# Patient Record
Sex: MALE | Race: BLACK OR AFRICAN AMERICAN | NOT HISPANIC OR LATINO | Employment: OTHER | URBAN - METROPOLITAN AREA
[De-identification: names, ages, dates, MRNs, and addresses within clinical notes are randomized per-mention and may not be internally consistent; named-entity substitution may affect disease eponyms.]

---

## 2022-01-21 LAB
APPEARANCE, UA: CLEAR
BACTERIA SPEC CULT: NORMAL
BILIRUB UR QL STRIP: NEGATIVE
COLOR UR: YELLOW
GLUCOSE (UA): NEGATIVE
HGB UR QL STRIP: NEGATIVE
KETONES UR QL STRIP: NEGATIVE
LEUKOCYTE ESTERASE UR QL STRIP: NEGATIVE
NITRITE UR QL STRIP: NEGATIVE
PH UR STRIP: 6.5 [PH] (ref 5–9)
PROT UR QL STRIP: NEGATIVE
RBC #/AREA URNS HPF: NORMAL /[HPF]
SARS-COV-2 AG RESP QL IA.RAPID: NEGATIVE
SP GR UR STRIP: 1.01 (ref 1–1.03)
SQUAMOUS EPITHELIAL, UA: NORMAL
UROBILINOGEN UR STRIP-ACNC: 0.2
WBC #/AREA URNS HPF: NORMAL /[HPF]

## 2022-01-22 LAB
ABS NEUT (OLG): 4.37 X10(3)/MCL (ref 2.1–9.2)
BASOPHILS # BLD AUTO: 0 X10(3)/MCL (ref 0–0.2)
BASOPHILS NFR BLD AUTO: 0 %
BUN SERPL-MCNC: 12 MG/DL (ref 8.4–25.7)
CALCIUM SERPL-MCNC: 8.8 MG/DL (ref 8.7–10.5)
CHLORIDE SERPL-SCNC: 99 MMOL/L (ref 98–107)
CO2 SERPL-SCNC: 29 MMOL/L (ref 23–31)
CREAT SERPL-MCNC: 0.71 MG/DL (ref 0.73–1.18)
CREAT/UREA NIT SERPL: 17
EOSINOPHIL # BLD AUTO: 0.1 X10(3)/MCL (ref 0–0.9)
EOSINOPHIL NFR BLD AUTO: 1 %
ERYTHROCYTE [DISTWIDTH] IN BLOOD BY AUTOMATED COUNT: 15.8 % (ref 11.5–17)
GLUCOSE SERPL-MCNC: 102 MG/DL (ref 82–115)
HCT VFR BLD AUTO: 28.5 % (ref 42–52)
HGB BLD-MCNC: 9.3 GM/DL (ref 14–18)
IMM GRANULOCYTES # BLD AUTO: 0.03 % (ref 0–0.02)
IMM GRANULOCYTES NFR BLD AUTO: 0.4 % (ref 0–0.43)
LYMPHOCYTES # BLD AUTO: 1.9 X10(3)/MCL (ref 0.6–4.6)
LYMPHOCYTES NFR BLD AUTO: 27 %
MCH RBC QN AUTO: 27.1 PG (ref 27–31)
MCHC RBC AUTO-ENTMCNC: 32.6 GM/DL (ref 33–36)
MCV RBC AUTO: 83.1 FL (ref 80–94)
MONOCYTES # BLD AUTO: 0.6 X10(3)/MCL (ref 0.1–1.3)
MONOCYTES NFR BLD AUTO: 9 %
NEUTROPHILS # BLD AUTO: 4.37 X10(3)/MCL (ref 1.4–7.9)
NEUTROPHILS NFR BLD AUTO: 62 %
PLATELET # BLD AUTO: 242 X10(3)/MCL (ref 130–400)
PMV BLD AUTO: 10.5 FL (ref 9.4–12.4)
POTASSIUM SERPL-SCNC: 3.7 MMOL/L (ref 3.5–5.1)
RBC # BLD AUTO: 3.43 X10(6)/MCL (ref 4.7–6.1)
SODIUM SERPL-SCNC: 136 MMOL/L (ref 136–145)
WBC # SPEC AUTO: 7 X10(3)/MCL (ref 4.5–11.5)

## 2022-01-24 ENCOUNTER — HISTORICAL (OUTPATIENT)
Dept: ADMINISTRATIVE | Facility: HOSPITAL | Age: 77
End: 2022-01-24

## 2022-01-24 LAB
ABS NEUT (OLG): 5.86 X10(3)/MCL (ref 2.1–9.2)
ALBUMIN SERPL-MCNC: 3 GM/DL (ref 3.4–4.8)
ALBUMIN/GLOB SERPL: 1 RATIO (ref 1.1–2)
ALP SERPL-CCNC: 62 UNIT/L (ref 40–150)
ALT SERPL-CCNC: 27 UNIT/L (ref 0–55)
AST SERPL-CCNC: 25 UNIT/L (ref 5–34)
BASOPHILS # BLD AUTO: 0 X10(3)/MCL (ref 0–0.2)
BASOPHILS NFR BLD AUTO: 0 %
BILIRUB SERPL-MCNC: 1.6 MG/DL
BILIRUBIN DIRECT+TOT PNL SERPL-MCNC: 0.6 MG/DL (ref 0–0.5)
BILIRUBIN DIRECT+TOT PNL SERPL-MCNC: 1 MG/DL (ref 0–0.8)
BUN SERPL-MCNC: 12.9 MG/DL (ref 8.4–25.7)
CALCIUM SERPL-MCNC: 9 MG/DL (ref 8.7–10.5)
CHLORIDE SERPL-SCNC: 105 MMOL/L (ref 98–107)
CO2 SERPL-SCNC: 22 MMOL/L (ref 23–31)
CREAT SERPL-MCNC: 0.67 MG/DL (ref 0.73–1.18)
EOSINOPHIL # BLD AUTO: 0.1 X10(3)/MCL (ref 0–0.9)
EOSINOPHIL NFR BLD AUTO: 1 %
ERYTHROCYTE [DISTWIDTH] IN BLOOD BY AUTOMATED COUNT: 16.1 % (ref 11.5–17)
GLOBULIN SER-MCNC: 3.1 GM/DL (ref 2.4–3.5)
GLUCOSE SERPL-MCNC: 92 MG/DL (ref 82–115)
HCT VFR BLD AUTO: 29.9 % (ref 42–52)
HGB BLD-MCNC: 9.9 GM/DL (ref 14–18)
IMM GRANULOCYTES # BLD AUTO: 0.06 % (ref 0–0.02)
IMM GRANULOCYTES NFR BLD AUTO: 0.7 % (ref 0–0.43)
LYMPHOCYTES # BLD AUTO: 1.7 X10(3)/MCL (ref 0.6–4.6)
LYMPHOCYTES NFR BLD AUTO: 20 %
MCH RBC QN AUTO: 27.5 PG (ref 27–31)
MCHC RBC AUTO-ENTMCNC: 33.1 GM/DL (ref 33–36)
MCV RBC AUTO: 83.1 FL (ref 80–94)
MONOCYTES # BLD AUTO: 0.8 X10(3)/MCL (ref 0.1–1.3)
MONOCYTES NFR BLD AUTO: 10 %
NEUTROPHILS # BLD AUTO: 5.86 X10(3)/MCL (ref 1.4–7.9)
NEUTROPHILS NFR BLD AUTO: 68 %
PLATELET # BLD AUTO: 342 X10(3)/MCL (ref 130–400)
PMV BLD AUTO: 10.3 FL (ref 9.4–12.4)
POTASSIUM SERPL-SCNC: 4.6 MMOL/L (ref 3.5–5.1)
PROT SERPL-MCNC: 6.1 GM/DL (ref 5.8–7.6)
RBC # BLD AUTO: 3.6 X10(6)/MCL (ref 4.7–6.1)
SODIUM SERPL-SCNC: 139 MMOL/L (ref 136–145)
WBC # SPEC AUTO: 8.6 X10(3)/MCL (ref 4.5–11.5)

## 2022-01-31 LAB
ABS NEUT (OLG): 4.1 (ref 2.1–9.2)
ALBUMIN SERPL-MCNC: 3.2 G/DL (ref 3.4–4.8)
ALBUMIN/GLOB SERPL: 1.1 {RATIO} (ref 1.1–2)
ALP SERPL-CCNC: 99 U/L (ref 40–150)
ALT SERPL-CCNC: 21 U/L (ref 0–55)
AST SERPL-CCNC: 17 U/L (ref 5–34)
BASOPHILS # BLD AUTO: 0 10*3/UL (ref 0–0.2)
BASOPHILS NFR BLD AUTO: 0 %
BILIRUB SERPL-MCNC: 1.2 MG/DL
BILIRUBIN DIRECT+TOT PNL SERPL-MCNC: 0.5 (ref 0–0.5)
BILIRUBIN DIRECT+TOT PNL SERPL-MCNC: 0.7 (ref 0–0.8)
BUN SERPL-MCNC: 13.8 MG/DL (ref 8.4–25.7)
CALCIUM SERPL-MCNC: 8.7 MG/DL (ref 8.7–10.5)
CHLORIDE SERPL-SCNC: 103 MMOL/L (ref 98–107)
CO2 SERPL-SCNC: 22 MMOL/L (ref 23–31)
CREAT SERPL-MCNC: 0.73 MG/DL (ref 0.73–1.18)
EOSINOPHIL # BLD AUTO: 0.1 10*3/UL (ref 0–0.9)
EOSINOPHIL NFR BLD AUTO: 2 %
ERYTHROCYTE [DISTWIDTH] IN BLOOD BY AUTOMATED COUNT: 17.5 % (ref 11.5–17)
GLOBULIN SER-MCNC: 2.9 G/DL (ref 2.4–3.5)
GLUCOSE SERPL-MCNC: 87 MG/DL (ref 82–115)
HCT VFR BLD AUTO: 34.4 % (ref 42–52)
HEMOLYSIS INTERF INDEX SERPL-ACNC: 4
HGB BLD-MCNC: 10.7 G/DL (ref 14–18)
ICTERIC INTERF INDEX SERPL-ACNC: 1
IMM GRANULOCYTES # BLD AUTO: 0.02 10*3/UL (ref 0–0.02)
IMM GRANULOCYTES NFR BLD AUTO: 0.3 % (ref 0–0.43)
LIPEMIC INTERF INDEX SERPL-ACNC: <0
LYMPHOCYTES # BLD AUTO: 1.6 10*3/UL (ref 0.6–4.6)
LYMPHOCYTES NFR BLD AUTO: 24 %
MANUAL DIFF? (OHS): NO
MCH RBC QN AUTO: 27.3 PG (ref 27–31)
MCHC RBC AUTO-ENTMCNC: 31.1 G/DL (ref 33–36)
MCV RBC AUTO: 87.8 FL (ref 80–94)
MONOCYTES # BLD AUTO: 0.6 10*3/UL (ref 0.1–1.3)
MONOCYTES NFR BLD AUTO: 10 %
NEUTROPHILS # BLD AUTO: 4.1 10*3/UL (ref 1.4–7.9)
NEUTROPHILS NFR BLD AUTO: 64 %
PLATELET # BLD AUTO: 463 10*3/UL (ref 130–400)
PMV BLD AUTO: 10.2 FL (ref 9.4–12.4)
POTASSIUM SERPL-SCNC: 4.8 MMOL/L (ref 3.5–5.1)
PROT SERPL-MCNC: 6.1 G/DL (ref 5.8–7.6)
RBC # BLD AUTO: 3.92 10*6/UL (ref 4.7–6.1)
SODIUM SERPL-SCNC: 135 MMOL/L (ref 136–145)
WBC # SPEC AUTO: 6.4 10*3/UL (ref 4.5–11.5)

## 2022-02-01 ENCOUNTER — HISTORICAL (OUTPATIENT)
Dept: ADMINISTRATIVE | Facility: HOSPITAL | Age: 77
End: 2022-02-01

## 2022-02-07 LAB
ABS NEUT (OLG): 2.68 (ref 2.1–9.2)
ALBUMIN SERPL-MCNC: 3.2 G/DL (ref 3.4–4.8)
ALBUMIN/GLOB SERPL: 1.1 {RATIO} (ref 1.1–2)
ALP SERPL-CCNC: 125 U/L (ref 40–150)
ALT SERPL-CCNC: 23 U/L (ref 0–55)
AST SERPL-CCNC: 18 U/L (ref 5–34)
BASOPHILS # BLD AUTO: 0 10*3/UL (ref 0–0.2)
BASOPHILS NFR BLD AUTO: 0 %
BILIRUB SERPL-MCNC: 0.9 MG/DL
BILIRUBIN DIRECT+TOT PNL SERPL-MCNC: 0.4 (ref 0–0.5)
BILIRUBIN DIRECT+TOT PNL SERPL-MCNC: 0.5 (ref 0–0.8)
BUN SERPL-MCNC: 12.9 MG/DL (ref 8.4–25.7)
CALCIUM SERPL-MCNC: 10 MG/DL (ref 8.7–10.5)
CHLORIDE SERPL-SCNC: 103 MMOL/L (ref 98–107)
CO2 SERPL-SCNC: 25 MMOL/L (ref 23–31)
CREAT SERPL-MCNC: 0.72 MG/DL (ref 0.73–1.18)
EOSINOPHIL # BLD AUTO: 0.2 10*3/UL (ref 0–0.9)
EOSINOPHIL NFR BLD AUTO: 3 %
ERYTHROCYTE [DISTWIDTH] IN BLOOD BY AUTOMATED COUNT: 16.8 % (ref 11.5–17)
GLOBULIN SER-MCNC: 3 G/DL (ref 2.4–3.5)
GLUCOSE SERPL-MCNC: 86 MG/DL (ref 82–115)
HCT VFR BLD AUTO: 34.2 % (ref 42–52)
HEMOLYSIS INTERF INDEX SERPL-ACNC: 4
HEMOLYSIS INTERF INDEX SERPL-ACNC: 4
HGB BLD-MCNC: 10.8 G/DL (ref 14–18)
ICTERIC INTERF INDEX SERPL-ACNC: 1
IMM GRANULOCYTES # BLD AUTO: 0.01 10*3/UL (ref 0–0.02)
IMM GRANULOCYTES NFR BLD AUTO: 0.2 % (ref 0–0.43)
LIPEMIC INTERF INDEX SERPL-ACNC: <0
LYMPHOCYTES # BLD AUTO: 1.5 10*3/UL (ref 0.6–4.6)
LYMPHOCYTES NFR BLD AUTO: 30 %
MAGNESIUM SERPL-MCNC: 1.9 MG/DL (ref 1.6–2.6)
MANUAL DIFF? (OHS): NO
MCH RBC QN AUTO: 27.3 PG (ref 27–31)
MCHC RBC AUTO-ENTMCNC: 31.6 G/DL (ref 33–36)
MCV RBC AUTO: 86.6 FL (ref 80–94)
MONOCYTES # BLD AUTO: 0.6 10*3/UL (ref 0.1–1.3)
MONOCYTES NFR BLD AUTO: 12 %
NEUTROPHILS # BLD AUTO: 2.68 10*3/UL (ref 1.4–7.9)
NEUTROPHILS NFR BLD AUTO: 54 %
PLATELET # BLD AUTO: 377 10*3/UL (ref 130–400)
PMV BLD AUTO: 9.5 FL (ref 9.4–12.4)
POTASSIUM SERPL-SCNC: 4.4 MMOL/L (ref 3.5–5.1)
PROT SERPL-MCNC: 6.2 G/DL (ref 5.8–7.6)
RBC # BLD AUTO: 3.95 10*6/UL (ref 4.7–6.1)
SODIUM SERPL-SCNC: 136 MMOL/L (ref 136–145)
WBC # SPEC AUTO: 5 10*3/UL (ref 4.5–11.5)

## 2022-03-16 ENCOUNTER — HISTORICAL (OUTPATIENT)
Dept: RADIOLOGY | Facility: HOSPITAL | Age: 77
End: 2022-03-16

## 2022-03-22 ENCOUNTER — HISTORICAL (OUTPATIENT)
Dept: ADMINISTRATIVE | Facility: HOSPITAL | Age: 77
End: 2022-03-22

## 2022-03-22 ENCOUNTER — HISTORICAL (OUTPATIENT)
Dept: CARDIOLOGY | Facility: HOSPITAL | Age: 77
End: 2022-03-22

## 2022-03-22 LAB
ABS NEUT (OLG): 5.49 (ref 2.1–9.2)
BASOPHILS # BLD AUTO: 0 10*3/UL (ref 0–0.2)
BASOPHILS NFR BLD AUTO: 0 %
EOSINOPHIL # BLD AUTO: 0 10*3/UL (ref 0–0.9)
EOSINOPHIL NFR BLD AUTO: 0 %
ERYTHROCYTE [DISTWIDTH] IN BLOOD BY AUTOMATED COUNT: 14.5 % (ref 11.5–17)
HCT VFR BLD AUTO: 38.5 % (ref 42–52)
HGB BLD-MCNC: 12.9 G/DL (ref 14–18)
INR PPP: 1 (ref 0–1.3)
LYMPHOCYTES # BLD AUTO: 1.8 10*3/UL (ref 0.6–4.6)
LYMPHOCYTES NFR BLD AUTO: 23 %
MANUAL DIFF? (OHS): NO
MCH RBC QN AUTO: 28.2 PG (ref 27–31)
MCHC RBC AUTO-ENTMCNC: 33.5 G/DL (ref 33–36)
MCV RBC AUTO: 84.2 FL (ref 80–94)
MONOCYTES # BLD AUTO: 0.6 10*3/UL (ref 0.1–1.3)
MONOCYTES NFR BLD AUTO: 8 %
NEUTROPHILS # BLD AUTO: 5.49 10*3/UL (ref 2.1–9.2)
NEUTROPHILS NFR BLD AUTO: 68 %
PLATELET # BLD AUTO: 237 10*3/UL (ref 130–400)
PMV BLD AUTO: 10 FL (ref 9.4–12.4)
PROTHROMBIN TIME: 13.2 S (ref 12.5–14.5)
RBC # BLD AUTO: 4.57 10*6/UL (ref 4.7–6.1)
WBC # SPEC AUTO: 8 10*3/UL (ref 4.5–11.5)

## 2022-04-01 ENCOUNTER — HISTORICAL (OUTPATIENT)
Dept: ADMINISTRATIVE | Facility: HOSPITAL | Age: 77
End: 2022-04-01

## 2022-04-19 ENCOUNTER — HISTORICAL (OUTPATIENT)
Dept: ADMINISTRATIVE | Facility: HOSPITAL | Age: 77
End: 2022-04-19
Payer: MEDICARE

## 2022-04-19 LAB
ABS NEUT (OLG): 3.3 (ref 2.1–9.2)
ALBUMIN SERPL-MCNC: 3.3 G/DL (ref 3.4–4.8)
ALBUMIN/GLOB SERPL: 1.1 {RATIO} (ref 1.1–2)
ALP SERPL-CCNC: 89 U/L (ref 40–150)
ALT SERPL-CCNC: 18 U/L (ref 0–55)
AST SERPL-CCNC: 14 U/L (ref 5–34)
BASOPHILS # BLD AUTO: 0 10*3/UL (ref 0–0.2)
BASOPHILS NFR BLD AUTO: 1 %
BILIRUB SERPL-MCNC: 0.4 MG/DL
BILIRUBIN DIRECT+TOT PNL SERPL-MCNC: 0.2 (ref 0–0.5)
BILIRUBIN DIRECT+TOT PNL SERPL-MCNC: 0.2 (ref 0–0.8)
BUN SERPL-MCNC: 9.7 MG/DL (ref 8.4–25.7)
CALCIUM SERPL-MCNC: 9.3 MG/DL (ref 8.7–10.5)
CHLORIDE SERPL-SCNC: 112 MMOL/L (ref 98–107)
CO2 SERPL-SCNC: 25 MMOL/L (ref 23–31)
CREAT SERPL-MCNC: 0.77 MG/DL (ref 0.73–1.18)
EOSINOPHIL # BLD AUTO: 0.1 10*3/UL (ref 0–0.9)
EOSINOPHIL NFR BLD AUTO: 2 %
ERYTHROCYTE [DISTWIDTH] IN BLOOD BY AUTOMATED COUNT: 16.2 % (ref 11.5–17)
GLOBULIN SER-MCNC: 3.1 G/DL (ref 2.4–3.5)
GLUCOSE SERPL-MCNC: 84 MG/DL (ref 82–115)
HCT VFR BLD AUTO: 33.2 % (ref 42–52)
HEMOLYSIS INTERF INDEX SERPL-ACNC: 1
HGB BLD-MCNC: 10.1 G/DL (ref 14–18)
ICTERIC INTERF INDEX SERPL-ACNC: 0
LIPEMIC INTERF INDEX SERPL-ACNC: 5
LYMPHOCYTES # BLD AUTO: 1.2 10*3/UL (ref 0.6–4.6)
LYMPHOCYTES NFR BLD AUTO: 25 %
MANUAL DIFF? (OHS): NO
MCH RBC QN AUTO: 27.4 PG (ref 27–31)
MCHC RBC AUTO-ENTMCNC: 30.4 G/DL (ref 33–36)
MCV RBC AUTO: 90.2 FL (ref 80–94)
MONOCYTES # BLD AUTO: 0.3 10*3/UL (ref 0.1–1.3)
MONOCYTES NFR BLD AUTO: 7 %
NEUTROPHILS # BLD AUTO: 3.3 10*3/UL (ref 2.1–9.2)
NEUTROPHILS NFR BLD AUTO: 66 %
PLATELET # BLD AUTO: 326 10*3/UL (ref 130–400)
PMV BLD AUTO: 10.2 FL (ref 9.4–12.4)
POTASSIUM SERPL-SCNC: 4.1 MMOL/L (ref 3.5–5.1)
PROT SERPL-MCNC: 6.4 G/DL (ref 5.8–7.6)
RBC # BLD AUTO: 3.68 10*6/UL (ref 4.7–6.1)
SODIUM SERPL-SCNC: 143 MMOL/L (ref 136–145)
WBC # SPEC AUTO: 5 10*3/UL (ref 4.5–11.5)

## 2022-04-26 ENCOUNTER — HISTORICAL (OUTPATIENT)
Dept: RESPIRATORY THERAPY | Facility: HOSPITAL | Age: 77
End: 2022-04-26
Payer: MEDICARE

## 2022-04-27 ENCOUNTER — HISTORICAL (OUTPATIENT)
Dept: RADIOLOGY | Facility: HOSPITAL | Age: 77
End: 2022-04-27
Payer: MEDICARE

## 2022-05-10 RX ORDER — SUCRALFATE 1 G/1
1 TABLET ORAL
COMMUNITY
Start: 2022-05-05 | End: 2022-05-10 | Stop reason: SDUPTHER

## 2022-05-10 RX ORDER — SUCRALFATE 1 G/1
1 TABLET ORAL
Qty: 120 TABLET | Refills: 1 | Status: SHIPPED | OUTPATIENT
Start: 2022-05-10 | End: 2022-05-17 | Stop reason: SDUPTHER

## 2022-05-14 NOTE — DISCHARGE SUMMARY
Admit and Discharge Dates  Admit Date: 01/21/2022  Discharge Date: 02/14/2022  Physicians  Attending Physician - Stephanie VILLANUEVA, Sundeep BERNSTEIN  Admitting Physician - Stephanie VILLANUEVA, Sundeep BERNSTEIN  Consulting Physician - Lynn VILLANUEVA, Joseph  Primary Care Physician - Pepito VILLANUEVA, Axel  Discharge Diagnosis  1.?Debility?R53.81  2.?Closed intertrochanteric fracture of left hip?S72.142A  3.?Postoperative anemia due to acute blood loss?D62  4.?Hypertension?I10  5.?Hyperlipidemia?E78.5  6.?GERD (gastroesophageal reflux disease)?K21.9  7.?Tobacco dependence?F17.200  Surgical Procedures  No procedures recorded for this visit.  Immunizations  No immunizations recorded for this visit.  Hospital Course  Mr. Rosales is a 75 y/o AAM with a h/o HTN, hyperlipidemia, and GERD who was transferred from [facility] on 1/21/22 for rehab s/p IM nailing of left intertrochanteric hip fracture. He initially fell onto his left side on 1/16/22 and he presented to [facility] ER for evaluation. X-ray of the left?femur showed a displaced comminuted intertrochanteric fracture of the left femur?and orthopedics was consulted. He underwent open reduction IM nailing of left displaced comminuted intertrochanteric hip fracture on 1/18/22 by Dr. Fortino Palomares. Postoperatively he developed a worsening anemia with a hemoglobin and hematocrit of 7.7 and 23.8 on 1/18/22 and he was transfused with 2 units packed RBCs. He had no other complications throughout his hospital course and he was transferred to [facility] for PT/OT and management of his multiple medical comorbidities.?He completed a 7 day course of keflex on 1/31/22 due to left hip drainage?and his wound culture grew normal skin everette.?He has been participating in therapy with slow progress?due to?TTWB status. He had no other complications throughout his hospital course and he was discharged home in stable condition.  Time Spent on discharge  >30 minutes  Objective  Vitals & Measurements  T:?36.5? ?C  (Oral)? TMIN:?36.5? ?C (Oral)? TMAX:?36.6? ?C (Oral)? HR:?85(Peripheral)? HR:?84(Monitored)? RR:?18? BP:?135/78? SpO2:?98%?  Physical Exam  General: Elderly obese AAM in NAD.  CVS: RRR.  Respiratory: CTA(B).  Abdomen: Soft, NT, ND, BS+.  Extremities: No c/c/e.  Neuro: A+Ox4.  Skin: Left hip wound c/d/i.?  Patient Discharge Condition  Stable  Discharge Disposition  Home  ?  This service was provided via telemedicine.  Type of Software: Audio/Visual.  Originating Site: [facility].  Distant Site: [facility].  His exam was performed with the assistance of Kacie Avilez RN.   Discharge Medication Reconciliation  Continue  acetaminophen-HYDROcodone (acetaminophen-hydrocodone 325 mg-7.5 mg oral tablet)?1 tab(s), Oral, q4hr, PRN pain  amLODIPine (Amlodipine Besylate 5 Mg)?5 mg, Oral, BID  ascorbic acid (ascorbic acid 500 mg oral tablet)?500 mg, Oral, At Bedtime  aspirin (aspirin 81 mg oral Delayed Release (EC) tablet)?81 mg, Oral, BID  bisacodyl (Dulcolax Laxative 10 mg RECTAL suppository)?10 mg, IL (rectal), Daily, PRN constipation  docusate-senna (docusate-senna 50 mg-8.6 mg oral tablet)?2 tab(s), Oral, Once a day (at bedtime)  famotidine (Pepcid 20 mg oral tablet)?20 mg, Oral, Daily  hydroCHLOROthiazide-lisinopril (Lisinopril-Hctz 20/12.5 T)?1 tab(s), Oral, Daily  lovastatin (Lovastatin 40 Mg Tablet)?40 mg, Oral, Daily  methocarbamol (Robaxin 750 mg oral tablet)?750 mg, Oral, q6hr, PRN spasm  nicotine (nicotine 14 mg/24 hr transdermal film, extended release), TD, Daily  pantoprazole (Protonix 40 mg ORAL enteric coated tablet)?40 mg, Oral, Daily  polyethylene glycol 3350 (polyethylene glycol 3350 oral powder for reconstitution)?17 gm, Oral, Daily  temazepam (Restoril 15 mg oral capsule)?15 mg, Oral, At Bedtime, PRN insomnia  Discontinue  enoxaparin?40 mg, Subcutaneous, Daily  Education and Orders Provided  --ALL-Hypertension, Easy-to-Read (MLTAMPORELLO)  Anemia  Hip Fracture Treated With ORIF, Care  After  Smoking Cessation, Tips for Success (Custom)  [facility] - Next Dose Due (JLVICKNAIR)  Discharge - 02/14/22 9:40:00 CST, Home?  Discharge Activity - Other:, TTWB?  Discharge Diet - Cardiac?  Follow up  Nery Pulliam, on 02/24/2022  ????Follow up appointment with Annie Pulliam on Thursday, February 24, 2022 @ 10:30 am. ?Spoke Toshia.  Fortino Palomares, on 02/16/2022  ????Follow up appointment with Dr. Fortino Palomares on Wednesday, February 16, 2022 @ 10:30 am. ?Spoke to Gertrude.  [facility] Phone: [phone]  [facility] Phone: [phone]

## 2022-05-17 ENCOUNTER — TELEPHONE (OUTPATIENT)
Dept: FAMILY MEDICINE | Facility: CLINIC | Age: 77
End: 2022-05-17
Payer: MEDICARE

## 2022-05-17 RX ORDER — AMLODIPINE BESYLATE 5 MG/1
5 TABLET ORAL 2 TIMES DAILY
COMMUNITY
Start: 2022-04-29 | End: 2022-06-08 | Stop reason: SDUPTHER

## 2022-05-17 RX ORDER — LISINOPRIL AND HYDROCHLOROTHIAZIDE 12.5; 2 MG/1; MG/1
1 TABLET ORAL DAILY
Status: ON HOLD | COMMUNITY
Start: 2022-04-13 | End: 2022-05-24 | Stop reason: CLARIF

## 2022-05-17 RX ORDER — SUCRALFATE 1 G/1
1 TABLET ORAL
COMMUNITY
Start: 2022-04-12 | End: 2022-06-08 | Stop reason: ALTCHOICE

## 2022-05-17 RX ORDER — PANTOPRAZOLE SODIUM 40 MG/1
40 TABLET, DELAYED RELEASE ORAL
COMMUNITY
Start: 2022-04-26 | End: 2022-05-17 | Stop reason: SDUPTHER

## 2022-05-17 RX ORDER — LINACLOTIDE 145 UG/1
145 CAPSULE, GELATIN COATED ORAL DAILY PRN
COMMUNITY
Start: 2022-03-28

## 2022-05-17 RX ORDER — LOVASTATIN 40 MG/1
40 TABLET ORAL DAILY
COMMUNITY
Start: 2022-03-18 | End: 2022-05-17 | Stop reason: SDUPTHER

## 2022-05-17 RX ORDER — AMOXICILLIN 250 MG
CAPSULE ORAL
Status: ON HOLD | COMMUNITY
Start: 2022-01-20 | End: 2022-05-24 | Stop reason: CLARIF

## 2022-05-17 RX ORDER — MUPIROCIN CALCIUM 20 MG/G
CREAM TOPICAL
Status: ON HOLD | COMMUNITY
Start: 2022-04-18 | End: 2022-05-28 | Stop reason: HOSPADM

## 2022-05-17 RX ORDER — HYDROCODONE BITARTRATE AND ACETAMINOPHEN 7.5; 325 MG/1; MG/1
1 TABLET ORAL EVERY 4 HOURS PRN
Status: ON HOLD | COMMUNITY
Start: 2022-02-15 | End: 2022-05-28 | Stop reason: SDUPTHER

## 2022-05-17 RX ORDER — ATORVASTATIN CALCIUM 10 MG/1
10 TABLET, FILM COATED ORAL DAILY
COMMUNITY
Start: 2022-05-05 | End: 2022-05-17 | Stop reason: SDUPTHER

## 2022-05-17 RX ORDER — METHOCARBAMOL 750 MG/1
750 TABLET, FILM COATED ORAL
COMMUNITY
Start: 2022-02-15 | End: 2022-06-08

## 2022-05-17 RX ORDER — ASCORBIC ACID 500 MG
500 TABLET ORAL DAILY
Status: ON HOLD | COMMUNITY
Start: 2022-01-21 | End: 2022-05-24 | Stop reason: CLARIF

## 2022-05-17 NOTE — TELEPHONE ENCOUNTER
Patient's Medicare will not pay for wheelchair since he is able to ambulate with a cane and/or walker.

## 2022-05-18 DIAGNOSIS — Z79.1 ENCOUNTER FOR MONITORING CHRONIC NSAID THERAPY: ICD-10-CM

## 2022-05-18 DIAGNOSIS — Z51.81 ENCOUNTER FOR MONITORING CHRONIC NSAID THERAPY: ICD-10-CM

## 2022-05-18 DIAGNOSIS — R91.8 LUNG MASS: Primary | ICD-10-CM

## 2022-05-23 ENCOUNTER — ANESTHESIA EVENT (OUTPATIENT)
Dept: SURGERY | Facility: HOSPITAL | Age: 77
DRG: 165 | End: 2022-05-23
Payer: MEDICARE

## 2022-05-23 ENCOUNTER — HOSPITAL ENCOUNTER (OUTPATIENT)
Dept: RADIOLOGY | Facility: HOSPITAL | Age: 77
Discharge: HOME | DRG: 165 | End: 2022-05-23
Attending: THORACIC SURGERY (CARDIOTHORACIC VASCULAR SURGERY)
Payer: MEDICARE

## 2022-05-23 DIAGNOSIS — R91.8 LUNG MASS: ICD-10-CM

## 2022-05-23 PROCEDURE — 36415 COLL VENOUS BLD VENIPUNCTURE: CPT | Performed by: THORACIC SURGERY (CARDIOTHORACIC VASCULAR SURGERY)

## 2022-05-23 PROCEDURE — 71046 X-RAY EXAM CHEST 2 VIEWS: CPT | Mod: TC

## 2022-05-23 NOTE — H&P
[facility]  History & Physical  Cardiothoracic Surgery    SUBJECTIVE:     Chief Complaint/Reason for Admission: lung cancer    History of Present Illness:  Patient is a 76 y.o. male presents with squamous cell ca      Family History of Heart Disease: no    No current facility-administered medications on file prior to encounter.     No current outpatient medications on file prior to encounter.        Review of patient's allergies indicates:  No Known Allergies     Past Medical History:   Diagnosis Date    Hypertension     Lung mass         Past Surgical History:   Procedure Laterality Date    BRONCHOSCOPY      HIP FRACTURE SURGERY Left            Review of Systems:  Review of Systems   All other systems reviewed and are negative.       OBJECTIVE:        Physical Exam:  Physical Exam  Vitals reviewed.   HENT:      Head: Normocephalic.      Right Ear: Tympanic membrane normal.      Left Ear: Tympanic membrane normal.      Nose: Nose normal.      Mouth/Throat:      Mouth: Mucous membranes are moist.   Eyes:      Pupils: Pupils are equal, round, and reactive to light.   Cardiovascular:      Rate and Rhythm: Normal rate and regular rhythm.      Pulses: Normal pulses.   Pulmonary:      Effort: Pulmonary effort is normal.      Breath sounds: Normal breath sounds.   Abdominal:      Palpations: Abdomen is soft.   Musculoskeletal:         General: Normal range of motion.      Cervical back: Normal range of motion.   Skin:     General: Skin is warm.   Neurological:      General: No focal deficit present.      Mental Status: He is alert.   Psychiatric:         Mood and Affect: Mood normal.          Laboratory:  I have reviewed all pertinent lab results within the past 24 hours.    Diagnostic Results:  CT: Reviewed          ASSESSMENT/PLAN:   A; RUL ca  P: RUL segement vs lobectomy

## 2022-05-24 ENCOUNTER — ANESTHESIA (OUTPATIENT)
Dept: SURGERY | Facility: HOSPITAL | Age: 77
DRG: 165 | End: 2022-05-24
Payer: MEDICARE

## 2022-05-24 ENCOUNTER — HOSPITAL ENCOUNTER (INPATIENT)
Facility: HOSPITAL | Age: 77
LOS: 4 days | Discharge: HOME HEALTH | DRG: 165 | End: 2022-05-28
Attending: THORACIC SURGERY (CARDIOTHORACIC VASCULAR SURGERY) | Admitting: THORACIC SURGERY (CARDIOTHORACIC VASCULAR SURGERY)
Payer: MEDICARE

## 2022-05-24 DIAGNOSIS — R91.8 LUNG MASS: Primary | ICD-10-CM

## 2022-05-24 DIAGNOSIS — Z51.81 ENCOUNTER FOR MONITORING NSAID THERAPY: ICD-10-CM

## 2022-05-24 DIAGNOSIS — Z79.1 ENCOUNTER FOR MONITORING NSAID THERAPY: ICD-10-CM

## 2022-05-24 LAB
ANION GAP SERPL CALC-SCNC: 12 MEQ/L
BASOPHILS # BLD AUTO: 0.03 X10(3)/MCL (ref 0–0.2)
BASOPHILS NFR BLD AUTO: 0.3 %
BUN SERPL-MCNC: 12.9 MG/DL (ref 8.4–25.7)
CALCIUM SERPL-MCNC: 8.9 MG/DL (ref 8.8–10)
CHLORIDE SERPL-SCNC: 108 MMOL/L (ref 98–107)
CO2 SERPL-SCNC: 19 MMOL/L (ref 23–31)
CREAT SERPL-MCNC: 0.73 MG/DL (ref 0.73–1.18)
CREAT/UREA NIT SERPL: 18
EOSINOPHIL # BLD AUTO: 0 X10(3)/MCL (ref 0–0.9)
EOSINOPHIL NFR BLD AUTO: 0 %
ERYTHROCYTE [DISTWIDTH] IN BLOOD BY AUTOMATED COUNT: 16.3 % (ref 11.5–17)
GLUCOSE SERPL-MCNC: 128 MG/DL (ref 82–115)
HCT VFR BLD AUTO: 29.7 % (ref 42–52)
HGB BLD-MCNC: 9.8 GM/DL (ref 14–18)
IMM GRANULOCYTES # BLD AUTO: 0.05 X10(3)/MCL (ref 0–0.02)
IMM GRANULOCYTES NFR BLD AUTO: 0.4 % (ref 0–0.43)
LYMPHOCYTES # BLD AUTO: 0.55 X10(3)/MCL (ref 0.6–4.6)
LYMPHOCYTES NFR BLD AUTO: 4.9 %
MCH RBC QN AUTO: 26.3 PG (ref 27–31)
MCHC RBC AUTO-ENTMCNC: 33 MG/DL (ref 33–36)
MCV RBC AUTO: 79.6 FL (ref 80–94)
MONOCYTES # BLD AUTO: 0.7 X10(3)/MCL (ref 0.1–1.3)
MONOCYTES NFR BLD AUTO: 6.3 %
NEUTROPHILS # BLD AUTO: 9.8 X10(3)/MCL (ref 2.1–9.2)
NEUTROPHILS NFR BLD AUTO: 88.1 %
NRBC BLD AUTO-RTO: 0 %
PLATELET # BLD AUTO: 286 X10(3)/MCL (ref 130–400)
PMV BLD AUTO: 10.2 FL (ref 9.4–12.4)
POCT GLUCOSE: 136 MG/DL (ref 70–110)
POTASSIUM SERPL-SCNC: 4.3 MMOL/L (ref 3.5–5.1)
RBC # BLD AUTO: 3.73 X10(6)/MCL (ref 4.7–6.1)
SODIUM SERPL-SCNC: 139 MMOL/L (ref 136–145)
WBC # SPEC AUTO: 11.1 X10(3)/MCL (ref 4.5–11.5)

## 2022-05-24 PROCEDURE — 80048 BASIC METABOLIC PNL TOTAL CA: CPT | Performed by: THORACIC SURGERY (CARDIOTHORACIC VASCULAR SURGERY)

## 2022-05-24 PROCEDURE — 36415 COLL VENOUS BLD VENIPUNCTURE: CPT | Performed by: THORACIC SURGERY (CARDIOTHORACIC VASCULAR SURGERY)

## 2022-05-24 PROCEDURE — 32480 PARTIAL REMOVAL OF LUNG: CPT | Mod: AS,RT,,

## 2022-05-24 PROCEDURE — 25000003 PHARM REV CODE 250: Performed by: THORACIC SURGERY (CARDIOTHORACIC VASCULAR SURGERY)

## 2022-05-24 PROCEDURE — 32480 PR REMOVAL OF LUNG,LOBECTOMY: ICD-10-PCS | Mod: RT,,, | Performed by: THORACIC SURGERY (CARDIOTHORACIC VASCULAR SURGERY)

## 2022-05-24 PROCEDURE — C1894 INTRO/SHEATH, NON-LASER: HCPCS | Performed by: THORACIC SURGERY (CARDIOTHORACIC VASCULAR SURGERY)

## 2022-05-24 PROCEDURE — A4306 DRUG DELIVERY SYSTEM <=50 ML: HCPCS | Performed by: THORACIC SURGERY (CARDIOTHORACIC VASCULAR SURGERY)

## 2022-05-24 PROCEDURE — 36620 INSERTION CATHETER ARTERY: CPT | Performed by: NURSE ANESTHETIST, CERTIFIED REGISTERED

## 2022-05-24 PROCEDURE — 36000710: Performed by: THORACIC SURGERY (CARDIOTHORACIC VASCULAR SURGERY)

## 2022-05-24 PROCEDURE — 63600175 PHARM REV CODE 636 W HCPCS: Performed by: THORACIC SURGERY (CARDIOTHORACIC VASCULAR SURGERY)

## 2022-05-24 PROCEDURE — 37000009 HC ANESTHESIA EA ADD 15 MINS: Performed by: THORACIC SURGERY (CARDIOTHORACIC VASCULAR SURGERY)

## 2022-05-24 PROCEDURE — 99223 1ST HOSP IP/OBS HIGH 75: CPT | Mod: 57,,, | Performed by: PHYSICIAN ASSISTANT

## 2022-05-24 PROCEDURE — 63600175 PHARM REV CODE 636 W HCPCS: Performed by: ANESTHESIOLOGY

## 2022-05-24 PROCEDURE — 63600175 PHARM REV CODE 636 W HCPCS

## 2022-05-24 PROCEDURE — 32480 PARTIAL REMOVAL OF LUNG: CPT | Mod: RT,,, | Performed by: THORACIC SURGERY (CARDIOTHORACIC VASCULAR SURGERY)

## 2022-05-24 PROCEDURE — 38746 PR REMOVE THOR LYMPH NODES RAD REGNL: ICD-10-PCS | Mod: ,,, | Performed by: THORACIC SURGERY (CARDIOTHORACIC VASCULAR SURGERY)

## 2022-05-24 PROCEDURE — 38746 REMOVE THORACIC LYMPH NODES: CPT | Mod: ,,, | Performed by: THORACIC SURGERY (CARDIOTHORACIC VASCULAR SURGERY)

## 2022-05-24 PROCEDURE — 71000039 HC RECOVERY, EACH ADD'L HOUR: Performed by: THORACIC SURGERY (CARDIOTHORACIC VASCULAR SURGERY)

## 2022-05-24 PROCEDURE — 25000003 PHARM REV CODE 250: Performed by: NURSE ANESTHETIST, CERTIFIED REGISTERED

## 2022-05-24 PROCEDURE — 71000033 HC RECOVERY, INTIAL HOUR: Performed by: THORACIC SURGERY (CARDIOTHORACIC VASCULAR SURGERY)

## 2022-05-24 PROCEDURE — 63600175 PHARM REV CODE 636 W HCPCS: Performed by: NURSE ANESTHETIST, CERTIFIED REGISTERED

## 2022-05-24 PROCEDURE — 94761 N-INVAS EAR/PLS OXIMETRY MLT: CPT

## 2022-05-24 PROCEDURE — 94799 UNLISTED PULMONARY SVC/PX: CPT

## 2022-05-24 PROCEDURE — 32480 PR REMOVAL OF LUNG,LOBECTOMY: ICD-10-PCS | Mod: AS,RT,,

## 2022-05-24 PROCEDURE — 99223 PR INITIAL HOSPITAL CARE,LEVL III: ICD-10-PCS | Mod: 57,,, | Performed by: PHYSICIAN ASSISTANT

## 2022-05-24 PROCEDURE — 38746 REMOVE THORACIC LYMPH NODES: CPT | Mod: AS,,,

## 2022-05-24 PROCEDURE — 85025 COMPLETE CBC W/AUTO DIFF WBC: CPT | Performed by: THORACIC SURGERY (CARDIOTHORACIC VASCULAR SURGERY)

## 2022-05-24 PROCEDURE — 36000711: Performed by: THORACIC SURGERY (CARDIOTHORACIC VASCULAR SURGERY)

## 2022-05-24 PROCEDURE — 38746 PR REMOVE THOR LYMPH NODES RAD REGNL: ICD-10-PCS | Mod: AS,,,

## 2022-05-24 PROCEDURE — 11000001 HC ACUTE MED/SURG PRIVATE ROOM

## 2022-05-24 PROCEDURE — 37000008 HC ANESTHESIA 1ST 15 MINUTES: Performed by: THORACIC SURGERY (CARDIOTHORACIC VASCULAR SURGERY)

## 2022-05-24 PROCEDURE — 27201423 OPTIME MED/SURG SUP & DEVICES STERILE SUPPLY: Performed by: THORACIC SURGERY (CARDIOTHORACIC VASCULAR SURGERY)

## 2022-05-24 PROCEDURE — 27000221 HC OXYGEN, UP TO 24 HOURS

## 2022-05-24 PROCEDURE — C1729 CATH, DRAINAGE: HCPCS | Performed by: THORACIC SURGERY (CARDIOTHORACIC VASCULAR SURGERY)

## 2022-05-24 RX ORDER — CEFAZOLIN SODIUM 2 G/50ML
2 SOLUTION INTRAVENOUS
Status: COMPLETED | OUTPATIENT
Start: 2022-05-24 | End: 2022-05-24

## 2022-05-24 RX ORDER — VANCOMYCIN HCL IN 5 % DEXTROSE 1G/250ML
1000 PLASTIC BAG, INJECTION (ML) INTRAVENOUS
Status: DISCONTINUED | OUTPATIENT
Start: 2022-05-24 | End: 2022-05-24

## 2022-05-24 RX ORDER — DOCUSATE SODIUM 100 MG/1
100 CAPSULE, LIQUID FILLED ORAL 2 TIMES DAILY
Status: DISCONTINUED | OUTPATIENT
Start: 2022-05-24 | End: 2022-05-28 | Stop reason: HOSPADM

## 2022-05-24 RX ORDER — LIDOCAINE HYDROCHLORIDE 10 MG/ML
1 INJECTION, SOLUTION EPIDURAL; INFILTRATION; INTRACAUDAL; PERINEURAL ONCE
Status: CANCELLED | OUTPATIENT
Start: 2022-05-24 | End: 2022-05-24

## 2022-05-24 RX ORDER — FAMOTIDINE 20 MG/1
20 TABLET, FILM COATED ORAL 2 TIMES DAILY
Status: DISCONTINUED | OUTPATIENT
Start: 2022-05-24 | End: 2022-05-28 | Stop reason: HOSPADM

## 2022-05-24 RX ORDER — BUPIVACAINE HYDROCHLORIDE 5 MG/ML
INJECTION, SOLUTION EPIDURAL; INTRACAUDAL
Status: DISCONTINUED | OUTPATIENT
Start: 2022-05-24 | End: 2022-05-24 | Stop reason: HOSPADM

## 2022-05-24 RX ORDER — MIDAZOLAM HYDROCHLORIDE 1 MG/ML
INJECTION INTRAMUSCULAR; INTRAVENOUS
Status: DISCONTINUED | OUTPATIENT
Start: 2022-05-24 | End: 2022-05-24

## 2022-05-24 RX ORDER — OXYCODONE HYDROCHLORIDE 5 MG/1
5 TABLET ORAL EVERY 4 HOURS PRN
Status: DISCONTINUED | OUTPATIENT
Start: 2022-05-24 | End: 2022-05-28 | Stop reason: HOSPADM

## 2022-05-24 RX ORDER — SODIUM CHLORIDE 450 MG/100ML
75 INJECTION, SOLUTION INTRAVENOUS CONTINUOUS
Status: ACTIVE | OUTPATIENT
Start: 2022-05-24 | End: 2022-05-25

## 2022-05-24 RX ORDER — ESMOLOL HYDROCHLORIDE 10 MG/ML
INJECTION INTRAVENOUS
Status: DISCONTINUED | OUTPATIENT
Start: 2022-05-24 | End: 2022-05-24

## 2022-05-24 RX ORDER — ROCURONIUM BROMIDE 10 MG/ML
INJECTION, SOLUTION INTRAVENOUS
Status: DISCONTINUED | OUTPATIENT
Start: 2022-05-24 | End: 2022-05-24

## 2022-05-24 RX ORDER — SODIUM CHLORIDE, SODIUM GLUCONATE, SODIUM ACETATE, POTASSIUM CHLORIDE AND MAGNESIUM CHLORIDE 30; 37; 368; 526; 502 MG/100ML; MG/100ML; MG/100ML; MG/100ML; MG/100ML
1000 INJECTION, SOLUTION INTRAVENOUS CONTINUOUS
Status: DISCONTINUED | OUTPATIENT
Start: 2022-05-24 | End: 2022-05-24

## 2022-05-24 RX ORDER — HALOPERIDOL 5 MG/ML
0.5 INJECTION INTRAMUSCULAR EVERY 10 MIN PRN
Status: DISCONTINUED | OUTPATIENT
Start: 2022-05-24 | End: 2022-05-24

## 2022-05-24 RX ORDER — FENTANYL CITRATE 50 UG/ML
INJECTION, SOLUTION INTRAMUSCULAR; INTRAVENOUS
Status: DISCONTINUED | OUTPATIENT
Start: 2022-05-24 | End: 2022-05-24

## 2022-05-24 RX ORDER — ONDANSETRON 2 MG/ML
4 INJECTION INTRAMUSCULAR; INTRAVENOUS DAILY PRN
Status: DISCONTINUED | OUTPATIENT
Start: 2022-05-24 | End: 2022-05-24

## 2022-05-24 RX ORDER — ACETAMINOPHEN 325 MG/1
650 TABLET ORAL EVERY 4 HOURS PRN
Status: DISCONTINUED | OUTPATIENT
Start: 2022-05-24 | End: 2022-05-28 | Stop reason: HOSPADM

## 2022-05-24 RX ORDER — HYDROMORPHONE HYDROCHLORIDE 2 MG/ML
0.4 INJECTION, SOLUTION INTRAMUSCULAR; INTRAVENOUS; SUBCUTANEOUS EVERY 5 MIN PRN
Status: DISCONTINUED | OUTPATIENT
Start: 2022-05-24 | End: 2022-05-24

## 2022-05-24 RX ORDER — LOPERAMIDE HYDROCHLORIDE 2 MG/1
4 CAPSULE ORAL ONCE
Status: DISCONTINUED | OUTPATIENT
Start: 2022-05-24 | End: 2022-05-28 | Stop reason: HOSPADM

## 2022-05-24 RX ORDER — SODIUM CHLORIDE, SODIUM GLUCONATE, SODIUM ACETATE, POTASSIUM CHLORIDE AND MAGNESIUM CHLORIDE 30; 37; 368; 526; 502 MG/100ML; MG/100ML; MG/100ML; MG/100ML; MG/100ML
1000 INJECTION, SOLUTION INTRAVENOUS CONTINUOUS
Status: CANCELLED | OUTPATIENT
Start: 2022-05-24 | End: 2022-06-23

## 2022-05-24 RX ORDER — HYDROMORPHONE HYDROCHLORIDE 2 MG/ML
INJECTION, SOLUTION INTRAMUSCULAR; INTRAVENOUS; SUBCUTANEOUS
Status: DISCONTINUED | OUTPATIENT
Start: 2022-05-24 | End: 2022-05-24

## 2022-05-24 RX ORDER — ONDANSETRON 2 MG/ML
INJECTION INTRAMUSCULAR; INTRAVENOUS
Status: DISCONTINUED | OUTPATIENT
Start: 2022-05-24 | End: 2022-05-24

## 2022-05-24 RX ORDER — CEFAZOLIN SODIUM/WATER 2 G/20 ML
SYRINGE (ML) INTRAVENOUS
Status: DISCONTINUED | OUTPATIENT
Start: 2022-05-24 | End: 2022-05-24

## 2022-05-24 RX ORDER — ONDANSETRON 4 MG/1
8 TABLET, ORALLY DISINTEGRATING ORAL EVERY 8 HOURS PRN
Status: DISCONTINUED | OUTPATIENT
Start: 2022-05-24 | End: 2022-05-28 | Stop reason: HOSPADM

## 2022-05-24 RX ORDER — TALC
6 POWDER (GRAM) TOPICAL NIGHTLY PRN
Status: DISCONTINUED | OUTPATIENT
Start: 2022-05-24 | End: 2022-05-24

## 2022-05-24 RX ORDER — PROPOFOL 10 MG/ML
VIAL (ML) INTRAVENOUS
Status: DISCONTINUED | OUTPATIENT
Start: 2022-05-24 | End: 2022-05-24

## 2022-05-24 RX ORDER — DEXAMETHASONE SODIUM PHOSPHATE 4 MG/ML
INJECTION, SOLUTION INTRA-ARTICULAR; INTRALESIONAL; INTRAMUSCULAR; INTRAVENOUS; SOFT TISSUE
Status: DISCONTINUED | OUTPATIENT
Start: 2022-05-24 | End: 2022-05-24

## 2022-05-24 RX ORDER — KETOROLAC TROMETHAMINE 30 MG/ML
15 INJECTION, SOLUTION INTRAMUSCULAR; INTRAVENOUS 4 TIMES DAILY
Status: COMPLETED | OUTPATIENT
Start: 2022-05-24 | End: 2022-05-26

## 2022-05-24 RX ORDER — MUPIROCIN 20 MG/G
1 OINTMENT TOPICAL 2 TIMES DAILY
Status: DISPENSED | OUTPATIENT
Start: 2022-05-24 | End: 2022-05-26

## 2022-05-24 RX ORDER — PHENYLEPHRINE HYDROCHLORIDE 10 MG/ML
INJECTION INTRAVENOUS
Status: DISCONTINUED | OUTPATIENT
Start: 2022-05-24 | End: 2022-05-24

## 2022-05-24 RX ORDER — METOCLOPRAMIDE HYDROCHLORIDE 5 MG/ML
5 INJECTION INTRAMUSCULAR; INTRAVENOUS EVERY 6 HOURS PRN
Status: DISCONTINUED | OUTPATIENT
Start: 2022-05-24 | End: 2022-05-28 | Stop reason: HOSPADM

## 2022-05-24 RX ORDER — ATORVASTATIN CALCIUM 10 MG/1
10 TABLET, FILM COATED ORAL DAILY
COMMUNITY
End: 2022-06-08 | Stop reason: SDUPTHER

## 2022-05-24 RX ORDER — CEFAZOLIN SODIUM 2 G/50ML
2 SOLUTION INTRAVENOUS
Status: DISCONTINUED | OUTPATIENT
Start: 2022-05-24 | End: 2022-05-28 | Stop reason: HOSPADM

## 2022-05-24 RX ADMIN — ONDANSETRON 4 MG: 2 INJECTION INTRAMUSCULAR; INTRAVENOUS at 08:05

## 2022-05-24 RX ADMIN — HYDROMORPHONE HYDROCHLORIDE 0.5 MG: 2 INJECTION INTRAMUSCULAR; INTRAVENOUS; SUBCUTANEOUS at 06:05

## 2022-05-24 RX ADMIN — FAMOTIDINE 20 MG: 20 TABLET, FILM COATED ORAL at 08:05

## 2022-05-24 RX ADMIN — DOCUSATE SODIUM 100 MG: 100 CAPSULE, LIQUID FILLED ORAL at 08:05

## 2022-05-24 RX ADMIN — CEFAZOLIN SODIUM 2 G: 2 SOLUTION INTRAVENOUS at 02:05

## 2022-05-24 RX ADMIN — SODIUM CHLORIDE 75 ML/HR: 4.5 INJECTION, SOLUTION INTRAVENOUS at 12:05

## 2022-05-24 RX ADMIN — DEXAMETHASONE SODIUM PHOSPHATE 4 MG: 4 INJECTION, SOLUTION INTRA-ARTICULAR; INTRALESIONAL; INTRAMUSCULAR; INTRAVENOUS; SOFT TISSUE at 07:05

## 2022-05-24 RX ADMIN — MUPIROCIN 1 G: 20 OINTMENT TOPICAL at 08:05

## 2022-05-24 RX ADMIN — ROCURONIUM BROMIDE 50 MG: 10 INJECTION, SOLUTION INTRAVENOUS at 07:05

## 2022-05-24 RX ADMIN — HYDROMORPHONE HYDROCHLORIDE 0.4 MG: 2 INJECTION, SOLUTION INTRAMUSCULAR; INTRAVENOUS; SUBCUTANEOUS at 10:05

## 2022-05-24 RX ADMIN — CEFAZOLIN SODIUM 2 G: 2 SOLUTION INTRAVENOUS at 10:05

## 2022-05-24 RX ADMIN — SODIUM CHLORIDE, SODIUM GLUCONATE, SODIUM ACETATE, POTASSIUM CHLORIDE AND MAGNESIUM CHLORIDE: 526; 502; 368; 37; 30 INJECTION, SOLUTION INTRAVENOUS at 07:05

## 2022-05-24 RX ADMIN — PROPOFOL 130 MG: 10 INJECTION, EMULSION INTRAVENOUS at 07:05

## 2022-05-24 RX ADMIN — FENTANYL CITRATE 50 MCG: 50 INJECTION, SOLUTION INTRAMUSCULAR; INTRAVENOUS at 08:05

## 2022-05-24 RX ADMIN — HYDROMORPHONE HYDROCHLORIDE 0.4 MG: 2 INJECTION, SOLUTION INTRAMUSCULAR; INTRAVENOUS; SUBCUTANEOUS at 11:05

## 2022-05-24 RX ADMIN — FENTANYL CITRATE 100 MCG: 50 INJECTION, SOLUTION INTRAMUSCULAR; INTRAVENOUS at 07:05

## 2022-05-24 RX ADMIN — HYDROMORPHONE HYDROCHLORIDE 0.5 MG: 2 INJECTION INTRAMUSCULAR; INTRAVENOUS; SUBCUTANEOUS at 09:05

## 2022-05-24 RX ADMIN — PHENYLEPHRINE HYDROCHLORIDE 100 MCG: 10 INJECTION INTRAVENOUS at 08:05

## 2022-05-24 RX ADMIN — ROCURONIUM BROMIDE 20 MG: 10 INJECTION, SOLUTION INTRAVENOUS at 08:05

## 2022-05-24 RX ADMIN — KETOROLAC TROMETHAMINE 15 MG: 30 INJECTION, SOLUTION INTRAMUSCULAR; INTRAVENOUS at 06:05

## 2022-05-24 RX ADMIN — OXYCODONE HYDROCHLORIDE 5 MG: 5 TABLET ORAL at 09:05

## 2022-05-24 RX ADMIN — MIDAZOLAM HYDROCHLORIDE 2 MG: 1 INJECTION, SOLUTION INTRAMUSCULAR; INTRAVENOUS at 06:05

## 2022-05-24 RX ADMIN — ESMOLOL HYDROCHLORIDE 20 MG: 10 INJECTION INTRAVENOUS at 07:05

## 2022-05-24 RX ADMIN — Medication 2 G: at 07:05

## 2022-05-24 RX ADMIN — KETOROLAC TROMETHAMINE 15 MG: 30 INJECTION, SOLUTION INTRAMUSCULAR; INTRAVENOUS at 11:05

## 2022-05-24 RX ADMIN — SUGAMMADEX 179 MG: 100 INJECTION, SOLUTION INTRAVENOUS at 09:05

## 2022-05-24 RX ADMIN — BUPIVACAINE HYDROCHLORIDE: 5 INJECTION, SOLUTION PERINEURAL at 10:05

## 2022-05-24 NOTE — ANESTHESIA PROCEDURE NOTES
Peripheral IV Insertion    Diagnosis: I87.9 Disorder of vein, unspecified.    Patient location during procedure: OR  Procedure start time: 5/24/2022 7:34 AM  Procedure end time: 5/24/2022 7:34 AM    Staffing  Authorizing Provider: Codie Grover MD  Performing Provider: Ric Long CRNA    Anesthesiologist was present at the time of the procedure.    Preanesthetic Checklist  Completed: patient identified, IV checked, site marked, risks and benefits discussed, surgical consent, monitors and equipment checked, pre-op evaluation, timeout performed and anesthesia consent givenPeripheral IV Insertion  Skin Prep: alcohol swabs  Local Infiltration: none  Orientation: right  Location: forearm  Catheter Type: peripheral IV (single lumen)  Catheter Size: 18 G  Catheter placement by Anatomical landmarks. Heme positive aspiration all ports. Insertion Attempts: 1  Assessment  Dressing: secured with tape and tegaderm  Patient: Tolerated well  Line flushed easily.

## 2022-05-24 NOTE — ANESTHESIA PREPROCEDURE EVALUATION
"                                                                                                             05/24/2022  Leonila Rosales is a 76 y.o., male with Past Medical History:  Hypertension  Lung mass  And Past Surgical History:  Bronchoscopy  Hip fracture surgery  Shoulder surgery  Here for above  Per pt, the lung mass was found after he had a left hip fracture on imaging prior to surgery in [place] - I have no records in this EMR of this procedure  He is a lifelong heavy smoker  Had CT Guided bx in March of 2022    Date:     05/24/2022  Name:   Leonila Rosales          SEX: male                      AGE: 76 y.o.               Phone:  [phone]                                      YOB: 1945  Procedure(s):  THORACOTOMY   Surgeon(s):  Jose Diaz MD    Vitals:  Vitals:    05/18/22 1214 05/24/22 0512 05/24/22 0533 05/24/22 0604   BP:  (!) 147/74 (!) 147/74 (!) 160/86   Pulse:  90  75   Temp:  36.7 °C (98 °F)     TempSrc:  Oral     SpO2:  99%  100%   Weight: 91.6 kg (202 lb)  89.3 kg (196 lb 13.9 oz)    Height: 5' 4" (1.626 m)          Allergies: Patient has no known allergies.    Home Medications:   Prior to Admission medications    Medication Sig Start Date End Date Taking? Authorizing Provider   amLODIPine (NORVASC) 5 MG tablet Take 5 mg by mouth 2 (two) times daily. 4/29/22  Yes Historical Provider   aspirin 81 mg Cap Take 81 mg by mouth Daily. 3/22/22  Yes Historical Provider   atorvastatin (LIPITOR) 10 MG tablet Take 10 mg by mouth once daily.   Yes Historical Provider   LINZESS 145 mcg Cap capsule Take 145 mcg by mouth once daily. 3/28/22  Yes Historical Provider   sucralfate (CARAFATE) 1 gram tablet Take 1 g by mouth 4 (four) times daily before meals and nightly. 4/12/22  Yes Historical Provider   HYDROcodone-acetaminophen (NORCO) 7.5-325 mg per tablet Take 1 tablet by mouth every 4 (four) hours as needed. 2/15/22   Historical Provider   methocarbamoL (ROBAXIN) 750 MG Tab 750 " mg. 2/15/22   Historical Provider   mupirocin calcium 2% (BACTROBAN) 2 % cream Apply topically. 22   Historical Provider       Documented History:  Past Surgical History:   Procedure Laterality Date    BRONCHOSCOPY      HIP FRACTURE SURGERY Left     SHOULDER SURGERY       Past Medical History:   Diagnosis Date    Hypertension     Lung mass      Social History     Tobacco Use   Smoking Status Former Smoker    Quit date: 2021    Years since quittin.4   Smokeless Tobacco Never Used     Social History     Substance and Sexual Activity   Alcohol Use Not Currently     Social History     Substance and Sexual Activity   Drug Use Never       Anesthesia Pre-Op Evaluation  Anesthesia Evaluation       Anesthesia Plan  Type of Anesthesia, risks & benefits discussed:  Anesthesia Type:  Gen ETT    Patient's Preference:   Plan Factors:          Intra-op Monitoring Plan: Standard ASA Monitors and Art Line  Intra-op Monitoring Plan Comments:   Post Op Pain Control Plan: IV/PO Opioids PRN and multimodal analgesia  Post Op Pain Control Plan Comments:     Induction:   IV  Beta Blocker:         Informed Consent: Informed consent signed with the Patient and all parties understand the risks and agree with anesthesia plan.  All questions answered.    ASA Score: 3     Day of Surgery Review of History & Physical:    H&P Update referred to the surgeon/provider.    Anesthesia Plan Notes: Premedication with Midazolam  Arterial line in holding  Technique: GETA with Double lumen tube   Thoracic epidural for postop pain control if requested by Surgeon - if not, plan for local infiltration/Exparel by surgeon and on-Q pump managed by Surgeon  Pt high risk of reintubation so will plan on extubating in OR and watch carefully in PACU prior to discharge to the floor  PONV Prophylaxis   PACU Postop           Ready For Surgery From Anesthesia Perspective.                   .      Pre-op Assessment          Review of Systems      Physical  Exam  General: Well nourished, Cooperative, Alert and Oriented    Airway:  Mallampati: II   Mouth Opening: Normal  TM Distance: Normal  Tongue: Normal  Neck ROM: Normal ROM    Dental:  Periodontal disease  Poor dentition with many missing broken teeth, none loose per pt  Chest/Lungs:  Clear to auscultation, Normal Respiratory Rate    Heart:  Rate: Normal  Rhythm: Regular Rhythm        Anesthesia Plan  Type of Anesthesia, risks & benefits discussed:    Anesthesia Type: Gen ETT  Intra-op Monitoring Plan: Standard ASA Monitors and Art Line  Post Op Pain Control Plan: IV/PO Opioids PRN and multimodal analgesia  Induction:  IV  Airway Plan: Direct  Informed Consent: Informed consent signed with the Patient and all parties understand the risks and agree with anesthesia plan.  All questions answered. Patient consented to blood products? No  ASA Score: 3  Day of Surgery Review of History & Physical: H&P Update referred to the surgeon/provider.  Anesthesia Plan Notes: Premedication with Midazolam  Arterial line in holding  Technique: GETA with Double lumen tube   Thoracic epidural for postop pain control if requested by Surgeon - if not, plan for local infiltration/Exparel by surgeon and on-Q pump managed by Surgeon  Pt high risk of reintubation so will plan on extubating in OR and watch carefully in PACU prior to discharge to the floor  PONV Prophylaxis   PACU Postop       Ready For Surgery From Anesthesia Perspective.     .

## 2022-05-24 NOTE — ANESTHESIA PROCEDURE NOTES
Intubation    Date/Time: 5/24/2022 7:34 AM  Performed by: Ric Long CRNA  Authorized by: Codie Grover MD     Intubation:     Induction:  Intravenous    Intubated:  Postinduction    Mask Ventilation:  Easy with oral airway    Attempts:  1    Attempted By:  Student    Method of Intubation:  Fiberoptic and direct    Blade:  Jesus 2    Laryngeal View Grade: Grade I - full view of cords      Difficult Airway Encountered?: No      Complications:  None    Airway Device:  Double lumen tube left    Airway Device Size:  37F    Style/Cuff Inflation:  Cuffed (inflated to minimal occlusive pressure)    Inflation Amount (mL):  6    Tube secured:  30    Secured at:  The lips    Placement Verified By:  Fiber optic visualization and Capnometry    Complicating Factors:  None    Findings Post-Intubation:  BS equal bilateral and atraumatic/condition of teeth unchanged

## 2022-05-24 NOTE — ANESTHESIA PROCEDURE NOTES
Arterial    Diagnosis: Lung mass    Patient location during procedure: holding area  Procedure start time: 5/24/2022 6:50 AM  Procedure end time: 5/24/2022 6:55 AM    Staffing  Authorizing Provider: Codie Grover MD  Performing Provider: Ric Long CRNA    Anesthesiologist was present at the time of the procedure.    Preanesthetic Checklist  Completed: patient identified, IV checked, site marked, risks and benefits discussed, surgical consent, monitors and equipment checked, pre-op evaluation, timeout performed and anesthesia consent givenArterial  Skin Prep: chlorhexidine gluconate and isopropyl alcohol  Local Infiltration: lidocaine  Orientation: right  Location: radial    Catheter Size: 20 G  Catheter placement by Anatomical landmarks. Heme positive aspiration all ports. Insertion Attempts: 1  Assessment  Dressing: secured with tape and tegaderm  Patient: Tolerated well

## 2022-05-24 NOTE — ANESTHESIA POSTPROCEDURE EVALUATION
Anesthesia Post Evaluation    Patient: Leonila Rosales    Procedure(s) Performed: Procedure(s) (LRB):  THORACOTOMY (Right)    Final Anesthesia Type: general      Patient location during evaluation: PACU  Patient participation: Yes- Able to Participate  Level of consciousness: awake and alert  Post-procedure vital signs: reviewed and stable  Pain management: adequate    PONV status at discharge: No PONV  Anesthetic complications: no      Cardiovascular status: hemodynamically stable  Respiratory status: unassisted  Hydration status: euvolemic  Follow-up not needed.          Vitals Value Taken Time   /60 05/24/22 1002   Temp 36.4 °C (97.5 °F) 05/24/22 1002   Pulse 81 05/24/22 1027   Resp 10 05/24/22 1027   SpO2 95 % 05/24/22 1027   Vitals shown include unvalidated device data.      No case tracking events are documented in the log.      Pain/Anastacio Score: Pain Rating Prior to Med Admin: 6 (5/24/2022 10:20 AM)  Anastacio Score: 3 (5/24/2022 10:00 AM)

## 2022-05-24 NOTE — TRANSFER OF CARE
"Anesthesia Transfer of Care Note    Patient: Leonila Rosales    Procedure(s) Performed: Procedure(s) (LRB):  THORACOTOMY (Right)    Patient location: PACU    Anesthesia Type: general    Transport from OR: Transported from OR on room air with adequate spontaneous ventilation    Post pain: adequate analgesia    Post assessment: no apparent anesthetic complications    Post vital signs: stable    Level of consciousness: sedated    Nausea/Vomiting: no nausea/vomiting    Complications: none    Transfer of care protocol was followed      Last vitals:   Visit Vitals  /60 (BP Location: Right arm, Patient Position: Lying)   Pulse 75   Temp 36.4 °C (97.5 °F) (Oral)   Resp 19   Ht 5' 4" (1.626 m)   Wt 89.3 kg (196 lb 13.9 oz)   SpO2 100%   BMI 33.79 kg/m²     "

## 2022-05-24 NOTE — OP NOTE
Date: 5/24/2022  Preop diagnosis:  Right upper lobe lung cancer    Surgeon: Khari Diaz MD    Procedure:  Right upper lobectomy right middle lobectomy  Regional lymph node dissection  On Q pump placement    Postop diagnosis:  The same negative margins on frozen section    Anesthesia:  General    Blood loss:  Per Anesthesia    Assistant:  Aníbal Jorgensen      Under informed consent patient was taken to the OR put in supine position general anesthesia was induced and therefore maintained for the remainder of the procedure.  Double lumen endotracheal tube was placed the patient was then turned in lateral decubitus position exposing the right chest.  All the pressure points were padded adequately.  The skin over the right chest was prepped and draped in the usual sterile fashion.  IV antibiotics were administered.    A small posterolateral thoracotomy incision was performed.  Muscle sparing technique was used.  Intercostal space 5. Was penetrated with a small piece of rib 6. Resected.  Retractors were placed.  Thorough examination of the chest revealed no evidence of any pleural implant and no other nodularity or masses in the middle or the lower lobe.  The fissures were completed using endoscopic stapler.  Dissection then was started at the apex and the apical posterior branch of the upper lobe was cut between endoscopic vascular staplers.  The pulmonary vein to the upper lobe and middle lobe was identified and the  the upper lobe vein was cut between endoscopic vascular stapler.  Dissection was then started at the fissure and procedure as sending branch was cut between endoscopic staplers.  The middle lobe artery and vein were seen and I elected to remove the middle lobe as it was a part of the upper lobe with no definite fissures whatsoever.  The middle lobe artery middle lobe vein and middle lobe bronchus was cut between staplers ed.  At this point the lung was only stuck with the bronchus and the stapler was  applied to the right upper lobe bronchus the right lung was allowed to breathe and both lower and middle lobe came up very nicely.  The stapler was fired and the specimen was sent for pathology where frozen section came back negative for bronchial margin.  Regional lymph node dissection was performed with lymph nodes from levels 7, 8R, R, 10R, 11R,12R.  The chest was filled with water and Valsalva maneuver was performed revealing no evidence of bronchial leak.  On Q pump was placed multiple rib spaces above and below the incision.  Chest tube was placed and positioned in the apex.  Pericostal sutures were placed and the wounds were closed in layers absorbable sutures.  The patient tolerated the procedure well and was transferred to the recovery room in a stable condition.

## 2022-05-25 LAB
BASOPHILS # BLD AUTO: 0.02 X10(3)/MCL (ref 0–0.2)
BASOPHILS NFR BLD AUTO: 0.2 %
EOSINOPHIL # BLD AUTO: 0.01 X10(3)/MCL (ref 0–0.9)
EOSINOPHIL NFR BLD AUTO: 0.1 %
ERYTHROCYTE [DISTWIDTH] IN BLOOD BY AUTOMATED COUNT: 16.5 % (ref 11.5–17)
HCT VFR BLD AUTO: 31.5 % (ref 42–52)
HGB BLD-MCNC: 9.6 GM/DL (ref 14–18)
IMM GRANULOCYTES # BLD AUTO: 0.06 X10(3)/MCL (ref 0–0.02)
IMM GRANULOCYTES NFR BLD AUTO: 0.7 % (ref 0–0.43)
LYMPHOCYTES # BLD AUTO: 1.4 X10(3)/MCL (ref 0.6–4.6)
LYMPHOCYTES NFR BLD AUTO: 16.9 %
MCH RBC QN AUTO: 25.5 PG (ref 27–31)
MCHC RBC AUTO-ENTMCNC: 30.5 MG/DL (ref 33–36)
MCV RBC AUTO: 83.6 FL (ref 80–94)
MONOCYTES # BLD AUTO: 0.76 X10(3)/MCL (ref 0.1–1.3)
MONOCYTES NFR BLD AUTO: 9.2 %
NEUTROPHILS # BLD AUTO: 6.1 X10(3)/MCL (ref 2.1–9.2)
NEUTROPHILS NFR BLD AUTO: 72.9 %
NRBC BLD AUTO-RTO: 0 %
PLATELET # BLD AUTO: 250 X10(3)/MCL (ref 130–400)
PMV BLD AUTO: 9.9 FL (ref 9.4–12.4)
RBC # BLD AUTO: 3.77 X10(6)/MCL (ref 4.7–6.1)
WBC # SPEC AUTO: 8.3 X10(3)/MCL (ref 4.5–11.5)

## 2022-05-25 PROCEDURE — 25000003 PHARM REV CODE 250: Performed by: THORACIC SURGERY (CARDIOTHORACIC VASCULAR SURGERY)

## 2022-05-25 PROCEDURE — 63600175 PHARM REV CODE 636 W HCPCS: Performed by: THORACIC SURGERY (CARDIOTHORACIC VASCULAR SURGERY)

## 2022-05-25 PROCEDURE — 36415 COLL VENOUS BLD VENIPUNCTURE: CPT | Performed by: THORACIC SURGERY (CARDIOTHORACIC VASCULAR SURGERY)

## 2022-05-25 PROCEDURE — 99024 POSTOP FOLLOW-UP VISIT: CPT | Mod: ,,,

## 2022-05-25 PROCEDURE — 85025 COMPLETE CBC W/AUTO DIFF WBC: CPT | Performed by: THORACIC SURGERY (CARDIOTHORACIC VASCULAR SURGERY)

## 2022-05-25 PROCEDURE — 99900035 HC TECH TIME PER 15 MIN (STAT)

## 2022-05-25 PROCEDURE — 94799 UNLISTED PULMONARY SVC/PX: CPT

## 2022-05-25 PROCEDURE — 99024 PR POST-OP FOLLOW-UP VISIT: ICD-10-PCS | Mod: ,,,

## 2022-05-25 PROCEDURE — 11000001 HC ACUTE MED/SURG PRIVATE ROOM

## 2022-05-25 RX ADMIN — KETOROLAC TROMETHAMINE 15 MG: 30 INJECTION, SOLUTION INTRAMUSCULAR; INTRAVENOUS at 06:05

## 2022-05-25 RX ADMIN — KETOROLAC TROMETHAMINE 15 MG: 30 INJECTION, SOLUTION INTRAMUSCULAR; INTRAVENOUS at 11:05

## 2022-05-25 RX ADMIN — SODIUM CHLORIDE 75 ML/HR: 4.5 INJECTION, SOLUTION INTRAVENOUS at 03:05

## 2022-05-25 RX ADMIN — KETOROLAC TROMETHAMINE 15 MG: 30 INJECTION, SOLUTION INTRAMUSCULAR; INTRAVENOUS at 12:05

## 2022-05-25 RX ADMIN — FAMOTIDINE 20 MG: 20 TABLET, FILM COATED ORAL at 08:05

## 2022-05-25 RX ADMIN — MUPIROCIN 1 G: 20 OINTMENT TOPICAL at 09:05

## 2022-05-25 RX ADMIN — DOCUSATE SODIUM 100 MG: 100 CAPSULE, LIQUID FILLED ORAL at 09:05

## 2022-05-25 RX ADMIN — DOCUSATE SODIUM 100 MG: 100 CAPSULE, LIQUID FILLED ORAL at 08:05

## 2022-05-25 RX ADMIN — OXYCODONE HYDROCHLORIDE 5 MG: 5 TABLET ORAL at 04:05

## 2022-05-25 RX ADMIN — OXYCODONE HYDROCHLORIDE 5 MG: 5 TABLET ORAL at 03:05

## 2022-05-25 RX ADMIN — OXYCODONE HYDROCHLORIDE 5 MG: 5 TABLET ORAL at 09:05

## 2022-05-25 RX ADMIN — FAMOTIDINE 20 MG: 20 TABLET, FILM COATED ORAL at 09:05

## 2022-05-25 NOTE — PROGRESS NOTES
CT SURGERY PROGRESS NOTE  Leonila Rosales  76 y.o.  1945    Patients Procedure: Procedure(s) (LRB):  THORACOTOMY (Right)    Subjective  Interval History: NAEO, patient doing well. Reports no pain but minor discomfort likely from chest tube. Walking well since surgery with minimal SOB.    Review of Systems   Constitutional: Negative.    Respiratory: Negative for cough, sputum production and shortness of breath.    Cardiovascular: Negative for chest pain, palpitations, claudication and leg swelling.   Gastrointestinal: Negative for abdominal pain, nausea and vomiting.   Genitourinary: Negative.    Skin: Negative.         Incision C/D/I       Medication List  Infusions   ON-Q PAIN PUMP [id number] 4 mL/hr at 05/24/22 1020     Scheduled   docusate sodium  100 mg Oral BID    famotidine  20 mg Oral BID    ketorolac  15 mg Intravenous QID    loperamide  4 mg Oral Once    mupirocin  1 g Nasal BID       Objective:  Recent Vitals:  Temp:  [97.7 °F (36.5 °C)-98.2 °F (36.8 °C)] 98.2 °F (36.8 °C)  Pulse:  [75-97] 82  Resp:  [16-20] 17  SpO2:  [98 %-100 %] 99 %  BP: (130-170)/(73-82) 170/74    Physical Exam  Vitals and nursing note reviewed.   Constitutional:       General: He is awake. He is not in acute distress.     Appearance: Normal appearance. He is not toxic-appearing or diaphoretic.   HENT:      Head: Normocephalic and atraumatic.   Eyes:      Extraocular Movements: Extraocular movements intact.      Pupils: Pupils are equal, round, and reactive to light.   Cardiovascular:      Rate and Rhythm: Normal rate and regular rhythm.      Pulses: Normal pulses.           Radial pulses are 2+ on the right side and 2+ on the left side.        Dorsalis pedis pulses are 2+ on the right side and 2+ on the left side.      Heart sounds: Normal heart sounds.   Pulmonary:      Effort: Pulmonary effort is normal.      Breath sounds: Normal breath sounds.   Musculoskeletal:      Right lower leg: No edema.      Left lower leg: No  edema.   Skin:     General: Skin is warm and dry.      Comments: INCISION C/D/I   Neurological:      General: No focal deficit present.      Mental Status: He is alert and oriented to person, place, and time.   Psychiatric:         Mood and Affect: Mood and affect normal.          I/O last 24 hrs:  Intake/Output - Last 3 Shifts       05/23 0700 05/24 0659 05/24 0700 05/25 0659 05/25 0700 05/26 0659    P.O.  960 480    IV Piggyback  2000     Total Intake(mL/kg)  2960 (33.3) 480 (5.4)    Urine (mL/kg/hr)  5150 (2.4) 300 (0.5)    Stool  0 0    Blood  100     Chest Tube   250    Total Output  5250 550    Net  -2290 -70           Urine Occurrence   1 x    Stool Occurrence  0 x 1 x          Labs  BMP:   Recent Labs   Lab 05/24/22  1407      K 4.3   CO2 19*   BUN 12.9   CREATININE 0.73   CALCIUM 8.9     CBC:   Recent Labs   Lab 05/25/22  0609   WBC 8.3   RBC 3.77*   HGB 9.6*   HCT 31.5*      MCV 83.6   MCH 25.5*   MCHC 30.5*     All pertinent labs from the last 24 hours have been reviewed.      Imaging:   CXR: X-Ray Chest 1 View    Result Date: 5/25/2022  Right chest tube in place without appreciable pneumothorax. Electronically signed by: Caitlin Gross Date:    05/25/2022 Time:    06:03  I have reviewed all pertinent imaging results/findings within the past 24 hours.        ASSESSMENT/PLAN:  RUL malignancy s/p RUL/MUL lobectomy  - ct output 24 hrs 250ml, no airleak. Continue to suction  - IS/OOB/Ambulation  - morning labs and CXR    Case and plan of care discussed with MD Aníbal Jorgensen PA-C

## 2022-05-26 LAB
ANION GAP SERPL CALC-SCNC: 6 MEQ/L
BUN SERPL-MCNC: 9.4 MG/DL (ref 8.4–25.7)
CALCIUM SERPL-MCNC: 9.1 MG/DL (ref 8.8–10)
CHLORIDE SERPL-SCNC: 109 MMOL/L (ref 98–107)
CO2 SERPL-SCNC: 23 MMOL/L (ref 23–31)
CREAT SERPL-MCNC: 0.7 MG/DL (ref 0.73–1.18)
CREAT/UREA NIT SERPL: 13
GLUCOSE SERPL-MCNC: 89 MG/DL (ref 82–115)
POTASSIUM SERPL-SCNC: 4.1 MMOL/L (ref 3.5–5.1)
SODIUM SERPL-SCNC: 138 MMOL/L (ref 136–145)

## 2022-05-26 PROCEDURE — 80048 BASIC METABOLIC PNL TOTAL CA: CPT | Performed by: THORACIC SURGERY (CARDIOTHORACIC VASCULAR SURGERY)

## 2022-05-26 PROCEDURE — 99024 POSTOP FOLLOW-UP VISIT: CPT | Mod: ,,, | Performed by: PHYSICIAN ASSISTANT

## 2022-05-26 PROCEDURE — 99024 PR POST-OP FOLLOW-UP VISIT: ICD-10-PCS | Mod: ,,, | Performed by: PHYSICIAN ASSISTANT

## 2022-05-26 PROCEDURE — 36415 COLL VENOUS BLD VENIPUNCTURE: CPT | Performed by: THORACIC SURGERY (CARDIOTHORACIC VASCULAR SURGERY)

## 2022-05-26 PROCEDURE — 63600175 PHARM REV CODE 636 W HCPCS: Performed by: THORACIC SURGERY (CARDIOTHORACIC VASCULAR SURGERY)

## 2022-05-26 PROCEDURE — 25000003 PHARM REV CODE 250: Performed by: THORACIC SURGERY (CARDIOTHORACIC VASCULAR SURGERY)

## 2022-05-26 PROCEDURE — 11000001 HC ACUTE MED/SURG PRIVATE ROOM

## 2022-05-26 RX ADMIN — OXYCODONE HYDROCHLORIDE 5 MG: 5 TABLET ORAL at 05:05

## 2022-05-26 RX ADMIN — OXYCODONE HYDROCHLORIDE 5 MG: 5 TABLET ORAL at 08:05

## 2022-05-26 RX ADMIN — DOCUSATE SODIUM 100 MG: 100 CAPSULE, LIQUID FILLED ORAL at 08:05

## 2022-05-26 RX ADMIN — FAMOTIDINE 20 MG: 20 TABLET, FILM COATED ORAL at 08:05

## 2022-05-26 RX ADMIN — OXYCODONE HYDROCHLORIDE 5 MG: 5 TABLET ORAL at 03:05

## 2022-05-26 RX ADMIN — KETOROLAC TROMETHAMINE 15 MG: 30 INJECTION, SOLUTION INTRAMUSCULAR; INTRAVENOUS at 05:05

## 2022-05-26 RX ADMIN — OXYCODONE HYDROCHLORIDE 5 MG: 5 TABLET ORAL at 09:05

## 2022-05-26 NOTE — PLAN OF CARE
05/26/22 1413   Discharge Assessment   Assessment Type Discharge Planning Assessment   Source of Information patient   Lives With alone   Do you expect to return to your current living situation? Yes   Walking or Climbing Stairs Difficulty none   Equipment Currently Used at Home bedside commode;walker, rolling;cane, straight   Do you currently have service(s) that help you manage your care at home? Yes   Name and Contact number of agency [facility]   Who is going to help you get home at discharge? Felecia///[phone]   Discharge Plan A Home Health   PCP Crystal Alicia

## 2022-05-27 PROCEDURE — 11000001 HC ACUTE MED/SURG PRIVATE ROOM

## 2022-05-27 PROCEDURE — 99024 PR POST-OP FOLLOW-UP VISIT: ICD-10-PCS | Mod: ,,, | Performed by: PHYSICIAN ASSISTANT

## 2022-05-27 PROCEDURE — 99024 POSTOP FOLLOW-UP VISIT: CPT | Mod: ,,, | Performed by: PHYSICIAN ASSISTANT

## 2022-05-27 PROCEDURE — 25000003 PHARM REV CODE 250: Performed by: THORACIC SURGERY (CARDIOTHORACIC VASCULAR SURGERY)

## 2022-05-27 PROCEDURE — 94761 N-INVAS EAR/PLS OXIMETRY MLT: CPT

## 2022-05-27 PROCEDURE — 99900035 HC TECH TIME PER 15 MIN (STAT)

## 2022-05-27 RX ADMIN — FAMOTIDINE 20 MG: 20 TABLET, FILM COATED ORAL at 08:05

## 2022-05-27 RX ADMIN — DOCUSATE SODIUM 100 MG: 100 CAPSULE, LIQUID FILLED ORAL at 09:05

## 2022-05-27 RX ADMIN — OXYCODONE HYDROCHLORIDE 5 MG: 5 TABLET ORAL at 02:05

## 2022-05-27 RX ADMIN — OXYCODONE HYDROCHLORIDE 5 MG: 5 TABLET ORAL at 06:05

## 2022-05-27 RX ADMIN — OXYCODONE HYDROCHLORIDE 5 MG: 5 TABLET ORAL at 05:05

## 2022-05-27 RX ADMIN — OXYCODONE HYDROCHLORIDE 5 MG: 5 TABLET ORAL at 09:05

## 2022-05-27 RX ADMIN — DOCUSATE SODIUM 100 MG: 100 CAPSULE, LIQUID FILLED ORAL at 08:05

## 2022-05-28 VITALS
DIASTOLIC BLOOD PRESSURE: 74 MMHG | HEART RATE: 91 BPM | HEIGHT: 64 IN | WEIGHT: 192.5 LBS | BODY MASS INDEX: 32.87 KG/M2 | OXYGEN SATURATION: 97 % | RESPIRATION RATE: 18 BRPM | TEMPERATURE: 97 F | SYSTOLIC BLOOD PRESSURE: 151 MMHG

## 2022-05-28 PROBLEM — R91.8 LUNG MASS: Status: ACTIVE | Noted: 2022-05-28

## 2022-05-28 PROCEDURE — 99024 POSTOP FOLLOW-UP VISIT: CPT | Mod: ,,, | Performed by: PHYSICIAN ASSISTANT

## 2022-05-28 PROCEDURE — 99024 PR POST-OP FOLLOW-UP VISIT: ICD-10-PCS | Mod: ,,, | Performed by: PHYSICIAN ASSISTANT

## 2022-05-28 PROCEDURE — 25000003 PHARM REV CODE 250: Performed by: THORACIC SURGERY (CARDIOTHORACIC VASCULAR SURGERY)

## 2022-05-28 RX ORDER — HYDROCODONE BITARTRATE AND ACETAMINOPHEN 7.5; 325 MG/1; MG/1
1 TABLET ORAL EVERY 4 HOURS PRN
Qty: 28 TABLET | Refills: 0 | Status: SHIPPED | OUTPATIENT
Start: 2022-05-28 | End: 2022-06-04

## 2022-05-28 RX ADMIN — OXYCODONE HYDROCHLORIDE 5 MG: 5 TABLET ORAL at 10:05

## 2022-05-28 RX ADMIN — FAMOTIDINE 20 MG: 20 TABLET, FILM COATED ORAL at 08:05

## 2022-05-28 RX ADMIN — OXYCODONE HYDROCHLORIDE 5 MG: 5 TABLET ORAL at 05:05

## 2022-05-28 RX ADMIN — DOCUSATE SODIUM 100 MG: 100 CAPSULE, LIQUID FILLED ORAL at 08:05

## 2022-05-28 NOTE — DISCHARGE SUMMARY
[facility] 6th Floor Medical Telemetry  Cardiothoracic Surgery  Discharge Summary      Patient Name: Leonila Rosales  MRN: [id number]  Admission Date: 5/24/2022  Hospital Length of Stay: 4 days  Discharge Date and Time:  05/28/2022 8:52 AM  Attending Physician: Jose Diaz MD   Discharging Provider: Gregg Knox PA-C  Primary Care Provider: NIDIA Richards    HPI:   No notes on file    Procedure(s) (LRB):  THORACOTOMY (Right)      Indwelling Lines/Drains at time of discharge:   Lines/Drains/Airways     None               Hospital Course:      Date: 5/24/2022  Preop diagnosis:  Right upper lobe lung cancer     Procedure:  Right upper lobectomy right middle lobectomy  Regional lymph node dissection  On Q pump placement     Postop diagnosis:  The same negative margins on frozen section     Anesthesia:  General     Blood loss:  Per Anesthesia     Assistant:  Aníbal Jorgensen             Goals of Care Treatment Preferences:             Significant Diagnostic Studies: Labs: BMP: No results for input(s): GLU, NA, K, CL, CO2, BUN, CREATININE, CALCIUM, MG in the last 48 hours., CMP No results for input(s): NA, K, CL, CO2, GLU, BUN, CREATININE, CALCIUM, PROT, ALBUMIN, BILITOT, ALKPHOS, AST, ALT, ANIONGAP, ESTGFRAFRICA, EGFRNONAA in the last 48 hours. and CBC No results for input(s): WBC, HGB, HCT, PLT in the last 48 hours.    Pending Diagnostic Studies:     Procedure Component Value Units Date/Time    X-Ray Chest 1 View [[id number]]     Order Status: Sent Lab Status: No result     X-Ray Chest AP Single View [[id number]]     Order Status: Sent Lab Status: No result           No new Assessment & Plan notes have been filed under this hospital service since the last note was generated.  Service: Cardiothoracic Surgery    There are no hospital problems to display for this patient.     Discharged Condition: good    Disposition: Home or Self Care    Follow Up:   Follow-up Information     Jose Diaz MD  Follow up in 3 week(s).    Specialty: Cardiothoracic Surgery  Contact information:  [address] [place] [address] Dr  [address]  [facility] [address]  [phone]             NIDIA Richards Follow up in 1 week(s).    Specialty: Nurse Practitioner  Contact information:  [address] [place] [address]  [phone]             NURSING SPECIALTIES Follow up.    Specialties: Home Health Services, Home Therapy Services, Home Living Aide Services  Contact information:  Yasmin Smith [facility] [address] [phone]                     Patient Instructions:      X-Ray Chest PA And Lateral   Standing Status: Future Number of Occurrences: 1 Standing Exp. Date: 05/23/23     Order Specific Question Answer Comments   May the Radiologist modify the order per protocol to meet the clinical needs of the patient? Yes    Release to patient Immediate      CBC auto differential   Standing Status: Future Number of Occurrences: 1 Standing Exp. Date: 07/22/23     Urinalysis   Standing Status: Future Number of Occurrences: 1 Standing Exp. Date: 06/23/22     Comprehensive metabolic panel   Standing Status: Future Number of Occurrences: 1 Standing Exp. Date: 07/22/23     APTT   Standing Status: Future Number of Occurrences: 1 Standing Exp. Date: 07/22/23     EKG 12-lead   Standing Status: Future Number of Occurrences: 1 Standing Exp. Date: 05/23/23     Type & Screen   Standing Status: Future Number of Occurrences: 1 Standing Exp. Date: 05/23/23     Medications:  Reconciled Home Medications:      Medication List      CHANGE how you take these medications    HYDROcodone-acetaminophen 7.5-325 mg per tablet  Commonly known as: NORCO  Take 1 tablet by mouth every 4 (four) hours as needed for Pain.  What changed: reasons to take this        CONTINUE taking these medications    amLODIPine 5 MG tablet  Commonly known as: NORVASC  Take 5 mg by mouth 2 (two) times daily.     aspirin 81 mg Cap  Take 81 mg by mouth Daily.      atorvastatin 10 MG tablet  Commonly known as: LIPITOR  Take 10 mg by mouth once daily.     LINZESS 145 mcg Cap capsule  Generic drug: linaCLOtide  Take 145 mcg by mouth once daily.     methocarbamoL 750 MG Tab  Commonly known as: ROBAXIN  750 mg.     sucralfate 1 gram tablet  Commonly known as: CARAFATE  Take 1 g by mouth 4 (four) times daily before meals and nightly.        STOP taking these medications    mupirocin calcium 2% 2 % cream  Commonly known as: BACTROBAN          Time spent on the discharge of patient: 20 minutes    Gregg Knox PA-C  Cardiothoracic Surgery  [facility] - 6th Floor Medical Telemetry

## 2022-05-28 NOTE — HOSPITAL COURSE
Date: 5/24/2022  Preop diagnosis:  Right upper lobe lung cancer     Procedure:  Right upper lobectomy right middle lobectomy  Regional lymph node dissection  On Q pump placement     Postop diagnosis:  The same negative margins on frozen section     Anesthesia:  General     Blood loss:  Per Anesthesia     Assistant:  Aníbal Jorgensen

## 2022-05-31 LAB
DHEA SERPL-MCNC: NORMAL
ESTROGEN SERPL-MCNC: NORMAL PG/ML
INSULIN SERPL-ACNC: NORMAL U[IU]/ML
LAB AP CLINICAL INFORMATION: NORMAL
LAB AP GROSS DESCRIPTION: NORMAL
LAB AP REPORT FOOTNOTES: NORMAL
T3RU NFR SERPL: NORMAL %

## 2022-06-06 ENCOUNTER — DOCUMENTATION ONLY (OUTPATIENT)
Dept: FAMILY MEDICINE | Facility: CLINIC | Age: 77
End: 2022-06-06
Payer: MEDICARE

## 2022-06-08 ENCOUNTER — OFFICE VISIT (OUTPATIENT)
Dept: FAMILY MEDICINE | Facility: CLINIC | Age: 77
End: 2022-06-08
Payer: MEDICARE

## 2022-06-08 VITALS
RESPIRATION RATE: 16 BRPM | BODY MASS INDEX: 33.43 KG/M2 | WEIGHT: 195.81 LBS | HEART RATE: 67 BPM | SYSTOLIC BLOOD PRESSURE: 126 MMHG | HEIGHT: 64 IN | OXYGEN SATURATION: 98 % | TEMPERATURE: 98 F | DIASTOLIC BLOOD PRESSURE: 70 MMHG

## 2022-06-08 DIAGNOSIS — K92.2 GASTROINTESTINAL HEMORRHAGE, UNSPECIFIED GASTROINTESTINAL HEMORRHAGE TYPE: ICD-10-CM

## 2022-06-08 DIAGNOSIS — C34.90 NON-SMALL CELL LUNG CANCER, UNSPECIFIED LATERALITY: ICD-10-CM

## 2022-06-08 DIAGNOSIS — K27.9 PEPTIC ULCER DISEASE: ICD-10-CM

## 2022-06-08 DIAGNOSIS — K22.10 EROSIVE ESOPHAGITIS: ICD-10-CM

## 2022-06-08 DIAGNOSIS — Z90.2 S/P LOBECTOMY OF LUNG: ICD-10-CM

## 2022-06-08 DIAGNOSIS — R91.8 LUNG MASS: Primary | ICD-10-CM

## 2022-06-08 PROBLEM — E78.5 HYPERLIPIDEMIA: Status: ACTIVE | Noted: 2022-06-08

## 2022-06-08 PROBLEM — S72.143A CLOSED INTERTROCHANTERIC FRACTURE OF FEMUR: Status: ACTIVE | Noted: 2022-06-08

## 2022-06-08 PROBLEM — E66.9 OBESITY: Status: ACTIVE | Noted: 2022-06-08

## 2022-06-08 PROBLEM — K21.9 GASTROESOPHAGEAL REFLUX DISEASE: Status: ACTIVE | Noted: 2022-06-08

## 2022-06-08 PROBLEM — E63.9 DEFICIENCY OF MACRONUTRIENTS: Status: ACTIVE | Noted: 2022-06-08

## 2022-06-08 PROBLEM — I10 HYPERTENSION: Status: ACTIVE | Noted: 2022-06-08

## 2022-06-08 PROBLEM — A04.8 HELICOBACTER PYLORI INFECTION: Status: ACTIVE | Noted: 2022-06-08

## 2022-06-08 PROCEDURE — 1159F PR MEDICATION LIST DOCUMENTED IN MEDICAL RECORD: ICD-10-PCS | Mod: CPTII,,, | Performed by: NURSE PRACTITIONER

## 2022-06-08 PROCEDURE — 1159F MED LIST DOCD IN RCRD: CPT | Mod: CPTII,,, | Performed by: NURSE PRACTITIONER

## 2022-06-08 PROCEDURE — 1126F PR PAIN SEVERITY QUANTIFIED, NO PAIN PRESENT: ICD-10-PCS | Mod: CPTII,,, | Performed by: NURSE PRACTITIONER

## 2022-06-08 PROCEDURE — 3078F DIAST BP <80 MM HG: CPT | Mod: CPTII,,, | Performed by: NURSE PRACTITIONER

## 2022-06-08 PROCEDURE — 1126F AMNT PAIN NOTED NONE PRSNT: CPT | Mod: CPTII,,, | Performed by: NURSE PRACTITIONER

## 2022-06-08 PROCEDURE — 3288F FALL RISK ASSESSMENT DOCD: CPT | Mod: CPTII,,, | Performed by: NURSE PRACTITIONER

## 2022-06-08 PROCEDURE — 1101F PT FALLS ASSESS-DOCD LE1/YR: CPT | Mod: CPTII,,, | Performed by: NURSE PRACTITIONER

## 2022-06-08 PROCEDURE — 99215 OFFICE O/P EST HI 40 MIN: CPT | Mod: ,,, | Performed by: NURSE PRACTITIONER

## 2022-06-08 PROCEDURE — 3078F PR MOST RECENT DIASTOLIC BLOOD PRESSURE < 80 MM HG: ICD-10-PCS | Mod: CPTII,,, | Performed by: NURSE PRACTITIONER

## 2022-06-08 PROCEDURE — 99215 PR OFFICE/OUTPT VISIT, EST, LEVL V, 40-54 MIN: ICD-10-PCS | Mod: ,,, | Performed by: NURSE PRACTITIONER

## 2022-06-08 PROCEDURE — 1160F PR REVIEW ALL MEDS BY PRESCRIBER/CLIN PHARMACIST DOCUMENTED: ICD-10-PCS | Mod: CPTII,,, | Performed by: NURSE PRACTITIONER

## 2022-06-08 PROCEDURE — 3074F SYST BP LT 130 MM HG: CPT | Mod: CPTII,,, | Performed by: NURSE PRACTITIONER

## 2022-06-08 PROCEDURE — 3288F PR FALLS RISK ASSESSMENT DOCUMENTED: ICD-10-PCS | Mod: CPTII,,, | Performed by: NURSE PRACTITIONER

## 2022-06-08 PROCEDURE — 1160F RVW MEDS BY RX/DR IN RCRD: CPT | Mod: CPTII,,, | Performed by: NURSE PRACTITIONER

## 2022-06-08 PROCEDURE — 3074F PR MOST RECENT SYSTOLIC BLOOD PRESSURE < 130 MM HG: ICD-10-PCS | Mod: CPTII,,, | Performed by: NURSE PRACTITIONER

## 2022-06-08 PROCEDURE — 1101F PR PT FALLS ASSESS DOC 0-1 FALLS W/OUT INJ PAST YR: ICD-10-PCS | Mod: CPTII,,, | Performed by: NURSE PRACTITIONER

## 2022-06-08 RX ORDER — ATORVASTATIN CALCIUM 10 MG/1
10 TABLET, FILM COATED ORAL DAILY
Qty: 90 TABLET | Refills: 3 | Status: SHIPPED | OUTPATIENT
Start: 2022-06-08 | End: 2023-07-03

## 2022-06-08 RX ORDER — AMLODIPINE BESYLATE 5 MG/1
5 TABLET ORAL 2 TIMES DAILY
Qty: 90 TABLET | Refills: 3 | Status: SHIPPED | OUTPATIENT
Start: 2022-06-08 | End: 2022-10-31

## 2022-06-08 RX ORDER — PANTOPRAZOLE SODIUM 40 MG/1
40 TABLET, DELAYED RELEASE ORAL DAILY
Qty: 30 TABLET | Refills: 11 | Status: SHIPPED | OUTPATIENT
Start: 2022-06-08 | End: 2023-04-12

## 2022-06-08 RX ORDER — HYDROCODONE BITARTRATE AND ACETAMINOPHEN 7.5; 325 MG/1; MG/1
1 TABLET ORAL EVERY 6 HOURS PRN
COMMUNITY
End: 2022-06-08

## 2022-06-08 NOTE — PATIENT INSTRUCTIONS
Stop the Carafate.     Start pantoprazole 40 mg daily for your ulcer.    You have an appointment with your surgeon Dr Diaz on Wednesday June 15th at 8:00 am for surgery follow up.     You need to schedule a repeat upper GI scope to evaluate your ulcer. Dr Vanessa's office will call you in the next 24-48 hours to get that scheduled. Their number is [phone].    [facility] will be coming back as well.

## 2022-06-08 NOTE — PROGRESS NOTES
Transitional Care Note  Subjective:   Patient ID: Leonila Rosales is a 76 y.o. male.    Chief Complaint: TCM - lung surgery       Date of Hospital Discharge: 5/28/22   Family and/or Caretaker present at visit?  No  Diagnostic tests reviewed/disposition: I have reviewed all completed as well as pending diagnostic tests at the time of discharge.  Disease/illness education:   Non small cell lung CA, peptic ulcer, erosive esophagitis  Home health/community services discussion/referrals: Patient does not have home health established from hospital visit.  They do need home health.  If needed, we will set up home health for the patient.   Establishment or re-establishment of referral orders for community resources:none needed.  Discussion with other health care providers: Spoke with Dr Vanessa's office, they will call and schedule EGD.     HPI: HPI     This is a 76-year-old  male who is seen today for [facility] hospital discharge follow-up.  Patient was admitted for a right middle and lower lobectomy on May 24th.  He stayed in the hospital until the 28th.  States was discharged and has been doing well since getting home.  He is not sure why he no longer has home health but they have not come since prior to his hospitalization.  Patient also recently had hospitalization in April for GI bleed, peptic ulcer, erosive esophagitis.  Has not had any GI follow-up due to needing the lobectomy for the non-small cell lung carcinoma.    Health maintenance reviewed with the patient.  Health maintenance completed:  Health Maintenance Topics with due status: Not Due       Topic Last Completion Date    Influenza Vaccine Not Due      Health maintenance due:  Health Maintenance Due   Topic Date Due    Hepatitis C Screening  Never done    Lipid Panel  Never done    Pneumococcal Vaccines (Age 65+) (1 - PCV) Never done    TETANUS VACCINE  Never done    Shingles Vaccine (1 of 2) Never done    Colorectal Cancer Screening   "Never done    COVID-19 Vaccine (3 - Booster for Moderna series) 2021      Review of Systems  History:     Past Medical History:   Diagnosis Date    Closed intertrochanteric fracture     Deficiency of macronutrients     GERD (gastroesophageal reflux disease)     H. pylori infection     History of tobacco use     Hyperlipidemia     Hypertension     Lung mass     Non-small cell lung cancer       Past Surgical History:   Procedure Laterality Date    BIOPSY OF INTESTINE  2022    BRONCHOSCOPY      ESOPHAGOGASTRODUODENOSCOPY  2022    HIP FRACTURE SURGERY Left 2016    Intramedullary Nail Insertion Hip Left 2022    KIDNEY SURGERY Right 2022    SHOULDER SURGERY       History reviewed. No pertinent family history.   Social History     Tobacco Use    Smoking status: Former Smoker     Quit date: 2021     Years since quittin.5    Smokeless tobacco: Never Used   Substance and Sexual Activity    Alcohol use: Not Currently    Drug use: Never    Sexual activity: Not Currently          Allergies: Review of patient's allergies indicates:  No Known Allergies  Objective:     Vitals:    22 1310   BP: 126/70   Pulse: 67   Resp: 16   Temp: 98.2 °F (36.8 °C)   TempSrc: Oral   SpO2: 98%   Weight: 88.8 kg (195 lb 12.8 oz)   Height: 5' 4" (1.626 m)   PainSc: 0-No pain     Body mass index is 33.61 kg/m².       Physical Examination:   Physical Exam    Diagnostic Tests:  Significant Imaging: I have reviewed and interpreted all pertinent imaging results/findings.  X-Ray Chest 1 View  Narrative: EXAMINATION:  XR CHEST 1 VIEW    CLINICAL HISTORY:  Placement Verification;    TECHNIQUE:  One view    COMPARISON:  May 26, 2022.    FINDINGS:  Cardiopericardial silhouette is within normal limits.  Right chest tube is in similar location.  Lungs hypoventilatory changes without dense focal or segmental consolidation, congestive process, significant pleural effusions or pneumothorax.  Focal " resection of the right posterior rib.  Impression: NO significant interval change.    Electronically signed by: Salvador Brown  Date:    05/27/2022  Time:    07:29      Labs:   Lab Results   Component Value Date    WBC 8.3 05/25/2022    RBC 3.77 (L) 05/25/2022    HGB 9.6 (L) 05/25/2022    HCT 31.5 (L) 05/25/2022    MCV 83.6 05/25/2022    MCH 25.5 (L) 05/25/2022     05/25/2022     05/26/2022    K 4.1 05/26/2022    BUN 9.4 05/26/2022    CREATININE 0.70 (L) 05/26/2022    AST 22 05/23/2022    ALT 25 05/23/2022    BILITOT 0.5 05/23/2022    TSH 0.4695 01/18/2022        Laboratory Data:  All pertinent labs have been reviewed.  I have reviewed the following records today:     Details:   [x] Labs [] Internal  [] External    [] Micro [] Internal  [] External    [] Pathology [] Internal  [] External    [x] Imaging [] Internal  [] External    [x] Cardiology Procedures [] Internal  [] External    [x] Provider Records [] Internal  [] External    [] Other [] Internal  [] External      Medications:     Medication List with Changes/Refills   New Medications    PANTOPRAZOLE (PROTONIX) 40 MG TABLET    Take 1 tablet (40 mg total) by mouth once daily.   Current Medications    ASPIRIN 81 MG CAP    Take 81 mg by mouth Daily.    HYDROCODONE-ACETAMINOPHEN (NORCO) 7.5-325 MG PER TABLET    Take 1 tablet by mouth every 4 (four) hours as needed for Pain.    LINZESS 145 MCG CAP CAPSULE    Take 145 mcg by mouth once daily.   Changed and/or Refilled Medications    Modified Medication Previous Medication    AMLODIPINE (NORVASC) 5 MG TABLET amLODIPine (NORVASC) 5 MG tablet       Take 1 tablet (5 mg total) by mouth 2 (two) times daily.    Take 5 mg by mouth 2 (two) times daily.    ATORVASTATIN (LIPITOR) 10 MG TABLET atorvastatin (LIPITOR) 10 MG tablet       Take 1 tablet (10 mg total) by mouth once daily.    Take 10 mg by mouth once daily.   Discontinued Medications    HYDROCODONE-ACETAMINOPHEN (NORCO) 7.5-325 MG PER TABLET    Take 1  tablet by mouth every 6 (six) hours as needed for Pain (pain).    METHOCARBAMOL (ROBAXIN) 750 MG TAB    750 mg.    SUCRALFATE (CARAFATE) 1 GRAM TABLET    Take 1 g by mouth 4 (four) times daily before meals and nightly.     Assessment:     1. Lung mass    2. Non-small cell lung cancer, unspecified laterality    3. S/P lobectomy of lung    4. Peptic ulcer disease    5. Erosive esophagitis    6. Gastrointestinal hemorrhage, unspecified gastrointestinal hemorrhage type      Plan:   Leonila was seen today for tcm visit.    Diagnoses and all orders for this visit:    Lung mass    Non-small cell lung cancer, unspecified laterality  -     Ambulatory referral/consult to Home Health; Future    S/P lobectomy of lung    Peptic ulcer disease  -     pantoprazole (PROTONIX) 40 MG tablet; Take 1 tablet (40 mg total) by mouth once daily.  -     Ambulatory referral/consult to Home Health; Future    Erosive esophagitis  -     pantoprazole (PROTONIX) 40 MG tablet; Take 1 tablet (40 mg total) by mouth once daily.  -     Ambulatory referral/consult to Home Health; Future    Gastrointestinal hemorrhage, unspecified gastrointestinal hemorrhage type    Other orders  -     amLODIPine (NORVASC) 5 MG tablet; Take 1 tablet (5 mg total) by mouth 2 (two) times daily.  -     atorvastatin (LIPITOR) 10 MG tablet; Take 1 tablet (10 mg total) by mouth once daily.      Will restart home health  [facility].  Spoke to Dr. Vanessa's  office staff and they will call him to get his EGD scheduled.  He was informed to stop the Carafate and restart his Protonix.  He was supposed to have been on the Protonix.  Notified of appointment with Dr. Diaz on Wednesday the 15th of this month at 8:00 a.m..       Follow Up:   Follow up in 16 weeks (on 9/28/2022) for follow up.    I spent greater than 50 minutes today both in chart review and greater than 50% of that time in discussion with the patient regarding health maintenance, diagnoses, diagnostic tests, medications,  treatments, symptom management, expected results and adverse effects. Patient verbalized understanding and all questions were answered.

## 2022-06-15 ENCOUNTER — OFFICE VISIT (OUTPATIENT)
Dept: CARDIAC SURGERY | Facility: CLINIC | Age: 77
End: 2022-06-15
Payer: MEDICARE

## 2022-06-15 VITALS
HEIGHT: 64 IN | HEART RATE: 80 BPM | WEIGHT: 193 LBS | DIASTOLIC BLOOD PRESSURE: 80 MMHG | BODY MASS INDEX: 32.95 KG/M2 | SYSTOLIC BLOOD PRESSURE: 146 MMHG

## 2022-06-15 DIAGNOSIS — R91.8 LUNG MASS: Primary | ICD-10-CM

## 2022-06-15 PROCEDURE — 3288F PR FALLS RISK ASSESSMENT DOCUMENTED: ICD-10-PCS | Mod: CPTII,,, | Performed by: THORACIC SURGERY (CARDIOTHORACIC VASCULAR SURGERY)

## 2022-06-15 PROCEDURE — 1101F PR PT FALLS ASSESS DOC 0-1 FALLS W/OUT INJ PAST YR: ICD-10-PCS | Mod: CPTII,,, | Performed by: THORACIC SURGERY (CARDIOTHORACIC VASCULAR SURGERY)

## 2022-06-15 PROCEDURE — 3079F PR MOST RECENT DIASTOLIC BLOOD PRESSURE 80-89 MM HG: ICD-10-PCS | Mod: CPTII,,, | Performed by: THORACIC SURGERY (CARDIOTHORACIC VASCULAR SURGERY)

## 2022-06-15 PROCEDURE — 1160F PR REVIEW ALL MEDS BY PRESCRIBER/CLIN PHARMACIST DOCUMENTED: ICD-10-PCS | Mod: CPTII,,, | Performed by: THORACIC SURGERY (CARDIOTHORACIC VASCULAR SURGERY)

## 2022-06-15 PROCEDURE — 3288F FALL RISK ASSESSMENT DOCD: CPT | Mod: CPTII,,, | Performed by: THORACIC SURGERY (CARDIOTHORACIC VASCULAR SURGERY)

## 2022-06-15 PROCEDURE — 1160F RVW MEDS BY RX/DR IN RCRD: CPT | Mod: CPTII,,, | Performed by: THORACIC SURGERY (CARDIOTHORACIC VASCULAR SURGERY)

## 2022-06-15 PROCEDURE — 99024 POSTOP FOLLOW-UP VISIT: CPT | Mod: ,,, | Performed by: THORACIC SURGERY (CARDIOTHORACIC VASCULAR SURGERY)

## 2022-06-15 PROCEDURE — 3077F SYST BP >= 140 MM HG: CPT | Mod: CPTII,,, | Performed by: THORACIC SURGERY (CARDIOTHORACIC VASCULAR SURGERY)

## 2022-06-15 PROCEDURE — 3079F DIAST BP 80-89 MM HG: CPT | Mod: CPTII,,, | Performed by: THORACIC SURGERY (CARDIOTHORACIC VASCULAR SURGERY)

## 2022-06-15 PROCEDURE — 1159F PR MEDICATION LIST DOCUMENTED IN MEDICAL RECORD: ICD-10-PCS | Mod: CPTII,,, | Performed by: THORACIC SURGERY (CARDIOTHORACIC VASCULAR SURGERY)

## 2022-06-15 PROCEDURE — 1101F PT FALLS ASSESS-DOCD LE1/YR: CPT | Mod: CPTII,,, | Performed by: THORACIC SURGERY (CARDIOTHORACIC VASCULAR SURGERY)

## 2022-06-15 PROCEDURE — 99024 PR POST-OP FOLLOW-UP VISIT: ICD-10-PCS | Mod: ,,, | Performed by: THORACIC SURGERY (CARDIOTHORACIC VASCULAR SURGERY)

## 2022-06-15 PROCEDURE — 3077F PR MOST RECENT SYSTOLIC BLOOD PRESSURE >= 140 MM HG: ICD-10-PCS | Mod: CPTII,,, | Performed by: THORACIC SURGERY (CARDIOTHORACIC VASCULAR SURGERY)

## 2022-06-15 PROCEDURE — 1159F MED LIST DOCD IN RCRD: CPT | Mod: CPTII,,, | Performed by: THORACIC SURGERY (CARDIOTHORACIC VASCULAR SURGERY)

## 2022-06-15 NOTE — PROGRESS NOTES
"Leonila Rosales is a 76 y.o. male patient.   1. Lung mass      Past Medical History:   Diagnosis Date    Closed intertrochanteric fracture     Deficiency of macronutrients     GERD (gastroesophageal reflux disease)     H. pylori infection     History of tobacco use     Hyperlipidemia     Hypertension     Lung mass     Non-small cell lung cancer      No past surgical history pertinent negatives on file.  Scheduled Meds:  Continuous Infusions:  PRN Meds:    Review of patient's allergies indicates:  No Known Allergies  There are no hospital problems to display for this patient.    Blood pressure (!) 146/80, pulse 80, height 5' 4" (1.626 m), weight 87.5 kg (193 lb).    Subjective:  Patient returns to clinic status post right upper lobectomy.  .  He reports no pain      Objective:  His wounds have healed well.  Final pathology is T1c N0 M0.      Assessment & Plan:  Patient is doing very well status post lobectomy.  Plan to refer to Oncology for follow-up and further care      Jose Diaz MD  6/15/2022    "

## 2022-06-24 ENCOUNTER — DOCUMENTATION ONLY (OUTPATIENT)
Dept: HEMATOLOGY/ONCOLOGY | Facility: CLINIC | Age: 77
End: 2022-06-24
Payer: MEDICARE

## 2022-06-27 ENCOUNTER — OFFICE VISIT (OUTPATIENT)
Dept: HEMATOLOGY/ONCOLOGY | Facility: CLINIC | Age: 77
End: 2022-06-27
Payer: MEDICARE

## 2022-06-27 ENCOUNTER — EXTERNAL HOME HEALTH (OUTPATIENT)
Dept: HOME HEALTH SERVICES | Facility: HOSPITAL | Age: 77
End: 2022-06-27
Payer: MEDICARE

## 2022-06-27 VITALS
HEIGHT: 64 IN | BODY MASS INDEX: 32.78 KG/M2 | HEART RATE: 88 BPM | WEIGHT: 192 LBS | TEMPERATURE: 98 F | DIASTOLIC BLOOD PRESSURE: 78 MMHG | OXYGEN SATURATION: 100 % | SYSTOLIC BLOOD PRESSURE: 148 MMHG

## 2022-06-27 DIAGNOSIS — R91.8 LUNG MASS: ICD-10-CM

## 2022-06-27 DIAGNOSIS — C34.91 NON-SMALL CELL CANCER OF RIGHT LUNG: Primary | ICD-10-CM

## 2022-06-27 PROCEDURE — 3077F SYST BP >= 140 MM HG: CPT | Mod: CPTII,S$GLB,, | Performed by: INTERNAL MEDICINE

## 2022-06-27 PROCEDURE — 99999 PR PBB SHADOW E&M-EST. PATIENT-LVL IV: ICD-10-PCS | Mod: PBBFAC,,, | Performed by: INTERNAL MEDICINE

## 2022-06-27 PROCEDURE — 3288F PR FALLS RISK ASSESSMENT DOCUMENTED: ICD-10-PCS | Mod: CPTII,S$GLB,, | Performed by: INTERNAL MEDICINE

## 2022-06-27 PROCEDURE — 99205 PR OFFICE/OUTPT VISIT, NEW, LEVL V, 60-74 MIN: ICD-10-PCS | Mod: S$GLB,,, | Performed by: INTERNAL MEDICINE

## 2022-06-27 PROCEDURE — 1101F PT FALLS ASSESS-DOCD LE1/YR: CPT | Mod: CPTII,S$GLB,, | Performed by: INTERNAL MEDICINE

## 2022-06-27 PROCEDURE — 1126F AMNT PAIN NOTED NONE PRSNT: CPT | Mod: CPTII,S$GLB,, | Performed by: INTERNAL MEDICINE

## 2022-06-27 PROCEDURE — 1101F PR PT FALLS ASSESS DOC 0-1 FALLS W/OUT INJ PAST YR: ICD-10-PCS | Mod: CPTII,S$GLB,, | Performed by: INTERNAL MEDICINE

## 2022-06-27 PROCEDURE — 1159F PR MEDICATION LIST DOCUMENTED IN MEDICAL RECORD: ICD-10-PCS | Mod: CPTII,S$GLB,, | Performed by: INTERNAL MEDICINE

## 2022-06-27 PROCEDURE — 3078F DIAST BP <80 MM HG: CPT | Mod: CPTII,S$GLB,, | Performed by: INTERNAL MEDICINE

## 2022-06-27 PROCEDURE — 1111F PR DISCHARGE MEDS RECONCILED W/ CURRENT OUTPATIENT MED LIST: ICD-10-PCS | Mod: CPTII,S$GLB,, | Performed by: INTERNAL MEDICINE

## 2022-06-27 PROCEDURE — 1126F PR PAIN SEVERITY QUANTIFIED, NO PAIN PRESENT: ICD-10-PCS | Mod: CPTII,S$GLB,, | Performed by: INTERNAL MEDICINE

## 2022-06-27 PROCEDURE — 1111F DSCHRG MED/CURRENT MED MERGE: CPT | Mod: CPTII,S$GLB,, | Performed by: INTERNAL MEDICINE

## 2022-06-27 PROCEDURE — 99205 OFFICE O/P NEW HI 60 MIN: CPT | Mod: S$GLB,,, | Performed by: INTERNAL MEDICINE

## 2022-06-27 PROCEDURE — 3078F PR MOST RECENT DIASTOLIC BLOOD PRESSURE < 80 MM HG: ICD-10-PCS | Mod: CPTII,S$GLB,, | Performed by: INTERNAL MEDICINE

## 2022-06-27 PROCEDURE — 1160F PR REVIEW ALL MEDS BY PRESCRIBER/CLIN PHARMACIST DOCUMENTED: ICD-10-PCS | Mod: CPTII,S$GLB,, | Performed by: INTERNAL MEDICINE

## 2022-06-27 PROCEDURE — 3288F FALL RISK ASSESSMENT DOCD: CPT | Mod: CPTII,S$GLB,, | Performed by: INTERNAL MEDICINE

## 2022-06-27 PROCEDURE — 99999 PR PBB SHADOW E&M-EST. PATIENT-LVL IV: CPT | Mod: PBBFAC,,, | Performed by: INTERNAL MEDICINE

## 2022-06-27 PROCEDURE — 1160F RVW MEDS BY RX/DR IN RCRD: CPT | Mod: CPTII,S$GLB,, | Performed by: INTERNAL MEDICINE

## 2022-06-27 PROCEDURE — 3077F PR MOST RECENT SYSTOLIC BLOOD PRESSURE >= 140 MM HG: ICD-10-PCS | Mod: CPTII,S$GLB,, | Performed by: INTERNAL MEDICINE

## 2022-06-27 PROCEDURE — 1159F MED LIST DOCD IN RCRD: CPT | Mod: CPTII,S$GLB,, | Performed by: INTERNAL MEDICINE

## 2022-06-27 NOTE — PROGRESS NOTES
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 Right upper lobe 2.5 cm mass.    Pathology:  03/22/2022 CT-guided biopsy of the right lung mass: invasive squamous cell carcinoma, moderately differentiated.  5/24/2022: Right upper lobectomy:  1- LYMPH NODE, LUMBAR RIGHT 10 LYMPHADENECTOMY: NEGATIVE FOR METASTATIC CARCINOMA (0/1).   2- LYMPH NODE, 11R, LYMPHADENECTOMY: NEGATIVE FOR METASTATIC CARCINOMA (0/1).  3- LYMPH NODE, 9R, LYMPHADENECTOMY: NEGATIVE FOR METASTATIC CARCINOMA (0/1).   4- LYMPH NODE, 7R, LYMPHADENECTOMY: NEGATIVE FOR METASTATIC CARCINOMA (0/1).   5- LYMPH NODE, 8R, LYMPHADENECTOMY: ONE LYMPH NODE NEGATIVE FOR METASTATIC CARCINOMA (0/1).    6- LYMPH NODE, 12R, LYMPHADENECTOMY: NEGATIVE FOR METASTATIC CARCINOMA (0/1).  7- LUNG, RIGHT UPPER AND MIDDLE LOBES, PNEUMONECTOMY:  POORLY DIFFERENTIATED, G3, SQUAMOUS CELL CARCINOMA, INVASIVE.    - TUMOR SIZE: 3 CM.    - LYMPHOVASCULAR INVASION IS PRESENT.    - MARGINS NEGATIVE FOR INVASIVE CARCINOMA:    - NEAREST MARGIN, VASCULAR / BRONCHIAL 2.5 CM.    - NO PLEURAL SURFACE INVOLVEMENT     - PROMINENT NECROSIS PRESENT.  Visceral Pleura Invasion: Not identified  Number of Lymph Nodes Examined: Exact number : 6  pT Category: pT1c: pN0: No regional lymph node metastasis    CLINICAL HISTORY:       Patient: Leonila Rosales is a 76 y.o. male kindly referred by  Dr. Diaz for evaluation of lung cancer.    On 01/17/2022 patient presented to the emergency department after a fall.  He reports that he was getting to his truck and sleep and fell on his left side on the truck step rail.  He started having pain in his left hip and knee.  He underwent a  CT angiogram that showed No pulmonary embolus. Right upper lobe 2.5 cm mass.      During that hospitalization he was treated for a close intertrochanteric fracture of the left femur and underwent open reduction intramedullary nailing left comminuted intertrochanteric hip fracture on 01/18/2022 with Dr. Fortino Palomares.  He then spent several weeks on rehab.    On 03/22/2022 he underwent CT-guided biopsy of the right lung mass.  Pathology showed invasive squamous cell carcinoma, moderately differentiated.    He is s/p right upper lobectomy with  on 5/24/2022.  Pathology report as above.  Patient is stage IA3 squamous cell carcinoma of the lung.  He has recovered well and has been doing good.     He lives alone and is able to perform ADL's. He uses a walker to aid in ambulation. He quit smoking January 2022, after smoking for 40 yrs.       Chief Complaint: NP (Referral from Dr. Jose Diaz for Lung Cancer)      Interval History:    Past Medical History:   Diagnosis Date    Closed intertrochanteric fracture     Deficiency of macronutrients     GERD (gastroesophageal reflux disease)     H. pylori infection     History of tobacco use     Hyperlipidemia     Hypertension     Lung mass     Non-small cell lung cancer       Past Surgical History:   Procedure Laterality Date    BIOPSY OF INTESTINE  04/04/2022    BRONCHOSCOPY      ESOPHAGOGASTRODUODENOSCOPY  04/04/2022    HIP FRACTURE SURGERY Left 2016    Intramedullary Nail Insertion Hip Left 01/18/2022    KIDNEY SURGERY Right 05/27/2022    SHOULDER SURGERY       History reviewed. No pertinent family history.  Social Connections: Not on file       Review of patient's allergies indicates:  No Known Allergies   Current Outpatient Medications on File Prior to Visit   Medication Sig Dispense Refill    amLODIPine (NORVASC) 5 MG tablet Take 1 tablet (5 mg total) by mouth 2 (two) times daily. 90 tablet 3    aspirin 81 mg Cap Take 81 mg by mouth Daily.       "atorvastatin (LIPITOR) 10 MG tablet Take 1 tablet (10 mg total) by mouth once daily. 90 tablet 3    LINZESS 145 mcg Cap capsule Take 145 mcg by mouth once daily.      pantoprazole (PROTONIX) 40 MG tablet Take 1 tablet (40 mg total) by mouth once daily. 30 tablet 11     No current facility-administered medications on file prior to visit.      Review of Systems   Constitutional: Negative for activity change, appetite change, chills, diaphoresis, fatigue, fever and unexpected weight change.   HENT: Negative for nasal congestion, mouth sores, nosebleeds, postnasal drip, sinus pressure/congestion, sore throat and trouble swallowing.    Eyes: Negative for visual disturbance.   Cardiovascular: Positive for chest pain. Negative for palpitations.        Around surgical scar   Gastrointestinal: Negative for abdominal distention, abdominal pain, blood in stool, change in bowel habit, constipation, diarrhea, nausea, vomiting and change in bowel habit.   Endocrine: Negative.    Genitourinary: Negative for bladder incontinence, decreased urine volume, difficulty urinating, dysuria, frequency, hematuria, scrotal swelling, testicular pain and urgency.   Musculoskeletal: Negative for arthralgias, back pain, gait problem, joint swelling, leg pain, myalgias and neck pain.   Integumentary:  Negative for rash.   Neurological: Positive for weakness. Negative for dizziness, tremors, seizures, syncope, speech difficulty, light-headedness, numbness, headaches and memory loss.   Hematological: Does not bruise/bleed easily.   Psychiatric/Behavioral: Negative for agitation, confusion, hallucinations, sleep disturbance and suicidal ideas. The patient is not nervous/anxious.               Vitals:    06/27/22 1352   BP: (!) 148/78   BP Location: Right arm   Patient Position: Sitting   Pulse: 88   Temp: 98.1 °F (36.7 °C)   SpO2: 100%   Weight: 87.1 kg (192 lb)   Height: 5' 4" (1.626 m)      Physical Exam  Vitals and nursing note reviewed. "   Constitutional:       General: He is not in acute distress.     Appearance: Normal appearance.   HENT:      Head: Normocephalic and atraumatic.      Mouth/Throat:      Mouth: Mucous membranes are moist.   Eyes:      General: No scleral icterus.     Extraocular Movements: Extraocular movements intact.      Conjunctiva/sclera: Conjunctivae normal.   Neck:      Vascular: No JVD.   Cardiovascular:      Rate and Rhythm: Normal rate and regular rhythm.      Heart sounds: No murmur heard.  Pulmonary:      Effort: Pulmonary effort is normal.      Breath sounds: Decreased breath sounds present. No wheezing or rhonchi.   Chest:      Chest wall: No tenderness.   Breasts:      Right: No axillary adenopathy or supraclavicular adenopathy.      Left: No axillary adenopathy or supraclavicular adenopathy.       Abdominal:      General: Bowel sounds are normal. There is no distension.      Palpations: Abdomen is soft.      Tenderness: There is no abdominal tenderness.   Musculoskeletal:         General: No swelling or deformity.      Cervical back: Neck supple.   Lymphadenopathy:      Cervical: No cervical adenopathy.      Upper Body:      Right upper body: No supraclavicular or axillary adenopathy.      Left upper body: No supraclavicular or axillary adenopathy.      Lower Body: No right inguinal adenopathy. No left inguinal adenopathy.   Skin:     General: Skin is warm.      Coloration: Skin is not jaundiced.      Findings: No rash.   Neurological:      General: No focal deficit present.      Mental Status: He is alert and oriented to person, place, and time.      Motor: Weakness present.      Comments: Mobility assitssed by walker   Psychiatric:         Attention and Perception: Attention normal.         Mood and Affect: Mood and affect normal.         Behavior: Behavior is cooperative.         Cognition and Memory: Cognition normal.         Judgment: Judgment normal.       ECOG SCORE           Laboratory:  CBC with  Differential:  Lab Results   Component Value Date    WBC 8.3 05/25/2022    RBC 3.77 (L) 05/25/2022    HGB 9.6 (L) 05/25/2022    HCT 31.5 (L) 05/25/2022    MCV 83.6 05/25/2022    MCH 25.5 (L) 05/25/2022    MCHC 30.5 (L) 05/25/2022    RDW 16.5 05/25/2022     05/25/2022    MPV 9.9 05/25/2022        CMP:  Sodium Level   Date Value Ref Range Status   05/26/2022 138 136 - 145 mmol/L Final     Potassium Level   Date Value Ref Range Status   05/26/2022 4.1 3.5 - 5.1 mmol/L Final     Carbon Dioxide   Date Value Ref Range Status   05/26/2022 23 23 - 31 mmol/L Final     Blood Urea Nitrogen   Date Value Ref Range Status   05/26/2022 9.4 8.4 - 25.7 mg/dL Final     Creatinine   Date Value Ref Range Status   05/26/2022 0.70 (L) 0.73 - 1.18 mg/dL Final     Calcium Level Total   Date Value Ref Range Status   05/26/2022 9.1 8.8 - 10.0 mg/dL Final     Albumin Level   Date Value Ref Range Status   05/23/2022 3.7 3.4 - 4.8 gm/dL Final     Bilirubin Total   Date Value Ref Range Status   05/23/2022 0.5 <=1.5 mg/dL Final     Alkaline Phosphatase   Date Value Ref Range Status   05/23/2022 88 40 - 150 unit/L Final     Aspartate Aminotransferase   Date Value Ref Range Status   05/23/2022 22 5 - 34 unit/L Final     Alanine Aminotransferase   Date Value Ref Range Status   05/23/2022 25 0 - 55 unit/L Final     Estimated GFR-Non    Date Value Ref Range Status   04/19/2022 >60               Assessment:       1. Non-small cell cancer of right lung    2. 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  Discussed with NCCN guidelines according to his stage I A.  With margin negative are 0 observation is the recommendation.  Surveillance include  chest CT with contrast every 6 months for 2-3 years, then H&P and low-dose noncontrast enhanced chest CT annually.    Return to clinic in 3 months with labs  Labs: CBC,  CMP  Will order CT chest w/ contrast    Encourage to call or message us for any questions or problems  The patient was given ample opportunity to ask questions, and to the best of my abilities, all questions answered to satisfaction; patient demonstrated understanding of what we discussed and agreeable to the plan.     I'd like to thank for referring and allowing me the opportunity to participate in the care of this patient and if any questions, please do not hesitate to call the office at [phone].       CONNER FLORES MD      Professional Services   I, Shirley Pina LPN, acted solely as a scribe for and in the presence of Dr. Conner Flores, who performed these services.

## 2022-07-13 ENCOUNTER — DOCUMENT SCAN (OUTPATIENT)
Dept: HOME HEALTH SERVICES | Facility: HOSPITAL | Age: 77
End: 2022-07-13
Payer: MEDICARE

## 2022-08-08 ENCOUNTER — OFFICE VISIT (OUTPATIENT)
Dept: ORTHOPEDICS | Facility: CLINIC | Age: 77
End: 2022-08-08
Payer: MEDICARE

## 2022-08-08 VITALS — WEIGHT: 192 LBS | HEIGHT: 64 IN | BODY MASS INDEX: 32.78 KG/M2

## 2022-08-08 DIAGNOSIS — M25.552 LEFT HIP PAIN: Primary | ICD-10-CM

## 2022-08-08 DIAGNOSIS — S72.142D CLOSED 2-PART INTERTROCHANTERIC FRACTURE OF LEFT FEMUR WITH ROUTINE HEALING, SUBSEQUENT ENCOUNTER: ICD-10-CM

## 2022-08-08 PROCEDURE — 1159F PR MEDICATION LIST DOCUMENTED IN MEDICAL RECORD: ICD-10-PCS | Mod: CPTII,,, | Performed by: ORTHOPAEDIC SURGERY

## 2022-08-08 PROCEDURE — 3288F FALL RISK ASSESSMENT DOCD: CPT | Mod: CPTII,,, | Performed by: ORTHOPAEDIC SURGERY

## 2022-08-08 PROCEDURE — 99213 PR OFFICE/OUTPT VISIT, EST, LEVL III, 20-29 MIN: ICD-10-PCS | Mod: ,,, | Performed by: ORTHOPAEDIC SURGERY

## 2022-08-08 PROCEDURE — 1101F PR PT FALLS ASSESS DOC 0-1 FALLS W/OUT INJ PAST YR: ICD-10-PCS | Mod: CPTII,,, | Performed by: ORTHOPAEDIC SURGERY

## 2022-08-08 PROCEDURE — 1160F RVW MEDS BY RX/DR IN RCRD: CPT | Mod: CPTII,,, | Performed by: ORTHOPAEDIC SURGERY

## 2022-08-08 PROCEDURE — 3288F PR FALLS RISK ASSESSMENT DOCUMENTED: ICD-10-PCS | Mod: CPTII,,, | Performed by: ORTHOPAEDIC SURGERY

## 2022-08-08 PROCEDURE — 1101F PT FALLS ASSESS-DOCD LE1/YR: CPT | Mod: CPTII,,, | Performed by: ORTHOPAEDIC SURGERY

## 2022-08-08 PROCEDURE — 99213 OFFICE O/P EST LOW 20 MIN: CPT | Mod: ,,, | Performed by: ORTHOPAEDIC SURGERY

## 2022-08-08 PROCEDURE — 1160F PR REVIEW ALL MEDS BY PRESCRIBER/CLIN PHARMACIST DOCUMENTED: ICD-10-PCS | Mod: CPTII,,, | Performed by: ORTHOPAEDIC SURGERY

## 2022-08-08 PROCEDURE — 1159F MED LIST DOCD IN RCRD: CPT | Mod: CPTII,,, | Performed by: ORTHOPAEDIC SURGERY

## 2022-08-08 NOTE — PROGRESS NOTES
Subjective:    CC: Pain of the Left Hip and Follow-up (L hip IM nail intertrochanteric fx 1/18/ 22 - pt is ambulating with a quad cane, but states he may not need it. pt states that his hip is feeling good - td)       HPI:  Patient returns today for repeat exam.  Patient is 7+ months from his left intertroch hip fracture.  He states he is doing very well he is without pain or discomfort, he states he is back to his normal activities.    ROS: Refer to HPI for pertinent ROS. All other 12 point systems negative.    Objective:    Physical Exam:  Left lower extremity compartment soft and warm.  Skin is intact.  There is no signs symptoms of DVT or infection.  He has no pain with log-rolling of the left hip.  He is walking with appropriate gait he is stable to stressing there is no long track signs, neurovascular intact distally.    Images:  X-rays two views left hip demonstrate a healed intertroch hip fracture with hardware in appropriate position. Images Reviewed and discussed with patient.    Assessment:  1. Left hip pain  - X-Ray Hip 2 or 3 views Left (with Pelvis when performed); Future    2. Closed 2-part intertrochanteric fracture of left femur with routine healing, subsequent encounter        Plan:  At this time we discussed his physical exam and x-ray findings.  He has healed nicely.  He will continue weight-bearing as tolerated.  I would like see him back with any problems or difficulties.    Follow UP: No follow-ups on file.

## 2022-09-19 ENCOUNTER — HOSPITAL ENCOUNTER (OUTPATIENT)
Dept: RADIOLOGY | Facility: HOSPITAL | Age: 77
Discharge: HOME | End: 2022-09-19
Attending: INTERNAL MEDICINE
Payer: MEDICARE

## 2022-09-19 DIAGNOSIS — R91.8 LUNG MASS: ICD-10-CM

## 2022-09-19 DIAGNOSIS — C34.91 NON-SMALL CELL CANCER OF RIGHT LUNG: ICD-10-CM

## 2022-09-19 LAB
CREAT SERPL-MCNC: 0.9 MG/DL (ref 0.5–1.4)
SAMPLE: NORMAL

## 2022-09-19 PROCEDURE — 25500020 PHARM REV CODE 255: Performed by: INTERNAL MEDICINE

## 2022-09-19 PROCEDURE — 71260 CT THORAX DX C+: CPT | Mod: TC

## 2022-09-19 RX ADMIN — IOPAMIDOL 100 ML: 755 INJECTION, SOLUTION INTRAVENOUS at 04:09

## 2022-09-21 ENCOUNTER — TELEPHONE (OUTPATIENT)
Dept: FAMILY MEDICINE | Facility: CLINIC | Age: 77
End: 2022-09-21
Payer: MEDICARE

## 2022-09-21 DIAGNOSIS — E63.9 DEFICIENCY OF MACRONUTRIENTS: ICD-10-CM

## 2022-09-21 DIAGNOSIS — Z11.59 NEED FOR HEPATITIS C SCREENING TEST: ICD-10-CM

## 2022-09-21 DIAGNOSIS — E78.5 HYPERLIPIDEMIA, UNSPECIFIED HYPERLIPIDEMIA TYPE: Primary | ICD-10-CM

## 2022-09-21 NOTE — TELEPHONE ENCOUNTER
Are there any outstanding tasks in patient's chart?  labs    2. Do we have outstanding/pending referrals?  n    3. Has the patient been seen in an ER, Urgent Care, or admitted since last visit?  n    4. Has patient seen any other health care providers since last visit?  Dr. Napoles, Dr. Palomares    5.  Has patient had any blood work or x-rays done since last visit?   Completed labs for Dr. Napoles  Will complete labs for visit at [facility]      Please order labs needed for visit

## 2022-09-23 ENCOUNTER — LAB VISIT (OUTPATIENT)
Dept: LAB | Facility: HOSPITAL | Age: 77
End: 2022-09-23
Attending: NURSE PRACTITIONER
Payer: MEDICARE

## 2022-09-23 DIAGNOSIS — E78.5 HYPERLIPIDEMIA, UNSPECIFIED HYPERLIPIDEMIA TYPE: ICD-10-CM

## 2022-09-23 DIAGNOSIS — Z11.59 NEED FOR HEPATITIS C SCREENING TEST: ICD-10-CM

## 2022-09-23 LAB
CHOLEST SERPL-MCNC: 156 MG/DL
CHOLEST/HDLC SERPL: 3 {RATIO} (ref 0–5)
HCV AB SERPL QL IA: NONREACTIVE
HDLC SERPL-MCNC: 55 MG/DL (ref 35–60)
LDLC SERPL CALC-MCNC: 88 MG/DL (ref 50–140)
TRIGL SERPL-MCNC: 66 MG/DL (ref 34–140)
VLDLC SERPL CALC-MCNC: 13 MG/DL

## 2022-09-23 PROCEDURE — 36415 COLL VENOUS BLD VENIPUNCTURE: CPT

## 2022-09-23 PROCEDURE — 80061 LIPID PANEL: CPT

## 2022-09-23 PROCEDURE — 86803 HEPATITIS C AB TEST: CPT

## 2022-09-27 ENCOUNTER — LAB VISIT (OUTPATIENT)
Dept: LAB | Facility: HOSPITAL | Age: 77
End: 2022-09-27
Attending: INTERNAL MEDICINE
Payer: MEDICARE

## 2022-09-27 ENCOUNTER — OFFICE VISIT (OUTPATIENT)
Dept: HEMATOLOGY/ONCOLOGY | Facility: CLINIC | Age: 77
End: 2022-09-27
Payer: MEDICARE

## 2022-09-27 VITALS
OXYGEN SATURATION: 100 % | BODY MASS INDEX: 35.8 KG/M2 | TEMPERATURE: 97 F | DIASTOLIC BLOOD PRESSURE: 73 MMHG | RESPIRATION RATE: 18 BRPM | SYSTOLIC BLOOD PRESSURE: 134 MMHG | HEIGHT: 64 IN | HEART RATE: 79 BPM | WEIGHT: 209.69 LBS

## 2022-09-27 DIAGNOSIS — D50.9 MICROCYTIC ANEMIA: ICD-10-CM

## 2022-09-27 DIAGNOSIS — C34.91 NON-SMALL CELL CANCER OF RIGHT LUNG: ICD-10-CM

## 2022-09-27 DIAGNOSIS — C34.90 MALIGNANT NEOPLASM OF UNSPECIFIED PART OF UNSPECIFIED BRONCHUS OR LUNG: Primary | ICD-10-CM

## 2022-09-27 DIAGNOSIS — R91.8 LUNG MASS: ICD-10-CM

## 2022-09-27 LAB
ALBUMIN SERPL-MCNC: 3.7 GM/DL (ref 3.4–4.8)
ALBUMIN/GLOB SERPL: 1 RATIO (ref 1.1–2)
ALP SERPL-CCNC: 99 UNIT/L (ref 40–150)
ALT SERPL-CCNC: 55 UNIT/L (ref 0–55)
AST SERPL-CCNC: 40 UNIT/L (ref 5–34)
BASOPHILS # BLD AUTO: 0.04 X10(3)/MCL (ref 0–0.2)
BASOPHILS NFR BLD AUTO: 0.6 %
BILIRUBIN DIRECT+TOT PNL SERPL-MCNC: 0.6 MG/DL
BUN SERPL-MCNC: 13.6 MG/DL (ref 8.4–25.7)
CALCIUM SERPL-MCNC: 9.7 MG/DL (ref 8.8–10)
CHLORIDE SERPL-SCNC: 108 MMOL/L (ref 98–107)
CO2 SERPL-SCNC: 26 MMOL/L (ref 23–31)
CREAT SERPL-MCNC: 0.91 MG/DL (ref 0.73–1.18)
EOSINOPHIL # BLD AUTO: 0.16 X10(3)/MCL (ref 0–0.9)
EOSINOPHIL NFR BLD AUTO: 2.5 %
ERYTHROCYTE [DISTWIDTH] IN BLOOD BY AUTOMATED COUNT: 21 % (ref 11.5–17)
GFR SERPLBLD CREATININE-BSD FMLA CKD-EPI: >60 MLS/MIN/1.73/M2
GLOBULIN SER-MCNC: 3.6 GM/DL (ref 2.4–3.5)
GLUCOSE SERPL-MCNC: 101 MG/DL (ref 82–115)
HCT VFR BLD AUTO: 37.7 % (ref 42–52)
HGB BLD-MCNC: 11.4 GM/DL (ref 14–18)
IMM GRANULOCYTES # BLD AUTO: 0.02 X10(3)/MCL (ref 0–0.04)
IMM GRANULOCYTES NFR BLD AUTO: 0.3 %
LYMPHOCYTES # BLD AUTO: 2.01 X10(3)/MCL (ref 0.6–4.6)
LYMPHOCYTES NFR BLD AUTO: 32 %
MCH RBC QN AUTO: 24.1 PG (ref 27–31)
MCHC RBC AUTO-ENTMCNC: 30.2 MG/DL (ref 33–36)
MCV RBC AUTO: 79.7 FL (ref 80–94)
MONOCYTES # BLD AUTO: 0.68 X10(3)/MCL (ref 0.1–1.3)
MONOCYTES NFR BLD AUTO: 10.8 %
NEUTROPHILS # BLD AUTO: 3.4 X10(3)/MCL (ref 2.1–9.2)
NEUTROPHILS NFR BLD AUTO: 53.8 %
PLATELET # BLD AUTO: 260 X10(3)/MCL (ref 130–400)
PMV BLD AUTO: 9.4 FL (ref 7.4–10.4)
POTASSIUM SERPL-SCNC: 4 MMOL/L (ref 3.5–5.1)
PROT SERPL-MCNC: 7.3 GM/DL (ref 5.8–7.6)
RBC # BLD AUTO: 4.73 X10(6)/MCL (ref 4.7–6.1)
SODIUM SERPL-SCNC: 141 MMOL/L (ref 136–145)
WBC # SPEC AUTO: 6.3 X10(3)/MCL (ref 4.5–11.5)

## 2022-09-27 PROCEDURE — 36415 COLL VENOUS BLD VENIPUNCTURE: CPT

## 2022-09-27 PROCEDURE — 1126F PR PAIN SEVERITY QUANTIFIED, NO PAIN PRESENT: ICD-10-PCS | Mod: CPTII,S$GLB,, | Performed by: INTERNAL MEDICINE

## 2022-09-27 PROCEDURE — 80053 COMPREHEN METABOLIC PANEL: CPT

## 2022-09-27 PROCEDURE — 99214 PR OFFICE/OUTPT VISIT, EST, LEVL IV, 30-39 MIN: ICD-10-PCS | Mod: S$GLB,,, | Performed by: INTERNAL MEDICINE

## 2022-09-27 PROCEDURE — 1159F PR MEDICATION LIST DOCUMENTED IN MEDICAL RECORD: ICD-10-PCS | Mod: CPTII,S$GLB,, | Performed by: INTERNAL MEDICINE

## 2022-09-27 PROCEDURE — 99999 PR PBB SHADOW E&M-EST. PATIENT-LVL IV: CPT | Mod: PBBFAC,,, | Performed by: INTERNAL MEDICINE

## 2022-09-27 PROCEDURE — 1160F PR REVIEW ALL MEDS BY PRESCRIBER/CLIN PHARMACIST DOCUMENTED: ICD-10-PCS | Mod: CPTII,S$GLB,, | Performed by: INTERNAL MEDICINE

## 2022-09-27 PROCEDURE — 85025 COMPLETE CBC W/AUTO DIFF WBC: CPT

## 2022-09-27 PROCEDURE — 1126F AMNT PAIN NOTED NONE PRSNT: CPT | Mod: CPTII,S$GLB,, | Performed by: INTERNAL MEDICINE

## 2022-09-27 PROCEDURE — 3078F DIAST BP <80 MM HG: CPT | Mod: CPTII,S$GLB,, | Performed by: INTERNAL MEDICINE

## 2022-09-27 PROCEDURE — 1159F MED LIST DOCD IN RCRD: CPT | Mod: CPTII,S$GLB,, | Performed by: INTERNAL MEDICINE

## 2022-09-27 PROCEDURE — 99999 PR PBB SHADOW E&M-EST. PATIENT-LVL IV: ICD-10-PCS | Mod: PBBFAC,,, | Performed by: INTERNAL MEDICINE

## 2022-09-27 PROCEDURE — 1160F RVW MEDS BY RX/DR IN RCRD: CPT | Mod: CPTII,S$GLB,, | Performed by: INTERNAL MEDICINE

## 2022-09-27 PROCEDURE — 3078F PR MOST RECENT DIASTOLIC BLOOD PRESSURE < 80 MM HG: ICD-10-PCS | Mod: CPTII,S$GLB,, | Performed by: INTERNAL MEDICINE

## 2022-09-27 PROCEDURE — 3075F PR MOST RECENT SYSTOLIC BLOOD PRESS GE 130-139MM HG: ICD-10-PCS | Mod: CPTII,S$GLB,, | Performed by: INTERNAL MEDICINE

## 2022-09-27 PROCEDURE — 3075F SYST BP GE 130 - 139MM HG: CPT | Mod: CPTII,S$GLB,, | Performed by: INTERNAL MEDICINE

## 2022-09-27 PROCEDURE — 99214 OFFICE O/P EST MOD 30 MIN: CPT | Mod: S$GLB,,, | Performed by: INTERNAL MEDICINE

## 2022-09-27 NOTE — PROGRESS NOTES
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 Right upper lobe 2.5 cm mass.  CT C 9/19/2022: Status post right partial pneumonectomy for resection of a right upper lobe lung mass with no residual recurrent mass seen. Small right-sided pleural effusion. Some lymphadenopathy in the right paratracheal region with a maximum short axis dimension of 1.6 cm.  Follow-up is recommended    Pathology:  03/22/2022 CT-guided biopsy of the right lung mass: invasive squamous cell carcinoma, moderately differentiated.  5/24/2022: Right upper lobectomy:  1- LYMPH NODE, LUMBAR RIGHT 10 LYMPHADENECTOMY: NEGATIVE FOR METASTATIC CARCINOMA (0/1).   2- LYMPH NODE, 11R, LYMPHADENECTOMY: NEGATIVE FOR METASTATIC CARCINOMA (0/1).  3- LYMPH NODE, 9R, LYMPHADENECTOMY: NEGATIVE FOR METASTATIC CARCINOMA (0/1).   4- LYMPH NODE, 7R, LYMPHADENECTOMY: NEGATIVE FOR METASTATIC CARCINOMA (0/1).   5- LYMPH NODE, 8R, LYMPHADENECTOMY: ONE LYMPH NODE NEGATIVE FOR METASTATIC CARCINOMA (0/1).    6- LYMPH NODE, 12R, LYMPHADENECTOMY: NEGATIVE FOR METASTATIC CARCINOMA (0/1).  7- LUNG, RIGHT UPPER AND MIDDLE LOBES, PNEUMONECTOMY:  POORLY DIFFERENTIATED, G3, SQUAMOUS CELL CARCINOMA, INVASIVE.    - TUMOR SIZE: 3 CM.    - LYMPHOVASCULAR INVASION IS PRESENT.    - MARGINS NEGATIVE FOR INVASIVE CARCINOMA:    - NEAREST MARGIN, VASCULAR / BRONCHIAL 2.5 CM.    - NO PLEURAL SURFACE INVOLVEMENT     - PROMINENT NECROSIS PRESENT.  Visceral Pleura Invasion: Not identified  Number of Lymph Nodes Examined: Exact number : 6  pT Category: pT1c: pN0: No regional lymph node metastasis    CLINICAL HISTORY:        Patient: Leonila Rosales is a 77 y.o. male kindly referred by  Dr. Diaz for evaluation of lung cancer.    On 01/17/2022 patient presented to the emergency department after a fall.  He reports that he was getting to his truck and sleep and fell on his left side on the truck step rail.  He started having pain in his left hip and knee.  He underwent a CT angiogram that showed No pulmonary embolus. Right upper lobe 2.5 cm mass.      During that hospitalization he was treated for a close intertrochanteric fracture of the left femur and underwent open reduction intramedullary nailing left comminuted intertrochanteric hip fracture on 01/18/2022 with Dr. Fortino Palomares.  He then spent several weeks on rehab.    On 03/22/2022 he underwent CT-guided biopsy of the right lung mass.  Pathology showed invasive squamous cell carcinoma, moderately differentiated.    He is s/p right upper lobectomy with  on 5/24/2022.  Pathology report as above.  Patient is stage IA3 squamous cell carcinoma of the lung.  He has recovered well and has been doing good.     He lives alone and is able to perform ADL's. He uses a walker to aid in ambulation. He quit smoking January 2022, after smoking for 40 yrs.       Chief Complaint: no complaints today      Interval History:    Patient is here in surveillance of his lung cancer. He is doing well and voices no concerns. He has gained about 9 lbs since his last visit. He is very active now and cut the grass. He uses a cane for mobility but voices no weakness. He has an appt with his PCP tomorrow.  Discuss CT scan and with him in detail.  It showed Status post right partial pneumonectomy for resection of a right upper lobe lung mass with no residual recurrent mass seen. Small right-sided pleural effusion. Some lymphadenopathy in the right paratracheal region with a maximum short axis dimension of 1.6 cm.  Follow-up is recommended      Past Medical History:   Diagnosis Date    Closed  intertrochanteric fracture     Deficiency of macronutrients     GERD (gastroesophageal reflux disease)     H. pylori infection     History of tobacco use     Hyperlipidemia     Hypertension     Lung mass     Non-small cell lung cancer       Past Surgical History:   Procedure Laterality Date    BIOPSY OF INTESTINE  04/04/2022    BRONCHOSCOPY      ESOPHAGOGASTRODUODENOSCOPY  04/04/2022    HIP FRACTURE SURGERY Left 2016    Intramedullary Nail Insertion Hip Left 01/18/2022    KIDNEY SURGERY Right 05/27/2022    SHOULDER SURGERY       History reviewed. No pertinent family history.  Social Connections: Not on file       Review of patient's allergies indicates:  No Known Allergies   Current Outpatient Medications on File Prior to Visit   Medication Sig Dispense Refill    amLODIPine (NORVASC) 5 MG tablet Take 1 tablet (5 mg total) by mouth 2 (two) times daily. 90 tablet 3    aspirin 81 mg Cap Take 81 mg by mouth Daily.      atorvastatin (LIPITOR) 10 MG tablet Take 1 tablet (10 mg total) by mouth once daily. 90 tablet 3    pantoprazole (PROTONIX) 40 MG tablet Take 1 tablet (40 mg total) by mouth once daily. 30 tablet 11    LINZESS 145 mcg Cap capsule Take 145 mcg by mouth once daily.       No current facility-administered medications on file prior to visit.      Review of Systems   Constitutional:  Negative for activity change, appetite change, chills, diaphoresis, fatigue, fever and unexpected weight change.   HENT:  Negative for nasal congestion, mouth sores, nosebleeds, postnasal drip, sinus pressure/congestion, sore throat and trouble swallowing.    Eyes:  Negative for visual disturbance.   Cardiovascular:  Negative for chest pain and palpitations.        Around surgical scar   Gastrointestinal:  Negative for abdominal distention, abdominal pain, blood in stool, change in bowel habit, constipation, diarrhea, nausea, vomiting and change in bowel habit.   Endocrine: Negative.    Genitourinary:  Negative for bladder  "incontinence, decreased urine volume, difficulty urinating, dysuria, frequency, hematuria, scrotal swelling, testicular pain and urgency.   Musculoskeletal:  Negative for arthralgias, back pain, gait problem, joint swelling, leg pain, myalgias and neck pain.   Integumentary:  Negative for rash.   Neurological:  Negative for dizziness, tremors, seizures, syncope, speech difficulty, weakness, light-headedness, numbness, headaches and memory loss.   Hematological:  Does not bruise/bleed easily.   Psychiatric/Behavioral:  Negative for agitation, confusion, hallucinations, sleep disturbance and suicidal ideas. The patient is not nervous/anxious.             Vitals:    09/27/22 0935   BP: 134/73   BP Location: Right arm   Patient Position: Sitting   BP Method: Medium (Automatic)   Pulse: 79   Resp: 18   Temp: 97.4 °F (36.3 °C)   TempSrc: Oral   SpO2: 100%   Weight: 95.1 kg (209 lb 11.2 oz)   Height: 5' 4" (1.626 m)      Physical Exam  Vitals and nursing note reviewed.   Constitutional:       General: He is not in acute distress.     Appearance: Normal appearance.   HENT:      Head: Normocephalic and atraumatic.      Mouth/Throat:      Mouth: Mucous membranes are moist.   Eyes:      General: No scleral icterus.     Extraocular Movements: Extraocular movements intact.      Conjunctiva/sclera: Conjunctivae normal.   Neck:      Vascular: No JVD.   Cardiovascular:      Rate and Rhythm: Normal rate and regular rhythm.      Heart sounds: No murmur heard.  Pulmonary:      Effort: Pulmonary effort is normal.      Breath sounds: Decreased breath sounds present. No wheezing or rhonchi.   Chest:      Chest wall: No tenderness.   Abdominal:      General: Bowel sounds are normal. There is no distension.      Palpations: Abdomen is soft.      Tenderness: There is no abdominal tenderness.   Musculoskeletal:         General: No swelling or deformity.      Cervical back: Neck supple.   Lymphadenopathy:      Cervical: No cervical " adenopathy.      Upper Body:      Right upper body: No supraclavicular or axillary adenopathy.      Left upper body: No supraclavicular or axillary adenopathy.      Lower Body: No right inguinal adenopathy. No left inguinal adenopathy.   Skin:     General: Skin is warm.      Coloration: Skin is not jaundiced.      Findings: No rash.   Neurological:      General: No focal deficit present.      Mental Status: He is alert and oriented to person, place, and time.      Motor: Weakness present.      Comments: Mobility assitssed by walker   Psychiatric:         Attention and Perception: Attention normal.         Mood and Affect: Mood and affect normal.         Behavior: Behavior is cooperative.         Cognition and Memory: Cognition normal.         Judgment: Judgment normal.     ECOG SCORE    1 - Restricted in strenuous activity-ambulatory and able to carry out work of a light nature         Laboratory:  CBC with Differential:  Lab Results   Component Value Date    WBC 6.3 09/27/2022    RBC 4.73 09/27/2022    HGB 11.4 (L) 09/27/2022    HCT 37.7 (L) 09/27/2022    MCV 79.7 (L) 09/27/2022    MCH 24.1 (L) 09/27/2022    MCHC 30.2 (L) 09/27/2022    RDW 21.0 (H) 09/27/2022     09/27/2022    MPV 9.4 09/27/2022        CMP:  Sodium Level   Date Value Ref Range Status   05/26/2022 138 136 - 145 mmol/L Final     Potassium Level   Date Value Ref Range Status   05/26/2022 4.1 3.5 - 5.1 mmol/L Final     Carbon Dioxide   Date Value Ref Range Status   05/26/2022 23 23 - 31 mmol/L Final     Blood Urea Nitrogen   Date Value Ref Range Status   05/26/2022 9.4 8.4 - 25.7 mg/dL Final     Creatinine   Date Value Ref Range Status   05/26/2022 0.70 (L) 0.73 - 1.18 mg/dL Final     Calcium Level Total   Date Value Ref Range Status   05/26/2022 9.1 8.8 - 10.0 mg/dL Final     Albumin Level   Date Value Ref Range Status   05/23/2022 3.7 3.4 - 4.8 gm/dL Final     Bilirubin Total   Date Value Ref Range Status   05/23/2022 0.5 <=1.5 mg/dL Final      Alkaline Phosphatase   Date Value Ref Range Status   05/23/2022 88 40 - 150 unit/L Final     Aspartate Aminotransferase   Date Value Ref Range Status   05/23/2022 22 5 - 34 unit/L Final     Alanine Aminotransferase   Date Value Ref Range Status   05/23/2022 25 0 - 55 unit/L Final     Estimated GFR-Non    Date Value Ref Range Status   04/19/2022 >60               Assessment:       1. Malignant neoplasm of unspecified part of unspecified bronchus or lung    2. 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  With margin negative are 0 observation is the recommendation.  NCCN guidelines for surveillance, stage 1-2:  H&P and CT chest every 6 months for 2-3 years, then H&P and low-dose noncontrast enhanced CT annually  Smoking cessation advice, counseling on Firmagon therapy  CT or brain MRI not routinely indicated        Plan:       Patient with 1.6 right paratracheal LN and small Rt pleural effusion. I will do CT scan chest to eval stability of this findings    Return to clinic in 4 months with labs  Labs: CBC, CMP  Will order CT chest w/ contrast    Encourage to call or message us for any questions or problems  The patient was given ample opportunity to ask questions, and to the best of my abilities, all questions answered to satisfaction; patient demonstrated understanding of what we discussed and agreeable to the plan.         CONNER FLORES MD

## 2022-09-28 ENCOUNTER — OFFICE VISIT (OUTPATIENT)
Dept: FAMILY MEDICINE | Facility: CLINIC | Age: 77
End: 2022-09-28
Payer: MEDICARE

## 2022-09-28 VITALS
HEART RATE: 78 BPM | WEIGHT: 209.19 LBS | RESPIRATION RATE: 16 BRPM | HEIGHT: 64 IN | SYSTOLIC BLOOD PRESSURE: 134 MMHG | OXYGEN SATURATION: 100 % | BODY MASS INDEX: 35.71 KG/M2 | DIASTOLIC BLOOD PRESSURE: 80 MMHG | TEMPERATURE: 98 F

## 2022-09-28 DIAGNOSIS — E63.9 DEFICIENCY OF MACRONUTRIENTS: ICD-10-CM

## 2022-09-28 DIAGNOSIS — D50.9 MICROCYTIC ANEMIA: ICD-10-CM

## 2022-09-28 DIAGNOSIS — E66.01 SEVERE OBESITY (BMI 35.0-39.9) WITH COMORBIDITY: ICD-10-CM

## 2022-09-28 DIAGNOSIS — Z11.59 NEED FOR HEPATITIS C SCREENING TEST: ICD-10-CM

## 2022-09-28 DIAGNOSIS — I10 HYPERTENSION, UNSPECIFIED TYPE: ICD-10-CM

## 2022-09-28 DIAGNOSIS — E78.5 HYPERLIPIDEMIA, UNSPECIFIED HYPERLIPIDEMIA TYPE: Primary | ICD-10-CM

## 2022-09-28 DIAGNOSIS — C34.91 NON-SMALL CELL CANCER OF RIGHT LUNG: ICD-10-CM

## 2022-09-28 DIAGNOSIS — K02.9 DENTAL DECAY: ICD-10-CM

## 2022-09-28 DIAGNOSIS — S72.142D CLOSED DISPLACED INTERTROCHANTERIC FRACTURE OF LEFT FEMUR WITH ROUTINE HEALING, SUBSEQUENT ENCOUNTER: ICD-10-CM

## 2022-09-28 DIAGNOSIS — Z23 NEED FOR INFLUENZA VACCINATION: ICD-10-CM

## 2022-09-28 PROCEDURE — 1126F AMNT PAIN NOTED NONE PRSNT: CPT | Mod: CPTII,,, | Performed by: NURSE PRACTITIONER

## 2022-09-28 PROCEDURE — 99214 PR OFFICE/OUTPT VISIT, EST, LEVL IV, 30-39 MIN: ICD-10-PCS | Mod: 25,,, | Performed by: NURSE PRACTITIONER

## 2022-09-28 PROCEDURE — 1126F PR PAIN SEVERITY QUANTIFIED, NO PAIN PRESENT: ICD-10-PCS | Mod: CPTII,,, | Performed by: NURSE PRACTITIONER

## 2022-09-28 PROCEDURE — G0008 FLU VACCINE - QUADRIVALENT - ADJUVANTED: ICD-10-PCS | Mod: ,,, | Performed by: NURSE PRACTITIONER

## 2022-09-28 PROCEDURE — 3075F SYST BP GE 130 - 139MM HG: CPT | Mod: CPTII,,, | Performed by: NURSE PRACTITIONER

## 2022-09-28 PROCEDURE — 1101F PT FALLS ASSESS-DOCD LE1/YR: CPT | Mod: CPTII,,, | Performed by: NURSE PRACTITIONER

## 2022-09-28 PROCEDURE — 3288F FALL RISK ASSESSMENT DOCD: CPT | Mod: CPTII,,, | Performed by: NURSE PRACTITIONER

## 2022-09-28 PROCEDURE — G0008 ADMIN INFLUENZA VIRUS VAC: HCPCS | Mod: ,,, | Performed by: NURSE PRACTITIONER

## 2022-09-28 PROCEDURE — 1160F RVW MEDS BY RX/DR IN RCRD: CPT | Mod: CPTII,,, | Performed by: NURSE PRACTITIONER

## 2022-09-28 PROCEDURE — 1160F PR REVIEW ALL MEDS BY PRESCRIBER/CLIN PHARMACIST DOCUMENTED: ICD-10-PCS | Mod: CPTII,,, | Performed by: NURSE PRACTITIONER

## 2022-09-28 PROCEDURE — 90694 FLU VACCINE - QUADRIVALENT - ADJUVANTED: ICD-10-PCS | Mod: ,,, | Performed by: NURSE PRACTITIONER

## 2022-09-28 PROCEDURE — 3288F PR FALLS RISK ASSESSMENT DOCUMENTED: ICD-10-PCS | Mod: CPTII,,, | Performed by: NURSE PRACTITIONER

## 2022-09-28 PROCEDURE — 99214 OFFICE O/P EST MOD 30 MIN: CPT | Mod: 25,,, | Performed by: NURSE PRACTITIONER

## 2022-09-28 PROCEDURE — 1101F PR PT FALLS ASSESS DOC 0-1 FALLS W/OUT INJ PAST YR: ICD-10-PCS | Mod: CPTII,,, | Performed by: NURSE PRACTITIONER

## 2022-09-28 PROCEDURE — 3079F DIAST BP 80-89 MM HG: CPT | Mod: CPTII,,, | Performed by: NURSE PRACTITIONER

## 2022-09-28 PROCEDURE — 3079F PR MOST RECENT DIASTOLIC BLOOD PRESSURE 80-89 MM HG: ICD-10-PCS | Mod: CPTII,,, | Performed by: NURSE PRACTITIONER

## 2022-09-28 PROCEDURE — 3075F PR MOST RECENT SYSTOLIC BLOOD PRESS GE 130-139MM HG: ICD-10-PCS | Mod: CPTII,,, | Performed by: NURSE PRACTITIONER

## 2022-09-28 PROCEDURE — 90694 VACC AIIV4 NO PRSRV 0.5ML IM: CPT | Mod: ,,, | Performed by: NURSE PRACTITIONER

## 2022-09-28 PROCEDURE — 1159F MED LIST DOCD IN RCRD: CPT | Mod: CPTII,,, | Performed by: NURSE PRACTITIONER

## 2022-09-28 PROCEDURE — 1159F PR MEDICATION LIST DOCUMENTED IN MEDICAL RECORD: ICD-10-PCS | Mod: CPTII,,, | Performed by: NURSE PRACTITIONER

## 2022-09-28 NOTE — PROGRESS NOTES
Subjective:       Patient ID: Leonila Rosales is a 77 y.o. male.    Chief Complaint: Hyperlipidemia (6 month f/u) and Hypertension (6 month f/u)      HPI   This is a 77-year-old  male who presents to clinic today for six-month follow-up.  Patient has a history of closed intertrochanteric fracture of the left hip, deficiency of micronutrients, GERD, H pylori, history of tobacco use, hyperlipidemia, hypertension, non-small cell lung cancer, microcytic anemia, chronic constipation.  Patient states he feels great.  States he saw the oncologist and she told him he only needs surveillance.  He is very happy with this plan. only real concern today is issues with his teeth.  States he has been having a lot of missing teeth and decay in his mouth for years but has never taken care of it.  Denies any dental pain or infection symptoms at this time.    Review of Systems  Comprehensive review of systems negative except as stated in HPI    The patient's Health Maintenance was reviewed and the following appears to be due:   Health Maintenance Due   Topic Date Due    TETANUS VACCINE  Never done    Shingles Vaccine (1 of 2) Never done    COVID-19 Vaccine (4 - Booster for Moderna series) 11/25/2021       Past Medical History:  Past Medical History:   Diagnosis Date    Closed intertrochanteric fracture     Deficiency of macronutrients     GERD (gastroesophageal reflux disease)     H. pylori infection     History of tobacco use     Hyperlipidemia     Hypertension     Lung mass     Non-small cell lung cancer      Past Surgical History:   Procedure Laterality Date    BIOPSY OF INTESTINE  04/04/2022    BRONCHOSCOPY      ESOPHAGOGASTRODUODENOSCOPY  04/04/2022    HIP FRACTURE SURGERY Left 2016    Intramedullary Nail Insertion Hip Left 01/18/2022    KIDNEY SURGERY Right 05/27/2022    SHOULDER SURGERY       Review of patient's allergies indicates:  No Known Allergies  Current Outpatient Medications on File Prior to Visit  "  Medication Sig Dispense Refill    amLODIPine (NORVASC) 5 MG tablet Take 1 tablet (5 mg total) by mouth 2 (two) times daily. 90 tablet 3    aspirin 81 mg Cap Take 81 mg by mouth Daily.      atorvastatin (LIPITOR) 10 MG tablet Take 1 tablet (10 mg total) by mouth once daily. 90 tablet 3    LINZESS 145 mcg Cap capsule Take 145 mcg by mouth once daily.      pantoprazole (PROTONIX) 40 MG tablet Take 1 tablet (40 mg total) by mouth once daily. 30 tablet 11     No current facility-administered medications on file prior to visit.     Social History     Socioeconomic History    Marital status:    Tobacco Use    Smoking status: Former     Types: Cigarettes     Quit date: 2021     Years since quittin.8    Smokeless tobacco: Never   Substance and Sexual Activity    Alcohol use: Not Currently    Drug use: Never    Sexual activity: Not Currently     History reviewed. No pertinent family history.    Objective:       /80 (BP Location: Left arm)   Pulse 78   Temp 98 °F (36.7 °C) (Oral)   Resp 16   Ht 5' 4" (1.626 m)   Wt 94.9 kg (209 lb 3.2 oz)   SpO2 100%   BMI 35.91 kg/m²      Physical Exam  Vitals and nursing note reviewed.   Constitutional:       Appearance: Normal appearance. He is normal weight.   HENT:      Head: Normocephalic and atraumatic.      Right Ear: Tympanic membrane, ear canal and external ear normal.      Left Ear: Tympanic membrane, ear canal and external ear normal.      Nose: Nose normal.      Mouth/Throat:      Mouth: Mucous membranes are moist.      Dentition: Abnormal dentition. Dental caries present.      Pharynx: Oropharynx is clear.   Eyes:      Extraocular Movements: Extraocular movements intact.      Conjunctiva/sclera: Conjunctivae normal.      Pupils: Pupils are equal, round, and reactive to light.   Cardiovascular:      Rate and Rhythm: Normal rate and regular rhythm.      Pulses: Normal pulses.      Heart sounds: Normal heart sounds.   Pulmonary:      Effort: Pulmonary " effort is normal.      Breath sounds: Normal breath sounds.   Musculoskeletal:         General: Normal range of motion.      Cervical back: Normal range of motion and neck supple.      Comments: Steady gait with assistance of cane   Skin:     General: Skin is warm and dry.   Neurological:      General: No focal deficit present.      Mental Status: He is alert and oriented to person, place, and time.   Psychiatric:         Mood and Affect: Mood normal.         Behavior: Behavior normal.         Thought Content: Thought content normal.         Judgment: Judgment normal.       Labs  Lab Visit on 09/27/2022   Component Date Value Ref Range Status    Sodium Level 09/27/2022 141  136 - 145 mmol/L Final    Potassium Level 09/27/2022 4.0  3.5 - 5.1 mmol/L Final    Chloride 09/27/2022 108 (H)  98 - 107 mmol/L Final    Carbon Dioxide 09/27/2022 26  23 - 31 mmol/L Final    Glucose Level 09/27/2022 101  82 - 115 mg/dL Final    Blood Urea Nitrogen 09/27/2022 13.6  8.4 - 25.7 mg/dL Final    Creatinine 09/27/2022 0.91  0.73 - 1.18 mg/dL Final    Calcium Level Total 09/27/2022 9.7  8.8 - 10.0 mg/dL Final    Protein Total 09/27/2022 7.3  5.8 - 7.6 gm/dL Final    Albumin Level 09/27/2022 3.7  3.4 - 4.8 gm/dL Final    Globulin 09/27/2022 3.6 (H)  2.4 - 3.5 gm/dL Final    Albumin/Globulin Ratio 09/27/2022 1.0 (L)  1.1 - 2.0 ratio Final    Bilirubin Total 09/27/2022 0.6  <=1.5 mg/dL Final    Alkaline Phosphatase 09/27/2022 99  40 - 150 unit/L Final    Alanine Aminotransferase 09/27/2022 55  0 - 55 unit/L Final    Aspartate Aminotransferase 09/27/2022 40 (H)  5 - 34 unit/L Final    eGFR 09/27/2022 >60  mls/min/1.73/m2 Final    WBC 09/27/2022 6.3  4.5 - 11.5 x10(3)/mcL Final    RBC 09/27/2022 4.73  4.70 - 6.10 x10(6)/mcL Final    Hgb 09/27/2022 11.4 (L)  14.0 - 18.0 gm/dL Final    Hct 09/27/2022 37.7 (L)  42.0 - 52.0 % Final    MCV 09/27/2022 79.7 (L)  80.0 - 94.0 fL Final    MCH 09/27/2022 24.1 (L)  27.0 - 31.0 pg Final    MCHC  09/27/2022 30.2 (L)  33.0 - 36.0 mg/dL Final    RDW 09/27/2022 21.0 (H)  11.5 - 17.0 % Final    Platelet 09/27/2022 260  130 - 400 x10(3)/mcL Final    MPV 09/27/2022 9.4  7.4 - 10.4 fL Final    Neut % 09/27/2022 53.8  % Final    Lymph % 09/27/2022 32.0  % Final    Mono % 09/27/2022 10.8  % Final    Eos % 09/27/2022 2.5  % Final    Basophil % 09/27/2022 0.6  % Final    Lymph # 09/27/2022 2.01  0.6 - 4.6 x10(3)/mcL Final    Neut # 09/27/2022 3.4  2.1 - 9.2 x10(3)/mcL Final    Mono # 09/27/2022 0.68  0.1 - 1.3 x10(3)/mcL Final    Eos # 09/27/2022 0.16  0 - 0.9 x10(3)/mcL Final    Baso # 09/27/2022 0.04  0 - 0.2 x10(3)/mcL Final    IG# 09/27/2022 0.02  0 - 0.04 x10(3)/mcL Final    IG% 09/27/2022 0.3  % Final   Lab Visit on 09/23/2022   Component Date Value Ref Range Status    Hepatitis C Antibody 09/23/2022 Nonreactive  Nonreactive Final    Cholesterol Total 09/23/2022 156  <=200 mg/dL Final    HDL Cholesterol 09/23/2022 55  35 - 60 mg/dL Final    Triglyceride 09/23/2022 66  34 - 140 mg/dL Final    Cholesterol/HDL Ratio 09/23/2022 3  0 - 5 Final    Very Low Density Lipoprotein 09/23/2022 13   Final    LDL Cholesterol 09/23/2022 88.00  50.00 - 140.00 mg/dL Final   Hospital Outpatient Visit on 09/19/2022   Component Date Value Ref Range Status    POC Creatinine 09/19/2022 0.9  0.5 - 1.4 mg/dL Final    Sample 09/19/2022 unknown   Final       Assessment and Plan       ICD-10-CM ICD-9-CM   1. Hyperlipidemia, unspecified hyperlipidemia type  E78.5 272.4   2. Hypertension, unspecified type  I10 401.9   3. Non-small cell cancer of right lung  C34.91 162.9   4. Need for hepatitis C screening test  Z11.59 V73.89   5. Need for influenza vaccination  Z23 V04.81   6. Severe obesity (BMI 35.0-39.9) with comorbidity  E66.01 278.01   7. Microcytic anemia  D50.9 280.9   8. Deficiency of macronutrients  E63.9 269.9   9. Closed displaced intertrochanteric fracture of left femur with routine healing, subsequent encounter  S72.142D V54.13    10. Dental decay  K02.9 521.00        1. Hyperlipidemia, unspecified hyperlipidemia type  Overview:  Atorvastatin 10 mg daily    Assessment & Plan:  Stable, total cholesterol 156, LDL 88, continue atorvastatin 10 mg daily and will recheck in 1 year      2. Hypertension, unspecified type  Overview:  Amlodipine 5 mg daily    Assessment & Plan:  Stable on amlodipine 5 mg daily, follow-up 6 months Wellness.      3. Non-small cell cancer of right lung  Overview:  01/17/2022 - fall with closed intratrochanteric fracture of the left femur, had CT chest while in emergency department and incidentally noted was right upper lobe 2.5 cm mass.  03/22/2022 CT - guided biopsy right upper lobe mass pathology showing invasive squamous cell carcinoma  05/24/2022 - right upper lobectomy with Dr. Diaz   09/27/2022 - initial visit with Dr. Napoles - recommends H&P and CT chest every 6 months for 2-3 years then H&P and low-dose noncontrast enhanced CT annually    Assessment & Plan:  Will follow-up with Dr. Napoles in 4 months with labs, CT chest.      4. Need for hepatitis C screening test  Comments:  Nonreactive    5. Need for influenza vaccination  -     Influenza (FLUAD) - Quadrivalent (Adjuvanted) *Preferred* (65+) (PF)    6. Severe obesity (BMI 35.0-39.9) with comorbidity  Assessment & Plan:  Discussed decreasing caloric intake and monitor intakes of sugars, carbohydrates, high fat foods.      7. Microcytic anemia  Overview:  Initially noted during hospital stay for left intratrochanteric hip fracture with ORIF. Thought to be related to acute blood loss postoperatively.  04/01/2022 - admit for acute GI bleed, consult per Dr Martins, EGD per Dr Vanessa  + H pylori, erosive gastritis, gastric antral ulcer. Recommended colonoscopy outpatient but this was pushed back due to pending right upper lobectomy.    Assessment & Plan:  Blood count steadily trending up since January of this year, current H&H 11.4 in 37.7. Scheduled for  further workup/follow up labs with Dr Napoles in January.    Still needs colonoscopy, patient wants to wait for now. States just starting to feel well after all the issues over the last year. Will discuss again at wellness in 6 months.       8. Deficiency of macronutrients  Assessment & Plan:  Albumin up to 3.7, will continue to monitor.      9. Closed displaced intertrochanteric fracture of left femur with routine healing, subsequent encounter  Overview:  Fall 01/17/2022  ORIF Dr Palomares 01/18/2022    Assessment & Plan:  Doing well, completely done with therapy.  Walking now only with assistance of cane.      10. Dental decay  Overview:  09/28/2022 - widespread dental decay, missing teeth.  Given number for [facility] with free dental services.           Follow up in 26 weeks (on 3/29/2023) for Annual.     I have personally reviewed NIDIA Lopez history, exam and medical decision making and agree with the assessment and plan as written.

## 2022-09-28 NOTE — ASSESSMENT & PLAN NOTE
Stable, total cholesterol 156, LDL 88, continue atorvastatin 10 mg daily and will recheck in 1 year

## 2022-09-28 NOTE — ASSESSMENT & PLAN NOTE
Blood count steadily trending up since January of this year, current H&H 11.4 in 37.7. Scheduled for further workup/follow up labs with Dr Napoles in January.    Still needs colonoscopy, patient wants to wait for now. States just starting to feel well after all the issues over the last year. Will discuss again at wellness in 6 months.

## 2023-01-25 ENCOUNTER — HOSPITAL ENCOUNTER (OUTPATIENT)
Dept: RADIOLOGY | Facility: HOSPITAL | Age: 78
Discharge: HOME | End: 2023-01-25
Attending: INTERNAL MEDICINE
Payer: MEDICARE

## 2023-01-25 DIAGNOSIS — C34.90 MALIGNANT NEOPLASM OF UNSPECIFIED PART OF UNSPECIFIED BRONCHUS OR LUNG: ICD-10-CM

## 2023-01-25 LAB
CREAT SERPL-MCNC: 0.9 MG/DL (ref 0.5–1.4)
SAMPLE: NORMAL

## 2023-01-25 PROCEDURE — 25500020 PHARM REV CODE 255: Performed by: INTERNAL MEDICINE

## 2023-01-25 PROCEDURE — 71260 CT THORAX DX C+: CPT | Mod: TC

## 2023-01-25 RX ADMIN — IOPAMIDOL 100 ML: 755 INJECTION, SOLUTION INTRAVENOUS at 09:01

## 2023-01-30 ENCOUNTER — OFFICE VISIT (OUTPATIENT)
Dept: HEMATOLOGY/ONCOLOGY | Facility: CLINIC | Age: 78
End: 2023-01-30
Payer: MEDICARE

## 2023-01-30 VITALS
TEMPERATURE: 98 F | BODY MASS INDEX: 34.69 KG/M2 | HEIGHT: 67 IN | WEIGHT: 221 LBS | OXYGEN SATURATION: 98 % | DIASTOLIC BLOOD PRESSURE: 88 MMHG | SYSTOLIC BLOOD PRESSURE: 165 MMHG | HEART RATE: 94 BPM

## 2023-01-30 DIAGNOSIS — C34.91 NON-SMALL CELL CANCER OF RIGHT LUNG: Primary | ICD-10-CM

## 2023-01-30 DIAGNOSIS — Z90.2 S/P LOBECTOMY OF LUNG: ICD-10-CM

## 2023-01-30 DIAGNOSIS — C34.90 MALIGNANT NEOPLASM OF UNSPECIFIED PART OF UNSPECIFIED BRONCHUS OR LUNG: ICD-10-CM

## 2023-01-30 PROCEDURE — 3077F PR MOST RECENT SYSTOLIC BLOOD PRESSURE >= 140 MM HG: ICD-10-PCS | Mod: CPTII,S$GLB,, | Performed by: INTERNAL MEDICINE

## 2023-01-30 PROCEDURE — 99999 PR PBB SHADOW E&M-EST. PATIENT-LVL IV: CPT | Mod: PBBFAC,,, | Performed by: INTERNAL MEDICINE

## 2023-01-30 PROCEDURE — 1159F MED LIST DOCD IN RCRD: CPT | Mod: CPTII,S$GLB,, | Performed by: INTERNAL MEDICINE

## 2023-01-30 PROCEDURE — 3288F PR FALLS RISK ASSESSMENT DOCUMENTED: ICD-10-PCS | Mod: CPTII,S$GLB,, | Performed by: INTERNAL MEDICINE

## 2023-01-30 PROCEDURE — 3079F DIAST BP 80-89 MM HG: CPT | Mod: CPTII,S$GLB,, | Performed by: INTERNAL MEDICINE

## 2023-01-30 PROCEDURE — 99999 PR PBB SHADOW E&M-EST. PATIENT-LVL IV: ICD-10-PCS | Mod: PBBFAC,,, | Performed by: INTERNAL MEDICINE

## 2023-01-30 PROCEDURE — 1159F PR MEDICATION LIST DOCUMENTED IN MEDICAL RECORD: ICD-10-PCS | Mod: CPTII,S$GLB,, | Performed by: INTERNAL MEDICINE

## 2023-01-30 PROCEDURE — 3077F SYST BP >= 140 MM HG: CPT | Mod: CPTII,S$GLB,, | Performed by: INTERNAL MEDICINE

## 2023-01-30 PROCEDURE — 1160F PR REVIEW ALL MEDS BY PRESCRIBER/CLIN PHARMACIST DOCUMENTED: ICD-10-PCS | Mod: CPTII,S$GLB,, | Performed by: INTERNAL MEDICINE

## 2023-01-30 PROCEDURE — 1126F AMNT PAIN NOTED NONE PRSNT: CPT | Mod: CPTII,S$GLB,, | Performed by: INTERNAL MEDICINE

## 2023-01-30 PROCEDURE — 99215 OFFICE O/P EST HI 40 MIN: CPT | Mod: S$GLB,,, | Performed by: INTERNAL MEDICINE

## 2023-01-30 PROCEDURE — 1160F RVW MEDS BY RX/DR IN RCRD: CPT | Mod: CPTII,S$GLB,, | Performed by: INTERNAL MEDICINE

## 2023-01-30 PROCEDURE — 1101F PT FALLS ASSESS-DOCD LE1/YR: CPT | Mod: CPTII,S$GLB,, | Performed by: INTERNAL MEDICINE

## 2023-01-30 PROCEDURE — 1126F PR PAIN SEVERITY QUANTIFIED, NO PAIN PRESENT: ICD-10-PCS | Mod: CPTII,S$GLB,, | Performed by: INTERNAL MEDICINE

## 2023-01-30 PROCEDURE — 1101F PR PT FALLS ASSESS DOC 0-1 FALLS W/OUT INJ PAST YR: ICD-10-PCS | Mod: CPTII,S$GLB,, | Performed by: INTERNAL MEDICINE

## 2023-01-30 PROCEDURE — 3079F PR MOST RECENT DIASTOLIC BLOOD PRESSURE 80-89 MM HG: ICD-10-PCS | Mod: CPTII,S$GLB,, | Performed by: INTERNAL MEDICINE

## 2023-01-30 PROCEDURE — 3288F FALL RISK ASSESSMENT DOCD: CPT | Mod: CPTII,S$GLB,, | Performed by: INTERNAL MEDICINE

## 2023-01-30 PROCEDURE — 99215 PR OFFICE/OUTPT VISIT, EST, LEVL V, 40-54 MIN: ICD-10-PCS | Mod: S$GLB,,, | Performed by: INTERNAL MEDICINE

## 2023-01-30 NOTE — PROGRESS NOTES
HEMATOLOGY/ONCOLOGY OFFICE CLINIC VISIT    Visit Information:    Initial Evaluation: 6/27/2022  Referring Provider: Dr Diaz  Other providers: Dr Palomares  Code status: Not addressed    Diagnosis:  [id number] Stage IA3 Squamous cell carcinoma of lung    Present treatment:  Surveillance    Treatment/Oncology history:  5/24/2022 right upper lobectomy     Plan of care:     Imaging:  CT angiogram 1/17/2022: No pulmonary embolus. Right upper lobe 2.5 cm mass.  CT C 9/19/2022: Status post right partial pneumonectomy for resection of a right upper lobe lung mass with no residual recurrent mass seen. Small right-sided pleural effusion. Some lymphadenopathy in the right paratracheal region with a maximum short axis dimension of 1.6 cm.  Follow-up is recommended  CT C 1/25/2023: There is a paratracheal enlarged lymph node which shows interval mild size increase and now measures 2.2 x 2.0 cm and previously was 1.6 x 1.5 cm.  Aortopulmonary window mildly enlarged lymph node is stable.  Thoracic aorta is without aneurysmal dilatation or dissection.  Impression: 1. Operative changes of right upper lobectomy without bronchial stump soft tissue proliferation    2. No residual tumor load or metastatic nodule. 3. Paratracheal enlarged lymph node with interval size increase.    Pathology:  03/22/2022 CT-guided biopsy of the right lung mass: invasive squamous cell carcinoma, moderately differentiated.  5/24/2022: Right upper lobectomy:  1- LYMPH NODE, LUMBAR RIGHT 10 LYMPHADENECTOMY: NEGATIVE FOR METASTATIC CARCINOMA (0/1).   2- LYMPH NODE, 11R, LYMPHADENECTOMY: NEGATIVE FOR METASTATIC CARCINOMA (0/1).  3- LYMPH NODE, 9R, LYMPHADENECTOMY: NEGATIVE FOR METASTATIC CARCINOMA (0/1).   4- LYMPH NODE, 7R, LYMPHADENECTOMY: NEGATIVE FOR METASTATIC CARCINOMA (0/1).   5- LYMPH NODE, 8R, LYMPHADENECTOMY: ONE LYMPH NODE NEGATIVE FOR METASTATIC CARCINOMA (0/1).    6- LYMPH NODE, 12R, LYMPHADENECTOMY: NEGATIVE FOR METASTATIC CARCINOMA (0/1).  7- LUNG,  RIGHT UPPER AND MIDDLE LOBES, PNEUMONECTOMY:  POORLY DIFFERENTIATED, G3, SQUAMOUS CELL CARCINOMA, INVASIVE.    - TUMOR SIZE: 3 CM.    - LYMPHOVASCULAR INVASION IS PRESENT.    - MARGINS NEGATIVE FOR INVASIVE CARCINOMA:    - NEAREST MARGIN, VASCULAR / BRONCHIAL 2.5 CM.    - NO PLEURAL SURFACE INVOLVEMENT     - PROMINENT NECROSIS PRESENT.  Visceral Pleura Invasion: Not identified  Number of Lymph Nodes Examined: Exact number : 6  pT Category: pT1c: pN0: No regional lymph node metastasis    CLINICAL HISTORY:       Patient: Leonila Rosales is a 77 y.o. male kindly referred by  Dr. Diaz for evaluation of lung cancer.    On 01/17/2022 patient presented to the emergency department after a fall.  He reports that he was getting to his truck and sleep and fell on his left side on the truck step rail.  He started having pain in his left hip and knee.  He underwent a CT angiogram that showed No pulmonary embolus. Right upper lobe 2.5 cm mass.      During that hospitalization he was treated for a close intertrochanteric fracture of the left femur and underwent open reduction intramedullary nailing left comminuted intertrochanteric hip fracture on 01/18/2022 with Dr. Fortino Palomares.  He then spent several weeks on rehab.    On 03/22/2022 he underwent CT-guided biopsy of the right lung mass.  Pathology showed invasive squamous cell carcinoma, moderately differentiated.    He is s/p right upper lobectomy with  on 5/24/2022.  Pathology report as above.  Patient is stage IA3 squamous cell carcinoma of the lung.  He has recovered well and has been doing good.     He lives alone and is able to perform ADL's. He uses a walker to aid in ambulation. He quit smoking January 2022, after smoking for 40 yrs.       Chief Complaint: No Concerns today        Interval History:    Patient is here in surveillance of his lung cancer. He is doing well and voices no concerns. He is very active now and cut the grass. He uses a cane for mobility  but voices no weakness.   Discuss CT scan and with him in detail.  It showed CT C 1/25/2023: There is a paratracheal enlarged lymph node which shows interval mild size increase and now measures 2.2 x 2.0 cm and previously was 1.6 x 1.5 cm.  Aortopulmonary window mildly enlarged lymph node is stable.  Thoracic aorta is without aneurysmal dilatation or dissection.  Impression: 1. Operative changes of right upper lobectomy without bronchial stump soft tissue proliferation  2. No residual tumor load or metastatic nodule. 3. Paratracheal enlarged lymph node with interval size increase.      Past Medical History:   Diagnosis Date    Closed intertrochanteric fracture     Deficiency of macronutrients     GERD (gastroesophageal reflux disease)     H. pylori infection     History of tobacco use     Hyperlipidemia     Hypertension     Lung mass     Non-small cell lung cancer       Past Surgical History:   Procedure Laterality Date    BIOPSY OF INTESTINE  04/04/2022    BRONCHOSCOPY      ESOPHAGOGASTRODUODENOSCOPY  04/04/2022    HIP FRACTURE SURGERY Left 2016    Intramedullary Nail Insertion Hip Left 01/18/2022    KIDNEY SURGERY Right 05/27/2022    SHOULDER SURGERY       History reviewed. No pertinent family history.  Social Connections: Not on file       Review of patient's allergies indicates:  No Known Allergies   Current Outpatient Medications on File Prior to Visit   Medication Sig Dispense Refill    amLODIPine (NORVASC) 5 MG tablet TAKE ONE TABLET BY MOUTH TWICE DAILY 180 tablet 1    aspirin 81 mg Cap Take 81 mg by mouth Daily.      atorvastatin (LIPITOR) 10 MG tablet Take 1 tablet (10 mg total) by mouth once daily. 90 tablet 3    LINZESS 145 mcg Cap capsule Take 145 mcg by mouth once daily.      pantoprazole (PROTONIX) 40 MG tablet Take 1 tablet (40 mg total) by mouth once daily. 30 tablet 11     No current facility-administered medications on file prior to visit.      Review of Systems   Constitutional:  Negative for  "activity change, appetite change, chills, diaphoresis, fatigue, fever and unexpected weight change.   HENT:  Negative for nasal congestion, mouth sores, nosebleeds, postnasal drip, sinus pressure/congestion, sore throat and trouble swallowing.    Eyes:  Negative for visual disturbance.   Cardiovascular:  Negative for chest pain and palpitations.        Around surgical scar   Gastrointestinal:  Negative for abdominal distention, abdominal pain, blood in stool, change in bowel habit, constipation, diarrhea, nausea, vomiting and change in bowel habit.   Endocrine: Negative.    Genitourinary:  Negative for bladder incontinence, decreased urine volume, difficulty urinating, dysuria, frequency, hematuria, scrotal swelling, testicular pain and urgency.   Musculoskeletal:  Negative for arthralgias, back pain, gait problem, joint swelling, leg pain, myalgias and neck pain.   Integumentary:  Negative for rash.   Neurological:  Negative for dizziness, tremors, seizures, syncope, speech difficulty, weakness, light-headedness, numbness, headaches and memory loss.   Hematological:  Does not bruise/bleed easily.   Psychiatric/Behavioral:  Negative for agitation, confusion, hallucinations, sleep disturbance and suicidal ideas. The patient is not nervous/anxious.             Vitals:    01/30/23 0853   BP: (!) 165/88   BP Location: Left arm   Patient Position: Sitting   Pulse: 94   Temp: 98.3 °F (36.8 °C)   TempSrc: Oral   SpO2: 98%   Weight: 100.2 kg (221 lb)   Height: 5' 7" (1.702 m)      Physical Exam  Vitals and nursing note reviewed.   Constitutional:       General: He is not in acute distress.     Appearance: Normal appearance.   HENT:      Head: Normocephalic and atraumatic.      Mouth/Throat:      Mouth: Mucous membranes are moist.   Eyes:      General: No scleral icterus.     Extraocular Movements: Extraocular movements intact.      Conjunctiva/sclera: Conjunctivae normal.   Neck:      Vascular: No JVD.   Cardiovascular:     "  Rate and Rhythm: Normal rate and regular rhythm.      Heart sounds: No murmur heard.  Pulmonary:      Effort: Pulmonary effort is normal.      Breath sounds: Decreased breath sounds present. No wheezing or rhonchi.   Chest:      Chest wall: No tenderness.   Abdominal:      General: Bowel sounds are normal. There is no distension.      Palpations: Abdomen is soft.      Tenderness: There is no abdominal tenderness.   Musculoskeletal:         General: No swelling or deformity.      Cervical back: Neck supple.   Lymphadenopathy:      Cervical: No cervical adenopathy.      Upper Body:      Right upper body: No supraclavicular or axillary adenopathy.      Left upper body: No supraclavicular or axillary adenopathy.      Lower Body: No right inguinal adenopathy. No left inguinal adenopathy.   Skin:     General: Skin is warm.      Coloration: Skin is not jaundiced.      Findings: No rash.   Neurological:      General: No focal deficit present.      Mental Status: He is alert and oriented to person, place, and time.      Motor: Weakness present.      Comments: Mobility assitssed by cane/walker   Psychiatric:         Attention and Perception: Attention normal.         Mood and Affect: Mood and affect normal.         Behavior: Behavior is cooperative.         Cognition and Memory: Cognition normal.         Judgment: Judgment normal.     ECOG SCORE             Laboratory:  CBC with Differential:  Lab Results   Component Value Date    WBC 5.8 01/25/2023    RBC 4.93 01/25/2023    HGB 12.6 (L) 01/25/2023    HCT 40.3 (L) 01/25/2023    MCV 81.7 01/25/2023    MCH 25.6 01/25/2023    MCHC 31.3 (L) 01/25/2023    RDW 16.9 01/25/2023     01/25/2023    MPV 9.6 01/25/2023        CMP:  Sodium Level   Date Value Ref Range Status   01/25/2023 141 136 - 145 mmol/L Final     Potassium Level   Date Value Ref Range Status   01/25/2023 4.3 3.5 - 5.1 mmol/L Final     Carbon Dioxide   Date Value Ref Range Status   01/25/2023 27 23 - 31 mmol/L  Final     Blood Urea Nitrogen   Date Value Ref Range Status   01/25/2023 12.5 8.4 - 25.7 mg/dL Final     Creatinine   Date Value Ref Range Status   01/25/2023 0.98 0.73 - 1.18 mg/dL Final     Calcium Level Total   Date Value Ref Range Status   01/25/2023 10.7 (H) 8.8 - 10.0 mg/dL Final     Albumin Level   Date Value Ref Range Status   01/25/2023 3.8 3.4 - 4.8 g/dL Final     Bilirubin Total   Date Value Ref Range Status   01/25/2023 0.5 <=1.5 mg/dL Final     Alkaline Phosphatase   Date Value Ref Range Status   01/25/2023 120 40 - 150 unit/L Final     Aspartate Aminotransferase   Date Value Ref Range Status   01/25/2023 30 5 - 34 unit/L Final     Alanine Aminotransferase   Date Value Ref Range Status   01/25/2023 35 0 - 55 unit/L Final     Estimated GFR-Non    Date Value Ref Range Status   04/19/2022 >60               Assessment:       No diagnosis found.      [id number] 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  With margin negative are 0 observation is the recommendation.  NCCN guidelines for surveillance, stage 1-2:  H&P and CT chest every 6 months for 2-3 years, then H&P and low-dose noncontrast enhanced CT annually  Smoking cessation advice, counseling on Firmagon therapy  CT or brain MRI not routinely indicated        Plan:       Patient with 1.6 right paratracheal LN and small Rt pleural effusion. Surveillance CT scan chest to eval stability with paratracheal enlarged lymph node which shows interval mild size increase and now measures 2.2 x 2.0 cm and previously was 1.6 x 1.5 cm.  Aortopulmonary window mildly enlarged lymph node is stable.     Return to clinic in 2 weeks to discuss PET  PET/CT to eval progression  Refer back to [facility] for further eval possible tissue diagnosis and recommnedations of enlarging LN  Encourage to call or message us for any questions or problems  The patient was given  ample opportunity to ask questions, and to the best of my abilities, all questions answered to satisfaction; patient demonstrated understanding of what we discussed and agreeable to the plan.         CONNER FLORES MD

## 2023-02-09 ENCOUNTER — HOSPITAL ENCOUNTER (OUTPATIENT)
Dept: RADIOLOGY | Facility: HOSPITAL | Age: 78
Discharge: HOME | End: 2023-02-09
Attending: INTERNAL MEDICINE
Payer: MEDICARE

## 2023-02-09 DIAGNOSIS — C34.91 NON-SMALL CELL CANCER OF RIGHT LUNG: ICD-10-CM

## 2023-02-09 DIAGNOSIS — C34.90 MALIGNANT NEOPLASM OF UNSPECIFIED PART OF UNSPECIFIED BRONCHUS OR LUNG: ICD-10-CM

## 2023-02-09 DIAGNOSIS — Z90.2 S/P LOBECTOMY OF LUNG: ICD-10-CM

## 2023-02-09 PROCEDURE — A9552 F18 FDG: HCPCS

## 2023-02-09 PROCEDURE — 78815 PET IMAGE W/CT SKULL-THIGH: CPT | Mod: TC,PI

## 2023-02-13 ENCOUNTER — OFFICE VISIT (OUTPATIENT)
Dept: HEMATOLOGY/ONCOLOGY | Facility: CLINIC | Age: 78
End: 2023-02-13
Payer: MEDICARE

## 2023-02-13 VITALS
HEIGHT: 67 IN | BODY MASS INDEX: 34.84 KG/M2 | TEMPERATURE: 98 F | HEART RATE: 113 BPM | SYSTOLIC BLOOD PRESSURE: 150 MMHG | OXYGEN SATURATION: 97 % | DIASTOLIC BLOOD PRESSURE: 82 MMHG | WEIGHT: 222 LBS

## 2023-02-13 DIAGNOSIS — E61.1 IRON DEFICIENCY: ICD-10-CM

## 2023-02-13 DIAGNOSIS — C34.91 NON-SMALL CELL CANCER OF RIGHT LUNG: Primary | ICD-10-CM

## 2023-02-13 PROCEDURE — 3077F SYST BP >= 140 MM HG: CPT | Mod: CPTII,S$GLB,, | Performed by: INTERNAL MEDICINE

## 2023-02-13 PROCEDURE — 99215 OFFICE O/P EST HI 40 MIN: CPT | Mod: S$GLB,,, | Performed by: INTERNAL MEDICINE

## 2023-02-13 PROCEDURE — 3077F PR MOST RECENT SYSTOLIC BLOOD PRESSURE >= 140 MM HG: ICD-10-PCS | Mod: CPTII,S$GLB,, | Performed by: INTERNAL MEDICINE

## 2023-02-13 PROCEDURE — 3288F PR FALLS RISK ASSESSMENT DOCUMENTED: ICD-10-PCS | Mod: CPTII,S$GLB,, | Performed by: INTERNAL MEDICINE

## 2023-02-13 PROCEDURE — 99215 PR OFFICE/OUTPT VISIT, EST, LEVL V, 40-54 MIN: ICD-10-PCS | Mod: S$GLB,,, | Performed by: INTERNAL MEDICINE

## 2023-02-13 PROCEDURE — 1126F PR PAIN SEVERITY QUANTIFIED, NO PAIN PRESENT: ICD-10-PCS | Mod: CPTII,S$GLB,, | Performed by: INTERNAL MEDICINE

## 2023-02-13 PROCEDURE — 1160F PR REVIEW ALL MEDS BY PRESCRIBER/CLIN PHARMACIST DOCUMENTED: ICD-10-PCS | Mod: CPTII,S$GLB,, | Performed by: INTERNAL MEDICINE

## 2023-02-13 PROCEDURE — 1160F RVW MEDS BY RX/DR IN RCRD: CPT | Mod: CPTII,S$GLB,, | Performed by: INTERNAL MEDICINE

## 2023-02-13 PROCEDURE — 3079F DIAST BP 80-89 MM HG: CPT | Mod: CPTII,S$GLB,, | Performed by: INTERNAL MEDICINE

## 2023-02-13 PROCEDURE — 3079F PR MOST RECENT DIASTOLIC BLOOD PRESSURE 80-89 MM HG: ICD-10-PCS | Mod: CPTII,S$GLB,, | Performed by: INTERNAL MEDICINE

## 2023-02-13 PROCEDURE — 1101F PT FALLS ASSESS-DOCD LE1/YR: CPT | Mod: CPTII,S$GLB,, | Performed by: INTERNAL MEDICINE

## 2023-02-13 PROCEDURE — 99999 PR PBB SHADOW E&M-EST. PATIENT-LVL III: ICD-10-PCS | Mod: PBBFAC,,, | Performed by: INTERNAL MEDICINE

## 2023-02-13 PROCEDURE — 99999 PR PBB SHADOW E&M-EST. PATIENT-LVL III: CPT | Mod: PBBFAC,,, | Performed by: INTERNAL MEDICINE

## 2023-02-13 PROCEDURE — 1159F MED LIST DOCD IN RCRD: CPT | Mod: CPTII,S$GLB,, | Performed by: INTERNAL MEDICINE

## 2023-02-13 PROCEDURE — 1101F PR PT FALLS ASSESS DOC 0-1 FALLS W/OUT INJ PAST YR: ICD-10-PCS | Mod: CPTII,S$GLB,, | Performed by: INTERNAL MEDICINE

## 2023-02-13 PROCEDURE — 1126F AMNT PAIN NOTED NONE PRSNT: CPT | Mod: CPTII,S$GLB,, | Performed by: INTERNAL MEDICINE

## 2023-02-13 PROCEDURE — 1159F PR MEDICATION LIST DOCUMENTED IN MEDICAL RECORD: ICD-10-PCS | Mod: CPTII,S$GLB,, | Performed by: INTERNAL MEDICINE

## 2023-02-13 PROCEDURE — 3288F FALL RISK ASSESSMENT DOCD: CPT | Mod: CPTII,S$GLB,, | Performed by: INTERNAL MEDICINE

## 2023-02-13 RX ORDER — FERROUS SULFATE 325(65) MG
325 TABLET ORAL DAILY
Qty: 30 TABLET | Refills: 3 | Status: SHIPPED | OUTPATIENT
Start: 2023-02-13 | End: 2023-10-17 | Stop reason: ALTCHOICE

## 2023-02-13 NOTE — PROGRESS NOTES
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 Right upper lobe 2.5 cm mass.  CT C 9/19/2022: Status post right partial pneumonectomy for resection of a right upper lobe lung mass with no residual recurrent mass seen. Small right-sided pleural effusion. Some lymphadenopathy in the right paratracheal region with a maximum short axis dimension of 1.6 cm.  Follow-up is recommended  CT C 1/25/2023: There is a paratracheal enlarged lymph node which shows interval mild size increase and now measures 2.2 x 2.0 cm and previously was 1.6 x 1.5 cm.  Aortopulmonary window mildly enlarged lymph node is stable.  Thoracic aorta is without aneurysmal dilatation or dissection.  Impression: 1. Operative changes of right upper lobectomy without bronchial stump soft tissue proliferation    2. No residual tumor load or metastatic nodule. 3. Paratracheal enlarged lymph node with interval size increase.  PET CT 2/9/2023:  Hypermetabolic right paratracheal lymph node image 81 series 3 measures 25 x 20 mm with max SUV 27.0.  Hypermetabolic anterior mediastinal lymph node anterior to the SVC measures 12 x 9 mm with max SUV 12.0. Impression: 1. Hypermetabolic metastatic mediastinal adenopathy.   2. No PET evidence of metastatic disease outside of the mediastinum.    Pathology:  03/22/2022 CT-guided biopsy of the right lung mass: invasive squamous cell carcinoma, moderately differentiated.  5/24/2022: Right upper lobectomy:  1- LYMPH NODE, LUMBAR RIGHT 10 LYMPHADENECTOMY: NEGATIVE FOR METASTATIC CARCINOMA (0/1).   2- LYMPH NODE, 11R, LYMPHADENECTOMY: NEGATIVE FOR  METASTATIC CARCINOMA (0/1).  3- LYMPH NODE, 9R, LYMPHADENECTOMY: NEGATIVE FOR METASTATIC CARCINOMA (0/1).   4- LYMPH NODE, 7R, LYMPHADENECTOMY: NEGATIVE FOR METASTATIC CARCINOMA (0/1).   5- LYMPH NODE, 8R, LYMPHADENECTOMY: ONE LYMPH NODE NEGATIVE FOR METASTATIC CARCINOMA (0/1).    6- LYMPH NODE, 12R, LYMPHADENECTOMY: NEGATIVE FOR METASTATIC CARCINOMA (0/1).  7- LUNG, RIGHT UPPER AND MIDDLE LOBES, PNEUMONECTOMY:  POORLY DIFFERENTIATED, G3, SQUAMOUS CELL CARCINOMA, INVASIVE.    - TUMOR SIZE: 3 CM.    - LYMPHOVASCULAR INVASION IS PRESENT.    - MARGINS NEGATIVE FOR INVASIVE CARCINOMA:    - NEAREST MARGIN, VASCULAR / BRONCHIAL 2.5 CM.    - NO PLEURAL SURFACE INVOLVEMENT     - PROMINENT NECROSIS PRESENT.  Visceral Pleura Invasion: Not identified  Number of Lymph Nodes Examined: Exact number : 6  pT Category: pT1c: pN0: No regional lymph node metastasis    CLINICAL HISTORY:       Patient: Leonila Rosales is a 77 y.o. male kindly referred by  Dr. Diaz for evaluation of lung cancer.    On 01/17/2022 patient presented to the emergency department after a fall.  He reports that he was getting to his truck and sleep and fell on his left side on the truck step rail.  He started having pain in his left hip and knee.  He underwent a CT angiogram that showed No pulmonary embolus. Right upper lobe 2.5 cm mass.      During that hospitalization he was treated for a close intertrochanteric fracture of the left femur and underwent open reduction intramedullary nailing left comminuted intertrochanteric hip fracture on 01/18/2022 with Dr. Fortino Palomares.  He then spent several weeks on rehab.    On 03/22/2022 he underwent CT-guided biopsy of the right lung mass.  Pathology showed invasive squamous cell carcinoma, moderately differentiated.    He is s/p right upper lobectomy with  on 5/24/2022.  Pathology report as above.  Patient is stage IA3 squamous cell carcinoma of the lung.  He has recovered well and has been doing good.     He  lives alone and is able to perform ADL's. He uses a walker to aid in ambulation. He quit smoking January 2022, after smoking for 40 yrs.       Chief Complaint:  No Concerns today        Interval History:    Patient presents to clinic for 2 weeks follow up to discuss PET CT results. He c/o back pain possibly the way he slept last night. Otherwise, he is doing okay.  He is active and cuts the grass. He uses a cane for mobility but voices no weakness.   He has upcoming appointment with Dr. Diaz this week.   PET CT results discussed in detail with him as above.      Past Medical History:   Diagnosis Date    Closed intertrochanteric fracture     Deficiency of macronutrients     GERD (gastroesophageal reflux disease)     H. pylori infection     History of tobacco use     Hyperlipidemia     Hypertension     Lung mass     Non-small cell lung cancer       Past Surgical History:   Procedure Laterality Date    BIOPSY OF INTESTINE  04/04/2022    BRONCHOSCOPY      ESOPHAGOGASTRODUODENOSCOPY  04/04/2022    HIP FRACTURE SURGERY Left 2016    Intramedullary Nail Insertion Hip Left 01/18/2022    KIDNEY SURGERY Right 05/27/2022    SHOULDER SURGERY       History reviewed. No pertinent family history.  Social Connections: Not on file       Review of patient's allergies indicates:  No Known Allergies   Current Outpatient Medications on File Prior to Visit   Medication Sig Dispense Refill    amLODIPine (NORVASC) 5 MG tablet TAKE ONE TABLET BY MOUTH TWICE DAILY 180 tablet 1    aspirin 81 mg Cap Take 81 mg by mouth Daily.      atorvastatin (LIPITOR) 10 MG tablet Take 1 tablet (10 mg total) by mouth once daily. 90 tablet 3    LINZESS 145 mcg Cap capsule Take 145 mcg by mouth once daily.      pantoprazole (PROTONIX) 40 MG tablet Take 1 tablet (40 mg total) by mouth once daily. 30 tablet 11     No current facility-administered medications on file prior to visit.      Review of Systems   Constitutional:  Negative for activity change, appetite  "change, chills, diaphoresis, fatigue, fever and unexpected weight change.   HENT:  Negative for nasal congestion, mouth sores, nosebleeds, postnasal drip, sinus pressure/congestion, sore throat and trouble swallowing.    Eyes:  Negative for visual disturbance.   Cardiovascular:  Negative for chest pain and palpitations.        Around surgical scar   Gastrointestinal:  Negative for abdominal distention, abdominal pain, blood in stool, change in bowel habit, constipation, diarrhea, nausea, vomiting and change in bowel habit.   Endocrine: Negative.    Genitourinary:  Negative for bladder incontinence, decreased urine volume, difficulty urinating, dysuria, frequency, hematuria, scrotal swelling, testicular pain and urgency.   Musculoskeletal:  Negative for arthralgias, back pain, gait problem, joint swelling, leg pain, myalgias and neck pain.   Integumentary:  Negative for rash.   Neurological:  Negative for dizziness, tremors, seizures, syncope, speech difficulty, weakness, light-headedness, numbness, headaches and memory loss.   Hematological:  Does not bruise/bleed easily.   Psychiatric/Behavioral:  Negative for agitation, confusion, hallucinations, sleep disturbance and suicidal ideas. The patient is not nervous/anxious.             Vitals:    02/13/23 0935   BP: (!) 150/82   BP Location: Left arm   Patient Position: Sitting   Pulse: (!) 113   Temp: 98.2 °F (36.8 °C)   TempSrc: Oral   SpO2: 97%   Weight: 100.7 kg (222 lb)   Height: 5' 7" (1.702 m)        Physical Exam  Vitals and nursing note reviewed.   Constitutional:       General: He is not in acute distress.     Appearance: Normal appearance.   HENT:      Head: Normocephalic and atraumatic.      Mouth/Throat:      Mouth: Mucous membranes are moist.   Eyes:      General: No scleral icterus.     Extraocular Movements: Extraocular movements intact.      Conjunctiva/sclera: Conjunctivae normal.   Neck:      Vascular: No JVD.   Cardiovascular:      Rate and Rhythm: " Normal rate and regular rhythm.      Heart sounds: No murmur heard.  Pulmonary:      Effort: Pulmonary effort is normal.      Breath sounds: Decreased breath sounds present. No wheezing or rhonchi.   Chest:      Chest wall: No tenderness.   Abdominal:      General: Bowel sounds are normal. There is no distension.      Palpations: Abdomen is soft.      Tenderness: There is no abdominal tenderness.   Musculoskeletal:         General: No swelling or deformity.      Cervical back: Neck supple.   Lymphadenopathy:      Cervical: No cervical adenopathy.      Upper Body:      Right upper body: No supraclavicular or axillary adenopathy.      Left upper body: No supraclavicular or axillary adenopathy.      Lower Body: No right inguinal adenopathy. No left inguinal adenopathy.   Skin:     General: Skin is warm.      Coloration: Skin is not jaundiced.      Findings: No rash.   Neurological:      General: No focal deficit present.      Mental Status: He is alert and oriented to person, place, and time.      Motor: Weakness present.      Comments: Mobility assitssed by cane/walker   Psychiatric:         Attention and Perception: Attention normal.         Mood and Affect: Mood and affect normal.         Behavior: Behavior is cooperative.         Cognition and Memory: Cognition normal.         Judgment: Judgment normal.     ECOG SCORE             Laboratory:  CBC with Differential:  Lab Results   Component Value Date    WBC 5.8 01/25/2023    RBC 4.93 01/25/2023    HGB 12.6 (L) 01/25/2023    HCT 40.3 (L) 01/25/2023    MCV 81.7 01/25/2023    MCH 25.6 01/25/2023    MCHC 31.3 (L) 01/25/2023    RDW 16.9 01/25/2023     01/25/2023    MPV 9.6 01/25/2023        CMP:  Sodium Level   Date Value Ref Range Status   01/25/2023 141 136 - 145 mmol/L Final     Potassium Level   Date Value Ref Range Status   01/25/2023 4.3 3.5 - 5.1 mmol/L Final     Carbon Dioxide   Date Value Ref Range Status   01/25/2023 27 23 - 31 mmol/L Final     Blood  Urea Nitrogen   Date Value Ref Range Status   01/25/2023 12.5 8.4 - 25.7 mg/dL Final     Creatinine   Date Value Ref Range Status   01/25/2023 0.98 0.73 - 1.18 mg/dL Final     Calcium Level Total   Date Value Ref Range Status   01/25/2023 10.7 (H) 8.8 - 10.0 mg/dL Final     Albumin Level   Date Value Ref Range Status   01/25/2023 3.8 3.4 - 4.8 g/dL Final     Bilirubin Total   Date Value Ref Range Status   01/25/2023 0.5 <=1.5 mg/dL Final     Alkaline Phosphatase   Date Value Ref Range Status   01/25/2023 120 40 - 150 unit/L Final     Aspartate Aminotransferase   Date Value Ref Range Status   01/25/2023 30 5 - 34 unit/L Final     Alanine Aminotransferase   Date Value Ref Range Status   01/25/2023 35 0 - 55 unit/L Final     Estimated GFR-Non    Date Value Ref Range Status   04/19/2022 >60           Assessment:         [id number] 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  With margin negative are 0 observation is the recommendation.  NCCN guidelines for surveillance, stage 1-2:  H&P and CT chest every 6 months for 2-3 years, then H&P and low-dose noncontrast enhanced CT annually  Smoking cessation advice, counseling on Firmagon therapy  CT or brain MRI not routinely indicated        Plan:       Patient with 1.6 right paratracheal LN and small Rt pleural effusion. Surveillance CT scan chest to eval stability with paratracheal enlarged lymph node which shows interval mild size increase and now measures 2.2 x 2.0 cm and previously was 1.6 x 1.5 cm.  Aortopulmonary window mildly enlarged lymph node is stable. PET CT with Hypermetabolic right paratracheal lymph node image 81 series 3 measures 25 x 20 mm with max SUV 27.0.  Hypermetabolic anterior mediastinal lymph node anterior to the SVC measures 12 x 9 mm with max SUV 12.0.     Appointment with Dr. Jordan Wednesday.  Encouraged to keep it as we may need tissue  diagnosis.  Findings highly suggestive of malignancy.      Return to clinic in 2 weeks to discuss definitive treatment options  Labs: CBC, CMP  Keep scheduled appointment with Dr. Diaz on Wednesday, 2/15/23  Encourage to call or message us for any questions or problems  The patient was given ample opportunity to ask questions, and to the best of my abilities, all questions answered to satisfaction; patient demonstrated understanding of what we discussed and agreeable to the plan.     CONNER FLORES MD      Professional Services   I, Shirley Pina LPN, acted solely as a scribe for and in the presence of Dr. Conner Flores, who performed these services.

## 2023-02-15 ENCOUNTER — OFFICE VISIT (OUTPATIENT)
Dept: CARDIAC SURGERY | Facility: CLINIC | Age: 78
End: 2023-02-15
Payer: MEDICARE

## 2023-02-15 VITALS
DIASTOLIC BLOOD PRESSURE: 84 MMHG | WEIGHT: 218 LBS | BODY MASS INDEX: 35.03 KG/M2 | SYSTOLIC BLOOD PRESSURE: 153 MMHG | HEART RATE: 100 BPM | HEIGHT: 66 IN

## 2023-02-15 DIAGNOSIS — C34.90 MALIGNANT NEOPLASM OF UNSPECIFIED PART OF UNSPECIFIED BRONCHUS OR LUNG: ICD-10-CM

## 2023-02-15 DIAGNOSIS — Z79.1 ENCOUNTER FOR MONITORING NSAID THERAPY: ICD-10-CM

## 2023-02-15 DIAGNOSIS — Z51.81 ENCOUNTER FOR MONITORING NSAID THERAPY: ICD-10-CM

## 2023-02-15 DIAGNOSIS — R91.8 LUNG MASS: Primary | ICD-10-CM

## 2023-02-15 PROCEDURE — 3288F PR FALLS RISK ASSESSMENT DOCUMENTED: ICD-10-PCS | Mod: CPTII,,, | Performed by: THORACIC SURGERY (CARDIOTHORACIC VASCULAR SURGERY)

## 2023-02-15 PROCEDURE — 1159F MED LIST DOCD IN RCRD: CPT | Mod: CPTII,,, | Performed by: THORACIC SURGERY (CARDIOTHORACIC VASCULAR SURGERY)

## 2023-02-15 PROCEDURE — 3077F SYST BP >= 140 MM HG: CPT | Mod: CPTII,,, | Performed by: THORACIC SURGERY (CARDIOTHORACIC VASCULAR SURGERY)

## 2023-02-15 PROCEDURE — 99214 PR OFFICE/OUTPT VISIT, EST, LEVL IV, 30-39 MIN: ICD-10-PCS | Mod: ,,, | Performed by: THORACIC SURGERY (CARDIOTHORACIC VASCULAR SURGERY)

## 2023-02-15 PROCEDURE — 3079F PR MOST RECENT DIASTOLIC BLOOD PRESSURE 80-89 MM HG: ICD-10-PCS | Mod: CPTII,,, | Performed by: THORACIC SURGERY (CARDIOTHORACIC VASCULAR SURGERY)

## 2023-02-15 PROCEDURE — 99214 OFFICE O/P EST MOD 30 MIN: CPT | Mod: ,,, | Performed by: THORACIC SURGERY (CARDIOTHORACIC VASCULAR SURGERY)

## 2023-02-15 PROCEDURE — 1160F RVW MEDS BY RX/DR IN RCRD: CPT | Mod: CPTII,,, | Performed by: THORACIC SURGERY (CARDIOTHORACIC VASCULAR SURGERY)

## 2023-02-15 PROCEDURE — 3288F FALL RISK ASSESSMENT DOCD: CPT | Mod: CPTII,,, | Performed by: THORACIC SURGERY (CARDIOTHORACIC VASCULAR SURGERY)

## 2023-02-15 PROCEDURE — 1160F PR REVIEW ALL MEDS BY PRESCRIBER/CLIN PHARMACIST DOCUMENTED: ICD-10-PCS | Mod: CPTII,,, | Performed by: THORACIC SURGERY (CARDIOTHORACIC VASCULAR SURGERY)

## 2023-02-15 PROCEDURE — 1159F PR MEDICATION LIST DOCUMENTED IN MEDICAL RECORD: ICD-10-PCS | Mod: CPTII,,, | Performed by: THORACIC SURGERY (CARDIOTHORACIC VASCULAR SURGERY)

## 2023-02-15 PROCEDURE — 3077F PR MOST RECENT SYSTOLIC BLOOD PRESSURE >= 140 MM HG: ICD-10-PCS | Mod: CPTII,,, | Performed by: THORACIC SURGERY (CARDIOTHORACIC VASCULAR SURGERY)

## 2023-02-15 PROCEDURE — 1101F PT FALLS ASSESS-DOCD LE1/YR: CPT | Mod: CPTII,,, | Performed by: THORACIC SURGERY (CARDIOTHORACIC VASCULAR SURGERY)

## 2023-02-15 PROCEDURE — 1101F PR PT FALLS ASSESS DOC 0-1 FALLS W/OUT INJ PAST YR: ICD-10-PCS | Mod: CPTII,,, | Performed by: THORACIC SURGERY (CARDIOTHORACIC VASCULAR SURGERY)

## 2023-02-15 PROCEDURE — 3079F DIAST BP 80-89 MM HG: CPT | Mod: CPTII,,, | Performed by: THORACIC SURGERY (CARDIOTHORACIC VASCULAR SURGERY)

## 2023-02-15 NOTE — PROGRESS NOTES
History & Physical    SUBJECTIVE:     History of Present Illness:  The patient is presenting for evaluation of enlarging paratracheal lymph node.  He is status post lobectomy with me last year.  His initial stage was T1c N0 M0 with multiple lymph node biopsies.  His surveillance CT revealed evidence of enlarging right paratracheal lymph node and aortopulmonary lymph node.  These are both PET positive.  He has been doing very well and he is asymptomatic      Chief Complaint   Patient presents with    Pre-op Exam     REF-DR. FLORES-TISSUE DIAGNOSIS AND RECCOMENDATION OF ENLARGING LYMPH NODE - 2.2 X 2.0 CM (PREV1.6 X 1.5 CM)  RT PARATRACHEAL ENLARGED LYMPH NODE, SMALL RT PL EFF, LUNG CA-STAGE IA 3, S/P RT UPPER LOBECTOMY 5/24/22 DR. SCHMIDT, FALL W/ HIP FX.       Review of patient's allergies indicates:  No Known Allergies    Current Outpatient Medications   Medication Sig Dispense Refill    amLODIPine (NORVASC) 5 MG tablet TAKE ONE TABLET BY MOUTH TWICE DAILY 180 tablet 1    aspirin 81 mg Cap Take 81 mg by mouth Daily.      atorvastatin (LIPITOR) 10 MG tablet Take 1 tablet (10 mg total) by mouth once daily. 90 tablet 3    ferrous sulfate (FEOSOL) 325 mg (65 mg iron) Tab tablet Take 1 tablet (325 mg total) by mouth once daily. 30 tablet 3    LINZESS 145 mcg Cap capsule Take 145 mcg by mouth once daily.      pantoprazole (PROTONIX) 40 MG tablet Take 1 tablet (40 mg total) by mouth once daily. (Patient not taking: Reported on 2/15/2023) 30 tablet 11     No current facility-administered medications for this visit.       Past Medical History:   Diagnosis Date    Closed intertrochanteric fracture     Deficiency of macronutrients     GERD (gastroesophageal reflux disease)     H. pylori infection     History of tobacco use     Hyperlipidemia     Hypertension     Lung mass     Non-small cell lung cancer      Past Surgical History:   Procedure Laterality Date    BIOPSY OF INTESTINE  04/04/2022    BRONCHOSCOPY       "ESOPHAGOGASTRODUODENOSCOPY  2022    HIP FRACTURE SURGERY Left 2016    Intramedullary Nail Insertion Hip Left 2022    KIDNEY SURGERY Right 2022    SHOULDER SURGERY       History reviewed. No pertinent family history.  Social History     Tobacco Use    Smoking status: Former     Types: Cigarettes     Quit date: 2021     Years since quittin.2    Smokeless tobacco: Never   Substance Use Topics    Alcohol use: Not Currently    Drug use: Never        Review of Systems:  Review of Systems   Constitutional: Negative.    HENT: Negative.     Eyes: Negative.    Respiratory: Negative.     Cardiovascular: Negative.    Gastrointestinal: Negative.    Endocrine: Negative.    Genitourinary: Negative.    Musculoskeletal: Negative.         Claudications   Skin: Negative.    Allergic/Immunologic: Negative.    Neurological: Negative.    Hematological: Negative.    Psychiatric/Behavioral: Negative.       OBJECTIVE:     Vital Signs (Most Recent)  Pulse: 100 (02/15/23 1315)  BP: (!) 153/84 (02/15/23 1315)  5' 6" (1.676 m)  98.9 kg (218 lb)     Physical Exam:  Physical Exam  Vitals reviewed.   Constitutional:       Appearance: Normal appearance.   HENT:      Head: Normocephalic and atraumatic.      Nose: Nose normal.      Mouth/Throat:      Mouth: Mucous membranes are dry.      Pharynx: Oropharynx is clear.   Eyes:      Extraocular Movements: Extraocular movements intact.      Conjunctiva/sclera: Conjunctivae normal.      Pupils: Pupils are equal, round, and reactive to light.   Cardiovascular:      Rate and Rhythm: Normal rate and regular rhythm.      Pulses: Normal pulses.   Pulmonary:      Effort: Pulmonary effort is normal.      Breath sounds: Normal breath sounds.   Abdominal:      General: Abdomen is flat.      Palpations: Abdomen is soft.   Musculoskeletal:         General: Normal range of motion.      Cervical back: Neck supple.   Skin:     General: Skin is warm and dry.   Neurological:      General: No " focal deficit present.   Psychiatric:         Mood and Affect: Mood normal.       Laboratory:  None      Diagnostic Results:  CT of the chest and PET scan reviewed      ASSESSMENT/PLAN:     Enlarging paratracheal lymph node with history of lung cancer.  I believe mediastinoscopy is in order at this time for biopsies and ruling out recurrence.  He understands the risk of bleeding and infection.  He elected to proceed

## 2023-02-27 ENCOUNTER — OFFICE VISIT (OUTPATIENT)
Dept: HEMATOLOGY/ONCOLOGY | Facility: CLINIC | Age: 78
End: 2023-02-27
Payer: MEDICARE

## 2023-02-27 VITALS
WEIGHT: 214 LBS | DIASTOLIC BLOOD PRESSURE: 80 MMHG | BODY MASS INDEX: 33.59 KG/M2 | SYSTOLIC BLOOD PRESSURE: 161 MMHG | TEMPERATURE: 98 F | HEIGHT: 67 IN | HEART RATE: 86 BPM | OXYGEN SATURATION: 98 %

## 2023-02-27 DIAGNOSIS — C34.91 NON-SMALL CELL CANCER OF RIGHT LUNG: Primary | ICD-10-CM

## 2023-02-27 PROCEDURE — 1159F MED LIST DOCD IN RCRD: CPT | Mod: CPTII,S$GLB,, | Performed by: NURSE PRACTITIONER

## 2023-02-27 PROCEDURE — 99213 OFFICE O/P EST LOW 20 MIN: CPT | Mod: S$GLB,,, | Performed by: NURSE PRACTITIONER

## 2023-02-27 PROCEDURE — 3288F PR FALLS RISK ASSESSMENT DOCUMENTED: ICD-10-PCS | Mod: CPTII,S$GLB,, | Performed by: NURSE PRACTITIONER

## 2023-02-27 PROCEDURE — 99999 PR PBB SHADOW E&M-EST. PATIENT-LVL III: ICD-10-PCS | Mod: PBBFAC,,, | Performed by: INTERNAL MEDICINE

## 2023-02-27 PROCEDURE — 1159F PR MEDICATION LIST DOCUMENTED IN MEDICAL RECORD: ICD-10-PCS | Mod: CPTII,S$GLB,, | Performed by: NURSE PRACTITIONER

## 2023-02-27 PROCEDURE — 3079F PR MOST RECENT DIASTOLIC BLOOD PRESSURE 80-89 MM HG: ICD-10-PCS | Mod: CPTII,S$GLB,, | Performed by: NURSE PRACTITIONER

## 2023-02-27 PROCEDURE — 1126F PR PAIN SEVERITY QUANTIFIED, NO PAIN PRESENT: ICD-10-PCS | Mod: CPTII,S$GLB,, | Performed by: NURSE PRACTITIONER

## 2023-02-27 PROCEDURE — 99999 PR PBB SHADOW E&M-EST. PATIENT-LVL III: CPT | Mod: PBBFAC,,, | Performed by: INTERNAL MEDICINE

## 2023-02-27 PROCEDURE — 3288F FALL RISK ASSESSMENT DOCD: CPT | Mod: CPTII,S$GLB,, | Performed by: NURSE PRACTITIONER

## 2023-02-27 PROCEDURE — 3079F DIAST BP 80-89 MM HG: CPT | Mod: CPTII,S$GLB,, | Performed by: NURSE PRACTITIONER

## 2023-02-27 PROCEDURE — 1101F PR PT FALLS ASSESS DOC 0-1 FALLS W/OUT INJ PAST YR: ICD-10-PCS | Mod: CPTII,S$GLB,, | Performed by: NURSE PRACTITIONER

## 2023-02-27 PROCEDURE — 3077F PR MOST RECENT SYSTOLIC BLOOD PRESSURE >= 140 MM HG: ICD-10-PCS | Mod: CPTII,S$GLB,, | Performed by: NURSE PRACTITIONER

## 2023-02-27 PROCEDURE — 1126F AMNT PAIN NOTED NONE PRSNT: CPT | Mod: CPTII,S$GLB,, | Performed by: NURSE PRACTITIONER

## 2023-02-27 PROCEDURE — 99213 PR OFFICE/OUTPT VISIT, EST, LEVL III, 20-29 MIN: ICD-10-PCS | Mod: S$GLB,,, | Performed by: NURSE PRACTITIONER

## 2023-02-27 PROCEDURE — 1101F PT FALLS ASSESS-DOCD LE1/YR: CPT | Mod: CPTII,S$GLB,, | Performed by: NURSE PRACTITIONER

## 2023-02-27 PROCEDURE — 3077F SYST BP >= 140 MM HG: CPT | Mod: CPTII,S$GLB,, | Performed by: NURSE PRACTITIONER

## 2023-02-27 NOTE — PROGRESS NOTES
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 Right upper lobe 2.5 cm mass.  CT C 9/19/2022: Status post right partial pneumonectomy for resection of a right upper lobe lung mass with no residual recurrent mass seen. Small right-sided pleural effusion. Some lymphadenopathy in the right paratracheal region with a maximum short axis dimension of 1.6 cm.  Follow-up is recommended  CT C 1/25/2023: There is a paratracheal enlarged lymph node which shows interval mild size increase and now measures 2.2 x 2.0 cm and previously was 1.6 x 1.5 cm.  Aortopulmonary window mildly enlarged lymph node is stable.  Thoracic aorta is without aneurysmal dilatation or dissection.  Impression: 1. Operative changes of right upper lobectomy without bronchial stump soft tissue proliferation    2. No residual tumor load or metastatic nodule. 3. Paratracheal enlarged lymph node with interval size increase.  PET CT 2/9/2023:  Hypermetabolic right paratracheal lymph node image 81 series 3 measures 25 x 20 mm with max SUV 27.0.  Hypermetabolic anterior mediastinal lymph node anterior to the SVC measures 12 x 9 mm with max SUV 12.0. Impression: 1. Hypermetabolic metastatic mediastinal adenopathy.   2. No PET evidence of metastatic disease outside of the mediastinum.    Pathology:  03/22/2022 CT-guided biopsy of the right lung mass: invasive squamous cell carcinoma, moderately differentiated.  5/24/2022: Right upper lobectomy:  1- LYMPH NODE, LUMBAR RIGHT 10 LYMPHADENECTOMY: NEGATIVE FOR METASTATIC CARCINOMA (0/1).   2- LYMPH NODE, 11R, LYMPHADENECTOMY: NEGATIVE FOR  METASTATIC CARCINOMA (0/1).  3- LYMPH NODE, 9R, LYMPHADENECTOMY: NEGATIVE FOR METASTATIC CARCINOMA (0/1).   4- LYMPH NODE, 7R, LYMPHADENECTOMY: NEGATIVE FOR METASTATIC CARCINOMA (0/1).   5- LYMPH NODE, 8R, LYMPHADENECTOMY: ONE LYMPH NODE NEGATIVE FOR METASTATIC CARCINOMA (0/1).    6- LYMPH NODE, 12R, LYMPHADENECTOMY: NEGATIVE FOR METASTATIC CARCINOMA (0/1).  7- LUNG, RIGHT UPPER AND MIDDLE LOBES, PNEUMONECTOMY:  POORLY DIFFERENTIATED, G3, SQUAMOUS CELL CARCINOMA, INVASIVE.    - TUMOR SIZE: 3 CM.    - LYMPHOVASCULAR INVASION IS PRESENT.    - MARGINS NEGATIVE FOR INVASIVE CARCINOMA:    - NEAREST MARGIN, VASCULAR / BRONCHIAL 2.5 CM.    - NO PLEURAL SURFACE INVOLVEMENT     - PROMINENT NECROSIS PRESENT.  Visceral Pleura Invasion: Not identified  Number of Lymph Nodes Examined: Exact number : 6  pT Category: pT1c: pN0: No regional lymph node metastasis    CLINICAL HISTORY:       Patient: Leonila Rosales is a 77 y.o. male kindly referred by  Dr. Diaz for evaluation of lung cancer.    On 01/17/2022 patient presented to the emergency department after a fall.  He reports that he was getting to his truck and sleep and fell on his left side on the truck step rail.  He started having pain in his left hip and knee.  He underwent a CT angiogram that showed No pulmonary embolus. Right upper lobe 2.5 cm mass.      During that hospitalization he was treated for a close intertrochanteric fracture of the left femur and underwent open reduction intramedullary nailing left comminuted intertrochanteric hip fracture on 01/18/2022 with Dr. Fortino Palomares.  He then spent several weeks on rehab.    On 03/22/2022 he underwent CT-guided biopsy of the right lung mass.  Pathology showed invasive squamous cell carcinoma, moderately differentiated.    He is s/p right upper lobectomy with  on 5/24/2022.  Pathology report as above.  Patient is stage IA3 squamous cell carcinoma of the lung.  He has recovered well and has been doing good.     He  lives alone and is able to perform ADL's. He uses a walker to aid in ambulation. He quit smoking January 2022, after smoking for 40 yrs.       Chief Complaint: No chief complaint on file.        Interval History:    Patient presents to clinic for 2 weeks follow up. He saw Dr. Diaz in the interim and has been scheduled for mediastinoscopy on 3/6/23.  He has no complaints today. He denies shortness of breath, chest pain. No weight loss. Denies headaches. He uses a cane for mobility.      Past Medical History:   Diagnosis Date    Closed intertrochanteric fracture     Deficiency of macronutrients     GERD (gastroesophageal reflux disease)     H. pylori infection     History of tobacco use     Hyperlipidemia     Hypertension     Lung mass     Non-small cell lung cancer       Past Surgical History:   Procedure Laterality Date    BIOPSY OF INTESTINE  04/04/2022    BRONCHOSCOPY      ESOPHAGOGASTRODUODENOSCOPY  04/04/2022    HIP FRACTURE SURGERY Left 2016    Intramedullary Nail Insertion Hip Left 01/18/2022    KIDNEY SURGERY Right 05/27/2022    SHOULDER SURGERY       History reviewed. No pertinent family history.  Social Connections: Not on file       Review of patient's allergies indicates:  No Known Allergies   Current Outpatient Medications on File Prior to Visit   Medication Sig Dispense Refill    amLODIPine (NORVASC) 5 MG tablet TAKE ONE TABLET BY MOUTH TWICE DAILY 180 tablet 1    aspirin 81 mg Cap Take 81 mg by mouth Daily.      atorvastatin (LIPITOR) 10 MG tablet Take 1 tablet (10 mg total) by mouth once daily. 90 tablet 3    ferrous sulfate (FEOSOL) 325 mg (65 mg iron) Tab tablet Take 1 tablet (325 mg total) by mouth once daily. 30 tablet 3    LINZESS 145 mcg Cap capsule Take 145 mcg by mouth once daily.      pantoprazole (PROTONIX) 40 MG tablet Take 1 tablet (40 mg total) by mouth once daily. 30 tablet 11     No current facility-administered medications on file prior to visit.      Review of Systems  "  Constitutional:  Negative for activity change, appetite change, chills, diaphoresis, fatigue, fever and unexpected weight change.   HENT:  Negative for nasal congestion, mouth sores, nosebleeds, postnasal drip, sinus pressure/congestion, sore throat and trouble swallowing.    Eyes:  Negative for visual disturbance.   Cardiovascular:  Negative for chest pain and palpitations.        Around surgical scar   Gastrointestinal:  Negative for abdominal distention, abdominal pain, blood in stool, change in bowel habit, constipation, diarrhea, nausea, vomiting and change in bowel habit.   Endocrine: Negative.    Genitourinary:  Negative for bladder incontinence, decreased urine volume, difficulty urinating, dysuria, frequency, hematuria, scrotal swelling, testicular pain and urgency.   Musculoskeletal:  Negative for arthralgias, back pain, gait problem, joint swelling, leg pain, myalgias and neck pain.   Integumentary:  Negative for rash.   Neurological:  Negative for dizziness, tremors, seizures, syncope, speech difficulty, weakness, light-headedness, numbness, headaches and memory loss.   Hematological:  Does not bruise/bleed easily.   Psychiatric/Behavioral:  Negative for agitation, confusion, hallucinations, sleep disturbance and suicidal ideas. The patient is not nervous/anxious.             Vitals:    02/27/23 1134   BP: (!) 161/80   BP Location: Right arm   Patient Position: Sitting   Pulse: 86   Temp: 97.9 °F (36.6 °C)   TempSrc: Oral   SpO2: 98%   Weight: 97.1 kg (214 lb)   Height: 5' 7" (1.702 m)          Physical Exam  Vitals and nursing note reviewed.   Constitutional:       General: He is not in acute distress.     Appearance: Normal appearance.   HENT:      Head: Normocephalic and atraumatic.      Mouth/Throat:      Mouth: Mucous membranes are moist.   Eyes:      General: No scleral icterus.     Extraocular Movements: Extraocular movements intact.      Conjunctiva/sclera: Conjunctivae normal.   Neck:      " Vascular: No JVD.   Cardiovascular:      Rate and Rhythm: Normal rate and regular rhythm.      Heart sounds: No murmur heard.  Pulmonary:      Effort: Pulmonary effort is normal.      Breath sounds: Decreased breath sounds present. No wheezing or rhonchi.   Chest:      Chest wall: No tenderness.   Abdominal:      General: Bowel sounds are normal. There is no distension.      Palpations: Abdomen is soft.      Tenderness: There is no abdominal tenderness.   Musculoskeletal:         General: No swelling or deformity.      Cervical back: Neck supple.   Lymphadenopathy:      Cervical: No cervical adenopathy.      Upper Body:      Right upper body: No supraclavicular or axillary adenopathy.      Left upper body: No supraclavicular or axillary adenopathy.      Lower Body: No right inguinal adenopathy. No left inguinal adenopathy.   Skin:     General: Skin is warm.      Coloration: Skin is not jaundiced.      Findings: No rash.   Neurological:      General: No focal deficit present.      Mental Status: He is alert and oriented to person, place, and time.      Motor: Weakness present.      Comments: Mobility assitssed by cane/walker   Psychiatric:         Attention and Perception: Attention normal.         Mood and Affect: Mood and affect normal.         Behavior: Behavior is cooperative.         Cognition and Memory: Cognition normal.         Judgment: Judgment normal.     ECOG SCORE    1 - Restricted in strenuous activity-ambulatory and able to carry out work of a light nature         Laboratory:  CBC with Differential:  Lab Results   Component Value Date    WBC 7.2 02/27/2023    RBC 4.95 02/27/2023    HGB 12.8 (L) 02/27/2023    HCT 41.3 (L) 02/27/2023    MCV 83.4 02/27/2023    MCH 25.9 02/27/2023    MCHC 31.0 (L) 02/27/2023    RDW 17.0 02/27/2023     02/27/2023    MPV 9.8 02/27/2023        CMP:  Sodium Level   Date Value Ref Range Status   02/27/2023 141 136 - 145 mmol/L Final     Potassium Level   Date Value Ref  Range Status   02/27/2023 4.2 3.5 - 5.1 mmol/L Final     Carbon Dioxide   Date Value Ref Range Status   02/27/2023 26 23 - 31 mmol/L Final     Blood Urea Nitrogen   Date Value Ref Range Status   02/27/2023 12.8 8.4 - 25.7 mg/dL Final     Creatinine   Date Value Ref Range Status   02/27/2023 0.98 0.73 - 1.18 mg/dL Final     Calcium Level Total   Date Value Ref Range Status   02/27/2023 10.0 8.8 - 10.0 mg/dL Final     Albumin Level   Date Value Ref Range Status   02/27/2023 3.9 3.4 - 4.8 g/dL Final     Bilirubin Total   Date Value Ref Range Status   02/27/2023 0.7 <=1.5 mg/dL Final     Alkaline Phosphatase   Date Value Ref Range Status   02/27/2023 123 40 - 150 unit/L Final     Aspartate Aminotransferase   Date Value Ref Range Status   02/27/2023 26 5 - 34 unit/L Final     Alanine Aminotransferase   Date Value Ref Range Status   02/27/2023 33 0 - 55 unit/L Final     Estimated GFR-Non    Date Value Ref Range Status   04/19/2022 >60           Assessment:         [id number] 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  With margin negative are 0 observation is the recommendation.  NCCN guidelines for surveillance, stage 1-2:  H&P and CT chest every 6 months for 2-3 years, then H&P and low-dose noncontrast enhanced CT annually  Smoking cessation advice, counseling on Firmagon therapy  CT or brain MRI not routinely indicated        Plan:       Patient with 1.6 right paratracheal LN and small Rt pleural effusion. Surveillance CT scan chest to eval stability with paratracheal enlarged lymph node which shows interval mild size increase and now measures 2.2 x 2.0 cm and previously was 1.6 x 1.5 cm.  Aortopulmonary window mildly enlarged lymph node is stable. PET CT with Hypermetabolic right paratracheal lymph node image 81 series 3 measures 25 x 20 mm with max SUV 27.0.  Hypermetabolic anterior mediastinal lymph node  anterior to the SVC measures 12 x 9 mm with max SUV 12.0.     Appointment with Dr. Jordan on 3/6/23 for mediastinoscopy.   RTC in 2 weeks with MD to discuss definitive treatment options    Encourage to call or message us for any questions or problems  The patient was given ample opportunity to ask questions, and to the best of my abilities, all questions answered to satisfaction; patient demonstrated understanding of what we discussed and agreeable to the plan.     INDIA Arango

## 2023-02-28 ENCOUNTER — TELEPHONE (OUTPATIENT)
Dept: HEMATOLOGY/ONCOLOGY | Facility: CLINIC | Age: 78
End: 2023-02-28
Payer: MEDICARE

## 2023-03-02 ENCOUNTER — HOSPITAL ENCOUNTER (OUTPATIENT)
Dept: RADIOLOGY | Facility: HOSPITAL | Age: 78
Discharge: HOME | End: 2023-03-02
Attending: THORACIC SURGERY (CARDIOTHORACIC VASCULAR SURGERY)
Payer: MEDICARE

## 2023-03-02 ENCOUNTER — ANESTHESIA EVENT (OUTPATIENT)
Dept: SURGERY | Facility: HOSPITAL | Age: 78
End: 2023-03-02
Payer: MEDICARE

## 2023-03-02 DIAGNOSIS — C34.90 MALIGNANT NEOPLASM OF UNSPECIFIED PART OF UNSPECIFIED BRONCHUS OR LUNG: ICD-10-CM

## 2023-03-02 DIAGNOSIS — R91.8 LUNG MASS: ICD-10-CM

## 2023-03-02 PROCEDURE — 71046 X-RAY EXAM CHEST 2 VIEWS: CPT | Mod: TC

## 2023-03-06 ENCOUNTER — ANESTHESIA (OUTPATIENT)
Dept: SURGERY | Facility: HOSPITAL | Age: 78
End: 2023-03-06
Payer: MEDICARE

## 2023-03-06 ENCOUNTER — HOSPITAL ENCOUNTER (OUTPATIENT)
Facility: HOSPITAL | Age: 78
Discharge: HOME | End: 2023-03-06
Attending: THORACIC SURGERY (CARDIOTHORACIC VASCULAR SURGERY) | Admitting: THORACIC SURGERY (CARDIOTHORACIC VASCULAR SURGERY)
Payer: MEDICARE

## 2023-03-06 DIAGNOSIS — R91.8 LUNG MASS: ICD-10-CM

## 2023-03-06 DIAGNOSIS — C34.90 MALIGNANT NEOPLASM OF UNSPECIFIED PART OF UNSPECIFIED BRONCHUS OR LUNG: ICD-10-CM

## 2023-03-06 LAB
GROUP & RH: NORMAL
INDIRECT COOMBS GEL: NORMAL
POCT GLUCOSE: 82 MG/DL (ref 70–110)

## 2023-03-06 PROCEDURE — 37000009 HC ANESTHESIA EA ADD 15 MINS: Performed by: THORACIC SURGERY (CARDIOTHORACIC VASCULAR SURGERY)

## 2023-03-06 PROCEDURE — 88173 CYTOPATH EVAL FNA REPORT: CPT | Performed by: THORACIC SURGERY (CARDIOTHORACIC VASCULAR SURGERY)

## 2023-03-06 PROCEDURE — 36620 INSERTION CATHETER ARTERY: CPT

## 2023-03-06 PROCEDURE — 63600175 PHARM REV CODE 636 W HCPCS

## 2023-03-06 PROCEDURE — 63600175 PHARM REV CODE 636 W HCPCS: Performed by: THORACIC SURGERY (CARDIOTHORACIC VASCULAR SURGERY)

## 2023-03-06 PROCEDURE — 25000003 PHARM REV CODE 250

## 2023-03-06 PROCEDURE — 37000008 HC ANESTHESIA 1ST 15 MINUTES: Performed by: THORACIC SURGERY (CARDIOTHORACIC VASCULAR SURGERY)

## 2023-03-06 PROCEDURE — 88341 IMHCHEM/IMCYTCHM EA ADD ANTB: CPT | Mod: 59

## 2023-03-06 PROCEDURE — 39402 MEDIASTINOSCPY W/LMPH NOD BX: CPT | Mod: ,,, | Performed by: THORACIC SURGERY (CARDIOTHORACIC VASCULAR SURGERY)

## 2023-03-06 PROCEDURE — 88342 IMHCHEM/IMCYTCHM 1ST ANTB: CPT

## 2023-03-06 PROCEDURE — 25000003 PHARM REV CODE 250: Performed by: THORACIC SURGERY (CARDIOTHORACIC VASCULAR SURGERY)

## 2023-03-06 PROCEDURE — 88305 TISSUE EXAM BY PATHOLOGIST: CPT

## 2023-03-06 PROCEDURE — 86900 BLOOD TYPING SEROLOGIC ABO: CPT | Performed by: THORACIC SURGERY (CARDIOTHORACIC VASCULAR SURGERY)

## 2023-03-06 PROCEDURE — 39402 PR MEDIASTINOSCPY W/LMPH NOD BX: ICD-10-PCS | Mod: ,,, | Performed by: THORACIC SURGERY (CARDIOTHORACIC VASCULAR SURGERY)

## 2023-03-06 PROCEDURE — 36000711: Performed by: THORACIC SURGERY (CARDIOTHORACIC VASCULAR SURGERY)

## 2023-03-06 PROCEDURE — 71000033 HC RECOVERY, INTIAL HOUR: Performed by: THORACIC SURGERY (CARDIOTHORACIC VASCULAR SURGERY)

## 2023-03-06 PROCEDURE — 71000015 HC POSTOP RECOV 1ST HR: Performed by: THORACIC SURGERY (CARDIOTHORACIC VASCULAR SURGERY)

## 2023-03-06 PROCEDURE — 36000710: Performed by: THORACIC SURGERY (CARDIOTHORACIC VASCULAR SURGERY)

## 2023-03-06 PROCEDURE — 71000016 HC POSTOP RECOV ADDL HR: Performed by: THORACIC SURGERY (CARDIOTHORACIC VASCULAR SURGERY)

## 2023-03-06 RX ORDER — LIDOCAINE HYDROCHLORIDE 10 MG/ML
INJECTION INFILTRATION; PERINEURAL
Status: DISCONTINUED
Start: 2023-03-06 | End: 2023-03-06 | Stop reason: HOSPADM

## 2023-03-06 RX ORDER — MIDAZOLAM HYDROCHLORIDE 1 MG/ML
INJECTION INTRAMUSCULAR; INTRAVENOUS
Status: COMPLETED
Start: 2023-03-06 | End: 2023-03-06

## 2023-03-06 RX ORDER — MIDAZOLAM HYDROCHLORIDE 1 MG/ML
INJECTION INTRAMUSCULAR; INTRAVENOUS
Status: DISCONTINUED | OUTPATIENT
Start: 2023-03-06 | End: 2023-03-06

## 2023-03-06 RX ORDER — HEPARIN 100 UNIT/ML
SYRINGE INTRAVENOUS
Status: DISCONTINUED
Start: 2023-03-06 | End: 2023-03-06 | Stop reason: HOSPADM

## 2023-03-06 RX ORDER — ONDANSETRON 2 MG/ML
INJECTION INTRAMUSCULAR; INTRAVENOUS
Status: DISCONTINUED | OUTPATIENT
Start: 2023-03-06 | End: 2023-03-06

## 2023-03-06 RX ORDER — ROCURONIUM BROMIDE 10 MG/ML
INJECTION, SOLUTION INTRAVENOUS
Status: DISCONTINUED | OUTPATIENT
Start: 2023-03-06 | End: 2023-03-06

## 2023-03-06 RX ORDER — PHENYLEPHRINE HYDROCHLORIDE 10 MG/ML
INJECTION INTRAVENOUS
Status: DISCONTINUED | OUTPATIENT
Start: 2023-03-06 | End: 2023-03-06

## 2023-03-06 RX ORDER — TRAMADOL HYDROCHLORIDE 50 MG/1
50 TABLET ORAL EVERY 6 HOURS PRN
Qty: 12 TABLET | Refills: 0 | Status: SHIPPED | OUTPATIENT
Start: 2023-03-06

## 2023-03-06 RX ORDER — TRAMADOL HYDROCHLORIDE 50 MG/1
50 TABLET ORAL EVERY 6 HOURS PRN
Status: DISCONTINUED | OUTPATIENT
Start: 2023-03-06 | End: 2023-03-06 | Stop reason: HOSPADM

## 2023-03-06 RX ORDER — LIDOCAINE HYDROCHLORIDE 20 MG/ML
INJECTION, SOLUTION EPIDURAL; INFILTRATION; INTRACAUDAL; PERINEURAL
Status: DISCONTINUED | OUTPATIENT
Start: 2023-03-06 | End: 2023-03-06

## 2023-03-06 RX ORDER — PROPOFOL 10 MG/ML
VIAL (ML) INTRAVENOUS
Status: DISCONTINUED | OUTPATIENT
Start: 2023-03-06 | End: 2023-03-06

## 2023-03-06 RX ORDER — ESMOLOL HYDROCHLORIDE 10 MG/ML
INJECTION INTRAVENOUS
Status: DISCONTINUED | OUTPATIENT
Start: 2023-03-06 | End: 2023-03-06

## 2023-03-06 RX ORDER — FENTANYL CITRATE 50 UG/ML
INJECTION, SOLUTION INTRAMUSCULAR; INTRAVENOUS
Status: DISCONTINUED | OUTPATIENT
Start: 2023-03-06 | End: 2023-03-06

## 2023-03-06 RX ORDER — DEXAMETHASONE SODIUM PHOSPHATE 4 MG/ML
INJECTION, SOLUTION INTRA-ARTICULAR; INTRALESIONAL; INTRAMUSCULAR; INTRAVENOUS; SOFT TISSUE
Status: DISCONTINUED | OUTPATIENT
Start: 2023-03-06 | End: 2023-03-06

## 2023-03-06 RX ADMIN — ESMOLOL HYDROCHLORIDE 10 MG: 100 INJECTION, SOLUTION INTRAVENOUS at 10:03

## 2023-03-06 RX ADMIN — ONDANSETRON 4 MG: 2 INJECTION INTRAMUSCULAR; INTRAVENOUS at 11:03

## 2023-03-06 RX ADMIN — FENTANYL CITRATE 50 MCG: 50 INJECTION, SOLUTION INTRAMUSCULAR; INTRAVENOUS at 10:03

## 2023-03-06 RX ADMIN — DEXTROSE MONOHYDRATE 1.5 G: 5 INJECTION INTRAVENOUS at 10:03

## 2023-03-06 RX ADMIN — SUGAMMADEX 200 MG: 100 INJECTION, SOLUTION INTRAVENOUS at 11:03

## 2023-03-06 RX ADMIN — ESMOLOL HYDROCHLORIDE 10 MG: 100 INJECTION, SOLUTION INTRAVENOUS at 11:03

## 2023-03-06 RX ADMIN — PROPOFOL 150 MG: 10 INJECTION, EMULSION INTRAVENOUS at 10:03

## 2023-03-06 RX ADMIN — FENTANYL CITRATE 50 MCG: 50 INJECTION, SOLUTION INTRAMUSCULAR; INTRAVENOUS at 11:03

## 2023-03-06 RX ADMIN — DEXAMETHASONE SODIUM PHOSPHATE 4 MG: 4 INJECTION, SOLUTION INTRA-ARTICULAR; INTRALESIONAL; INTRAMUSCULAR; INTRAVENOUS; SOFT TISSUE at 10:03

## 2023-03-06 RX ADMIN — ROCURONIUM BROMIDE 50 MG: 10 INJECTION, SOLUTION INTRAVENOUS at 10:03

## 2023-03-06 RX ADMIN — LIDOCAINE HYDROCHLORIDE 80 MG: 20 INJECTION, SOLUTION EPIDURAL; INFILTRATION; INTRACAUDAL; PERINEURAL at 10:03

## 2023-03-06 RX ADMIN — PROPOFOL 100 MG: 10 INJECTION, EMULSION INTRAVENOUS at 11:03

## 2023-03-06 RX ADMIN — SODIUM CHLORIDE, SODIUM GLUCONATE, SODIUM ACETATE, POTASSIUM CHLORIDE AND MAGNESIUM CHLORIDE: 526; 502; 368; 37; 30 INJECTION, SOLUTION INTRAVENOUS at 10:03

## 2023-03-06 RX ADMIN — ROCURONIUM BROMIDE 25 MG: 10 INJECTION, SOLUTION INTRAVENOUS at 11:03

## 2023-03-06 RX ADMIN — MIDAZOLAM HYDROCHLORIDE 1 MG: 1 INJECTION, SOLUTION INTRAMUSCULAR; INTRAVENOUS at 09:03

## 2023-03-06 RX ADMIN — PROPOFOL 50 MG: 10 INJECTION, EMULSION INTRAVENOUS at 10:03

## 2023-03-06 RX ADMIN — PHENYLEPHRINE HYDROCHLORIDE 100 MCG: 10 INJECTION INTRAVENOUS at 10:03

## 2023-03-06 NOTE — TRANSFER OF CARE
"Anesthesia Transfer of Care Note    Patient: Leonila Rosales    Procedure(s) Performed: Procedure(s) (LRB):  MEDIASTINOSCOPY (N/A)    Patient location: PACU    Anesthesia Type: general    Transport from OR: Transported from OR on 2-3 L/min O2 by NC with adequate spontaneous ventilation    Post pain: adequate analgesia    Post assessment: no apparent anesthetic complications    Post vital signs: stable    Level of consciousness: responds to stimulation    Nausea/Vomiting: no nausea/vomiting    Complications: none    Transfer of care protocol was followed      Last vitals:   Visit Vitals  BP (!) 152/93 (BP Location: Right arm, Patient Position: Sitting)   Pulse 74   Temp 36.9 °C (98.5 °F) (Oral)   Resp 18   Ht 5' 7" (1.702 m)   Wt 97.1 kg (214 lb 1.1 oz)   SpO2 99%   BMI 33.53 kg/m²     "

## 2023-03-06 NOTE — ANESTHESIA PREPROCEDURE EVALUATION
"    Note written by JAYLENE Yañez MD and edited by NATHANAEL Rogel MD                                                                                                         03/06/2023  Leonila Rosales is a 77 y.o., male with hx lung CA s/p lobectomy last year.  He now has enlarging paratracheal lymph node.  Mediastinoscopy today.    "History of Present Illness:  The patient is presenting for evaluation of enlarging paratracheal lymph node.  He is status post lobectomy with me last year.  His initial stage was T1c N0 M0 with multiple lymph node biopsies.  His surveillance CT revealed evidence of enlarging right paratracheal lymph node and aortopulmonary lymph node.  These are both PET positive.  He has been doing very well and he is asymptomatic             Chief Complaint   Patient presents with    Pre-op Exam       REF-DR. FLORES-TISSUE DIAGNOSIS AND RECCOMENDATION OF ENLARGING LYMPH NODE - 2.2 X 2.0 CM (PREV1.6 X 1.5 CM)  RT PARATRACHEAL ENLARGED LYMPH NODE, SMALL RT PL EFF, LUNG CA-STAGE IA 3, S/P RT UPPER LOBECTOMY 5/24/22 DR. SCHMIDT, FALL W/ HIP FX.         Review of patient's allergies indicates:  No Known Allergies     Current Medications          Current Outpatient Medications   Medication Sig Dispense Refill    amLODIPine (NORVASC) 5 MG tablet TAKE ONE TABLET BY MOUTH TWICE DAILY 180 tablet 1    aspirin 81 mg Cap Take 81 mg by mouth Daily.        atorvastatin (LIPITOR) 10 MG tablet Take 1 tablet (10 mg total) by mouth once daily. 90 tablet 3    ferrous sulfate (FEOSOL) 325 mg (65 mg iron) Tab tablet Take 1 tablet (325 mg total) by mouth once daily. 30 tablet 3    LINZESS 145 mcg Cap capsule Take 145 mcg by mouth once daily.        pantoprazole (PROTONIX) 40 MG tablet Take 1 tablet (40 mg total) by mouth once daily. (Patient not taking: Reported on 2/15/2023) 30 tablet 11      No current facility-administered medications for this visit.                 Past Medical History:   Diagnosis Date    Closed " "intertrochanteric fracture      Deficiency of macronutrients      GERD (gastroesophageal reflux disease)      H. pylori infection      History of tobacco use      Hyperlipidemia      Hypertension      Lung mass      Non-small cell lung cancer              Past Surgical History:   Procedure Laterality Date    BIOPSY OF INTESTINE   04/04/2022    BRONCHOSCOPY        ESOPHAGOGASTRODUODENOSCOPY   04/04/2022    HIP FRACTURE SURGERY Left 2016    Intramedullary Nail Insertion Hip Left 01/18/2022    KIDNEY SURGERY Right 05/27/2022    SHOULDER SURGERY"          Lab Data:      CROW:      EKG:      Pre-op Assessment    I have reviewed the Patient Summary Reports.     I have reviewed the Nursing Notes. I have reviewed the NPO Status.   I have reviewed the Medications.     Review of Systems  Anesthesia Hx:  No problems with previous Anesthesia  Denies Family Hx of Anesthesia complications.   Denies Personal Hx of Anesthesia complications.   Hematology/Oncology:        Current/Recent Cancer.   Cardiovascular:   Hypertension    Pulmonary:  Pulmonary Normal    Hepatic/GI:   PUD, GERD        Physical Exam  General: Alert, Oriented, Well nourished and Cooperative    Airway:  Mallampati: II   Mouth Opening: Normal  TM Distance: Normal  Tongue: Normal  Neck ROM: Normal ROM    Dental:  Intact    Chest/Lungs:  Clear to auscultation, Normal Respiratory Rate    Heart:  Rate: Normal  Rhythm: Regular Rhythm        Anesthesia Plan  Type of Anesthesia, risks & benefits discussed:    Anesthesia Type: Gen ETT  Intra-op Monitoring Plan: Standard ASA Monitors and Art Line  Post Op Pain Control Plan: multimodal analgesia and IV/PO Opioids PRN  Induction:  IV  Airway Plan: Direct, Post-Induction  Informed Consent: Informed consent signed with the Patient and all parties understand the risks and agree with anesthesia plan.  All questions answered.   ASA Score: 3  Day of Surgery Review of History & Physical: H&P Update referred to the " surgeon/provider.    Ready For Surgery From Anesthesia Perspective.     .

## 2023-03-06 NOTE — ANESTHESIA PROCEDURE NOTES
Arterial    Diagnosis: intraop monitoring    Patient location during procedure: pre-op    Staffing  Authorizing Provider: Thomas Rogel MD  Performing Provider: Gertrudis Slater CRNA    Anesthesiologist was present at the time of the procedure.    Preanesthetic Checklist  Completed: patient identified, IV checked, site marked, risks and benefits discussed, surgical consent, monitors and equipment checked, pre-op evaluation, timeout performed and anesthesia consent givenArterial  Skin Prep: chlorhexidine gluconate  Local Infiltration: lidocaine  Orientation: right  Location: radial    Catheter Size: 20 G  Catheter placement by Anatomical landmarks. Heme positive aspiration all ports. Insertion Attempts: 1  Assessment  Dressing: secured with tape and tegaderm  Patient: Tolerated well

## 2023-03-06 NOTE — OP NOTE
[facility]                       [address] [address]    PATIENT NAME:      ROCK FARMER   YOB: 1945  CSN:               [id number]  MRN:               [id number]  ADMIT DATE:        03/06/2023 06:25:00  PHYSICIAN:         Jose Diaz MD                          OPERATIVE REPORT      DATE OF SURGERY:    03/06/2023 00:00:00    SURGEON:  Jose Diaz MD    PREOPERATIVE DIAGNOSIS:  Mediastinal lymphadenopathy.    PROCEDURE:  Mediastinoscopy.    POSTOPERATIVE DIAGNOSIS:  Mediastinal lymphadenopathy.    BLOOD LOSS:  Minimal.    ANESTHESIA:  General.    TECHNIQUE:  Under full consent, patient was taken to the OR, supine position.    General anesthesia was induced and therefore maintained for remainder of   procedure.  The skin over the neck and chest was prepped and draped in sterile   fashion.  IV antibiotics administered.  Suprasternal notch incision was made,   followed by taking down subcutaneous tissue and platysma.  The median raphe was   penetrated in the midline.  Pretracheal plane was formed.  Mediastinoscope was   introduced, and I was able to biopsy the 4R lymph node, multiple biopsies, a   generous specimen.  This was sent for Pathology.  The wounds were irrigated and   closed in layers of absorbable sutures.  Patient tolerated procedure well.        ______________________________  MD FABI Nazario/AQS  DD:  03/06/2023  Time:  11:38AM  DT:  03/06/2023  Time:  11:59AM  Job #:  [id number]      OPERATIVE REPORT

## 2023-03-06 NOTE — ANESTHESIA PROCEDURE NOTES
Intubation    Date/Time: 3/6/2023 10:27 AM  Performed by: Gertrudis Slater CRNA  Authorized by: Thomas Rogel MD     Intubation:     Induction:  Intravenous    Intubated:  Postinduction    Mask Ventilation:  Easy mask    Attempts:  1    Attempted By:  CRNA    Method of Intubation:  Video laryngoscopy    Blade:  Bass 3    Laryngeal View Grade: Grade IIA - cords partially seen      Difficult Airway Encountered?: No      Complications:  None    Airway Device:  Oral endotracheal tube    Airway Device Size:  7.5    Style/Cuff Inflation:  Cuffed    Tube secured:  22    Secured at:  The lips    Placement Verified By:  Capnometry    Complicating Factors:  None    Findings Post-Intubation:  BS equal bilateral and atraumatic/condition of teeth unchanged

## 2023-03-06 NOTE — BRIEF OP NOTE
[facility]  Cardiothoracic Surgery  Operative Note    SUMMARY     Date of Procedure: 3/6/2023     Procedure: Procedure(s) (LRB):  MEDIASTINOSCOPY (N/A)    Surgeon(s) and Role:     * Jose Diaz MD - Primary     * Gregg Knox PA-C - Assisting    Assistant: Gregg Knox PA-C    Pre-Operative Diagnosis: Malignant neoplasm of unspecified part of unspecified bronchus or lung [C34.90]  Lung mass [R91.8]    Post-Operative Diagnosis: Post-Op Diagnosis Codes:     * Malignant neoplasm of unspecified part of unspecified bronchus or lung [C34.90]     * Lung mass [R91.8]    Anesthesia: General    Operative Findings (including complications, if any):            Specimens:   Specimen (24h ago, onward)      None                    Condition: Good    Disposition: PACU - hemodynamically stable.

## 2023-03-06 NOTE — DISCHARGE SUMMARY
[facility]  Cardiothoracic Surgery  Discharge Summary      Patient Name: Leonila Rosales  MRN: [id number]  Admission Date: 3/6/2023  Hospital Length of Stay: 0 days  Discharge Date and Time:  03/06/2023 11:00 AM  Attending Physician: Jose Diaz MD   Discharging Provider: Gregg Knox PA-C  Primary Care Provider: NIDIA Richards    HPI:   No notes on file    Procedure(s) (LRB):  MEDIASTINOSCOPY (N/A)      Indwelling Lines/Drains at time of discharge:   Lines/Drains/Airways     Airway  Duration                Airway - Non-Surgical 03/06/23 1027 <1 day          Arterial Line  Duration           Arterial Line 03/06/23 1044 Right Radial <1 day              Hospital Course: No notes on file    Goals of Care Treatment Preferences:             Significant Diagnostic Studies:     Pending Diagnostic Studies:     None          No notes have been filed under this hospital service.  Service: Cardiothoracic Surgery    Final Active Diagnoses:    Diagnosis Date Noted POA    PRINCIPAL PROBLEM:  Lung mass [R91.8] 05/28/2022 Yes      Problems Resolved During this Admission:      Discharged Condition: good    Disposition: Home or Self Care    Follow Up:   Follow-up Information     Jose Diaz MD Follow up.    Specialties: Cardiothoracic Surgery, Cardiology  Why: As needed, If symptoms worsen  Contact information:  [address] [place] [address]  [address] [place] [address] [id number] [phone]                       Patient Instructions:   No discharge procedures on file.  Medications:  Reconciled Home Medications:      Medication List      START taking these medications    traMADoL 50 mg tablet  Commonly known as: ULTRAM  Take 1 tablet (50 mg total) by mouth every 6 (six) hours as needed for Pain.        CONTINUE taking these medications    amLODIPine 5 MG tablet  Commonly known as: NORVASC  TAKE ONE TABLET BY MOUTH TWICE DAILY     aspirin 81 mg Cap  Take 81 mg by mouth Daily.     atorvastatin 10  MG tablet  Commonly known as: LIPITOR  Take 1 tablet (10 mg total) by mouth once daily.     ferrous sulfate 325 mg (65 mg iron) Tab tablet  Commonly known as: FEOSOL  Take 1 tablet (325 mg total) by mouth once daily.     LINZESS 145 mcg Cap capsule  Generic drug: linaCLOtide  Take 145 mcg by mouth daily as needed.     pantoprazole 40 MG tablet  Commonly known as: PROTONIX  Take 1 tablet (40 mg total) by mouth once daily.          Time spent on the discharge of patient: 30 minutes    Gregg Knox PA-C  Cardiothoracic Surgery  [facility]

## 2023-03-07 NOTE — ANESTHESIA POSTPROCEDURE EVALUATION
Anesthesia Post Evaluation    Patient: Leonila Rosales    Procedure(s) Performed: Procedure(s) (LRB):  MEDIASTINOSCOPY (N/A)    Final Anesthesia Type: general      Patient location during evaluation: PACU  Patient participation: Yes- Able to Participate  Level of consciousness: awake and alert and oriented  Post-procedure vital signs: reviewed and stable  Pain management: adequate  Airway patency: patent  ANDREA mitigation strategies: Postoperative administration of CPAP, nasopharyngeal airway, or oral appliance in the postanesthesia care unit (PACU), Verification of full reversal of neuromuscular block and Multimodal analgesia  PONV status at discharge: No PONV  Anesthetic complications: no      Cardiovascular status: hemodynamically stable  Respiratory status: unassisted and room air  Hydration status: euvolemic  Follow-up not needed.          Vitals Value Taken Time   /88 03/06/23 1241   Temp 36.8 03/06/23 2124   Pulse 78 03/06/23 1241   Resp 16 03/06/23 1221   SpO2 98 % 03/06/23 1241   Vitals shown include unvalidated device data.      Event Time   Out of Recovery 12:23:00         Pain/Anastacio Score: Anastacio Score: 10 (3/6/2023  1:19 PM)  Modified Anastacio Score: 20 (3/6/2023  1:19 PM)

## 2023-03-08 LAB — PSYCHE PATHOLOGY RESULT: NORMAL

## 2023-03-09 VITALS
SYSTOLIC BLOOD PRESSURE: 172 MMHG | DIASTOLIC BLOOD PRESSURE: 88 MMHG | HEIGHT: 67 IN | OXYGEN SATURATION: 98 % | BODY MASS INDEX: 33.6 KG/M2 | RESPIRATION RATE: 20 BRPM | TEMPERATURE: 99 F | WEIGHT: 214.06 LBS | HEART RATE: 78 BPM

## 2023-03-15 ENCOUNTER — OFFICE VISIT (OUTPATIENT)
Dept: HEMATOLOGY/ONCOLOGY | Facility: CLINIC | Age: 78
End: 2023-03-15
Payer: MEDICARE

## 2023-03-15 VITALS — BODY MASS INDEX: 33.53 KG/M2 | HEIGHT: 67 IN

## 2023-03-15 DIAGNOSIS — Z90.2 S/P LOBECTOMY OF LUNG: ICD-10-CM

## 2023-03-15 DIAGNOSIS — C34.91 NON-SMALL CELL CANCER OF RIGHT LUNG: Primary | ICD-10-CM

## 2023-03-15 PROCEDURE — 1126F PR PAIN SEVERITY QUANTIFIED, NO PAIN PRESENT: ICD-10-PCS | Mod: CPTII,S$GLB,, | Performed by: INTERNAL MEDICINE

## 2023-03-15 PROCEDURE — 1101F PR PT FALLS ASSESS DOC 0-1 FALLS W/OUT INJ PAST YR: ICD-10-PCS | Mod: CPTII,S$GLB,, | Performed by: INTERNAL MEDICINE

## 2023-03-15 PROCEDURE — 1126F AMNT PAIN NOTED NONE PRSNT: CPT | Mod: CPTII,S$GLB,, | Performed by: INTERNAL MEDICINE

## 2023-03-15 PROCEDURE — 1101F PT FALLS ASSESS-DOCD LE1/YR: CPT | Mod: CPTII,S$GLB,, | Performed by: INTERNAL MEDICINE

## 2023-03-15 PROCEDURE — 1159F MED LIST DOCD IN RCRD: CPT | Mod: CPTII,S$GLB,, | Performed by: INTERNAL MEDICINE

## 2023-03-15 PROCEDURE — 99999 PR PBB SHADOW E&M-EST. PATIENT-LVL III: ICD-10-PCS | Mod: PBBFAC,,, | Performed by: INTERNAL MEDICINE

## 2023-03-15 PROCEDURE — 99215 PR OFFICE/OUTPT VISIT, EST, LEVL V, 40-54 MIN: ICD-10-PCS | Mod: S$GLB,,, | Performed by: INTERNAL MEDICINE

## 2023-03-15 PROCEDURE — 3288F FALL RISK ASSESSMENT DOCD: CPT | Mod: CPTII,S$GLB,, | Performed by: INTERNAL MEDICINE

## 2023-03-15 PROCEDURE — 3288F PR FALLS RISK ASSESSMENT DOCUMENTED: ICD-10-PCS | Mod: CPTII,S$GLB,, | Performed by: INTERNAL MEDICINE

## 2023-03-15 PROCEDURE — 1160F PR REVIEW ALL MEDS BY PRESCRIBER/CLIN PHARMACIST DOCUMENTED: ICD-10-PCS | Mod: CPTII,S$GLB,, | Performed by: INTERNAL MEDICINE

## 2023-03-15 PROCEDURE — 99999 PR PBB SHADOW E&M-EST. PATIENT-LVL III: CPT | Mod: PBBFAC,,, | Performed by: INTERNAL MEDICINE

## 2023-03-15 PROCEDURE — 1160F RVW MEDS BY RX/DR IN RCRD: CPT | Mod: CPTII,S$GLB,, | Performed by: INTERNAL MEDICINE

## 2023-03-15 PROCEDURE — 1159F PR MEDICATION LIST DOCUMENTED IN MEDICAL RECORD: ICD-10-PCS | Mod: CPTII,S$GLB,, | Performed by: INTERNAL MEDICINE

## 2023-03-15 PROCEDURE — 99215 OFFICE O/P EST HI 40 MIN: CPT | Mod: S$GLB,,, | Performed by: INTERNAL MEDICINE

## 2023-03-15 NOTE — PROGRESS NOTES
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 Right upper lobe 2.5 cm mass.  CT C 9/19/2022: Status post right partial pneumonectomy for resection of a right upper lobe lung mass with no residual recurrent mass seen. Small right-sided pleural effusion. Some lymphadenopathy in the right paratracheal region with a maximum short axis dimension of 1.6 cm.  Follow-up is recommended  CT C 1/25/2023: There is a paratracheal enlarged lymph node which shows interval mild size increase and now measures 2.2 x 2.0 cm and previously was 1.6 x 1.5 cm.  Aortopulmonary window mildly enlarged lymph node is stable.  Thoracic aorta is without aneurysmal dilatation or dissection.  Impression: 1. Operative changes of right upper lobectomy without bronchialstump soft tissue proliferation    2. No residual tumor load or metastatic nodule. 3. Paratracheal enlarged lymph node with interval size increase.  PET CT 2/9/2023:  Hypermetabolic right paratracheal lymph node image 81 series 3 measures 25 x 20 mm with max SUV 27.0.  Hypermetabolic anterior mediastinal lymph node anterior to the SVC measures 12 x 9 mm with max SUV 12.0. Impression: 1. Hypermetabolic metastatic mediastinal adenopathy.   2. No PET evidence of metastatic disease outside of the mediastinum.    Pathology:  03/22/2022 CT-guided biopsy of the right lung mass: invasive squamous cell carcinoma, moderately differentiated.  5/24/2022: Right upper lobectomy:  1- LYMPH NODE, LUMBAR RIGHT 10 LYMPHADENECTOMY: NEGATIVE FOR METASTATIC CARCINOMA (0/1).   2- LYMPH NODE, 11R,  LYMPHADENECTOMY: NEGATIVE FOR METASTATIC CARCINOMA (0/1).  3- LYMPH NODE, 9R, LYMPHADENECTOMY: NEGATIVE FOR METASTATIC CARCINOMA (0/1).   4- LYMPH NODE, 7R, LYMPHADENECTOMY: NEGATIVE FOR METASTATIC CARCINOMA (0/1).   5- LYMPH NODE, 8R, LYMPHADENECTOMY: ONE LYMPH NODE NEGATIVE FOR METASTATIC CARCINOMA (0/1).    6- LYMPH NODE, 12R, LYMPHADENECTOMY: NEGATIVE FOR METASTATIC CARCINOMA (0/1).  7- LUNG, RIGHT UPPER AND MIDDLE LOBES, PNEUMONECTOMY:  POORLY DIFFERENTIATED, G3, SQUAMOUS CELL CARCINOMA, INVASIVE.    - TUMOR SIZE: 3 CM.    - LYMPHOVASCULAR INVASION IS PRESENT.    - MARGINS NEGATIVE FOR INVASIVE CARCINOMA:    - NEAREST MARGIN, VASCULAR / BRONCHIAL 2.5 CM.    - NO PLEURAL SURFACE INVOLVEMENT     - PROMINENT NECROSIS PRESENT.  Visceral Pleura Invasion: Not identified  Number of Lymph Nodes Examined: Exact number : 6  pT Category: pT1c: pN0: No regional lymph node metastasis    3/8/2023 LYMPH NODE BIOPSY:   LYMPH NODE 4R, LYMPHADENECTOMY:  METASTATIC SQUAMOUS CELL CARCINOMA, NONKERATINIZING, MULTIPLE FRAGMENTS.       IMMUNOHISTOCHEMICAL STAINS:   CK 5/6, P63 AND CK7:  POSITIVE IN MALIGNANT CELLS.   CK20 AND TTF-1:  NEGATIVE IN MALIGNANT CELLS.         CLINICAL HISTORY:       Patient: Leonila Rosales is a 77 y.o. male kindly referred by  Dr. Diaz for evaluation of lung cancer.    On 01/17/2022 patient presented to the emergency department after a fall.  He reports that he was getting to his truck and sleep and fell on his left side on the truck step rail.  He started having pain in his left hip and knee.  He underwent a CT angiogram that showed No pulmonary embolus. Right upper lobe 2.5 cm mass.      During that hospitalization he was treated for a close intertrochanteric fracture of the left femur and underwent open reduction intramedullary nailing left comminuted intertrochanteric hip fracture on 01/18/2022 with Dr. Fortino Palomares.  He then spent several weeks on rehab.    On 03/22/2022 he underwent CT-guided biopsy  of the right lung mass.  Pathology showed invasive squamous cell carcinoma, moderately differentiated.    He is s/p right upper lobectomy with  on 5/24/2022.  Pathology report as above.  Patient is stage IA3 squamous cell carcinoma of the lung.  He has recovered well and has been doing good.     He lives alone and is able to perform ADL's. He uses a walker to aid in ambulation. He quit smoking January 2022, after smoking for 40 yrs.       Chief Complaint: biopsy results    Interval History:    Patient presents to clinic for 2 weeks follow up to discuss biopsy results. He underwent  mediastinoscopy on 3/6/23 with Dr. Diaz and LN biopsy revealed recurrence of METASTATIC SQUAMOUS CELL CARCINOMA of lung. Treatment options and recommendations discussed with him in detail. He has no complaints today. He denies shortness of breath, chest pain. No weight loss. Denies headaches. He uses a cane for mobility.        Past Medical History:   Diagnosis Date    Anemia     Closed intertrochanteric fracture     Deficiency of macronutrients     GERD (gastroesophageal reflux disease)     H. pylori infection     History of tobacco use     Hyperlipidemia     Hypertension     Lung mass     Non-small cell lung cancer       Past Surgical History:   Procedure Laterality Date    BIOPSY OF INTESTINE  04/04/2022    BRONCHOSCOPY      COLONOSCOPY      ESOPHAGOGASTRODUODENOSCOPY  04/04/2022    HIP FRACTURE SURGERY Left 2016    Intramedullary Nail Insertion Hip Left 01/18/2022    KIDNEY SURGERY Right 05/27/2022    MEDIASTINOSCOPY N/A 3/6/2023    Procedure: MEDIASTINOSCOPY;  Surgeon: Jose Diaz MD;  Location: [facility];  Service: Cardiothoracic;  Laterality: N/A;  XX    SHOULDER SURGERY       History reviewed. No pertinent family history.  Social Connections: Not on file       Review of patient's allergies indicates:  No Known Allergies   Current Outpatient Medications on File Prior to Visit   Medication Sig Dispense Refill     amLODIPine (NORVASC) 5 MG tablet TAKE ONE TABLET BY MOUTH TWICE DAILY 180 tablet 1    aspirin 81 mg Cap Take 81 mg by mouth Daily.      atorvastatin (LIPITOR) 10 MG tablet Take 1 tablet (10 mg total) by mouth once daily. 90 tablet 3    ferrous sulfate (FEOSOL) 325 mg (65 mg iron) Tab tablet Take 1 tablet (325 mg total) by mouth once daily. 30 tablet 3    LINZESS 145 mcg Cap capsule Take 145 mcg by mouth daily as needed.      pantoprazole (PROTONIX) 40 MG tablet Take 1 tablet (40 mg total) by mouth once daily. 30 tablet 11    traMADoL (ULTRAM) 50 mg tablet Take 1 tablet (50 mg total) by mouth every 6 (six) hours as needed for Pain. 12 tablet 0     No current facility-administered medications on file prior to visit.      Review of Systems   Constitutional:  Negative for activity change, appetite change, chills, diaphoresis, fatigue, fever and unexpected weight change.   HENT:  Negative for nasal congestion, mouth sores, nosebleeds, postnasal drip, sinus pressure/congestion, sore throat and trouble swallowing.    Eyes:  Negative for visual disturbance.   Cardiovascular:  Negative for chest pain and palpitations.        Around surgical scar   Gastrointestinal:  Negative for abdominal distention, abdominal pain, blood in stool, change in bowel habit, constipation, diarrhea, nausea, vomiting and change in bowel habit.   Endocrine: Negative.    Genitourinary:  Negative for bladder incontinence, decreased urine volume, difficulty urinating, dysuria, frequency, hematuria, scrotal swelling, testicular pain and urgency.   Musculoskeletal:  Negative for arthralgias, back pain, gait problem, joint swelling, leg pain, myalgias and neck pain.   Integumentary:  Negative for rash.   Neurological:  Negative for dizziness, tremors, seizures, syncope, speech difficulty, weakness, light-headedness, numbness, headaches and memory loss.   Hematological:  Does not bruise/bleed easily.   Psychiatric/Behavioral:  Negative for agitation,  "confusion, hallucinations, sleep disturbance and suicidal ideas. The patient is not nervous/anxious.             Vitals:    03/15/23 1103   Height: 5' 7" (1.702 m)     Body mass index is 33.53 kg/m².  Body surface area is 2.14 meters squared.       Physical Exam  Vitals and nursing note reviewed.   Constitutional:       General: He is not in acute distress.     Appearance: Normal appearance.   HENT:      Head: Normocephalic and atraumatic.      Mouth/Throat:      Mouth: Mucous membranes are moist.   Eyes:      General: No scleral icterus.     Extraocular Movements: Extraocular movements intact.      Conjunctiva/sclera: Conjunctivae normal.   Neck:      Vascular: No JVD.   Cardiovascular:      Rate and Rhythm: Normal rate and regular rhythm.      Heart sounds: No murmur heard.  Pulmonary:      Effort: Pulmonary effort is normal.      Breath sounds: Decreased breath sounds present. No wheezing or rhonchi.   Chest:      Chest wall: No tenderness.   Abdominal:      General: Bowel sounds are normal. There is no distension.      Palpations: Abdomen is soft.      Tenderness: There is no abdominal tenderness.   Musculoskeletal:         General: No swelling or deformity.      Cervical back: Neck supple.   Lymphadenopathy:      Cervical: No cervical adenopathy.      Upper Body:      Right upper body: No supraclavicular or axillary adenopathy.      Left upper body: No supraclavicular or axillary adenopathy.      Lower Body: No right inguinal adenopathy. No left inguinal adenopathy.   Skin:     General: Skin is warm.      Coloration: Skin is not jaundiced.      Findings: No rash.   Neurological:      General: No focal deficit present.      Mental Status: He is alert and oriented to person, place, and time.      Motor: Weakness present.      Comments: Mobility assitssed by cane/walker   Psychiatric:         Attention and Perception: Attention normal.         Mood and Affect: Mood and affect normal.         Behavior: Behavior is " cooperative.         Cognition and Memory: Cognition normal.         Judgment: Judgment normal.     ECOG SCORE    1 - Restricted in strenuous activity-ambulatory and able to carry out work of a light nature         Laboratory:  CBC with Differential:  Lab Results   Component Value Date    WBC 5.1 03/02/2023    RBC 4.83 03/02/2023    HGB 12.4 (L) 03/02/2023    HCT 41.3 (L) 03/02/2023    MCV 85.5 03/02/2023    MCH 25.7 03/02/2023    MCHC 30.0 (L) 03/02/2023    RDW 17.7 (H) 03/02/2023     03/02/2023    MPV 10.3 03/02/2023        CMP:  Sodium Level   Date Value Ref Range Status   03/02/2023 140 136 - 145 mmol/L Final     Potassium Level   Date Value Ref Range Status   03/02/2023 4.1 3.5 - 5.1 mmol/L Final     Carbon Dioxide   Date Value Ref Range Status   03/02/2023 23 23 - 31 mmol/L Final     Blood Urea Nitrogen   Date Value Ref Range Status   03/02/2023 17.5 8.4 - 25.7 mg/dL Final     Creatinine   Date Value Ref Range Status   03/02/2023 0.95 0.73 - 1.18 mg/dL Final     Calcium Level Total   Date Value Ref Range Status   03/02/2023 10.0 8.8 - 10.0 mg/dL Final     Albumin Level   Date Value Ref Range Status   03/02/2023 3.8 3.4 - 4.8 g/dL Final     Bilirubin Total   Date Value Ref Range Status   03/02/2023 0.7 <=1.5 mg/dL Final     Alkaline Phosphatase   Date Value Ref Range Status   03/02/2023 114 40 - 150 unit/L Final     Aspartate Aminotransferase   Date Value Ref Range Status   03/02/2023 25 5 - 34 unit/L Final     Alanine Aminotransferase   Date Value Ref Range Status   03/02/2023 28 0 - 55 unit/L Final     Estimated GFR-Non    Date Value Ref Range Status   04/19/2022 >60           Assessment:       1. Non-small cell cancer of right lung    2. 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  Surveillance CT scan chest to eval stability with paratracheal enlarged lymph node which shows interval mild size increase and now measures 2.2 x 2.0 cm and previously was 1.6 x 1.5 cm.  Aortopulmonary window mildly enlarged lymph node is stable. PET CT with Hypermetabolic right paratracheal lymph node image 81 series 3 measures 25 x 20 mm with max SUV 27.0. Hypermetabolic anterior mediastinal lymph node anterior to the SVC measures 12 x 9 mm with max SUV 12.0.   Biopsy of R4 lymph node confirmed the metastatic squamous cell carcinoma    Will refer to Rad/Onc  Will refer back to Dr. Diaz for MP placement   RTC in 2 weeks with MD to discuss definitive treatment options, same day labs: CBC, CMP    Encourage to call or message us for any questions or problems  The patient was given ample opportunity to ask questions, and to the best of my abilities, all questions answered to satisfaction; patient demonstrated understanding of what we discussed and agreeable to the plan.     CONNER FLORES MD      Professional Services   I, Shirley Pina LPN, acted solely as a scribe for and in the presence of Dr. Conner Flores, who performed these services.

## 2023-03-22 ENCOUNTER — OFFICE VISIT (OUTPATIENT)
Dept: CARDIAC SURGERY | Facility: CLINIC | Age: 78
End: 2023-03-22
Payer: MEDICARE

## 2023-03-22 VITALS
HEIGHT: 67 IN | WEIGHT: 202 LBS | HEART RATE: 125 BPM | DIASTOLIC BLOOD PRESSURE: 86 MMHG | SYSTOLIC BLOOD PRESSURE: 152 MMHG | BODY MASS INDEX: 31.71 KG/M2

## 2023-03-22 DIAGNOSIS — C34.90 MALIGNANT NEOPLASM OF UNSPECIFIED PART OF UNSPECIFIED BRONCHUS OR LUNG: Primary | ICD-10-CM

## 2023-03-22 DIAGNOSIS — Z79.1 ENCOUNTER FOR MONITORING NSAID THERAPY: ICD-10-CM

## 2023-03-22 DIAGNOSIS — Z51.81 ENCOUNTER FOR MONITORING NSAID THERAPY: ICD-10-CM

## 2023-03-22 PROCEDURE — 1159F MED LIST DOCD IN RCRD: CPT | Mod: CPTII,,, | Performed by: THORACIC SURGERY (CARDIOTHORACIC VASCULAR SURGERY)

## 2023-03-22 PROCEDURE — 3077F SYST BP >= 140 MM HG: CPT | Mod: CPTII,,, | Performed by: THORACIC SURGERY (CARDIOTHORACIC VASCULAR SURGERY)

## 2023-03-22 PROCEDURE — 1159F PR MEDICATION LIST DOCUMENTED IN MEDICAL RECORD: ICD-10-PCS | Mod: CPTII,,, | Performed by: THORACIC SURGERY (CARDIOTHORACIC VASCULAR SURGERY)

## 2023-03-22 PROCEDURE — 3077F PR MOST RECENT SYSTOLIC BLOOD PRESSURE >= 140 MM HG: ICD-10-PCS | Mod: CPTII,,, | Performed by: THORACIC SURGERY (CARDIOTHORACIC VASCULAR SURGERY)

## 2023-03-22 PROCEDURE — 3079F DIAST BP 80-89 MM HG: CPT | Mod: CPTII,,, | Performed by: THORACIC SURGERY (CARDIOTHORACIC VASCULAR SURGERY)

## 2023-03-22 PROCEDURE — 3079F PR MOST RECENT DIASTOLIC BLOOD PRESSURE 80-89 MM HG: ICD-10-PCS | Mod: CPTII,,, | Performed by: THORACIC SURGERY (CARDIOTHORACIC VASCULAR SURGERY)

## 2023-03-22 PROCEDURE — 1101F PR PT FALLS ASSESS DOC 0-1 FALLS W/OUT INJ PAST YR: ICD-10-PCS | Mod: CPTII,,, | Performed by: THORACIC SURGERY (CARDIOTHORACIC VASCULAR SURGERY)

## 2023-03-22 PROCEDURE — 99024 PR POST-OP FOLLOW-UP VISIT: ICD-10-PCS | Mod: ,,, | Performed by: THORACIC SURGERY (CARDIOTHORACIC VASCULAR SURGERY)

## 2023-03-22 PROCEDURE — 3288F PR FALLS RISK ASSESSMENT DOCUMENTED: ICD-10-PCS | Mod: CPTII,,, | Performed by: THORACIC SURGERY (CARDIOTHORACIC VASCULAR SURGERY)

## 2023-03-22 PROCEDURE — 99024 POSTOP FOLLOW-UP VISIT: CPT | Mod: ,,, | Performed by: THORACIC SURGERY (CARDIOTHORACIC VASCULAR SURGERY)

## 2023-03-22 PROCEDURE — 1160F RVW MEDS BY RX/DR IN RCRD: CPT | Mod: CPTII,,, | Performed by: THORACIC SURGERY (CARDIOTHORACIC VASCULAR SURGERY)

## 2023-03-22 PROCEDURE — 1160F PR REVIEW ALL MEDS BY PRESCRIBER/CLIN PHARMACIST DOCUMENTED: ICD-10-PCS | Mod: CPTII,,, | Performed by: THORACIC SURGERY (CARDIOTHORACIC VASCULAR SURGERY)

## 2023-03-22 PROCEDURE — 3288F FALL RISK ASSESSMENT DOCD: CPT | Mod: CPTII,,, | Performed by: THORACIC SURGERY (CARDIOTHORACIC VASCULAR SURGERY)

## 2023-03-22 PROCEDURE — 1101F PT FALLS ASSESS-DOCD LE1/YR: CPT | Mod: CPTII,,, | Performed by: THORACIC SURGERY (CARDIOTHORACIC VASCULAR SURGERY)

## 2023-03-22 NOTE — H&P (VIEW-ONLY)
"Leonila Rosales is a 77 y.o. male patient.   No diagnosis found.  Past Medical History:   Diagnosis Date    Anemia     Closed intertrochanteric fracture     Deficiency of macronutrients     GERD (gastroesophageal reflux disease)     H. pylori infection     History of tobacco use     Hyperlipidemia     Hypertension     Lung mass     Non-small cell lung cancer      No past surgical history pertinent negatives on file.  Scheduled Meds:  Continuous Infusions:  PRN Meds:    Review of patient's allergies indicates:  No Known Allergies  There are no hospital problems to display for this patient.    Blood pressure (!) 152/86, pulse (!) 125, height 5' 7" (1.702 m), weight 91.6 kg (202 lb).    Subjective:  The patient returns to the clinic status post mediastinoscopy.  Final pathology came back squamous cell carcinoma.  He has been doing well.  He is here for possible placement of MediPort.      Objective:  The wounds have healed well.  He has no complaints.      Assessment & Plan:  I plan for placement of MediPort as soon as possible.  Risks and benefits have been explained to the patient he completely understands and elected to proceed  Jose Diaz MD  3/22/2023    "

## 2023-03-28 ENCOUNTER — TELEPHONE (OUTPATIENT)
Dept: CARDIAC SURGERY | Facility: CLINIC | Age: 78
End: 2023-03-28
Payer: MEDICARE

## 2023-03-28 NOTE — TELEPHONE ENCOUNTER
[facility] pt that 4/7/23 was Good Friday Holiday and no surgeries will be scheduled for this day.  Pt then asked what time his surgery would be on 4/6/23 and [facility] pt we will call the day before to notify of time.  Then pt had questions about chemo and [facility] pt to call and discuss this with Dr. Napoles, that office will be the one who coordinates chemo.  Pt verb. Understanding. L  ----- Message from Raquel Ulrich sent at 3/23/2023 11:22 AM CDT -----  Pt would like to change his sx date from Thurs 4/6/23 to the Fri 4/7/2023 because his daughter is off on the Fri and can take him.    [phone]

## 2023-03-29 ENCOUNTER — OFFICE VISIT (OUTPATIENT)
Dept: RADIATION THERAPY | Facility: HOSPITAL | Age: 78
End: 2023-03-29
Attending: RADIOLOGY
Payer: MEDICARE

## 2023-03-29 DIAGNOSIS — C34.91 NON-SMALL CELL CANCER OF RIGHT LUNG: ICD-10-CM

## 2023-03-29 DIAGNOSIS — Z90.2 S/P LOBECTOMY OF LUNG: ICD-10-CM

## 2023-03-29 PROCEDURE — 77334 RADIATION TREATMENT AID(S): CPT | Performed by: RADIOLOGY

## 2023-04-03 ENCOUNTER — OFFICE VISIT (OUTPATIENT)
Dept: HEMATOLOGY/ONCOLOGY | Facility: CLINIC | Age: 78
End: 2023-04-03
Payer: MEDICARE

## 2023-04-03 ENCOUNTER — TELEPHONE (OUTPATIENT)
Dept: HEMATOLOGY/ONCOLOGY | Facility: CLINIC | Age: 78
End: 2023-04-03

## 2023-04-03 ENCOUNTER — LAB VISIT (OUTPATIENT)
Dept: LAB | Facility: HOSPITAL | Age: 78
End: 2023-04-03
Attending: NURSE PRACTITIONER
Payer: MEDICARE

## 2023-04-03 VITALS
TEMPERATURE: 98 F | DIASTOLIC BLOOD PRESSURE: 73 MMHG | SYSTOLIC BLOOD PRESSURE: 120 MMHG | OXYGEN SATURATION: 97 % | HEART RATE: 83 BPM | HEIGHT: 67 IN | WEIGHT: 212 LBS | BODY MASS INDEX: 33.27 KG/M2

## 2023-04-03 DIAGNOSIS — C34.91 NON-SMALL CELL CANCER OF RIGHT LUNG: Primary | ICD-10-CM

## 2023-04-03 DIAGNOSIS — C34.91 NON-SMALL CELL CANCER OF RIGHT LUNG: ICD-10-CM

## 2023-04-03 DIAGNOSIS — Z90.2 S/P LOBECTOMY OF LUNG: ICD-10-CM

## 2023-04-03 LAB
ALBUMIN SERPL-MCNC: 3.6 G/DL (ref 3.4–4.8)
ALBUMIN/GLOB SERPL: 1 RATIO (ref 1.1–2)
ALP SERPL-CCNC: 101 UNIT/L (ref 40–150)
ALT SERPL-CCNC: 24 UNIT/L (ref 0–55)
AST SERPL-CCNC: 22 UNIT/L (ref 5–34)
BASOPHILS # BLD AUTO: 0.03 X10(3)/MCL (ref 0–0.2)
BASOPHILS NFR BLD AUTO: 0.6 %
BILIRUBIN DIRECT+TOT PNL SERPL-MCNC: 0.9 MG/DL
BUN SERPL-MCNC: 11.7 MG/DL (ref 8.4–25.7)
CALCIUM SERPL-MCNC: 9.8 MG/DL (ref 8.8–10)
CHLORIDE SERPL-SCNC: 106 MMOL/L (ref 98–107)
CO2 SERPL-SCNC: 26 MMOL/L (ref 23–31)
CREAT SERPL-MCNC: 0.96 MG/DL (ref 0.73–1.18)
EOSINOPHIL # BLD AUTO: 0.11 X10(3)/MCL (ref 0–0.9)
EOSINOPHIL NFR BLD AUTO: 2.3 %
ERYTHROCYTE [DISTWIDTH] IN BLOOD BY AUTOMATED COUNT: 17.1 % (ref 11.5–17)
GFR SERPLBLD CREATININE-BSD FMLA CKD-EPI: >60 MLS/MIN/1.73/M2
GLOBULIN SER-MCNC: 3.5 GM/DL (ref 2.4–3.5)
GLUCOSE SERPL-MCNC: 114 MG/DL (ref 82–115)
HCT VFR BLD AUTO: 40.9 % (ref 42–52)
HGB BLD-MCNC: 12.6 G/DL (ref 14–18)
IMM GRANULOCYTES # BLD AUTO: 0.01 X10(3)/MCL (ref 0–0.04)
IMM GRANULOCYTES NFR BLD AUTO: 0.2 %
LYMPHOCYTES # BLD AUTO: 1.57 X10(3)/MCL (ref 0.6–4.6)
LYMPHOCYTES NFR BLD AUTO: 32.2 %
MCH RBC QN AUTO: 26.4 PG (ref 27–31)
MCHC RBC AUTO-ENTMCNC: 30.8 G/DL (ref 33–36)
MCV RBC AUTO: 85.7 FL (ref 80–94)
MONOCYTES # BLD AUTO: 0.43 X10(3)/MCL (ref 0.1–1.3)
MONOCYTES NFR BLD AUTO: 8.8 %
NEUTROPHILS # BLD AUTO: 2.73 X10(3)/MCL (ref 2.1–9.2)
NEUTROPHILS NFR BLD AUTO: 55.9 %
PLATELET # BLD AUTO: 230 X10(3)/MCL (ref 130–400)
PMV BLD AUTO: 9.8 FL (ref 7.4–10.4)
POTASSIUM SERPL-SCNC: 4.1 MMOL/L (ref 3.5–5.1)
PROT SERPL-MCNC: 7.1 GM/DL (ref 5.8–7.6)
RBC # BLD AUTO: 4.77 X10(6)/MCL (ref 4.7–6.1)
SODIUM SERPL-SCNC: 142 MMOL/L (ref 136–145)
WBC # SPEC AUTO: 4.9 X10(3)/MCL (ref 4.5–11.5)

## 2023-04-03 PROCEDURE — 99214 OFFICE O/P EST MOD 30 MIN: CPT | Mod: S$GLB,,, | Performed by: INTERNAL MEDICINE

## 2023-04-03 PROCEDURE — 1159F PR MEDICATION LIST DOCUMENTED IN MEDICAL RECORD: ICD-10-PCS | Mod: CPTII,S$GLB,, | Performed by: INTERNAL MEDICINE

## 2023-04-03 PROCEDURE — 80053 COMPREHEN METABOLIC PANEL: CPT

## 2023-04-03 PROCEDURE — 1101F PR PT FALLS ASSESS DOC 0-1 FALLS W/OUT INJ PAST YR: ICD-10-PCS | Mod: CPTII,S$GLB,, | Performed by: INTERNAL MEDICINE

## 2023-04-03 PROCEDURE — 99214 PR OFFICE/OUTPT VISIT, EST, LEVL IV, 30-39 MIN: ICD-10-PCS | Mod: S$GLB,,, | Performed by: INTERNAL MEDICINE

## 2023-04-03 PROCEDURE — 1126F AMNT PAIN NOTED NONE PRSNT: CPT | Mod: CPTII,S$GLB,, | Performed by: INTERNAL MEDICINE

## 2023-04-03 PROCEDURE — 99999 PR PBB SHADOW E&M-EST. PATIENT-LVL IV: CPT | Mod: PBBFAC,,, | Performed by: INTERNAL MEDICINE

## 2023-04-03 PROCEDURE — 3288F FALL RISK ASSESSMENT DOCD: CPT | Mod: CPTII,S$GLB,, | Performed by: INTERNAL MEDICINE

## 2023-04-03 PROCEDURE — 85025 COMPLETE CBC W/AUTO DIFF WBC: CPT

## 2023-04-03 PROCEDURE — 99999 PR PBB SHADOW E&M-EST. PATIENT-LVL IV: ICD-10-PCS | Mod: PBBFAC,,, | Performed by: INTERNAL MEDICINE

## 2023-04-03 PROCEDURE — 1101F PT FALLS ASSESS-DOCD LE1/YR: CPT | Mod: CPTII,S$GLB,, | Performed by: INTERNAL MEDICINE

## 2023-04-03 PROCEDURE — 1159F MED LIST DOCD IN RCRD: CPT | Mod: CPTII,S$GLB,, | Performed by: INTERNAL MEDICINE

## 2023-04-03 PROCEDURE — 36415 COLL VENOUS BLD VENIPUNCTURE: CPT

## 2023-04-03 PROCEDURE — 3288F PR FALLS RISK ASSESSMENT DOCUMENTED: ICD-10-PCS | Mod: CPTII,S$GLB,, | Performed by: INTERNAL MEDICINE

## 2023-04-03 PROCEDURE — 3078F DIAST BP <80 MM HG: CPT | Mod: CPTII,S$GLB,, | Performed by: INTERNAL MEDICINE

## 2023-04-03 PROCEDURE — 3078F PR MOST RECENT DIASTOLIC BLOOD PRESSURE < 80 MM HG: ICD-10-PCS | Mod: CPTII,S$GLB,, | Performed by: INTERNAL MEDICINE

## 2023-04-03 PROCEDURE — 1126F PR PAIN SEVERITY QUANTIFIED, NO PAIN PRESENT: ICD-10-PCS | Mod: CPTII,S$GLB,, | Performed by: INTERNAL MEDICINE

## 2023-04-03 PROCEDURE — 3074F PR MOST RECENT SYSTOLIC BLOOD PRESSURE < 130 MM HG: ICD-10-PCS | Mod: CPTII,S$GLB,, | Performed by: INTERNAL MEDICINE

## 2023-04-03 PROCEDURE — 3074F SYST BP LT 130 MM HG: CPT | Mod: CPTII,S$GLB,, | Performed by: INTERNAL MEDICINE

## 2023-04-03 NOTE — PROGRESS NOTES
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 Right upper lobe 2.5 cm mass.  CT C 9/19/2022: Status post right partial pneumonectomy for resection of a right upper lobe lung mass with no residual recurrent mass seen. Small right-sided pleural effusion. Some lymphadenopathy in the right paratracheal region with a maximum short axis dimension of 1.6 cm.  Follow-up is recommended  CT C 1/25/2023: There is a paratracheal enlarged lymph node which shows interval mild size increase and now measures 2.2 x 2.0 cm and previously was 1.6 x 1.5 cm.  Aortopulmonary window mildly enlarged lymph node is stable.  Thoracic aorta is without aneurysmal dilatation or dissection.  Impression: 1. Operative changes of right upper lobectomy without bronchialstump soft tissue proliferation    2. No residual tumor load or metastatic nodule. 3. Paratracheal enlarged lymph node with interval size increase.  PET CT 2/9/2023:  Hypermetabolic right paratracheal lymph node image 81 series 3 measures 25 x 20 mm with max SUV 27.0.  Hypermetabolic anterior mediastinal lymph node anterior to the SVC measures 12 x 9 mm with max SUV 12.0. Impression: 1. Hypermetabolic metastatic mediastinal adenopathy.   2. No PET evidence of metastatic disease outside of the mediastinum.    Pathology:  03/22/2022 CT-guided biopsy of the right lung mass: invasive squamous cell carcinoma, moderately differentiated.  5/24/2022: Right upper lobectomy:  1- LYMPH NODE, LUMBAR RIGHT 10 LYMPHADENECTOMY: NEGATIVE FOR METASTATIC CARCINOMA (0/1).    2- LYMPH NODE, 11R, LYMPHADENECTOMY: NEGATIVE FOR METASTATIC CARCINOMA (0/1).  3- LYMPH NODE, 9R, LYMPHADENECTOMY: NEGATIVE FOR METASTATIC CARCINOMA (0/1).   4- LYMPH NODE, 7R, LYMPHADENECTOMY: NEGATIVE FOR METASTATIC CARCINOMA (0/1).   5- LYMPH NODE, 8R, LYMPHADENECTOMY: ONE LYMPH NODE NEGATIVE FOR METASTATIC CARCINOMA (0/1).    6- LYMPH NODE, 12R, LYMPHADENECTOMY: NEGATIVE FOR METASTATIC CARCINOMA (0/1).  7- LUNG, RIGHT UPPER AND MIDDLE LOBES, PNEUMONECTOMY:  POORLY DIFFERENTIATED, G3, SQUAMOUS CELL CARCINOMA, INVASIVE.    - TUMOR SIZE: 3 CM.    - LYMPHOVASCULAR INVASION IS PRESENT.    - MARGINS NEGATIVE FOR INVASIVE CARCINOMA:    - NEAREST MARGIN, VASCULAR / BRONCHIAL 2.5 CM.    - NO PLEURAL SURFACE INVOLVEMENT     - PROMINENT NECROSIS PRESENT.  Visceral Pleura Invasion: Not identified  Number of Lymph Nodes Examined: Exact number : 6  pT Category: pT1c: pN0: No regional lymph node metastasis    3/8/2023 LYMPH NODE BIOPSY:   LYMPH NODE 4R, LYMPHADENECTOMY:  METASTATIC SQUAMOUS CELL CARCINOMA, NONKERATINIZING, MULTIPLE FRAGMENTS.       IMMUNOHISTOCHEMICAL STAINS:   CK 5/6, P63 AND CK7:  POSITIVE IN MALIGNANT CELLS.   CK20 AND TTF-1:  NEGATIVE IN MALIGNANT CELLS.         CLINICAL HISTORY:       Patient: Leonila Rosales is a 77 y.o. male kindly referred by  Dr. Diaz for evaluation of lung cancer.    On 01/17/2022 patient presented to the emergency department after a fall.  He reports that he was getting to his truck and sleep and fell on his left side on the truck step rail.  He started having pain in his left hip and knee.  He underwent a CT angiogram that showed No pulmonary embolus. Right upper lobe 2.5 cm mass.      During that hospitalization he was treated for a close intertrochanteric fracture of the left femur and underwent open reduction intramedullary nailing left comminuted intertrochanteric hip fracture on 01/18/2022 with Dr. Fortino Palomares.  He then spent several weeks on rehab.    On 03/22/2022 he  underwent CT-guided biopsy of the right lung mass.  Pathology showed invasive squamous cell carcinoma, moderately differentiated.    He is s/p right upper lobectomy with  on 5/24/2022.  Pathology report as above.  Patient is stage IA3 squamous cell carcinoma of the lung.  He has recovered well and has been doing good.     He lives alone and is able to perform ADL's. He uses a walker to aid in ambulation. He quit smoking January 2022, after smoking for 40 yrs.       Chief Complaint: No Concerns today        Interval History:    Patient presents to clinic for follow up. He was seen by Dr. Willis on 3/29/23, she recommends XRT + chemotherapy, planning to begin next week. He is scheduled for mediport placement with Dr. Diaz on 4/6/23. He is not too happy about having treatment once a week. He is requesting treatment on Fridays due to transportation. He has no complaints today, states he is feeling fine. He denies shortness of breath, chest pain. No weight loss. Denies headaches. He uses a cane for mobility.        Past Medical History:   Diagnosis Date    Anemia     Closed intertrochanteric fracture     Deficiency of macronutrients     GERD (gastroesophageal reflux disease)     H. pylori infection     History of tobacco use     Hyperlipidemia     Hypertension     Lung mass     Malignant neoplasm of unspecified part of unspecified bronchus or lung     Non-small cell lung cancer       Past Surgical History:   Procedure Laterality Date    BIOPSY OF INTESTINE  04/04/2022    BRONCHOSCOPY      COLONOSCOPY      ESOPHAGOGASTRODUODENOSCOPY  04/04/2022    HIP FRACTURE SURGERY Left 2016    Intramedullary Nail Insertion Hip Left 01/18/2022    KIDNEY SURGERY Right 05/27/2022    MEDIASTINOSCOPY N/A 3/6/2023    Procedure: MEDIASTINOSCOPY;  Surgeon: Jose Diaz MD;  Location: [facility];  Service: Cardiothoracic;  Laterality: N/A;  XX    SHOULDER SURGERY       History reviewed. No pertinent family history.  Social  Connections: Not on file       Review of patient's allergies indicates:  No Known Allergies   Current Outpatient Medications on File Prior to Visit   Medication Sig Dispense Refill    amLODIPine (NORVASC) 5 MG tablet TAKE ONE TABLET BY MOUTH TWICE DAILY 180 tablet 1    aspirin 81 mg Cap Take 81 mg by mouth Daily.      atorvastatin (LIPITOR) 10 MG tablet Take 1 tablet (10 mg total) by mouth once daily. 90 tablet 3    ferrous sulfate (FEOSOL) 325 mg (65 mg iron) Tab tablet Take 1 tablet (325 mg total) by mouth once daily. 30 tablet 3    LINZESS 145 mcg Cap capsule Take 145 mcg by mouth daily as needed.      pantoprazole (PROTONIX) 40 MG tablet Take 1 tablet (40 mg total) by mouth once daily. 30 tablet 11    traMADoL (ULTRAM) 50 mg tablet Take 1 tablet (50 mg total) by mouth every 6 (six) hours as needed for Pain. 12 tablet 0     No current facility-administered medications on file prior to visit.      Review of Systems   Constitutional:  Negative for activity change, appetite change, chills, diaphoresis, fatigue, fever and unexpected weight change.   HENT:  Negative for nasal congestion, mouth sores, nosebleeds, postnasal drip, sinus pressure/congestion, sore throat and trouble swallowing.    Eyes:  Negative for visual disturbance.   Cardiovascular:  Negative for chest pain and palpitations.        Around surgical scar   Gastrointestinal:  Negative for abdominal distention, abdominal pain, blood in stool, change in bowel habit, constipation, diarrhea, nausea, vomiting and change in bowel habit.   Endocrine: Negative.    Genitourinary:  Negative for bladder incontinence, decreased urine volume, difficulty urinating, dysuria, frequency, hematuria, scrotal swelling, testicular pain and urgency.   Musculoskeletal:  Negative for arthralgias, back pain, gait problem, joint swelling, leg pain, myalgias and neck pain.   Integumentary:  Negative for rash.   Neurological:  Negative for dizziness, tremors, seizures, syncope,  "speech difficulty, weakness, light-headedness, numbness, headaches and memory loss.   Hematological:  Does not bruise/bleed easily.   Psychiatric/Behavioral:  Negative for agitation, confusion, hallucinations, sleep disturbance and suicidal ideas. The patient is not nervous/anxious.             Vitals:    04/03/23 0838   BP: 120/73   BP Location: Left arm   Patient Position: Sitting   Pulse: 83   Temp: 98.1 °F (36.7 °C)   TempSrc: Oral   SpO2: 97%   Weight: 96.2 kg (212 lb)   Height: 5' 7" (1.702 m)       Body mass index is 33.2 kg/m².  Body surface area is 2.13 meters squared.       Physical Exam  Vitals and nursing note reviewed.   Constitutional:       General: He is not in acute distress.     Appearance: Normal appearance.   HENT:      Head: Normocephalic and atraumatic.      Mouth/Throat:      Mouth: Mucous membranes are moist.   Eyes:      General: No scleral icterus.     Extraocular Movements: Extraocular movements intact.      Conjunctiva/sclera: Conjunctivae normal.   Neck:      Vascular: No JVD.   Cardiovascular:      Rate and Rhythm: Normal rate and regular rhythm.      Heart sounds: No murmur heard.  Pulmonary:      Effort: Pulmonary effort is normal.      Breath sounds: Decreased breath sounds present. No wheezing or rhonchi.   Chest:      Chest wall: No tenderness.   Abdominal:      General: Bowel sounds are normal. There is no distension.      Palpations: Abdomen is soft.      Tenderness: There is no abdominal tenderness.   Musculoskeletal:         General: No swelling or deformity.      Cervical back: Neck supple.   Lymphadenopathy:      Cervical: No cervical adenopathy.      Upper Body:      Right upper body: No supraclavicular or axillary adenopathy.      Left upper body: No supraclavicular or axillary adenopathy.      Lower Body: No right inguinal adenopathy. No left inguinal adenopathy.   Skin:     General: Skin is warm.      Coloration: Skin is not jaundiced.      Findings: No rash. "   Neurological:      General: No focal deficit present.      Mental Status: He is alert and oriented to person, place, and time.      Motor: Weakness present.      Comments: Mobility assitssed by cane/walker   Psychiatric:         Attention and Perception: Attention normal.         Mood and Affect: Mood and affect normal.         Behavior: Behavior is cooperative.         Cognition and Memory: Cognition normal.         Judgment: Judgment normal.     ECOG SCORE    1 - Restricted in strenuous activity-ambulatory and able to carry out work of a light nature         Laboratory:  CBC with Differential:  Lab Results   Component Value Date    WBC 4.9 04/03/2023    RBC 4.77 04/03/2023    HGB 12.6 (L) 04/03/2023    HCT 40.9 (L) 04/03/2023    MCV 85.7 04/03/2023    MCH 26.4 (L) 04/03/2023    MCHC 30.8 (L) 04/03/2023    RDW 17.1 (H) 04/03/2023     04/03/2023    MPV 9.8 04/03/2023        CMP:  Sodium Level   Date Value Ref Range Status   04/03/2023 142 136 - 145 mmol/L Final     Potassium Level   Date Value Ref Range Status   04/03/2023 4.1 3.5 - 5.1 mmol/L Final     Carbon Dioxide   Date Value Ref Range Status   04/03/2023 26 23 - 31 mmol/L Final     Blood Urea Nitrogen   Date Value Ref Range Status   04/03/2023 11.7 8.4 - 25.7 mg/dL Final     Creatinine   Date Value Ref Range Status   04/03/2023 0.96 0.73 - 1.18 mg/dL Final     Calcium Level Total   Date Value Ref Range Status   04/03/2023 9.8 8.8 - 10.0 mg/dL Final     Albumin Level   Date Value Ref Range Status   04/03/2023 3.6 3.4 - 4.8 g/dL Final     Bilirubin Total   Date Value Ref Range Status   04/03/2023 0.9 <=1.5 mg/dL Final     Alkaline Phosphatase   Date Value Ref Range Status   04/03/2023 101 40 - 150 unit/L Final     Aspartate Aminotransferase   Date Value Ref Range Status   04/03/2023 22 5 - 34 unit/L Final     Alanine Aminotransferase   Date Value Ref Range Status   04/03/2023 24 0 - 55 unit/L Final     Estimated GFR-Non    Date Value Ref  Range Status   04/19/2022 >60           Assessment:       1) [id number] 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 Surveillance CT scan chest to eval stability with paratracheal enlarged lymph node which shows interval mild size increase and now measures 2.2 x 2.0 cm and previously was 1.6 x 1.5 cm.  Aortopulmonary window mildly enlarged lymph node is stable. PET CT with Hypermetabolic right paratracheal lymph node image 81 series 3 measures 25 x 20 mm with max SUV 27.0. Hypermetabolic anterior mediastinal lymph node anterior to the SVC measures 12 x 9 mm with max SUV 12.0.   Biopsy of R4 lymph node confirmed the metastatic squamous cell carcinoma. Discussed case with Dr Willis and he will be ready to start next week.     Will plan to begin chemotherapy +XRT on 4/14/23 - patient prefers Fridays due to transportation  Keep appointment for XRT  Keep scheduled appointment for mediport placement with Dr. Diaz on 4/6/23  Patient education   RTC on 4/14/23 for chemotherapy and labs: CBC, CMP - per MD, no TD visit prior, today (4/3/23) is TD visit  RTC in 2 weeks with NP for TD/labs/chemo - please schedule on Friday per patient request due to transportation    Encourage to call or message us for any questions or problems  The patient was given ample opportunity to ask questions, and to the best of my abilities, all questions answered to satisfaction; patient demonstrated understanding of what we discussed and agreeable to the plan.     CONNER NAPOLES MD      Professional Services   I, Shirley Pina LPN, acted solely as a scribe for and in the presence of Dr. Conner Napoles, who performed these services.

## 2023-04-04 ENCOUNTER — ANESTHESIA EVENT (OUTPATIENT)
Dept: SURGERY | Facility: HOSPITAL | Age: 78
End: 2023-04-04
Payer: MEDICARE

## 2023-04-05 ENCOUNTER — TELEPHONE (OUTPATIENT)
Dept: FAMILY MEDICINE | Facility: CLINIC | Age: 78
End: 2023-04-05
Payer: MEDICARE

## 2023-04-05 NOTE — TELEPHONE ENCOUNTER
No answer/ left message on 4/5/23 at 9:48 reminding patient about upcoming visit. No labs needed prior to appt.

## 2023-04-06 ENCOUNTER — HOSPITAL ENCOUNTER (OUTPATIENT)
Facility: HOSPITAL | Age: 78
Discharge: HOME | End: 2023-04-06
Attending: THORACIC SURGERY (CARDIOTHORACIC VASCULAR SURGERY) | Admitting: THORACIC SURGERY (CARDIOTHORACIC VASCULAR SURGERY)
Payer: MEDICARE

## 2023-04-06 ENCOUNTER — ANESTHESIA (OUTPATIENT)
Dept: SURGERY | Facility: HOSPITAL | Age: 78
End: 2023-04-06
Payer: MEDICARE

## 2023-04-06 DIAGNOSIS — C34.90 MALIGNANT NEOPLASM OF UNSPECIFIED PART OF UNSPECIFIED BRONCHUS OR LUNG: ICD-10-CM

## 2023-04-06 PROCEDURE — 36561 PR INSERT TUNNELED CV CATH WITH PORT: ICD-10-PCS | Mod: LT,,, | Performed by: THORACIC SURGERY (CARDIOTHORACIC VASCULAR SURGERY)

## 2023-04-06 PROCEDURE — 77001 CHG FLUOROGUIDE CNTRL VEN ACCESS,PLACE,REPLACE,REMOVE: ICD-10-PCS | Mod: 26,,, | Performed by: THORACIC SURGERY (CARDIOTHORACIC VASCULAR SURGERY)

## 2023-04-06 PROCEDURE — C1788 PORT, INDWELLING, IMP: HCPCS | Performed by: THORACIC SURGERY (CARDIOTHORACIC VASCULAR SURGERY)

## 2023-04-06 PROCEDURE — 71000016 HC POSTOP RECOV ADDL HR: Performed by: THORACIC SURGERY (CARDIOTHORACIC VASCULAR SURGERY)

## 2023-04-06 PROCEDURE — 36000707: Performed by: THORACIC SURGERY (CARDIOTHORACIC VASCULAR SURGERY)

## 2023-04-06 PROCEDURE — D9220A PRA ANESTHESIA: Mod: CRNA,,, | Performed by: NURSE ANESTHETIST, CERTIFIED REGISTERED

## 2023-04-06 PROCEDURE — 71000015 HC POSTOP RECOV 1ST HR: Performed by: THORACIC SURGERY (CARDIOTHORACIC VASCULAR SURGERY)

## 2023-04-06 PROCEDURE — 25000003 PHARM REV CODE 250: Performed by: NURSE ANESTHETIST, CERTIFIED REGISTERED

## 2023-04-06 PROCEDURE — 36561 INSERT TUNNELED CV CATH: CPT | Mod: LT,,, | Performed by: THORACIC SURGERY (CARDIOTHORACIC VASCULAR SURGERY)

## 2023-04-06 PROCEDURE — 25000003 PHARM REV CODE 250: Performed by: THORACIC SURGERY (CARDIOTHORACIC VASCULAR SURGERY)

## 2023-04-06 PROCEDURE — 77001 FLUOROGUIDE FOR VEIN DEVICE: CPT | Mod: 26,,, | Performed by: THORACIC SURGERY (CARDIOTHORACIC VASCULAR SURGERY)

## 2023-04-06 PROCEDURE — 37000009 HC ANESTHESIA EA ADD 15 MINS: Performed by: THORACIC SURGERY (CARDIOTHORACIC VASCULAR SURGERY)

## 2023-04-06 PROCEDURE — D9220A PRA ANESTHESIA: Mod: ANES,,, | Performed by: ANESTHESIOLOGY

## 2023-04-06 PROCEDURE — 63600175 PHARM REV CODE 636 W HCPCS: Performed by: THORACIC SURGERY (CARDIOTHORACIC VASCULAR SURGERY)

## 2023-04-06 PROCEDURE — D9220A PRA ANESTHESIA: ICD-10-PCS | Mod: CRNA,,, | Performed by: NURSE ANESTHETIST, CERTIFIED REGISTERED

## 2023-04-06 PROCEDURE — 36000706: Performed by: THORACIC SURGERY (CARDIOTHORACIC VASCULAR SURGERY)

## 2023-04-06 PROCEDURE — 63600175 PHARM REV CODE 636 W HCPCS: Performed by: NURSE ANESTHETIST, CERTIFIED REGISTERED

## 2023-04-06 PROCEDURE — D9220A PRA ANESTHESIA: ICD-10-PCS | Mod: ANES,,, | Performed by: ANESTHESIOLOGY

## 2023-04-06 PROCEDURE — 37000008 HC ANESTHESIA 1ST 15 MINUTES: Performed by: THORACIC SURGERY (CARDIOTHORACIC VASCULAR SURGERY)

## 2023-04-06 DEVICE — PORT POWER SLIM: Type: IMPLANTABLE DEVICE | Site: CHEST | Status: FUNCTIONAL

## 2023-04-06 RX ORDER — MIDAZOLAM HYDROCHLORIDE 1 MG/ML
INJECTION INTRAMUSCULAR; INTRAVENOUS
Status: DISCONTINUED | OUTPATIENT
Start: 2023-04-06 | End: 2023-04-06

## 2023-04-06 RX ORDER — PROPOFOL 10 MG/ML
VIAL (ML) INTRAVENOUS CONTINUOUS PRN
Status: DISCONTINUED | OUTPATIENT
Start: 2023-04-06 | End: 2023-04-06

## 2023-04-06 RX ORDER — LIDOCAINE HYDROCHLORIDE 10 MG/ML
INJECTION INFILTRATION; PERINEURAL
Status: DISCONTINUED | OUTPATIENT
Start: 2023-04-06 | End: 2023-04-06 | Stop reason: HOSPADM

## 2023-04-06 RX ORDER — LIDOCAINE HYDROCHLORIDE 20 MG/ML
INJECTION, SOLUTION EPIDURAL; INFILTRATION; INTRACAUDAL; PERINEURAL
Status: DISCONTINUED | OUTPATIENT
Start: 2023-04-06 | End: 2023-04-06

## 2023-04-06 RX ORDER — HEPARIN SODIUM 1000 [USP'U]/ML
INJECTION, SOLUTION INTRAVENOUS; SUBCUTANEOUS
Status: DISCONTINUED | OUTPATIENT
Start: 2023-04-06 | End: 2023-04-06 | Stop reason: HOSPADM

## 2023-04-06 RX ORDER — ONDANSETRON 2 MG/ML
INJECTION INTRAMUSCULAR; INTRAVENOUS
Status: DISCONTINUED | OUTPATIENT
Start: 2023-04-06 | End: 2023-04-06

## 2023-04-06 RX ADMIN — LIDOCAINE HYDROCHLORIDE 4 ML: 20 INJECTION, SOLUTION EPIDURAL; INFILTRATION; INTRACAUDAL; PERINEURAL at 07:04

## 2023-04-06 RX ADMIN — ONDANSETRON 4 MG: 2 INJECTION INTRAMUSCULAR; INTRAVENOUS at 07:04

## 2023-04-06 RX ADMIN — SODIUM CHLORIDE, SODIUM GLUCONATE, SODIUM ACETATE, POTASSIUM CHLORIDE AND MAGNESIUM CHLORIDE: 526; 502; 368; 37; 30 INJECTION, SOLUTION INTRAVENOUS at 07:04

## 2023-04-06 RX ADMIN — PROPOFOL 75 MCG/KG/MIN: 10 INJECTION, EMULSION INTRAVENOUS at 07:04

## 2023-04-06 RX ADMIN — MIDAZOLAM HYDROCHLORIDE 2 MG: 1 INJECTION, SOLUTION INTRAMUSCULAR; INTRAVENOUS at 07:04

## 2023-04-06 RX ADMIN — DEXTROSE MONOHYDRATE 1.5 G: 50 INJECTION, SOLUTION INTRAVENOUS at 07:04

## 2023-04-06 NOTE — ANESTHESIA PREPROCEDURE EVALUATION
04/06/2023  Leonila Rosales is a 77 y.o., male.  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 for Chemo    Pre-op Assessment    I have reviewed the Patient Summary Reports.     I have reviewed the Nursing Notes. I have reviewed the NPO Status.   I have reviewed the Medications.     Review of Systems  Anesthesia Hx:  No problems with previous Anesthesia    Hematology/Oncology:        Current/Recent Cancer.   Cardiovascular:   Hypertension    Hepatic/GI:   PUD, GERD, well controlled        Physical Exam  General: Well nourished, Cooperative, Alert and Oriented    Airway:  Mallampati: II   Mouth Opening: Normal  TM Distance: Normal  Tongue: Normal  Neck ROM: Normal ROM    Dental:  Edentulous        Anesthesia Plan  Type of Anesthesia, risks & benefits discussed:    Anesthesia Type: Gen Natural Airway  Intra-op Monitoring Plan: Standard ASA Monitors  Post Op Pain Control Plan: multimodal analgesia  Induction:  IV  Informed Consent: Informed consent signed with the Patient and all parties understand the risks and agree with anesthesia plan.  All questions answered. Patient consented to blood products? Yes  ASA Score: 3  Day of Surgery Review of History & Physical: H&P Update referred to the surgeon/provider.    Ready For Surgery From Anesthesia Perspective.     .

## 2023-04-06 NOTE — TRANSFER OF CARE
"Anesthesia Transfer of Care Note    Patient: Leonila Rosales    Procedure(s) Performed: Procedure(s) (LRB):  Placement, Mediport (N/A)    Patient location: OPS    Anesthesia Type: MAC    Transport from OR: Transported from OR on room air with adequate spontaneous ventilation    Post pain: adequate analgesia    Post assessment: no apparent anesthetic complications    Post vital signs: stable    Level of consciousness: awake, alert and oriented    Nausea/Vomiting: no nausea/vomiting    Complications: none    Transfer of care protocol was followed      Last vitals:   Visit Vitals  /79   Pulse 88   Temp 37 °C (98.6 °F)   Resp 18   Ht 5' 7" (1.702 m)   Wt 94 kg (207 lb 3.7 oz)   SpO2 100%   BMI 32.46 kg/m²     "

## 2023-04-06 NOTE — OP NOTE
This note has been moved to another encounter. If you have any questions, please contact HIM Chart Correction at [phone].

## 2023-04-06 NOTE — OP NOTE
[facility]                       [address] [id number]    PATIENT NAME:      ROCK FARMER   YOB: 1945  CSN:               [id number]  MRN:               [id number]  ADMIT DATE:  PHYSICIAN:         Jose Diaz MD                          OPERATIVE REPORT      DATE OF SURGERY:    04/06/2023 00:00:00    SURGEON:  Jose Diaz MD    PREOPERATIVE DIAGNOSIS:  Need for MediPort for chemotherapy.    PROCEDURE:  Placement of 6-[place] MediPort through left subclavian approach   under fluoroscopy guidance.    POSTOPERATIVE DIAGNOSIS:  MediPort in good position flushing and aspirating very   well.    ANESTHESIA:  MAC.    TECHNIQUE:  Under informed consent, the patient was taken to the OR in supine   position.  MAC anesthesia was given.  Skin over left chest was prepped and   draped in usual sterile fashion.  Local anesthesia was given in the chest.  I   gained access to the subclavian vein, followed by introduction of a wire under   fluoro guidance all the way into the IVC.  A counterincision was made and a   pocket was made.  The MediPort was sutured in the pocket.  The catheter was   tunneled next to the wire.  The catheter was cut to the appropriate length.    Dilator introducer was advanced over the wire followed by removal of the wire   and the dilator.  The catheter was placed through the introducer and the   introducer was removed.  The tip of the catheter was at the SVC, right atrial   junction.  It flushed and aspirated very well.  The wounds were irrigated and   closed in layers of absorbable sutures.  The patient tolerated the procedure   well.        ______________________________  MD FABI Nazario/SAROJ  DD:  04/06/2023  Time:  09:29AM  DT:  04/06/2023  Time:  10:02AM  Job #:  [id number]      OPERATIVE REPORT

## 2023-04-06 NOTE — PROGRESS NOTES
"Leonila Rosales is a 77 y.o. male patient.   1. Malignant neoplasm of unspecified part of unspecified bronchus or lung      Past Medical History:   Diagnosis Date    Anemia     Closed intertrochanteric fracture     Deficiency of macronutrients     GERD (gastroesophageal reflux disease)     H. pylori infection     History of tobacco use     Hyperlipidemia     Hypertension     Lung mass     Malignant neoplasm of unspecified part of unspecified bronchus or lung     Non-small cell lung cancer      No past surgical history pertinent negatives on file.  Scheduled Meds:  Continuous Infusions:  PRN Meds:    ROS  Gen - aler  CV- wnl  Pulm HPI lung CA  Hem/Onc- HPI  GI GERD, h pylori  Neuro wnl  Skin wnl   HEENT - wnl     Review of patient's allergies indicates:  No Known Allergies  There are no hospital problems to display for this patient.    Blood pressure 135/72, pulse 76, temperature 98.1 °F (36.7 °C), temperature source Oral, resp. rate 18, height 5' 7" (1.702 m), weight 94 kg (207 lb 3.7 oz), SpO2 97 %.    Subjective:  The patient returns to the clinic status post mediastinoscopy.  Final pathology came back squamous cell carcinoma.  He has been doing well.  He is here for possible placement of MediPort.      Objective:  The wounds have healed well.  He has no complaints.      Assessment & Plan:  I plan for placement of MediPort as soon as possible.  Risks and benefits have been explained to the patient he completely understands and elected to proceed  Gregg Knox PA-C  4/6/2023    "

## 2023-04-10 VITALS
OXYGEN SATURATION: 70 % | HEART RATE: 80 BPM | WEIGHT: 207.25 LBS | RESPIRATION RATE: 18 BRPM | BODY MASS INDEX: 32.53 KG/M2 | SYSTOLIC BLOOD PRESSURE: 176 MMHG | TEMPERATURE: 99 F | DIASTOLIC BLOOD PRESSURE: 100 MMHG | HEIGHT: 67 IN

## 2023-04-10 NOTE — ANESTHESIA POSTPROCEDURE EVALUATION
Anesthesia Post Evaluation    Patient: Leonila Rosales    Procedure(s) Performed: Procedure(s) (LRB):  Placement, Mediport (N/A)    Final Anesthesia Type: general      Patient location during evaluation: PACU  Patient participation: Yes- Able to Participate  Level of consciousness: awake and alert  Post-procedure vital signs: reviewed and stable  Pain management: adequate  Airway patency: patent      Anesthetic complications: no      Cardiovascular status: hemodynamically stable  Respiratory status: unassisted  Hydration status: euvolemic  Follow-up not needed.          Vitals Value Taken Time   /100 04/06/23 0900   Temp 36.7 04/10/23 0845   Pulse 80 04/06/23 0904   Resp 16 04/10/23 0845   SpO2 70 % 04/06/23 0904         No case tracking events are documented in the log.      Pain/Anastacio Score: No data recorded

## 2023-04-11 ENCOUNTER — OFFICE VISIT (OUTPATIENT)
Dept: HEMATOLOGY/ONCOLOGY | Facility: CLINIC | Age: 78
End: 2023-04-11
Payer: MEDICARE

## 2023-04-11 ENCOUNTER — CLINICAL SUPPORT (OUTPATIENT)
Dept: HEMATOLOGY/ONCOLOGY | Facility: CLINIC | Age: 78
End: 2023-04-11
Payer: MEDICARE

## 2023-04-11 VITALS
OXYGEN SATURATION: 97 % | BODY MASS INDEX: 33.55 KG/M2 | TEMPERATURE: 98 F | RESPIRATION RATE: 16 BRPM | WEIGHT: 214.19 LBS | SYSTOLIC BLOOD PRESSURE: 167 MMHG | HEART RATE: 109 BPM | DIASTOLIC BLOOD PRESSURE: 95 MMHG

## 2023-04-11 DIAGNOSIS — C34.91 NON-SMALL CELL CANCER OF RIGHT LUNG: ICD-10-CM

## 2023-04-11 PROCEDURE — 1160F RVW MEDS BY RX/DR IN RCRD: CPT | Mod: CPTII,S$GLB,, | Performed by: NURSE PRACTITIONER

## 2023-04-11 PROCEDURE — 1126F PR PAIN SEVERITY QUANTIFIED, NO PAIN PRESENT: ICD-10-PCS | Mod: CPTII,S$GLB,, | Performed by: NURSE PRACTITIONER

## 2023-04-11 PROCEDURE — 3080F PR MOST RECENT DIASTOLIC BLOOD PRESSURE >= 90 MM HG: ICD-10-PCS | Mod: CPTII,S$GLB,, | Performed by: NURSE PRACTITIONER

## 2023-04-11 PROCEDURE — 99215 PR OFFICE/OUTPT VISIT, EST, LEVL V, 40-54 MIN: ICD-10-PCS | Mod: S$GLB,,, | Performed by: NURSE PRACTITIONER

## 2023-04-11 PROCEDURE — 99999 PR PBB SHADOW E&M-EST. PATIENT-LVL IV: ICD-10-PCS | Mod: PBBFAC,,, | Performed by: NURSE PRACTITIONER

## 2023-04-11 PROCEDURE — 99999 PR PBB SHADOW E&M-EST. PATIENT-LVL I: CPT | Mod: PBBFAC,,,

## 2023-04-11 PROCEDURE — 99215 OFFICE O/P EST HI 40 MIN: CPT | Mod: S$GLB,,, | Performed by: NURSE PRACTITIONER

## 2023-04-11 PROCEDURE — 3077F SYST BP >= 140 MM HG: CPT | Mod: CPTII,S$GLB,, | Performed by: NURSE PRACTITIONER

## 2023-04-11 PROCEDURE — 99999 PR PBB SHADOW E&M-EST. PATIENT-LVL IV: CPT | Mod: PBBFAC,,, | Performed by: NURSE PRACTITIONER

## 2023-04-11 PROCEDURE — 99999 PR PBB SHADOW E&M-EST. PATIENT-LVL I: ICD-10-PCS | Mod: PBBFAC,,,

## 2023-04-11 PROCEDURE — 3077F PR MOST RECENT SYSTOLIC BLOOD PRESSURE >= 140 MM HG: ICD-10-PCS | Mod: CPTII,S$GLB,, | Performed by: NURSE PRACTITIONER

## 2023-04-11 PROCEDURE — 1159F PR MEDICATION LIST DOCUMENTED IN MEDICAL RECORD: ICD-10-PCS | Mod: CPTII,S$GLB,, | Performed by: NURSE PRACTITIONER

## 2023-04-11 PROCEDURE — 1126F AMNT PAIN NOTED NONE PRSNT: CPT | Mod: CPTII,S$GLB,, | Performed by: NURSE PRACTITIONER

## 2023-04-11 PROCEDURE — 1160F PR REVIEW ALL MEDS BY PRESCRIBER/CLIN PHARMACIST DOCUMENTED: ICD-10-PCS | Mod: CPTII,S$GLB,, | Performed by: NURSE PRACTITIONER

## 2023-04-11 PROCEDURE — 3080F DIAST BP >= 90 MM HG: CPT | Mod: CPTII,S$GLB,, | Performed by: NURSE PRACTITIONER

## 2023-04-11 PROCEDURE — 1159F MED LIST DOCD IN RCRD: CPT | Mod: CPTII,S$GLB,, | Performed by: NURSE PRACTITIONER

## 2023-04-11 RX ORDER — PROCHLORPERAZINE MALEATE 10 MG
10 TABLET ORAL EVERY 6 HOURS PRN
Qty: 30 TABLET | Refills: 3 | Status: SHIPPED | OUTPATIENT
Start: 2023-04-11 | End: 2024-04-10

## 2023-04-11 RX ORDER — LIDOCAINE AND PRILOCAINE 25; 25 MG/G; MG/G
CREAM TOPICAL
Qty: 30 G | Refills: 3 | Status: SHIPPED | OUTPATIENT
Start: 2023-04-11

## 2023-04-11 RX ORDER — ONDANSETRON HYDROCHLORIDE 8 MG/1
8 TABLET, FILM COATED ORAL EVERY 8 HOURS PRN
Qty: 30 TABLET | Refills: 2 | Status: SHIPPED | OUTPATIENT
Start: 2023-04-11 | End: 2024-04-10

## 2023-04-11 NOTE — PROGRESS NOTES
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 Right upper lobe 2.5 cm mass.  CT C 9/19/2022: Status post right partial pneumonectomy for resection of a right upper lobe lung mass with no residual recurrent mass seen. Small right-sided pleural effusion. Some lymphadenopathy in the right paratracheal region with a maximum short axis dimension of 1.6 cm.  Follow-up is recommended  CT C 1/25/2023: There is a paratracheal enlarged lymph node which shows interval mild size increase and now measures 2.2 x 2.0 cm and previously was 1.6 x 1.5 cm.  Aortopulmonary window mildly enlarged lymph node is stable.  Thoracic aorta is without aneurysmal dilatation or dissection.  Impression: 1. Operative changes of right upper lobectomy without bronchialstump soft tissue proliferation    2. No residual tumor load or metastatic nodule. 3. Paratracheal enlarged lymph node with interval size increase.  PET CT 2/9/2023:  Hypermetabolic right paratracheal lymph node image 81 series 3 measures 25 x 20 mm with max SUV 27.0.  Hypermetabolic anterior mediastinal lymph node anterior to the SVC measures 12 x 9 mm with max SUV 12.0. Impression: 1. Hypermetabolic metastatic mediastinal adenopathy.   2. No PET evidence of metastatic disease outside of the mediastinum.    Pathology:  03/22/2022 CT-guided biopsy of the right lung mass: invasive squamous cell carcinoma, moderately differentiated.  5/24/2022: Right upper lobectomy:  1- LYMPH NODE, LUMBAR RIGHT 10 LYMPHADENECTOMY: NEGATIVE FOR METASTATIC  CARCINOMA (0/1).   2- LYMPH NODE, 11R, LYMPHADENECTOMY: NEGATIVE FOR METASTATIC CARCINOMA (0/1).  3- LYMPH NODE, 9R, LYMPHADENECTOMY: NEGATIVE FOR METASTATIC CARCINOMA (0/1).   4- LYMPH NODE, 7R, LYMPHADENECTOMY: NEGATIVE FOR METASTATIC CARCINOMA (0/1).   5- LYMPH NODE, 8R, LYMPHADENECTOMY: ONE LYMPH NODE NEGATIVE FOR METASTATIC CARCINOMA (0/1).    6- LYMPH NODE, 12R, LYMPHADENECTOMY: NEGATIVE FOR METASTATIC CARCINOMA (0/1).  7- LUNG, RIGHT UPPER AND MIDDLE LOBES, PNEUMONECTOMY:  POORLY DIFFERENTIATED, G3, SQUAMOUS CELL CARCINOMA, INVASIVE.    - TUMOR SIZE: 3 CM.    - LYMPHOVASCULAR INVASION IS PRESENT.    - MARGINS NEGATIVE FOR INVASIVE CARCINOMA:    - NEAREST MARGIN, VASCULAR / BRONCHIAL 2.5 CM.    - NO PLEURAL SURFACE INVOLVEMENT     - PROMINENT NECROSIS PRESENT.  Visceral Pleura Invasion: Not identified  Number of Lymph Nodes Examined: Exact number : [id number]  pT Category: pT1c: pN0: No regional lymph node metastasis    3/8/2023 LYMPH NODE BIOPSY:   LYMPH NODE 4R, LYMPHADENECTOMY:  METASTATIC SQUAMOUS CELL CARCINOMA, NONKERATINIZING, MULTIPLE FRAGMENTS.       IMMUNOHISTOCHEMICAL STAINS:   CK 5/6, P63 AND CK7:  POSITIVE IN MALIGNANT CELLS.   CK20 AND TTF-1:  NEGATIVE IN MALIGNANT CELLS.         CLINICAL HISTORY:       Patient: Leonila Rosales is a 77 y.o. male kindly referred by  Dr. Diaz for evaluation of lung cancer.    On 01/17/2022 patient presented to the emergency department after a fall.  He reports that he was getting to his truck and sleep and fell on his left side on the truck step rail.  He started having pain in his left hip and knee.  He underwent a CT angiogram that showed No pulmonary embolus. Right upper lobe 2.5 cm mass.      During that hospitalization he was treated for a close intertrochanteric fracture of the left femur and underwent open reduction intramedullary nailing left comminuted intertrochanteric hip fracture on 01/18/2022 with Dr. Fortino Palomares.  He then spent several weeks on rehab.    On  03/22/2022 he underwent CT-guided biopsy of the right lung mass.  Pathology showed invasive squamous cell carcinoma, moderately differentiated.    He is s/p right upper lobectomy with  on 5/24/2022.  Pathology report as above.  Patient is stage IA3 squamous cell carcinoma of the lung.  He has recovered well and has been doing good.     He lives alone and is able to perform ADL's. He uses a walker to aid in ambulation. He quit smoking January 2022, after smoking for 40 yrs.       Chief Complaint: Therapy Education         Interval History:    4/11/23: Mr. Rosales presents today for therapy education, his sister joins us by phone. He reports feeling well overall. He has undergone port placement 4/6/23. He is not aware of the date and time he will start radiation and will plan to call their office today to find out. He will be starting chemo on 4/14/23.     4/3/23: Patient presents to clinic for follow up. He was seen by Dr. Willis on 3/29/23, she recommends XRT + chemotherapy, planning to begin next week. He is scheduled for mediport placement with Dr. Diaz on 4/6/23. He is not too happy about having treatment once a week. He is requesting treatment on Fridays due to transportation. He has no complaints today, states he is feeling fine. He denies shortness of breath, chest pain. No weight loss. Denies headaches. He uses a cane for mobility.        Past Medical History:   Diagnosis Date    Anemia     Closed intertrochanteric fracture     Deficiency of macronutrients     GERD (gastroesophageal reflux disease)     H. pylori infection     History of tobacco use     Hyperlipidemia     Hypertension     Lung mass     Malignant neoplasm of unspecified part of unspecified bronchus or lung     Non-small cell lung cancer       Past Surgical History:   Procedure Laterality Date    BIOPSY OF INTESTINE  04/04/2022    BRONCHOSCOPY      COLONOSCOPY      ESOPHAGOGASTRODUODENOSCOPY  04/04/2022    HIP FRACTURE SURGERY Left  2016    Intramedullary Nail Insertion Hip Left 01/18/2022    KIDNEY SURGERY Right 05/27/2022    MEDIASTINOSCOPY N/A 3/6/2023    Procedure: MEDIASTINOSCOPY;  Surgeon: Jose Diaz MD;  Location: [facility] OR;  Service: Cardiothoracic;  Laterality: N/A;  XX    PLACEMENT, MEDIPORT N/A 4/6/2023    Procedure: Placement, Mediport;  Surgeon: Jose Diaz MD;  Location: [facility] OR;  Service: Cardiology;  Laterality: N/A;    SHOULDER SURGERY       History reviewed. No pertinent family history.  Social Connections: Not on file       Review of patient's allergies indicates:  No Known Allergies   Current Outpatient Medications on File Prior to Visit   Medication Sig Dispense Refill    amLODIPine (NORVASC) 5 MG tablet TAKE ONE TABLET BY MOUTH TWICE DAILY 180 tablet 1    aspirin 81 mg Cap Take 81 mg by mouth Daily.      atorvastatin (LIPITOR) 10 MG tablet Take 1 tablet (10 mg total) by mouth once daily. 90 tablet 3    ferrous sulfate (FEOSOL) 325 mg (65 mg iron) Tab tablet Take 1 tablet (325 mg total) by mouth once daily. 30 tablet 3    LINZESS 145 mcg Cap capsule Take 145 mcg by mouth daily as needed. New medication, not started yet      pantoprazole (PROTONIX) 40 MG tablet Take 1 tablet (40 mg total) by mouth once daily. 30 tablet 11    traMADoL (ULTRAM) 50 mg tablet Take 1 tablet (50 mg total) by mouth every 6 (six) hours as needed for Pain. 12 tablet 0     No current facility-administered medications on file prior to visit.      Review of Systems   Constitutional:  Negative for activity change, appetite change, chills, diaphoresis, fatigue, fever and unexpected weight change.   HENT:  Negative for nasal congestion, mouth sores, nosebleeds, postnasal drip, sinus pressure/congestion, sore throat and trouble swallowing.    Eyes:  Negative for visual disturbance.   Cardiovascular:  Negative for chest pain and palpitations.        Around surgical scar   Gastrointestinal:  Negative for abdominal distention, abdominal pain, blood in  stool, change in bowel habit, constipation, diarrhea, nausea, vomiting and change in bowel habit.   Endocrine: Negative.    Genitourinary:  Negative for bladder incontinence, decreased urine volume, difficulty urinating, dysuria, frequency, hematuria, scrotal swelling, testicular pain and urgency.   Musculoskeletal:  Negative for arthralgias, back pain, gait problem, joint swelling, leg pain, myalgias and neck pain.   Integumentary:  Negative for rash.   Neurological:  Negative for dizziness, tremors, seizures, syncope, speech difficulty, weakness, light-headedness, numbness, headaches and memory loss.   Hematological:  Does not bruise/bleed easily.   Psychiatric/Behavioral:  Negative for agitation, confusion, hallucinations, sleep disturbance and suicidal ideas. The patient is not nervous/anxious.             Vitals:    04/11/23 0944   BP: (!) 167/95   Pulse: 109   Resp: 16   Temp: 97.6 °F (36.4 °C)   SpO2: 97%   Weight: 97.2 kg (214 lb 3.2 oz)       Body mass index is 33.55 kg/m².  Body surface area is 2.14 meters squared.       Physical Exam  Vitals and nursing note reviewed.   Constitutional:       General: He is not in acute distress.     Appearance: Normal appearance.   HENT:      Head: Normocephalic and atraumatic.      Mouth/Throat:      Mouth: Mucous membranes are moist.   Eyes:      General: No scleral icterus.     Extraocular Movements: Extraocular movements intact.      Conjunctiva/sclera: Conjunctivae normal.   Neck:      Vascular: No JVD.   Cardiovascular:      Rate and Rhythm: Normal rate and regular rhythm.      Heart sounds: No murmur heard.  Pulmonary:      Effort: Pulmonary effort is normal.      Breath sounds: Decreased breath sounds present. No wheezing or rhonchi.   Chest:      Chest wall: No tenderness.   Abdominal:      General: Bowel sounds are normal. There is no distension.      Palpations: Abdomen is soft.      Tenderness: There is no abdominal tenderness.   Musculoskeletal:          General: No swelling or deformity.      Cervical back: Neck supple.   Lymphadenopathy:      Cervical: No cervical adenopathy.      Upper Body:      Right upper body: No supraclavicular or axillary adenopathy.      Left upper body: No supraclavicular or axillary adenopathy.      Lower Body: No right inguinal adenopathy. No left inguinal adenopathy.   Skin:     General: Skin is warm.      Coloration: Skin is not jaundiced.      Findings: No rash.   Neurological:      General: No focal deficit present.      Mental Status: He is alert and oriented to person, place, and time.      Motor: Weakness present.      Comments: Mobility assitssed by cane/walker   Psychiatric:         Attention and Perception: Attention normal.         Mood and Affect: Mood and affect normal.         Behavior: Behavior is cooperative.         Cognition and Memory: Cognition normal.         Judgment: Judgment normal.         Laboratory:  CBC with Differential:  Lab Results   Component Value Date    WBC 4.9 04/03/2023    RBC 4.77 04/03/2023    HGB 12.6 (L) 04/03/2023    HCT 40.9 (L) 04/03/2023    MCV 85.7 04/03/2023    MCH 26.4 (L) 04/03/2023    MCHC 30.8 (L) 04/03/2023    RDW 17.1 (H) 04/03/2023     04/03/2023    MPV 9.8 04/03/2023        CMP:  Sodium Level   Date Value Ref Range Status   04/03/2023 142 136 - 145 mmol/L Final     Potassium Level   Date Value Ref Range Status   04/03/2023 4.1 3.5 - 5.1 mmol/L Final     Carbon Dioxide   Date Value Ref Range Status   04/03/2023 26 23 - 31 mmol/L Final     Blood Urea Nitrogen   Date Value Ref Range Status   04/03/2023 11.7 8.4 - 25.7 mg/dL Final     Creatinine   Date Value Ref Range Status   04/03/2023 0.96 0.73 - 1.18 mg/dL Final     Calcium Level Total   Date Value Ref Range Status   04/03/2023 9.8 8.8 - 10.0 mg/dL Final     Albumin Level   Date Value Ref Range Status   04/03/2023 3.6 3.4 - 4.8 g/dL Final     Bilirubin Total   Date Value Ref Range Status   04/03/2023 0.9 <=1.5 mg/dL Final      Alkaline Phosphatase   Date Value Ref Range Status   04/03/2023 101 40 - 150 unit/L Final     Aspartate Aminotransferase   Date Value Ref Range Status   04/03/2023 22 5 - 34 unit/L Final     Alanine Aminotransferase   Date Value Ref Range Status   04/03/2023 24 0 - 55 unit/L Final     Estimated GFR-Non    Date Value Ref Range Status   04/19/2022 >60           Assessment:       1) [id number] 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 Surveillance CT scan chest to eval stability with paratracheal enlarged lymph node which shows interval mild size increase and now measures 2.2 x 2.0 cm and previously was 1.6 x 1.5 cm.  Aortopulmonary window mildly enlarged lymph node is stable. PET CT with Hypermetabolic right paratracheal lymph node image 81 series 3 measures 25 x 20 mm with max SUV 27.0. Hypermetabolic anterior mediastinal lymph node anterior to the SVC measures 12 x 9 mm with max SUV 12.0.   Biopsy of R4 lymph node confirmed the metastatic squamous cell carcinoma. Discussed case with Dr Willis.     Will plan to begin chemotherapy +XRT on 4/14/23 - patient prefers Fridays due to transportation  Keep appointment for XRT  S/P mediport placement with Dr. Diaz on 4/6/23  RTC on 4/14/23 for chemotherapy and labs: CBC, CMP - per MD, no TD visit prior  RTC in 2 weeks with NP for TD/labs/chemo - please schedule on Friday per patient request due to transportation    Sent Zofran and Compazine PRN nausea.    Sent Lidocaine to apply to port 30 mins prior to chemo.       DISCUSSION:    1.  A total of 60 minutes were spent in counseling today, in which 100% were face-to-face.  At today's chemotherapy teaching session, we discussed the patient's cancer diagnosis as well as planned therapy regimen, protocol, side effects and toxicities.  A handout of  each therapeutic agent in the regimen was provided and reviewed in detail.    2.  The following side effects were discussed but not limited to:                a.  Discussed the risk of infection while on chemotherapy related to pancytopenia, specifically a decrease in their white blood cell count.  Instructed to contact our office for temperature >100.5 F, chills, sudden onset cough or shortness of breath, symptoms of a urinary tract infection.                b.  Discussed the risk of anemia. Instructed to contact our office for dizziness, heart palpitations, or extreme or sudden changes in weakness.                c.  Discussed the risk of thrombocytopenia, which increases the risk of bruising or bleeding.  Instructed the patient to contact our office for spontaneous signs of bleeding, including nose bleeds, bleeding from the gums or mouth, blood in sputum, urine or stool and unusual or excessive bruising or rash.                d.  Discussed GI side effects including weight changes, changes in appetite, altered sense of taste, stomatitis, nausea, vomiting, diarrhea, constipation, and heartburn.                e.  Discussed  side effects including painful urination, changes in the amount of urination, possible urine color changes.  Discussed fertility issues and to prevent  pregnancy if of child bearing age.                f.  Discussed neurological side effects including the risk of peripheral neuropathy, either temporary or permanent.                g.  Discussed the potential for skin, hair, and nail changes.       3.  Instructed to contact our office for discussion of medication changes, the addition of vitamin and/or herbal supplementation as they may interact with some chemotherapy agents.    4.  Discussed dietary modifications and the need to maintain adequate caloric intake and proper oral hydration.  Recommended 64 ounces of fluid per day.    5.  Discussed anti-emetic protocol and bowel regimen  protocol.    6.  Office contact information given including after hours number.  Discussed there is an oncologist on call 24/7, 365 days including weekends.  Provided primary nurse's information .    7.  In summary, the patient is in agreement with the plan of care.  Questions appeared to be answered to their satisfaction.  Chemotherapy consent was reviewed and signed.  To be copy scanned into the chart.

## 2023-04-11 NOTE — NURSING
I met with Leonila for patient education. He was alone and unsure why he was here. I explained his appointment and what to expect. He understands his diagnosis. I referred [facility] to the [facility], provided him with a gas card and encouraged him to reach out if any issues arise.

## 2023-04-12 ENCOUNTER — OFFICE VISIT (OUTPATIENT)
Dept: FAMILY MEDICINE | Facility: CLINIC | Age: 78
End: 2023-04-12
Payer: MEDICARE

## 2023-04-12 VITALS
SYSTOLIC BLOOD PRESSURE: 132 MMHG | TEMPERATURE: 98 F | HEART RATE: 77 BPM | WEIGHT: 213.63 LBS | DIASTOLIC BLOOD PRESSURE: 76 MMHG | BODY MASS INDEX: 33.53 KG/M2 | HEIGHT: 67 IN | OXYGEN SATURATION: 98 % | RESPIRATION RATE: 16 BRPM

## 2023-04-12 DIAGNOSIS — D50.9 MICROCYTIC ANEMIA: ICD-10-CM

## 2023-04-12 DIAGNOSIS — C34.90 NON-SMALL CELL LUNG CANCER, UNSPECIFIED LATERALITY: ICD-10-CM

## 2023-04-12 DIAGNOSIS — Z71.89 ADVANCED CARE PLANNING/COUNSELING DISCUSSION: ICD-10-CM

## 2023-04-12 DIAGNOSIS — Z00.00 MEDICARE ANNUAL WELLNESS VISIT, SUBSEQUENT: Primary | ICD-10-CM

## 2023-04-12 DIAGNOSIS — E78.5 HYPERLIPIDEMIA, UNSPECIFIED HYPERLIPIDEMIA TYPE: ICD-10-CM

## 2023-04-12 DIAGNOSIS — Z23 NEED FOR PNEUMOCOCCAL VACCINATION: ICD-10-CM

## 2023-04-12 DIAGNOSIS — I10 PRIMARY HYPERTENSION: ICD-10-CM

## 2023-04-12 PROBLEM — Z87.81 HISTORY OF FEMUR FRACTURE: Status: ACTIVE | Noted: 2022-06-08

## 2023-04-12 PROCEDURE — 1157F ADVNC CARE PLAN IN RCRD: CPT | Mod: CPTII,,, | Performed by: NURSE PRACTITIONER

## 2023-04-12 PROCEDURE — 3075F PR MOST RECENT SYSTOLIC BLOOD PRESS GE 130-139MM HG: ICD-10-PCS | Mod: CPTII,,, | Performed by: NURSE PRACTITIONER

## 2023-04-12 PROCEDURE — 3075F SYST BP GE 130 - 139MM HG: CPT | Mod: CPTII,,, | Performed by: NURSE PRACTITIONER

## 2023-04-12 PROCEDURE — 3078F PR MOST RECENT DIASTOLIC BLOOD PRESSURE < 80 MM HG: ICD-10-PCS | Mod: CPTII,,, | Performed by: NURSE PRACTITIONER

## 2023-04-12 PROCEDURE — 1126F AMNT PAIN NOTED NONE PRSNT: CPT | Mod: CPTII,,, | Performed by: NURSE PRACTITIONER

## 2023-04-12 PROCEDURE — 1159F MED LIST DOCD IN RCRD: CPT | Mod: CPTII,,, | Performed by: NURSE PRACTITIONER

## 2023-04-12 PROCEDURE — G0009 ADMIN PNEUMOCOCCAL VACCINE: HCPCS | Mod: ,,, | Performed by: NURSE PRACTITIONER

## 2023-04-12 PROCEDURE — 1157F PR ADVANCE CARE PLAN OR EQUIV PRESENT IN MEDICAL RECORD: ICD-10-PCS | Mod: CPTII,,, | Performed by: NURSE PRACTITIONER

## 2023-04-12 PROCEDURE — 1126F PR PAIN SEVERITY QUANTIFIED, NO PAIN PRESENT: ICD-10-PCS | Mod: CPTII,,, | Performed by: NURSE PRACTITIONER

## 2023-04-12 PROCEDURE — 3288F FALL RISK ASSESSMENT DOCD: CPT | Mod: CPTII,,, | Performed by: NURSE PRACTITIONER

## 2023-04-12 PROCEDURE — 3078F DIAST BP <80 MM HG: CPT | Mod: CPTII,,, | Performed by: NURSE PRACTITIONER

## 2023-04-12 PROCEDURE — 1160F PR REVIEW ALL MEDS BY PRESCRIBER/CLIN PHARMACIST DOCUMENTED: ICD-10-PCS | Mod: CPTII,,, | Performed by: NURSE PRACTITIONER

## 2023-04-12 PROCEDURE — 1159F PR MEDICATION LIST DOCUMENTED IN MEDICAL RECORD: ICD-10-PCS | Mod: CPTII,,, | Performed by: NURSE PRACTITIONER

## 2023-04-12 PROCEDURE — 1101F PR PT FALLS ASSESS DOC 0-1 FALLS W/OUT INJ PAST YR: ICD-10-PCS | Mod: CPTII,,, | Performed by: NURSE PRACTITIONER

## 2023-04-12 PROCEDURE — 1101F PT FALLS ASSESS-DOCD LE1/YR: CPT | Mod: CPTII,,, | Performed by: NURSE PRACTITIONER

## 2023-04-12 PROCEDURE — G0439 PPPS, SUBSEQ VISIT: HCPCS | Mod: ,,, | Performed by: NURSE PRACTITIONER

## 2023-04-12 PROCEDURE — G0439 PR MEDICARE ANNUAL WELLNESS SUBSEQUENT VISIT: ICD-10-PCS | Mod: ,,, | Performed by: NURSE PRACTITIONER

## 2023-04-12 PROCEDURE — G0009 PNEUMOCOCCAL CONJUGATE VACCINE 20-VALENT: ICD-10-PCS | Mod: ,,, | Performed by: NURSE PRACTITIONER

## 2023-04-12 PROCEDURE — 3288F PR FALLS RISK ASSESSMENT DOCUMENTED: ICD-10-PCS | Mod: CPTII,,, | Performed by: NURSE PRACTITIONER

## 2023-04-12 PROCEDURE — 1160F RVW MEDS BY RX/DR IN RCRD: CPT | Mod: CPTII,,, | Performed by: NURSE PRACTITIONER

## 2023-04-12 PROCEDURE — 90677 PNEUMOCOCCAL CONJUGATE VACCINE 20-VALENT: ICD-10-PCS | Mod: ,,, | Performed by: NURSE PRACTITIONER

## 2023-04-12 PROCEDURE — 90677 PCV20 VACCINE IM: CPT | Mod: ,,, | Performed by: NURSE PRACTITIONER

## 2023-04-12 NOTE — PROGRESS NOTES
Patient ID: [id number]     Chief Complaint: Medicare AWV      HPI:     Leonila Rosales is a 77 y.o. male here today for a Medicare Wellness. No other complaints today.       ----------------------------  Anemia  Closed intertrochanteric fracture  Deficiency of macronutrients  GERD (gastroesophageal reflux disease)  H. pylori infection  History of tobacco use  Hyperlipidemia  Hypertension  Lung mass  Malignant neoplasm of unspecified part of unspecified bronchus or lung  Non-small cell lung cancer     Past Surgical History:   Procedure Laterality Date    BIOPSY OF INTESTINE  04/04/2022    BRONCHOSCOPY      COLONOSCOPY      ESOPHAGOGASTRODUODENOSCOPY  04/04/2022    HIP FRACTURE SURGERY Left 2016    Intramedullary Nail Insertion Hip Left 01/18/2022    KIDNEY SURGERY Right 05/27/2022    MEDIASTINOSCOPY N/A 3/6/2023    Procedure: MEDIASTINOSCOPY;  Surgeon: Jose Diaz MD;  Location: [facility];  Service: Cardiothoracic;  Laterality: N/A;  XX    PLACEMENT, MEDIPORT N/A 4/6/2023    Procedure: Placement, Mediport;  Surgeon: Jose Diaz MD;  Location: [facility] OR;  Service: Cardiology;  Laterality: N/A;    SHOULDER SURGERY         Review of patient's allergies indicates:  No Known Allergies    Outpatient Medications Marked as Taking for the 4/12/23 encounter (Office Visit) with NIDIA Zavala   Medication Sig Dispense Refill    amLODIPine (NORVASC) 5 MG tablet TAKE ONE TABLET BY MOUTH TWICE DAILY 180 tablet 1    aspirin 81 mg Cap Take 81 mg by mouth Daily.      atorvastatin (LIPITOR) 10 MG tablet Take 1 tablet (10 mg total) by mouth once daily. 90 tablet 3    ferrous sulfate (FEOSOL) 325 mg (65 mg iron) Tab tablet Take 1 tablet (325 mg total) by mouth once daily. 30 tablet 3    ondansetron (ZOFRAN) 8 MG tablet Take 1 tablet (8 mg total) by mouth every 8 (eight) hours as needed for Nausea. 1st choice for nausea 30 tablet 2       Social History     Socioeconomic History    Marital status:    Tobacco Use     Smoking status: Former     Types: Cigarettes     Quit date: 2021     Years since quittin.3    Smokeless tobacco: Never   Substance and Sexual Activity    Alcohol use: Yes     Comment: rare    Drug use: Never    Sexual activity: Not Currently     Social Determinants of Health     Financial Resource Strain: Medium Risk    Difficulty of Paying Living Expenses: Somewhat hard   Food Insecurity: No Food Insecurity    Worried About Running Out of Food in the Last Year: Never true    Ran Out of Food in the Last Year: Never true   Transportation Needs: No Transportation Needs    Lack of Transportation (Medical): No    Lack of Transportation (Non-Medical): No   Physical Activity: Insufficiently Active    Days of Exercise per Week: 3 days    Minutes of Exercise per Session: 20 min   Stress: Stress Concern Present    Feeling of Stress : To some extent   Social Connections: Socially Integrated    Frequency of Communication with Friends and Family: More than three times a week    Frequency of Social Gatherings with Friends and Family: More than three times a week    Attends Religion Services: More than 4 times per year    Active Member of Clubs or Organizations: Yes    Attends Club or Organization Meetings: More than 4 times per year    Marital Status:    Housing Stability: Low Risk     Unable to Pay for Housing in the Last Year: No    Number of Places Lived in the Last Year: 1    Unstable Housing in the Last Year: No        Family History   Family history unknown: Yes        Patient Care Team:  NIDIA Zavala as PCP - General (Nurse Practitioner)       Subjective:     Review of Systems   Constitutional: Negative.    HENT: Negative.     Eyes: Negative.    Respiratory: Negative.     Cardiovascular: Negative.    Gastrointestinal: Negative.    Genitourinary: Negative.    Musculoskeletal: Negative.    Skin: Negative.    Neurological: Negative.    Endo/Heme/Allergies: Negative.    Psychiatric/Behavioral:  Negative.     All other systems reviewed and are negative.      Patient Reported Health Risk Assessment  What [age] 70-79  Are you male or female?: Male  During the past four weeks, how much have you been bothered by emotional problems such as feeling anxious, depressed, irritable, sad, or downhearted and blue?: Not at all  During the past five weeks, has your physical and/or emotional health limited your social activities with family, friends, neighbors, or groups?: Not at all  During the past four weeks, how much bodily pain have you generally had?: Very mild pain  During the past four weeks, was someone available to help if you needed and wanted help?: Yes, as much as I wanted  During the past four weeks, what was the hardest physical activity you could do for at least two minutes?: Light  Can you get to places out of walking distance without help?  (For example, can you travel alone on buses or taxis, or drive your own car?): Yes  Can you go shopping for groceries or clothes without someone's help?: Yes  Can you prepare your own meals?: Yes  Can you do your own housework without help?: Yes  Because of any health problems, do you need the help of another person with your personal care needs such as eating, bathing, dressing, or getting around the house?: No  Can you handle your own money without help?: Yes  During the past four weeks, how would you rate your health in general?: Very good  How have things been going for you during the past four weeks?: Pretty well  Are you having difficulties driving your car?: No  Do you always fasten your seat belt when you are in a car?: Yes, usually  How often in the past four weeks have you been bothered by falling or dizzy when standing up?: Never  How often in the past four weeks have you been bothered by sexual problems?: Never  How often in the past four weeks have you been bothered by trouble eating well?: Never  How often in the past four weeks have you been  "bothered by teeth or denture problems?: Sometimes  How often in the past four weeks have you been bothered with problems using the telephone?: Never  How often in the past four weeks have you been bothered by tiredness or fatigue?: Never  Have you fallen two or more times in the past year?: No  Are you afraid of falling?: Yes  Are you a smoker?: No  During the past four weeks, how many drinks of wine, beer, or other alcoholic beverages did you have?: One drink or less per week  Do you exercise for about 20 minutes three or more days a week?: Yes, most of the time  Have you been given any information to help you with hazards in your house that might hurt you?: No  Have you been given any information to help you with keeping track of your medications?: No  How often do you have trouble taking medicines the way you've been told to take them?: I always take them as prescribed  How confident are you that you can control and manage most of your health problems?: Very confident  What is your race? (Check all that apply.):     Opioid Screening: Patient medication list reviewed, patient is taking prescription opioids. Patient is not using additional opioids than prescribed. Patient is at low risk of substance abuse based on this opioid use history.      Objective:     /76 (BP Location: Left arm)   Pulse 77   Temp 98.4 °F (36.9 °C) (Oral)   Resp 16   Ht 5' 7" (1.702 m)   Wt 96.9 kg (213 lb 9.6 oz)   SpO2 98%   BMI 33.45 kg/m²     Physical Exam  Vitals and nursing note reviewed.   Constitutional:       Appearance: Normal appearance. He is normal weight.   HENT:      Head: Normocephalic and atraumatic.      Right Ear: Tympanic membrane, ear canal and external ear normal.      Left Ear: Tympanic membrane, ear canal and external ear normal.      Nose: Nose normal.      Mouth/Throat:      Mouth: Mucous membranes are moist.      Pharynx: Oropharynx is clear.   Eyes:      Extraocular Movements: " Extraocular movements intact.      Conjunctiva/sclera: Conjunctivae normal.      Pupils: Pupils are equal, round, and reactive to light.   Cardiovascular:      Rate and Rhythm: Normal rate and regular rhythm.   Pulmonary:      Effort: Pulmonary effort is normal.      Breath sounds: Decreased air movement present.   Abdominal:      General: Abdomen is flat. Bowel sounds are normal.      Palpations: Abdomen is soft.   Musculoskeletal:         General: Normal range of motion.      Cervical back: Normal range of motion and neck supple.      Comments: Ambulates with assistance of cane   Skin:     General: Skin is warm and dry.   Neurological:      General: No focal deficit present.      Mental Status: He is alert and oriented to person, place, and time.   Psychiatric:         Mood and Affect: Mood normal.         Behavior: Behavior normal.         Thought Content: Thought content normal.         Judgment: Judgment normal.         No flowsheet data found.  Fall Risk Assessment - Outpatient 4/12/2023 4/3/2023 3/22/2023 3/15/2023 2/27/2023 2/15/2023 2/13/2023   Mobility Status Ambulatory w/ assistance Ambulatory Ambulatory Ambulatory Ambulatory Ambulatory w/ assistance Ambulatory   Number of falls 0 0 0 0 0 0 0   Identified as fall risk 1 0 0 0 0 1 0           Depression Screening  Over the past two weeks, has the patient felt down, depressed, or hopeless?: No  Over the past two weeks, has the patient felt little interest or pleasure in doing things?: No  Functional Ability/Safety Screening  Was the patient's timed Up & Go test unsteady or longer than 30 seconds?: No  Does the patient need help with phone, transportation, shopping, preparing meals, housework, laundry, meds, or managing money?: No  Does the patient's home have rugs in the hallway, lack grab bars in the bathroom, lack handrails on the stairs or have poor lighting?: No  Have you noticed any hearing difficulties?: No  Cognitive Function (Assessed through direct  observation with due consideration of information obtained by way of patient reports and/or concerns raised by family, friends, caretakers, or others)    Does the patient repeat questions/statements in the same day?: No  Does the patient have trouble remembering the date, year, and time?: No  Does the patient have difficulty managing finances?: No  Assessment and Plan       ICD-10-CM ICD-9-CM   1. Medicare annual wellness visit, subsequent  Z00.00 V70.0   2. Advanced care planning/counseling discussion  Z71.89 V65.49   3. Non-small cell lung cancer, unspecified laterality  C34.90 162.9   4. Need for pneumococcal vaccination  Z23 V03.82   5. Microcytic anemia  D50.9 280.9   6. Hyperlipidemia, unspecified hyperlipidemia type  E78.5 272.4   7. Primary hypertension  I10 401.9     1. Medicare annual wellness visit, subsequent  Overview:  Medicare annual wellness visit yearly in April      2. Advanced care planning/counseling discussion  Assessment & Plan:  Healthcare power of  and Living Will completed in office today and scanned into chart.  Original given to patient.      3. Non-small cell lung cancer, unspecified laterality  Overview:  01/17/2022 - fall with closed intratrochanteric fracture of the left femur, had CT chest while in emergency department and incidentally noted was right upper lobe 2.5 cm mass.    03/22/2022 CT - guided biopsy right upper lobe mass pathology showing invasive squamous cell carcinoma    05/24/2022 - right upper lobectomy with Dr. Diaz     09/27/2022 - initial visit with Dr. Napoles - recommends H&P and CT chest every 6 months for 2-3 years then H&P and low-dose noncontrast enhanced CT annually    CT C 1/25/2023: There is a paratracheal enlarged lymph node which shows interval mild size increase and now measures 2.2 x 2.0 cm and previously was 1.6 x 1.5 cm.  Impression -  Paratracheal enlarged lymph node with interval size increase.    PET CT 2/9/2023:  Hypermetabolic right  paratracheal lymph node image 81 series 3 measures 25 x 20 mm with max SUV 27.0.  Hypermetabolic anterior mediastinal lymph node anterior to the SVC measures 12 x 9 mm with max SUV 12.0. Impression: 1. Hypermetabolic metastatic mediastinal adenopathy.   2. No PET evidence of metastatic disease outside of the mediastinum.    3/8/2023 LYMPH NODE BIOPSY:   LYMPH NODE 4R, LYMPHADENECTOMY:  METASTATIC SQUAMOUS CELL CARCINOMA, NONKERATINIZING, MULTIPLE FRAGMENTS.    04/06/2023 - mediport placement per Dr Diaz    04/11/2023 - oncology visit, plan to start chemotherapy + XRT      Assessment & Plan:  Patient has appointment to start chemotherapy Friday, he will have chemotherapy weekly for 6 weeks.  He is also to be starting radiation therapy.  Does not have an appointment until July, states that Oncology is working on getting that moved up for him.  Recommend continued follow-up with Oncology.      4. Need for pneumococcal vaccination  -     (In Office Administered) Pneumococcal Conjugate Vaccine (20 Valent) (IM)    5. Microcytic anemia  Overview:  Initially noted during hospital stay for left intratrochanteric hip fracture with ORIF. Thought to be related to acute blood loss postoperatively.  04/01/2022 - admit for acute GI bleed, consult per Dr Martins, EGD per Dr Vanessa  + H pylori, erosive gastritis, gastric antral ulcer. Recommended colonoscopy outpatient but this was pushed back due to pending right upper lobectomy.    Assessment & Plan:  Stable, recent H&H 12.6 and 40.9.  Will continue to have regular CBCs with Oncology during chemotherapy treatment.    Orders:  -     CBC Auto Differential; Future; Expected date: 10/12/2023    6. Hyperlipidemia, unspecified hyperlipidemia type  Overview:  Atorvastatin 10 mg daily    Assessment & Plan:  Stable, continue atorvastatin 10 mg daily.  Due for lipid panel in 6 months.    Orders:  -     Comprehensive Metabolic Panel; Future; Expected date: 10/12/2023  -     Lipid  Panel; Future; Expected date: 10/12/2023    7. Primary hypertension  Overview:  Amlodipine 5 mg daily    Assessment & Plan:  Stable, continue amlodipine 5 mg daily, follow-up 6 months.    Orders:  -     Comprehensive Metabolic Panel; Future; Expected date: 10/12/2023              Medicare Annual Wellness and Personalized Prevention Plan:   Fall Risk + Home Safety + Hearing Impairment + Depression Screen + Cognitive Impairment Screen + Health Risk Assessment all reviewed.       Health Maintenance Topics with due status: Not Due       Topic Last Completion Date    Lipid Panel 09/23/2022      The patient's Health Maintenance was reviewed and the following appears to be due at this time:   There are no preventive care reminders to display for this patient.      Advance Care Planning   Advanced Care Planning: I offered to discuss advanced care planning, including how to pick a person who would make decisions for you if you were unable to make them for yourself, called a health care power of , and what kind of decisions you might make such as use of life sustaining treatments such as ventilators and tube feeding when faced with a life limiting illness recorded on a living will that they will need to know. (How you want to be cared for as you near the end of your natural life).  Spent 20 minutes with the patient/family on the importance of Advanced Care Planning.        Opioid Screening: Patient medication list reviewed, patient is taking prescription opioids. Patient is not using additional opioids than prescribed. Patient is at low risk of substance abuse based on this opioid use history.     Medication List with Changes/Refills   Current Medications    AMLODIPINE (NORVASC) 5 MG TABLET    TAKE ONE TABLET BY MOUTH TWICE DAILY       Start Date: 10/31/2022End Date: --    ASPIRIN 81 MG CAP    Take 81 mg by mouth Daily.       Start Date: 3/22/2022 End Date: --    ATORVASTATIN (LIPITOR) 10 MG TABLET    Take 1 tablet  (10 mg total) by mouth once daily.       Start Date: 6/8/2022  End Date: --    FERROUS SULFATE (FEOSOL) 325 MG (65 MG IRON) TAB TABLET    Take 1 tablet (325 mg total) by mouth once daily.       Start Date: 2/13/2023 End Date: 2/13/2024    LIDOCAINE-PRILOCAINE (EMLA) CREAM    Apply topically as needed. Apply to port site 30 mins prior to chemo       Start Date: 4/11/2023 End Date: --    LINZESS 145 MCG CAP CAPSULE    Take 145 mcg by mouth daily as needed. New medication, not started yet       Start Date: 3/28/2022 End Date: --    ONDANSETRON (ZOFRAN) 8 MG TABLET    Take 1 tablet (8 mg total) by mouth every 8 (eight) hours as needed for Nausea. 1st choice for nausea       Start Date: 4/11/2023 End Date: 4/10/2024    PROCHLORPERAZINE (COMPAZINE) 10 MG TABLET    Take 1 tablet (10 mg total) by mouth every 6 (six) hours as needed (for nausea). 2nd choice, can cause drowsiness.       Start Date: 4/11/2023 End Date: 4/10/2024    TRAMADOL (ULTRAM) 50 MG TABLET    Take 1 tablet (50 mg total) by mouth every 6 (six) hours as needed for Pain.       Start Date: 3/6/2023  End Date: --   Discontinued Medications    PANTOPRAZOLE (PROTONIX) 40 MG TABLET    Take 1 tablet (40 mg total) by mouth once daily.       Start Date: 6/8/2022  End Date: 4/12/2023        Follow up in about 6 months (around 10/12/2023) for follow up. In addition to their scheduled follow up, the patient has also been instructed to follow up on as needed basis.

## 2023-04-12 NOTE — ASSESSMENT & PLAN NOTE
Patient has appointment to start chemotherapy Friday, he will have chemotherapy weekly for 6 weeks.  He is also to be starting radiation therapy.  Does not have an appointment until July, states that Oncology is working on getting that moved up for him.  Recommend continued follow-up with Oncology.

## 2023-04-12 NOTE — ASSESSMENT & PLAN NOTE
Stable, recent H&H 12.6 and 40.9.  Will continue to have regular CBCs with Oncology during chemotherapy treatment.

## 2023-04-12 NOTE — ASSESSMENT & PLAN NOTE
Healthcare power of  and Living Will completed in office today and scanned into chart.  Original given to patient.

## 2023-04-13 RX ORDER — SODIUM CHLORIDE 0.9 % (FLUSH) 0.9 %
10 SYRINGE (ML) INJECTION
Status: CANCELLED | OUTPATIENT
Start: 2023-04-14

## 2023-04-13 RX ORDER — HEPARIN 100 UNIT/ML
500 SYRINGE INTRAVENOUS
Status: CANCELLED | OUTPATIENT
Start: 2023-04-14

## 2023-04-13 RX ORDER — DIPHENHYDRAMINE HYDROCHLORIDE 50 MG/ML
50 INJECTION INTRAMUSCULAR; INTRAVENOUS ONCE AS NEEDED
Status: CANCELLED | OUTPATIENT
Start: 2023-04-14

## 2023-04-13 RX ORDER — FAMOTIDINE 10 MG/ML
20 INJECTION INTRAVENOUS
Status: CANCELLED | OUTPATIENT
Start: 2023-04-14

## 2023-04-13 RX ORDER — EPINEPHRINE 0.3 MG/.3ML
0.3 INJECTION SUBCUTANEOUS ONCE AS NEEDED
Status: CANCELLED | OUTPATIENT
Start: 2023-04-14

## 2023-04-14 ENCOUNTER — INFUSION (OUTPATIENT)
Dept: INFUSION THERAPY | Facility: HOSPITAL | Age: 78
End: 2023-04-14
Attending: INTERNAL MEDICINE
Payer: MEDICARE

## 2023-04-14 ENCOUNTER — CLINICAL SUPPORT (OUTPATIENT)
Dept: HEMATOLOGY/ONCOLOGY | Facility: CLINIC | Age: 78
End: 2023-04-14
Payer: MEDICARE

## 2023-04-14 VITALS
TEMPERATURE: 98 F | SYSTOLIC BLOOD PRESSURE: 161 MMHG | DIASTOLIC BLOOD PRESSURE: 75 MMHG | WEIGHT: 213.38 LBS | RESPIRATION RATE: 18 BRPM | BODY MASS INDEX: 33.42 KG/M2 | HEART RATE: 89 BPM | OXYGEN SATURATION: 98 %

## 2023-04-14 DIAGNOSIS — C34.90 NON-SMALL CELL LUNG CANCER, UNSPECIFIED LATERALITY: Primary | ICD-10-CM

## 2023-04-14 PROCEDURE — 96415 CHEMO IV INFUSION ADDL HR: CPT

## 2023-04-14 PROCEDURE — 63600175 PHARM REV CODE 636 W HCPCS: Performed by: INTERNAL MEDICINE

## 2023-04-14 PROCEDURE — 96413 CHEMO IV INFUSION 1 HR: CPT

## 2023-04-14 PROCEDURE — 25000003 PHARM REV CODE 250: Performed by: INTERNAL MEDICINE

## 2023-04-14 PROCEDURE — 96375 TX/PRO/DX INJ NEW DRUG ADDON: CPT

## 2023-04-14 RX ORDER — FAMOTIDINE 10 MG/ML
20 INJECTION INTRAVENOUS
Status: COMPLETED | OUTPATIENT
Start: 2023-04-14 | End: 2023-04-14

## 2023-04-14 RX ORDER — DIPHENHYDRAMINE HYDROCHLORIDE 50 MG/ML
50 INJECTION INTRAMUSCULAR; INTRAVENOUS ONCE AS NEEDED
Status: DISCONTINUED | OUTPATIENT
Start: 2023-04-14 | End: 2023-04-14 | Stop reason: HOSPADM

## 2023-04-14 RX ORDER — EPINEPHRINE 0.3 MG/.3ML
0.3 INJECTION SUBCUTANEOUS ONCE AS NEEDED
Status: DISCONTINUED | OUTPATIENT
Start: 2023-04-14 | End: 2023-04-14 | Stop reason: HOSPADM

## 2023-04-14 RX ORDER — SODIUM CHLORIDE 0.9 % (FLUSH) 0.9 %
10 SYRINGE (ML) INJECTION
Status: DISCONTINUED | OUTPATIENT
Start: 2023-04-14 | End: 2023-04-14 | Stop reason: HOSPADM

## 2023-04-14 RX ORDER — DIPHENHYDRAMINE HYDROCHLORIDE 50 MG/ML
50 INJECTION INTRAMUSCULAR; INTRAVENOUS
Status: COMPLETED | OUTPATIENT
Start: 2023-04-14 | End: 2023-04-14

## 2023-04-14 RX ORDER — HEPARIN 100 UNIT/ML
500 SYRINGE INTRAVENOUS
Status: DISCONTINUED | OUTPATIENT
Start: 2023-04-14 | End: 2023-04-14 | Stop reason: HOSPADM

## 2023-04-14 RX ADMIN — FAMOTIDINE 20 MG: 10 INJECTION, SOLUTION INTRAVENOUS at 09:04

## 2023-04-14 RX ADMIN — PALONOSETRON 0.25 MG: 0.25 INJECTION, SOLUTION INTRAVENOUS at 09:04

## 2023-04-14 RX ADMIN — PACLITAXEL 96 MG: 6 INJECTION, SOLUTION INTRAVENOUS at 10:04

## 2023-04-14 RX ADMIN — DIPHENHYDRAMINE HYDROCHLORIDE 50 MG: 50 INJECTION, SOLUTION INTRAMUSCULAR; INTRAVENOUS at 09:04

## 2023-04-14 RX ADMIN — CARBOPLATIN 230 MG: 10 INJECTION, SOLUTION INTRAVENOUS at 12:04

## 2023-04-14 RX ADMIN — HEPARIN 500 UNITS: 100 SYRINGE at 12:04

## 2023-04-14 NOTE — PROGRESS NOTES
Oncology Nutrition Evaluation      Leonila Rosales   1945    Oncology Provider:   Sanjuana Napoles MD    Reason for Visit:  New Treatment Education    Oncology/Hematology Diagnosis:   1) [id number] Stage IA3 Squamous cell carcinoma of lung s/p 5/24/2022 right upper lobectomy   2) recurrence 3/6/23    Treatment Plan:  Carbo/taxol + XRT    Nutrition Recommendations:  1. Regular diet as tolerated    Nutrition Assessment    4/14/23: This is a 77 y.o.male with a medical diagnosis of recurrent lung CA. He reports good appetite and po intake. No c/o n/v/c/d. Wt has been stable.    Nutrition Factors Affecting Intake  none identified    PMHx: GERD, H Pylori, HLD, HTN    Allergies: Patient has no known allergies.    Current Medications:    Current Outpatient Medications:     amLODIPine (NORVASC) 5 MG tablet, TAKE ONE TABLET BY MOUTH TWICE DAILY, Disp: 180 tablet, Rfl: 1    aspirin 81 mg Cap, Take 81 mg by mouth Daily., Disp: , Rfl:     atorvastatin (LIPITOR) 10 MG tablet, Take 1 tablet (10 mg total) by mouth once daily., Disp: 90 tablet, Rfl: 3    ferrous sulfate (FEOSOL) 325 mg (65 mg iron) Tab tablet, Take 1 tablet (325 mg total) by mouth once daily., Disp: 30 tablet, Rfl: 3    LIDOcaine-prilocaine (EMLA) cream, Apply topically as needed. Apply to port site 30 mins prior to chemo, Disp: 30 g, Rfl: 3    LINZESS 145 mcg Cap capsule, Take 145 mcg by mouth daily as needed. New medication, not started yet, Disp: , Rfl:     ondansetron (ZOFRAN) 8 MG tablet, Take 1 tablet (8 mg total) by mouth every 8 (eight) hours as needed for Nausea. 1st choice for nausea, Disp: 30 tablet, Rfl: 2    prochlorperazine (COMPAZINE) 10 MG tablet, Take 1 tablet (10 mg total) by mouth every 6 (six) hours as needed (for nausea). 2nd choice, can cause drowsiness., Disp: 30 tablet, Rfl: 3    traMADoL (ULTRAM) 50 mg tablet, Take 1 tablet (50 mg total) by mouth every 6 (six) hours as needed for Pain., Disp: 12 tablet, Rfl: 0  No current  "facility-administered medications for this visit.    Facility-Administered Medications Ordered in Other Visits:     alteplase injection 2 mg, 2 mg, Intra-Catheter, PRN, Sanjuana Napoles MD    diphenhydrAMINE injection 50 mg, 50 mg, Intravenous, Once PRN, Sanjuana Napoles MD    EPINEPHrine (EPIPEN) 0.3 mg/0.3 mL pen injection 0.3 mg, 0.3 mg, Intramuscular, Once PRN, Sanjuana Napoles MD    heparin, porcine (PF) 100 unit/mL injection flush 500 Units, 500 Units, Intravenous, PRN, Sanjuana Napoles MD, 500 Units at 04/14/23 1240    hydrocortisone sodium succinate injection 100 mg, 100 mg, Intravenous, Once PRN, Sanjuana Napoles MD    sodium chloride 0.9% 250 mL flush bag, , Intravenous, 1 time in Clinic/HOD, Sanjuana Napoles MD    sodium chloride 0.9% flush 10 mL, 10 mL, Intravenous, PRN, Sanjuana Napoles MD    Labs: 4/14/23 CMP not resulted    Anthropometrics    Height:   Ht Readings from Last 1 Encounters:   04/12/23 5' 7" (1.702 m)      Weight:   Wt Readings from Last 3 Encounters:   04/14/23 96.8 kg (213 lb 6.5 oz)   04/12/23 96.9 kg (213 lb 9.6 oz)   04/11/23 97.2 kg (214 lb 3.2 oz)        Usual Body Weight: 96.8 kg (213 lb)  % Weight Change: 0%    BMI: 33.3 (obese I)    Ideal Weight: 67.2 kg (148 lb)  % Ideal Weight: 144%      Nutrition Diagnosis    PES: Food and nutrition related knowledge deficit related to lung CA as evidenced by lack of prior exposure to onc nutrition. (resolved)    Nutrition Risk  low    Nutrition Intervention    Interventions(treatment strategy):  Education Provided: antimicrobial/neutropenic  Teaching Method: explanation and printed materials  Comprehension: verbalizes understanding  Barriers to Learning: none evident  Expected Compliance: good  Comments: All questions were answered and dietitian's contact information was provided.        Nutrition Monitoring and Evaluation    Ongoing monitoring not warranted at this time. Please consult MARÍA arthur.        Joanne Sepulveda MS, " MARÍA, , JAMARIN

## 2023-04-14 NOTE — PLAN OF CARE
Problem: Adult Inpatient Plan of Care  Goal: Plan of Care Review  Outcome: Met  Flowsheets (Taken 4/14/2023 1240)  Plan of Care Reviewed With: patient  Goal: Absence of Hospital-Acquired Illness or Injury  Outcome: Met  Intervention: Identify and Manage Fall Risk  Flowsheets (Taken 4/14/2023 1240)  Safety Promotion/Fall Prevention:   assistive device/personal item within reach   Fall Risk reviewed with patient/family   in recliner, wheels locked   instructed to call staff for mobility     Pt tolerated C1D1 infusion well. Next appt reviewed; pt denied questions or further needs at the time of discharge.

## 2023-04-17 NOTE — DISCHARGE SUMMARY
[facility] - Periop Services  Cardiothoracic Surgery  Discharge Summary      Patient Name: Leonila Rosales  MRN: [id number]  Admission Date: 4/6/2023  Hospital Length of Stay: 0 days  Discharge Date and Time: 4/6/2023 10:21 AM  Attending Physician: No att. providers found   Discharging Provider: Gregg Knox PA-C  Primary Care Provider: NIDIA Richards    HPI:   No notes on file    Procedure(s) (LRB):  Placement, Mediport (N/A)      Indwelling Lines/Drains at time of discharge:   Lines/Drains/Airways     Central Venous Catheter Line  Duration                PowerPort A Cath Single Lumen Atrial Left -- days              Hospital Course: No notes on file    Goals of Care Treatment Preferences:       Living Will: Yes                  Significant Diagnostic Studies:     Pending Diagnostic Studies:     None          No new Assessment & Plan notes have been filed under this hospital service since the last note was generated.  Service: Cardiothoracic Surgery    There are no hospital problems to display for this patient.     Discharged Condition: fair    Disposition: Home or Self Care    Follow Up:   Follow-up Information     Jose Diaz MD Follow up.    Specialties: Cardiothoracic Surgery, Cardiology  Contact information:  [address] [place] [address] Dr  [address]  [facility] [id number] [phone]                       Patient Instructions:   No discharge procedures on file.  Medications:  Reconciled Home Medications:      Medication List      ASK your doctor about these medications    amLODIPine 5 MG tablet  Commonly known as: NORVASC  TAKE ONE TABLET BY MOUTH TWICE DAILY     aspirin 81 mg Cap  Take 81 mg by mouth Daily.     atorvastatin 10 MG tablet  Commonly known as: LIPITOR  Take 1 tablet (10 mg total) by mouth once daily.     ferrous sulfate 325 mg (65 mg iron) Tab tablet  Commonly known as: FEOSOL  Take 1 tablet (325 mg total) by mouth once daily.     LINZESS 145 mcg Cap capsule  Generic drug:  linaCLOtide  Take 145 mcg by mouth daily as needed. New medication, not started yet     traMADoL 50 mg tablet  Commonly known as: ULTRAM  Take 1 tablet (50 mg total) by mouth every 6 (six) hours as needed for Pain.          Time spent on the discharge of patient: 30 minutes    Gregg Knox PA-C  Cardiothoracic Surgery  [facility] - Periop Services

## 2023-04-21 ENCOUNTER — OFFICE VISIT (OUTPATIENT)
Dept: HEMATOLOGY/ONCOLOGY | Facility: CLINIC | Age: 78
End: 2023-04-21
Payer: MEDICARE

## 2023-04-21 ENCOUNTER — INFUSION (OUTPATIENT)
Dept: INFUSION THERAPY | Facility: HOSPITAL | Age: 78
End: 2023-04-21
Attending: INTERNAL MEDICINE
Payer: MEDICARE

## 2023-04-21 VITALS
OXYGEN SATURATION: 97 % | WEIGHT: 212.88 LBS | HEIGHT: 67 IN | HEART RATE: 88 BPM | RESPIRATION RATE: 18 BRPM | SYSTOLIC BLOOD PRESSURE: 130 MMHG | BODY MASS INDEX: 33.41 KG/M2 | DIASTOLIC BLOOD PRESSURE: 81 MMHG | TEMPERATURE: 98 F

## 2023-04-21 DIAGNOSIS — C34.90 NON-SMALL CELL LUNG CANCER, UNSPECIFIED LATERALITY: Primary | ICD-10-CM

## 2023-04-21 DIAGNOSIS — Z51.11 CHEMOTHERAPY MANAGEMENT, ENCOUNTER FOR: ICD-10-CM

## 2023-04-21 PROCEDURE — 96413 CHEMO IV INFUSION 1 HR: CPT

## 2023-04-21 PROCEDURE — 96375 TX/PRO/DX INJ NEW DRUG ADDON: CPT

## 2023-04-21 PROCEDURE — 25000003 PHARM REV CODE 250: Performed by: NURSE PRACTITIONER

## 2023-04-21 PROCEDURE — 99999 PR PBB SHADOW E&M-EST. PATIENT-LVL III: ICD-10-PCS | Mod: PBBFAC,,, | Performed by: NURSE PRACTITIONER

## 2023-04-21 PROCEDURE — 1160F RVW MEDS BY RX/DR IN RCRD: CPT | Mod: CPTII,S$GLB,, | Performed by: NURSE PRACTITIONER

## 2023-04-21 PROCEDURE — 3075F SYST BP GE 130 - 139MM HG: CPT | Mod: CPTII,S$GLB,, | Performed by: NURSE PRACTITIONER

## 2023-04-21 PROCEDURE — 1101F PT FALLS ASSESS-DOCD LE1/YR: CPT | Mod: CPTII,S$GLB,, | Performed by: NURSE PRACTITIONER

## 2023-04-21 PROCEDURE — 1159F MED LIST DOCD IN RCRD: CPT | Mod: CPTII,S$GLB,, | Performed by: NURSE PRACTITIONER

## 2023-04-21 PROCEDURE — 3288F FALL RISK ASSESSMENT DOCD: CPT | Mod: CPTII,S$GLB,, | Performed by: NURSE PRACTITIONER

## 2023-04-21 PROCEDURE — 1126F AMNT PAIN NOTED NONE PRSNT: CPT | Mod: CPTII,S$GLB,, | Performed by: NURSE PRACTITIONER

## 2023-04-21 PROCEDURE — 1157F PR ADVANCE CARE PLAN OR EQUIV PRESENT IN MEDICAL RECORD: ICD-10-PCS | Mod: CPTII,S$GLB,, | Performed by: NURSE PRACTITIONER

## 2023-04-21 PROCEDURE — 99215 OFFICE O/P EST HI 40 MIN: CPT | Mod: S$GLB,,, | Performed by: NURSE PRACTITIONER

## 2023-04-21 PROCEDURE — 1159F PR MEDICATION LIST DOCUMENTED IN MEDICAL RECORD: ICD-10-PCS | Mod: CPTII,S$GLB,, | Performed by: NURSE PRACTITIONER

## 2023-04-21 PROCEDURE — 3075F PR MOST RECENT SYSTOLIC BLOOD PRESS GE 130-139MM HG: ICD-10-PCS | Mod: CPTII,S$GLB,, | Performed by: NURSE PRACTITIONER

## 2023-04-21 PROCEDURE — 1126F PR PAIN SEVERITY QUANTIFIED, NO PAIN PRESENT: ICD-10-PCS | Mod: CPTII,S$GLB,, | Performed by: NURSE PRACTITIONER

## 2023-04-21 PROCEDURE — 3079F DIAST BP 80-89 MM HG: CPT | Mod: CPTII,S$GLB,, | Performed by: NURSE PRACTITIONER

## 2023-04-21 PROCEDURE — 99215 PR OFFICE/OUTPT VISIT, EST, LEVL V, 40-54 MIN: ICD-10-PCS | Mod: S$GLB,,, | Performed by: NURSE PRACTITIONER

## 2023-04-21 PROCEDURE — 3079F PR MOST RECENT DIASTOLIC BLOOD PRESSURE 80-89 MM HG: ICD-10-PCS | Mod: CPTII,S$GLB,, | Performed by: NURSE PRACTITIONER

## 2023-04-21 PROCEDURE — 96417 CHEMO IV INFUS EACH ADDL SEQ: CPT

## 2023-04-21 PROCEDURE — 99999 PR PBB SHADOW E&M-EST. PATIENT-LVL III: CPT | Mod: PBBFAC,,, | Performed by: NURSE PRACTITIONER

## 2023-04-21 PROCEDURE — 63600175 PHARM REV CODE 636 W HCPCS: Performed by: NURSE PRACTITIONER

## 2023-04-21 PROCEDURE — 96367 TX/PROPH/DG ADDL SEQ IV INF: CPT

## 2023-04-21 PROCEDURE — 1160F PR REVIEW ALL MEDS BY PRESCRIBER/CLIN PHARMACIST DOCUMENTED: ICD-10-PCS | Mod: CPTII,S$GLB,, | Performed by: NURSE PRACTITIONER

## 2023-04-21 PROCEDURE — 3288F PR FALLS RISK ASSESSMENT DOCUMENTED: ICD-10-PCS | Mod: CPTII,S$GLB,, | Performed by: NURSE PRACTITIONER

## 2023-04-21 PROCEDURE — 1101F PR PT FALLS ASSESS DOC 0-1 FALLS W/OUT INJ PAST YR: ICD-10-PCS | Mod: CPTII,S$GLB,, | Performed by: NURSE PRACTITIONER

## 2023-04-21 PROCEDURE — 1157F ADVNC CARE PLAN IN RCRD: CPT | Mod: CPTII,S$GLB,, | Performed by: NURSE PRACTITIONER

## 2023-04-21 RX ORDER — EPINEPHRINE 0.3 MG/.3ML
0.3 INJECTION SUBCUTANEOUS ONCE AS NEEDED
Status: DISCONTINUED | OUTPATIENT
Start: 2023-04-21 | End: 2023-04-21 | Stop reason: HOSPADM

## 2023-04-21 RX ORDER — FAMOTIDINE 10 MG/ML
20 INJECTION INTRAVENOUS
Status: CANCELLED | OUTPATIENT
Start: 2023-04-21

## 2023-04-21 RX ORDER — EPINEPHRINE 0.3 MG/.3ML
0.3 INJECTION SUBCUTANEOUS ONCE AS NEEDED
Status: CANCELLED | OUTPATIENT
Start: 2023-04-21

## 2023-04-21 RX ORDER — SODIUM CHLORIDE 0.9 % (FLUSH) 0.9 %
10 SYRINGE (ML) INJECTION
Status: DISCONTINUED | OUTPATIENT
Start: 2023-04-21 | End: 2023-04-21 | Stop reason: HOSPADM

## 2023-04-21 RX ORDER — DIPHENHYDRAMINE HYDROCHLORIDE 50 MG/ML
50 INJECTION INTRAMUSCULAR; INTRAVENOUS
Status: COMPLETED | OUTPATIENT
Start: 2023-04-21 | End: 2023-04-21

## 2023-04-21 RX ORDER — SODIUM CHLORIDE 0.9 % (FLUSH) 0.9 %
10 SYRINGE (ML) INJECTION
Status: CANCELLED | OUTPATIENT
Start: 2023-04-21

## 2023-04-21 RX ORDER — DIPHENHYDRAMINE HYDROCHLORIDE 50 MG/ML
50 INJECTION INTRAMUSCULAR; INTRAVENOUS ONCE AS NEEDED
Status: CANCELLED | OUTPATIENT
Start: 2023-04-21

## 2023-04-21 RX ORDER — DIPHENHYDRAMINE HYDROCHLORIDE 50 MG/ML
50 INJECTION INTRAMUSCULAR; INTRAVENOUS ONCE AS NEEDED
Status: DISCONTINUED | OUTPATIENT
Start: 2023-04-21 | End: 2023-04-21 | Stop reason: HOSPADM

## 2023-04-21 RX ORDER — FAMOTIDINE 10 MG/ML
20 INJECTION INTRAVENOUS
Status: COMPLETED | OUTPATIENT
Start: 2023-04-21 | End: 2023-04-21

## 2023-04-21 RX ORDER — HEPARIN 100 UNIT/ML
500 SYRINGE INTRAVENOUS
Status: DISCONTINUED | OUTPATIENT
Start: 2023-04-21 | End: 2023-04-21 | Stop reason: HOSPADM

## 2023-04-21 RX ORDER — HEPARIN 100 UNIT/ML
500 SYRINGE INTRAVENOUS
Status: CANCELLED | OUTPATIENT
Start: 2023-04-21

## 2023-04-21 RX ORDER — DIPHENHYDRAMINE HYDROCHLORIDE 50 MG/ML
50 INJECTION INTRAMUSCULAR; INTRAVENOUS
Status: CANCELLED
Start: 2023-04-21

## 2023-04-21 RX ADMIN — FAMOTIDINE 20 MG: 10 INJECTION, SOLUTION INTRAVENOUS at 09:04

## 2023-04-21 RX ADMIN — DIPHENHYDRAMINE HYDROCHLORIDE 50 MG: 50 INJECTION, SOLUTION INTRAMUSCULAR; INTRAVENOUS at 09:04

## 2023-04-21 RX ADMIN — CARBOPLATIN 250 MG: 10 INJECTION, SOLUTION INTRAVENOUS at 11:04

## 2023-04-21 RX ADMIN — PALONOSETRON 0.25 MG: 0.25 INJECTION, SOLUTION INTRAVENOUS at 09:04

## 2023-04-21 RX ADMIN — PACLITAXEL 96 MG: 6 INJECTION, SOLUTION INTRAVENOUS at 10:04

## 2023-04-21 NOTE — PROGRESS NOTES
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 Right upper lobe 2.5 cm mass.  CT C 9/19/2022: Status post right partial pneumonectomy for resection of a right upper lobe lung mass with no residual recurrent mass seen. Small right-sided pleural effusion. Some lymphadenopathy in the right paratracheal region with a maximum short axis dimension of 1.6 cm.  Follow-up is recommended  CT C 1/25/2023: There is a paratracheal enlarged lymph node which shows interval mild size increase and now measures 2.2 x 2.0 cm and previously was 1.6 x 1.5 cm.  Aortopulmonary window mildly enlarged lymph node is stable.  Thoracic aorta is without aneurysmal dilatation or dissection.  Impression: 1. Operative changes of right upper lobectomy without bronchialstump soft tissue proliferation    2. No residual tumor load or metastatic nodule. 3. Paratracheal enlarged lymph node with interval size increase.  PET CT 2/9/2023:  Hypermetabolic right paratracheal lymph node image 81 series 3 measures 25 x 20 mm with max SUV 27.0.  Hypermetabolic anterior mediastinal lymph node anterior to the SVC measures 12 x 9 mm with max SUV 12.0. Impression: 1. Hypermetabolic metastatic mediastinal adenopathy.   2. No PET evidence of metastatic disease outside of the mediastinum.    Pathology:  03/22/2022 CT-guided biopsy of the right lung mass: invasive squamous cell carcinoma, moderately differentiated.  5/24/2022: Right upper lobectomy:  1- LYMPH NODE, LUMBAR RIGHT 10 LYMPHADENECTOMY: NEGATIVE FOR METASTATIC CARCINOMA (0/1).   2- LYMPH  NODE, 11R, LYMPHADENECTOMY: NEGATIVE FOR METASTATIC CARCINOMA (0/1).  3- LYMPH NODE, 9R, LYMPHADENECTOMY: NEGATIVE FOR METASTATIC CARCINOMA (0/1).   4- LYMPH NODE, 7R, LYMPHADENECTOMY: NEGATIVE FOR METASTATIC CARCINOMA (0/1).   5- LYMPH NODE, 8R, LYMPHADENECTOMY: ONE LYMPH NODE NEGATIVE FOR METASTATIC CARCINOMA (0/1).    6- LYMPH NODE, 12R, LYMPHADENECTOMY: NEGATIVE FOR METASTATIC CARCINOMA (0/1).  7- LUNG, RIGHT UPPER AND MIDDLE LOBES, PNEUMONECTOMY:  POORLY DIFFERENTIATED, G3, SQUAMOUS CELL CARCINOMA, INVASIVE.    - TUMOR SIZE: 3 CM.    - LYMPHOVASCULAR INVASION IS PRESENT.    - MARGINS NEGATIVE FOR INVASIVE CARCINOMA:    - NEAREST MARGIN, VASCULAR / BRONCHIAL 2.5 CM.    - NO PLEURAL SURFACE INVOLVEMENT     - PROMINENT NECROSIS PRESENT.  Visceral Pleura Invasion: Not identified  Number of Lymph Nodes Examined: Exact number : 6  pT Category: pT1c: pN0: No regional lymph node metastasis    3/8/2023 LYMPH NODE BIOPSY:   LYMPH NODE 4R, LYMPHADENECTOMY:  METASTATIC SQUAMOUS CELL CARCINOMA, NONKERATINIZING, MULTIPLE FRAGMENTS.       IMMUNOHISTOCHEMICAL STAINS:   CK 5/6, P63 AND CK7:  POSITIVE IN MALIGNANT CELLS.   CK20 AND TTF-1:  NEGATIVE IN MALIGNANT CELLS.         CLINICAL HISTORY:       Patient: Leonila Rosales is a 77 y.o. male kindly referred by  Dr. Diaz for evaluation of lung cancer.    On 01/17/2022 patient presented to the emergency department after a fall.  He reports that he was getting to his truck and sleep and fell on his left side on the truck step rail.  He started having pain in his left hip and knee.  He underwent a CT angiogram that showed No pulmonary embolus. Right upper lobe 2.5 cm mass.      During that hospitalization he was treated for a close intertrochanteric fracture of the left femur and underwent open reduction intramedullary nailing left comminuted intertrochanteric hip fracture on 01/18/2022 with Dr. Fortino Palomares.  He then spent several weeks on rehab.    On 03/22/2022 he underwent  CT-guided biopsy of the right lung mass.  Pathology showed invasive squamous cell carcinoma, moderately differentiated.    He is s/p right upper lobectomy with  on 5/24/2022.  Pathology report as above.  Patient is stage IA3 squamous cell carcinoma of the lung.  He has recovered well and has been doing good.     He lives alone and is able to perform ADL's. He uses a walker to aid in ambulation. He quit smoking January 2022, after smoking for 40 yrs.       Chief Complaint: OTHER (No concerns today.)      Interval History:    Patient presents today for two week f/u. He started weekly carbo/taxol with XRT last week. Chemo was started on 4/14/23. He due for C2 today.  He has no complaints today, states he is feeling fine. Denies any side effects. He denies shortness of breath, chest pain. No weight loss. Denies headaches. He uses a cane for mobility.        Past Medical History:   Diagnosis Date    Anemia     Closed intertrochanteric fracture     Deficiency of macronutrients     GERD (gastroesophageal reflux disease)     H. pylori infection     History of tobacco use     Hyperlipidemia     Hypertension     Lung mass     Malignant neoplasm of unspecified part of unspecified bronchus or lung     Non-small cell lung cancer       Past Surgical History:   Procedure Laterality Date    BIOPSY OF INTESTINE  04/04/2022    BRONCHOSCOPY      COLONOSCOPY      ESOPHAGOGASTRODUODENOSCOPY  04/04/2022    HIP FRACTURE SURGERY Left 2016    Intramedullary Nail Insertion Hip Left 01/18/2022    KIDNEY SURGERY Right 05/27/2022    MEDIASTINOSCOPY N/A 3/6/2023    Procedure: MEDIASTINOSCOPY;  Surgeon: Jose Diaz MD;  Location: [facility] OR;  Service: Cardiothoracic;  Laterality: N/A;  XX    PLACEMENT, MEDIPORT N/A 4/6/2023    Procedure: Placement, Mediport;  Surgeon: Jose Diaz MD;  Location: [facility] OR;  Service: Cardiology;  Laterality: N/A;    SHOULDER SURGERY       Family History   Family history unknown: Yes     Social  Connections: Socially Integrated    Frequency of Communication with Friends and Family: More than three times a week    Frequency of Social Gatherings with Friends and Family: More than three times a week    Attends Zoroastrian Services: More than 4 times per year    Active Member of Clubs or Organizations: Yes    Attends Club or Organization Meetings: More than 4 times per year    Marital Status:        Review of patient's allergies indicates:  No Known Allergies   Current Outpatient Medications on File Prior to Visit   Medication Sig Dispense Refill    amLODIPine (NORVASC) 5 MG tablet TAKE ONE TABLET BY MOUTH TWICE DAILY 180 tablet 1    aspirin 81 mg Cap Take 81 mg by mouth Daily.      atorvastatin (LIPITOR) 10 MG tablet Take 1 tablet (10 mg total) by mouth once daily. 90 tablet 3    ferrous sulfate (FEOSOL) 325 mg (65 mg iron) Tab tablet Take 1 tablet (325 mg total) by mouth once daily. 30 tablet 3    LIDOcaine-prilocaine (EMLA) cream Apply topically as needed. Apply to port site 30 mins prior to chemo 30 g 3    LINZESS 145 mcg Cap capsule Take 145 mcg by mouth daily as needed. New medication, not started yet      ondansetron (ZOFRAN) 8 MG tablet Take 1 tablet (8 mg total) by mouth every 8 (eight) hours as needed for Nausea. 1st choice for nausea 30 tablet 2    prochlorperazine (COMPAZINE) 10 MG tablet Take 1 tablet (10 mg total) by mouth every 6 (six) hours as needed (for nausea). 2nd choice, can cause drowsiness. 30 tablet 3    traMADoL (ULTRAM) 50 mg tablet Take 1 tablet (50 mg total) by mouth every 6 (six) hours as needed for Pain. 12 tablet 0     No current facility-administered medications on file prior to visit.      Review of Systems   Constitutional:  Negative for activity change, appetite change, chills, diaphoresis, fatigue, fever and unexpected weight change.   HENT:  Negative for nasal congestion, mouth sores, nosebleeds, postnasal drip, sinus pressure/congestion, sore throat and trouble  "swallowing.    Eyes:  Negative for visual disturbance.   Cardiovascular:  Negative for chest pain and palpitations.        Around surgical scar   Gastrointestinal:  Negative for abdominal distention, abdominal pain, blood in stool, change in bowel habit, constipation, diarrhea, nausea, vomiting and change in bowel habit.   Endocrine: Negative.    Genitourinary:  Negative for bladder incontinence, decreased urine volume, difficulty urinating, dysuria, frequency, hematuria, scrotal swelling, testicular pain and urgency.   Musculoskeletal:  Negative for arthralgias, back pain, gait problem, joint swelling, leg pain, myalgias and neck pain.   Integumentary:  Negative for rash.   Neurological:  Negative for dizziness, tremors, seizures, syncope, speech difficulty, weakness, light-headedness, numbness, headaches and memory loss.   Hematological:  Does not bruise/bleed easily.   Psychiatric/Behavioral:  Negative for agitation, confusion, hallucinations, sleep disturbance and suicidal ideas. The patient is not nervous/anxious.             Vitals:    04/21/23 0859   BP: 130/81   BP Location: Left arm   Patient Position: Sitting   Pulse: 88   Temp: 98 °F (36.7 °C)   TempSrc: Oral   SpO2: 97%   Weight: 96.6 kg (212 lb 14.4 oz)   Height: 5' 7" (1.702 m)       Body mass index is 33.34 kg/m².  Body surface area is 2.14 meters squared.       Physical Exam  Vitals and nursing note reviewed.   Constitutional:       General: He is not in acute distress.     Appearance: Normal appearance.   HENT:      Head: Normocephalic and atraumatic.      Mouth/Throat:      Mouth: Mucous membranes are moist.   Eyes:      General: No scleral icterus.     Extraocular Movements: Extraocular movements intact.      Conjunctiva/sclera: Conjunctivae normal.   Neck:      Vascular: No JVD.   Cardiovascular:      Rate and Rhythm: Normal rate and regular rhythm.      Heart sounds: No murmur heard.  Pulmonary:      Effort: Pulmonary effort is normal.      " Breath sounds: Decreased breath sounds present. No wheezing or rhonchi.   Chest:      Chest wall: No tenderness.   Abdominal:      General: Bowel sounds are normal. There is no distension.      Palpations: Abdomen is soft.      Tenderness: There is no abdominal tenderness.   Musculoskeletal:         General: No swelling or deformity.      Cervical back: Neck supple.   Lymphadenopathy:      Cervical: No cervical adenopathy.      Upper Body:      Right upper body: No supraclavicular or axillary adenopathy.      Left upper body: No supraclavicular or axillary adenopathy.      Lower Body: No right inguinal adenopathy. No left inguinal adenopathy.   Skin:     General: Skin is warm.      Coloration: Skin is not jaundiced.      Findings: No rash.   Neurological:      General: No focal deficit present.      Mental Status: He is alert and oriented to person, place, and time.      Motor: Weakness present.      Comments: Mobility assitssed by cane/walker   Psychiatric:         Attention and Perception: Attention normal.         Mood and Affect: Mood and affect normal.         Behavior: Behavior is cooperative.         Cognition and Memory: Cognition normal.         Judgment: Judgment normal.     ECOG SCORE    1 - Restricted in strenuous activity-ambulatory and able to carry out work of a light nature         Laboratory:  CBC with Differential:  Lab Results   Component Value Date    WBC 4.3 (L) 04/21/2023    RBC 4.75 04/21/2023    HGB 12.6 (L) 04/21/2023    HCT 41.1 (L) 04/21/2023    MCV 86.5 04/21/2023    MCH 26.5 (L) 04/21/2023    MCHC 30.7 (L) 04/21/2023    RDW 16.3 04/21/2023     04/21/2023    MPV 10.3 04/21/2023        CMP:  Sodium Level   Date Value Ref Range Status   04/21/2023 141 136 - 145 mmol/L Final     Potassium Level   Date Value Ref Range Status   04/21/2023 4.0 3.5 - 5.1 mmol/L Final     Carbon Dioxide   Date Value Ref Range Status   04/21/2023 22 (L) 23 - 31 mmol/L Final     Blood Urea Nitrogen   Date  Value Ref Range Status   04/21/2023 11.5 8.4 - 25.7 mg/dL Final     Creatinine   Date Value Ref Range Status   04/21/2023 0.85 0.73 - 1.18 mg/dL Final     Calcium Level Total   Date Value Ref Range Status   04/21/2023 10.0 8.8 - 10.0 mg/dL Final     Albumin Level   Date Value Ref Range Status   04/21/2023 3.6 3.4 - 4.8 g/dL Final     Bilirubin Total   Date Value Ref Range Status   04/21/2023 0.6 <=1.5 mg/dL Final     Alkaline Phosphatase   Date Value Ref Range Status   04/21/2023 97 40 - 150 unit/L Final     Aspartate Aminotransferase   Date Value Ref Range Status   04/21/2023 18 5 - 34 unit/L Final     Alanine Aminotransferase   Date Value Ref Range Status   04/21/2023 20 0 - 55 unit/L Final     Estimated GFR-Non    Date Value Ref Range Status   04/19/2022 >60           Assessment:       1) [id number] 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 Surveillance CT scan chest to eval stability with paratracheal enlarged lymph node which shows interval mild size increase and now measures 2.2 x 2.0 cm and previously was 1.6 x 1.5 cm.  Aortopulmonary window mildly enlarged lymph node is stable. PET CT with Hypermetabolic right paratracheal lymph node image 81 series 3 measures 25 x 20 mm with max SUV 27.0. Hypermetabolic anterior mediastinal lymph node anterior to the SVC measures 12 x 9 mm with max SUV 12.0.   Biopsy of R4 lymph node confirmed the metastatic squamous cell carcinoma. Discussed case with Dr Willis and he will be ready to start next week.     Started chemotherapy +XRT on 4/14/23 - patient prefers Fridays due to transportation  May proceed with C2 today.  Lab/Infusion only on 4/28/23 for C3  RTC in 2 weeks with for TD/labs/chemo for C4- please schedule on Friday per patient request due to transportation    Encourage to call or message  us for any questions or problems  The patient was given ample opportunity to ask questions, and to the best of my abilities, all questions answered to satisfaction; patient demonstrated understanding of what we discussed and agreeable to the plan.     EVERETT ArangoP

## 2023-04-25 ENCOUNTER — TELEPHONE (OUTPATIENT)
Dept: HEMATOLOGY/ONCOLOGY | Facility: CLINIC | Age: 78
End: 2023-04-25
Payer: MEDICARE

## 2023-04-25 NOTE — TELEPHONE ENCOUNTER
Pt caregiver called requesting oral supplement assistance [facility]. Notes that past couple days pt is not eating as well as last week. Faxed supplement order for Ensure Plus 2 cases  2months.

## 2023-04-27 DIAGNOSIS — C34.90 NON-SMALL CELL LUNG CANCER, UNSPECIFIED LATERALITY: Primary | ICD-10-CM

## 2023-04-27 DIAGNOSIS — Z51.11 CHEMOTHERAPY MANAGEMENT, ENCOUNTER FOR: ICD-10-CM

## 2023-04-28 ENCOUNTER — INFUSION (OUTPATIENT)
Dept: INFUSION THERAPY | Facility: HOSPITAL | Age: 78
End: 2023-04-28
Attending: INTERNAL MEDICINE
Payer: MEDICARE

## 2023-04-28 DIAGNOSIS — C34.90 NON-SMALL CELL LUNG CANCER, UNSPECIFIED LATERALITY: Primary | ICD-10-CM

## 2023-04-28 PROCEDURE — 96413 CHEMO IV INFUSION 1 HR: CPT

## 2023-04-28 PROCEDURE — 96417 CHEMO IV INFUS EACH ADDL SEQ: CPT

## 2023-04-28 PROCEDURE — 96367 TX/PROPH/DG ADDL SEQ IV INF: CPT

## 2023-04-28 PROCEDURE — 25000003 PHARM REV CODE 250: Performed by: NURSE PRACTITIONER

## 2023-04-28 PROCEDURE — 63600175 PHARM REV CODE 636 W HCPCS: Performed by: NURSE PRACTITIONER

## 2023-04-28 PROCEDURE — 96375 TX/PRO/DX INJ NEW DRUG ADDON: CPT

## 2023-04-28 RX ORDER — DIPHENHYDRAMINE HYDROCHLORIDE 50 MG/ML
50 INJECTION INTRAMUSCULAR; INTRAVENOUS
Status: COMPLETED | OUTPATIENT
Start: 2023-04-28 | End: 2023-04-28

## 2023-04-28 RX ORDER — HEPARIN 100 UNIT/ML
500 SYRINGE INTRAVENOUS
Status: DISCONTINUED | OUTPATIENT
Start: 2023-04-28 | End: 2023-04-28 | Stop reason: HOSPADM

## 2023-04-28 RX ORDER — DIPHENHYDRAMINE HYDROCHLORIDE 50 MG/ML
50 INJECTION INTRAMUSCULAR; INTRAVENOUS
Status: CANCELLED
Start: 2023-04-28

## 2023-04-28 RX ORDER — SODIUM CHLORIDE 0.9 % (FLUSH) 0.9 %
10 SYRINGE (ML) INJECTION
Status: CANCELLED | OUTPATIENT
Start: 2023-04-28

## 2023-04-28 RX ORDER — DIPHENHYDRAMINE HYDROCHLORIDE 50 MG/ML
50 INJECTION INTRAMUSCULAR; INTRAVENOUS ONCE AS NEEDED
Status: CANCELLED | OUTPATIENT
Start: 2023-04-28

## 2023-04-28 RX ORDER — SODIUM CHLORIDE 0.9 % (FLUSH) 0.9 %
10 SYRINGE (ML) INJECTION
Status: DISCONTINUED | OUTPATIENT
Start: 2023-04-28 | End: 2023-04-28 | Stop reason: HOSPADM

## 2023-04-28 RX ORDER — EPINEPHRINE 0.3 MG/.3ML
0.3 INJECTION SUBCUTANEOUS ONCE AS NEEDED
Status: CANCELLED | OUTPATIENT
Start: 2023-04-28

## 2023-04-28 RX ORDER — FAMOTIDINE 10 MG/ML
20 INJECTION INTRAVENOUS
Status: COMPLETED | OUTPATIENT
Start: 2023-04-28 | End: 2023-04-28

## 2023-04-28 RX ORDER — DIPHENHYDRAMINE HYDROCHLORIDE 50 MG/ML
50 INJECTION INTRAMUSCULAR; INTRAVENOUS ONCE AS NEEDED
Status: DISCONTINUED | OUTPATIENT
Start: 2023-04-28 | End: 2023-04-28 | Stop reason: HOSPADM

## 2023-04-28 RX ORDER — FAMOTIDINE 10 MG/ML
20 INJECTION INTRAVENOUS
Status: CANCELLED | OUTPATIENT
Start: 2023-04-28

## 2023-04-28 RX ORDER — EPINEPHRINE 0.3 MG/.3ML
0.3 INJECTION SUBCUTANEOUS ONCE AS NEEDED
Status: DISCONTINUED | OUTPATIENT
Start: 2023-04-28 | End: 2023-04-28 | Stop reason: HOSPADM

## 2023-04-28 RX ORDER — HEPARIN 100 UNIT/ML
500 SYRINGE INTRAVENOUS
Status: CANCELLED | OUTPATIENT
Start: 2023-04-28

## 2023-04-28 RX ADMIN — PACLITAXEL 96 MG: 6 INJECTION, SOLUTION INTRAVENOUS at 10:04

## 2023-04-28 RX ADMIN — FAMOTIDINE 20 MG: 10 INJECTION INTRAVENOUS at 10:04

## 2023-04-28 RX ADMIN — Medication 500 UNITS: at 12:04

## 2023-04-28 RX ADMIN — DIPHENHYDRAMINE HYDROCHLORIDE 50 MG: 50 INJECTION, SOLUTION INTRAMUSCULAR; INTRAVENOUS at 10:04

## 2023-04-28 RX ADMIN — PALONOSETRON 0.25 MG: 0.25 INJECTION, SOLUTION INTRAVENOUS at 10:04

## 2023-04-28 RX ADMIN — CARBOPLATIN 200 MG: 10 INJECTION, SOLUTION INTRAVENOUS at 11:04

## 2023-05-05 ENCOUNTER — TELEPHONE (OUTPATIENT)
Dept: HEMATOLOGY/ONCOLOGY | Facility: CLINIC | Age: 78
End: 2023-05-05
Payer: MEDICARE

## 2023-05-05 ENCOUNTER — OFFICE VISIT (OUTPATIENT)
Dept: HEMATOLOGY/ONCOLOGY | Facility: CLINIC | Age: 78
End: 2023-05-05
Payer: MEDICARE

## 2023-05-05 VITALS
TEMPERATURE: 98 F | SYSTOLIC BLOOD PRESSURE: 131 MMHG | WEIGHT: 209 LBS | BODY MASS INDEX: 32.8 KG/M2 | HEIGHT: 67 IN | DIASTOLIC BLOOD PRESSURE: 81 MMHG | HEART RATE: 95 BPM | OXYGEN SATURATION: 99 %

## 2023-05-05 DIAGNOSIS — Z79.899 ON ANTINEOPLASTIC CHEMOTHERAPY: ICD-10-CM

## 2023-05-05 DIAGNOSIS — C34.91 NON-SMALL CELL CANCER OF RIGHT LUNG: Primary | ICD-10-CM

## 2023-05-05 DIAGNOSIS — T45.1X5A LEUKOPENIA DUE TO ANTINEOPLASTIC CHEMOTHERAPY: ICD-10-CM

## 2023-05-05 DIAGNOSIS — D70.1 LEUKOPENIA DUE TO ANTINEOPLASTIC CHEMOTHERAPY: ICD-10-CM

## 2023-05-05 PROCEDURE — 3079F DIAST BP 80-89 MM HG: CPT | Mod: CPTII,S$GLB,, | Performed by: INTERNAL MEDICINE

## 2023-05-05 PROCEDURE — 3075F SYST BP GE 130 - 139MM HG: CPT | Mod: CPTII,S$GLB,, | Performed by: INTERNAL MEDICINE

## 2023-05-05 PROCEDURE — 1157F PR ADVANCE CARE PLAN OR EQUIV PRESENT IN MEDICAL RECORD: ICD-10-PCS | Mod: CPTII,S$GLB,, | Performed by: INTERNAL MEDICINE

## 2023-05-05 PROCEDURE — 99215 PR OFFICE/OUTPT VISIT, EST, LEVL V, 40-54 MIN: ICD-10-PCS | Mod: S$GLB,,, | Performed by: INTERNAL MEDICINE

## 2023-05-05 PROCEDURE — 3079F PR MOST RECENT DIASTOLIC BLOOD PRESSURE 80-89 MM HG: ICD-10-PCS | Mod: CPTII,S$GLB,, | Performed by: INTERNAL MEDICINE

## 2023-05-05 PROCEDURE — 99215 OFFICE O/P EST HI 40 MIN: CPT | Mod: S$GLB,,, | Performed by: INTERNAL MEDICINE

## 2023-05-05 PROCEDURE — 1126F PR PAIN SEVERITY QUANTIFIED, NO PAIN PRESENT: ICD-10-PCS | Mod: CPTII,S$GLB,, | Performed by: INTERNAL MEDICINE

## 2023-05-05 PROCEDURE — 1159F PR MEDICATION LIST DOCUMENTED IN MEDICAL RECORD: ICD-10-PCS | Mod: CPTII,S$GLB,, | Performed by: INTERNAL MEDICINE

## 2023-05-05 PROCEDURE — 1101F PR PT FALLS ASSESS DOC 0-1 FALLS W/OUT INJ PAST YR: ICD-10-PCS | Mod: CPTII,S$GLB,, | Performed by: INTERNAL MEDICINE

## 2023-05-05 PROCEDURE — 99999 PR PBB SHADOW E&M-EST. PATIENT-LVL III: CPT | Mod: PBBFAC,,, | Performed by: INTERNAL MEDICINE

## 2023-05-05 PROCEDURE — 1159F MED LIST DOCD IN RCRD: CPT | Mod: CPTII,S$GLB,, | Performed by: INTERNAL MEDICINE

## 2023-05-05 PROCEDURE — 3075F PR MOST RECENT SYSTOLIC BLOOD PRESS GE 130-139MM HG: ICD-10-PCS | Mod: CPTII,S$GLB,, | Performed by: INTERNAL MEDICINE

## 2023-05-05 PROCEDURE — 3288F FALL RISK ASSESSMENT DOCD: CPT | Mod: CPTII,S$GLB,, | Performed by: INTERNAL MEDICINE

## 2023-05-05 PROCEDURE — 1126F AMNT PAIN NOTED NONE PRSNT: CPT | Mod: CPTII,S$GLB,, | Performed by: INTERNAL MEDICINE

## 2023-05-05 PROCEDURE — 3288F PR FALLS RISK ASSESSMENT DOCUMENTED: ICD-10-PCS | Mod: CPTII,S$GLB,, | Performed by: INTERNAL MEDICINE

## 2023-05-05 PROCEDURE — 1157F ADVNC CARE PLAN IN RCRD: CPT | Mod: CPTII,S$GLB,, | Performed by: INTERNAL MEDICINE

## 2023-05-05 PROCEDURE — 1101F PT FALLS ASSESS-DOCD LE1/YR: CPT | Mod: CPTII,S$GLB,, | Performed by: INTERNAL MEDICINE

## 2023-05-05 PROCEDURE — 99999 PR PBB SHADOW E&M-EST. PATIENT-LVL III: ICD-10-PCS | Mod: PBBFAC,,, | Performed by: INTERNAL MEDICINE

## 2023-05-05 PROCEDURE — 1160F RVW MEDS BY RX/DR IN RCRD: CPT | Mod: CPTII,S$GLB,, | Performed by: INTERNAL MEDICINE

## 2023-05-05 PROCEDURE — 1160F PR REVIEW ALL MEDS BY PRESCRIBER/CLIN PHARMACIST DOCUMENTED: ICD-10-PCS | Mod: CPTII,S$GLB,, | Performed by: INTERNAL MEDICINE

## 2023-05-05 NOTE — PROGRESS NOTES
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 Right upper lobe 2.5 cm mass.  CT C 9/19/2022: Status post right partial pneumonectomy for resection of a right upper lobe lung mass with no residual recurrent mass seen. Small right-sided pleural effusion. Some lymphadenopathy in the right paratracheal region with a maximum short axis dimension of 1.6 cm.  Follow-up is recommended  CT C 1/25/2023: There is a paratracheal enlarged lymph node which shows interval mild size increase and now measures 2.2 x 2.0 cm and previously was 1.6 x 1.5 cm.  Aortopulmonary window mildly enlarged lymph node is stable.  Thoracic aorta is without aneurysmal dilatation or dissection.  Impression: 1. Operative changes of right upper lobectomy without bronchialstump soft tissue proliferation    2. No residual tumor load or metastatic nodule. 3. Paratracheal enlarged lymph node with interval size increase.  PET CT 2/9/2023:  Hypermetabolic right paratracheal lymph node image 81 series 3 measures 25 x 20 mm with max SUV 27.0.  Hypermetabolic anterior mediastinal lymph node anterior to the SVC measures 12 x 9 mm with max SUV 12.0. Impression: 1. Hypermetabolic metastatic mediastinal adenopathy.   2. No PET evidence of metastatic disease outside of the mediastinum.    Pathology:  03/22/2022 CT-guided biopsy of the right lung mass: invasive squamous cell carcinoma, moderately differentiated.  5/24/2022: Right upper lobectomy:  1- LYMPH NODE, LUMBAR RIGHT 10 LYMPHADENECTOMY: NEGATIVE FOR METASTATIC CARCINOMA (0/1).   2- LYMPH  NODE, 11R, LYMPHADENECTOMY: NEGATIVE FOR METASTATIC CARCINOMA (0/1).  3- LYMPH NODE, 9R, LYMPHADENECTOMY: NEGATIVE FOR METASTATIC CARCINOMA (0/1).   4- LYMPH NODE, 7R, LYMPHADENECTOMY: NEGATIVE FOR METASTATIC CARCINOMA (0/1).   5- LYMPH NODE, 8R, LYMPHADENECTOMY: ONE LYMPH NODE NEGATIVE FOR METASTATIC CARCINOMA (0/1).    6- LYMPH NODE, 12R, LYMPHADENECTOMY: NEGATIVE FOR METASTATIC CARCINOMA (0/1).  7- LUNG, RIGHT UPPER AND MIDDLE LOBES, PNEUMONECTOMY:  POORLY DIFFERENTIATED, G3, SQUAMOUS CELL CARCINOMA, INVASIVE.    - TUMOR SIZE: 3 CM.    - LYMPHOVASCULAR INVASION IS PRESENT.    - MARGINS NEGATIVE FOR INVASIVE CARCINOMA:    - NEAREST MARGIN, VASCULAR / BRONCHIAL 2.5 CM.    - NO PLEURAL SURFACE INVOLVEMENT     - PROMINENT NECROSIS PRESENT.  Visceral Pleura Invasion: Not identified  Number of Lymph Nodes Examined: Exact number : [id number]  pT Category: pT1c: pN0: No regional lymph node metastasis    3/8/2023 LYMPH NODE BIOPSY:   LYMPH NODE 4R, LYMPHADENECTOMY:  METASTATIC SQUAMOUS CELL CARCINOMA, NONKERATINIZING, MULTIPLE FRAGMENTS.       IMMUNOHISTOCHEMICAL STAINS:   CK 5/6, P63 AND CK7:  POSITIVE IN MALIGNANT CELLS.   CK20 AND TTF-1:  NEGATIVE IN MALIGNANT CELLS.         CLINICAL HISTORY:       Patient: Leonila Rosales is a 77 y.o. male kindly referred by  Dr. Diaz for evaluation of lung cancer.    On 01/17/2022 patient presented to the emergency department after a fall.  He reports that he was getting to his truck and sleep and fell on his left side on the truck step rail.  He started having pain in his left hip and knee.  He underwent a CT angiogram that showed No pulmonary embolus. Right upper lobe 2.5 cm mass.      During that hospitalization he was treated for a close intertrochanteric fracture of the left femur and underwent open reduction intramedullary nailing left comminuted intertrochanteric hip fracture on 01/18/2022 with Dr. Fortino Palomares.  He then spent several weeks on rehab.    On 03/22/2022 he underwent  CT-guided biopsy of the right lung mass.  Pathology showed invasive squamous cell carcinoma, moderately differentiated.    He is s/p right upper lobectomy with  on 5/24/2022.  Pathology report as above.  Patient is stage IA3 squamous cell carcinoma of the lung.  He has recovered well and has been doing good.     He lives alone and is able to perform ADL's. He uses a walker to aid in ambulation. He quit smoking January 2022, after smoking for 40 yrs.       Chief Complaint: OTHER (No concerns today. )      Interval History:    Patient presents today for two week follow up, he is with his sister.  He started weekly carbo/taxol with XRT last week. Chemo was started on 4/14/23. He due for C4 today.  He has no complaints today, states he is feeling fine. Denies any side effects. He denies shortness of breath, chest pain. No weight loss. Denies headaches. He uses a cane for mobility.  He is requesting DMV form for handicap tag.  ANC today 0.8 - will HOLD chemotherapy.      Past Medical History:   Diagnosis Date    Anemia     Closed intertrochanteric fracture     Deficiency of macronutrients     GERD (gastroesophageal reflux disease)     H. pylori infection     History of tobacco use     Hyperlipidemia     Hypertension     Lung mass     Malignant neoplasm of unspecified part of unspecified bronchus or lung     Non-small cell lung cancer       Past Surgical History:   Procedure Laterality Date    BIOPSY OF INTESTINE  04/04/2022    BRONCHOSCOPY      COLONOSCOPY      ESOPHAGOGASTRODUODENOSCOPY  04/04/2022    HIP FRACTURE SURGERY Left 2016    Intramedullary Nail Insertion Hip Left 01/18/2022    KIDNEY SURGERY Right 05/27/2022    MEDIASTINOSCOPY N/A 3/6/2023    Procedure: MEDIASTINOSCOPY;  Surgeon: Jose Diaz MD;  Location: [facility];  Service: Cardiothoracic;  Laterality: N/A;  XX    PLACEMENT, MEDIPORT N/A 4/6/2023    Procedure: Placement, Mediport;  Surgeon: Jose Diaz MD;  Location: [facility] OR;  Service:  Cardiology;  Laterality: N/A;    SHOULDER SURGERY       Family History   Family history unknown: Yes     Social Connections: Socially Integrated    Frequency of Communication with Friends and Family: More than three times a week    Frequency of Social Gatherings with Friends and Family: More than three times a week    Attends Denominational Services: More than 4 times per year    Active Member of Clubs or Organizations: Yes    Attends Club or Organization Meetings: More than 4 times per year    Marital Status:        Review of patient's allergies indicates:  No Known Allergies   Current Outpatient Medications on File Prior to Visit   Medication Sig Dispense Refill    amLODIPine (NORVASC) 5 MG tablet TAKE ONE TABLET BY MOUTH TWICE DAILY 180 tablet 1    aspirin 81 mg Cap Take 81 mg by mouth Daily.      atorvastatin (LIPITOR) 10 MG tablet Take 1 tablet (10 mg total) by mouth once daily. 90 tablet 3    ferrous sulfate (FEOSOL) 325 mg (65 mg iron) Tab tablet Take 1 tablet (325 mg total) by mouth once daily. 30 tablet 3    LIDOcaine-prilocaine (EMLA) cream Apply topically as needed. Apply to port site 30 mins prior to chemo 30 g 3    LINZESS 145 mcg Cap capsule Take 145 mcg by mouth daily as needed. New medication, not started yet      ondansetron (ZOFRAN) 8 MG tablet Take 1 tablet (8 mg total) by mouth every 8 (eight) hours as needed for Nausea. 1st choice for nausea 30 tablet 2    prochlorperazine (COMPAZINE) 10 MG tablet Take 1 tablet (10 mg total) by mouth every 6 (six) hours as needed (for nausea). 2nd choice, can cause drowsiness. 30 tablet 3    traMADoL (ULTRAM) 50 mg tablet Take 1 tablet (50 mg total) by mouth every 6 (six) hours as needed for Pain. 12 tablet 0     No current facility-administered medications on file prior to visit.      Review of Systems   Constitutional:  Positive for activity change and fatigue. Negative for appetite change, chills, diaphoresis, fever and unexpected weight change.   HENT:   "Negative for nasal congestion, mouth sores, nosebleeds, postnasal drip, sinus pressure/congestion, sore throat and trouble swallowing.    Eyes: Negative.  Negative for visual disturbance.   Respiratory:  Positive for shortness of breath. Negative for cough.    Cardiovascular:  Positive for leg swelling (mild). Negative for chest pain and palpitations.        Around surgical scar   Gastrointestinal:  Positive for abdominal distention and constipation. Negative for abdominal pain, blood in stool, change in bowel habit, diarrhea, nausea, vomiting and change in bowel habit.   Endocrine: Negative.    Genitourinary:  Negative for bladder incontinence, decreased urine volume, difficulty urinating, dysuria, frequency, hematuria, scrotal swelling, testicular pain and urgency.   Musculoskeletal:  Positive for gait problem. Negative for arthralgias, back pain, joint swelling, leg pain, myalgias and neck pain.   Integumentary:  Negative for rash.   Allergic/Immunologic: Negative.    Neurological:  Negative for dizziness, tremors, seizures, syncope, speech difficulty, weakness, light-headedness, numbness, headaches and memory loss.   Hematological:  Negative for adenopathy. Does not bruise/bleed easily.   Psychiatric/Behavioral:  Negative for agitation, confusion, hallucinations, sleep disturbance and suicidal ideas. The patient is not nervous/anxious.             Vitals:    05/05/23 1015   BP: 131/81   BP Location: Left arm   Patient Position: Sitting   Pulse: 95   Temp: 98.3 °F (36.8 °C)   TempSrc: Oral   SpO2: 99%   Weight: 94.8 kg (209 lb)   Height: 5' 7" (1.702 m)     Wt Readings from Last 6 Encounters:   05/05/23 94.8 kg (209 lb)   04/21/23 96.6 kg (212 lb 14.4 oz)   04/14/23 96.8 kg (213 lb 6.5 oz)   04/12/23 96.9 kg (213 lb 9.6 oz)   04/11/23 97.2 kg (214 lb 3.2 oz)   04/06/23 94 kg (207 lb 3.7 oz)           Body mass index is 32.73 kg/m².  Body surface area is 2.12 meters squared.       Physical Exam  Vitals and nursing " note reviewed.   Constitutional:       General: He is not in acute distress.     Appearance: Normal appearance.   HENT:      Head: Normocephalic and atraumatic.      Mouth/Throat:      Mouth: Mucous membranes are moist.   Eyes:      General: No scleral icterus.     Extraocular Movements: Extraocular movements intact.      Conjunctiva/sclera: Conjunctivae normal.   Neck:      Vascular: No JVD.   Cardiovascular:      Rate and Rhythm: Normal rate and regular rhythm.      Heart sounds: No murmur heard.  Pulmonary:      Effort: Pulmonary effort is normal.      Breath sounds: Normal breath sounds.   Chest:      Chest wall: No tenderness.   Abdominal:      General: There is no distension.      Palpations: There is no fluid wave.      Tenderness: There is no abdominal tenderness.   Musculoskeletal:         General: No swelling or deformity.      Cervical back: Neck supple.   Lymphadenopathy:      Cervical: No cervical adenopathy.      Upper Body:      Right upper body: No supraclavicular or axillary adenopathy.      Left upper body: No supraclavicular or axillary adenopathy.      Lower Body: No right inguinal adenopathy. No left inguinal adenopathy.   Skin:     General: Skin is warm.      Coloration: Skin is not jaundiced.      Findings: No rash.   Neurological:      General: No focal deficit present.      Mental Status: He is alert and oriented to person, place, and time.      Motor: Weakness present.      Comments: Mobility assitssed by cane/walker   Psychiatric:         Attention and Perception: Attention normal.         Mood and Affect: Mood and affect normal.         Behavior: Behavior is cooperative.         Cognition and Memory: Cognition normal.         Judgment: Judgment normal.     ECOG SCORE    1 - Restricted in strenuous activity-ambulatory and able to carry out work of a light nature         Laboratory:  CBC with Differential:  Lab Results   Component Value Date    WBC 1.77 (LL) 05/05/2023    RBC 4.51 (L)  05/05/2023    HGB 12.1 (L) 05/05/2023    HCT 38.9 (L) 05/05/2023    MCV 86.3 05/05/2023    MCH 26.8 (L) 05/05/2023    MCHC 31.1 (L) 05/05/2023    RDW 16.1 05/05/2023     05/05/2023    MPV 10.0 05/05/2023        CMP:  Sodium Level   Date Value Ref Range Status   05/05/2023 141 136 - 145 mmol/L Final     Potassium Level   Date Value Ref Range Status   05/05/2023 4.0 3.5 - 5.1 mmol/L Final     Carbon Dioxide   Date Value Ref Range Status   05/05/2023 24 23 - 31 mmol/L Final     Blood Urea Nitrogen   Date Value Ref Range Status   05/05/2023 12.7 8.4 - 25.7 mg/dL Final     Creatinine   Date Value Ref Range Status   05/05/2023 0.93 0.73 - 1.18 mg/dL Final     Calcium Level Total   Date Value Ref Range Status   05/05/2023 9.5 8.8 - 10.0 mg/dL Final     Albumin Level   Date Value Ref Range Status   05/05/2023 3.6 3.4 - 4.8 g/dL Final     Bilirubin Total   Date Value Ref Range Status   05/05/2023 0.5 <=1.5 mg/dL Final     Alkaline Phosphatase   Date Value Ref Range Status   05/05/2023 84 40 - 150 unit/L Final     Aspartate Aminotransferase   Date Value Ref Range Status   05/05/2023 16 5 - 34 unit/L Final     Alanine Aminotransferase   Date Value Ref Range Status   05/05/2023 16 0 - 55 unit/L Final     Estimated GFR-Non    Date Value Ref Range Status   04/19/2022 >60           Assessment:       1) [id number] 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 Surveillance CT scan chest to eval stability with paratracheal enlarged lymph node which shows interval mild size increase and now measures 2.2 x 2.0 cm and previously was 1.6 x 1.5 cm.  Aortopulmonary window mildly enlarged lymph node is stable. PET CT with Hypermetabolic right paratracheal lymph node image 81 series 3 measures 25 x 20 mm with max SUV 27.0. Hypermetabolic anterior mediastinal lymph  node anterior to the SVC measures 12 x 9 mm with max SUV 12.0.   Biopsy of R4 lymph node confirmed the metastatic squamous cell carcinoma. Discussed case with Dr Willis and he will be ready to start next week.     Started chemotherapy +XRT on 4/14/23 - patient prefers Fridays due to transportation  ANC today 0.8 - will HOLD C4 today and re-evaluate in 1 week  Lab/Infusion only on 5/12/23 for C4  RTC in 2 weeks with for TD/labs/chemo for C5- please schedule on Friday per patient request due to transportation  Neutropenic precautions given  DMV form provided     Encourage to call or message us for any questions or problems  The patient was given ample opportunity to ask questions, and to the best of my abilities, all questions answered to satisfaction; patient demonstrated understanding of what we discussed and agreeable to the plan.     CONNER FLORES MD      Professional Services   I, Shirley Pina LPN, acted solely as a scribe for and in the presence of Dr. Conner Flores, who performed these services.

## 2023-05-05 NOTE — TELEPHONE ENCOUNTER
Received message from TASHI Cherry; pt asking for more supplements from [facility]. I called and spoke to  at [facility] and pt is eligible to  2 remaining cases on his order on May 26th. I called and let pt know and he verbalized understanding.

## 2023-05-12 ENCOUNTER — INFUSION (OUTPATIENT)
Dept: INFUSION THERAPY | Facility: HOSPITAL | Age: 78
End: 2023-05-12
Attending: INTERNAL MEDICINE
Payer: MEDICARE

## 2023-05-12 VITALS
TEMPERATURE: 99 F | HEART RATE: 93 BPM | WEIGHT: 212.63 LBS | BODY MASS INDEX: 33.3 KG/M2 | OXYGEN SATURATION: 99 % | RESPIRATION RATE: 16 BRPM | SYSTOLIC BLOOD PRESSURE: 143 MMHG | DIASTOLIC BLOOD PRESSURE: 78 MMHG

## 2023-05-12 DIAGNOSIS — C34.90 NON-SMALL CELL LUNG CANCER, UNSPECIFIED LATERALITY: Primary | ICD-10-CM

## 2023-05-12 PROCEDURE — 63600175 PHARM REV CODE 636 W HCPCS: Performed by: INTERNAL MEDICINE

## 2023-05-12 PROCEDURE — 25000003 PHARM REV CODE 250: Performed by: INTERNAL MEDICINE

## 2023-05-12 PROCEDURE — 96375 TX/PRO/DX INJ NEW DRUG ADDON: CPT

## 2023-05-12 PROCEDURE — 96417 CHEMO IV INFUS EACH ADDL SEQ: CPT

## 2023-05-12 PROCEDURE — 96413 CHEMO IV INFUSION 1 HR: CPT

## 2023-05-12 RX ORDER — FAMOTIDINE 10 MG/ML
20 INJECTION INTRAVENOUS
Status: COMPLETED | OUTPATIENT
Start: 2023-05-12 | End: 2023-05-12

## 2023-05-12 RX ORDER — HEPARIN 100 UNIT/ML
500 SYRINGE INTRAVENOUS
Status: CANCELLED | OUTPATIENT
Start: 2023-05-12

## 2023-05-12 RX ORDER — FAMOTIDINE 10 MG/ML
20 INJECTION INTRAVENOUS
Status: CANCELLED | OUTPATIENT
Start: 2023-05-12

## 2023-05-12 RX ORDER — DIPHENHYDRAMINE HYDROCHLORIDE 50 MG/ML
50 INJECTION INTRAMUSCULAR; INTRAVENOUS
Status: CANCELLED
Start: 2023-05-12

## 2023-05-12 RX ORDER — EPINEPHRINE 0.3 MG/.3ML
0.3 INJECTION SUBCUTANEOUS ONCE AS NEEDED
Status: CANCELLED | OUTPATIENT
Start: 2023-05-12

## 2023-05-12 RX ORDER — SODIUM CHLORIDE 0.9 % (FLUSH) 0.9 %
10 SYRINGE (ML) INJECTION
Status: CANCELLED | OUTPATIENT
Start: 2023-05-12

## 2023-05-12 RX ORDER — HEPARIN 100 UNIT/ML
500 SYRINGE INTRAVENOUS
Status: DISCONTINUED | OUTPATIENT
Start: 2023-05-12 | End: 2023-05-12 | Stop reason: HOSPADM

## 2023-05-12 RX ORDER — EPINEPHRINE 0.3 MG/.3ML
0.3 INJECTION SUBCUTANEOUS ONCE AS NEEDED
Status: DISCONTINUED | OUTPATIENT
Start: 2023-05-12 | End: 2023-05-12 | Stop reason: HOSPADM

## 2023-05-12 RX ORDER — DIPHENHYDRAMINE HYDROCHLORIDE 50 MG/ML
50 INJECTION INTRAMUSCULAR; INTRAVENOUS ONCE AS NEEDED
Status: CANCELLED | OUTPATIENT
Start: 2023-05-12

## 2023-05-12 RX ORDER — SODIUM CHLORIDE 0.9 % (FLUSH) 0.9 %
10 SYRINGE (ML) INJECTION
Status: DISCONTINUED | OUTPATIENT
Start: 2023-05-12 | End: 2023-05-12 | Stop reason: HOSPADM

## 2023-05-12 RX ORDER — DIPHENHYDRAMINE HYDROCHLORIDE 50 MG/ML
50 INJECTION INTRAMUSCULAR; INTRAVENOUS
Status: COMPLETED | OUTPATIENT
Start: 2023-05-12 | End: 2023-05-12

## 2023-05-12 RX ORDER — DIPHENHYDRAMINE HYDROCHLORIDE 50 MG/ML
50 INJECTION INTRAMUSCULAR; INTRAVENOUS ONCE AS NEEDED
Status: DISCONTINUED | OUTPATIENT
Start: 2023-05-12 | End: 2023-05-12 | Stop reason: HOSPADM

## 2023-05-12 RX ADMIN — FAMOTIDINE 20 MG: 10 INJECTION INTRAVENOUS at 09:05

## 2023-05-12 RX ADMIN — SODIUM CHLORIDE: 9 INJECTION, SOLUTION INTRAVENOUS at 09:05

## 2023-05-12 RX ADMIN — CARBOPLATIN 200 MG: 10 INJECTION, SOLUTION INTRAVENOUS at 11:05

## 2023-05-12 RX ADMIN — PALONOSETRON 0.25 MG: 0.25 INJECTION, SOLUTION INTRAVENOUS at 09:05

## 2023-05-12 RX ADMIN — DIPHENHYDRAMINE HYDROCHLORIDE 50 MG: 50 INJECTION INTRAMUSCULAR; INTRAVENOUS at 09:05

## 2023-05-12 RX ADMIN — Medication 500 UNITS: at 11:05

## 2023-05-12 RX ADMIN — PACLITAXEL 96 MG: 6 INJECTION, SOLUTION INTRAVENOUS at 09:05

## 2023-05-12 NOTE — NURSING
Pt tolerated treatment with no complaints. Patton needle removed from mediport. Site without signs and symptoms of complications. Dressing applied.

## 2023-05-19 ENCOUNTER — INFUSION (OUTPATIENT)
Dept: INFUSION THERAPY | Facility: HOSPITAL | Age: 78
End: 2023-05-19
Attending: INTERNAL MEDICINE
Payer: MEDICARE

## 2023-05-19 ENCOUNTER — OFFICE VISIT (OUTPATIENT)
Dept: HEMATOLOGY/ONCOLOGY | Facility: CLINIC | Age: 78
End: 2023-05-19
Payer: MEDICARE

## 2023-05-19 VITALS
WEIGHT: 213.63 LBS | OXYGEN SATURATION: 98 % | HEIGHT: 67 IN | HEART RATE: 101 BPM | SYSTOLIC BLOOD PRESSURE: 131 MMHG | BODY MASS INDEX: 33.53 KG/M2 | TEMPERATURE: 98 F | DIASTOLIC BLOOD PRESSURE: 78 MMHG

## 2023-05-19 DIAGNOSIS — C34.90 NON-SMALL CELL LUNG CANCER, UNSPECIFIED LATERALITY: Primary | ICD-10-CM

## 2023-05-19 DIAGNOSIS — Z79.899 ON ANTINEOPLASTIC CHEMOTHERAPY: ICD-10-CM

## 2023-05-19 PROCEDURE — 99213 OFFICE O/P EST LOW 20 MIN: CPT | Mod: 25 | Performed by: INTERNAL MEDICINE

## 2023-05-19 PROCEDURE — 3075F SYST BP GE 130 - 139MM HG: CPT | Mod: CPTII,,, | Performed by: INTERNAL MEDICINE

## 2023-05-19 PROCEDURE — 99999 PR PBB SHADOW E&M-EST. PATIENT-LVL III: ICD-10-PCS | Mod: PBBFAC,,, | Performed by: INTERNAL MEDICINE

## 2023-05-19 PROCEDURE — 3288F FALL RISK ASSESSMENT DOCD: CPT | Mod: CPTII,,, | Performed by: INTERNAL MEDICINE

## 2023-05-19 PROCEDURE — 3288F PR FALLS RISK ASSESSMENT DOCUMENTED: ICD-10-PCS | Mod: CPTII,,, | Performed by: INTERNAL MEDICINE

## 2023-05-19 PROCEDURE — 25000003 PHARM REV CODE 250: Performed by: INTERNAL MEDICINE

## 2023-05-19 PROCEDURE — 96417 CHEMO IV INFUS EACH ADDL SEQ: CPT

## 2023-05-19 PROCEDURE — 96375 TX/PRO/DX INJ NEW DRUG ADDON: CPT

## 2023-05-19 PROCEDURE — 1101F PT FALLS ASSESS-DOCD LE1/YR: CPT | Mod: CPTII,,, | Performed by: INTERNAL MEDICINE

## 2023-05-19 PROCEDURE — A4216 STERILE WATER/SALINE, 10 ML: HCPCS | Performed by: INTERNAL MEDICINE

## 2023-05-19 PROCEDURE — 1126F PR PAIN SEVERITY QUANTIFIED, NO PAIN PRESENT: ICD-10-PCS | Mod: CPTII,,, | Performed by: INTERNAL MEDICINE

## 2023-05-19 PROCEDURE — 99215 PR OFFICE/OUTPT VISIT, EST, LEVL V, 40-54 MIN: ICD-10-PCS | Mod: ,,, | Performed by: INTERNAL MEDICINE

## 2023-05-19 PROCEDURE — 63600175 PHARM REV CODE 636 W HCPCS: Performed by: INTERNAL MEDICINE

## 2023-05-19 PROCEDURE — 3075F PR MOST RECENT SYSTOLIC BLOOD PRESS GE 130-139MM HG: ICD-10-PCS | Mod: CPTII,,, | Performed by: INTERNAL MEDICINE

## 2023-05-19 PROCEDURE — 1157F ADVNC CARE PLAN IN RCRD: CPT | Mod: CPTII,,, | Performed by: INTERNAL MEDICINE

## 2023-05-19 PROCEDURE — 3078F PR MOST RECENT DIASTOLIC BLOOD PRESSURE < 80 MM HG: ICD-10-PCS | Mod: CPTII,,, | Performed by: INTERNAL MEDICINE

## 2023-05-19 PROCEDURE — 99215 OFFICE O/P EST HI 40 MIN: CPT | Mod: ,,, | Performed by: INTERNAL MEDICINE

## 2023-05-19 PROCEDURE — 3078F DIAST BP <80 MM HG: CPT | Mod: CPTII,,, | Performed by: INTERNAL MEDICINE

## 2023-05-19 PROCEDURE — 96413 CHEMO IV INFUSION 1 HR: CPT

## 2023-05-19 PROCEDURE — 96367 TX/PROPH/DG ADDL SEQ IV INF: CPT

## 2023-05-19 PROCEDURE — 1126F AMNT PAIN NOTED NONE PRSNT: CPT | Mod: CPTII,,, | Performed by: INTERNAL MEDICINE

## 2023-05-19 PROCEDURE — 1101F PR PT FALLS ASSESS DOC 0-1 FALLS W/OUT INJ PAST YR: ICD-10-PCS | Mod: CPTII,,, | Performed by: INTERNAL MEDICINE

## 2023-05-19 PROCEDURE — 1159F PR MEDICATION LIST DOCUMENTED IN MEDICAL RECORD: ICD-10-PCS | Mod: CPTII,,, | Performed by: INTERNAL MEDICINE

## 2023-05-19 PROCEDURE — 1157F PR ADVANCE CARE PLAN OR EQUIV PRESENT IN MEDICAL RECORD: ICD-10-PCS | Mod: CPTII,,, | Performed by: INTERNAL MEDICINE

## 2023-05-19 PROCEDURE — 1159F MED LIST DOCD IN RCRD: CPT | Mod: CPTII,,, | Performed by: INTERNAL MEDICINE

## 2023-05-19 PROCEDURE — 99999 PR PBB SHADOW E&M-EST. PATIENT-LVL III: CPT | Mod: PBBFAC,,, | Performed by: INTERNAL MEDICINE

## 2023-05-19 RX ORDER — SODIUM CHLORIDE 0.9 % (FLUSH) 0.9 %
10 SYRINGE (ML) INJECTION
Status: CANCELLED | OUTPATIENT
Start: 2023-05-19

## 2023-05-19 RX ORDER — FAMOTIDINE 10 MG/ML
20 INJECTION INTRAVENOUS
Status: CANCELLED | OUTPATIENT
Start: 2023-05-19

## 2023-05-19 RX ORDER — DIPHENHYDRAMINE HYDROCHLORIDE 50 MG/ML
50 INJECTION INTRAMUSCULAR; INTRAVENOUS ONCE AS NEEDED
Status: DISCONTINUED | OUTPATIENT
Start: 2023-05-19 | End: 2023-05-19 | Stop reason: HOSPADM

## 2023-05-19 RX ORDER — FAMOTIDINE 10 MG/ML
20 INJECTION INTRAVENOUS
Status: COMPLETED | OUTPATIENT
Start: 2023-05-19 | End: 2023-05-19

## 2023-05-19 RX ORDER — HEPARIN 100 UNIT/ML
500 SYRINGE INTRAVENOUS
Status: CANCELLED | OUTPATIENT
Start: 2023-05-19

## 2023-05-19 RX ORDER — DIPHENHYDRAMINE HYDROCHLORIDE 50 MG/ML
50 INJECTION INTRAMUSCULAR; INTRAVENOUS
Status: COMPLETED | OUTPATIENT
Start: 2023-05-19 | End: 2023-05-19

## 2023-05-19 RX ORDER — DIPHENHYDRAMINE HYDROCHLORIDE 50 MG/ML
50 INJECTION INTRAMUSCULAR; INTRAVENOUS
Status: CANCELLED
Start: 2023-05-19

## 2023-05-19 RX ORDER — EPINEPHRINE 0.3 MG/.3ML
0.3 INJECTION SUBCUTANEOUS ONCE AS NEEDED
Status: DISCONTINUED | OUTPATIENT
Start: 2023-05-19 | End: 2023-05-19 | Stop reason: HOSPADM

## 2023-05-19 RX ORDER — SODIUM CHLORIDE 0.9 % (FLUSH) 0.9 %
10 SYRINGE (ML) INJECTION
Status: DISCONTINUED | OUTPATIENT
Start: 2023-05-19 | End: 2023-05-19 | Stop reason: HOSPADM

## 2023-05-19 RX ORDER — DIPHENHYDRAMINE HYDROCHLORIDE 50 MG/ML
50 INJECTION INTRAMUSCULAR; INTRAVENOUS ONCE AS NEEDED
Status: CANCELLED | OUTPATIENT
Start: 2023-05-19

## 2023-05-19 RX ORDER — HEPARIN 100 UNIT/ML
500 SYRINGE INTRAVENOUS
Status: DISCONTINUED | OUTPATIENT
Start: 2023-05-19 | End: 2023-05-19 | Stop reason: HOSPADM

## 2023-05-19 RX ORDER — EPINEPHRINE 0.3 MG/.3ML
0.3 INJECTION SUBCUTANEOUS ONCE AS NEEDED
Status: CANCELLED | OUTPATIENT
Start: 2023-05-19

## 2023-05-19 RX ADMIN — Medication 10 ML: at 12:05

## 2023-05-19 RX ADMIN — Medication 500 UNITS: at 12:05

## 2023-05-19 RX ADMIN — PACLITAXEL 96 MG: 6 INJECTION, SOLUTION INTRAVENOUS at 11:05

## 2023-05-19 RX ADMIN — PALONOSETRON 0.25 MG: 0.25 INJECTION, SOLUTION INTRAVENOUS at 10:05

## 2023-05-19 RX ADMIN — CARBOPLATIN 200 MG: 10 INJECTION, SOLUTION INTRAVENOUS at 12:05

## 2023-05-19 RX ADMIN — DIPHENHYDRAMINE HYDROCHLORIDE 50 MG: 50 INJECTION, SOLUTION INTRAMUSCULAR; INTRAVENOUS at 10:05

## 2023-05-19 RX ADMIN — FAMOTIDINE 20 MG: 10 INJECTION INTRAVENOUS at 11:05

## 2023-05-19 NOTE — PROGRESS NOTES
HEMATOLOGY/ONCOLOGY OFFICE CLINIC VISIT    Visit Information:    Initial Evaluation: 6/27/2022  Referring Provider: Dr Diaz  Other providers: Dr Palomares  Code status: Not addressed    Diagnosis:  1) [id number] Stage IA3 Squamous cell carcinoma of lung  2) recurrence 3/6/23  3) Thrombocytopenia    Present treatment:  Carbo/taxol + RT    Treatment/Oncogy history:  5/24/2022 right upper lobectomy     Plan of care: chemoradiation--> maintenance therapy with durvalumab      Imaging:  CT angiogram 1/17/2022: No pulmonary embolus. Right upper lobe 2.5 cm mass.  CT C 9/19/2022: Status post right partial pneumonectomy for resection of a right upper lobe lung mass with no residual recurrent mass seen. Small right-sided pleural effusion. Some lymphadenopathy in the right paratracheal region with a maximum short axis dimension of 1.6 cm.  Follow-up is recommended  CT C 1/25/2023: There is a paratracheal enlarged lymph node which shows interval mild size increase and now measures 2.2 x 2.0 cm and previously was 1.6 x 1.5 cm.  Aortopulmonary window mildly enlarged lymph node is stable.  Thoracic aorta is without aneurysmal dilatation or dissection.  Impression: 1. Operative changes of right upper lobectomy without bronchialstump soft tissue proliferation    2. No residual tumor load or metastatic nodule. 3. Paratracheal enlarged lymph node with interval size increase.  PET CT 2/9/2023:  Hypermetabolic right paratracheal lymph node image 81 series 3 measures 25 x 20 mm with max SUV 27.0.  Hypermetabolic anterior mediastinal lymph node anterior to the SVC measures 12 x 9 mm with max SUV 12.0. Impression: 1. Hypermetabolic metastatic mediastinal adenopathy.   2. No PET evidence of metastatic disease outside of the mediastinum.    Pathology:  03/22/2022 CT-guided biopsy of the right lung mass: invasive squamous cell carcinoma, moderately differentiated.  5/24/2022: Right upper lobectomy:  1- LYMPH NODE, LUMBAR RIGHT 10 LYMPHADENECTOMY:  NEGATIVE FOR METASTATIC CARCINOMA (0/1).   2- LYMPH NODE, 11R, LYMPHADENECTOMY: NEGATIVE FOR METASTATIC CARCINOMA (0/1).  3- LYMPH NODE, 9R, LYMPHADENECTOMY: NEGATIVE FOR METASTATIC CARCINOMA (0/1).   4- LYMPH NODE, 7R, LYMPHADENECTOMY: NEGATIVE FOR METASTATIC CARCINOMA (0/1).   5- LYMPH NODE, 8R, LYMPHADENECTOMY: ONE LYMPH NODE NEGATIVE FOR METASTATIC CARCINOMA (0/1).    6- LYMPH NODE, 12R, LYMPHADENECTOMY: NEGATIVE FOR METASTATIC CARCINOMA (0/1).  7- LUNG, RIGHT UPPER AND MIDDLE LOBES, PNEUMONECTOMY:  POORLY DIFFERENTIATED, G3, SQUAMOUS CELL CARCINOMA, INVASIVE.    - TUMOR SIZE: 3 CM.    - LYMPHOVASCULAR INVASION IS PRESENT.    - MARGINS NEGATIVE FOR INVASIVE CARCINOMA:    - NEAREST MARGIN, VASCULAR / BRONCHIAL 2.5 CM.    - NO PLEURAL SURFACE INVOLVEMENT     - PROMINENT NECROSIS PRESENT.  Visceral Pleura Invasion: Not identified  Number of Lymph Nodes Examined: Exact number : 6  pT Category: pT1c: pN0: No regional lymph node metastasis    3/8/2023 LYMPH NODE BIOPSY:   LYMPH NODE 4R, LYMPHADENECTOMY:  METASTATIC SQUAMOUS CELL CARCINOMA, NONKERATINIZING, MULTIPLE FRAGMENTS.       IMMUNOHISTOCHEMICAL STAINS:   CK 5/6, P63 AND CK7:  POSITIVE IN MALIGNANT CELLS.   CK20 AND TTF-1:  NEGATIVE IN MALIGNANT CELLS.         CLINICAL HISTORY:       Patient: Leonila Rosales is a 77 y.o. male kindly referred by  Dr. Diaz for evaluation of lung cancer.    On 01/17/2022 patient presented to the emergency department after a fall.  He reports that he was getting to his truck and sleep and fell on his left side on the truck step rail.  He started having pain in his left hip and knee.  He underwent a CT angiogram that showed No pulmonary embolus. Right upper lobe 2.5 cm mass.      During that hospitalization he was treated for a close intertrochanteric fracture of the left femur and underwent open reduction intramedullary nailing left comminuted intertrochanteric hip fracture on 01/18/2022 with Dr. Fortino Palomares.  He then spent several  weeks on rehab.    On 03/22/2022 he underwent CT-guided biopsy of the right lung mass.  Pathology showed invasive squamous cell carcinoma, moderately differentiated.    He is s/p right upper lobectomy with  on 5/24/2022.  Pathology report as above.  Patient is stage IA3 squamous cell carcinoma of the lung.  He has recovered well and has been doing good.     He lives alone and is able to perform ADL's. He uses a walker to aid in ambulation. He quit smoking January 2022, after smoking for 40 yrs.       Chief Complaint: OTHER (No concerns today.)      Interval History:    Patient presents today for two week follow up, he is with his sister.  He started weekly carbo/taxol with XRT on 4/14/23. He's due for C5 today. C4 was delayed on week due to neutropenia, ANC 0.8,  he received on 5/12/23.  He is due to complete XRT on 5/29/23. He has no complaints today, states he is feeling fine. Denies any side effects. He denies shortness of breath, chest pain. No weight loss. Denies headaches. He uses a cane for mobility.    Explained to them due to national shortage of carboplatin, he will only receive taxol for C6 (final cycle) on 5/26/23.      Past Medical History:   Diagnosis Date    Anemia     Closed intertrochanteric fracture     Deficiency of macronutrients     GERD (gastroesophageal reflux disease)     H. pylori infection     History of tobacco use     Hyperlipidemia     Hypertension     Lung mass     Malignant neoplasm of unspecified part of unspecified bronchus or lung     Non-small cell lung cancer       Past Surgical History:   Procedure Laterality Date    BIOPSY OF INTESTINE  04/04/2022    BRONCHOSCOPY      COLONOSCOPY      ESOPHAGOGASTRODUODENOSCOPY  04/04/2022    HIP FRACTURE SURGERY Left 2016    Intramedullary Nail Insertion Hip Left 01/18/2022    KIDNEY SURGERY Right 05/27/2022    MEDIASTINOSCOPY N/A 3/6/2023    Procedure: MEDIASTINOSCOPY;  Surgeon: Jose Diaz MD;  Location: [facility];  Service:  Cardiothoracic;  Laterality: N/A;  XX    PLACEMENT, MEDIPORT N/A 4/6/2023    Procedure: Placement, Mediport;  Surgeon: Jose Diaz MD;  Location: [facility];  Service: Cardiology;  Laterality: N/A;    SHOULDER SURGERY       Family History   Family history unknown: Yes     Social Connections: Socially Integrated    Frequency of Communication with Friends and Family: More than three times a week    Frequency of Social Gatherings with Friends and Family: More than three times a week    Attends Congregational Services: More than 4 times per year    Active Member of Clubs or Organizations: Yes    Attends Club or Organization Meetings: More than 4 times per year    Marital Status:        Review of patient's allergies indicates:  No Known Allergies   Current Outpatient Medications on File Prior to Visit   Medication Sig Dispense Refill    amLODIPine (NORVASC) 5 MG tablet TAKE ONE TABLET BY MOUTH TWICE DAILY 180 tablet 1    aspirin 81 mg Cap Take 81 mg by mouth Daily.      atorvastatin (LIPITOR) 10 MG tablet Take 1 tablet (10 mg total) by mouth once daily. 90 tablet 3    ferrous sulfate (FEOSOL) 325 mg (65 mg iron) Tab tablet Take 1 tablet (325 mg total) by mouth once daily. 30 tablet 3    LIDOcaine-prilocaine (EMLA) cream Apply topically as needed. Apply to port site 30 mins prior to chemo 30 g 3    LINZESS 145 mcg Cap capsule Take 145 mcg by mouth daily as needed. New medication, not started yet      ondansetron (ZOFRAN) 8 MG tablet Take 1 tablet (8 mg total) by mouth every 8 (eight) hours as needed for Nausea. 1st choice for nausea 30 tablet 2    prochlorperazine (COMPAZINE) 10 MG tablet Take 1 tablet (10 mg total) by mouth every 6 (six) hours as needed (for nausea). 2nd choice, can cause drowsiness. 30 tablet 3    traMADoL (ULTRAM) 50 mg tablet Take 1 tablet (50 mg total) by mouth every 6 (six) hours as needed for Pain. 12 tablet 0     Current Facility-Administered Medications on File Prior to Visit   Medication  Dose Route Frequency Provider Last Rate Last Admin    alteplase injection 2 mg  2 mg Intra-Catheter PRN [facility] MD Dony        CARBOplatin (PARAPLATIN) 200 mg in sodium chloride 0.9% 305 mL chemo infusion  200 mg Intravenous 1 time in [facility] MD Dony        diphenhydrAMINE injection 50 mg  50 mg Intravenous 1 time in [facility] MD Dony        diphenhydrAMINE injection 50 mg  50 mg Intravenous Once PRN [facility] MD Dony        EPINEPHrine (EPIPEN) 0.3 mg/0.3 mL pen injection 0.3 mg  0.3 mg Intramuscular Once PRN [facility] MD Dony        famotidine (PF) injection 20 mg  20 mg Intravenous 1 time in [facility] MD Dony        heparin, porcine (PF) 100 unit/mL injection flush 500 Units  500 Units Intravenous PRN [facility] MD Dony        hydrocortisone sodium succinate injection 100 mg  100 mg Intravenous Once PRN [facility] MD Dony        PACLitaxeL (TAXOL) 45 mg/m2 = 96 mg in sodium chloride 0.9% 301 mL chemo infusion  45 mg/m2 (Treatment Plan Recorded) Intravenous 1 time in [facility] MD Dony        palonosetron (ALOXI) 0.25 mg with Dexamethasone (DECADRON) 12 mg in NS 50 mL IVPB  0.25 mg Intravenous 1 time in [facility] MD Dony        sodium chloride 0.9% 250 mL flush bag   Intravenous 1 time in [facility] MD Dony        sodium chloride 0.9% flush 10 mL  10 mL Intravenous PRN [facility] MD Dony          Review of Systems   Constitutional:  Positive for activity change and fatigue. Negative for appetite change, chills, diaphoresis, fever and unexpected weight change.   HENT:  Negative for nasal congestion, mouth sores, nosebleeds, postnasal drip, sinus pressure/congestion, sore throat and trouble swallowing.    Eyes: Negative.  Negative for visual disturbance.   Respiratory:  Positive for shortness of breath. Negative for cough.    Cardiovascular:  Positive for leg swelling (mild). Negative for  "chest pain and palpitations.        Around surgical scar   Gastrointestinal:  Positive for abdominal distention and constipation. Negative for abdominal pain, blood in stool, change in bowel habit, diarrhea, nausea, vomiting and change in bowel habit.   Endocrine: Negative.    Genitourinary:  Negative for bladder incontinence, decreased urine volume, difficulty urinating, dysuria, frequency, hematuria, scrotal swelling, testicular pain and urgency.   Musculoskeletal:  Positive for gait problem. Negative for arthralgias, back pain, joint swelling, leg pain, myalgias and neck pain.   Integumentary:  Negative for rash.   Allergic/Immunologic: Negative.    Neurological:  Negative for dizziness, tremors, seizures, syncope, speech difficulty, weakness, light-headedness, numbness, headaches and memory loss.   Hematological:  Negative for adenopathy. Does not bruise/bleed easily.   Psychiatric/Behavioral:  Negative for agitation, confusion, hallucinations, sleep disturbance and suicidal ideas. The patient is not nervous/anxious.             Vitals:    05/19/23 0937   BP: 131/78   BP Location: Left arm   Patient Position: Sitting   Pulse: 101   Temp: 98.2 °F (36.8 °C)   TempSrc: Oral   SpO2: 98%   Weight: 96.9 kg (213 lb 9.6 oz)   Height: 5' 7" (1.702 m)       Wt Readings from Last 6 Encounters:   05/19/23 96.9 kg (213 lb 9.6 oz)   05/12/23 96.4 kg (212 lb 9.6 oz)   05/05/23 94.8 kg (209 lb)   04/21/23 96.6 kg (212 lb 14.4 oz)   04/14/23 96.8 kg (213 lb 6.5 oz)   04/12/23 96.9 kg (213 lb 9.6 oz)           Body mass index is 33.45 kg/m².  Body surface area is 2.14 meters squared.       Physical Exam  Vitals and nursing note reviewed.   Constitutional:       General: He is not in acute distress.     Appearance: Normal appearance.   HENT:      Head: Normocephalic and atraumatic.      Mouth/Throat:      Mouth: Mucous membranes are moist.   Eyes:      General: No scleral icterus.     Extraocular Movements: Extraocular movements " intact.      Conjunctiva/sclera: Conjunctivae normal.   Neck:      Vascular: No JVD.   Cardiovascular:      Rate and Rhythm: Normal rate and regular rhythm.      Heart sounds: No murmur heard.  Pulmonary:      Effort: Pulmonary effort is normal.      Breath sounds: Normal breath sounds.   Chest:      Chest wall: No tenderness.   Abdominal:      General: There is no distension.      Palpations: There is no fluid wave.      Tenderness: There is no abdominal tenderness.   Musculoskeletal:         General: No swelling or deformity.      Cervical back: Neck supple.   Lymphadenopathy:      Cervical: No cervical adenopathy.      Upper Body:      Right upper body: No supraclavicular or axillary adenopathy.      Left upper body: No supraclavicular or axillary adenopathy.      Lower Body: No right inguinal adenopathy. No left inguinal adenopathy.   Skin:     General: Skin is warm.      Coloration: Skin is not jaundiced.      Findings: No rash.   Neurological:      General: No focal deficit present.      Mental Status: He is alert and oriented to person, place, and time.      Motor: Weakness present.      Comments: Mobility assitssed by cane/walker   Psychiatric:         Attention and Perception: Attention normal.         Mood and Affect: Mood and affect normal.         Behavior: Behavior is cooperative.         Cognition and Memory: Cognition normal.         Judgment: Judgment normal.     ECOG SCORE    1 - Restricted in strenuous activity-ambulatory and able to carry out work of a light nature         Laboratory:  CBC with Differential:  Lab Results   Component Value Date    WBC 2.87 (L) 05/19/2023    RBC 4.28 (L) 05/19/2023    HGB 11.6 (L) 05/19/2023    HCT 37.0 (L) 05/19/2023    MCV 86.4 05/19/2023    MCH 27.1 05/19/2023    MCHC 31.4 (L) 05/19/2023    RDW 16.8 05/19/2023    PLT 99 (L) 05/19/2023    MPV 10.0 05/19/2023        CMP:  Sodium Level   Date Value Ref Range Status   05/12/2023 144 136 - 145 mmol/L Final      Potassium Level   Date Value Ref Range Status   05/12/2023 4.0 3.5 - 5.1 mmol/L Final     Carbon Dioxide   Date Value Ref Range Status   05/12/2023 25 23 - 31 mmol/L Final     Blood Urea Nitrogen   Date Value Ref Range Status   05/12/2023 10.5 8.4 - 25.7 mg/dL Final     Creatinine   Date Value Ref Range Status   05/12/2023 0.87 0.73 - 1.18 mg/dL Final     Calcium Level Total   Date Value Ref Range Status   05/12/2023 9.6 8.8 - 10.0 mg/dL Final     Albumin Level   Date Value Ref Range Status   05/12/2023 3.5 3.4 - 4.8 g/dL Final     Bilirubin Total   Date Value Ref Range Status   05/12/2023 0.5 <=1.5 mg/dL Final     Alkaline Phosphatase   Date Value Ref Range Status   05/12/2023 85 40 - 150 unit/L Final     Aspartate Aminotransferase   Date Value Ref Range Status   05/12/2023 18 5 - 34 unit/L Final     Alanine Aminotransferase   Date Value Ref Range Status   05/12/2023 18 0 - 55 unit/L Final     Estimated GFR-Non    Date Value Ref Range Status   04/19/2022 >60           Assessment:       1. Non-small cell lung cancer, unspecified laterality    2. On antineoplastic chemotherapy        1) [id number] Stage IA3 Squamous cell carcinoma of lung  --5/24/2022 s/p right upper lobectomy   2) Recurrence-Biopsy proven R4 LN  Educated the patient on the risks versus benefits as well as toxicities associated with treatment.  Verbally consented the patient to the treatment plan and the patient was educated on the planned duration of the treatment and schedule of the treatment administration.  All questions were answered.    Patient understands that because of nationwide carboplatin/cisplatin showed as he will not receive Carboplatinum next week.  Today will be the last dose of Carboplatinum.  He will come next week for Taxol only.    2) Pancytopenia- Treatment related. Will folow for now        Plan:       Patient with 1.6 right paratracheal LN and small Rt pleural effusion. Surveillance CT scan chest to eval  stability with paratracheal enlarged lymph node which shows interval mild size increase and now measures 2.2 x 2.0 cm and previously was 1.6 x 1.5 cm.  Aortopulmonary window mildly enlarged lymph node is stable. PET CT with Hypermetabolic right paratracheal lymph node image 81 series 3 measures 25 x 20 mm with max SUV 27.0. Hypermetabolic anterior mediastinal lymph node anterior to the SVC measures 12 x 9 mm with max SUV 12.0.   Biopsy of R4 lymph node confirmed the metastatic squamous cell carcinoma. Discussed case with Dr Willis and he will be ready to start next week.     Started chemotherapy + XRT on 4/14/23 - patient prefers Fridays due to transportation  Due to complete XRT on 5/29/23  Okay to proceed with C5 today  RTC in 1 week with MD  for TD/labs/chemo for C6 (FINAL CYCLE)- please schedule on Friday per patient request due to transportation  Will discuss maintenance therapy with durvalumab and place the order next visit  Encourage to call or message us for any questions or problems  The patient was given ample opportunity to ask questions, and to the best of my abilities, all questions answered to satisfaction; patient demonstrated understanding of what we discussed and agreeable to the plan.     CONNER FLORES MD      Professional Services   I, Shirley Pina LPN, acted solely as a scribe for and in the presence of Dr. Conner Flores, who performed these services.

## 2023-05-19 NOTE — PLAN OF CARE
Patient received C5D1, tolerated well. No reaction noted. Next appointment given. Discharge in stable condition.

## 2023-05-26 ENCOUNTER — OFFICE VISIT (OUTPATIENT)
Dept: HEMATOLOGY/ONCOLOGY | Facility: CLINIC | Age: 78
End: 2023-05-26
Payer: MEDICARE

## 2023-05-26 ENCOUNTER — TELEPHONE (OUTPATIENT)
Dept: HEMATOLOGY/ONCOLOGY | Facility: CLINIC | Age: 78
End: 2023-05-26
Payer: MEDICARE

## 2023-05-26 VITALS
BODY MASS INDEX: 32.79 KG/M2 | WEIGHT: 208.88 LBS | TEMPERATURE: 98 F | HEIGHT: 67 IN | HEART RATE: 107 BPM | SYSTOLIC BLOOD PRESSURE: 131 MMHG | DIASTOLIC BLOOD PRESSURE: 87 MMHG | OXYGEN SATURATION: 99 %

## 2023-05-26 DIAGNOSIS — T45.1X5A CHEMOTHERAPY-INDUCED NEUTROPENIA: Primary | ICD-10-CM

## 2023-05-26 DIAGNOSIS — Z95.828 PORT-A-CATH IN PLACE: ICD-10-CM

## 2023-05-26 DIAGNOSIS — Z79.899 ON ANTINEOPLASTIC CHEMOTHERAPY: ICD-10-CM

## 2023-05-26 DIAGNOSIS — C34.91 NON-SMALL CELL CANCER OF RIGHT LUNG: ICD-10-CM

## 2023-05-26 DIAGNOSIS — D70.1 CHEMOTHERAPY-INDUCED NEUTROPENIA: Primary | ICD-10-CM

## 2023-05-26 PROBLEM — C34.90 LUNG CANCER: Status: ACTIVE | Noted: 2023-05-26

## 2023-05-26 PROCEDURE — 3075F PR MOST RECENT SYSTOLIC BLOOD PRESS GE 130-139MM HG: ICD-10-PCS | Mod: CPTII,S$GLB,, | Performed by: INTERNAL MEDICINE

## 2023-05-26 PROCEDURE — 99999 PR PBB SHADOW E&M-EST. PATIENT-LVL IV: ICD-10-PCS | Mod: PBBFAC,,, | Performed by: INTERNAL MEDICINE

## 2023-05-26 PROCEDURE — 1159F PR MEDICATION LIST DOCUMENTED IN MEDICAL RECORD: ICD-10-PCS | Mod: CPTII,S$GLB,, | Performed by: INTERNAL MEDICINE

## 2023-05-26 PROCEDURE — 1157F ADVNC CARE PLAN IN RCRD: CPT | Mod: CPTII,S$GLB,, | Performed by: INTERNAL MEDICINE

## 2023-05-26 PROCEDURE — 1101F PT FALLS ASSESS-DOCD LE1/YR: CPT | Mod: CPTII,S$GLB,, | Performed by: INTERNAL MEDICINE

## 2023-05-26 PROCEDURE — 3079F PR MOST RECENT DIASTOLIC BLOOD PRESSURE 80-89 MM HG: ICD-10-PCS | Mod: CPTII,S$GLB,, | Performed by: INTERNAL MEDICINE

## 2023-05-26 PROCEDURE — 1157F PR ADVANCE CARE PLAN OR EQUIV PRESENT IN MEDICAL RECORD: ICD-10-PCS | Mod: CPTII,S$GLB,, | Performed by: INTERNAL MEDICINE

## 2023-05-26 PROCEDURE — 1159F MED LIST DOCD IN RCRD: CPT | Mod: CPTII,S$GLB,, | Performed by: INTERNAL MEDICINE

## 2023-05-26 PROCEDURE — 1160F PR REVIEW ALL MEDS BY PRESCRIBER/CLIN PHARMACIST DOCUMENTED: ICD-10-PCS | Mod: CPTII,S$GLB,, | Performed by: INTERNAL MEDICINE

## 2023-05-26 PROCEDURE — 1160F RVW MEDS BY RX/DR IN RCRD: CPT | Mod: CPTII,S$GLB,, | Performed by: INTERNAL MEDICINE

## 2023-05-26 PROCEDURE — 99215 PR OFFICE/OUTPT VISIT, EST, LEVL V, 40-54 MIN: ICD-10-PCS | Mod: S$GLB,,, | Performed by: INTERNAL MEDICINE

## 2023-05-26 PROCEDURE — 1101F PR PT FALLS ASSESS DOC 0-1 FALLS W/OUT INJ PAST YR: ICD-10-PCS | Mod: CPTII,S$GLB,, | Performed by: INTERNAL MEDICINE

## 2023-05-26 PROCEDURE — 1126F PR PAIN SEVERITY QUANTIFIED, NO PAIN PRESENT: ICD-10-PCS | Mod: CPTII,S$GLB,, | Performed by: INTERNAL MEDICINE

## 2023-05-26 PROCEDURE — 99215 OFFICE O/P EST HI 40 MIN: CPT | Mod: S$GLB,,, | Performed by: INTERNAL MEDICINE

## 2023-05-26 PROCEDURE — 3288F PR FALLS RISK ASSESSMENT DOCUMENTED: ICD-10-PCS | Mod: CPTII,S$GLB,, | Performed by: INTERNAL MEDICINE

## 2023-05-26 PROCEDURE — 3288F FALL RISK ASSESSMENT DOCD: CPT | Mod: CPTII,S$GLB,, | Performed by: INTERNAL MEDICINE

## 2023-05-26 PROCEDURE — 3079F DIAST BP 80-89 MM HG: CPT | Mod: CPTII,S$GLB,, | Performed by: INTERNAL MEDICINE

## 2023-05-26 PROCEDURE — 1126F AMNT PAIN NOTED NONE PRSNT: CPT | Mod: CPTII,S$GLB,, | Performed by: INTERNAL MEDICINE

## 2023-05-26 PROCEDURE — 3075F SYST BP GE 130 - 139MM HG: CPT | Mod: CPTII,S$GLB,, | Performed by: INTERNAL MEDICINE

## 2023-05-26 PROCEDURE — 99999 PR PBB SHADOW E&M-EST. PATIENT-LVL IV: CPT | Mod: PBBFAC,,, | Performed by: INTERNAL MEDICINE

## 2023-05-26 RX ORDER — SODIUM CHLORIDE 0.9 % (FLUSH) 0.9 %
10 SYRINGE (ML) INJECTION
Status: CANCELLED | OUTPATIENT
Start: 2023-06-23

## 2023-05-26 RX ORDER — EPINEPHRINE 0.3 MG/.3ML
0.3 INJECTION SUBCUTANEOUS ONCE AS NEEDED
Status: CANCELLED | OUTPATIENT
Start: 2023-06-23

## 2023-05-26 RX ORDER — DIPHENHYDRAMINE HYDROCHLORIDE 50 MG/ML
50 INJECTION INTRAMUSCULAR; INTRAVENOUS ONCE AS NEEDED
Status: CANCELLED | OUTPATIENT
Start: 2023-06-23

## 2023-05-26 RX ORDER — HEPARIN 100 UNIT/ML
500 SYRINGE INTRAVENOUS
Status: CANCELLED | OUTPATIENT
Start: 2023-06-23

## 2023-05-26 RX ORDER — LEVOFLOXACIN 500 MG/1
500 TABLET, FILM COATED ORAL DAILY
Qty: 5 TABLET | Refills: 0 | Status: SHIPPED | OUTPATIENT
Start: 2023-05-26 | End: 2023-10-17 | Stop reason: ALTCHOICE

## 2023-05-26 NOTE — PROGRESS NOTES
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 Right upper lobe 2.5 cm mass.  CT C 9/19/2022: Status post right partial pneumonectomy for resection of a right upper lobe lung mass with no residual recurrent mass seen. Small right-sided pleural effusion. Some lymphadenopathy in the right paratracheal region with a maximum short axis dimension of 1.6 cm.  Follow-up is recommended  CT C 1/25/2023: There is a paratracheal enlarged lymph node which shows interval mild size increase and now measures 2.2 x 2.0 cm and previously was 1.6 x 1.5 cm.  Aortopulmonary window mildly enlarged lymph node is stable.  Thoracic aorta is without aneurysmal dilatation or dissection.  Impression: 1. Operative changes of right upper lobectomy without bronchialstump soft tissue proliferation    2. No residual tumor load or metastatic nodule. 3. Paratracheal enlarged lymph node with interval size increase.  PET CT 2/9/2023:  Hypermetabolic right paratracheal lymph node image 81 series 3 measures 25 x 20 mm with max SUV 27.0.  Hypermetabolic anterior mediastinal lymph node anterior to the SVC measures 12 x 9 mm with max SUV 12.0. Impression: 1. Hypermetabolic metastatic mediastinal adenopathy.   2. No PET evidence of metastatic disease outside of the mediastinum.    Pathology:  03/22/2022 CT-guided biopsy of the right lung mass: invasive squamous cell carcinoma, moderately differentiated.  5/24/2022: Right upper lobectomy:  1- LYMPH NODE, LUMBAR RIGHT 10 LYMPHADENECTOMY:  NEGATIVE FOR METASTATIC CARCINOMA (0/1).   2- LYMPH NODE, 11R, LYMPHADENECTOMY: NEGATIVE FOR METASTATIC CARCINOMA (0/1).  3- LYMPH NODE, 9R, LYMPHADENECTOMY: NEGATIVE FOR METASTATIC CARCINOMA (0/1).   4- LYMPH NODE, 7R, LYMPHADENECTOMY: NEGATIVE FOR METASTATIC CARCINOMA (0/1).   5- LYMPH NODE, 8R, LYMPHADENECTOMY: ONE LYMPH NODE NEGATIVE FOR METASTATIC CARCINOMA (0/1).    6- LYMPH NODE, 12R, LYMPHADENECTOMY: NEGATIVE FOR METASTATIC CARCINOMA (0/1).  7- LUNG, RIGHT UPPER AND MIDDLE LOBES, PNEUMONECTOMY:  POORLY DIFFERENTIATED, G3, SQUAMOUS CELL CARCINOMA, INVASIVE.    - TUMOR SIZE: 3 CM.    - LYMPHOVASCULAR INVASION IS PRESENT.    - MARGINS NEGATIVE FOR INVASIVE CARCINOMA:    - NEAREST MARGIN, VASCULAR / BRONCHIAL 2.5 CM.    - NO PLEURAL SURFACE INVOLVEMENT     - PROMINENT NECROSIS PRESENT.  Visceral Pleura Invasion: Not identified  Number of Lymph Nodes Examined: Exact number : 6  pT Category: pT1c: pN0: No regional lymph node metastasis    3/8/2023 LYMPH NODE BIOPSY:   LYMPH NODE 4R, LYMPHADENECTOMY:  METASTATIC SQUAMOUS CELL CARCINOMA, NONKERATINIZING, MULTIPLE FRAGMENTS.       IMMUNOHISTOCHEMICAL STAINS:   CK 5/6, P63 AND CK7:  POSITIVE IN MALIGNANT CELLS.   CK20 AND TTF-1:  NEGATIVE IN MALIGNANT CELLS.         CLINICAL HISTORY:       Patient: Leonila Rosales is a 77 y.o. male kindly referred by  Dr. Diaz for evaluation of lung cancer.    On 01/17/2022 patient presented to the emergency department after a fall.  He reports that he was getting to his truck and sleep and fell on his left side on the truck step rail.  He started having pain in his left hip and knee.  He underwent a CT angiogram that showed No pulmonary embolus. Right upper lobe 2.5 cm mass.      During that hospitalization he was treated for a close intertrochanteric fracture of the left femur and underwent open reduction intramedullary nailing left comminuted intertrochanteric hip fracture on 01/18/2022 with Dr. Fortino Palomares.  He then spent several  weeks on rehab.    On 03/22/2022 he underwent CT-guided biopsy of the right lung mass.  Pathology showed invasive squamous cell carcinoma, moderately differentiated.    He is s/p right upper lobectomy with  on 5/24/2022.  Pathology report as above.  Patient is stage IA3 squamous cell carcinoma of the lung.  He has recovered well and has been doing good.     He lives alone and is able to perform ADL's. He uses a walker to aid in ambulation. He quit smoking January 2022, after smoking for 40 yrs.       Chief Complaint: OTHER (No concerns today.)      Interval History:    Patient presents today for follow up and continuation of weekly carbo/taxol. He started weekly with XRT on 4/14/23. He's due for C6 today. C4 was delayed one week due to neutropenia, ANC 0.8,  he received on 5/12/23.  He is due to complete XRT on 5/30/23, will have 2 more after today. He has no complaints today, states he is feeling fine. Denies any side effects. He denies shortness of breath, chest pain. No weight loss. Denies headaches. He uses a cane for mobility.    ANC today 0.7, C6 will be held today. He received 5 total cycles.       Past Medical History:   Diagnosis Date    Anemia     Closed intertrochanteric fracture     Deficiency of macronutrients     GERD (gastroesophageal reflux disease)     H. pylori infection     History of tobacco use     Hyperlipidemia     Hypertension     Lung mass     Malignant neoplasm of unspecified part of unspecified bronchus or lung     Non-small cell lung cancer       Past Surgical History:   Procedure Laterality Date    BIOPSY OF INTESTINE  04/04/2022    BRONCHOSCOPY      COLONOSCOPY      ESOPHAGOGASTRODUODENOSCOPY  04/04/2022    HIP FRACTURE SURGERY Left 2016    Intramedullary Nail Insertion Hip Left 01/18/2022    KIDNEY SURGERY Right 05/27/2022    MEDIASTINOSCOPY N/A 3/6/2023    Procedure: MEDIASTINOSCOPY;  Surgeon: Jose Diaz MD;  Location: [facility];  Service: Cardiothoracic;  Laterality: N/A;   XX    PLACEMENT, MEDIPORT N/A 4/6/2023    Procedure: Placement, Mediport;  Surgeon: Jose Diaz MD;  Location: [facility] OR;  Service: Cardiology;  Laterality: N/A;    SHOULDER SURGERY       Family History   Family history unknown: Yes     Social Connections: Socially Integrated    Frequency of Communication with Friends and Family: More than three times a week    Frequency of Social Gatherings with Friends and Family: More than three times a week    Attends [employer] Services: More than 4 times per year    Active Member of Clubs or Organizations: Yes    Attends Club or Organization Meetings: More than 4 times per year    Marital Status:        Review of patient's allergies indicates:  No Known Allergies   Current Outpatient Medications on File Prior to Visit   Medication Sig Dispense Refill    amLODIPine (NORVASC) 5 MG tablet TAKE ONE TABLET BY MOUTH TWICE DAILY 180 tablet 1    aspirin 81 mg Cap Take 81 mg by mouth Daily.      atorvastatin (LIPITOR) 10 MG tablet Take 1 tablet (10 mg total) by mouth once daily. 90 tablet 3    ferrous sulfate (FEOSOL) 325 mg (65 mg iron) Tab tablet Take 1 tablet (325 mg total) by mouth once daily. 30 tablet 3    LIDOcaine-prilocaine (EMLA) cream Apply topically as needed. Apply to port site 30 mins prior to chemo 30 g 3    LINZESS 145 mcg Cap capsule Take 145 mcg by mouth daily as needed. New medication, not started yet      ondansetron (ZOFRAN) 8 MG tablet Take 1 tablet (8 mg total) by mouth every 8 (eight) hours as needed for Nausea. 1st choice for nausea 30 tablet 2    prochlorperazine (COMPAZINE) 10 MG tablet Take 1 tablet (10 mg total) by mouth every 6 (six) hours as needed (for nausea). 2nd choice, can cause drowsiness. 30 tablet 3    traMADoL (ULTRAM) 50 mg tablet Take 1 tablet (50 mg total) by mouth every 6 (six) hours as needed for Pain. 12 tablet 0     No current facility-administered medications on file prior to visit.      Review of Systems   Constitutional:   "Positive for activity change and fatigue. Negative for appetite change, chills, diaphoresis, fever and unexpected weight change.   HENT:  Negative for nasal congestion, mouth sores, nosebleeds, postnasal drip, sinus pressure/congestion, sore throat and trouble swallowing.    Eyes: Negative.  Negative for visual disturbance.   Respiratory:  Positive for shortness of breath. Negative for cough.    Cardiovascular:  Positive for leg swelling (mild). Negative for chest pain and palpitations.        Around surgical scar   Gastrointestinal:  Positive for abdominal distention and constipation. Negative for abdominal pain, blood in stool, change in bowel habit, diarrhea, nausea, vomiting and change in bowel habit.   Endocrine: Negative.    Genitourinary:  Negative for bladder incontinence, decreased urine volume, difficulty urinating, dysuria, frequency, hematuria, scrotal swelling, testicular pain and urgency.   Musculoskeletal:  Positive for gait problem. Negative for arthralgias, back pain, joint swelling, leg pain, myalgias and neck pain.   Integumentary:  Negative for rash.   Allergic/Immunologic: Negative.    Neurological:  Negative for dizziness, tremors, seizures, syncope, speech difficulty, weakness, light-headedness, numbness, headaches and memory loss.   Hematological:  Negative for adenopathy. Does not bruise/bleed easily.   Psychiatric/Behavioral:  Negative for agitation, confusion, hallucinations, sleep disturbance and suicidal ideas. The patient is not nervous/anxious.             Vitals:    05/26/23 1005   BP: 131/87   BP Location: Left arm   Patient Position: Sitting   Pulse: 107   Temp: 98.2 °F (36.8 °C)   TempSrc: Oral   SpO2: 99%   Weight: 94.8 kg (208 lb 14.4 oz)   Height: 5' 7" (1.702 m)         Wt Readings from Last 6 Encounters:   05/26/23 94.8 kg (208 lb 14.4 oz)   05/19/23 96.9 kg (213 lb 9.6 oz)   05/12/23 96.4 kg (212 lb 9.6 oz)   05/05/23 94.8 kg (209 lb)   04/21/23 96.6 kg (212 lb 14.4 oz) "   04/14/23 96.8 kg (213 lb 6.5 oz)           Body mass index is 32.72 kg/m².  Body surface area is 2.12 meters squared.       Physical Exam  Vitals and nursing note reviewed.   Constitutional:       General: He is not in acute distress.     Appearance: Normal appearance.   HENT:      Head: Normocephalic and atraumatic.      Mouth/Throat:      Mouth: Mucous membranes are moist.   Eyes:      General: No scleral icterus.     Extraocular Movements: Extraocular movements intact.      Conjunctiva/sclera: Conjunctivae normal.   Neck:      Vascular: No JVD.   Cardiovascular:      Rate and Rhythm: Normal rate and regular rhythm.      Heart sounds: No murmur heard.  Pulmonary:      Effort: Pulmonary effort is normal.      Breath sounds: Normal breath sounds.   Chest:      Chest wall: No tenderness.   Abdominal:      General: There is no distension.      Palpations: There is no fluid wave.      Tenderness: There is no abdominal tenderness.   Musculoskeletal:         General: No swelling or deformity.      Cervical back: Neck supple.   Lymphadenopathy:      Cervical: No cervical adenopathy.      Upper Body:      Right upper body: No supraclavicular or axillary adenopathy.      Left upper body: No supraclavicular or axillary adenopathy.      Lower Body: No right inguinal adenopathy. No left inguinal adenopathy.   Skin:     General: Skin is warm.      Coloration: Skin is not jaundiced.      Findings: No rash.   Neurological:      General: No focal deficit present.      Mental Status: He is alert and oriented to person, place, and time.      Motor: Weakness present.      Comments: Mobility assitssed by cane/walker   Psychiatric:         Attention and Perception: Attention normal.         Mood and Affect: Mood and affect normal.         Behavior: Behavior is cooperative.         Cognition and Memory: Cognition normal.         Judgment: Judgment normal.     ECOG SCORE    1 - Restricted in strenuous activity-ambulatory and able to  carry out work of a light nature         Laboratory:  CBC with Differential:  Lab Results   Component Value Date    WBC 1.35 (LL) 05/26/2023    RBC 4.53 (L) 05/26/2023    HGB 12.2 (L) 05/26/2023    HCT 38.7 (L) 05/26/2023    MCV 85.4 05/26/2023    MCH 26.9 (L) 05/26/2023    MCHC 31.5 (L) 05/26/2023    RDW 16.2 05/26/2023     (L) 05/26/2023    MPV 9.9 05/26/2023        CMP:  Sodium Level   Date Value Ref Range Status   05/26/2023 138 136 - 145 mmol/L Final     Potassium Level   Date Value Ref Range Status   05/26/2023 4.2 3.5 - 5.1 mmol/L Final     Carbon Dioxide   Date Value Ref Range Status   05/26/2023 22 (L) 23 - 31 mmol/L Final     Blood Urea Nitrogen   Date Value Ref Range Status   05/26/2023 10.8 8.4 - 25.7 mg/dL Final     Creatinine   Date Value Ref Range Status   05/26/2023 1.01 0.73 - 1.18 mg/dL Final     Calcium Level Total   Date Value Ref Range Status   05/26/2023 9.6 8.8 - 10.0 mg/dL Final     Albumin Level   Date Value Ref Range Status   05/26/2023 3.7 3.4 - 4.8 g/dL Final     Bilirubin Total   Date Value Ref Range Status   05/26/2023 0.7 <=1.5 mg/dL Final     Alkaline Phosphatase   Date Value Ref Range Status   05/26/2023 87 40 - 150 unit/L Final     Aspartate Aminotransferase   Date Value Ref Range Status   05/26/2023 14 5 - 34 unit/L Final     Alanine Aminotransferase   Date Value Ref Range Status   05/26/2023 16 0 - 55 unit/L Final     Estimated GFR-Non    Date Value Ref Range Status   04/19/2022 >60           Assessment:         1) [id number] Stage IA3 Squamous cell carcinoma of lung  --5/24/2022 s/p right upper lobectomy   2) Recurrence-Biopsy proven R4 LN  Educated the patient on the risks versus benefits as well as toxicities associated with treatment.  Verbally consented the patient to the treatment plan and the patient was educated on the planned duration of the treatment and schedule of the treatment administration.  All questions were answered.    Patient understands  that because of nationwide carboplatin/cisplatin showed as he will not receive Carboplatinum next week.  Today will be the last dose of Carboplatinum.  He will come next week for Taxol only.    2) Pancytopenia- Treatment related. Will folow for now  -Patient pancytopenia treatment related.  Today he is neutropenic therefore will going to hold chemotherapy.  Mild thrombocytopenia but improved compared to last week.  Anemia also improved.      Plan:       Patient with 1.6 right paratracheal LN and small Rt pleural effusion. Surveillance CT scan chest to eval stability with paratracheal enlarged lymph node which shows interval mild size increase and now measures 2.2 x 2.0 cm and previously was 1.6 x 1.5 cm.  Aortopulmonary window mildly enlarged lymph node is stable. PET CT with Hypermetabolic right paratracheal lymph node image 81 series 3 measures 25 x 20 mm with max SUV 27.0. Hypermetabolic anterior mediastinal lymph node anterior to the SVC measures 12 x 9 mm with max SUV 12.0.   Biopsy of R4 lymph node confirmed the metastatic squamous cell carcinoma. Discussed case with Dr Willis and he will be ready to start next week.     Started chemotherapy + XRT on 4/14/23 - patient prefers Fridays due to transportation  Due to complete XRT on 5/30/23  ANC today is 0.7, final cycle will be held today  Neutropenic precautions given, patient instructed to call office with fever of 100.4 or greater  Will send prescription for Levaquin 500 mg daily x 5 days to pharmacy  Weekly labs X 3 @ [facility], last will be here for appt on 6/16/23  RTC in 3 weeks with MD to discuss immunotherapy with durvalumab, same day labs: CBC, CMP - please schedule on Friday per patient request due to transportation  CT C/A/P 6 weeks after XRT - due 7/2023 - will order next visit  Patient Education  Will plan to begin immunotherapy on 6/23/23    Encourage to call or message us for any questions or problems  The patient was given ample opportunity to ask  questions, and to the best of my abilities, all questions answered to satisfaction; patient demonstrated understanding of what we discussed and agreeable to the plan.     CONNER FLORES MD      Professional Services   I, Shirley Pina LPN, acted solely as a scribe for and in the presence of Dr. Conner Flores, who performed these services.

## 2023-06-01 NOTE — ADDENDUM NOTE
Addendum  created 06/01/23 1412 by Rosalva Bassett MD    Attestation recorded in Intraprocedure, Intraprocedure Attestations filed

## 2023-06-02 ENCOUNTER — LAB VISIT (OUTPATIENT)
Dept: LAB | Facility: HOSPITAL | Age: 78
End: 2023-06-02
Attending: INTERNAL MEDICINE
Payer: MEDICARE

## 2023-06-02 DIAGNOSIS — Z79.899 ON ANTINEOPLASTIC CHEMOTHERAPY: ICD-10-CM

## 2023-06-02 DIAGNOSIS — C34.91 NON-SMALL CELL CANCER OF RIGHT LUNG: ICD-10-CM

## 2023-06-02 DIAGNOSIS — Z95.828 PORT-A-CATH IN PLACE: ICD-10-CM

## 2023-06-02 LAB
ABS NEUT CALC (OHS): 1.05 X10(3)/MCL (ref 2.1–9.2)
ALBUMIN SERPL-MCNC: 3.6 G/DL (ref 3.4–4.8)
ALBUMIN/GLOB SERPL: 1.2 RATIO (ref 1.1–2)
ALP SERPL-CCNC: 84 UNIT/L (ref 40–150)
ALT SERPL-CCNC: 14 UNIT/L (ref 0–55)
AST SERPL-CCNC: 16 UNIT/L (ref 5–34)
BILIRUBIN DIRECT+TOT PNL SERPL-MCNC: 0.5 MG/DL
BUN SERPL-MCNC: 12.2 MG/DL (ref 8.4–25.7)
CALCIUM SERPL-MCNC: 9.3 MG/DL (ref 8.8–10)
CHLORIDE SERPL-SCNC: 107 MMOL/L (ref 98–107)
CO2 SERPL-SCNC: 25 MMOL/L (ref 23–31)
CREAT SERPL-MCNC: 1.01 MG/DL (ref 0.73–1.18)
EOSINOPHIL NFR BLD MANUAL: 0.03 X10(3)/MCL (ref 0–0.9)
EOSINOPHIL NFR BLD MANUAL: 1 % (ref 0–8)
ERYTHROCYTE [DISTWIDTH] IN BLOOD BY AUTOMATED COUNT: 17.2 % (ref 11.5–17)
GFR SERPLBLD CREATININE-BSD FMLA CKD-EPI: >60 MLS/MIN/1.73/M2
GLOBULIN SER-MCNC: 3.1 GM/DL (ref 2.4–3.5)
GLUCOSE SERPL-MCNC: 110 MG/DL (ref 82–115)
HCT VFR BLD AUTO: 37.6 % (ref 42–52)
HGB BLD-MCNC: 11.7 G/DL (ref 14–18)
LYMPHOCYTES NFR BLD MANUAL: 0.54 X10(3)/MCL
LYMPHOCYTES NFR BLD MANUAL: 20 % (ref 13–40)
MCH RBC QN AUTO: 26.7 PG (ref 27–31)
MCHC RBC AUTO-ENTMCNC: 31.1 G/DL (ref 33–36)
MCV RBC AUTO: 85.8 FL (ref 80–94)
MONOCYTES NFR BLD MANUAL: 1.08 X10(3)/MCL (ref 0.1–1.3)
MONOCYTES NFR BLD MANUAL: 40 % (ref 2–11)
NEUTROPHILS NFR BLD MANUAL: 37 % (ref 47–80)
NEUTS BAND NFR BLD MANUAL: 2 % (ref 0–11)
PLATELET # BLD AUTO: 205 X10(3)/MCL (ref 130–400)
PLATELET # BLD EST: NORMAL 10*3/UL
PMV BLD AUTO: 9.1 FL (ref 7.4–10.4)
POTASSIUM SERPL-SCNC: 3.9 MMOL/L (ref 3.5–5.1)
PROT SERPL-MCNC: 6.7 GM/DL (ref 5.8–7.6)
RBC # BLD AUTO: 4.38 X10(6)/MCL (ref 4.7–6.1)
RBC MORPH BLD: NORMAL
SODIUM SERPL-SCNC: 143 MMOL/L (ref 136–145)
WBC # SPEC AUTO: 2.69 X10(3)/MCL (ref 4.5–11.5)

## 2023-06-02 PROCEDURE — 80053 COMPREHEN METABOLIC PANEL: CPT

## 2023-06-02 PROCEDURE — 85027 COMPLETE CBC AUTOMATED: CPT

## 2023-06-02 PROCEDURE — 36415 COLL VENOUS BLD VENIPUNCTURE: CPT

## 2023-06-07 ENCOUNTER — LAB VISIT (OUTPATIENT)
Dept: LAB | Facility: HOSPITAL | Age: 78
End: 2023-06-07
Attending: INTERNAL MEDICINE
Payer: MEDICARE

## 2023-06-07 DIAGNOSIS — Z79.899 ON ANTINEOPLASTIC CHEMOTHERAPY: ICD-10-CM

## 2023-06-07 DIAGNOSIS — T45.1X5A LEUKOPENIA DUE TO ANTINEOPLASTIC CHEMOTHERAPY: ICD-10-CM

## 2023-06-07 DIAGNOSIS — C34.91 NON-SMALL CELL CANCER OF RIGHT LUNG: ICD-10-CM

## 2023-06-07 DIAGNOSIS — D70.1 LEUKOPENIA DUE TO ANTINEOPLASTIC CHEMOTHERAPY: ICD-10-CM

## 2023-06-07 DIAGNOSIS — Z95.828 PORT-A-CATH IN PLACE: ICD-10-CM

## 2023-06-07 LAB
ALBUMIN SERPL-MCNC: 3.6 G/DL (ref 3.4–4.8)
ALBUMIN/GLOB SERPL: 1.2 RATIO (ref 1.1–2)
ALP SERPL-CCNC: 75 UNIT/L (ref 40–150)
ALT SERPL-CCNC: 21 UNIT/L (ref 0–55)
AST SERPL-CCNC: 20 UNIT/L (ref 5–34)
BASOPHILS # BLD AUTO: 0.02 X10(3)/MCL
BASOPHILS NFR BLD AUTO: 0.6 %
BILIRUBIN DIRECT+TOT PNL SERPL-MCNC: 0.6 MG/DL
BUN SERPL-MCNC: 10 MG/DL (ref 8.4–25.7)
CALCIUM SERPL-MCNC: 9.6 MG/DL (ref 8.8–10)
CHLORIDE SERPL-SCNC: 107 MMOL/L (ref 98–107)
CO2 SERPL-SCNC: 25 MMOL/L (ref 23–31)
CREAT SERPL-MCNC: 0.83 MG/DL (ref 0.73–1.18)
EOSINOPHIL # BLD AUTO: 0.01 X10(3)/MCL (ref 0–0.9)
EOSINOPHIL NFR BLD AUTO: 0.3 %
ERYTHROCYTE [DISTWIDTH] IN BLOOD BY AUTOMATED COUNT: 18.1 % (ref 11.5–17)
GFR SERPLBLD CREATININE-BSD FMLA CKD-EPI: >60 MLS/MIN/1.73/M2
GLOBULIN SER-MCNC: 3 GM/DL (ref 2.4–3.5)
GLUCOSE SERPL-MCNC: 93 MG/DL (ref 82–115)
HCT VFR BLD AUTO: 37 % (ref 42–52)
HGB BLD-MCNC: 11.8 G/DL (ref 14–18)
IMM GRANULOCYTES # BLD AUTO: 0.01 X10(3)/MCL (ref 0–0.04)
IMM GRANULOCYTES NFR BLD AUTO: 0.3 %
LYMPHOCYTES # BLD AUTO: 0.73 X10(3)/MCL (ref 0.6–4.6)
LYMPHOCYTES NFR BLD AUTO: 20.4 %
MCH RBC QN AUTO: 27.8 PG (ref 27–31)
MCHC RBC AUTO-ENTMCNC: 31.9 G/DL (ref 33–36)
MCV RBC AUTO: 87.1 FL (ref 80–94)
MONOCYTES # BLD AUTO: 0.62 X10(3)/MCL (ref 0.1–1.3)
MONOCYTES NFR BLD AUTO: 17.3 %
NEUTROPHILS # BLD AUTO: 2.19 X10(3)/MCL (ref 2.1–9.2)
NEUTROPHILS NFR BLD AUTO: 61.1 %
PLATELET # BLD AUTO: 195 X10(3)/MCL (ref 130–400)
PMV BLD AUTO: 9.1 FL (ref 7.4–10.4)
POTASSIUM SERPL-SCNC: 4.1 MMOL/L (ref 3.5–5.1)
PROT SERPL-MCNC: 6.6 GM/DL (ref 5.8–7.6)
RBC # BLD AUTO: 4.25 X10(6)/MCL (ref 4.7–6.1)
SODIUM SERPL-SCNC: 142 MMOL/L (ref 136–145)
WBC # SPEC AUTO: 3.58 X10(3)/MCL (ref 4.5–11.5)

## 2023-06-07 PROCEDURE — 80053 COMPREHEN METABOLIC PANEL: CPT

## 2023-06-07 PROCEDURE — 85025 COMPLETE CBC W/AUTO DIFF WBC: CPT

## 2023-06-07 PROCEDURE — 36415 COLL VENOUS BLD VENIPUNCTURE: CPT

## 2023-06-08 DIAGNOSIS — I10 HYPERTENSION, UNSPECIFIED TYPE: ICD-10-CM

## 2023-06-08 RX ORDER — AMLODIPINE BESYLATE 5 MG/1
TABLET ORAL
Qty: 180 TABLET | Refills: 1 | Status: SHIPPED | OUTPATIENT
Start: 2023-06-08 | End: 2023-12-22

## 2023-06-16 ENCOUNTER — CLINICAL SUPPORT (OUTPATIENT)
Dept: HEMATOLOGY/ONCOLOGY | Facility: CLINIC | Age: 78
End: 2023-06-16
Payer: MEDICARE

## 2023-06-16 ENCOUNTER — OFFICE VISIT (OUTPATIENT)
Dept: HEMATOLOGY/ONCOLOGY | Facility: CLINIC | Age: 78
End: 2023-06-16
Payer: MEDICARE

## 2023-06-16 VITALS
TEMPERATURE: 98 F | OXYGEN SATURATION: 99 % | BODY MASS INDEX: 32.83 KG/M2 | WEIGHT: 209.19 LBS | HEART RATE: 110 BPM | HEIGHT: 67 IN | DIASTOLIC BLOOD PRESSURE: 74 MMHG | SYSTOLIC BLOOD PRESSURE: 132 MMHG

## 2023-06-16 DIAGNOSIS — R53.83 FATIGUE, UNSPECIFIED TYPE: ICD-10-CM

## 2023-06-16 DIAGNOSIS — C34.91 NON-SMALL CELL CANCER OF RIGHT LUNG: Primary | ICD-10-CM

## 2023-06-16 PROCEDURE — 1157F PR ADVANCE CARE PLAN OR EQUIV PRESENT IN MEDICAL RECORD: ICD-10-PCS | Mod: CPTII,S$GLB,, | Performed by: INTERNAL MEDICINE

## 2023-06-16 PROCEDURE — 99999 PR PBB SHADOW E&M-EST. PATIENT-LVL IV: CPT | Mod: PBBFAC,,, | Performed by: INTERNAL MEDICINE

## 2023-06-16 PROCEDURE — 3075F SYST BP GE 130 - 139MM HG: CPT | Mod: CPTII,S$GLB,, | Performed by: INTERNAL MEDICINE

## 2023-06-16 PROCEDURE — 3288F FALL RISK ASSESSMENT DOCD: CPT | Mod: CPTII,S$GLB,, | Performed by: INTERNAL MEDICINE

## 2023-06-16 PROCEDURE — 99999 PR PBB SHADOW E&M-EST. PATIENT-LVL IV: ICD-10-PCS | Mod: PBBFAC,,, | Performed by: INTERNAL MEDICINE

## 2023-06-16 PROCEDURE — 3288F PR FALLS RISK ASSESSMENT DOCUMENTED: ICD-10-PCS | Mod: CPTII,S$GLB,, | Performed by: INTERNAL MEDICINE

## 2023-06-16 PROCEDURE — 1101F PR PT FALLS ASSESS DOC 0-1 FALLS W/OUT INJ PAST YR: ICD-10-PCS | Mod: CPTII,S$GLB,, | Performed by: INTERNAL MEDICINE

## 2023-06-16 PROCEDURE — 99215 PR OFFICE/OUTPT VISIT, EST, LEVL V, 40-54 MIN: ICD-10-PCS | Mod: S$GLB,,, | Performed by: INTERNAL MEDICINE

## 2023-06-16 PROCEDURE — 3078F PR MOST RECENT DIASTOLIC BLOOD PRESSURE < 80 MM HG: ICD-10-PCS | Mod: CPTII,S$GLB,, | Performed by: INTERNAL MEDICINE

## 2023-06-16 PROCEDURE — 1159F MED LIST DOCD IN RCRD: CPT | Mod: CPTII,S$GLB,, | Performed by: INTERNAL MEDICINE

## 2023-06-16 PROCEDURE — 1159F PR MEDICATION LIST DOCUMENTED IN MEDICAL RECORD: ICD-10-PCS | Mod: CPTII,S$GLB,, | Performed by: INTERNAL MEDICINE

## 2023-06-16 PROCEDURE — 1160F RVW MEDS BY RX/DR IN RCRD: CPT | Mod: CPTII,S$GLB,, | Performed by: INTERNAL MEDICINE

## 2023-06-16 PROCEDURE — 3078F DIAST BP <80 MM HG: CPT | Mod: CPTII,S$GLB,, | Performed by: INTERNAL MEDICINE

## 2023-06-16 PROCEDURE — 99215 OFFICE O/P EST HI 40 MIN: CPT | Mod: S$GLB,,, | Performed by: INTERNAL MEDICINE

## 2023-06-16 PROCEDURE — 1126F PR PAIN SEVERITY QUANTIFIED, NO PAIN PRESENT: ICD-10-PCS | Mod: CPTII,S$GLB,, | Performed by: INTERNAL MEDICINE

## 2023-06-16 PROCEDURE — 1126F AMNT PAIN NOTED NONE PRSNT: CPT | Mod: CPTII,S$GLB,, | Performed by: INTERNAL MEDICINE

## 2023-06-16 PROCEDURE — 3075F PR MOST RECENT SYSTOLIC BLOOD PRESS GE 130-139MM HG: ICD-10-PCS | Mod: CPTII,S$GLB,, | Performed by: INTERNAL MEDICINE

## 2023-06-16 PROCEDURE — 1101F PT FALLS ASSESS-DOCD LE1/YR: CPT | Mod: CPTII,S$GLB,, | Performed by: INTERNAL MEDICINE

## 2023-06-16 PROCEDURE — 1157F ADVNC CARE PLAN IN RCRD: CPT | Mod: CPTII,S$GLB,, | Performed by: INTERNAL MEDICINE

## 2023-06-16 PROCEDURE — 1160F PR REVIEW ALL MEDS BY PRESCRIBER/CLIN PHARMACIST DOCUMENTED: ICD-10-PCS | Mod: CPTII,S$GLB,, | Performed by: INTERNAL MEDICINE

## 2023-06-16 NOTE — PROGRESS NOTES
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 Right upper lobe 2.5 cm mass.  CT C 9/19/2022: Status post right partial pneumonectomy for resection of a right upper lobe lung mass with no residual recurrent mass seen. Small right-sided pleural effusion. Some lymphadenopathy in the right paratracheal region with a maximum short axis dimension of 1.6 cm.  Follow-up is recommended  CT C 1/25/2023: There is a paratracheal enlarged lymph node which shows interval mild size increase and now measures 2.2 x 2.0 cm and previously was 1.6 x 1.5 cm.  Aortopulmonary window mildly enlarged lymph node is stable.  Thoracic aorta is without aneurysmal dilatation or dissection.  Impression: 1. Operative changes of right upper lobectomy without bronchialstump soft tissue proliferation    2. No residual tumor load or metastatic nodule. 3. Paratracheal enlarged lymph node with interval size increase.  PET CT 2/9/2023:  Hypermetabolic right paratracheal lymph node image 81 series 3 measures 25 x 20 mm with max SUV 27.0.  Hypermetabolic anterior mediastinal lymph node anterior to the SVC measures 12 x 9 mm with max SUV 12.0. Impression: 1. Hypermetabolic metastatic mediastinal adenopathy.   2. No PET evidence of metastatic disease outside of the mediastinum.    Pathology:  03/22/2022 CT-guided biopsy of the right lung mass: invasive squamous cell carcinoma, moderately differentiated.  5/24/2022: Right upper lobectomy:  1- LYMPH NODE, LUMBAR RIGHT 10 LYMPHADENECTOMY: NEGATIVE FOR  METASTATIC CARCINOMA (0/1).   2- LYMPH NODE, 11R, LYMPHADENECTOMY: NEGATIVE FOR METASTATIC CARCINOMA (0/1).  3- LYMPH NODE, 9R, LYMPHADENECTOMY: NEGATIVE FOR METASTATIC CARCINOMA (0/1).   4- LYMPH NODE, 7R, LYMPHADENECTOMY: NEGATIVE FOR METASTATIC CARCINOMA (0/1).   5- LYMPH NODE, 8R, LYMPHADENECTOMY: ONE LYMPH NODE NEGATIVE FOR METASTATIC CARCINOMA (0/1).    6- LYMPH NODE, 12R, LYMPHADENECTOMY: NEGATIVE FOR METASTATIC CARCINOMA (0/1).  7- LUNG, RIGHT UPPER AND MIDDLE LOBES, PNEUMONECTOMY:  POORLY DIFFERENTIATED, G3, SQUAMOUS CELL CARCINOMA, INVASIVE.    - TUMOR SIZE: 3 CM.    - LYMPHOVASCULAR INVASION IS PRESENT.    - MARGINS NEGATIVE FOR INVASIVE CARCINOMA:    - NEAREST MARGIN, VASCULAR / BRONCHIAL 2.5 CM.    - NO PLEURAL SURFACE INVOLVEMENT     - PROMINENT NECROSIS PRESENT.  Visceral Pleura Invasion: Not identified  Number of Lymph Nodes Examined: Exact number : 6  pT Category: pT1c: pN0: No regional lymph node metastasis    3/8/2023 LYMPH NODE BIOPSY:   LYMPH NODE 4R, LYMPHADENECTOMY:  METASTATIC SQUAMOUS CELL CARCINOMA, NONKERATINIZING, MULTIPLE FRAGMENTS.       IMMUNOHISTOCHEMICAL STAINS:   CK 5/6, P63 AND CK7:  POSITIVE IN MALIGNANT CELLS.   CK20 AND TTF-1:  NEGATIVE IN MALIGNANT CELLS.         CLINICAL HISTORY:       Patient: Leonila Rosales is a 77 y.o. male kindly referred by  Dr. Diaz for evaluation of lung cancer.    On 01/17/2022 patient presented to the emergency department after a fall.  He reports that he was getting to his truck and sleep and fell on his left side on the truck step rail.  He started having pain in his left hip and knee.  He underwent a CT angiogram that showed No pulmonary embolus. Right upper lobe 2.5 cm mass.      During that hospitalization he was treated for a close intertrochanteric fracture of the left femur and underwent open reduction intramedullary nailing left comminuted intertrochanteric hip fracture on 01/18/2022 with Dr. Fortino Palomares.  He then spent several weeks on  rehab.    On 03/22/2022 he underwent CT-guided biopsy of the right lung mass.  Pathology showed invasive squamous cell carcinoma, moderately differentiated.    He is s/p right upper lobectomy with  on 5/24/2022.  Pathology report as above.  Patient is stage IA3 squamous cell carcinoma of the lung.  He has recovered well and has been doing good.     He lives alone and is able to perform ADL's. He uses a walker to aid in ambulation. He quit smoking January 2022, after smoking for 40 yrs.       Chief Complaint: OTHER (No concerns today.)      Interval History:    Patient presents today for follow up to discuss immunotherapy.  He completed XRT on 5/30/23 and 5 cycles of weekly carbo/taxol on 5/19/23. His final cycle was held due to neutropenia, ANC was 0.7.  He has no complaints today, states he is feeling fine. Denies any side effects. He denies shortness of breath, chest pain. No weight loss. Denies headaches. He uses a cane for mobility.  He drove himself to appointment today.        Past Medical History:   Diagnosis Date    Anemia     Closed intertrochanteric fracture     Deficiency of macronutrients     GERD (gastroesophageal reflux disease)     H. pylori infection     History of tobacco use     Hyperlipidemia     Hypertension     Lung mass     Malignant neoplasm of unspecified part of unspecified bronchus or lung     Non-small cell lung cancer       Past Surgical History:   Procedure Laterality Date    BIOPSY OF INTESTINE  04/04/2022    BRONCHOSCOPY      COLONOSCOPY      ESOPHAGOGASTRODUODENOSCOPY  04/04/2022    HIP FRACTURE SURGERY Left 2016    Intramedullary Nail Insertion Hip Left 01/18/2022    KIDNEY SURGERY Right 05/27/2022    MEDIASTINOSCOPY N/A 3/6/2023    Procedure: MEDIASTINOSCOPY;  Surgeon: Jose Diaz MD;  Location: [facility] OR;  Service: Cardiothoracic;  Laterality: N/A;  XX    PLACEMENT, MEDIPORT N/A 4/6/2023    Procedure: Placement, Mediport;  Surgeon: Jose Diaz MD;  Location: [facility]  OR;  Service: Cardiology;  Laterality: N/A;    SHOULDER SURGERY       Family History   Family history unknown: Yes     Social Connections: Socially Integrated    Frequency of Communication with Friends and Family: More than three times a week    Frequency of Social Gatherings with Friends and Family: More than three times a week    Attends [employer] Services: More than 4 times per year    Active Member of Clubs or Organizations: Yes    Attends Club or Organization Meetings: More than 4 times per year    Marital Status:        Review of patient's allergies indicates:  No Known Allergies   Current Outpatient Medications on File Prior to Visit   Medication Sig Dispense Refill    amLODIPine (NORVASC) 5 MG tablet TAKE ONE TABLET BY MOUTH TWICE DAILY 180 tablet 1    aspirin 81 mg Cap Take 81 mg by mouth Daily.      atorvastatin (LIPITOR) 10 MG tablet Take 1 tablet (10 mg total) by mouth once daily. 90 tablet 3    ferrous sulfate (FEOSOL) 325 mg (65 mg iron) Tab tablet Take 1 tablet (325 mg total) by mouth once daily. 30 tablet 3    levoFLOXacin (LEVAQUIN) 500 MG tablet Take 1 tablet (500 mg total) by mouth once daily. 5 tablet 0    LIDOcaine-prilocaine (EMLA) cream Apply topically as needed. Apply to port site 30 mins prior to chemo 30 g 3    LINZESS 145 mcg Cap capsule Take 145 mcg by mouth daily as needed. New medication, not started yet      ondansetron (ZOFRAN) 8 MG tablet Take 1 tablet (8 mg total) by mouth every 8 (eight) hours as needed for Nausea. 1st choice for nausea 30 tablet 2    prochlorperazine (COMPAZINE) 10 MG tablet Take 1 tablet (10 mg total) by mouth every 6 (six) hours as needed (for nausea). 2nd choice, can cause drowsiness. 30 tablet 3    traMADoL (ULTRAM) 50 mg tablet Take 1 tablet (50 mg total) by mouth every 6 (six) hours as needed for Pain. 12 tablet 0     No current facility-administered medications on file prior to visit.      Review of Systems   Constitutional:  Positive for fatigue  "(mild). Negative for activity change, appetite change, chills, diaphoresis, fever and unexpected weight change.   HENT:  Negative for nasal congestion, mouth sores, nosebleeds, postnasal drip, sinus pressure/congestion, sore throat and trouble swallowing.    Eyes: Negative.  Negative for visual disturbance.   Respiratory:  Positive for shortness of breath (baseline). Negative for cough.    Cardiovascular:  Positive for leg swelling (mild). Negative for chest pain and palpitations.        Around surgical scar   Gastrointestinal:  Positive for constipation. Negative for abdominal distention, abdominal pain, blood in stool, change in bowel habit, diarrhea, nausea, vomiting and change in bowel habit.   Endocrine: Negative.    Genitourinary:  Negative for bladder incontinence, decreased urine volume, difficulty urinating, dysuria, frequency, hematuria, scrotal swelling, testicular pain and urgency.   Musculoskeletal:  Positive for gait problem. Negative for arthralgias, back pain, joint swelling, leg pain, myalgias and neck pain.   Integumentary:  Negative for rash.   Allergic/Immunologic: Negative.    Neurological:  Negative for dizziness, tremors, seizures, syncope, speech difficulty, weakness, light-headedness, numbness, headaches and memory loss.   Hematological:  Negative for adenopathy. Does not bruise/bleed easily.   Psychiatric/Behavioral:  Negative for agitation, confusion, hallucinations, sleep disturbance and suicidal ideas. The patient is not nervous/anxious.             Vitals:    06/16/23 0938   BP: 132/74   BP Location: Right arm   Patient Position: Sitting   Pulse: 110   Temp: 98.2 °F (36.8 °C)   TempSrc: Oral   SpO2: 99%   Weight: 94.9 kg (209 lb 3.2 oz)   Height: 5' 7" (1.702 m)           Wt Readings from Last 6 Encounters:   06/16/23 94.9 kg (209 lb 3.2 oz)   05/26/23 94.8 kg (208 lb 14.4 oz)   05/19/23 96.9 kg (213 lb 9.6 oz)   05/12/23 96.4 kg (212 lb 9.6 oz)   05/05/23 94.8 kg (209 lb)   04/21/23 " 96.6 kg (212 lb 14.4 oz)     Body mass index is 32.77 kg/m².  Body surface area is 2.12 meters squared.       Physical Exam  Vitals and nursing note reviewed.   Constitutional:       General: He is not in acute distress.     Appearance: Normal appearance. He is obese.   HENT:      Head: Normocephalic and atraumatic.      Mouth/Throat:      Mouth: Mucous membranes are moist.   Eyes:      General: No scleral icterus.     Extraocular Movements: Extraocular movements intact.      Conjunctiva/sclera: Conjunctivae normal.   Neck:      Vascular: No JVD.   Cardiovascular:      Rate and Rhythm: Normal rate and regular rhythm.      Heart sounds: No murmur heard.  Pulmonary:      Effort: Pulmonary effort is normal.      Breath sounds: Decreased breath sounds present. No wheezing.   Chest:      Chest wall: No tenderness.   Abdominal:      General: There is no distension.      Palpations: There is no fluid wave.      Tenderness: There is no abdominal tenderness.   Musculoskeletal:         General: No swelling or deformity.      Cervical back: Neck supple.   Lymphadenopathy:      Cervical: No cervical adenopathy.      Upper Body:      Right upper body: No supraclavicular or axillary adenopathy.      Left upper body: No supraclavicular or axillary adenopathy.      Lower Body: No right inguinal adenopathy. No left inguinal adenopathy.   Skin:     General: Skin is warm.      Coloration: Skin is not jaundiced.      Findings: No rash.   Neurological:      General: No focal deficit present.      Mental Status: He is alert and oriented to person, place, and time.      Comments: Mobility assitssed by cane/walker   Psychiatric:         Attention and Perception: Attention normal.         Mood and Affect: Mood and affect normal.         Behavior: Behavior is cooperative.         Cognition and Memory: Cognition normal.         Judgment: Judgment normal.     ECOG SCORE             Laboratory:  CBC with Differential:  Lab Results   Component  Value Date    WBC 4.41 (L) 06/16/2023    RBC 4.25 (L) 06/16/2023    HGB 11.9 (L) 06/16/2023    HCT 38.2 (L) 06/16/2023    MCV 89.9 06/16/2023    MCH 28.0 06/16/2023    MCHC 31.2 (L) 06/16/2023    RDW 18.6 (H) 06/16/2023     06/16/2023    MPV 9.0 06/16/2023        CMP:  Sodium Level   Date Value Ref Range Status   06/16/2023 145 136 - 145 mmol/L Final     Potassium Level   Date Value Ref Range Status   06/16/2023 4.0 3.5 - 5.1 mmol/L Final     Carbon Dioxide   Date Value Ref Range Status   06/16/2023 24 23 - 31 mmol/L Final     Blood Urea Nitrogen   Date Value Ref Range Status   06/16/2023 10.7 8.4 - 25.7 mg/dL Final     Creatinine   Date Value Ref Range Status   06/16/2023 0.93 0.73 - 1.18 mg/dL Final     Calcium Level Total   Date Value Ref Range Status   06/16/2023 9.5 8.8 - 10.0 mg/dL Final     Albumin Level   Date Value Ref Range Status   06/16/2023 3.6 3.4 - 4.8 g/dL Final     Bilirubin Total   Date Value Ref Range Status   06/16/2023 0.7 <=1.5 mg/dL Final     Alkaline Phosphatase   Date Value Ref Range Status   06/16/2023 84 40 - 150 unit/L Final     Aspartate Aminotransferase   Date Value Ref Range Status   06/16/2023 18 5 - 34 unit/L Final     Alanine Aminotransferase   Date Value Ref Range Status   06/16/2023 18 0 - 55 unit/L Final     Estimated GFR-Non    Date Value Ref Range Status   04/19/2022 >60           Assessment:         1) [id number] 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  Verbally consented the patient to the treatment plan and the patient was educated on the planned duration of the treatment and schedule of the treatment administration.  All questions were answered.    3) Pancytopenia- Treatment related. Counts improving.        Plan:       Patient with 1.6 right paratracheal LN and small Rt pleural  effusion. Surveillance CT scan chest to eval stability with paratracheal enlarged lymph node which shows interval mild size increase and now measures 2.2 x 2.0 cm and previously was 1.6 x 1.5 cm.  Aortopulmonary window mildly enlarged lymph node is stable. PET CT with Hypermetabolic right paratracheal lymph node image 81 series 3 measures 25 x 20 mm with max SUV 27.0. Hypermetabolic anterior mediastinal lymph node anterior to the SVC measures 12 x 9 mm with max SUV 12.0.   Biopsy of R4 lymph node confirmed the metastatic squamous cell carcinoma. Discussed case with Dr Willis and he will be ready to start next week. Completed  XRT on 5/30/23  and 5 cycles of Carbo/taxol on 5/19/23. C6 was held on 5/26/23 due to neutropenia, ANC was 0.7.      Plan to begin immunotherapy with Imfinzi every 4 weeks - will schedule next week  RTC in 5 weeks for TD/lab/infusion same day - he lives in [place]  Labs: CBC, CMP, TSH   CT C/A/P 6 weeks after XRT - due 7/2023 - ordered  Will check TSH today  RTC next week for education and first cycle of Imfinzi and labs    Encourage to call or message us for any questions or problems  The patient was given ample opportunity to ask questions, and to the best of my abilities, all questions answered to satisfaction; patient demonstrated understanding of what we discussed and agreeable to the plan.     CONNER FLORES MD      Professional Services   I, Shirley Pina LPN, acted solely as a scribe for and in the presence of Dr. Conner Flores, who performed these services.

## 2023-06-16 NOTE — PROGRESS NOTES
Oncology Nutrition Evaluation      Leonila Rosales   1945    Oncology Provider:   Sanjuana Napoles MD    Reason for Visit:  Change in Treatment Education    Oncology/Hematology Diagnosis:   [id number] Stage IA3 Squamous cell carcinoma of lung  recurrence 3/6/23  s/p 5/24/2022 right upper lobectomy     Treatment Plan:  durvalumab    Nutrition Recommendations:  1. Regular diet as tolerated    Nutrition Assessment    6/16/23: This is a 77 y.o.male with a medical diagnosis of lung CA. He is switching to maintenance immunotherapy. Reports good appetite and po intake. No c/o n/v/c/d. Wt has been stable.    Nutrition Factors Affecting Intake  none identified    PMHx: GERD, H.pylori, HLD, HTN    Allergies: Patient has no known allergies.    Current Medications:    Current Outpatient Medications:     amLODIPine (NORVASC) 5 MG tablet, TAKE ONE TABLET BY MOUTH TWICE DAILY, Disp: 180 tablet, Rfl: 1    aspirin 81 mg Cap, Take 81 mg by mouth Daily., Disp: , Rfl:     atorvastatin (LIPITOR) 10 MG tablet, Take 1 tablet (10 mg total) by mouth once daily., Disp: 90 tablet, Rfl: 3    ferrous sulfate (FEOSOL) 325 mg (65 mg iron) Tab tablet, Take 1 tablet (325 mg total) by mouth once daily., Disp: 30 tablet, Rfl: 3    levoFLOXacin (LEVAQUIN) 500 MG tablet, Take 1 tablet (500 mg total) by mouth once daily., Disp: 5 tablet, Rfl: 0    LIDOcaine-prilocaine (EMLA) cream, Apply topically as needed. Apply to port site 30 mins prior to chemo, Disp: 30 g, Rfl: 3    LINZESS 145 mcg Cap capsule, Take 145 mcg by mouth daily as needed. New medication, not started yet, Disp: , Rfl:     ondansetron (ZOFRAN) 8 MG tablet, Take 1 tablet (8 mg total) by mouth every 8 (eight) hours as needed for Nausea. 1st choice for nausea, Disp: 30 tablet, Rfl: 2    prochlorperazine (COMPAZINE) 10 MG tablet, Take 1 tablet (10 mg total) by mouth every 6 (six) hours as needed (for nausea). 2nd choice, can cause drowsiness., Disp: 30 tablet, Rfl: 3    traMADoL (ULTRAM) 50  "mg tablet, Take 1 tablet (50 mg total) by mouth every 6 (six) hours as needed for Pain., Disp: 12 tablet, Rfl: 0    Labs: 6/16/23 chloride 110 (H)    Anthropometrics    Height:   Ht Readings from Last 1 Encounters:   06/16/23 5' 7" (1.702 m)      Weight:   Wt Readings from Last 3 Encounters:   06/16/23 94.9 kg (209 lb 3.2 oz)   05/26/23 94.8 kg (208 lb 14.4 oz)   05/19/23 96.9 kg (213 lb 9.6 oz)        Usual Body Weight: 94.9 kg (209 lb)  % Weight Change: 0    BMI: 32.7 (obese I)    Ideal Weight: 67.2 kg (148 lb)  % Ideal Weight: 141%      Nutrition Diagnosis    No nutrition diagnosis at this time.    Nutrition Risk  low    Nutrition Intervention    Interventions(treatment strategy):  None at this time.      Nutrition Monitoring and Evaluation    Ongoing monitoring not warranted at this time. Please consult RD prn.        Joanne Sepulveda, MS, RD, , LDN                                                                                                                                                                                                                                                                                 "

## 2023-06-16 NOTE — NURSING
I met with Leonila following his appointment with Dr. Napoles. He didn't meet with the patient educator today. He will be scheduled another day. He reported no feelings of depression or anxiety. He reported he has a good support system.

## 2023-06-21 ENCOUNTER — TELEPHONE (OUTPATIENT)
Dept: HEMATOLOGY/ONCOLOGY | Facility: CLINIC | Age: 78
End: 2023-06-21
Payer: MEDICARE

## 2023-06-22 ENCOUNTER — OFFICE VISIT (OUTPATIENT)
Dept: HEMATOLOGY/ONCOLOGY | Facility: CLINIC | Age: 78
End: 2023-06-22
Payer: MEDICARE

## 2023-06-22 ENCOUNTER — INFUSION (OUTPATIENT)
Dept: INFUSION THERAPY | Facility: HOSPITAL | Age: 78
End: 2023-06-22
Attending: INTERNAL MEDICINE
Payer: MEDICARE

## 2023-06-22 VITALS
RESPIRATION RATE: 16 BRPM | WEIGHT: 210.81 LBS | SYSTOLIC BLOOD PRESSURE: 131 MMHG | BODY MASS INDEX: 33.09 KG/M2 | HEART RATE: 130 BPM | OXYGEN SATURATION: 99 % | HEIGHT: 67 IN | DIASTOLIC BLOOD PRESSURE: 83 MMHG | TEMPERATURE: 98 F

## 2023-06-22 DIAGNOSIS — C34.00 MALIGNANT NEOPLASM OF HILUS OF LUNG, UNSPECIFIED LATERALITY: Primary | ICD-10-CM

## 2023-06-22 DIAGNOSIS — C34.90 NON-SMALL CELL LUNG CANCER, UNSPECIFIED LATERALITY: ICD-10-CM

## 2023-06-22 DIAGNOSIS — C34.91 NON-SMALL CELL CANCER OF RIGHT LUNG: Primary | ICD-10-CM

## 2023-06-22 PROCEDURE — 3079F DIAST BP 80-89 MM HG: CPT | Mod: CPTII,S$GLB,,

## 2023-06-22 PROCEDURE — 1159F MED LIST DOCD IN RCRD: CPT | Mod: CPTII,S$GLB,,

## 2023-06-22 PROCEDURE — A4216 STERILE WATER/SALINE, 10 ML: HCPCS | Performed by: INTERNAL MEDICINE

## 2023-06-22 PROCEDURE — 1101F PR PT FALLS ASSESS DOC 0-1 FALLS W/OUT INJ PAST YR: ICD-10-PCS | Mod: CPTII,S$GLB,,

## 2023-06-22 PROCEDURE — 1157F PR ADVANCE CARE PLAN OR EQUIV PRESENT IN MEDICAL RECORD: ICD-10-PCS | Mod: CPTII,S$GLB,,

## 2023-06-22 PROCEDURE — 1157F ADVNC CARE PLAN IN RCRD: CPT | Mod: CPTII,S$GLB,,

## 2023-06-22 PROCEDURE — 99999 PR PBB SHADOW E&M-EST. PATIENT-LVL III: ICD-10-PCS | Mod: PBBFAC,,,

## 2023-06-22 PROCEDURE — 1101F PT FALLS ASSESS-DOCD LE1/YR: CPT | Mod: CPTII,S$GLB,,

## 2023-06-22 PROCEDURE — 1159F PR MEDICATION LIST DOCUMENTED IN MEDICAL RECORD: ICD-10-PCS | Mod: CPTII,S$GLB,,

## 2023-06-22 PROCEDURE — 1126F PR PAIN SEVERITY QUANTIFIED, NO PAIN PRESENT: ICD-10-PCS | Mod: CPTII,S$GLB,,

## 2023-06-22 PROCEDURE — 99999 PR PBB SHADOW E&M-EST. PATIENT-LVL III: CPT | Mod: PBBFAC,,,

## 2023-06-22 PROCEDURE — 3075F SYST BP GE 130 - 139MM HG: CPT | Mod: CPTII,S$GLB,,

## 2023-06-22 PROCEDURE — 3288F FALL RISK ASSESSMENT DOCD: CPT | Mod: CPTII,S$GLB,,

## 2023-06-22 PROCEDURE — 99215 PR OFFICE/OUTPT VISIT, EST, LEVL V, 40-54 MIN: ICD-10-PCS | Mod: S$GLB,,,

## 2023-06-22 PROCEDURE — 99215 OFFICE O/P EST HI 40 MIN: CPT | Mod: S$GLB,,,

## 2023-06-22 PROCEDURE — 3075F PR MOST RECENT SYSTOLIC BLOOD PRESS GE 130-139MM HG: ICD-10-PCS | Mod: CPTII,S$GLB,,

## 2023-06-22 PROCEDURE — 3288F PR FALLS RISK ASSESSMENT DOCUMENTED: ICD-10-PCS | Mod: CPTII,S$GLB,,

## 2023-06-22 PROCEDURE — 1126F AMNT PAIN NOTED NONE PRSNT: CPT | Mod: CPTII,S$GLB,,

## 2023-06-22 PROCEDURE — 25000003 PHARM REV CODE 250: Performed by: INTERNAL MEDICINE

## 2023-06-22 PROCEDURE — 63600175 PHARM REV CODE 636 W HCPCS: Mod: JZ,JG | Performed by: INTERNAL MEDICINE

## 2023-06-22 PROCEDURE — 96413 CHEMO IV INFUSION 1 HR: CPT

## 2023-06-22 PROCEDURE — 3079F PR MOST RECENT DIASTOLIC BLOOD PRESSURE 80-89 MM HG: ICD-10-PCS | Mod: CPTII,S$GLB,,

## 2023-06-22 RX ORDER — EPINEPHRINE 0.3 MG/.3ML
0.3 INJECTION SUBCUTANEOUS ONCE AS NEEDED
Status: DISCONTINUED | OUTPATIENT
Start: 2023-06-22 | End: 2023-06-22 | Stop reason: HOSPADM

## 2023-06-22 RX ORDER — HEPARIN 100 UNIT/ML
500 SYRINGE INTRAVENOUS
Status: DISCONTINUED | OUTPATIENT
Start: 2023-06-22 | End: 2023-06-22 | Stop reason: HOSPADM

## 2023-06-22 RX ORDER — SODIUM CHLORIDE 0.9 % (FLUSH) 0.9 %
10 SYRINGE (ML) INJECTION
Status: DISCONTINUED | OUTPATIENT
Start: 2023-06-22 | End: 2023-06-22 | Stop reason: HOSPADM

## 2023-06-22 RX ORDER — DIPHENHYDRAMINE HYDROCHLORIDE 50 MG/ML
50 INJECTION INTRAMUSCULAR; INTRAVENOUS ONCE AS NEEDED
Status: DISCONTINUED | OUTPATIENT
Start: 2023-06-22 | End: 2023-06-22 | Stop reason: HOSPADM

## 2023-06-22 RX ADMIN — DURVALUMAB 1500 MG: 500 INJECTION, SOLUTION INTRAVENOUS at 11:06

## 2023-06-22 RX ADMIN — Medication 10 ML: at 01:06

## 2023-06-22 RX ADMIN — Medication 500 UNITS: at 01:06

## 2023-06-22 RX ADMIN — SODIUM CHLORIDE: 9 INJECTION, SOLUTION INTRAVENOUS at 11:06

## 2023-06-22 NOTE — PROGRESS NOTES
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    Treatment/Oncogy history:  5/24/2022 right upper lobectomy   Carbo/taxol + RT- April '23- June '23    Plan of care: chemoradiation--> maintenance therapy with durvalumab      Imaging:  CT angiogram 1/17/2022: No pulmonary embolus. Right upper lobe 2.5 cm mass.  CT C 9/19/2022: Status post right partial pneumonectomy for resection of a right upper lobe lung mass with no residual recurrent mass seen. Small right-sided pleural effusion. Some lymphadenopathy in the right paratracheal region with a maximum short axis dimension of 1.6 cm.  Follow-up is recommended  CT C 1/25/2023: There is a paratracheal enlarged lymph node which shows interval mild size increase and now measures 2.2 x 2.0 cm and previously was 1.6 x 1.5 cm.  Aortopulmonary window mildly enlarged lymph node is stable.  Thoracic aorta is without aneurysmal dilatation or dissection.  Impression: 1. Operative changes of right upper lobectomy without bronchialstump soft tissue proliferation    2. No residual tumor load or metastatic nodule. 3. Paratracheal enlarged lymph node with interval size increase.  PET CT 2/9/2023:  Hypermetabolic right paratracheal lymph node image 81 series 3 measures 25 x 20 mm with max SUV 27.0.  Hypermetabolic anterior mediastinal lymph node anterior to the SVC measures 12 x 9 mm with max SUV 12.0. Impression: 1. Hypermetabolic metastatic mediastinal adenopathy.   2. No PET evidence of metastatic disease outside of the mediastinum.    Pathology:  03/22/2022 CT-guided biopsy of the right lung mass: invasive squamous cell carcinoma, moderately differentiated.  5/24/2022: Right upper lobectomy:  1- LYMPH NODE, LUMBAR RIGHT 10 LYMPHADENECTOMY: NEGATIVE FOR METASTATIC CARCINOMA (0/1).   2- LYMPH NODE, 11R, LYMPHADENECTOMY: NEGATIVE FOR  METASTATIC CARCINOMA (0/1).  3- LYMPH NODE, 9R, LYMPHADENECTOMY: NEGATIVE FOR METASTATIC CARCINOMA (0/1).   4- LYMPH NODE, 7R, LYMPHADENECTOMY: NEGATIVE FOR METASTATIC CARCINOMA (0/1).   5- LYMPH NODE, 8R, LYMPHADENECTOMY: ONE LYMPH NODE NEGATIVE FOR METASTATIC CARCINOMA (0/1).    6- LYMPH NODE, 12R, LYMPHADENECTOMY: NEGATIVE FOR METASTATIC CARCINOMA (0/1).  7- LUNG, RIGHT UPPER AND MIDDLE LOBES, PNEUMONECTOMY:  POORLY DIFFERENTIATED, G3, SQUAMOUS CELL CARCINOMA, INVASIVE.    - TUMOR SIZE: 3 CM.    - LYMPHOVASCULAR INVASION IS PRESENT.    - MARGINS NEGATIVE FOR INVASIVE CARCINOMA:    - NEAREST MARGIN, VASCULAR / BRONCHIAL 2.5 CM.    - NO PLEURAL SURFACE INVOLVEMENT     - PROMINENT NECROSIS PRESENT.  Visceral Pleura Invasion: Not identified  Number of Lymph Nodes Examined: Exact number : 6  pT Category: pT1c: pN0: No regional lymph node metastasis    3/8/2023 LYMPH NODE BIOPSY:   LYMPH NODE 4R, LYMPHADENECTOMY:  METASTATIC SQUAMOUS CELL CARCINOMA, NONKERATINIZING, MULTIPLE FRAGMENTS.       IMMUNOHISTOCHEMICAL STAINS:   CK 5/6, P63 AND CK7:  POSITIVE IN MALIGNANT CELLS.   CK20 AND TTF-1:  NEGATIVE IN MALIGNANT CELLS.         CLINICAL HISTORY:       Patient: Leonila Rosales is a 77 y.o. male kindly referred by  Dr. Diza for evaluation of lung cancer.    On 01/17/2022 patient presented to the emergency department after a fall.  He reports that he was getting to his truck and sleep and fell on his left side on the truck step rail.  He started having pain in his left hip and knee.  He underwent a CT angiogram that showed No pulmonary embolus. Right upper lobe 2.5 cm mass.      During that hospitalization he was treated for a close intertrochanteric fracture of the left femur and underwent open reduction intramedullary nailing left comminuted intertrochanteric hip fracture on 01/18/2022 with Dr. Fortino Palomares.  He then spent several weeks on rehab.    On 03/22/2022 he underwent CT-guided biopsy of the right lung mass.   Pathology showed invasive squamous cell carcinoma, moderately differentiated.    He is s/p right upper lobectomy with  on 5/24/2022.  Pathology report as above.  Patient is stage IA3 squamous cell carcinoma of the lung.  He has recovered well and has been doing good.     He lives alone and is able to perform ADL's. He uses a walker to aid in ambulation. He quit smoking January 2022, after smoking for 40 yrs.       Chief Complaint: Therapy Education       Interval History:    6/22/23: Mr. Rosales presents to the clinic by himself for therapy education. Patient reports no major complaints at today's visit. Denies fever, chills, SOB, N/V/D, constipation, chest pain, recent infections, or bleeding.     6/16/23:Patient presents today for follow up and continuation of weekly carbo/taxol. He started weekly with XRT on 4/14/23. He's due for C6 today. C4 was delayed one week due to neutropenia, ANC 0.8,  he received on 5/12/23.  He is due to complete XRT on 5/30/23, will have 2 more after today. He has no complaints today, states he is feeling fine. Denies any side effects. He denies shortness of breath, chest pain. No weight loss. Denies headaches. He uses a cane for mobility.    ANC today 0.7, C6 will be held today. He received 5 total cycles.       Past Medical History:   Diagnosis Date    Anemia     Closed intertrochanteric fracture     Deficiency of macronutrients     GERD (gastroesophageal reflux disease)     H. pylori infection     History of tobacco use     Hyperlipidemia     Hypertension     Lung mass     Malignant neoplasm of unspecified part of unspecified bronchus or lung     Non-small cell lung cancer       Past Surgical History:   Procedure Laterality Date    BIOPSY OF INTESTINE  04/04/2022    BRONCHOSCOPY      COLONOSCOPY      ESOPHAGOGASTRODUODENOSCOPY  04/04/2022    HIP FRACTURE SURGERY Left 2016    Intramedullary Nail Insertion Hip Left 01/18/2022    KIDNEY SURGERY Right 05/27/2022     MEDIASTINOSCOPY N/A 3/6/2023    Procedure: MEDIASTINOSCOPY;  Surgeon: Jose Diaz MD;  Location: [facility] OR;  Service: Cardiothoracic;  Laterality: N/A;  XX    PLACEMENT, MEDIPORT N/A 4/6/2023    Procedure: Placement, Mediport;  Surgeon: Jose Diaz MD;  Location: [facility] OR;  Service: Cardiology;  Laterality: N/A;    SHOULDER SURGERY       Family History   Family history unknown: Yes     Social Connections: Socially Integrated    Frequency of Communication with Friends and Family: More than three times a week    Frequency of Social Gatherings with Friends and Family: More than three times a week    Attends Holiness Services: More than 4 times per year    Active Member of Clubs or Organizations: Yes    Attends Club or Organization Meetings: More than 4 times per year    Marital Status:        Review of patient's allergies indicates:  No Known Allergies   Current Outpatient Medications on File Prior to Visit   Medication Sig Dispense Refill    amLODIPine (NORVASC) 5 MG tablet TAKE ONE TABLET BY MOUTH TWICE DAILY 180 tablet 1    aspirin 81 mg Cap Take 81 mg by mouth Daily.      atorvastatin (LIPITOR) 10 MG tablet Take 1 tablet (10 mg total) by mouth once daily. 90 tablet 3    ferrous sulfate (FEOSOL) 325 mg (65 mg iron) Tab tablet Take 1 tablet (325 mg total) by mouth once daily. 30 tablet 3    levoFLOXacin (LEVAQUIN) 500 MG tablet Take 1 tablet (500 mg total) by mouth once daily. 5 tablet 0    LIDOcaine-prilocaine (EMLA) cream Apply topically as needed. Apply to port site 30 mins prior to chemo 30 g 3    LINZESS 145 mcg Cap capsule Take 145 mcg by mouth daily as needed. New medication, not started yet      ondansetron (ZOFRAN) 8 MG tablet Take 1 tablet (8 mg total) by mouth every 8 (eight) hours as needed for Nausea. 1st choice for nausea 30 tablet 2    prochlorperazine (COMPAZINE) 10 MG tablet Take 1 tablet (10 mg total) by mouth every 6 (six) hours as needed (for nausea). 2nd choice, can cause  "drowsiness. 30 tablet 3    traMADoL (ULTRAM) 50 mg tablet Take 1 tablet (50 mg total) by mouth every 6 (six) hours as needed for Pain. 12 tablet 0     No current facility-administered medications on file prior to visit.      Review of Systems   Constitutional:  Positive for fatigue. Negative for activity change, appetite change, chills, diaphoresis, fever and unexpected weight change.   HENT:  Negative for nasal congestion, mouth sores, nosebleeds, postnasal drip, sinus pressure/congestion, sore throat and trouble swallowing.    Eyes: Negative.  Negative for visual disturbance.   Respiratory:  Negative for cough and shortness of breath.    Cardiovascular:  Negative for chest pain, palpitations and leg swelling (mild).   Gastrointestinal:  Negative for abdominal distention, abdominal pain, blood in stool, change in bowel habit, constipation, diarrhea, nausea, vomiting and change in bowel habit.   Endocrine: Negative.    Genitourinary:  Negative for bladder incontinence, decreased urine volume, difficulty urinating, dysuria, frequency, hematuria, scrotal swelling, testicular pain and urgency.   Musculoskeletal:  Positive for gait problem. Negative for arthralgias, back pain, joint swelling, leg pain, myalgias and neck pain.        Walks with a cane   Integumentary:  Negative for rash.   Allergic/Immunologic: Negative.    Neurological:  Negative for dizziness, tremors, seizures, syncope, speech difficulty, weakness, light-headedness, numbness, headaches and memory loss.   Hematological:  Negative for adenopathy. Does not bruise/bleed easily.   Psychiatric/Behavioral:  Negative for agitation, confusion, hallucinations, sleep disturbance and suicidal ideas. The patient is not nervous/anxious.             Vitals:    06/22/23 1001   BP: 131/83   Pulse: (!) 130   Resp: 16   Temp: 98.1 °F (36.7 °C)   SpO2: 99%   Weight: 95.6 kg (210 lb 12.8 oz)   Height: 5' 7" (1.702 m)           Wt Readings from Last 6 Encounters: "   06/16/23 94.9 kg (209 lb 3.2 oz)   05/26/23 94.8 kg (208 lb 14.4 oz)   05/19/23 96.9 kg (213 lb 9.6 oz)   05/12/23 96.4 kg (212 lb 9.6 oz)   05/05/23 94.8 kg (209 lb)   04/21/23 96.6 kg (212 lb 14.4 oz)           Body mass index is 33.02 kg/m².  Body surface area is 2.13 meters squared.        Laboratory:  CBC with Differential:  Lab Results   Component Value Date    WBC 4.41 (L) 06/16/2023    RBC 4.25 (L) 06/16/2023    HGB 11.9 (L) 06/16/2023    HCT 38.2 (L) 06/16/2023    MCV 89.9 06/16/2023    MCH 28.0 06/16/2023    MCHC 31.2 (L) 06/16/2023    RDW 18.6 (H) 06/16/2023     06/16/2023    MPV 9.0 06/16/2023        CMP:  Sodium Level   Date Value Ref Range Status   06/16/2023 145 136 - 145 mmol/L Final     Potassium Level   Date Value Ref Range Status   06/16/2023 4.0 3.5 - 5.1 mmol/L Final     Carbon Dioxide   Date Value Ref Range Status   06/16/2023 24 23 - 31 mmol/L Final     Blood Urea Nitrogen   Date Value Ref Range Status   06/16/2023 10.7 8.4 - 25.7 mg/dL Final     Creatinine   Date Value Ref Range Status   06/16/2023 0.93 0.73 - 1.18 mg/dL Final     Calcium Level Total   Date Value Ref Range Status   06/16/2023 9.5 8.8 - 10.0 mg/dL Final     Albumin Level   Date Value Ref Range Status   06/16/2023 3.6 3.4 - 4.8 g/dL Final     Bilirubin Total   Date Value Ref Range Status   06/16/2023 0.7 <=1.5 mg/dL Final     Alkaline Phosphatase   Date Value Ref Range Status   06/16/2023 84 40 - 150 unit/L Final     Aspartate Aminotransferase   Date Value Ref Range Status   06/16/2023 18 5 - 34 unit/L Final     Alanine Aminotransferase   Date Value Ref Range Status   06/16/2023 18 0 - 55 unit/L Final     Estimated GFR-Non    Date Value Ref Range Status   04/19/2022 >60           Assessment:   1) [id number] 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  -Plans to start Durvalumab on 6/22/23.  -CT C/A/P  scheduled for 7/10/23.  -Mediport placed.  -Zofran PRN prescribed for at home with instructions to be taken by mouth every 8 hours as needed for nausea. If not working, Compazine prescribed as 2nd choice can be taken every 6 hours as needed.  -OTC Imodium AD recommended for diarrhea (4-6 BMs a day). Take 2 tablets after the first loose bowel movement, and 1 tablet after each loose bowel movement after the first dose has been taken. No more than 4 tablets should be taken in any 24-hour period. If not working, Lomotil can be prescribed as 2nd choice.      -Emphasized adequate hand-hygiene and limited contact with people who are sick.   -Monitor and notify any bleeding in urine, stool, or sputum.  As well as unusual bleeding or bruising and stomach pain.   - Emphasized hydration with 4 16 oz bottles of water a day.    -Importance of moisturizing with fragrance free lotion to prevent skin rash and/or hand-foot syndrome.  -Call clinic if fever >100.4, shakes or chills, shortness of breath, chest pain, uncontrolled vomiting or diarrhea, pain and tingling in the chest or arm, or just not feeling well.    - RTC on 7/20/23 for same day labs and toxicity check with Dr. Napoles.    Discussion:    ·       A total of 60 minutes were spent in counseling today of which 100% was face-to-face.   At today's teaching session we discussed the patient's cancer diagnosis as well as planned immunotherapy regimen, protocol, side effects and toxicities.  Handouts of each agent was given and reviewed.    ·       We reviewed that side effects and toxicities typically occur after 4-10 weeks of treatment, and include but are not limited to:    The following side effects are common (occurring in greater than 30%) for patients taking durvalumab:    Fatigue  Infection    These are less common side effects (occurring in 10-29%) for patients receiving durvalumab:    Muscle and/or bone pain  Constipation  Decreased  appetite  Rash  Nausea  Swelling  Urinary tract infections  Abdominal pain  Fever  Colitis  Diarrhea  Decreased sodium level  Decreased lymphocyte count     ·       Discussed the risk of immune-mediated pneumonitis, including interstitial lung disease.  Instructed to contact our office for new or worsening chest pain, severe cough or shortness of breath.    ·       Discussed the risk of immune-mediated colitis and diarrhea. Instructed to contact our office for diarrhea she cannot control or that results in =4 bowel movements per day.    ·       Discussed the risk of immune mediated hepatitis. Instructed to contact office for right upper quadrant pain, yellowing of skin or eyes, concentrated yellow urine, severe nausea and vomiting, or severe abdominal pain.    ·       Discussed the risk of immune-mediated nephritis. Instructed patient on possible symptoms of decreased urine output, blood in the urine or very dark urine, overall swelling, or very high blood pressure.    ·       Discussed the risk of immune-mediated cardiomyopathy, including symptoms of edema, including periorbital edema, generalized edema, peripheral edema, and pulmonary edema.    ·       Discussed the risk of immune-mediated adrenal insufficiency or hypophysitis, including symptoms of headache, visual disturbances, fatigue, weight loss, and hypotension.    ·       Discussed immune-mediated thyroiditis, which can result in either hypothyroid or hyperthyroid. These will be managed according to symptoms, without a dose adjustment needed.    ·       Discussed the risk of rashes including an immune-mediated rash, Mancilla-Lambert syndrome (SJS), and toxic epidermal necrolysis (TENS). Also possible are vitiligo/skin hypopigmentation.  Instructed to contact our office for any new onset moderate to severe rash.    ·       Discussed the risk of immune-mediated encephalitis, including symptoms of altered mental status, headache, fever, confusion, agitation or  hallucinations, seizures, loss of sensation or paralysis, muscle weakness, double vision, problems with speech or hearing, or loss on consciousness.    ·       Discussed musculoskeletal side effects of muscle and joint pain.    ·       Discussed respiratory side effects of cough and dyspnea, increased risk of upper respiratory infections.    ·       Discussed GI side effects of nausea and vomiting, and to a lesser extent abdominal pain, decreased appetite, wt loss, and constipation.    ·       Discussed possible side effects of edema, including periorbital edema, generalized edema, peripheral edema, and pulmonary edema.    ·       Discussed general side effects of fatigue and fever.    ·       Discussed possible electrolyte abnormalities.    ·       Discussed possible hematologic toxicities.    ·       Instructed to contact our office for discussion of medication changes, vaccination, the addition of vitamin and/or herbal supplementation as they may interact with some immunotherapy agents.    ·       Discussed dietary modifications and the need to maintain adequate caloric intake and proper oral hydration.  Recommended at least 64-80 oz of fluid per day.    ·       Discussed anti-emetic protocol and bowel regimen protocol.    ·       Office contact information given including after hours number.  Discussed there is an oncologist on call 24/7, 365 days, including weekends.  Provided phone numbers to use as necessary during business, Monday through Friday.    ·       A tour of our infusion room was given.    ·       In summary, the patient is in agreement with the treatment plan.  Questions appeared to be answered to their satisfaction. Consented the patient to the treatment plan and the patient was educated on the planned duration of the treatment and schedule of the treatment administration. Copy scanned into the chart.    All questions answered to the satisfaction of the patient and family.     Follow up  appointments given to dave AGUILLON FNP-C  PATIENT EDUCATOR  [facility]

## 2023-06-30 DIAGNOSIS — E78.5 HYPERLIPIDEMIA, UNSPECIFIED HYPERLIPIDEMIA TYPE: Primary | ICD-10-CM

## 2023-07-03 RX ORDER — ATORVASTATIN CALCIUM 10 MG/1
TABLET, FILM COATED ORAL
Qty: 90 TABLET | Refills: 3 | Status: SHIPPED | OUTPATIENT
Start: 2023-07-03

## 2023-07-10 ENCOUNTER — HOSPITAL ENCOUNTER (OUTPATIENT)
Dept: RADIOLOGY | Facility: HOSPITAL | Age: 78
Discharge: HOME | End: 2023-07-10
Attending: INTERNAL MEDICINE
Payer: MEDICARE

## 2023-07-10 DIAGNOSIS — R53.83 FATIGUE, UNSPECIFIED TYPE: ICD-10-CM

## 2023-07-10 DIAGNOSIS — C34.91 NON-SMALL CELL CANCER OF RIGHT LUNG: ICD-10-CM

## 2023-07-10 PROCEDURE — 25500020 PHARM REV CODE 255: Performed by: INTERNAL MEDICINE

## 2023-07-10 PROCEDURE — 71260 CT THORAX DX C+: CPT | Mod: TC

## 2023-07-10 PROCEDURE — 74177 CT ABD & PELVIS W/CONTRAST: CPT | Mod: TC

## 2023-07-10 RX ADMIN — IOPAMIDOL 100 ML: 755 INJECTION, SOLUTION INTRAVENOUS at 01:07

## 2023-07-10 RX ADMIN — DIATRIZOATE MEGLUMINE AND DIATRIZOATE SODIUM 30 ML: 660; 100 LIQUID ORAL; RECTAL at 01:07

## 2023-07-17 PROBLEM — Z00.00 MEDICARE ANNUAL WELLNESS VISIT, SUBSEQUENT: Status: RESOLVED | Noted: 2023-04-12 | Resolved: 2023-07-17

## 2023-07-20 ENCOUNTER — INFUSION (OUTPATIENT)
Dept: INFUSION THERAPY | Facility: HOSPITAL | Age: 78
End: 2023-07-20
Attending: NURSE PRACTITIONER
Payer: MEDICARE

## 2023-07-20 ENCOUNTER — OFFICE VISIT (OUTPATIENT)
Dept: HEMATOLOGY/ONCOLOGY | Facility: CLINIC | Age: 78
End: 2023-07-20
Payer: MEDICARE

## 2023-07-20 VITALS
DIASTOLIC BLOOD PRESSURE: 72 MMHG | HEART RATE: 110 BPM | SYSTOLIC BLOOD PRESSURE: 125 MMHG | OXYGEN SATURATION: 99 % | WEIGHT: 207.13 LBS | BODY MASS INDEX: 32.51 KG/M2 | TEMPERATURE: 98 F | HEIGHT: 67 IN

## 2023-07-20 DIAGNOSIS — C34.91 NON-SMALL CELL CANCER OF RIGHT LUNG: Primary | ICD-10-CM

## 2023-07-20 DIAGNOSIS — R53.83 FATIGUE, UNSPECIFIED TYPE: ICD-10-CM

## 2023-07-20 DIAGNOSIS — C34.00 MALIGNANT NEOPLASM OF HILUS OF LUNG, UNSPECIFIED LATERALITY: Primary | ICD-10-CM

## 2023-07-20 DIAGNOSIS — C34.90 NON-SMALL CELL LUNG CANCER, UNSPECIFIED LATERALITY: ICD-10-CM

## 2023-07-20 PROCEDURE — 1126F PR PAIN SEVERITY QUANTIFIED, NO PAIN PRESENT: ICD-10-PCS | Mod: CPTII,S$GLB,, | Performed by: INTERNAL MEDICINE

## 2023-07-20 PROCEDURE — 1159F MED LIST DOCD IN RCRD: CPT | Mod: CPTII,S$GLB,, | Performed by: INTERNAL MEDICINE

## 2023-07-20 PROCEDURE — 3078F DIAST BP <80 MM HG: CPT | Mod: CPTII,S$GLB,, | Performed by: INTERNAL MEDICINE

## 2023-07-20 PROCEDURE — 99999 PR PBB SHADOW E&M-EST. PATIENT-LVL IV: ICD-10-PCS | Mod: PBBFAC,,, | Performed by: INTERNAL MEDICINE

## 2023-07-20 PROCEDURE — 3078F PR MOST RECENT DIASTOLIC BLOOD PRESSURE < 80 MM HG: ICD-10-PCS | Mod: CPTII,S$GLB,, | Performed by: INTERNAL MEDICINE

## 2023-07-20 PROCEDURE — 1157F ADVNC CARE PLAN IN RCRD: CPT | Mod: CPTII,S$GLB,, | Performed by: INTERNAL MEDICINE

## 2023-07-20 PROCEDURE — 1159F PR MEDICATION LIST DOCUMENTED IN MEDICAL RECORD: ICD-10-PCS | Mod: CPTII,S$GLB,, | Performed by: INTERNAL MEDICINE

## 2023-07-20 PROCEDURE — 99215 PR OFFICE/OUTPT VISIT, EST, LEVL V, 40-54 MIN: ICD-10-PCS | Mod: S$GLB,,, | Performed by: INTERNAL MEDICINE

## 2023-07-20 PROCEDURE — 96413 CHEMO IV INFUSION 1 HR: CPT

## 2023-07-20 PROCEDURE — 3074F PR MOST RECENT SYSTOLIC BLOOD PRESSURE < 130 MM HG: ICD-10-PCS | Mod: CPTII,S$GLB,, | Performed by: INTERNAL MEDICINE

## 2023-07-20 PROCEDURE — 25000003 PHARM REV CODE 250: Performed by: INTERNAL MEDICINE

## 2023-07-20 PROCEDURE — 99215 OFFICE O/P EST HI 40 MIN: CPT | Mod: S$GLB,,, | Performed by: INTERNAL MEDICINE

## 2023-07-20 PROCEDURE — 1101F PR PT FALLS ASSESS DOC 0-1 FALLS W/OUT INJ PAST YR: ICD-10-PCS | Mod: CPTII,S$GLB,, | Performed by: INTERNAL MEDICINE

## 2023-07-20 PROCEDURE — 1160F PR REVIEW ALL MEDS BY PRESCRIBER/CLIN PHARMACIST DOCUMENTED: ICD-10-PCS | Mod: CPTII,S$GLB,, | Performed by: INTERNAL MEDICINE

## 2023-07-20 PROCEDURE — 63600175 PHARM REV CODE 636 W HCPCS: Mod: JZ,JG | Performed by: INTERNAL MEDICINE

## 2023-07-20 PROCEDURE — 3074F SYST BP LT 130 MM HG: CPT | Mod: CPTII,S$GLB,, | Performed by: INTERNAL MEDICINE

## 2023-07-20 PROCEDURE — 1157F PR ADVANCE CARE PLAN OR EQUIV PRESENT IN MEDICAL RECORD: ICD-10-PCS | Mod: CPTII,S$GLB,, | Performed by: INTERNAL MEDICINE

## 2023-07-20 PROCEDURE — 1126F AMNT PAIN NOTED NONE PRSNT: CPT | Mod: CPTII,S$GLB,, | Performed by: INTERNAL MEDICINE

## 2023-07-20 PROCEDURE — 3288F PR FALLS RISK ASSESSMENT DOCUMENTED: ICD-10-PCS | Mod: CPTII,S$GLB,, | Performed by: INTERNAL MEDICINE

## 2023-07-20 PROCEDURE — 99999 PR PBB SHADOW E&M-EST. PATIENT-LVL IV: CPT | Mod: PBBFAC,,, | Performed by: INTERNAL MEDICINE

## 2023-07-20 PROCEDURE — 1160F RVW MEDS BY RX/DR IN RCRD: CPT | Mod: CPTII,S$GLB,, | Performed by: INTERNAL MEDICINE

## 2023-07-20 PROCEDURE — 3288F FALL RISK ASSESSMENT DOCD: CPT | Mod: CPTII,S$GLB,, | Performed by: INTERNAL MEDICINE

## 2023-07-20 PROCEDURE — 1101F PT FALLS ASSESS-DOCD LE1/YR: CPT | Mod: CPTII,S$GLB,, | Performed by: INTERNAL MEDICINE

## 2023-07-20 RX ORDER — EPINEPHRINE 0.3 MG/.3ML
0.3 INJECTION SUBCUTANEOUS ONCE AS NEEDED
Status: CANCELLED | OUTPATIENT
Start: 2023-07-20

## 2023-07-20 RX ORDER — DIPHENHYDRAMINE HYDROCHLORIDE 50 MG/ML
50 INJECTION INTRAMUSCULAR; INTRAVENOUS ONCE AS NEEDED
Status: CANCELLED | OUTPATIENT
Start: 2023-07-20

## 2023-07-20 RX ORDER — EPINEPHRINE 0.3 MG/.3ML
0.3 INJECTION SUBCUTANEOUS ONCE AS NEEDED
Status: DISCONTINUED | OUTPATIENT
Start: 2023-07-20 | End: 2023-07-20 | Stop reason: HOSPADM

## 2023-07-20 RX ORDER — HEPARIN 100 UNIT/ML
500 SYRINGE INTRAVENOUS
Status: CANCELLED | OUTPATIENT
Start: 2023-07-20

## 2023-07-20 RX ORDER — HEPARIN 100 UNIT/ML
500 SYRINGE INTRAVENOUS
Status: DISCONTINUED | OUTPATIENT
Start: 2023-07-20 | End: 2023-07-20 | Stop reason: HOSPADM

## 2023-07-20 RX ORDER — SODIUM CHLORIDE 0.9 % (FLUSH) 0.9 %
10 SYRINGE (ML) INJECTION
Status: DISCONTINUED | OUTPATIENT
Start: 2023-07-20 | End: 2023-07-20 | Stop reason: HOSPADM

## 2023-07-20 RX ORDER — DIPHENHYDRAMINE HYDROCHLORIDE 50 MG/ML
50 INJECTION INTRAMUSCULAR; INTRAVENOUS ONCE AS NEEDED
Status: DISCONTINUED | OUTPATIENT
Start: 2023-07-20 | End: 2023-07-20 | Stop reason: HOSPADM

## 2023-07-20 RX ORDER — SODIUM CHLORIDE 0.9 % (FLUSH) 0.9 %
10 SYRINGE (ML) INJECTION
Status: CANCELLED | OUTPATIENT
Start: 2023-07-20

## 2023-07-20 RX ADMIN — DURVALUMAB 1500 MG: 500 INJECTION, SOLUTION INTRAVENOUS at 10:07

## 2023-08-17 ENCOUNTER — INFUSION (OUTPATIENT)
Dept: INFUSION THERAPY | Facility: HOSPITAL | Age: 78
End: 2023-08-17
Attending: NURSE PRACTITIONER
Payer: MEDICARE

## 2023-08-17 ENCOUNTER — OFFICE VISIT (OUTPATIENT)
Dept: HEMATOLOGY/ONCOLOGY | Facility: CLINIC | Age: 78
End: 2023-08-17
Payer: MEDICARE

## 2023-08-17 VITALS
HEIGHT: 67 IN | RESPIRATION RATE: 18 BRPM | SYSTOLIC BLOOD PRESSURE: 127 MMHG | HEART RATE: 94 BPM | WEIGHT: 205.5 LBS | TEMPERATURE: 98 F | BODY MASS INDEX: 32.25 KG/M2 | OXYGEN SATURATION: 97 % | DIASTOLIC BLOOD PRESSURE: 77 MMHG

## 2023-08-17 DIAGNOSIS — C34.90 NON-SMALL CELL LUNG CANCER, UNSPECIFIED LATERALITY: ICD-10-CM

## 2023-08-17 DIAGNOSIS — C34.00 MALIGNANT NEOPLASM OF HILUS OF LUNG, UNSPECIFIED LATERALITY: Primary | ICD-10-CM

## 2023-08-17 DIAGNOSIS — R53.83 FATIGUE, UNSPECIFIED TYPE: ICD-10-CM

## 2023-08-17 DIAGNOSIS — C34.91 NON-SMALL CELL CANCER OF RIGHT LUNG: Primary | ICD-10-CM

## 2023-08-17 PROCEDURE — 3078F DIAST BP <80 MM HG: CPT | Mod: CPTII,S$GLB,, | Performed by: NURSE PRACTITIONER

## 2023-08-17 PROCEDURE — 3074F SYST BP LT 130 MM HG: CPT | Mod: CPTII,S$GLB,, | Performed by: NURSE PRACTITIONER

## 2023-08-17 PROCEDURE — 1101F PR PT FALLS ASSESS DOC 0-1 FALLS W/OUT INJ PAST YR: ICD-10-PCS | Mod: CPTII,S$GLB,, | Performed by: NURSE PRACTITIONER

## 2023-08-17 PROCEDURE — 96413 CHEMO IV INFUSION 1 HR: CPT

## 2023-08-17 PROCEDURE — 1160F PR REVIEW ALL MEDS BY PRESCRIBER/CLIN PHARMACIST DOCUMENTED: ICD-10-PCS | Mod: CPTII,S$GLB,, | Performed by: NURSE PRACTITIONER

## 2023-08-17 PROCEDURE — 1159F MED LIST DOCD IN RCRD: CPT | Mod: CPTII,S$GLB,, | Performed by: NURSE PRACTITIONER

## 2023-08-17 PROCEDURE — 3078F PR MOST RECENT DIASTOLIC BLOOD PRESSURE < 80 MM HG: ICD-10-PCS | Mod: CPTII,S$GLB,, | Performed by: NURSE PRACTITIONER

## 2023-08-17 PROCEDURE — 3074F PR MOST RECENT SYSTOLIC BLOOD PRESSURE < 130 MM HG: ICD-10-PCS | Mod: CPTII,S$GLB,, | Performed by: NURSE PRACTITIONER

## 2023-08-17 PROCEDURE — 99999 PR PBB SHADOW E&M-EST. PATIENT-LVL IV: ICD-10-PCS | Mod: PBBFAC,,, | Performed by: NURSE PRACTITIONER

## 2023-08-17 PROCEDURE — 3288F FALL RISK ASSESSMENT DOCD: CPT | Mod: CPTII,S$GLB,, | Performed by: NURSE PRACTITIONER

## 2023-08-17 PROCEDURE — 99215 OFFICE O/P EST HI 40 MIN: CPT | Mod: S$GLB,,, | Performed by: NURSE PRACTITIONER

## 2023-08-17 PROCEDURE — 1157F ADVNC CARE PLAN IN RCRD: CPT | Mod: CPTII,S$GLB,, | Performed by: NURSE PRACTITIONER

## 2023-08-17 PROCEDURE — 99999 PR PBB SHADOW E&M-EST. PATIENT-LVL IV: CPT | Mod: PBBFAC,,, | Performed by: NURSE PRACTITIONER

## 2023-08-17 PROCEDURE — 99215 PR OFFICE/OUTPT VISIT, EST, LEVL V, 40-54 MIN: ICD-10-PCS | Mod: S$GLB,,, | Performed by: NURSE PRACTITIONER

## 2023-08-17 PROCEDURE — 1126F AMNT PAIN NOTED NONE PRSNT: CPT | Mod: CPTII,S$GLB,, | Performed by: NURSE PRACTITIONER

## 2023-08-17 PROCEDURE — 1159F PR MEDICATION LIST DOCUMENTED IN MEDICAL RECORD: ICD-10-PCS | Mod: CPTII,S$GLB,, | Performed by: NURSE PRACTITIONER

## 2023-08-17 PROCEDURE — 1160F RVW MEDS BY RX/DR IN RCRD: CPT | Mod: CPTII,S$GLB,, | Performed by: NURSE PRACTITIONER

## 2023-08-17 PROCEDURE — 1101F PT FALLS ASSESS-DOCD LE1/YR: CPT | Mod: CPTII,S$GLB,, | Performed by: NURSE PRACTITIONER

## 2023-08-17 PROCEDURE — 1157F PR ADVANCE CARE PLAN OR EQUIV PRESENT IN MEDICAL RECORD: ICD-10-PCS | Mod: CPTII,S$GLB,, | Performed by: NURSE PRACTITIONER

## 2023-08-17 PROCEDURE — 25000003 PHARM REV CODE 250: Performed by: NURSE PRACTITIONER

## 2023-08-17 PROCEDURE — 1126F PR PAIN SEVERITY QUANTIFIED, NO PAIN PRESENT: ICD-10-PCS | Mod: CPTII,S$GLB,, | Performed by: NURSE PRACTITIONER

## 2023-08-17 PROCEDURE — 63600175 PHARM REV CODE 636 W HCPCS: Mod: JZ,JG | Performed by: NURSE PRACTITIONER

## 2023-08-17 PROCEDURE — 3288F PR FALLS RISK ASSESSMENT DOCUMENTED: ICD-10-PCS | Mod: CPTII,S$GLB,, | Performed by: NURSE PRACTITIONER

## 2023-08-17 RX ORDER — SODIUM CHLORIDE 0.9 % (FLUSH) 0.9 %
10 SYRINGE (ML) INJECTION
Status: CANCELLED | OUTPATIENT
Start: 2023-08-17

## 2023-08-17 RX ORDER — HEPARIN 100 UNIT/ML
500 SYRINGE INTRAVENOUS
Status: CANCELLED | OUTPATIENT
Start: 2023-08-17

## 2023-08-17 RX ORDER — DIPHENHYDRAMINE HYDROCHLORIDE 50 MG/ML
50 INJECTION INTRAMUSCULAR; INTRAVENOUS ONCE AS NEEDED
Status: DISCONTINUED | OUTPATIENT
Start: 2023-08-17 | End: 2023-08-17 | Stop reason: HOSPADM

## 2023-08-17 RX ORDER — EPINEPHRINE 0.3 MG/.3ML
0.3 INJECTION SUBCUTANEOUS ONCE AS NEEDED
Status: CANCELLED | OUTPATIENT
Start: 2023-08-17

## 2023-08-17 RX ORDER — HEPARIN 100 UNIT/ML
500 SYRINGE INTRAVENOUS
Status: DISCONTINUED | OUTPATIENT
Start: 2023-08-17 | End: 2023-08-17 | Stop reason: HOSPADM

## 2023-08-17 RX ORDER — SODIUM CHLORIDE 0.9 % (FLUSH) 0.9 %
10 SYRINGE (ML) INJECTION
Status: DISCONTINUED | OUTPATIENT
Start: 2023-08-17 | End: 2023-08-17 | Stop reason: HOSPADM

## 2023-08-17 RX ORDER — DIPHENHYDRAMINE HYDROCHLORIDE 50 MG/ML
50 INJECTION INTRAMUSCULAR; INTRAVENOUS ONCE AS NEEDED
Status: CANCELLED | OUTPATIENT
Start: 2023-08-17

## 2023-08-17 RX ORDER — EPINEPHRINE 0.3 MG/.3ML
0.3 INJECTION SUBCUTANEOUS ONCE AS NEEDED
Status: DISCONTINUED | OUTPATIENT
Start: 2023-08-17 | End: 2023-08-17 | Stop reason: HOSPADM

## 2023-08-17 RX ADMIN — DURVALUMAB 1500 MG: 500 INJECTION, SOLUTION INTRAVENOUS at 11:08

## 2023-08-17 NOTE — PROGRESS NOTES
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 Right upper lobe 2.5 cm mass.  CT C 9/19/2022: Status post right partial pneumonectomy for resection of a right upper lobe lung mass with no residual recurrent mass seen. Small right-sided pleural effusion. Some lymphadenopathy in the right paratracheal region with a maximum short axis dimension of 1.6 cm.  Follow-up is recommended  CT C 1/25/2023: There is a paratracheal enlarged lymph node which shows interval mild size increase and now measures 2.2 x 2.0 cm and previously was 1.6 x 1.5 cm.  Aortopulmonary window mildly enlarged lymph node is stable.  Thoracic aorta is without aneurysmal dilatation or dissection.  Impression: 1. Operative changes of right upper lobectomy without bronchialstump soft tissue proliferation    2. No residual tumor load or metastatic nodule. 3. Paratracheal enlarged lymph node with interval size increase.  PET CT 2/9/2023:  Hypermetabolic right paratracheal lymph node image 81 series 3 measures 25 x 20 mm with max SUV 27.0.  Hypermetabolic anterior mediastinal lymph node anterior to the SVC measures 12 x 9 mm with max SUV 12.0. Impression: 1. Hypermetabolic metastatic mediastinal adenopathy.   2. No PET evidence of metastatic disease outside of the mediastinum.  CT C/A/P 7/10/2023:  1. Several new subcentimeter nodules in the right lung.  Suspect these are infectious or inflammatory in nature, but 3 month follow-up chest CT recommended to  ensure resolution.  2. 2 reference mediastinal lymph nodes are smaller since January.  3. L1 vertebral body compression deformity new since January, but appears subacute.       Pathology:  03/22/2022 CT-guided biopsy of the right lung mass: invasive squamous cell carcinoma, moderately differentiated.  5/24/2022: Right upper lobectomy:  1- LYMPH NODE, LUMBAR RIGHT 10 LYMPHADENECTOMY: NEGATIVE FOR METASTATIC CARCINOMA (0/1).   2- LYMPH NODE, 11R, LYMPHADENECTOMY: NEGATIVE FOR METASTATIC CARCINOMA (0/1).  3- LYMPH NODE, 9R, LYMPHADENECTOMY: NEGATIVE FOR METASTATIC CARCINOMA (0/1).   4- LYMPH NODE, 7R, LYMPHADENECTOMY: NEGATIVE FOR METASTATIC CARCINOMA (0/1).   5- LYMPH NODE, 8R, LYMPHADENECTOMY: ONE LYMPH NODE NEGATIVE FOR METASTATIC CARCINOMA (0/1).    6- LYMPH NODE, 12R, LYMPHADENECTOMY: NEGATIVE FOR METASTATIC CARCINOMA (0/1).  7- LUNG, RIGHT UPPER AND MIDDLE LOBES, PNEUMONECTOMY:  POORLY DIFFERENTIATED, G3, SQUAMOUS CELL CARCINOMA, INVASIVE.    - TUMOR SIZE: 3 CM.    - LYMPHOVASCULAR INVASION IS PRESENT.    - MARGINS NEGATIVE FOR INVASIVE CARCINOMA:    - NEAREST MARGIN, VASCULAR / BRONCHIAL 2.5 CM.    - NO PLEURAL SURFACE INVOLVEMENT     - PROMINENT NECROSIS PRESENT.  Visceral Pleura Invasion: Not identified  Number of Lymph Nodes Examined: Exact number : 6  pT Category: pT1c: pN0: No regional lymph node metastasis    3/8/2023 LYMPH NODE BIOPSY:   LYMPH NODE 4R, LYMPHADENECTOMY:  METASTATIC SQUAMOUS CELL CARCINOMA, NONKERATINIZING, MULTIPLE FRAGMENTS.       IMMUNOHISTOCHEMICAL STAINS:   CK 5/6, P63 AND CK7:  POSITIVE IN MALIGNANT CELLS.   CK20 AND TTF-1:  NEGATIVE IN MALIGNANT CELLS.         CLINICAL HISTORY:       Patient: Leonila Rosales is a 77 y.o. male kindly referred by  Dr. Diaz for evaluation of lung cancer.    On 01/17/2022 patient presented to the emergency department after a fall.  He reports that he was getting to his truck and sleep and fell on his left side on the truck step rail.  He started having  pain in his left hip and knee.  He underwent a CT angiogram that showed No pulmonary embolus. Right upper lobe 2.5 cm mass.      During that hospitalization he was treated for a close intertrochanteric fracture of the left femur and underwent open reduction intramedullary nailing left comminuted intertrochanteric hip fracture on 01/18/2022 with Dr. Fortino Palomares.  He then spent several weeks on rehab.    On 03/22/2022 he underwent CT-guided biopsy of the right lung mass.  Pathology showed invasive squamous cell carcinoma, moderately differentiated.    He is s/p right upper lobectomy with  on 5/24/2022.  Pathology report as above.  Patient is stage IA3 squamous cell carcinoma of the lung.  He has recovered well and has been doing good.     He lives alone and is able to perform ADL's. He uses a walker to aid in ambulation. He quit smoking January 2022, after smoking for 40 yrs.       Chief Complaint: OTHER (No concerns today.)      Interval History:  He completed XRT on 5/30/23 and 5 cycles of weekly carbo/taxol on 5/19/23. His final cycle was held due to neutropenia, ANC was 0.7.        Patient presents today continuation of immunotherapy. He started Durvalumab on 6/22/23. Due for C3 today.   He has no complaints today, states he is feeling fine. Denies any side effects. He denies shortness of breath, chest pain. No weight loss. Denies headaches. He uses a cane for mobility.  He drove himself to appointment today.  Denies any back pain or recent falls.      Past Medical History:   Diagnosis Date    Anemia     Closed intertrochanteric fracture     Deficiency of macronutrients     GERD (gastroesophageal reflux disease)     H. pylori infection     History of tobacco use     Hyperlipidemia     Hypertension     Lung mass     Malignant neoplasm of unspecified part of unspecified bronchus or lung     Non-small cell lung cancer       Past Surgical History:   Procedure Laterality Date    BIOPSY OF INTESTINE  04/04/2022     BRONCHOSCOPY      COLONOSCOPY      ESOPHAGOGASTRODUODENOSCOPY  04/04/2022    HIP FRACTURE SURGERY Left 2016    Intramedullary Nail Insertion Hip Left 01/18/2022    KIDNEY SURGERY Right 05/27/2022    MEDIASTINOSCOPY N/A 3/6/2023    Procedure: MEDIASTINOSCOPY;  Surgeon: Jose Diaz MD;  Location: [facility];  Service: Cardiothoracic;  Laterality: N/A;  XX    PLACEMENT, MEDIPORT N/A 4/6/2023    Procedure: Placement, Mediport;  Surgeon: Jose Diaz MD;  Location: [facility] OR;  Service: Cardiology;  Laterality: N/A;    SHOULDER SURGERY       Family History   Family history unknown: Yes     Social Connections: Socially Integrated (4/12/2023)    Social Connection and Isolation Panel [NHANES]     Frequency of Communication with Friends and Family: More than three times a week     Frequency of Social Gatherings with Friends and Family: More than three times a week     Attends Anabaptism Services: More than 4 times per year     Active Member of Clubs or Organizations: Yes     Attends Club or Organization Meetings: More than 4 times per year     Marital Status:        Review of patient's allergies indicates:  No Known Allergies   Current Outpatient Medications on File Prior to Visit   Medication Sig Dispense Refill    aspirin 81 mg Cap Take 81 mg by mouth Daily.      atorvastatin (LIPITOR) 10 MG tablet TAKE ONE TABLET BY MOUTH DAILY 90 tablet 3    ferrous sulfate (FEOSOL) 325 mg (65 mg iron) Tab tablet Take 1 tablet (325 mg total) by mouth once daily. 30 tablet 3    levoFLOXacin (LEVAQUIN) 500 MG tablet Take 1 tablet (500 mg total) by mouth once daily. 5 tablet 0    LIDOcaine-prilocaine (EMLA) cream Apply topically as needed. Apply to port site 30 mins prior to chemo 30 g 3    LINZESS 145 mcg Cap capsule Take 145 mcg by mouth daily as needed. New medication, not started yet      ondansetron (ZOFRAN) 8 MG tablet Take 1 tablet (8 mg total) by mouth every 8 (eight) hours as needed for Nausea. 1st choice for nausea 30  "tablet 2    prochlorperazine (COMPAZINE) 10 MG tablet Take 1 tablet (10 mg total) by mouth every 6 (six) hours as needed (for nausea). 2nd choice, can cause drowsiness. 30 tablet 3    traMADoL (ULTRAM) 50 mg tablet Take 1 tablet (50 mg total) by mouth every 6 (six) hours as needed for Pain. 12 tablet 0    amLODIPine (NORVASC) 5 MG tablet TAKE ONE TABLET BY MOUTH TWICE DAILY (Patient not taking: Reported on 7/20/2023) 180 tablet 1     No current facility-administered medications on file prior to visit.      Review of Systems   Neurological:  Positive for coordination difficulties and coordination difficulties.            Vitals:    08/17/23 1011   BP: 127/77   BP Location: Right arm   Patient Position: Sitting   Pulse: 94   Resp: 18   Temp: 97.6 °F (36.4 °C)   TempSrc: Oral   SpO2: 97%   Weight: 93.2 kg (205 lb 8 oz)   Height: 5' 7" (1.702 m)               Wt Readings from Last 6 Encounters:   08/17/23 93.2 kg (205 lb 8 oz)   07/20/23 93.9 kg (207 lb 1.6 oz)   06/22/23 95.6 kg (210 lb 12.8 oz)   06/16/23 94.9 kg (209 lb 3.2 oz)   05/26/23 94.8 kg (208 lb 14.4 oz)   05/19/23 96.9 kg (213 lb 9.6 oz)     Body mass index is 32.19 kg/m².  Body surface area is 2.1 meters squared.       Physical Exam  Vitals and nursing note reviewed.   Constitutional:       General: He is not in acute distress.     Appearance: Normal appearance. He is obese.   HENT:      Head: Normocephalic and atraumatic.      Mouth/Throat:      Mouth: Mucous membranes are moist.   Eyes:      General: No scleral icterus.     Extraocular Movements: Extraocular movements intact.      Conjunctiva/sclera: Conjunctivae normal.   Neck:      Vascular: No JVD.   Cardiovascular:      Rate and Rhythm: Normal rate and regular rhythm.      Heart sounds: No murmur heard.  Pulmonary:      Effort: Pulmonary effort is normal.      Breath sounds: Decreased breath sounds present. No wheezing.   Chest:      Chest wall: No tenderness.   Abdominal:      General: There is no " distension.      Palpations: There is no fluid wave.      Tenderness: There is no abdominal tenderness.   Musculoskeletal:         General: No swelling or deformity.      Cervical back: Neck supple.   Lymphadenopathy:      Cervical: No cervical adenopathy.      Upper Body:      Right upper body: No supraclavicular or axillary adenopathy.      Left upper body: No supraclavicular or axillary adenopathy.      Lower Body: No right inguinal adenopathy. No left inguinal adenopathy.   Skin:     General: Skin is warm.      Coloration: Skin is not jaundiced.      Findings: No rash.   Neurological:      General: No focal deficit present.      Mental Status: He is alert and oriented to person, place, and time.      Comments: Mobility assitssed by cane/walker   Psychiatric:         Attention and Perception: Attention normal.         Mood and Affect: Mood and affect normal.         Behavior: Behavior is cooperative.         Cognition and Memory: Cognition normal.         Judgment: Judgment normal.     ECOG SCORE             Laboratory:  CBC with Differential:  Lab Results   Component Value Date    WBC 5.37 08/17/2023    RBC 4.44 (L) 08/17/2023    HGB 12.6 (L) 08/17/2023    HCT 40.7 (L) 08/17/2023    MCV 91.7 08/17/2023    MCH 28.4 08/17/2023    MCHC 31.0 (L) 08/17/2023    RDW 14.8 08/17/2023     08/17/2023    MPV 9.5 08/17/2023        CMP:  Sodium Level   Date Value Ref Range Status   08/17/2023 143 136 - 145 mmol/L Final     Potassium Level   Date Value Ref Range Status   08/17/2023 3.7 3.5 - 5.1 mmol/L Final     Carbon Dioxide   Date Value Ref Range Status   08/17/2023 20 (L) 23 - 31 mmol/L Final     Blood Urea Nitrogen   Date Value Ref Range Status   08/17/2023 12.1 8.4 - 25.7 mg/dL Final     Creatinine   Date Value Ref Range Status   08/17/2023 0.86 0.73 - 1.18 mg/dL Final     Calcium Level Total   Date Value Ref Range Status   08/17/2023 9.6 8.8 - 10.0 mg/dL Final     Albumin Level   Date Value Ref Range Status    08/17/2023 3.5 3.4 - 4.8 g/dL Final     Bilirubin Total   Date Value Ref Range Status   08/17/2023 0.9 <=1.5 mg/dL Final     Alkaline Phosphatase   Date Value Ref Range Status   08/17/2023 89 40 - 150 unit/L Final     Aspartate Aminotransferase   Date Value Ref Range Status   08/17/2023 18 5 - 34 unit/L Final     Alanine Aminotransferase   Date Value Ref Range Status   08/17/2023 14 0 - 55 unit/L Final     Estimated GFR-Non    Date Value Ref Range Status   04/19/2022 >60           Assessment:       1) [id number] 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  Verbally consented the patient to the treatment plan and the patient was educated on the planned duration of the treatment and schedule of the treatment administration.  All questions were answered.    3) Pancytopenia- Treatment related. Counts improving.        Plan:       Patient with 1.6 right paratracheal LN and small Rt pleural effusion. Surveillance CT scan chest to eval stability with paratracheal enlarged lymph node which shows interval mild size increase and now measures 2.2 x 2.0 cm and previously was 1.6 x 1.5 cm.  Aortopulmonary window mildly enlarged lymph node is stable. PET CT with Hypermetabolic right paratracheal lymph node image 81 series 3 measures 25 x 20 mm with max SUV 27.0. Hypermetabolic anterior mediastinal lymph node anterior to the SVC measures 12 x 9 mm with max SUV 12.0.   Biopsy of R4 lymph node confirmed the metastatic squamous cell carcinoma. Discussed case with Dr Willis and he will be ready to start next week. Completed  XRT on 5/30/23  and 5 cycles of Carbo/taxol on 5/19/23. C6 was held on 5/26/23 due to neutropenia, ANC was 0.7. Plan is for Durvalumab every 4 weeks for 1 year.      Continue with Imfinzi every 4 weeks - okay to proceed with  C4 today  CT C/A/P in 3 months to evaluate subcentimeter nodules in the right lung, infectious or inflammatory in nature - due 10/2023, will order when closer  RTC in 4 weeks with NP for TD/lab/infusion same day - he lives in [place]  Labs: CBC, CMP, TSH     Encourage to call or message us for any questions or problems  The patient was given ample opportunity to ask questions, and to the best of my abilities, all questions answered to satisfaction; patient demonstrated understanding of what we discussed and agreeable to the plan.     INDIA Arango

## 2023-09-14 ENCOUNTER — INFUSION (OUTPATIENT)
Dept: INFUSION THERAPY | Facility: HOSPITAL | Age: 78
End: 2023-09-14
Attending: NURSE PRACTITIONER
Payer: MEDICARE

## 2023-09-14 ENCOUNTER — OFFICE VISIT (OUTPATIENT)
Dept: HEMATOLOGY/ONCOLOGY | Facility: CLINIC | Age: 78
End: 2023-09-14
Payer: MEDICARE

## 2023-09-14 VITALS
HEART RATE: 96 BPM | SYSTOLIC BLOOD PRESSURE: 145 MMHG | WEIGHT: 205.88 LBS | TEMPERATURE: 98 F | RESPIRATION RATE: 17 BRPM | DIASTOLIC BLOOD PRESSURE: 81 MMHG | HEIGHT: 67 IN | OXYGEN SATURATION: 98 % | BODY MASS INDEX: 32.31 KG/M2

## 2023-09-14 DIAGNOSIS — R53.83 FATIGUE, UNSPECIFIED TYPE: ICD-10-CM

## 2023-09-14 DIAGNOSIS — C34.91 NON-SMALL CELL CANCER OF RIGHT LUNG: Primary | ICD-10-CM

## 2023-09-14 DIAGNOSIS — Z51.12 MAINTENANCE ANTINEOPLASTIC IMMUNOTHERAPY: ICD-10-CM

## 2023-09-14 DIAGNOSIS — C34.90 NON-SMALL CELL LUNG CANCER, UNSPECIFIED LATERALITY: ICD-10-CM

## 2023-09-14 DIAGNOSIS — C34.00 MALIGNANT NEOPLASM OF HILUS OF LUNG, UNSPECIFIED LATERALITY: Primary | ICD-10-CM

## 2023-09-14 PROCEDURE — 1126F AMNT PAIN NOTED NONE PRSNT: CPT | Mod: CPTII,S$GLB,, | Performed by: INTERNAL MEDICINE

## 2023-09-14 PROCEDURE — 3079F PR MOST RECENT DIASTOLIC BLOOD PRESSURE 80-89 MM HG: ICD-10-PCS | Mod: CPTII,S$GLB,, | Performed by: INTERNAL MEDICINE

## 2023-09-14 PROCEDURE — 1157F ADVNC CARE PLAN IN RCRD: CPT | Mod: CPTII,S$GLB,, | Performed by: INTERNAL MEDICINE

## 2023-09-14 PROCEDURE — 3077F SYST BP >= 140 MM HG: CPT | Mod: CPTII,S$GLB,, | Performed by: INTERNAL MEDICINE

## 2023-09-14 PROCEDURE — 3079F DIAST BP 80-89 MM HG: CPT | Mod: CPTII,S$GLB,, | Performed by: INTERNAL MEDICINE

## 2023-09-14 PROCEDURE — 1101F PR PT FALLS ASSESS DOC 0-1 FALLS W/OUT INJ PAST YR: ICD-10-PCS | Mod: CPTII,S$GLB,, | Performed by: INTERNAL MEDICINE

## 2023-09-14 PROCEDURE — 3288F PR FALLS RISK ASSESSMENT DOCUMENTED: ICD-10-PCS | Mod: CPTII,S$GLB,, | Performed by: INTERNAL MEDICINE

## 2023-09-14 PROCEDURE — 3077F PR MOST RECENT SYSTOLIC BLOOD PRESSURE >= 140 MM HG: ICD-10-PCS | Mod: CPTII,S$GLB,, | Performed by: INTERNAL MEDICINE

## 2023-09-14 PROCEDURE — 63600175 PHARM REV CODE 636 W HCPCS: Performed by: INTERNAL MEDICINE

## 2023-09-14 PROCEDURE — 99215 OFFICE O/P EST HI 40 MIN: CPT | Mod: S$GLB,,, | Performed by: INTERNAL MEDICINE

## 2023-09-14 PROCEDURE — 1157F PR ADVANCE CARE PLAN OR EQUIV PRESENT IN MEDICAL RECORD: ICD-10-PCS | Mod: CPTII,S$GLB,, | Performed by: INTERNAL MEDICINE

## 2023-09-14 PROCEDURE — 1126F PR PAIN SEVERITY QUANTIFIED, NO PAIN PRESENT: ICD-10-PCS | Mod: CPTII,S$GLB,, | Performed by: INTERNAL MEDICINE

## 2023-09-14 PROCEDURE — 1101F PT FALLS ASSESS-DOCD LE1/YR: CPT | Mod: CPTII,S$GLB,, | Performed by: INTERNAL MEDICINE

## 2023-09-14 PROCEDURE — A4216 STERILE WATER/SALINE, 10 ML: HCPCS | Performed by: INTERNAL MEDICINE

## 2023-09-14 PROCEDURE — 25000003 PHARM REV CODE 250: Performed by: INTERNAL MEDICINE

## 2023-09-14 PROCEDURE — 3288F FALL RISK ASSESSMENT DOCD: CPT | Mod: CPTII,S$GLB,, | Performed by: INTERNAL MEDICINE

## 2023-09-14 PROCEDURE — 99999 PR PBB SHADOW E&M-EST. PATIENT-LVL IV: ICD-10-PCS | Mod: PBBFAC,,, | Performed by: INTERNAL MEDICINE

## 2023-09-14 PROCEDURE — 1159F PR MEDICATION LIST DOCUMENTED IN MEDICAL RECORD: ICD-10-PCS | Mod: CPTII,S$GLB,, | Performed by: INTERNAL MEDICINE

## 2023-09-14 PROCEDURE — 96413 CHEMO IV INFUSION 1 HR: CPT

## 2023-09-14 PROCEDURE — 99999 PR PBB SHADOW E&M-EST. PATIENT-LVL IV: CPT | Mod: PBBFAC,,, | Performed by: INTERNAL MEDICINE

## 2023-09-14 PROCEDURE — 1159F MED LIST DOCD IN RCRD: CPT | Mod: CPTII,S$GLB,, | Performed by: INTERNAL MEDICINE

## 2023-09-14 PROCEDURE — 99215 PR OFFICE/OUTPT VISIT, EST, LEVL V, 40-54 MIN: ICD-10-PCS | Mod: S$GLB,,, | Performed by: INTERNAL MEDICINE

## 2023-09-14 RX ORDER — HEPARIN 100 UNIT/ML
500 SYRINGE INTRAVENOUS
Status: CANCELLED | OUTPATIENT
Start: 2023-09-14

## 2023-09-14 RX ORDER — EPINEPHRINE 0.3 MG/.3ML
0.3 INJECTION SUBCUTANEOUS ONCE AS NEEDED
Status: DISCONTINUED | OUTPATIENT
Start: 2023-09-14 | End: 2023-09-14 | Stop reason: HOSPADM

## 2023-09-14 RX ORDER — SODIUM CHLORIDE 0.9 % (FLUSH) 0.9 %
10 SYRINGE (ML) INJECTION
Status: DISCONTINUED | OUTPATIENT
Start: 2023-09-14 | End: 2023-09-14 | Stop reason: HOSPADM

## 2023-09-14 RX ORDER — SODIUM CHLORIDE 0.9 % (FLUSH) 0.9 %
10 SYRINGE (ML) INJECTION
Status: CANCELLED | OUTPATIENT
Start: 2023-09-14

## 2023-09-14 RX ORDER — DIPHENHYDRAMINE HYDROCHLORIDE 50 MG/ML
50 INJECTION INTRAMUSCULAR; INTRAVENOUS ONCE AS NEEDED
Status: CANCELLED | OUTPATIENT
Start: 2023-09-14

## 2023-09-14 RX ORDER — DIPHENHYDRAMINE HYDROCHLORIDE 50 MG/ML
50 INJECTION INTRAMUSCULAR; INTRAVENOUS ONCE AS NEEDED
Status: DISCONTINUED | OUTPATIENT
Start: 2023-09-14 | End: 2023-09-14 | Stop reason: HOSPADM

## 2023-09-14 RX ORDER — EPINEPHRINE 0.3 MG/.3ML
0.3 INJECTION SUBCUTANEOUS ONCE AS NEEDED
Status: CANCELLED | OUTPATIENT
Start: 2023-09-14

## 2023-09-14 RX ORDER — HEPARIN 100 UNIT/ML
500 SYRINGE INTRAVENOUS
Status: DISCONTINUED | OUTPATIENT
Start: 2023-09-14 | End: 2023-09-14 | Stop reason: HOSPADM

## 2023-09-14 RX ADMIN — HEPARIN 500 UNITS: 100 SYRINGE at 12:09

## 2023-09-14 RX ADMIN — Medication 10 ML: at 12:09

## 2023-09-14 RX ADMIN — DURVALUMAB 1500 MG: 500 INJECTION, SOLUTION INTRAVENOUS at 11:09

## 2023-09-14 NOTE — PROGRESS NOTES
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 Right upper lobe 2.5 cm mass.  CT C 9/19/2022: Status post right partial pneumonectomy for resection of a right upper lobe lung mass with no residual recurrent mass seen. Small right-sided pleural effusion. Some lymphadenopathy in the right paratracheal region with a maximum short axis dimension of 1.6 cm.  Follow-up is recommended  CT C 1/25/2023: There is a paratracheal enlarged lymph node which shows interval mild size increase and now measures 2.2 x 2.0 cm and previously was 1.6 x 1.5 cm.  Aortopulmonary window mildly enlarged lymph node is stable.  Thoracic aorta is without aneurysmal dilatation or dissection.  Impression: 1. Operative changes of right upper lobectomy without bronchialstump soft tissue proliferation    2. No residual tumor load or metastatic nodule. 3. Paratracheal enlarged lymph node with interval size increase.  PET CT 2/9/2023:  Hypermetabolic right paratracheal lymph node image 81 series 3 measures 25 x 20 mm with max SUV 27.0.  Hypermetabolic anterior mediastinal lymph node anterior to the SVC measures 12 x 9 mm with max SUV 12.0. Impression: 1. Hypermetabolic metastatic mediastinal adenopathy.   2. No PET evidence of metastatic disease outside of the mediastinum.  CT C/A/P 7/10/2023:  1. Several new subcentimeter nodules in the right lung.  Suspect these are infectious or inflammatory in nature, but 3 month follow-up chest CT recommended to  ensure resolution.  2. 2 reference mediastinal lymph nodes are smaller since January.  3. L1 vertebral body compression deformity new since January, but appears subacute.       Pathology:  03/22/2022 CT-guided biopsy of the right lung mass: invasive squamous cell carcinoma, moderately differentiated.  5/24/2022: Right upper lobectomy:  1- LYMPH NODE, LUMBAR RIGHT 10 LYMPHADENECTOMY: NEGATIVE FOR METASTATIC CARCINOMA (0/1).   2- LYMPH NODE, 11R, LYMPHADENECTOMY: NEGATIVE FOR METASTATIC CARCINOMA (0/1).  3- LYMPH NODE, 9R, LYMPHADENECTOMY: NEGATIVE FOR METASTATIC CARCINOMA (0/1).   4- LYMPH NODE, 7R, LYMPHADENECTOMY: NEGATIVE FOR METASTATIC CARCINOMA (0/1).   5- LYMPH NODE, 8R, LYMPHADENECTOMY: ONE LYMPH NODE NEGATIVE FOR METASTATIC CARCINOMA (0/1).    6- LYMPH NODE, 12R, LYMPHADENECTOMY: NEGATIVE FOR METASTATIC CARCINOMA (0/1).  7- LUNG, RIGHT UPPER AND MIDDLE LOBES, PNEUMONECTOMY:  POORLY DIFFERENTIATED, G3, SQUAMOUS CELL CARCINOMA, INVASIVE.    - TUMOR SIZE: 3 CM.    - LYMPHOVASCULAR INVASION IS PRESENT.    - MARGINS NEGATIVE FOR INVASIVE CARCINOMA:    - NEAREST MARGIN, VASCULAR / BRONCHIAL 2.5 CM.    - NO PLEURAL SURFACE INVOLVEMENT     - PROMINENT NECROSIS PRESENT.  Visceral Pleura Invasion: Not identified  Number of Lymph Nodes Examined: Exact number : 6  pT Category: pT1c: pN0: No regional lymph node metastasis    3/8/2023 LYMPH NODE BIOPSY:   LYMPH NODE 4R, LYMPHADENECTOMY:  METASTATIC SQUAMOUS CELL CARCINOMA, NONKERATINIZING, MULTIPLE FRAGMENTS.       IMMUNOHISTOCHEMICAL STAINS:   CK 5/6, P63 AND CK7:  POSITIVE IN MALIGNANT CELLS.   CK20 AND TTF-1:  NEGATIVE IN MALIGNANT CELLS.         CLINICAL HISTORY:       Patient: Lenoila Rosales is a 78 y.o. male kindly referred by  Dr. Diaz for evaluation of lung cancer.    On 01/17/2022 patient presented to the emergency department after a fall.  He reports that he was getting to his truck and sleep and fell on his left side on the truck step rail.  He started having  pain in his left hip and knee.  He underwent a CT angiogram that showed No pulmonary embolus. Right upper lobe 2.5 cm mass.      During that hospitalization he was treated for a close intertrochanteric fracture of the left femur and underwent open reduction intramedullary nailing left comminuted intertrochanteric hip fracture on 01/18/2022 with Dr. Fortino Palomares.  He then spent several weeks on rehab.    On 03/22/2022 he underwent CT-guided biopsy of the right lung mass.  Pathology showed invasive squamous cell carcinoma, moderately differentiated.    He is s/p right upper lobectomy with  on 5/24/2022.  Pathology report as above.  Patient is stage IA3 squamous cell carcinoma of the lung.  He has recovered well and has been doing good.     He lives alone and is able to perform ADL's. He uses a walker to aid in ambulation. He quit smoking January 2022, after smoking for 40 yrs.       Chief Complaint: OTHER (No concerns today.)      Interval History:  He completed XRT on 5/30/23 and 5 cycles of weekly carbo/taxol on 5/19/23. His final cycle was held due to neutropenia, ANC was 0.7.        Patient presents today continuation of immunotherapy. He started Durvalumab on 6/22/23. Due for C4 today. He reports coughing only in the morning after waking up, clear sputum. Otherwise, states he is feeling fine. Denies any side effects. He denies shortness of breath, chest pain. No weight loss. Denies headaches. He uses a cane for mobility.  He drove himself to appointment today.  Denies any back pain or recent falls.      Past Medical History:   Diagnosis Date    Anemia     Closed intertrochanteric fracture     Deficiency of macronutrients     GERD (gastroesophageal reflux disease)     H. pylori infection     History of tobacco use     Hyperlipidemia     Hypertension     Lung mass     Malignant neoplasm of unspecified part of unspecified bronchus or lung     Non-small cell lung cancer       Past Surgical History:    Procedure Laterality Date    BIOPSY OF INTESTINE  04/04/2022    BRONCHOSCOPY      COLONOSCOPY      ESOPHAGOGASTRODUODENOSCOPY  04/04/2022    HIP FRACTURE SURGERY Left 2016    Intramedullary Nail Insertion Hip Left 01/18/2022    KIDNEY SURGERY Right 05/27/2022    MEDIASTINOSCOPY N/A 3/6/2023    Procedure: MEDIASTINOSCOPY;  Surgeon: Jose Diaz MD;  Location: [facility] OR;  Service: Cardiothoracic;  Laterality: N/A;  XX    PLACEMENT, MEDIPORT N/A 4/6/2023    Procedure: Placement, Mediport;  Surgeon: Jose Diaz MD;  Location: [facility] OR;  Service: Cardiology;  Laterality: N/A;    SHOULDER SURGERY       Family History   Family history unknown: Yes     Social Connections: Socially Integrated (4/12/2023)    Social Connection and Isolation Panel [NHANES]     Frequency of Communication with Friends and Family: More than three times a week     Frequency of Social Gatherings with Friends and Family: More than three times a week     Attends [employer] Services: More than 4 times per year     Active Member of Clubs or Organizations: Yes     Attends Club or Organization Meetings: More than 4 times per year     Marital Status:        Review of patient's allergies indicates:  No Known Allergies   Current Outpatient Medications on File Prior to Visit   Medication Sig Dispense Refill    aspirin 81 mg Cap Take 81 mg by mouth Daily.      atorvastatin (LIPITOR) 10 MG tablet TAKE ONE TABLET BY MOUTH DAILY 90 tablet 3    ferrous sulfate (FEOSOL) 325 mg (65 mg iron) Tab tablet Take 1 tablet (325 mg total) by mouth once daily. 30 tablet 3    levoFLOXacin (LEVAQUIN) 500 MG tablet Take 1 tablet (500 mg total) by mouth once daily. 5 tablet 0    LIDOcaine-prilocaine (EMLA) cream Apply topically as needed. Apply to port site 30 mins prior to chemo 30 g 3    LINZESS 145 mcg Cap capsule Take 145 mcg by mouth daily as needed. New medication, not started yet      ondansetron (ZOFRAN) 8 MG tablet Take 1 tablet (8 mg total) by mouth every  8 (eight) hours as needed for Nausea. 1st choice for nausea 30 tablet 2    prochlorperazine (COMPAZINE) 10 MG tablet Take 1 tablet (10 mg total) by mouth every 6 (six) hours as needed (for nausea). 2nd choice, can cause drowsiness. 30 tablet 3    traMADoL (ULTRAM) 50 mg tablet Take 1 tablet (50 mg total) by mouth every 6 (six) hours as needed for Pain. 12 tablet 0    amLODIPine (NORVASC) 5 MG tablet TAKE ONE TABLET BY MOUTH TWICE DAILY (Patient not taking: Reported on 7/20/2023) 180 tablet 1     No current facility-administered medications on file prior to visit.      Review of Systems   Constitutional:  Negative for activity change, appetite change, chills, diaphoresis, fatigue, fever and unexpected weight change.   HENT:  Negative for nasal congestion, mouth sores, nosebleeds, sinus pressure/congestion, sore throat and trouble swallowing.    Eyes: Negative.    Respiratory:  Negative for cough and shortness of breath.    Cardiovascular:  Negative for chest pain and palpitations.   Gastrointestinal:  Negative for abdominal distention, abdominal pain, blood in stool, change in bowel habit, constipation, diarrhea, nausea, vomiting and change in bowel habit.   Endocrine: Negative.    Genitourinary:  Negative for bladder incontinence, decreased urine volume, difficulty urinating, dysuria, frequency, hematuria and urgency.   Musculoskeletal:  Negative for arthralgias, back pain, gait problem, joint swelling, leg pain and myalgias.   Integumentary:  Negative for rash.   Allergic/Immunologic: Negative.    Neurological:  Negative for dizziness, tremors, syncope, weakness, light-headedness, numbness, headaches and memory loss.   Hematological:  Negative for adenopathy. Does not bruise/bleed easily.   Psychiatric/Behavioral:  Negative for agitation, confusion, hallucinations, sleep disturbance and suicidal ideas. The patient is not nervous/anxious.           Vitals:    09/14/23 1051   BP: (!) 145/81   BP Location: Left arm  "  Patient Position: Sitting   Pulse: 96   Resp: 17   Temp: 97.6 °F (36.4 °C)   TempSrc: Oral   SpO2: 98%   Weight: 93.4 kg (205 lb 14.4 oz)   Height: 5' 7" (1.702 m)         Wt Readings from Last 6 Encounters:   09/14/23 93.4 kg (205 lb 14.4 oz)   08/17/23 93.2 kg (205 lb 8 oz)   07/20/23 93.9 kg (207 lb 1.6 oz)   06/22/23 95.6 kg (210 lb 12.8 oz)   06/16/23 94.9 kg (209 lb 3.2 oz)   05/26/23 94.8 kg (208 lb 14.4 oz)     Body mass index is 32.25 kg/m².  Body surface area is 2.1 meters squared.       Physical Exam  Vitals and nursing note reviewed.   Constitutional:       General: He is not in acute distress.     Appearance: Normal appearance. He is obese.   HENT:      Head: Normocephalic and atraumatic.      Mouth/Throat:      Mouth: Mucous membranes are moist.   Eyes:      General: No scleral icterus.     Extraocular Movements: Extraocular movements intact.      Conjunctiva/sclera: Conjunctivae normal.   Neck:      Vascular: No JVD.   Cardiovascular:      Rate and Rhythm: Normal rate and regular rhythm.      Heart sounds: No murmur heard.  Pulmonary:      Effort: Pulmonary effort is normal.      Breath sounds: Decreased breath sounds present. No wheezing or rhonchi.   Chest:      Chest wall: No tenderness.   Abdominal:      General: Bowel sounds are normal. There is no distension.      Palpations: Abdomen is soft. There is no fluid wave.      Tenderness: There is no abdominal tenderness.   Musculoskeletal:         General: No swelling or deformity.      Cervical back: Neck supple.   Lymphadenopathy:      Head:      Right side of head: No submandibular adenopathy.      Left side of head: No submandibular adenopathy.      Cervical: No cervical adenopathy.      Upper Body:      Right upper body: No supraclavicular or axillary adenopathy.      Left upper body: No supraclavicular or axillary adenopathy.      Lower Body: No right inguinal adenopathy. No left inguinal adenopathy.   Skin:     General: Skin is warm.      " Coloration: Skin is not jaundiced.      Findings: No rash.   Neurological:      General: No focal deficit present.      Mental Status: He is alert and oriented to person, place, and time.      Cranial Nerves: Cranial nerves 2-12 are intact.      Comments: Mobility assitssed by cane/walker   Psychiatric:         Attention and Perception: Attention normal.         Mood and Affect: Mood and affect normal.         Behavior: Behavior is cooperative.         Cognition and Memory: Cognition normal.         Judgment: Judgment normal.       ECOG SCORE    1 - Restricted in strenuous activity-ambulatory and able to carry out work of a light nature         Laboratory:  CBC with Differential:  Lab Results   Component Value Date    WBC 4.70 09/14/2023    RBC 4.50 (L) 09/14/2023    HGB 12.6 (L) 09/14/2023    HCT 40.3 (L) 09/14/2023    MCV 89.6 09/14/2023    MCH 28.0 09/14/2023    MCHC 31.3 (L) 09/14/2023    RDW 14.1 09/14/2023     09/14/2023    MPV 9.6 09/14/2023        CMP:  Sodium Level   Date Value Ref Range Status   09/14/2023 142 136 - 145 mmol/L Final     Potassium Level   Date Value Ref Range Status   09/14/2023 3.8 3.5 - 5.1 mmol/L Final     Carbon Dioxide   Date Value Ref Range Status   09/14/2023 22 (L) 23 - 31 mmol/L Final     Blood Urea Nitrogen   Date Value Ref Range Status   09/14/2023 10.6 8.4 - 25.7 mg/dL Final     Creatinine   Date Value Ref Range Status   09/14/2023 0.84 0.73 - 1.18 mg/dL Final     Calcium Level Total   Date Value Ref Range Status   09/14/2023 9.7 8.8 - 10.0 mg/dL Final     Albumin Level   Date Value Ref Range Status   09/14/2023 3.6 3.4 - 4.8 g/dL Final     Bilirubin Total   Date Value Ref Range Status   09/14/2023 0.5 <=1.5 mg/dL Final     Alkaline Phosphatase   Date Value Ref Range Status   09/14/2023 82 40 - 150 unit/L Final     Aspartate Aminotransferase   Date Value Ref Range Status   09/14/2023 19 5 - 34 unit/L Final     Alanine Aminotransferase   Date Value Ref Range Status    09/14/2023 17 0 - 55 unit/L Final     Estimated GFR-Non    Date Value Ref Range Status   04/19/2022 >60           Assessment:       1) [id number] 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  Verbally consented the patient to the treatment plan and the patient was educated on the planned duration of the treatment and schedule of the treatment administration.  All questions were answered.    3) Pancytopenia- Treatment related. Counts improving. Now with mild anemia-stable        Plan:       Patient with 1.6 right paratracheal LN and small Rt pleural effusion. Surveillance CT scan chest to eval stability with paratracheal enlarged lymph node which shows interval mild size increase and now measures 2.2 x 2.0 cm and previously was 1.6 x 1.5 cm.  Aortopulmonary window mildly enlarged lymph node is stable. PET CT with Hypermetabolic right paratracheal lymph node image 81 series 3 measures 25 x 20 mm with max SUV 27.0. Hypermetabolic anterior mediastinal lymph node anterior to the SVC measures 12 x 9 mm with max SUV 12.0.   Biopsy of R4 lymph node confirmed the metastatic squamous cell carcinoma. Discussed case with Dr Willis and he will be ready to start next week. Completed  XRT on 5/30/23  and 5 cycles of Carbo/taxol on 5/19/23. C6 was held on 5/26/23 due to neutropenia, ANC was 0.7. Plan is for Durvalumab every 4 weeks for 1 year.      Continue with Imfinzi every 4 weeks - okay to proceed with C4 today  CT C/A/P in 3 months to evaluate subcentimeter nodules in the right lung, infectious or inflammatory in nature - due 10/10/2023, ordered  RTC in 4 weeks with MD for TD/lab/infusion same day - he lives in [place]  Labs: CBC, CMP, TSH, Free T4 and T3    Encourage to call or message us for any questions or problems  The patient  was given ample opportunity to ask questions, and to the best of my abilities, all questions answered to satisfaction; patient demonstrated understanding of what we discussed and agreeable to the plan.     Sanjuana Napoles MD  Hematology/Oncology      Professional Services   I, Shirley Pina LPN, acted solely as a scribe for and in the presence of Dr. Sanjuana Napoles, who performed these services.

## 2023-10-10 ENCOUNTER — HOSPITAL ENCOUNTER (OUTPATIENT)
Dept: RADIOLOGY | Facility: HOSPITAL | Age: 78
Discharge: HOME | End: 2023-10-10
Attending: INTERNAL MEDICINE
Payer: MEDICARE

## 2023-10-10 ENCOUNTER — TELEPHONE (OUTPATIENT)
Dept: FAMILY MEDICINE | Facility: CLINIC | Age: 78
End: 2023-10-10
Payer: MEDICARE

## 2023-10-10 DIAGNOSIS — C34.91 NON-SMALL CELL CANCER OF RIGHT LUNG: ICD-10-CM

## 2023-10-10 DIAGNOSIS — Z51.12 MAINTENANCE ANTINEOPLASTIC IMMUNOTHERAPY: ICD-10-CM

## 2023-10-10 PROCEDURE — 25500020 PHARM REV CODE 255: Performed by: INTERNAL MEDICINE

## 2023-10-10 PROCEDURE — 71260 CT THORAX DX C+: CPT | Mod: TC

## 2023-10-10 RX ADMIN — DIATRIZOATE MEGLUMINE AND DIATRIZOATE SODIUM 30 ML: 660; 100 LIQUID ORAL; RECTAL at 10:10

## 2023-10-10 RX ADMIN — IOPAMIDOL 100 ML: 755 INJECTION, SOLUTION INTRAVENOUS at 10:10

## 2023-10-10 NOTE — TELEPHONE ENCOUNTER
Are there any outstanding tasks in patient's chart?  labs    2. Do we have outstanding/pending referrals?  n    3. Has the patient been seen in an ER, Urgent Care, or admitted since last visit?  n    4. Has patient seen any other health care providers since last visit?  Oncology    5.  Has patient had any blood work or x-rays done since last visit?    Labs and CT completed at [facility]  Spoke with [facility] to add on Lipid Panel to labs collected on 10/10/23

## 2023-10-12 ENCOUNTER — OFFICE VISIT (OUTPATIENT)
Dept: HEMATOLOGY/ONCOLOGY | Facility: CLINIC | Age: 78
End: 2023-10-12
Payer: MEDICARE

## 2023-10-12 ENCOUNTER — INFUSION (OUTPATIENT)
Dept: INFUSION THERAPY | Facility: HOSPITAL | Age: 78
End: 2023-10-12
Attending: NURSE PRACTITIONER
Payer: MEDICARE

## 2023-10-12 VITALS
WEIGHT: 205.5 LBS | HEART RATE: 118 BPM | HEIGHT: 67 IN | OXYGEN SATURATION: 98 % | DIASTOLIC BLOOD PRESSURE: 80 MMHG | SYSTOLIC BLOOD PRESSURE: 132 MMHG | BODY MASS INDEX: 32.25 KG/M2 | TEMPERATURE: 98 F | RESPIRATION RATE: 18 BRPM

## 2023-10-12 VITALS
RESPIRATION RATE: 18 BRPM | OXYGEN SATURATION: 98 % | WEIGHT: 205.5 LBS | SYSTOLIC BLOOD PRESSURE: 132 MMHG | DIASTOLIC BLOOD PRESSURE: 80 MMHG | HEIGHT: 67 IN | TEMPERATURE: 98 F | BODY MASS INDEX: 32.25 KG/M2 | HEART RATE: 118 BPM

## 2023-10-12 DIAGNOSIS — Z51.81 ENCOUNTER FOR THERAPEUTIC DRUG MONITORING: ICD-10-CM

## 2023-10-12 DIAGNOSIS — Z51.12 MAINTENANCE ANTINEOPLASTIC IMMUNOTHERAPY: ICD-10-CM

## 2023-10-12 DIAGNOSIS — C34.00 MALIGNANT NEOPLASM OF HILUS OF LUNG, UNSPECIFIED LATERALITY: Primary | ICD-10-CM

## 2023-10-12 DIAGNOSIS — C34.90 NON-SMALL CELL LUNG CANCER, UNSPECIFIED LATERALITY: ICD-10-CM

## 2023-10-12 DIAGNOSIS — R53.83 FATIGUE, UNSPECIFIED TYPE: ICD-10-CM

## 2023-10-12 DIAGNOSIS — C34.91 NON-SMALL CELL CANCER OF RIGHT LUNG: Primary | ICD-10-CM

## 2023-10-12 PROCEDURE — 99999 PR PBB SHADOW E&M-EST. PATIENT-LVL IV: CPT | Mod: PBBFAC,,, | Performed by: INTERNAL MEDICINE

## 2023-10-12 PROCEDURE — 1157F PR ADVANCE CARE PLAN OR EQUIV PRESENT IN MEDICAL RECORD: ICD-10-PCS | Mod: CPTII,S$GLB,, | Performed by: INTERNAL MEDICINE

## 2023-10-12 PROCEDURE — 96413 CHEMO IV INFUSION 1 HR: CPT

## 2023-10-12 PROCEDURE — 3288F FALL RISK ASSESSMENT DOCD: CPT | Mod: CPTII,S$GLB,, | Performed by: INTERNAL MEDICINE

## 2023-10-12 PROCEDURE — 99999 PR PBB SHADOW E&M-EST. PATIENT-LVL IV: ICD-10-PCS | Mod: PBBFAC,,, | Performed by: INTERNAL MEDICINE

## 2023-10-12 PROCEDURE — 3079F DIAST BP 80-89 MM HG: CPT | Mod: CPTII,S$GLB,, | Performed by: INTERNAL MEDICINE

## 2023-10-12 PROCEDURE — 1157F ADVNC CARE PLAN IN RCRD: CPT | Mod: CPTII,S$GLB,, | Performed by: INTERNAL MEDICINE

## 2023-10-12 PROCEDURE — 1159F MED LIST DOCD IN RCRD: CPT | Mod: CPTII,S$GLB,, | Performed by: INTERNAL MEDICINE

## 2023-10-12 PROCEDURE — 99215 PR OFFICE/OUTPT VISIT, EST, LEVL V, 40-54 MIN: ICD-10-PCS | Mod: S$GLB,,, | Performed by: INTERNAL MEDICINE

## 2023-10-12 PROCEDURE — 1101F PR PT FALLS ASSESS DOC 0-1 FALLS W/OUT INJ PAST YR: ICD-10-PCS | Mod: CPTII,S$GLB,, | Performed by: INTERNAL MEDICINE

## 2023-10-12 PROCEDURE — 1126F AMNT PAIN NOTED NONE PRSNT: CPT | Mod: CPTII,S$GLB,, | Performed by: INTERNAL MEDICINE

## 2023-10-12 PROCEDURE — 1126F PR PAIN SEVERITY QUANTIFIED, NO PAIN PRESENT: ICD-10-PCS | Mod: CPTII,S$GLB,, | Performed by: INTERNAL MEDICINE

## 2023-10-12 PROCEDURE — 63600175 PHARM REV CODE 636 W HCPCS: Mod: JZ,JG | Performed by: INTERNAL MEDICINE

## 2023-10-12 PROCEDURE — 3288F PR FALLS RISK ASSESSMENT DOCUMENTED: ICD-10-PCS | Mod: CPTII,S$GLB,, | Performed by: INTERNAL MEDICINE

## 2023-10-12 PROCEDURE — 1159F PR MEDICATION LIST DOCUMENTED IN MEDICAL RECORD: ICD-10-PCS | Mod: CPTII,S$GLB,, | Performed by: INTERNAL MEDICINE

## 2023-10-12 PROCEDURE — 3075F PR MOST RECENT SYSTOLIC BLOOD PRESS GE 130-139MM HG: ICD-10-PCS | Mod: CPTII,S$GLB,, | Performed by: INTERNAL MEDICINE

## 2023-10-12 PROCEDURE — 99215 OFFICE O/P EST HI 40 MIN: CPT | Mod: S$GLB,,, | Performed by: INTERNAL MEDICINE

## 2023-10-12 PROCEDURE — 1101F PT FALLS ASSESS-DOCD LE1/YR: CPT | Mod: CPTII,S$GLB,, | Performed by: INTERNAL MEDICINE

## 2023-10-12 PROCEDURE — 25000003 PHARM REV CODE 250: Performed by: INTERNAL MEDICINE

## 2023-10-12 PROCEDURE — 3079F PR MOST RECENT DIASTOLIC BLOOD PRESSURE 80-89 MM HG: ICD-10-PCS | Mod: CPTII,S$GLB,, | Performed by: INTERNAL MEDICINE

## 2023-10-12 PROCEDURE — 3075F SYST BP GE 130 - 139MM HG: CPT | Mod: CPTII,S$GLB,, | Performed by: INTERNAL MEDICINE

## 2023-10-12 RX ORDER — HEPARIN 100 UNIT/ML
500 SYRINGE INTRAVENOUS
Status: CANCELLED | OUTPATIENT
Start: 2023-10-12

## 2023-10-12 RX ORDER — SODIUM CHLORIDE 0.9 % (FLUSH) 0.9 %
10 SYRINGE (ML) INJECTION
Status: CANCELLED | OUTPATIENT
Start: 2023-10-12

## 2023-10-12 RX ORDER — EPINEPHRINE 0.3 MG/.3ML
0.3 INJECTION SUBCUTANEOUS ONCE AS NEEDED
Status: DISCONTINUED | OUTPATIENT
Start: 2023-10-12 | End: 2023-10-12 | Stop reason: HOSPADM

## 2023-10-12 RX ORDER — HEPARIN 100 UNIT/ML
500 SYRINGE INTRAVENOUS
Status: DISCONTINUED | OUTPATIENT
Start: 2023-10-12 | End: 2023-10-12 | Stop reason: HOSPADM

## 2023-10-12 RX ORDER — SODIUM CHLORIDE 0.9 % (FLUSH) 0.9 %
10 SYRINGE (ML) INJECTION
Status: DISCONTINUED | OUTPATIENT
Start: 2023-10-12 | End: 2023-10-12 | Stop reason: HOSPADM

## 2023-10-12 RX ORDER — EPINEPHRINE 0.3 MG/.3ML
0.3 INJECTION SUBCUTANEOUS ONCE AS NEEDED
Status: CANCELLED | OUTPATIENT
Start: 2023-10-12

## 2023-10-12 RX ORDER — DIPHENHYDRAMINE HYDROCHLORIDE 50 MG/ML
50 INJECTION INTRAMUSCULAR; INTRAVENOUS ONCE AS NEEDED
Status: CANCELLED | OUTPATIENT
Start: 2023-10-12

## 2023-10-12 RX ORDER — DIPHENHYDRAMINE HYDROCHLORIDE 50 MG/ML
50 INJECTION INTRAMUSCULAR; INTRAVENOUS ONCE AS NEEDED
Status: DISCONTINUED | OUTPATIENT
Start: 2023-10-12 | End: 2023-10-12 | Stop reason: HOSPADM

## 2023-10-12 RX ADMIN — DURVALUMAB 1500 MG: 500 INJECTION, SOLUTION INTRAVENOUS at 09:10

## 2023-10-12 RX ADMIN — SODIUM CHLORIDE: 9 INJECTION, SOLUTION INTRAVENOUS at 09:10

## 2023-10-12 NOTE — PROGRESS NOTES
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 Right upper lobe 2.5 cm mass.  CT C 9/19/2022: Status post right partial pneumonectomy for resection of a right upper lobe lung mass with no residual recurrent mass seen. Small right-sided pleural effusion. Some lymphadenopathy in the right paratracheal region with a maximum short axis dimension of 1.6 cm.  Follow-up is recommended  CT C 1/25/2023: There is a paratracheal enlarged lymph node which shows interval mild size increase and now measures 2.2 x 2.0 cm and previously was 1.6 x 1.5 cm.  Aortopulmonary window mildly enlarged lymph node is stable.  Thoracic aorta is without aneurysmal dilatation or dissection.  Impression: 1. Operative changes of right upper lobectomy without bronchialstump soft tissue proliferation    2. No residual tumor load or metastatic nodule. 3. Paratracheal enlarged lymph node with interval size increase.  PET CT 2/9/2023:  Hypermetabolic right paratracheal lymph node image 81 series 3 measures 25 x 20 mm with max SUV 27.0.  Hypermetabolic anterior mediastinal lymph node anterior to the SVC measures 12 x 9 mm with max SUV 12.0. Impression: 1. Hypermetabolic metastatic mediastinal adenopathy.   2. No PET evidence of metastatic disease outside of the mediastinum.  CT C/A/P 7/10/2023:  1. Several new subcentimeter  nodules in the right lung.  Suspect these are infectious or inflammatory in nature, but 3 month follow-up chest CT recommended to ensure resolution.  2. 2 reference mediastinal lymph nodes are smaller since January.  3. L1 vertebral body compression deformity new since January, but appears subacute.  CT C/A/P 10/10/2023:    1. Slightly larger mediastinal lymph nodes, to be followed.  2. Increased irregular right perihilar consolidation which may be treatment related.  3. Small right pleural effusion.  4. No new suspicious findings in the abdomen or pelvis.        Pathology:  03/22/2022 CT-guided biopsy of the right lung mass: invasive squamous cell carcinoma, moderately differentiated.  5/24/2022: Right upper lobectomy:  1- LYMPH NODE, LUMBAR RIGHT 10 LYMPHADENECTOMY: NEGATIVE FOR METASTATIC CARCINOMA (0/1).   2- LYMPH NODE, 11R, LYMPHADENECTOMY: NEGATIVE FOR METASTATIC CARCINOMA (0/1).  3- LYMPH NODE, 9R, LYMPHADENECTOMY: NEGATIVE FOR METASTATIC CARCINOMA (0/1).   4- LYMPH NODE, 7R, LYMPHADENECTOMY: NEGATIVE FOR METASTATIC CARCINOMA (0/1).   5- LYMPH NODE, 8R, LYMPHADENECTOMY: ONE LYMPH NODE NEGATIVE FOR METASTATIC CARCINOMA (0/1).    6- LYMPH NODE, 12R, LYMPHADENECTOMY: NEGATIVE FOR METASTATIC CARCINOMA (0/1).  7- LUNG, RIGHT UPPER AND MIDDLE LOBES, PNEUMONECTOMY:  POORLY DIFFERENTIATED, G3, SQUAMOUS CELL CARCINOMA, INVASIVE.    - TUMOR SIZE: 3 CM.    - LYMPHOVASCULAR INVASION IS PRESENT.    - MARGINS NEGATIVE FOR INVASIVE CARCINOMA:    - NEAREST MARGIN, VASCULAR / BRONCHIAL 2.5 CM.    - NO PLEURAL SURFACE INVOLVEMENT     - PROMINENT NECROSIS PRESENT.  Visceral Pleura Invasion: Not identified  Number of Lymph Nodes Examined: Exact number : [id number]  pT Category: pT1c: pN0: No regional lymph node metastasis    3/8/2023 LYMPH NODE BIOPSY:   LYMPH NODE 4R, LYMPHADENECTOMY:  METASTATIC SQUAMOUS CELL CARCINOMA, NONKERATINIZING, MULTIPLE FRAGMENTS.       IMMUNOHISTOCHEMICAL STAINS:   CK 5/6, P63 AND CK7:  POSITIVE IN MALIGNANT  CELLS.   CK20 AND TTF-1:  NEGATIVE IN MALIGNANT CELLS.         CLINICAL HISTORY:       Patient: Leonila Rosales is a 78 y.o. male kindly referred by  Dr. Diaz for evaluation of lung cancer.    On 01/17/2022 patient presented to the emergency department after a fall.  He reports that he was getting to his truck and sleep and fell on his left side on the truck step rail.  He started having pain in his left hip and knee.  He underwent a CT angiogram that showed No pulmonary embolus. Right upper lobe 2.5 cm mass.      During that hospitalization he was treated for a close intertrochanteric fracture of the left femur and underwent open reduction intramedullary nailing left comminuted intertrochanteric hip fracture on 01/18/2022 with Dr. Fortino Palomares.  He then spent several weeks on rehab.    On 03/22/2022 he underwent CT-guided biopsy of the right lung mass.  Pathology showed invasive squamous cell carcinoma, moderately differentiated.    He is s/p right upper lobectomy with  on 5/24/2022.  Pathology report as above.  Patient is stage IA3 squamous cell carcinoma of the lung.  He has recovered well and has been doing good.     He lives alone and is able to perform ADL's. He uses a walker to aid in ambulation. He quit smoking January 2022, after smoking for 40 yrs.       Chief Complaint: OTHER (No concerns today.)      Interval History:  He completed XRT on 5/30/23 and 5 cycles of weekly carbo/taxol on 5/19/23. His final cycle was held due to neutropenia, ANC was 0.7.      Patient presents today continuation of immunotherapy and to discuss CT scan results.  He started Durvalumab on 6/22/23. Due for C5 today. He continues to cough up clear sputum only in the morning after waking up. Overall, he states he is feeling fine. Denies any side effects. He denies shortness of breath, chest pain. No weight loss. Denies headaches. He uses a cane for mobility.   On 10/10/23, Free T3 and T4 were normal.    Past Medical History:    Diagnosis Date    Anemia     Closed intertrochanteric fracture     Deficiency of macronutrients     GERD (gastroesophageal reflux disease)     H. pylori infection     History of tobacco use     Hyperlipidemia     Hypertension     Lung mass     Malignant neoplasm of unspecified part of unspecified bronchus or lung     Non-small cell lung cancer       Past Surgical History:   Procedure Laterality Date    BIOPSY OF INTESTINE  04/04/2022    BRONCHOSCOPY      COLONOSCOPY      ESOPHAGOGASTRODUODENOSCOPY  04/04/2022    HIP FRACTURE SURGERY Left 2016    Intramedullary Nail Insertion Hip Left 01/18/2022    KIDNEY SURGERY Right 05/27/2022    MEDIASTINOSCOPY N/A 3/6/2023    Procedure: MEDIASTINOSCOPY;  Surgeon: Jose Diaz MD;  Location: [facility];  Service: Cardiothoracic;  Laterality: N/A;  XX    PLACEMENT, MEDIPORT N/A 4/6/2023    Procedure: Placement, Mediport;  Surgeon: Jose Diaz MD;  Location: [facility] OR;  Service: Cardiology;  Laterality: N/A;    SHOULDER SURGERY       Family History   Family history unknown: Yes     Social Connections: Socially Integrated (4/12/2023)    Social Connection and Isolation Panel [NHANES]     Frequency of Communication with Friends and Family: More than three times a week     Frequency of Social Gatherings with Friends and Family: More than three times a week     Attends Mandaen Services: More than 4 times per year     Active Member of Clubs or Organizations: Yes     Attends Club or Organization Meetings: More than 4 times per year     Marital Status:        Review of patient's allergies indicates:  No Known Allergies   Current Outpatient Medications on File Prior to Visit   Medication Sig Dispense Refill    aspirin 81 mg Cap Take 81 mg by mouth Daily.      atorvastatin (LIPITOR) 10 MG tablet TAKE ONE TABLET BY MOUTH DAILY 90 tablet 3    ferrous sulfate (FEOSOL) 325 mg (65 mg iron) Tab tablet Take 1 tablet (325 mg total) by mouth once daily. 30 tablet 3    levoFLOXacin  (LEVAQUIN) 500 MG tablet Take 1 tablet (500 mg total) by mouth once daily. 5 tablet 0    LIDOcaine-prilocaine (EMLA) cream Apply topically as needed. Apply to port site 30 mins prior to chemo 30 g 3    LINZESS 145 mcg Cap capsule Take 145 mcg by mouth daily as needed. New medication, not started yet      ondansetron (ZOFRAN) 8 MG tablet Take 1 tablet (8 mg total) by mouth every 8 (eight) hours as needed for Nausea. 1st choice for nausea 30 tablet 2    prochlorperazine (COMPAZINE) 10 MG tablet Take 1 tablet (10 mg total) by mouth every 6 (six) hours as needed (for nausea). 2nd choice, can cause drowsiness. 30 tablet 3    traMADoL (ULTRAM) 50 mg tablet Take 1 tablet (50 mg total) by mouth every 6 (six) hours as needed for Pain. 12 tablet 0    amLODIPine (NORVASC) 5 MG tablet TAKE ONE TABLET BY MOUTH TWICE DAILY (Patient not taking: Reported on 7/20/2023) 180 tablet 1     No current facility-administered medications on file prior to visit.      Review of Systems   Constitutional:  Negative for activity change, appetite change, chills, diaphoresis, fatigue, fever and unexpected weight change.   HENT:  Negative for nasal congestion, mouth sores, nosebleeds, sinus pressure/congestion, sore throat and trouble swallowing.    Eyes: Negative.    Respiratory:  Negative for cough and shortness of breath.    Cardiovascular:  Negative for chest pain and palpitations.   Gastrointestinal:  Negative for abdominal distention, abdominal pain, blood in stool, change in bowel habit, constipation, diarrhea, nausea and vomiting.   Endocrine: Negative.    Genitourinary:  Negative for bladder incontinence, decreased urine volume, difficulty urinating, dysuria, frequency, hematuria and urgency.   Musculoskeletal:  Negative for arthralgias, back pain, gait problem, joint swelling, leg pain and myalgias.   Integumentary:  Negative for rash.   Allergic/Immunologic: Negative.    Neurological:  Negative for dizziness, tremors, syncope, weakness,  "light-headedness, numbness, headaches and memory loss.   Hematological:  Negative for adenopathy. Does not bruise/bleed easily.   Psychiatric/Behavioral:  Negative for agitation, confusion, hallucinations, sleep disturbance and suicidal ideas. The patient is not nervous/anxious.           Vitals:    10/12/23 0845   BP: 132/80   BP Location: Left arm   Patient Position: Sitting   Pulse: (!) 118   Resp: 18   Temp: 98 °F (36.7 °C)   TempSrc: Oral   SpO2: 98%   Weight: 93.2 kg (205 lb 8 oz)   Height: 5' 7" (1.702 m)           Wt Readings from Last 6 Encounters:   10/12/23 93.2 kg (205 lb 8 oz)   10/12/23 93.2 kg (205 lb 8 oz)   09/14/23 93.4 kg (205 lb 14.4 oz)   08/17/23 93.2 kg (205 lb 8 oz)   07/20/23 93.9 kg (207 lb 1.6 oz)   06/22/23 95.6 kg (210 lb 12.8 oz)     Body mass index is 32.19 kg/m².  Body surface area is 2.1 meters squared.       Physical Exam  Vitals and nursing note reviewed.   Constitutional:       General: He is not in acute distress.     Appearance: Normal appearance. He is obese.   HENT:      Head: Normocephalic and atraumatic.      Mouth/Throat:      Mouth: Mucous membranes are moist.   Eyes:      General: No scleral icterus.     Extraocular Movements: Extraocular movements intact.      Conjunctiva/sclera: Conjunctivae normal.   Neck:      Vascular: No JVD.   Cardiovascular:      Rate and Rhythm: Normal rate and regular rhythm.      Heart sounds: No murmur heard.  Pulmonary:      Effort: Pulmonary effort is normal.      Breath sounds: Decreased breath sounds present. No wheezing or rhonchi.   Chest:      Chest wall: No tenderness.   Abdominal:      General: Bowel sounds are normal. There is no distension.      Palpations: Abdomen is soft. There is no fluid wave.      Tenderness: There is no abdominal tenderness.   Musculoskeletal:         General: No swelling or deformity.      Cervical back: Neck supple.   Lymphadenopathy:      Head:      Right side of head: No submandibular adenopathy.      Left " side of head: No submandibular adenopathy.      Cervical: No cervical adenopathy.      Upper Body:      Right upper body: No supraclavicular or axillary adenopathy.      Left upper body: No supraclavicular or axillary adenopathy.      Lower Body: No right inguinal adenopathy. No left inguinal adenopathy.   Skin:     General: Skin is warm.      Coloration: Skin is not jaundiced.      Findings: No rash.   Neurological:      General: No focal deficit present.      Mental Status: He is alert and oriented to person, place, and time.      Cranial Nerves: Cranial nerves 2-12 are intact.      Comments: Mobility assitssed by cane/walker   Psychiatric:         Attention and Perception: Attention normal.         Mood and Affect: Mood and affect normal.         Behavior: Behavior is cooperative.         Cognition and Memory: Cognition normal.         Judgment: Judgment normal.       ECOG SCORE             Laboratory:  CBC with Differential:  Lab Results   Component Value Date    WBC 4.80 10/10/2023    RBC 4.75 10/10/2023    HGB 13.2 (L) 10/10/2023    HCT 42.0 10/10/2023    MCV 88.4 10/10/2023    MCH 27.8 10/10/2023    MCHC 31.4 (L) 10/10/2023    RDW 14.9 10/10/2023     10/10/2023    MPV 10.1 10/10/2023        CMP:  Sodium Level   Date Value Ref Range Status   10/10/2023 142 136 - 145 mmol/L Final     Comment:     @ [facility] same day as CT scan     Potassium Level   Date Value Ref Range Status   10/10/2023 4.3 3.5 - 5.1 mmol/L Final     Comment:     @ [facility] same day as CT scan     Carbon Dioxide   Date Value Ref Range Status   10/10/2023 26 23 - 31 mmol/L Final     Comment:     @ [facility] same day as CT scan     Blood Urea Nitrogen   Date Value Ref Range Status   10/10/2023 11.5 8.4 - 25.7 mg/dL Final     Comment:     @ [facility] same day as CT scan     Creatinine   Date Value Ref Range Status   10/10/2023 0.83 0.73 - 1.18 mg/dL Final     Comment:     @ [facility] same day as CT scan     Calcium Level Total   Date Value Ref Range Status    10/10/2023 10.0 8.8 - 10.0 mg/dL Final     Comment:     @ [facility] same day as CT scan     Albumin Level   Date Value Ref Range Status   10/10/2023 3.6 3.4 - 4.8 g/dL Final     Comment:     @ [facility] same day as CT scan     Bilirubin Total   Date Value Ref Range Status   10/10/2023 0.7 <=1.5 mg/dL Final     Comment:     @ [facility] same day as CT scan     Alkaline Phosphatase   Date Value Ref Range Status   10/10/2023 92 40 - 150 unit/L Final     Comment:     @ [facility] same day as CT scan     Aspartate Aminotransferase   Date Value Ref Range Status   10/10/2023 23 5 - 34 unit/L Final     Comment:     @ [facility] same day as CT scan     Alanine Aminotransferase   Date Value Ref Range Status   10/10/2023 27 0 - 55 unit/L Final     Comment:     @ [facility] same day as CT scan     Estimated GFR-Non    Date Value Ref Range Status   04/19/2022 >60           Assessment:       1) [id number] 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  Verbally consented the patient to the treatment plan and the patient was educated on the planned duration of the treatment and schedule of the treatment administration.  All questions were answered.    3) Pancytopenia- Treatment related. Counts improving. Now with mild anemia-stable        Plan:       Patient with 1.6 right paratracheal LN and small Rt pleural effusion. Surveillance CT scan chest to eval stability with paratracheal enlarged lymph node which shows interval mild size increase and now measures 2.2 x 2.0 cm and previously was 1.6 x 1.5 cm.  Aortopulmonary window mildly enlarged lymph node is stable. PET CT with Hypermetabolic right paratracheal lymph node image 81 series 3 measures 25 x 20 mm with max SUV 27.0. Hypermetabolic anterior mediastinal lymph node anterior to the SVC measures 12 x 9 mm with max SUV 12.0.    Biopsy of R4 lymph node confirmed the metastatic squamous cell carcinoma. Discussed case with Dr Willis and he will be ready to start next week. Completed  XRT on 5/30/23  and 5 cycles of Carbo/taxol on 5/19/23. C6 was held on 5/26/23 due to neutropenia, ANC was 0.7. Plan is for Durvalumab every 4 weeks for 1 year.      Continue with Imfinzi every 4 weeks - okay to proceed with C5 today  CT C/A/P every 3 months - due 1/2024, will order when closer  RTC in 4 weeks with NP for TD/lab/infusion same day - he lives in [place]  Labs: CBC, CMP, TSH    Encourage to call or message us for any questions or problems  The patient was given ample opportunity to ask questions, and to the best of my abilities, all questions answered to satisfaction; patient demonstrated understanding of what we discussed and agreeable to the plan.     Sanjuana Napoles MD  Hematology/Oncology      Professional Services   I, Shirley Pina LPN, acted solely as a scribe for and in the presence of Dr. Sanjuana Napoles, who performed these services.

## 2023-10-17 ENCOUNTER — OFFICE VISIT (OUTPATIENT)
Dept: FAMILY MEDICINE | Facility: CLINIC | Age: 78
End: 2023-10-17
Payer: MEDICARE

## 2023-10-17 VITALS
WEIGHT: 206.63 LBS | HEART RATE: 120 BPM | OXYGEN SATURATION: 97 % | SYSTOLIC BLOOD PRESSURE: 116 MMHG | BODY MASS INDEX: 32.43 KG/M2 | RESPIRATION RATE: 16 BRPM | TEMPERATURE: 98 F | DIASTOLIC BLOOD PRESSURE: 78 MMHG | HEIGHT: 67 IN

## 2023-10-17 DIAGNOSIS — R00.0 TACHYCARDIA: ICD-10-CM

## 2023-10-17 DIAGNOSIS — C34.91 NON-SMALL CELL CANCER OF RIGHT LUNG: ICD-10-CM

## 2023-10-17 DIAGNOSIS — Z23 NEED FOR INFLUENZA VACCINATION: ICD-10-CM

## 2023-10-17 DIAGNOSIS — I10 PRIMARY HYPERTENSION: Primary | ICD-10-CM

## 2023-10-17 DIAGNOSIS — R79.89 LOW TSH LEVEL: ICD-10-CM

## 2023-10-17 DIAGNOSIS — E78.5 HYPERLIPIDEMIA, UNSPECIFIED HYPERLIPIDEMIA TYPE: ICD-10-CM

## 2023-10-17 PROBLEM — R91.8 LUNG MASS: Status: RESOLVED | Noted: 2022-05-28 | Resolved: 2023-10-17

## 2023-10-17 PROCEDURE — 3078F DIAST BP <80 MM HG: CPT | Mod: CPTII,,, | Performed by: NURSE PRACTITIONER

## 2023-10-17 PROCEDURE — 1126F AMNT PAIN NOTED NONE PRSNT: CPT | Mod: CPTII,,, | Performed by: NURSE PRACTITIONER

## 2023-10-17 PROCEDURE — 3078F PR MOST RECENT DIASTOLIC BLOOD PRESSURE < 80 MM HG: ICD-10-PCS | Mod: CPTII,,, | Performed by: NURSE PRACTITIONER

## 2023-10-17 PROCEDURE — G0008 ADMIN INFLUENZA VIRUS VAC: HCPCS | Mod: ,,, | Performed by: NURSE PRACTITIONER

## 2023-10-17 PROCEDURE — 1157F ADVNC CARE PLAN IN RCRD: CPT | Mod: CPTII,,, | Performed by: NURSE PRACTITIONER

## 2023-10-17 PROCEDURE — G0008 FLU VACCINE - QUADRIVALENT - ADJUVANTED: ICD-10-PCS | Mod: ,,, | Performed by: NURSE PRACTITIONER

## 2023-10-17 PROCEDURE — 90694 FLU VACCINE - QUADRIVALENT - ADJUVANTED: ICD-10-PCS | Mod: ,,, | Performed by: NURSE PRACTITIONER

## 2023-10-17 PROCEDURE — 1157F PR ADVANCE CARE PLAN OR EQUIV PRESENT IN MEDICAL RECORD: ICD-10-PCS | Mod: CPTII,,, | Performed by: NURSE PRACTITIONER

## 2023-10-17 PROCEDURE — 3074F SYST BP LT 130 MM HG: CPT | Mod: CPTII,,, | Performed by: NURSE PRACTITIONER

## 2023-10-17 PROCEDURE — 1159F MED LIST DOCD IN RCRD: CPT | Mod: CPTII,,, | Performed by: NURSE PRACTITIONER

## 2023-10-17 PROCEDURE — 1160F RVW MEDS BY RX/DR IN RCRD: CPT | Mod: CPTII,,, | Performed by: NURSE PRACTITIONER

## 2023-10-17 PROCEDURE — 90694 VACC AIIV4 NO PRSRV 0.5ML IM: CPT | Mod: ,,, | Performed by: NURSE PRACTITIONER

## 2023-10-17 PROCEDURE — 99214 PR OFFICE/OUTPT VISIT, EST, LEVL IV, 30-39 MIN: ICD-10-PCS | Mod: 25,,, | Performed by: NURSE PRACTITIONER

## 2023-10-17 PROCEDURE — 1126F PR PAIN SEVERITY QUANTIFIED, NO PAIN PRESENT: ICD-10-PCS | Mod: CPTII,,, | Performed by: NURSE PRACTITIONER

## 2023-10-17 PROCEDURE — 1160F PR REVIEW ALL MEDS BY PRESCRIBER/CLIN PHARMACIST DOCUMENTED: ICD-10-PCS | Mod: CPTII,,, | Performed by: NURSE PRACTITIONER

## 2023-10-17 PROCEDURE — 1159F PR MEDICATION LIST DOCUMENTED IN MEDICAL RECORD: ICD-10-PCS | Mod: CPTII,,, | Performed by: NURSE PRACTITIONER

## 2023-10-17 PROCEDURE — 3074F PR MOST RECENT SYSTOLIC BLOOD PRESSURE < 130 MM HG: ICD-10-PCS | Mod: CPTII,,, | Performed by: NURSE PRACTITIONER

## 2023-10-17 PROCEDURE — 99214 OFFICE O/P EST MOD 30 MIN: CPT | Mod: 25,,, | Performed by: NURSE PRACTITIONER

## 2023-10-17 RX ORDER — PANTOPRAZOLE SODIUM 40 MG/1
40 TABLET, DELAYED RELEASE ORAL
COMMUNITY
Start: 2023-04-27

## 2023-10-17 NOTE — ASSESSMENT & PLAN NOTE
EKG in clinic today sinus tachycardia, no arrhythmia.  Patient encouraged to monitor for any chest pain, palpitations, worsening dyspnea.  Likely secondary to low TSH which is secondary to chemotherapy.  Continue to monitor TSH with Oncology.

## 2023-10-17 NOTE — PROGRESS NOTES
Subjective:       Patient ID: Leonila Rosales is a 78 y.o. male.    Chief Complaint: Hyperlipidemia (6 month f/u), Hypertension (6 month f/u), and Microcytic Anemia (6 month f/u)      HPI   This is a 78-year-old  male who presents to clinic today for six-month follow-up for hypertension, hyperlipidemia, lung cancer.  Patient reports overall he is doing well.  Is still undergoing chemotherapy with Oncology and overall doing well.  Review of Systems  Comprehensive review of systems negative except as stated in HPI    The patient's Health Maintenance was reviewed and the following appears to be due:   Health Maintenance Due   Topic Date Due    COVID-19 Vaccine (6 - 2023-24 season) 09/01/2023       Past Medical History:  Past Medical History:   Diagnosis Date    Anemia     Closed intertrochanteric fracture     Deficiency of macronutrients     GERD (gastroesophageal reflux disease)     H. pylori infection     History of tobacco use     Hyperlipidemia     Hypertension     Lung mass     Malignant neoplasm of unspecified part of unspecified bronchus or lung     Non-small cell lung cancer      Past Surgical History:   Procedure Laterality Date    BIOPSY OF INTESTINE  04/04/2022    BRONCHOSCOPY      COLONOSCOPY      ESOPHAGOGASTRODUODENOSCOPY  04/04/2022    HIP FRACTURE SURGERY Left 2016    Intramedullary Nail Insertion Hip Left 01/18/2022    KIDNEY SURGERY Right 05/27/2022    MEDIASTINOSCOPY N/A 3/6/2023    Procedure: MEDIASTINOSCOPY;  Surgeon: Jose Diaz MD;  Location: [facility];  Service: Cardiothoracic;  Laterality: N/A;  XX    PLACEMENT, MEDIPORT N/A 4/6/2023    Procedure: Placement, Mediport;  Surgeon: Jose Diaz MD;  Location: [facility];  Service: Cardiology;  Laterality: N/A;    SHOULDER SURGERY       Review of patient's allergies indicates:  No Known Allergies  Current Outpatient Medications on File Prior to Visit   Medication Sig Dispense Refill    amLODIPine (NORVASC) 5 MG tablet TAKE ONE  TABLET BY MOUTH TWICE DAILY 180 tablet 1    aspirin 81 mg Cap Take 81 mg by mouth Daily.      atorvastatin (LIPITOR) 10 MG tablet TAKE ONE TABLET BY MOUTH DAILY 90 tablet 3    LINZESS 145 mcg Cap capsule Take 145 mcg by mouth daily as needed. New medication, not started yet      ondansetron (ZOFRAN) 8 MG tablet Take 1 tablet (8 mg total) by mouth every 8 (eight) hours as needed for Nausea. 1st choice for nausea 30 tablet 2    traMADoL (ULTRAM) 50 mg tablet Take 1 tablet (50 mg total) by mouth every 6 (six) hours as needed for Pain. 12 tablet 0    LIDOcaine-prilocaine (EMLA) cream Apply topically as needed. Apply to port site 30 mins prior to chemo 30 g 3    pantoprazole (PROTONIX) 40 MG tablet Take 40 mg by mouth.      prochlorperazine (COMPAZINE) 10 MG tablet Take 1 tablet (10 mg total) by mouth every 6 (six) hours as needed (for nausea). 2nd choice, can cause drowsiness. 30 tablet 3    [DISCONTINUED] ferrous sulfate (FEOSOL) 325 mg (65 mg iron) Tab tablet Take 1 tablet (325 mg total) by mouth once daily. 30 tablet 3    [DISCONTINUED] levoFLOXacin (LEVAQUIN) 500 MG tablet Take 1 tablet (500 mg total) by mouth once daily. 5 tablet 0     No current facility-administered medications on file prior to visit.     Social History     Socioeconomic History    Marital status:    Tobacco Use    Smoking status: Former     Current packs/day: 0.00     Types: Cigarettes     Quit date: 2021     Years since quittin.8    Smokeless tobacco: Never   Substance and Sexual Activity    Alcohol use: Yes     Comment: rare    Drug use: Never    Sexual activity: Not Currently     Social Determinants of Health     Financial Resource Strain: Medium Risk (2023)    Overall Financial Resource Strain (CARDIA)     Difficulty of Paying Living Expenses: Somewhat hard   Food Insecurity: No Food Insecurity (2023)    Hunger Vital Sign     Worried About Running Out of Food in the Last Year: Never true     Ran Out of Food in the  "Last Year: Never true   Transportation Needs: No Transportation Needs (4/12/2023)    PRAPARE - Transportation     Lack of Transportation (Medical): No     Lack of Transportation (Non-Medical): No   Physical Activity: Insufficiently Active (4/12/2023)    Exercise Vital Sign     Days of Exercise per Week: 3 days     Minutes of Exercise per Session: 20 min   Stress: Stress Concern Present (4/12/2023)    [employer] - Occupational Stress Questionnaire     Feeling of Stress : To some extent   Social Connections: Socially Integrated (4/12/2023)    Social Connection and Isolation Panel [NHANES]     Frequency of Communication with Friends and Family: More than three times a week     Frequency of Social Gatherings with Friends and Family: More than three times a week     Attends Taoism Services: More than 4 times per year     Active Member of Clubs or Organizations: Yes     Attends Club or Organization Meetings: More than 4 times per year     Marital Status:    Housing Stability: Low Risk  (4/12/2023)    Housing Stability Vital Sign     Unable to Pay for Housing in the Last Year: No     Number of Places Lived in the Last Year: 1     Unstable Housing in the Last Year: No     Family History   Family history unknown: Yes       Objective:       /78 (BP Location: Left arm)   Pulse (!) 120   Temp 98.4 °F (36.9 °C) (Temporal)   Resp 16   Ht 5' 7" (1.702 m)   Wt 93.7 kg (206 lb 9.6 oz)   SpO2 97%   BMI 32.36 kg/m²      Physical Exam  Vitals and nursing note reviewed.   Constitutional:       Appearance: Normal appearance. He is normal weight.   HENT:      Head: Normocephalic and atraumatic.      Right Ear: Tympanic membrane, ear canal and external ear normal.      Left Ear: Tympanic membrane, ear canal and external ear normal.      Nose: Nose normal.      Mouth/Throat:      Mouth: Mucous membranes are moist.      Pharynx: Oropharynx is clear.   Eyes:      Extraocular Movements: " Extraocular movements intact.      Conjunctiva/sclera: Conjunctivae normal.      Pupils: Pupils are equal, round, and reactive to light.   Cardiovascular:      Rate and Rhythm: Regular rhythm. Tachycardia present.   Pulmonary:      Effort: Pulmonary effort is normal.      Breath sounds: Normal breath sounds.   Musculoskeletal:         General: Normal range of motion.      Cervical back: Normal range of motion and neck supple.      Comments: Steady gait with assistance of cane   Skin:     General: Skin is warm and dry.   Neurological:      General: No focal deficit present.      Mental Status: He is alert and oriented to person, place, and time.   Psychiatric:         Mood and Affect: Mood normal.         Behavior: Behavior normal.         Thought Content: Thought content normal.         Judgment: Judgment normal.         Labs  Lab Visit on 10/10/2023   Component Date Value Ref Range Status    Sodium Level 10/10/2023 142  136 - 145 mmol/L Final    @ [facility] same day as CT scan    Potassium Level 10/10/2023 4.3  3.5 - 5.1 mmol/L Final    @ [facility] same day as CT scan    Chloride 10/10/2023 107  98 - 107 mmol/L Final    @ [facility] same day as CT scan    Carbon Dioxide 10/10/2023 26  23 - 31 mmol/L Final    @ [facility] same day as CT scan    Glucose Level 10/10/2023 92  82 - 115 mg/dL Final    @ [facility] same day as CT scan    Blood Urea Nitrogen 10/10/2023 11.5  8.4 - 25.7 mg/dL Final    @ [facility] same day as CT scan    Creatinine 10/10/2023 0.83  0.73 - 1.18 mg/dL Final    @ [facility] same day as CT scan    Calcium Level Total 10/10/2023 10.0  8.8 - 10.0 mg/dL Final    @ [facility] same day as CT scan    Protein Total 10/10/2023 8.0 (H)  5.8 - 7.6 gm/dL Final    @ [facility] same day as CT scan    Albumin Level 10/10/2023 3.6  3.4 - 4.8 g/dL Final    @ [facility] same day as CT scan    Globulin 10/10/2023 4.4 (H)  2.4 - 3.5 gm/dL Final    Albumin/Globulin Ratio 10/10/2023 0.8 (L)  1.1 - 2.0 ratio Final    Bilirubin Total 10/10/2023 0.7  <=1.5 mg/dL Final    @  [facility] same day as CT scan    Alkaline Phosphatase 10/10/2023 92  40 - 150 unit/L Final    @ [facility] same day as CT scan    Alanine Aminotransferase 10/10/2023 27  0 - 55 unit/L Final    @ [facility] same day as CT scan    Aspartate Aminotransferase 10/10/2023 23  5 - 34 unit/L Final    @ [facility] same day as CT scan    eGFR 10/10/2023 >60  mls/min/1.73/m2 Final    TSH 10/10/2023 0.224 (L)  0.350 - 4.940 uIU/mL Final    @ [facility] same day as CT scan    Thyroxine Free 10/10/2023 1.10  0.70 - 1.48 ng/dL Final    @ [facility] same day as CT scan    T3 Free 10/10/2023 2.76  1.58 - 3.91 pg/mL Final    WBC 10/10/2023 4.80  4.50 - 11.50 x10(3)/mcL Final    RBC 10/10/2023 4.75  4.70 - 6.10 x10(6)/mcL Final    Hgb 10/10/2023 13.2 (L)  14.0 - 18.0 g/dL Final    Hct 10/10/2023 42.0  42.0 - 52.0 % Final    MCV 10/10/2023 88.4  80.0 - 94.0 fL Final    MCH 10/10/2023 27.8  27.0 - 31.0 pg Final    MCHC 10/10/2023 31.4 (L)  33.0 - 36.0 g/dL Final    RDW 10/10/2023 14.9  11.5 - 17.0 % Final    Platelet 10/10/2023 195  130 - 400 x10(3)/mcL Final    MPV 10/10/2023 10.1  7.4 - 10.4 fL Final    Neut % 10/10/2023 66.7  % Final    Lymph % 10/10/2023 18.1  % Final    Mono % 10/10/2023 10.8  % Final    Eos % 10/10/2023 3.8  % Final    Basophil % 10/10/2023 0.4  % Final    Lymph # 10/10/2023 0.87  0.6 - 4.6 x10(3)/mcL Final    Neut # 10/10/2023 3.20  2.1 - 9.2 x10(3)/mcL Final    Mono # 10/10/2023 0.52  0.1 - 1.3 x10(3)/mcL Final    Eos # 10/10/2023 0.18  0 - 0.9 x10(3)/mcL Final    Baso # 10/10/2023 0.02  <=0.2 x10(3)/mcL Final    IG# 10/10/2023 0.01  0 - 0.04 x10(3)/mcL Final    IG% 10/10/2023 0.2  % Final    Cholesterol Total 10/10/2023 140  <=200 mg/dL Final    HDL Cholesterol 10/10/2023 47  35 - 60 mg/dL Final    Triglyceride 10/10/2023 65  34 - 140 mg/dL Final    Cholesterol/HDL Ratio 10/10/2023 3  0 - 5 Final    Very Low Density Lipoprotein 10/10/2023 13   Final    LDL Cholesterol 10/10/2023 80.00  50.00 - 140.00 mg/dL Final         Assessment and Plan        ICD-10-CM ICD-9-CM   1. Primary hypertension  I10 401.9   2. Hyperlipidemia, unspecified hyperlipidemia type  E78.5 272.4   3. Non-small cell cancer of right lung  C34.91 162.9   4. Low TSH level  R79.89 794.5   5. Tachycardia  R00.0 785.0   6. Need for influenza vaccination  Z23 V04.81        1. Primary hypertension  Overview:  Amlodipine 5 mg daily    Assessment & Plan:  Stable, continue amlodipine 5 mg daily, follow-up 6 months.      2. Hyperlipidemia, unspecified hyperlipidemia type  Overview:  Atorvastatin 10 mg daily    Assessment & Plan:  Stable, total cholesterol 140, LDL 80, continue atorvastatin 10 mg daily, follow-up 6 months.    Orders:  -     Lipid Panel; Future; Expected date: 04/17/2024    3. Non-small cell cancer of right lung  Overview:  01/17/2022 - fall with closed intratrochanteric fracture of the left femur, had CT chest while in emergency department and incidentally noted was right upper lobe 2.5 cm mass.    03/22/2022 CT - guided biopsy right upper lobe mass pathology showing invasive squamous cell carcinoma    05/24/2022 - right upper lobectomy with Dr. Diaz     09/27/2022 - initial visit with Dr. Napoles - recommends H&P and CT chest every 6 months for 2-3 years then H&P and low-dose noncontrast enhanced CT annually    CT C 1/25/2023: There is a paratracheal enlarged lymph node which shows interval mild size increase and now measures 2.2 x 2.0 cm and previously was 1.6 x 1.5 cm.  Impression -  Paratracheal enlarged lymph node with interval size increase.    PET CT 2/9/2023:  Hypermetabolic right paratracheal lymph node image 81 series 3 measures 25 x 20 mm with max SUV 27.0.  Hypermetabolic anterior mediastinal lymph node anterior to the SVC measures 12 x 9 mm with max SUV 12.0. Impression: 1. Hypermetabolic metastatic mediastinal adenopathy.   2. No PET evidence of metastatic disease outside of the mediastinum.    3/8/2023 LYMPH NODE BIOPSY:   LYMPH NODE 4R, LYMPHADENECTOMY:  METASTATIC  SQUAMOUS CELL CARCINOMA, NONKERATINIZING, MULTIPLE FRAGMENTS.    04/06/2023 - mediport placement per Dr Diaz    04/11/2023 - oncology visit, plan to start chemotherapy + XRT    05/19/2023 - completed 5 cycles carbo/Taxol    05/30/2023 - completed XRT    06/22/2023 - started durvalumab    Assessment & Plan:  Continue management with Oncology.  Follow-up next month as scheduled.    Orders:  -     TSH; Future; Expected date: 04/17/2024    4. Low TSH level  Overview:  Noted per oncology starting June 2023  Monitored by oncology monthly  Normal FT3 and FT4    Assessment & Plan:  Being managed/monitored per Oncology at this point.  Patient has been having some tachycardia as well, asymptomatic.  EKG in clinic today sinus tachycardia, no arrhythmia.  Patient encouraged to monitor for any chest pain, palpitations, worsening dyspnea.  Follow-up with Oncology as scheduled for labs next month.    Orders:  -     TSH; Future; Expected date: 04/17/2024    5. Tachycardia  Assessment & Plan:  EKG in clinic today sinus tachycardia, no arrhythmia.  Patient encouraged to monitor for any chest pain, palpitations, worsening dyspnea.  Likely secondary to low TSH which is secondary to chemotherapy.  Continue to monitor TSH with Oncology.    Orders:  -     POCT EKG 12-LEAD (Manually Resulted by Ordering Provider)  -     TSH; Future; Expected date: 04/17/2024    6. Need for influenza vaccination  -     Influenza (FLUAD) - Quadrivalent (Adjuvanted) *Preferred* (65+) (PF)           Follow up in 6 months (on 4/12/2024) for Annual.

## 2023-10-17 NOTE — ASSESSMENT & PLAN NOTE
Being managed/monitored per Oncology at this point.  Patient has been having some tachycardia as well, asymptomatic.  EKG in clinic today sinus tachycardia, no arrhythmia.  Patient encouraged to monitor for any chest pain, palpitations, worsening dyspnea.  Follow-up with Oncology as scheduled for labs next month.

## 2023-11-07 ENCOUNTER — LAB VISIT (OUTPATIENT)
Dept: LAB | Facility: HOSPITAL | Age: 78
End: 2023-11-07
Attending: NURSE PRACTITIONER
Payer: MEDICARE

## 2023-11-07 DIAGNOSIS — Z51.81 ENCOUNTER FOR THERAPEUTIC DRUG MONITORING: ICD-10-CM

## 2023-11-07 DIAGNOSIS — C34.91 NON-SMALL CELL CANCER OF RIGHT LUNG: ICD-10-CM

## 2023-11-07 DIAGNOSIS — R53.83 FATIGUE, UNSPECIFIED TYPE: ICD-10-CM

## 2023-11-07 DIAGNOSIS — Z51.12 MAINTENANCE ANTINEOPLASTIC IMMUNOTHERAPY: ICD-10-CM

## 2023-11-07 LAB
ALBUMIN SERPL-MCNC: 3.6 G/DL (ref 3.4–4.8)
ALBUMIN/GLOB SERPL: 0.9 RATIO (ref 1.1–2)
ALP SERPL-CCNC: 94 UNIT/L (ref 40–150)
ALT SERPL-CCNC: 22 UNIT/L (ref 0–55)
AST SERPL-CCNC: 21 UNIT/L (ref 5–34)
BASOPHILS # BLD AUTO: 0.02 X10(3)/MCL
BASOPHILS NFR BLD AUTO: 0.4 %
BILIRUB SERPL-MCNC: 0.6 MG/DL
BUN SERPL-MCNC: 8.7 MG/DL (ref 8.4–25.7)
CALCIUM SERPL-MCNC: 9.8 MG/DL (ref 8.8–10)
CHLORIDE SERPL-SCNC: 107 MMOL/L (ref 98–107)
CO2 SERPL-SCNC: 24 MMOL/L (ref 23–31)
CREAT SERPL-MCNC: 0.81 MG/DL (ref 0.73–1.18)
EOSINOPHIL # BLD AUTO: 0.23 X10(3)/MCL (ref 0–0.9)
EOSINOPHIL NFR BLD AUTO: 5 %
ERYTHROCYTE [DISTWIDTH] IN BLOOD BY AUTOMATED COUNT: 15.3 % (ref 11.5–17)
GFR SERPLBLD CREATININE-BSD FMLA CKD-EPI: >60 MLS/MIN/1.73/M2
GLOBULIN SER-MCNC: 3.8 GM/DL (ref 2.4–3.5)
GLUCOSE SERPL-MCNC: 91 MG/DL (ref 82–115)
HCT VFR BLD AUTO: 43.3 % (ref 42–52)
HGB BLD-MCNC: 13.4 G/DL (ref 14–18)
IMM GRANULOCYTES # BLD AUTO: 0.01 X10(3)/MCL (ref 0–0.04)
IMM GRANULOCYTES NFR BLD AUTO: 0.2 %
LYMPHOCYTES # BLD AUTO: 1.32 X10(3)/MCL (ref 0.6–4.6)
LYMPHOCYTES NFR BLD AUTO: 28.5 %
MCH RBC QN AUTO: 27.5 PG (ref 27–31)
MCHC RBC AUTO-ENTMCNC: 30.9 G/DL (ref 33–36)
MCV RBC AUTO: 88.7 FL (ref 80–94)
MONOCYTES # BLD AUTO: 0.48 X10(3)/MCL (ref 0.1–1.3)
MONOCYTES NFR BLD AUTO: 10.4 %
NEUTROPHILS # BLD AUTO: 2.57 X10(3)/MCL (ref 2.1–9.2)
NEUTROPHILS NFR BLD AUTO: 55.5 %
PLATELET # BLD AUTO: 198 X10(3)/MCL (ref 130–400)
PMV BLD AUTO: 10.4 FL (ref 7.4–10.4)
POTASSIUM SERPL-SCNC: 4.3 MMOL/L (ref 3.5–5.1)
PROT SERPL-MCNC: 7.4 GM/DL (ref 5.8–7.6)
RBC # BLD AUTO: 4.88 X10(6)/MCL (ref 4.7–6.1)
SODIUM SERPL-SCNC: 143 MMOL/L (ref 136–145)
TSH SERPL-ACNC: 0.19 UIU/ML (ref 0.35–4.94)
WBC # SPEC AUTO: 4.63 X10(3)/MCL (ref 4.5–11.5)

## 2023-11-07 PROCEDURE — 80053 COMPREHEN METABOLIC PANEL: CPT

## 2023-11-07 PROCEDURE — 36415 COLL VENOUS BLD VENIPUNCTURE: CPT

## 2023-11-07 PROCEDURE — 85025 COMPLETE CBC W/AUTO DIFF WBC: CPT

## 2023-11-07 PROCEDURE — 84443 ASSAY THYROID STIM HORMONE: CPT

## 2023-11-08 DIAGNOSIS — D70.1 CHEMOTHERAPY-INDUCED NEUTROPENIA: ICD-10-CM

## 2023-11-08 DIAGNOSIS — R53.83 FATIGUE, UNSPECIFIED TYPE: ICD-10-CM

## 2023-11-08 DIAGNOSIS — T45.1X5A CHEMOTHERAPY-INDUCED NEUTROPENIA: ICD-10-CM

## 2023-11-08 DIAGNOSIS — C34.91 NON-SMALL CELL CANCER OF RIGHT LUNG: Primary | ICD-10-CM

## 2023-11-09 ENCOUNTER — INFUSION (OUTPATIENT)
Dept: INFUSION THERAPY | Facility: HOSPITAL | Age: 78
End: 2023-11-09
Attending: NURSE PRACTITIONER
Payer: MEDICARE

## 2023-11-09 ENCOUNTER — OFFICE VISIT (OUTPATIENT)
Dept: HEMATOLOGY/ONCOLOGY | Facility: CLINIC | Age: 78
End: 2023-11-09
Payer: MEDICARE

## 2023-11-09 VITALS
RESPIRATION RATE: 18 BRPM | OXYGEN SATURATION: 98 % | WEIGHT: 208.5 LBS | HEIGHT: 67 IN | TEMPERATURE: 98 F | HEART RATE: 103 BPM | BODY MASS INDEX: 32.72 KG/M2 | DIASTOLIC BLOOD PRESSURE: 79 MMHG | SYSTOLIC BLOOD PRESSURE: 143 MMHG

## 2023-11-09 VITALS — WEIGHT: 208.5 LBS | HEIGHT: 67 IN | BODY MASS INDEX: 32.72 KG/M2

## 2023-11-09 DIAGNOSIS — C34.91 NON-SMALL CELL CANCER OF RIGHT LUNG: Primary | ICD-10-CM

## 2023-11-09 DIAGNOSIS — C34.00 MALIGNANT NEOPLASM OF HILUS OF LUNG, UNSPECIFIED LATERALITY: Primary | ICD-10-CM

## 2023-11-09 DIAGNOSIS — Z51.12 MAINTENANCE ANTINEOPLASTIC IMMUNOTHERAPY: ICD-10-CM

## 2023-11-09 DIAGNOSIS — C34.90 NON-SMALL CELL LUNG CANCER, UNSPECIFIED LATERALITY: ICD-10-CM

## 2023-11-09 DIAGNOSIS — R53.83 FATIGUE, UNSPECIFIED TYPE: ICD-10-CM

## 2023-11-09 PROCEDURE — 3288F FALL RISK ASSESSMENT DOCD: CPT | Mod: CPTII,S$GLB,, | Performed by: NURSE PRACTITIONER

## 2023-11-09 PROCEDURE — 1157F PR ADVANCE CARE PLAN OR EQUIV PRESENT IN MEDICAL RECORD: ICD-10-PCS | Mod: CPTII,S$GLB,, | Performed by: NURSE PRACTITIONER

## 2023-11-09 PROCEDURE — 3077F SYST BP >= 140 MM HG: CPT | Mod: CPTII,S$GLB,, | Performed by: NURSE PRACTITIONER

## 2023-11-09 PROCEDURE — A4216 STERILE WATER/SALINE, 10 ML: HCPCS | Performed by: NURSE PRACTITIONER

## 2023-11-09 PROCEDURE — 96413 CHEMO IV INFUSION 1 HR: CPT

## 2023-11-09 PROCEDURE — 1160F PR REVIEW ALL MEDS BY PRESCRIBER/CLIN PHARMACIST DOCUMENTED: ICD-10-PCS | Mod: CPTII,S$GLB,, | Performed by: NURSE PRACTITIONER

## 2023-11-09 PROCEDURE — 1160F RVW MEDS BY RX/DR IN RCRD: CPT | Mod: CPTII,S$GLB,, | Performed by: NURSE PRACTITIONER

## 2023-11-09 PROCEDURE — 1157F ADVNC CARE PLAN IN RCRD: CPT | Mod: CPTII,S$GLB,, | Performed by: NURSE PRACTITIONER

## 2023-11-09 PROCEDURE — 1126F PR PAIN SEVERITY QUANTIFIED, NO PAIN PRESENT: ICD-10-PCS | Mod: CPTII,S$GLB,, | Performed by: NURSE PRACTITIONER

## 2023-11-09 PROCEDURE — 3078F DIAST BP <80 MM HG: CPT | Mod: CPTII,S$GLB,, | Performed by: NURSE PRACTITIONER

## 2023-11-09 PROCEDURE — 1126F AMNT PAIN NOTED NONE PRSNT: CPT | Mod: CPTII,S$GLB,, | Performed by: NURSE PRACTITIONER

## 2023-11-09 PROCEDURE — 63600175 PHARM REV CODE 636 W HCPCS: Mod: JZ,JG | Performed by: NURSE PRACTITIONER

## 2023-11-09 PROCEDURE — 3288F PR FALLS RISK ASSESSMENT DOCUMENTED: ICD-10-PCS | Mod: CPTII,S$GLB,, | Performed by: NURSE PRACTITIONER

## 2023-11-09 PROCEDURE — 99215 OFFICE O/P EST HI 40 MIN: CPT | Mod: S$GLB,,, | Performed by: NURSE PRACTITIONER

## 2023-11-09 PROCEDURE — 3077F PR MOST RECENT SYSTOLIC BLOOD PRESSURE >= 140 MM HG: ICD-10-PCS | Mod: CPTII,S$GLB,, | Performed by: NURSE PRACTITIONER

## 2023-11-09 PROCEDURE — 25000003 PHARM REV CODE 250: Performed by: NURSE PRACTITIONER

## 2023-11-09 PROCEDURE — 1159F PR MEDICATION LIST DOCUMENTED IN MEDICAL RECORD: ICD-10-PCS | Mod: CPTII,S$GLB,, | Performed by: NURSE PRACTITIONER

## 2023-11-09 PROCEDURE — 1159F MED LIST DOCD IN RCRD: CPT | Mod: CPTII,S$GLB,, | Performed by: NURSE PRACTITIONER

## 2023-11-09 PROCEDURE — 1101F PR PT FALLS ASSESS DOC 0-1 FALLS W/OUT INJ PAST YR: ICD-10-PCS | Mod: CPTII,S$GLB,, | Performed by: NURSE PRACTITIONER

## 2023-11-09 PROCEDURE — 99999 PR PBB SHADOW E&M-EST. PATIENT-LVL IV: ICD-10-PCS | Mod: PBBFAC,,, | Performed by: NURSE PRACTITIONER

## 2023-11-09 PROCEDURE — 1101F PT FALLS ASSESS-DOCD LE1/YR: CPT | Mod: CPTII,S$GLB,, | Performed by: NURSE PRACTITIONER

## 2023-11-09 PROCEDURE — 99999 PR PBB SHADOW E&M-EST. PATIENT-LVL IV: CPT | Mod: PBBFAC,,, | Performed by: NURSE PRACTITIONER

## 2023-11-09 PROCEDURE — 99215 PR OFFICE/OUTPT VISIT, EST, LEVL V, 40-54 MIN: ICD-10-PCS | Mod: S$GLB,,, | Performed by: NURSE PRACTITIONER

## 2023-11-09 PROCEDURE — 3078F PR MOST RECENT DIASTOLIC BLOOD PRESSURE < 80 MM HG: ICD-10-PCS | Mod: CPTII,S$GLB,, | Performed by: NURSE PRACTITIONER

## 2023-11-09 RX ORDER — SODIUM CHLORIDE 0.9 % (FLUSH) 0.9 %
10 SYRINGE (ML) INJECTION
Status: CANCELLED | OUTPATIENT
Start: 2023-11-09

## 2023-11-09 RX ORDER — DIPHENHYDRAMINE HYDROCHLORIDE 50 MG/ML
50 INJECTION INTRAMUSCULAR; INTRAVENOUS ONCE AS NEEDED
Status: DISCONTINUED | OUTPATIENT
Start: 2023-11-09 | End: 2023-11-09 | Stop reason: HOSPADM

## 2023-11-09 RX ORDER — HEPARIN 100 UNIT/ML
500 SYRINGE INTRAVENOUS
Status: DISCONTINUED | OUTPATIENT
Start: 2023-11-09 | End: 2023-11-09 | Stop reason: HOSPADM

## 2023-11-09 RX ORDER — HEPARIN 100 UNIT/ML
500 SYRINGE INTRAVENOUS
Status: CANCELLED | OUTPATIENT
Start: 2023-11-09

## 2023-11-09 RX ORDER — SODIUM CHLORIDE 0.9 % (FLUSH) 0.9 %
10 SYRINGE (ML) INJECTION
Status: DISCONTINUED | OUTPATIENT
Start: 2023-11-09 | End: 2023-11-09 | Stop reason: HOSPADM

## 2023-11-09 RX ORDER — DIPHENHYDRAMINE HYDROCHLORIDE 50 MG/ML
50 INJECTION INTRAMUSCULAR; INTRAVENOUS ONCE AS NEEDED
Status: CANCELLED | OUTPATIENT
Start: 2023-11-09

## 2023-11-09 RX ORDER — EPINEPHRINE 0.3 MG/.3ML
0.3 INJECTION SUBCUTANEOUS ONCE AS NEEDED
Status: DISCONTINUED | OUTPATIENT
Start: 2023-11-09 | End: 2023-11-09 | Stop reason: HOSPADM

## 2023-11-09 RX ORDER — EPINEPHRINE 0.3 MG/.3ML
0.3 INJECTION SUBCUTANEOUS ONCE AS NEEDED
Status: CANCELLED | OUTPATIENT
Start: 2023-11-09

## 2023-11-09 RX ADMIN — SODIUM CHLORIDE 1500 MG: 9 INJECTION, SOLUTION INTRAVENOUS at 10:11

## 2023-11-09 RX ADMIN — SODIUM CHLORIDE: 9 INJECTION, SOLUTION INTRAVENOUS at 09:11

## 2023-11-09 RX ADMIN — Medication 500 UNITS: at 11:11

## 2023-11-09 RX ADMIN — Medication 10 ML: at 11:11

## 2023-11-09 NOTE — PROGRESS NOTES
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 Right upper lobe 2.5 cm mass.  CT C 9/19/2022: Status post right partial pneumonectomy for resection of a right upper lobe lung mass with no residual recurrent mass seen. Small right-sided pleural effusion. Some lymphadenopathy in the right paratracheal region with a maximum short axis dimension of 1.6 cm.  Follow-up is recommended  CT C 1/25/2023: There is a paratracheal enlarged lymph node which shows interval mild size increase and now measures 2.2 x 2.0 cm and previously was 1.6 x 1.5 cm.  Aortopulmonary window mildly enlarged lymph node is stable.  Thoracic aorta is without aneurysmal dilatation or dissection.  Impression: 1. Operative changes of right upper lobectomy without bronchialstump soft tissue proliferation    2. No residual tumor load or metastatic nodule. 3. Paratracheal enlarged lymph node with interval size increase.  PET CT 2/9/2023:  Hypermetabolic right paratracheal lymph node image 81 series 3 measures 25 x 20 mm with max SUV 27.0.  Hypermetabolic anterior mediastinal lymph node anterior to the SVC measures 12 x 9 mm with max SUV 12.0. Impression: 1. Hypermetabolic metastatic mediastinal adenopathy.   2. No PET evidence of metastatic disease outside of the mediastinum.  CT C/A/P 7/10/2023:  1. Several new subcentimeter  nodules in the right lung.  Suspect these are infectious or inflammatory in nature, but 3 month follow-up chest CT recommended to ensure resolution.  2. 2 reference mediastinal lymph nodes are smaller since January.  3. L1 vertebral body compression deformity new since January, but appears subacute.  CT C/A/P 10/10/2023:    1. Slightly larger mediastinal lymph nodes, to be followed.  2. Increased irregular right perihilar consolidation which may be treatment related.  3. Small right pleural effusion.  4. No new suspicious findings in the abdomen or pelvis.        Pathology:  03/22/2022 CT-guided biopsy of the right lung mass: invasive squamous cell carcinoma, moderately differentiated.  5/24/2022: Right upper lobectomy:  1- LYMPH NODE, LUMBAR RIGHT 10 LYMPHADENECTOMY: NEGATIVE FOR METASTATIC CARCINOMA (0/1).   2- LYMPH NODE, 11R, LYMPHADENECTOMY: NEGATIVE FOR METASTATIC CARCINOMA (0/1).  3- LYMPH NODE, 9R, LYMPHADENECTOMY: NEGATIVE FOR METASTATIC CARCINOMA (0/1).   4- LYMPH NODE, 7R, LYMPHADENECTOMY: NEGATIVE FOR METASTATIC CARCINOMA (0/1).   5- LYMPH NODE, 8R, LYMPHADENECTOMY: ONE LYMPH NODE NEGATIVE FOR METASTATIC CARCINOMA (0/1).    6- LYMPH NODE, 12R, LYMPHADENECTOMY: NEGATIVE FOR METASTATIC CARCINOMA (0/1).  7- LUNG, RIGHT UPPER AND MIDDLE LOBES, PNEUMONECTOMY:  POORLY DIFFERENTIATED, G3, SQUAMOUS CELL CARCINOMA, INVASIVE.    - TUMOR SIZE: 3 CM.    - LYMPHOVASCULAR INVASION IS PRESENT.    - MARGINS NEGATIVE FOR INVASIVE CARCINOMA:    - NEAREST MARGIN, VASCULAR / BRONCHIAL 2.5 CM.    - NO PLEURAL SURFACE INVOLVEMENT     - PROMINENT NECROSIS PRESENT.  Visceral Pleura Invasion: Not identified  Number of Lymph Nodes Examined: Exact number : 6  pT Category: pT1c: pN0: No regional lymph node metastasis    3/8/2023 LYMPH NODE BIOPSY:   LYMPH NODE 4R, LYMPHADENECTOMY:  METASTATIC SQUAMOUS CELL CARCINOMA, NONKERATINIZING, MULTIPLE FRAGMENTS.       IMMUNOHISTOCHEMICAL STAINS:   CK 5/6, P63 AND CK7:  POSITIVE IN MALIGNANT  CELLS.   CK20 AND TTF-1:  NEGATIVE IN MALIGNANT CELLS.         CLINICAL HISTORY:       Patient: Leonila Rosales is a 78 y.o. male kindly referred by  Dr. Diaz for evaluation of lung cancer.    On 01/17/2022 patient presented to the emergency department after a fall.  He reports that he was getting to his truck and sleep and fell on his left side on the truck step rail.  He started having pain in his left hip and knee.  He underwent a CT angiogram that showed No pulmonary embolus. Right upper lobe 2.5 cm mass.      During that hospitalization he was treated for a close intertrochanteric fracture of the left femur and underwent open reduction intramedullary nailing left comminuted intertrochanteric hip fracture on 01/18/2022 with Dr. Fortino Palomares.  He then spent several weeks on rehab.    On 03/22/2022 he underwent CT-guided biopsy of the right lung mass.  Pathology showed invasive squamous cell carcinoma, moderately differentiated.    He is s/p right upper lobectomy with  on 5/24/2022.  Pathology report as above.  Patient is stage IA3 squamous cell carcinoma of the lung.  He has recovered well and has been doing good.     He lives alone and is able to perform ADL's. He uses a walker to aid in ambulation. He quit smoking January 2022, after smoking for 40 yrs.       Chief Complaint: OTHER (No concerns today.)      Interval History:  He completed XRT on 5/30/23 and 5 cycles of weekly carbo/taxol on 5/19/23. His final cycle was held due to neutropenia, ANC was 0.7.      Patient presents today continuation of immunotherapy. He started Durvalumab on 6/22/23. Due for C6 today. He continues to cough up clear sputum only in the morning after waking up. Overall, he states he is feeling fine. Denies any side effects. He denies shortness of breath, chest pain. No weight loss. Denies headaches. He uses a cane for mobility. Denies any symptoms of immune mediated toxicities.     Past Medical History:   Diagnosis Date     Anemia     Closed intertrochanteric fracture     Deficiency of macronutrients     GERD (gastroesophageal reflux disease)     H. pylori infection     History of tobacco use     Hyperlipidemia     Hypertension     Lung mass     Malignant neoplasm of unspecified part of unspecified bronchus or lung     Non-small cell lung cancer       Past Surgical History:   Procedure Laterality Date    BIOPSY OF INTESTINE  04/04/2022    BRONCHOSCOPY      COLONOSCOPY      ESOPHAGOGASTRODUODENOSCOPY  04/04/2022    HIP FRACTURE SURGERY Left 2016    Intramedullary Nail Insertion Hip Left 01/18/2022    KIDNEY SURGERY Right 05/27/2022    MEDIASTINOSCOPY N/A 3/6/2023    Procedure: MEDIASTINOSCOPY;  Surgeon: Jose Diaz MD;  Location: [facility];  Service: Cardiothoracic;  Laterality: N/A;  XX    PLACEMENT, MEDIPORT N/A 4/6/2023    Procedure: Placement, Mediport;  Surgeon: Jose Diaz MD;  Location: [facility] OR;  Service: Cardiology;  Laterality: N/A;    SHOULDER SURGERY       Family History   Family history unknown: Yes     Social Connections: Socially Integrated (4/12/2023)    Social Connection and Isolation Panel [NHANES]     Frequency of Communication with Friends and Family: More than three times a week     Frequency of Social Gatherings with Friends and Family: More than three times a week     Attends Yazidi Services: More than 4 times per year     Active Member of Clubs or Organizations: Yes     Attends Club or Organization Meetings: More than 4 times per year     Marital Status:        Review of patient's allergies indicates:  No Known Allergies   Current Outpatient Medications on File Prior to Visit   Medication Sig Dispense Refill    amLODIPine (NORVASC) 5 MG tablet TAKE ONE TABLET BY MOUTH TWICE DAILY 180 tablet 1    aspirin 81 mg Cap Take 81 mg by mouth Daily.      atorvastatin (LIPITOR) 10 MG tablet TAKE ONE TABLET BY MOUTH DAILY 90 tablet 3    LIDOcaine-prilocaine (EMLA) cream Apply topically as needed. Apply to  port site 30 mins prior to chemo 30 g 3    LINZESS 145 mcg Cap capsule Take 145 mcg by mouth daily as needed. New medication, not started yet      ondansetron (ZOFRAN) 8 MG tablet Take 1 tablet (8 mg total) by mouth every 8 (eight) hours as needed for Nausea. 1st choice for nausea 30 tablet 2    pantoprazole (PROTONIX) 40 MG tablet Take 40 mg by mouth.      prochlorperazine (COMPAZINE) 10 MG tablet Take 1 tablet (10 mg total) by mouth every 6 (six) hours as needed (for nausea). 2nd choice, can cause drowsiness. 30 tablet 3    traMADoL (ULTRAM) 50 mg tablet Take 1 tablet (50 mg total) by mouth every 6 (six) hours as needed for Pain. 12 tablet 0     No current facility-administered medications on file prior to visit.      Review of Systems   Constitutional:  Negative for activity change, appetite change, chills, diaphoresis, fatigue, fever and unexpected weight change.   HENT:  Negative for nasal congestion, mouth sores, nosebleeds, sinus pressure/congestion, sore throat and trouble swallowing.    Eyes: Negative.    Respiratory:  Negative for cough and shortness of breath.    Cardiovascular:  Negative for chest pain and palpitations.   Gastrointestinal:  Negative for abdominal distention, abdominal pain, blood in stool, change in bowel habit, constipation, diarrhea, nausea and vomiting.   Endocrine: Negative.    Genitourinary:  Negative for bladder incontinence, decreased urine volume, difficulty urinating, dysuria, frequency, hematuria and urgency.   Musculoskeletal:  Negative for arthralgias, back pain, gait problem, joint swelling, leg pain and myalgias.   Integumentary:  Negative for rash.   Allergic/Immunologic: Negative.    Neurological:  Negative for dizziness, tremors, syncope, weakness, light-headedness, numbness, headaches and memory loss.   Hematological:  Negative for adenopathy. Does not bruise/bleed easily.   Psychiatric/Behavioral:  Negative for agitation, confusion, hallucinations, sleep disturbance  "and suicidal ideas. The patient is not nervous/anxious.           Vitals:    11/09/23 0902   BP: (!) 143/79   BP Location: Left arm   Patient Position: Sitting   Pulse: 103   Resp: 18   Temp: 97.9 °F (36.6 °C)   TempSrc: Oral   SpO2: 98%   Weight: 94.6 kg (208 lb 8 oz)   Height: 5' 7" (1.702 m)             Wt Readings from Last 6 Encounters:   11/09/23 94.6 kg (208 lb 8 oz)   10/17/23 93.7 kg (206 lb 9.6 oz)   10/12/23 93.2 kg (205 lb 8 oz)   10/12/23 93.2 kg (205 lb 8 oz)   09/14/23 93.4 kg (205 lb 14.4 oz)   08/17/23 93.2 kg (205 lb 8 oz)     Body mass index is 32.66 kg/m².  Body surface area is 2.11 meters squared.       Physical Exam  Vitals and nursing note reviewed.   Constitutional:       General: He is not in acute distress.     Appearance: Normal appearance. He is obese.   HENT:      Head: Normocephalic and atraumatic.      Mouth/Throat:      Mouth: Mucous membranes are moist.   Eyes:      General: No scleral icterus.     Extraocular Movements: Extraocular movements intact.      Conjunctiva/sclera: Conjunctivae normal.   Neck:      Vascular: No JVD.   Cardiovascular:      Rate and Rhythm: Normal rate and regular rhythm.      Heart sounds: No murmur heard.  Pulmonary:      Effort: Pulmonary effort is normal.      Breath sounds: Decreased breath sounds present. No wheezing or rhonchi.   Chest:      Chest wall: No tenderness.   Abdominal:      General: Bowel sounds are normal. There is no distension.      Palpations: Abdomen is soft. There is no fluid wave.      Tenderness: There is no abdominal tenderness.   Musculoskeletal:         General: No swelling or deformity.      Cervical back: Neck supple.   Lymphadenopathy:      Head:      Right side of head: No submandibular adenopathy.      Left side of head: No submandibular adenopathy.      Cervical: No cervical adenopathy.      Upper Body:      Right upper body: No supraclavicular or axillary adenopathy.      Left upper body: No supraclavicular or axillary " adenopathy.      Lower Body: No right inguinal adenopathy. No left inguinal adenopathy.   Skin:     General: Skin is warm.      Coloration: Skin is not jaundiced.      Findings: No rash.   Neurological:      General: No focal deficit present.      Mental Status: He is alert and oriented to person, place, and time.      Cranial Nerves: Cranial nerves 2-12 are intact.      Comments: Mobility assitssed by cane/walker   Psychiatric:         Attention and Perception: Attention normal.         Mood and Affect: Mood and affect normal.         Behavior: Behavior is cooperative.         Cognition and Memory: Cognition normal.         Judgment: Judgment normal.       ECOG SCORE    2 - Capable of all selfcare but unable to carry out any work activities, active > 50% of hours         Laboratory:  CBC with Differential:  Lab Results   Component Value Date    WBC 4.63 11/07/2023    RBC 4.88 11/07/2023    HGB 13.4 (L) 11/07/2023    HCT 43.3 11/07/2023    MCV 88.7 11/07/2023    MCH 27.5 11/07/2023    MCHC 30.9 (L) 11/07/2023    RDW 15.3 11/07/2023     11/07/2023    MPV 10.4 11/07/2023        CMP:  Sodium Level   Date Value Ref Range Status   11/07/2023 143 136 - 145 mmol/L Final     Potassium Level   Date Value Ref Range Status   11/07/2023 4.3 3.5 - 5.1 mmol/L Final     Carbon Dioxide   Date Value Ref Range Status   11/07/2023 24 23 - 31 mmol/L Final     Blood Urea Nitrogen   Date Value Ref Range Status   11/07/2023 8.7 8.4 - 25.7 mg/dL Final     Creatinine   Date Value Ref Range Status   11/07/2023 0.81 0.73 - 1.18 mg/dL Final     Calcium Level Total   Date Value Ref Range Status   11/07/2023 9.8 8.8 - 10.0 mg/dL Final     Albumin Level   Date Value Ref Range Status   11/07/2023 3.6 3.4 - 4.8 g/dL Final     Bilirubin Total   Date Value Ref Range Status   11/07/2023 0.6 <=1.5 mg/dL Final     Alkaline Phosphatase   Date Value Ref Range Status   11/07/2023 94 40 - 150 unit/L Final     Aspartate Aminotransferase   Date Value  Ref Range Status   11/07/2023 21 5 - 34 unit/L Final     Alanine Aminotransferase   Date Value Ref Range Status   11/07/2023 22 0 - 55 unit/L Final     Estimated GFR-Non    Date Value Ref Range Status   04/19/2022 >60           Assessment:       1) [id number] 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  Verbally consented the patient to the treatment plan and the patient was educated on the planned duration of the treatment and schedule of the treatment administration.  All questions were answered.    3) Pancytopenia- Treatment related. WBC and platelets have normalized. Now only with mild anemia that is improving.       Plan:       Patient with 1.6 right paratracheal LN and small Rt pleural effusion. Surveillance CT scan chest to eval stability with paratracheal enlarged lymph node which shows interval mild size increase and now measures 2.2 x 2.0 cm and previously was 1.6 x 1.5 cm.  Aortopulmonary window mildly enlarged lymph node is stable. PET CT with Hypermetabolic right paratracheal lymph node image 81 series 3 measures 25 x 20 mm with max SUV 27.0. Hypermetabolic anterior mediastinal lymph node anterior to the SVC measures 12 x 9 mm with max SUV 12.0.   Biopsy of R4 lymph node confirmed the metastatic squamous cell carcinoma. Completed  XRT on 5/30/23  and 5 cycles of Carbo/taxol on 5/19/23. C6 was held on 5/26/23 due to neutropenia, ANC was 0.7. Plan is for Durvalumab every 4 weeks for 1 year.      Continue with Imfinzi every 4 weeks - okay to proceed with C6 today  CT C/A/P every 3 months - due 1/2024, will order when closer  RTC in 4 weeks with NP for TD/lab/infusion same day - he lives in [place]  Labs: CBC, CMP, TSH    Encourage to call or message us for any questions or problems  The patient was given ample  opportunity to ask questions, and to the best of my abilities, all questions answered to satisfaction; patient demonstrated understanding of what we discussed and agreeable to the plan.     Keiko Linda, NORMCP

## 2023-11-09 NOTE — PLAN OF CARE
Plan of care reviewed with patient; patient in agreement. C6 Imfinzi infused. Appts printed and given to patient.

## 2023-11-27 ENCOUNTER — DOCUMENTATION ONLY (OUTPATIENT)
Dept: HEMATOLOGY/ONCOLOGY | Facility: CLINIC | Age: 78
End: 2023-11-27
Payer: MEDICARE

## 2023-12-05 ENCOUNTER — LAB VISIT (OUTPATIENT)
Dept: LAB | Facility: HOSPITAL | Age: 78
End: 2023-12-05
Attending: INTERNAL MEDICINE
Payer: MEDICARE

## 2023-12-05 DIAGNOSIS — R53.83 FATIGUE, UNSPECIFIED TYPE: ICD-10-CM

## 2023-12-05 DIAGNOSIS — D70.1 LEUKOPENIA DUE TO ANTINEOPLASTIC CHEMOTHERAPY: ICD-10-CM

## 2023-12-05 DIAGNOSIS — C34.91 NON-SMALL CELL CANCER OF RIGHT LUNG: ICD-10-CM

## 2023-12-05 DIAGNOSIS — Z51.12 MAINTENANCE ANTINEOPLASTIC IMMUNOTHERAPY: ICD-10-CM

## 2023-12-05 DIAGNOSIS — Z79.899 ON ANTINEOPLASTIC CHEMOTHERAPY: ICD-10-CM

## 2023-12-05 DIAGNOSIS — T45.1X5A LEUKOPENIA DUE TO ANTINEOPLASTIC CHEMOTHERAPY: ICD-10-CM

## 2023-12-05 DIAGNOSIS — Z51.11 CHEMOTHERAPY MANAGEMENT, ENCOUNTER FOR: ICD-10-CM

## 2023-12-05 DIAGNOSIS — C34.90 NON-SMALL CELL LUNG CANCER, UNSPECIFIED LATERALITY: ICD-10-CM

## 2023-12-05 LAB
ALBUMIN SERPL-MCNC: 3.6 G/DL (ref 3.4–4.8)
ALBUMIN/GLOB SERPL: 1 RATIO (ref 1.1–2)
ALP SERPL-CCNC: 92 UNIT/L (ref 40–150)
ALT SERPL-CCNC: 21 UNIT/L (ref 0–55)
AST SERPL-CCNC: 20 UNIT/L (ref 5–34)
BASOPHILS # BLD AUTO: 0.01 X10(3)/MCL
BASOPHILS NFR BLD AUTO: 0.2 %
BILIRUB SERPL-MCNC: 1 MG/DL
BUN SERPL-MCNC: 11 MG/DL (ref 8.4–25.7)
CALCIUM SERPL-MCNC: 9.5 MG/DL (ref 8.8–10)
CHLORIDE SERPL-SCNC: 106 MMOL/L (ref 98–107)
CO2 SERPL-SCNC: 26 MMOL/L (ref 23–31)
CREAT SERPL-MCNC: 0.98 MG/DL (ref 0.73–1.18)
EOSINOPHIL # BLD AUTO: 0.19 X10(3)/MCL (ref 0–0.9)
EOSINOPHIL NFR BLD AUTO: 3.7 %
ERYTHROCYTE [DISTWIDTH] IN BLOOD BY AUTOMATED COUNT: 15.7 % (ref 11.5–17)
GFR SERPLBLD CREATININE-BSD FMLA CKD-EPI: >60 MLS/MIN/1.73/M2
GLOBULIN SER-MCNC: 3.7 GM/DL (ref 2.4–3.5)
GLUCOSE SERPL-MCNC: 98 MG/DL (ref 82–115)
HCT VFR BLD AUTO: 43.4 % (ref 42–52)
HGB BLD-MCNC: 13.4 G/DL (ref 14–18)
IMM GRANULOCYTES # BLD AUTO: 0.01 X10(3)/MCL (ref 0–0.04)
IMM GRANULOCYTES NFR BLD AUTO: 0.2 %
LYMPHOCYTES # BLD AUTO: 1.35 X10(3)/MCL (ref 0.6–4.6)
LYMPHOCYTES NFR BLD AUTO: 26.3 %
MCH RBC QN AUTO: 27.3 PG (ref 27–31)
MCHC RBC AUTO-ENTMCNC: 30.9 G/DL (ref 33–36)
MCV RBC AUTO: 88.6 FL (ref 80–94)
MONOCYTES # BLD AUTO: 0.49 X10(3)/MCL (ref 0.1–1.3)
MONOCYTES NFR BLD AUTO: 9.5 %
NEUTROPHILS # BLD AUTO: 3.09 X10(3)/MCL (ref 2.1–9.2)
NEUTROPHILS NFR BLD AUTO: 60.1 %
PLATELET # BLD AUTO: 214 X10(3)/MCL (ref 130–400)
PMV BLD AUTO: 10 FL (ref 7.4–10.4)
POTASSIUM SERPL-SCNC: 3.9 MMOL/L (ref 3.5–5.1)
PROT SERPL-MCNC: 7.3 GM/DL (ref 5.8–7.6)
RBC # BLD AUTO: 4.9 X10(6)/MCL (ref 4.7–6.1)
SODIUM SERPL-SCNC: 142 MMOL/L (ref 136–145)
TSH SERPL-ACNC: 0.44 UIU/ML (ref 0.35–4.94)
WBC # SPEC AUTO: 5.14 X10(3)/MCL (ref 4.5–11.5)

## 2023-12-05 PROCEDURE — 36415 COLL VENOUS BLD VENIPUNCTURE: CPT

## 2023-12-05 PROCEDURE — 80053 COMPREHEN METABOLIC PANEL: CPT

## 2023-12-05 PROCEDURE — 85025 COMPLETE CBC W/AUTO DIFF WBC: CPT

## 2023-12-05 PROCEDURE — 84443 ASSAY THYROID STIM HORMONE: CPT

## 2023-12-07 ENCOUNTER — INFUSION (OUTPATIENT)
Dept: INFUSION THERAPY | Facility: HOSPITAL | Age: 78
End: 2023-12-07
Attending: NURSE PRACTITIONER
Payer: MEDICARE

## 2023-12-07 ENCOUNTER — OFFICE VISIT (OUTPATIENT)
Dept: HEMATOLOGY/ONCOLOGY | Facility: CLINIC | Age: 78
End: 2023-12-07
Payer: MEDICARE

## 2023-12-07 VITALS
DIASTOLIC BLOOD PRESSURE: 76 MMHG | WEIGHT: 210 LBS | BODY MASS INDEX: 32.96 KG/M2 | OXYGEN SATURATION: 97 % | HEIGHT: 67 IN | SYSTOLIC BLOOD PRESSURE: 125 MMHG | TEMPERATURE: 98 F | RESPIRATION RATE: 18 BRPM | HEART RATE: 100 BPM

## 2023-12-07 DIAGNOSIS — C34.00 MALIGNANT NEOPLASM OF HILUS OF LUNG, UNSPECIFIED LATERALITY: ICD-10-CM

## 2023-12-07 DIAGNOSIS — Z51.12 MAINTENANCE ANTINEOPLASTIC IMMUNOTHERAPY: ICD-10-CM

## 2023-12-07 DIAGNOSIS — C34.91 NON-SMALL CELL CANCER OF RIGHT LUNG: ICD-10-CM

## 2023-12-07 DIAGNOSIS — C34.90 NON-SMALL CELL LUNG CANCER, UNSPECIFIED LATERALITY: Primary | ICD-10-CM

## 2023-12-07 DIAGNOSIS — R53.83 FATIGUE, UNSPECIFIED TYPE: Primary | ICD-10-CM

## 2023-12-07 PROCEDURE — 1157F PR ADVANCE CARE PLAN OR EQUIV PRESENT IN MEDICAL RECORD: ICD-10-PCS | Mod: CPTII,S$GLB,, | Performed by: INTERNAL MEDICINE

## 2023-12-07 PROCEDURE — 3074F SYST BP LT 130 MM HG: CPT | Mod: CPTII,S$GLB,, | Performed by: INTERNAL MEDICINE

## 2023-12-07 PROCEDURE — 3288F PR FALLS RISK ASSESSMENT DOCUMENTED: ICD-10-PCS | Mod: CPTII,S$GLB,, | Performed by: INTERNAL MEDICINE

## 2023-12-07 PROCEDURE — 99999 PR PBB SHADOW E&M-EST. PATIENT-LVL IV: CPT | Mod: PBBFAC,,, | Performed by: INTERNAL MEDICINE

## 2023-12-07 PROCEDURE — 1157F ADVNC CARE PLAN IN RCRD: CPT | Mod: CPTII,S$GLB,, | Performed by: INTERNAL MEDICINE

## 2023-12-07 PROCEDURE — 3078F PR MOST RECENT DIASTOLIC BLOOD PRESSURE < 80 MM HG: ICD-10-PCS | Mod: CPTII,S$GLB,, | Performed by: INTERNAL MEDICINE

## 2023-12-07 PROCEDURE — 3078F DIAST BP <80 MM HG: CPT | Mod: CPTII,S$GLB,, | Performed by: INTERNAL MEDICINE

## 2023-12-07 PROCEDURE — 3074F PR MOST RECENT SYSTOLIC BLOOD PRESSURE < 130 MM HG: ICD-10-PCS | Mod: CPTII,S$GLB,, | Performed by: INTERNAL MEDICINE

## 2023-12-07 PROCEDURE — 3288F FALL RISK ASSESSMENT DOCD: CPT | Mod: CPTII,S$GLB,, | Performed by: INTERNAL MEDICINE

## 2023-12-07 PROCEDURE — 1159F MED LIST DOCD IN RCRD: CPT | Mod: CPTII,S$GLB,, | Performed by: INTERNAL MEDICINE

## 2023-12-07 PROCEDURE — 1101F PR PT FALLS ASSESS DOC 0-1 FALLS W/OUT INJ PAST YR: ICD-10-PCS | Mod: CPTII,S$GLB,, | Performed by: INTERNAL MEDICINE

## 2023-12-07 PROCEDURE — 25000003 PHARM REV CODE 250: Performed by: INTERNAL MEDICINE

## 2023-12-07 PROCEDURE — 63600175 PHARM REV CODE 636 W HCPCS: Performed by: INTERNAL MEDICINE

## 2023-12-07 PROCEDURE — 99215 OFFICE O/P EST HI 40 MIN: CPT | Mod: S$GLB,,, | Performed by: INTERNAL MEDICINE

## 2023-12-07 PROCEDURE — 99215 PR OFFICE/OUTPT VISIT, EST, LEVL V, 40-54 MIN: ICD-10-PCS | Mod: S$GLB,,, | Performed by: INTERNAL MEDICINE

## 2023-12-07 PROCEDURE — 1101F PT FALLS ASSESS-DOCD LE1/YR: CPT | Mod: CPTII,S$GLB,, | Performed by: INTERNAL MEDICINE

## 2023-12-07 PROCEDURE — 1126F PR PAIN SEVERITY QUANTIFIED, NO PAIN PRESENT: ICD-10-PCS | Mod: CPTII,S$GLB,, | Performed by: INTERNAL MEDICINE

## 2023-12-07 PROCEDURE — 1160F RVW MEDS BY RX/DR IN RCRD: CPT | Mod: CPTII,S$GLB,, | Performed by: INTERNAL MEDICINE

## 2023-12-07 PROCEDURE — A4216 STERILE WATER/SALINE, 10 ML: HCPCS | Performed by: INTERNAL MEDICINE

## 2023-12-07 PROCEDURE — 96413 CHEMO IV INFUSION 1 HR: CPT

## 2023-12-07 PROCEDURE — 1159F PR MEDICATION LIST DOCUMENTED IN MEDICAL RECORD: ICD-10-PCS | Mod: CPTII,S$GLB,, | Performed by: INTERNAL MEDICINE

## 2023-12-07 PROCEDURE — 1126F AMNT PAIN NOTED NONE PRSNT: CPT | Mod: CPTII,S$GLB,, | Performed by: INTERNAL MEDICINE

## 2023-12-07 PROCEDURE — 99999 PR PBB SHADOW E&M-EST. PATIENT-LVL IV: ICD-10-PCS | Mod: PBBFAC,,, | Performed by: INTERNAL MEDICINE

## 2023-12-07 PROCEDURE — 1160F PR REVIEW ALL MEDS BY PRESCRIBER/CLIN PHARMACIST DOCUMENTED: ICD-10-PCS | Mod: CPTII,S$GLB,, | Performed by: INTERNAL MEDICINE

## 2023-12-07 RX ORDER — SODIUM CHLORIDE 0.9 % (FLUSH) 0.9 %
10 SYRINGE (ML) INJECTION
Status: CANCELLED | OUTPATIENT
Start: 2023-12-07

## 2023-12-07 RX ORDER — EPINEPHRINE 0.3 MG/.3ML
0.3 INJECTION SUBCUTANEOUS ONCE AS NEEDED
Status: CANCELLED | OUTPATIENT
Start: 2023-12-07

## 2023-12-07 RX ORDER — SODIUM CHLORIDE 0.9 % (FLUSH) 0.9 %
10 SYRINGE (ML) INJECTION
Status: DISCONTINUED | OUTPATIENT
Start: 2023-12-07 | End: 2023-12-07 | Stop reason: HOSPADM

## 2023-12-07 RX ORDER — HEPARIN 100 UNIT/ML
500 SYRINGE INTRAVENOUS
Status: DISCONTINUED | OUTPATIENT
Start: 2023-12-07 | End: 2023-12-07 | Stop reason: HOSPADM

## 2023-12-07 RX ORDER — HEPARIN 100 UNIT/ML
500 SYRINGE INTRAVENOUS
Status: CANCELLED | OUTPATIENT
Start: 2023-12-07

## 2023-12-07 RX ORDER — DIPHENHYDRAMINE HYDROCHLORIDE 50 MG/ML
50 INJECTION INTRAMUSCULAR; INTRAVENOUS ONCE AS NEEDED
Status: DISCONTINUED | OUTPATIENT
Start: 2023-12-07 | End: 2023-12-07 | Stop reason: HOSPADM

## 2023-12-07 RX ORDER — EPINEPHRINE 0.3 MG/.3ML
0.3 INJECTION SUBCUTANEOUS ONCE AS NEEDED
Status: DISCONTINUED | OUTPATIENT
Start: 2023-12-07 | End: 2023-12-07 | Stop reason: HOSPADM

## 2023-12-07 RX ORDER — DIPHENHYDRAMINE HYDROCHLORIDE 50 MG/ML
50 INJECTION INTRAMUSCULAR; INTRAVENOUS ONCE AS NEEDED
Status: CANCELLED | OUTPATIENT
Start: 2023-12-07

## 2023-12-07 RX ADMIN — SODIUM CHLORIDE: 9 INJECTION, SOLUTION INTRAVENOUS at 09:12

## 2023-12-07 RX ADMIN — HEPARIN 500 UNITS: 100 SYRINGE at 11:12

## 2023-12-07 RX ADMIN — SODIUM CHLORIDE 1500 MG: 9 INJECTION, SOLUTION INTRAVENOUS at 10:12

## 2023-12-07 RX ADMIN — Medication 10 ML: at 11:12

## 2023-12-07 NOTE — PROGRESS NOTES
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 Right upper lobe 2.5 cm mass.  CT C 9/19/2022: Status post right partial pneumonectomy for resection of a right upper lobe lung mass with no residual recurrent mass seen. Small right-sided pleural effusion. Some lymphadenopathy in the right paratracheal region with a maximum short axis dimension of 1.6 cm.  Follow-up is recommended  CT C 1/25/2023: There is a paratracheal enlarged lymph node which shows interval mild size increase and now measures 2.2 x 2.0 cm and previously was 1.6 x 1.5 cm.  Aortopulmonary window mildly enlarged lymph node is stable.  Thoracic aorta is without aneurysmal dilatation or dissection.  Impression: 1. Operative changes of right upper lobectomy without bronchialstump soft tissue proliferation    2. No residual tumor load or metastatic nodule. 3. Paratracheal enlarged lymph node with interval size increase.  PET CT 2/9/2023:  Hypermetabolic right paratracheal lymph node image 81 series 3 measures 25 x 20 mm with max SUV 27.0.  Hypermetabolic anterior mediastinal lymph node anterior to the SVC measures 12 x 9 mm with max SUV 12.0. Impression: 1. Hypermetabolic metastatic mediastinal adenopathy.   2. No PET evidence of metastatic disease outside of the mediastinum.  CT C/A/P 7/10/2023:  1. Several new subcentimeter  nodules in the right lung.  Suspect these are infectious or inflammatory in nature, but 3 month follow-up chest CT recommended to ensure resolution.  2. 2 reference mediastinal lymph nodes are smaller since January.  3. L1 vertebral body compression deformity new since January, but appears subacute.  CT C/A/P 10/10/2023:    1. Slightly larger mediastinal lymph nodes, to be followed.  2. Increased irregular right perihilar consolidation which may be treatment related.  3. Small right pleural effusion.  4. No new suspicious findings in the abdomen or pelvis.      Pathology:  03/22/2022 CT-guided biopsy of the right lung mass: invasive squamous cell carcinoma, moderately differentiated.  5/24/2022: Right upper lobectomy:  1- LYMPH NODE, LUMBAR RIGHT 10 LYMPHADENECTOMY: NEGATIVE FOR METASTATIC CARCINOMA (0/1).   2- LYMPH NODE, 11R, LYMPHADENECTOMY: NEGATIVE FOR METASTATIC CARCINOMA (0/1).  3- LYMPH NODE, 9R, LYMPHADENECTOMY: NEGATIVE FOR METASTATIC CARCINOMA (0/1).   4- LYMPH NODE, 7R, LYMPHADENECTOMY: NEGATIVE FOR METASTATIC CARCINOMA (0/1).   5- LYMPH NODE, 8R, LYMPHADENECTOMY: ONE LYMPH NODE NEGATIVE FOR METASTATIC CARCINOMA (0/1).    6- LYMPH NODE, 12R, LYMPHADENECTOMY: NEGATIVE FOR METASTATIC CARCINOMA (0/1).  7- LUNG, RIGHT UPPER AND MIDDLE LOBES, PNEUMONECTOMY:  POORLY DIFFERENTIATED, G3, SQUAMOUS CELL CARCINOMA, INVASIVE.    - TUMOR SIZE: 3 CM.    - LYMPHOVASCULAR INVASION IS PRESENT.    - MARGINS NEGATIVE FOR INVASIVE CARCINOMA:    - NEAREST MARGIN, VASCULAR / BRONCHIAL 2.5 CM.    - NO PLEURAL SURFACE INVOLVEMENT     - PROMINENT NECROSIS PRESENT.  Visceral Pleura Invasion: Not identified  Number of Lymph Nodes Examined: Exact number : [id number]  pT Category: pT1c: pN0: No regional lymph node metastasis    3/8/2023 LYMPH NODE BIOPSY:   LYMPH NODE 4R, LYMPHADENECTOMY:  METASTATIC SQUAMOUS CELL CARCINOMA, NONKERATINIZING, MULTIPLE FRAGMENTS.       IMMUNOHISTOCHEMICAL STAINS:   CK 5/6, P63 AND CK7:  POSITIVE IN MALIGNANT  CELLS.   CK20 AND TTF-1:  NEGATIVE IN MALIGNANT CELLS.         CLINICAL HISTORY:       Patient: Leonila Rosales is a 78 y.o. male kindly referred by  Dr. Diaz for evaluation of lung cancer.    On 01/17/2022 patient presented to the emergency department after a fall.  He reports that he was getting to his truck and sleep and fell on his left side on the truck step rail.  He started having pain in his left hip and knee.  He underwent a CT angiogram that showed No pulmonary embolus. Right upper lobe 2.5 cm mass.      During that hospitalization he was treated for a close intertrochanteric fracture of the left femur and underwent open reduction intramedullary nailing left comminuted intertrochanteric hip fracture on 01/18/2022 with Dr. Fortino Palomares.  He then spent several weeks on rehab.    On 03/22/2022 he underwent CT-guided biopsy of the right lung mass.  Pathology showed invasive squamous cell carcinoma, moderately differentiated.    He is s/p right upper lobectomy with  on 5/24/2022.  Pathology report as above.  Patient is stage IA3 squamous cell carcinoma of the lung.  He has recovered well and has been doing good.     Patient was started on surveillance. CT 9/19/2022 with 1.6 right paratracheal LN and small Rt pleural effusion. Surveillance CT scan chest to eval stability 1/25/2023 with paratracheal enlarged lymph node which shows interval mild size increase and now measures 2.2 x 2.0 cm and previously was 1.6 x 1.5 cm.  Aortopulmonary window mildly enlarged lymph node is stable. PET CT 2/9/2023 with Hypermetabolic right paratracheal lymph node 25 x 20 mm with max SUV 27.0. Hypermetabolic anterior mediastinal lymph node anterior to the SVC measures 12 x 9 mm with max SUV 12.0.   Biopsy of R4 lymph node confirmed the metastatic squamous cell carcinoma. Completed  XRT on 5/30/23  and 5 cycles of Carbo/taxol on 5/19/23. C6 was held on 5/26/23 due to neutropenia, ANC was 0.7.    Patient was started on  chemoradiation. -completed XRT on 5/30/23 and 5 cycles of weekly carbo/taxol on 5/19/23, His final cycle was held due to neutropenia, ANC was 0.7.  He was then started on maintenance Imfinzi every 4 weeks on 6/22/23    Chief Complaint: Follow-up (No concerns today.)      Interval History:  He lives alone and is able to perform ADL's. He uses a walker to aid in ambulation. He quit smoking January 2022, after smoking for 40 yrs.     He completed XRT on 5/30/23 and 5 cycles of weekly carbo/taxol on 5/19/23. He is on maintenance durvalumab, started 6/22/2023 and tolerating well.     Patient presents today continuation of immunotherapy. Due for C7 today. He continues to cough up clear sputum only in the morning after waking up. Overall, he states he is feeling fine. Denies any side effects. He denies shortness of breath, chest pain. No weight loss. Denies headaches. He uses a cane for mobility. Denies any symptoms of immune mediated toxicities.       Past Medical History:   Diagnosis Date    Anemia     Closed intertrochanteric fracture     Deficiency of macronutrients     GERD (gastroesophageal reflux disease)     H. pylori infection     History of tobacco use     Hyperlipidemia     Hypertension     Lung mass     Malignant neoplasm of unspecified part of unspecified bronchus or lung     Non-small cell lung cancer       Past Surgical History:   Procedure Laterality Date    BIOPSY OF INTESTINE  04/04/2022    BRONCHOSCOPY      COLONOSCOPY      ESOPHAGOGASTRODUODENOSCOPY  04/04/2022    HIP FRACTURE SURGERY Left 2016    Intramedullary Nail Insertion Hip Left 01/18/2022    KIDNEY SURGERY Right 05/27/2022    MEDIASTINOSCOPY N/A 3/6/2023    Procedure: MEDIASTINOSCOPY;  Surgeon: Jose Diaz MD;  Location: [facility] OR;  Service: Cardiothoracic;  Laterality: N/A;  XX    PLACEMENT, MEDIPORT N/A 4/6/2023    Procedure: Placement, Mediport;  Surgeon: Jose Diaz MD;  Location: [facility] OR;  Service: Cardiology;  Laterality: N/A;     SHOULDER SURGERY       Family History   Family history unknown: Yes     Social Connections: Socially Integrated (4/12/2023)    Social Connection and Isolation Panel [NHANES]     Frequency of Communication with Friends and Family: More than three times a week     Frequency of Social Gatherings with Friends and Family: More than three times a week     Attends [employer] Services: More than 4 times per year     Active Member of Clubs or Organizations: Yes     Attends Club or Organization Meetings: More than 4 times per year     Marital Status:        Review of patient's allergies indicates:  No Known Allergies   Current Outpatient Medications on File Prior to Visit   Medication Sig Dispense Refill    amLODIPine (NORVASC) 5 MG tablet TAKE ONE TABLET BY MOUTH TWICE DAILY 180 tablet 1    aspirin 81 mg Cap Take 81 mg by mouth Daily.      atorvastatin (LIPITOR) 10 MG tablet TAKE ONE TABLET BY MOUTH DAILY 90 tablet 3    LIDOcaine-prilocaine (EMLA) cream Apply topically as needed. Apply to port site 30 mins prior to chemo 30 g 3    LINZESS 145 mcg Cap capsule Take 145 mcg by mouth daily as needed. New medication, not started yet      ondansetron (ZOFRAN) 8 MG tablet Take 1 tablet (8 mg total) by mouth every 8 (eight) hours as needed for Nausea. 1st choice for nausea 30 tablet 2    pantoprazole (PROTONIX) 40 MG tablet Take 40 mg by mouth.      prochlorperazine (COMPAZINE) 10 MG tablet Take 1 tablet (10 mg total) by mouth every 6 (six) hours as needed (for nausea). 2nd choice, can cause drowsiness. 30 tablet 3    traMADoL (ULTRAM) 50 mg tablet Take 1 tablet (50 mg total) by mouth every 6 (six) hours as needed for Pain. 12 tablet 0     No current facility-administered medications on file prior to visit.      Review of Systems   Constitutional:  Negative for activity change, appetite change, chills, diaphoresis, fatigue, fever and unexpected weight change.   HENT:  Negative for nasal congestion, mouth sores, nosebleeds,  "sinus pressure/congestion, sore throat and trouble swallowing.    Eyes: Negative.    Respiratory:  Negative for cough and shortness of breath.    Cardiovascular:  Negative for chest pain and palpitations.   Gastrointestinal:  Negative for abdominal distention, abdominal pain, blood in stool, change in bowel habit, constipation, diarrhea, nausea and vomiting.   Endocrine: Negative.    Genitourinary:  Negative for bladder incontinence, decreased urine volume, difficulty urinating, dysuria, frequency, hematuria and urgency.   Musculoskeletal:  Negative for arthralgias, back pain, gait problem, joint swelling, leg pain and myalgias.   Integumentary:  Negative for rash.   Allergic/Immunologic: Negative.    Neurological:  Negative for dizziness, tremors, syncope, weakness, light-headedness, numbness, headaches and memory loss.   Hematological:  Negative for adenopathy. Does not bruise/bleed easily.   Psychiatric/Behavioral:  Negative for agitation, confusion, hallucinations, sleep disturbance and suicidal ideas. The patient is not nervous/anxious.           Vitals:    12/07/23 0847   BP: 125/76   BP Location: Left arm   Patient Position: Sitting   Pulse: 100   Resp: 18   Temp: 98.4 °F (36.9 °C)   TempSrc: Oral   SpO2: 97%   Weight: 95.3 kg (210 lb)   Height: 5' 7" (1.702 m)       Wt Readings from Last 6 Encounters:   12/07/23 95.3 kg (210 lb)   11/09/23 94.6 kg (208 lb 8 oz)   11/09/23 94.6 kg (208 lb 8 oz)   10/17/23 93.7 kg (206 lb 9.6 oz)   10/12/23 93.2 kg (205 lb 8 oz)   10/12/23 93.2 kg (205 lb 8 oz)     Body mass index is 32.89 kg/m².  Body surface area is 2.12 meters squared.       Physical Exam  Vitals and nursing note reviewed.   Constitutional:       General: He is not in acute distress.     Appearance: Normal appearance. He is obese.   HENT:      Head: Normocephalic and atraumatic.      Mouth/Throat:      Mouth: Mucous membranes are moist.   Eyes:      General: No scleral icterus.     Extraocular Movements: " Extraocular movements intact.      Conjunctiva/sclera: Conjunctivae normal.   Neck:      Vascular: No JVD.   Cardiovascular:      Rate and Rhythm: Normal rate and regular rhythm.      Heart sounds: No murmur heard.  Pulmonary:      Effort: Pulmonary effort is normal.      Breath sounds: Decreased breath sounds present. No wheezing or rhonchi.   Chest:      Chest wall: No tenderness.   Abdominal:      General: Bowel sounds are normal. There is no distension.      Palpations: Abdomen is soft. There is no fluid wave.      Tenderness: There is no abdominal tenderness.   Musculoskeletal:         General: No swelling or deformity.      Cervical back: Neck supple.   Lymphadenopathy:      Head:      Right side of head: No submandibular adenopathy.      Left side of head: No submandibular adenopathy.      Cervical: No cervical adenopathy.      Upper Body:      Right upper body: No supraclavicular or axillary adenopathy.      Left upper body: No supraclavicular or axillary adenopathy.      Lower Body: No right inguinal adenopathy. No left inguinal adenopathy.   Skin:     General: Skin is warm.      Coloration: Skin is not jaundiced.      Findings: No rash.   Neurological:      General: No focal deficit present.      Mental Status: He is alert and oriented to person, place, and time.      Cranial Nerves: Cranial nerves 2-12 are intact.      Comments: Mobility assitssed by cane/walker   Psychiatric:         Attention and Perception: Attention normal.         Mood and Affect: Mood and affect normal.         Behavior: Behavior is cooperative.         Cognition and Memory: Cognition normal.         Judgment: Judgment normal.       ECOG SCORE    1 - Restricted in strenuous activity-ambulatory and able to carry out work of a light nature         Laboratory:  CBC with Differential:  Lab Results   Component Value Date    WBC 5.14 12/05/2023    RBC 4.90 12/05/2023    HGB 13.4 (L) 12/05/2023    HCT 43.4 12/05/2023    MCV 88.6 12/05/2023     MCH 27.3 12/05/2023    MCHC 30.9 (L) 12/05/2023    RDW 15.7 12/05/2023     12/05/2023    MPV 10.0 12/05/2023        CMP:  Sodium Level   Date Value Ref Range Status   12/05/2023 142 136 - 145 mmol/L Final     Potassium Level   Date Value Ref Range Status   12/05/2023 3.9 3.5 - 5.1 mmol/L Final     Carbon Dioxide   Date Value Ref Range Status   12/05/2023 26 23 - 31 mmol/L Final     Blood Urea Nitrogen   Date Value Ref Range Status   12/05/2023 11.0 8.4 - 25.7 mg/dL Final     Creatinine   Date Value Ref Range Status   12/05/2023 0.98 0.73 - 1.18 mg/dL Final     Calcium Level Total   Date Value Ref Range Status   12/05/2023 9.5 8.8 - 10.0 mg/dL Final     Albumin Level   Date Value Ref Range Status   12/05/2023 3.6 3.4 - 4.8 g/dL Final     Bilirubin Total   Date Value Ref Range Status   12/05/2023 1.0 <=1.5 mg/dL Final     Alkaline Phosphatase   Date Value Ref Range Status   12/05/2023 92 40 - 150 unit/L Final     Aspartate Aminotransferase   Date Value Ref Range Status   12/05/2023 20 5 - 34 unit/L Final     Alanine Aminotransferase   Date Value Ref Range Status   12/05/2023 21 0 - 55 unit/L Final     Estimated GFR-Non    Date Value Ref Range Status   04/19/2022 >60           Assessment:       1. Fatigue, unspecified type    2. Non-small cell cancer of right lung    3. 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 WBC and platelets have normalized. Now only with mild anemia that is improving.       Plan:        Plan is for Durvalumab every 4 weeks for 1 year. Tolerating well.     Continue with Imfinzi every 4 weeks - okay to proceed with C7 today  CT C/A/P every 3 months - due 1/2024, ordered @  [facility]  RTC in 4 weeks with NP for TD/Infusion   Labs: CBC, CMP, TSH - will be done @ [facility] 1-2 days prior to visit    Encourage to call or message us for any questions or problems  The patient was given ample opportunity to ask questions, and to the best of my abilities, all questions answered to satisfaction; patient demonstrated understanding of what we discussed and agreeable to the plan.     Sanjuana Napoles MD  Hematology/Oncology      Professional Services   I, Shirley Pina LPN, acted solely as a scribe for and in the presence of Dr. Sanjuana Napoles, who performed these services.

## 2023-12-21 DIAGNOSIS — I10 HYPERTENSION, UNSPECIFIED TYPE: ICD-10-CM

## 2023-12-22 RX ORDER — AMLODIPINE BESYLATE 5 MG/1
TABLET ORAL
Qty: 180 TABLET | Refills: 1 | Status: SHIPPED | OUTPATIENT
Start: 2023-12-22

## 2024-01-02 ENCOUNTER — LAB VISIT (OUTPATIENT)
Dept: LAB | Facility: HOSPITAL | Age: 79
End: 2024-01-02
Attending: INTERNAL MEDICINE
Payer: MEDICARE

## 2024-01-02 DIAGNOSIS — Z51.12 MAINTENANCE ANTINEOPLASTIC IMMUNOTHERAPY: ICD-10-CM

## 2024-01-02 DIAGNOSIS — R53.83 FATIGUE, UNSPECIFIED TYPE: ICD-10-CM

## 2024-01-02 DIAGNOSIS — C34.91 NON-SMALL CELL CANCER OF RIGHT LUNG: ICD-10-CM

## 2024-01-02 LAB
ALBUMIN SERPL-MCNC: 3.5 G/DL (ref 3.4–4.8)
ALBUMIN/GLOB SERPL: 0.9 RATIO (ref 1.1–2)
ALP SERPL-CCNC: 88 UNIT/L (ref 40–150)
ALT SERPL-CCNC: 19 UNIT/L (ref 0–55)
AST SERPL-CCNC: 18 UNIT/L (ref 5–34)
BASOPHILS # BLD AUTO: 0.02 X10(3)/MCL
BASOPHILS NFR BLD AUTO: 0.3 %
BILIRUB SERPL-MCNC: 1 MG/DL
BUN SERPL-MCNC: 11.3 MG/DL (ref 8.4–25.7)
CALCIUM SERPL-MCNC: 9.6 MG/DL (ref 8.8–10)
CHLORIDE SERPL-SCNC: 108 MMOL/L (ref 98–107)
CO2 SERPL-SCNC: 26 MMOL/L (ref 23–31)
CREAT SERPL-MCNC: 0.89 MG/DL (ref 0.73–1.18)
EOSINOPHIL # BLD AUTO: 0.19 X10(3)/MCL (ref 0–0.9)
EOSINOPHIL NFR BLD AUTO: 3.3 %
ERYTHROCYTE [DISTWIDTH] IN BLOOD BY AUTOMATED COUNT: 15.6 % (ref 11.5–17)
GFR SERPLBLD CREATININE-BSD FMLA CKD-EPI: >60 MLS/MIN/1.73/M2
GLOBULIN SER-MCNC: 3.8 GM/DL (ref 2.4–3.5)
GLUCOSE SERPL-MCNC: 88 MG/DL (ref 82–115)
HCT VFR BLD AUTO: 41.6 % (ref 42–52)
HGB BLD-MCNC: 12.8 G/DL (ref 14–18)
IMM GRANULOCYTES # BLD AUTO: 0.02 X10(3)/MCL (ref 0–0.04)
IMM GRANULOCYTES NFR BLD AUTO: 0.3 %
LYMPHOCYTES # BLD AUTO: 1.12 X10(3)/MCL (ref 0.6–4.6)
LYMPHOCYTES NFR BLD AUTO: 19.6 %
MCH RBC QN AUTO: 27.4 PG (ref 27–31)
MCHC RBC AUTO-ENTMCNC: 30.8 G/DL (ref 33–36)
MCV RBC AUTO: 88.9 FL (ref 80–94)
MONOCYTES # BLD AUTO: 0.5 X10(3)/MCL (ref 0.1–1.3)
MONOCYTES NFR BLD AUTO: 8.7 %
NEUTROPHILS # BLD AUTO: 3.87 X10(3)/MCL (ref 2.1–9.2)
NEUTROPHILS NFR BLD AUTO: 67.8 %
PLATELET # BLD AUTO: 224 X10(3)/MCL (ref 130–400)
PMV BLD AUTO: 10.4 FL (ref 7.4–10.4)
POTASSIUM SERPL-SCNC: 3.8 MMOL/L (ref 3.5–5.1)
PROT SERPL-MCNC: 7.3 GM/DL (ref 5.8–7.6)
RBC # BLD AUTO: 4.68 X10(6)/MCL (ref 4.7–6.1)
SODIUM SERPL-SCNC: 144 MMOL/L (ref 136–145)
TSH SERPL-ACNC: 0.64 UIU/ML (ref 0.35–4.94)
WBC # SPEC AUTO: 5.72 X10(3)/MCL (ref 4.5–11.5)

## 2024-01-02 PROCEDURE — 80053 COMPREHEN METABOLIC PANEL: CPT

## 2024-01-02 PROCEDURE — 36415 COLL VENOUS BLD VENIPUNCTURE: CPT

## 2024-01-02 PROCEDURE — 85025 COMPLETE CBC W/AUTO DIFF WBC: CPT

## 2024-01-02 PROCEDURE — 84443 ASSAY THYROID STIM HORMONE: CPT

## 2024-01-04 ENCOUNTER — OFFICE VISIT (OUTPATIENT)
Dept: HEMATOLOGY/ONCOLOGY | Facility: CLINIC | Age: 79
End: 2024-01-04
Payer: MEDICARE

## 2024-01-04 ENCOUNTER — INFUSION (OUTPATIENT)
Dept: INFUSION THERAPY | Facility: HOSPITAL | Age: 79
End: 2024-01-04
Attending: NURSE PRACTITIONER
Payer: MEDICARE

## 2024-01-04 VITALS
WEIGHT: 210 LBS | HEART RATE: 100 BPM | HEIGHT: 67 IN | SYSTOLIC BLOOD PRESSURE: 146 MMHG | TEMPERATURE: 98 F | OXYGEN SATURATION: 97 % | RESPIRATION RATE: 18 BRPM | DIASTOLIC BLOOD PRESSURE: 83 MMHG | BODY MASS INDEX: 32.96 KG/M2

## 2024-01-04 DIAGNOSIS — Z51.12 MAINTENANCE ANTINEOPLASTIC IMMUNOTHERAPY: ICD-10-CM

## 2024-01-04 DIAGNOSIS — C34.00 MALIGNANT NEOPLASM OF HILUS OF LUNG, UNSPECIFIED LATERALITY: ICD-10-CM

## 2024-01-04 DIAGNOSIS — C34.91 NON-SMALL CELL CANCER OF RIGHT LUNG: Primary | ICD-10-CM

## 2024-01-04 DIAGNOSIS — C34.90 NON-SMALL CELL LUNG CANCER, UNSPECIFIED LATERALITY: Primary | ICD-10-CM

## 2024-01-04 DIAGNOSIS — R53.83 FATIGUE, UNSPECIFIED TYPE: ICD-10-CM

## 2024-01-04 PROCEDURE — 1157F ADVNC CARE PLAN IN RCRD: CPT | Mod: CPTII,S$GLB,, | Performed by: NURSE PRACTITIONER

## 2024-01-04 PROCEDURE — 99999 PR PBB SHADOW E&M-EST. PATIENT-LVL IV: CPT | Mod: PBBFAC,,, | Performed by: NURSE PRACTITIONER

## 2024-01-04 PROCEDURE — A4216 STERILE WATER/SALINE, 10 ML: HCPCS | Performed by: NURSE PRACTITIONER

## 2024-01-04 PROCEDURE — 1101F PT FALLS ASSESS-DOCD LE1/YR: CPT | Mod: CPTII,S$GLB,, | Performed by: NURSE PRACTITIONER

## 2024-01-04 PROCEDURE — 3079F DIAST BP 80-89 MM HG: CPT | Mod: CPTII,S$GLB,, | Performed by: NURSE PRACTITIONER

## 2024-01-04 PROCEDURE — 1126F AMNT PAIN NOTED NONE PRSNT: CPT | Mod: CPTII,S$GLB,, | Performed by: NURSE PRACTITIONER

## 2024-01-04 PROCEDURE — 99215 OFFICE O/P EST HI 40 MIN: CPT | Mod: S$GLB,,, | Performed by: NURSE PRACTITIONER

## 2024-01-04 PROCEDURE — 3288F FALL RISK ASSESSMENT DOCD: CPT | Mod: CPTII,S$GLB,, | Performed by: NURSE PRACTITIONER

## 2024-01-04 PROCEDURE — 25000003 PHARM REV CODE 250: Performed by: NURSE PRACTITIONER

## 2024-01-04 PROCEDURE — 63600175 PHARM REV CODE 636 W HCPCS: Performed by: NURSE PRACTITIONER

## 2024-01-04 PROCEDURE — 1160F RVW MEDS BY RX/DR IN RCRD: CPT | Mod: CPTII,S$GLB,, | Performed by: NURSE PRACTITIONER

## 2024-01-04 PROCEDURE — 3077F SYST BP >= 140 MM HG: CPT | Mod: CPTII,S$GLB,, | Performed by: NURSE PRACTITIONER

## 2024-01-04 PROCEDURE — 1159F MED LIST DOCD IN RCRD: CPT | Mod: CPTII,S$GLB,, | Performed by: NURSE PRACTITIONER

## 2024-01-04 RX ORDER — HEPARIN 100 UNIT/ML
500 SYRINGE INTRAVENOUS
Status: CANCELLED | OUTPATIENT
Start: 2024-01-04

## 2024-01-04 RX ORDER — HEPARIN 100 UNIT/ML
500 SYRINGE INTRAVENOUS
Status: DISCONTINUED | OUTPATIENT
Start: 2024-01-04 | End: 2024-01-04 | Stop reason: HOSPADM

## 2024-01-04 RX ORDER — SODIUM CHLORIDE 0.9 % (FLUSH) 0.9 %
10 SYRINGE (ML) INJECTION
Status: DISCONTINUED | OUTPATIENT
Start: 2024-01-04 | End: 2024-01-04 | Stop reason: HOSPADM

## 2024-01-04 RX ORDER — EPINEPHRINE 0.3 MG/.3ML
0.3 INJECTION SUBCUTANEOUS ONCE AS NEEDED
Status: DISCONTINUED | OUTPATIENT
Start: 2024-01-04 | End: 2024-01-04 | Stop reason: HOSPADM

## 2024-01-04 RX ORDER — DIPHENHYDRAMINE HYDROCHLORIDE 50 MG/ML
50 INJECTION, SOLUTION INTRAMUSCULAR; INTRAVENOUS ONCE AS NEEDED
Status: CANCELLED | OUTPATIENT
Start: 2024-01-04

## 2024-01-04 RX ORDER — SODIUM CHLORIDE 0.9 % (FLUSH) 0.9 %
10 SYRINGE (ML) INJECTION
Status: CANCELLED | OUTPATIENT
Start: 2024-01-04

## 2024-01-04 RX ORDER — DIPHENHYDRAMINE HYDROCHLORIDE 50 MG/ML
50 INJECTION, SOLUTION INTRAMUSCULAR; INTRAVENOUS ONCE AS NEEDED
Status: DISCONTINUED | OUTPATIENT
Start: 2024-01-04 | End: 2024-01-04 | Stop reason: HOSPADM

## 2024-01-04 RX ORDER — EPINEPHRINE 0.3 MG/.3ML
0.3 INJECTION SUBCUTANEOUS ONCE AS NEEDED
Status: CANCELLED | OUTPATIENT
Start: 2024-01-04

## 2024-01-04 RX ADMIN — HEPARIN 500 UNITS: 100 SYRINGE at 11:01

## 2024-01-04 RX ADMIN — Medication 10 ML: at 11:01

## 2024-01-04 NOTE — PROGRESS NOTES
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 Right upper lobe 2.5 cm mass.  CT C 9/19/2022: Status post right partial pneumonectomy for resection of a right upper lobe lung mass with no residual recurrent mass seen. Small right-sided pleural effusion. Some lymphadenopathy in the right paratracheal region with a maximum short axis dimension of 1.6 cm.  Follow-up is recommended  CT C 1/25/2023: There is a paratracheal enlarged lymph node which shows interval mild size increase and now measures 2.2 x 2.0 cm and previously was 1.6 x 1.5 cm.  Aortopulmonary window mildly enlarged lymph node is stable.  Thoracic aorta is without aneurysmal dilatation or dissection.  Impression: 1. Operative changes of right upper lobectomy without bronchialstump soft tissue proliferation    2. No residual tumor load or metastatic nodule. 3. Paratracheal enlarged lymph node with interval size increase.  PET CT 2/9/2023:  Hypermetabolic right paratracheal lymph node image 81 series 3 measures 25 x 20 mm with max SUV 27.0.  Hypermetabolic anterior mediastinal lymph node anterior to the SVC measures 12 x 9 mm with max SUV 12.0. Impression: 1. Hypermetabolic metastatic mediastinal adenopathy.   2. No PET evidence of metastatic disease outside of the mediastinum.  CT C/A/P 7/10/2023:  1. Several new subcentimeter  nodules in the right lung.  Suspect these are infectious or inflammatory in nature, but 3 month follow-up chest CT recommended to ensure resolution.  2. 2 reference mediastinal lymph nodes are smaller since January.  3. L1 vertebral body compression deformity new since January, but appears subacute.  CT C/A/P 10/10/2023:    1. Slightly larger mediastinal lymph nodes, to be followed.  2. Increased irregular right perihilar consolidation which may be treatment related.  3. Small right pleural effusion.  4. No new suspicious findings in the abdomen or pelvis.      Pathology:  03/22/2022 CT-guided biopsy of the right lung mass: invasive squamous cell carcinoma, moderately differentiated.  5/24/2022: Right upper lobectomy:  1- LYMPH NODE, LUMBAR RIGHT 10 LYMPHADENECTOMY: NEGATIVE FOR METASTATIC CARCINOMA (0/1).   2- LYMPH NODE, 11R, LYMPHADENECTOMY: NEGATIVE FOR METASTATIC CARCINOMA (0/1).  3- LYMPH NODE, 9R, LYMPHADENECTOMY: NEGATIVE FOR METASTATIC CARCINOMA (0/1).   4- LYMPH NODE, 7R, LYMPHADENECTOMY: NEGATIVE FOR METASTATIC CARCINOMA (0/1).   5- LYMPH NODE, 8R, LYMPHADENECTOMY: ONE LYMPH NODE NEGATIVE FOR METASTATIC CARCINOMA (0/1).    6- LYMPH NODE, 12R, LYMPHADENECTOMY: NEGATIVE FOR METASTATIC CARCINOMA (0/1).  7- LUNG, RIGHT UPPER AND MIDDLE LOBES, PNEUMONECTOMY:  POORLY DIFFERENTIATED, G3, SQUAMOUS CELL CARCINOMA, INVASIVE.    - TUMOR SIZE: 3 CM.    - LYMPHOVASCULAR INVASION IS PRESENT.    - MARGINS NEGATIVE FOR INVASIVE CARCINOMA:    - NEAREST MARGIN, VASCULAR / BRONCHIAL 2.5 CM.    - NO PLEURAL SURFACE INVOLVEMENT     - PROMINENT NECROSIS PRESENT.  Visceral Pleura Invasion: Not identified  Number of Lymph Nodes Examined: Exact number : 6  pT Category: pT1c: pN0: No regional lymph node metastasis    3/8/2023 LYMPH NODE BIOPSY:   LYMPH NODE 4R, LYMPHADENECTOMY:  METASTATIC SQUAMOUS CELL CARCINOMA, NONKERATINIZING, MULTIPLE FRAGMENTS.       IMMUNOHISTOCHEMICAL STAINS:   CK 5/6, P63 AND CK7:  POSITIVE IN MALIGNANT  CELLS.   CK20 AND TTF-1:  NEGATIVE IN MALIGNANT CELLS.         CLINICAL HISTORY:       Patient: Leonila Rosales is a 78 y.o. male kindly referred by  Dr. Diaz for evaluation of lung cancer.    On 01/17/2022 patient presented to the emergency department after a fall.  He reports that he was getting to his truck and sleep and fell on his left side on the truck step rail.  He started having pain in his left hip and knee.  He underwent a CT angiogram that showed No pulmonary embolus. Right upper lobe 2.5 cm mass.      During that hospitalization he was treated for a close intertrochanteric fracture of the left femur and underwent open reduction intramedullary nailing left comminuted intertrochanteric hip fracture on 01/18/2022 with Dr. Fortino Palomares.  He then spent several weeks on rehab.    On 03/22/2022 he underwent CT-guided biopsy of the right lung mass.  Pathology showed invasive squamous cell carcinoma, moderately differentiated.    He is s/p right upper lobectomy with  on 5/24/2022.  Pathology report as above.  Patient is stage IA3 squamous cell carcinoma of the lung.  He has recovered well and has been doing good.     Patient was started on surveillance. CT 9/19/2022 with 1.6 right paratracheal LN and small Rt pleural effusion. Surveillance CT scan chest to eval stability 1/25/2023 with paratracheal enlarged lymph node which shows interval mild size increase and now measures 2.2 x 2.0 cm and previously was 1.6 x 1.5 cm.  Aortopulmonary window mildly enlarged lymph node is stable. PET CT 2/9/2023 with Hypermetabolic right paratracheal lymph node 25 x 20 mm with max SUV 27.0. Hypermetabolic anterior mediastinal lymph node anterior to the SVC measures 12 x 9 mm with max SUV 12.0.   Biopsy of R4 lymph node confirmed the metastatic squamous cell carcinoma. Completed  XRT on 5/30/23  and 5 cycles of Carbo/taxol on 5/19/23. C6 was held on 5/26/23 due to neutropenia, ANC was 0.7.    Patient was started on  chemoradiation. -completed XRT on 5/30/23 and 5 cycles of weekly carbo/taxol on 5/19/23, His final cycle was held due to neutropenia, ANC was 0.7.  He was then started on maintenance Imfinzi every 4 weeks on 6/22/23    Chief Complaint: Follow-up (No concerns today.)      Interval History:  He lives alone and is able to perform ADL's. He uses a walker to aid in ambulation. He quit smoking January 2022, after smoking for 40 yrs.     He completed XRT on 5/30/23 and 5 cycles of weekly carbo/taxol on 5/19/23. He is on maintenance durvalumab, started 6/22/2023 and tolerating well.     Patient presents today continuation of immunotherapy. Due for C8 today. He continues to cough up clear sputum only in the morning after waking up. Overall, he states he is feeling fine. Denies any side effects. He denies shortness of breath, chest pain. No weight loss. Denies headaches. He uses a cane for mobility. Denies any symptoms of immune mediated toxicities.       Past Medical History:   Diagnosis Date    Anemia     Closed intertrochanteric fracture     Deficiency of macronutrients     GERD (gastroesophageal reflux disease)     H. pylori infection     History of tobacco use     Hyperlipidemia     Hypertension     Lung mass     Malignant neoplasm of unspecified part of unspecified bronchus or lung     Non-small cell lung cancer       Past Surgical History:   Procedure Laterality Date    BIOPSY OF INTESTINE  04/04/2022    BRONCHOSCOPY      COLONOSCOPY      ESOPHAGOGASTRODUODENOSCOPY  04/04/2022    HIP FRACTURE SURGERY Left 2016    Intramedullary Nail Insertion Hip Left 01/18/2022    KIDNEY SURGERY Right 05/27/2022    MEDIASTINOSCOPY N/A 3/6/2023    Procedure: MEDIASTINOSCOPY;  Surgeon: Jose Diaz MD;  Location: [facility] OR;  Service: Cardiothoracic;  Laterality: N/A;  XX    PLACEMENT, MEDIPORT N/A 4/6/2023    Procedure: Placement, Mediport;  Surgeon: Jose Diaz MD;  Location: [facility] OR;  Service: Cardiology;  Laterality: N/A;     SHOULDER SURGERY       Family History   Family history unknown: Yes     Social Connections: Socially Integrated (4/12/2023)    Social Connection and Isolation Panel [NHANES]     Frequency of Communication with Friends and Family: More than three times a week     Frequency of Social Gatherings with Friends and Family: More than three times a week     Attends Muslim Services: More than 4 times per year     Active Member of Clubs or Organizations: Yes     Attends Club or Organization Meetings: More than 4 times per year     Marital Status:        Review of patient's allergies indicates:  No Known Allergies   Current Outpatient Medications on File Prior to Visit   Medication Sig Dispense Refill    amLODIPine (NORVASC) 5 MG tablet TAKE ONE TABLET BY MOUTH EVERY DAY TWICE DAILY 180 tablet 1    aspirin 81 mg Cap Take 81 mg by mouth Daily.      atorvastatin (LIPITOR) 10 MG tablet TAKE ONE TABLET BY MOUTH DAILY 90 tablet 3    LIDOcaine-prilocaine (EMLA) cream Apply topically as needed. Apply to port site 30 mins prior to chemo 30 g 3    LINZESS 145 mcg Cap capsule Take 145 mcg by mouth daily as needed. New medication, not started yet      ondansetron (ZOFRAN) 8 MG tablet Take 1 tablet (8 mg total) by mouth every 8 (eight) hours as needed for Nausea. 1st choice for nausea 30 tablet 2    pantoprazole (PROTONIX) 40 MG tablet Take 40 mg by mouth.      prochlorperazine (COMPAZINE) 10 MG tablet Take 1 tablet (10 mg total) by mouth every 6 (six) hours as needed (for nausea). 2nd choice, can cause drowsiness. 30 tablet 3    traMADoL (ULTRAM) 50 mg tablet Take 1 tablet (50 mg total) by mouth every 6 (six) hours as needed for Pain. 12 tablet 0     No current facility-administered medications on file prior to visit.      Review of Systems   Constitutional:  Negative for activity change, appetite change, chills, diaphoresis, fatigue, fever and unexpected weight change.   HENT:  Negative for nasal congestion, mouth sores,  "nosebleeds, sinus pressure/congestion, sore throat and trouble swallowing.    Eyes: Negative.    Respiratory:  Negative for cough and shortness of breath.    Cardiovascular:  Negative for chest pain and palpitations.   Gastrointestinal:  Negative for abdominal distention, abdominal pain, blood in stool, change in bowel habit, constipation, diarrhea, nausea and vomiting.   Endocrine: Negative.    Genitourinary:  Negative for bladder incontinence, decreased urine volume, difficulty urinating, dysuria, frequency, hematuria and urgency.   Musculoskeletal:  Negative for arthralgias, back pain, gait problem, joint swelling, leg pain and myalgias.   Integumentary:  Negative for rash.   Allergic/Immunologic: Negative.    Neurological:  Negative for dizziness, tremors, syncope, weakness, light-headedness, numbness, headaches and memory loss.   Hematological:  Negative for adenopathy. Does not bruise/bleed easily.   Psychiatric/Behavioral:  Negative for agitation, confusion, hallucinations, sleep disturbance and suicidal ideas. The patient is not nervous/anxious.           Vitals:    01/04/24 0912   BP: (!) 146/83   BP Location: Left arm   Patient Position: Sitting   Pulse: 100   Resp: 18   Temp: 98 °F (36.7 °C)   TempSrc: Oral   SpO2: 97%   Weight: 95.3 kg (210 lb)   Height: 5' 7" (1.702 m)         Wt Readings from Last 6 Encounters:   01/04/24 95.3 kg (210 lb)   12/07/23 95.3 kg (210 lb)   11/09/23 94.6 kg (208 lb 8 oz)   11/09/23 94.6 kg (208 lb 8 oz)   10/17/23 93.7 kg (206 lb 9.6 oz)   10/12/23 93.2 kg (205 lb 8 oz)     Body mass index is 32.89 kg/m².  Body surface area is 2.12 meters squared.       Physical Exam  Vitals and nursing note reviewed.   Constitutional:       General: He is not in acute distress.     Appearance: Normal appearance. He is obese.   HENT:      Head: Normocephalic and atraumatic.      Mouth/Throat:      Mouth: Mucous membranes are moist.   Eyes:      General: No scleral icterus.     Extraocular " Movements: Extraocular movements intact.      Conjunctiva/sclera: Conjunctivae normal.   Neck:      Vascular: No JVD.   Cardiovascular:      Rate and Rhythm: Normal rate and regular rhythm.      Heart sounds: No murmur heard.  Pulmonary:      Effort: Pulmonary effort is normal.      Breath sounds: Decreased breath sounds present. No wheezing or rhonchi.   Chest:      Chest wall: No tenderness.   Abdominal:      General: Bowel sounds are normal. There is no distension.      Palpations: Abdomen is soft. There is no fluid wave.      Tenderness: There is no abdominal tenderness.   Musculoskeletal:         General: No swelling or deformity.      Cervical back: Neck supple.   Lymphadenopathy:      Head:      Right side of head: No submandibular adenopathy.      Left side of head: No submandibular adenopathy.      Cervical: No cervical adenopathy.      Upper Body:      Right upper body: No supraclavicular or axillary adenopathy.      Left upper body: No supraclavicular or axillary adenopathy.      Lower Body: No right inguinal adenopathy. No left inguinal adenopathy.   Skin:     General: Skin is warm.      Coloration: Skin is not jaundiced.      Findings: No rash.   Neurological:      General: No focal deficit present.      Mental Status: He is alert and oriented to person, place, and time.      Cranial Nerves: Cranial nerves 2-12 are intact.      Comments: Mobility assitssed by cane/walker   Psychiatric:         Attention and Perception: Attention normal.         Mood and Affect: Mood and affect normal.         Behavior: Behavior is cooperative.         Cognition and Memory: Cognition normal.         Judgment: Judgment normal.       ECOG SCORE    2 - Capable of all selfcare but unable to carry out any work activities, active > 50% of hours         Laboratory:  CBC with Differential:  Lab Results   Component Value Date    WBC 5.72 01/02/2024    RBC 4.68 (L) 01/02/2024    HGB 12.8 (L) 01/02/2024    HCT 41.6 (L) 01/02/2024     MCV 88.9 01/02/2024    MCH 27.4 01/02/2024    MCHC 30.8 (L) 01/02/2024    RDW 15.6 01/02/2024     01/02/2024    MPV 10.4 01/02/2024        CMP:  Sodium Level   Date Value Ref Range Status   01/02/2024 144 136 - 145 mmol/L Final     Comment:     @ [facility]     Potassium Level   Date Value Ref Range Status   01/02/2024 3.8 3.5 - 5.1 mmol/L Final     Comment:     @ [facility]     Carbon Dioxide   Date Value Ref Range Status   01/02/2024 26 23 - 31 mmol/L Final     Comment:     @ [facility]     Blood Urea Nitrogen   Date Value Ref Range Status   01/02/2024 11.3 8.4 - 25.7 mg/dL Final     Comment:     @ [facility]     Creatinine   Date Value Ref Range Status   01/02/2024 0.89 0.73 - 1.18 mg/dL Final     Comment:     @ [facility]     Calcium Level Total   Date Value Ref Range Status   01/02/2024 9.6 8.8 - 10.0 mg/dL Final     Comment:     @ [facility]     Albumin Level   Date Value Ref Range Status   01/02/2024 3.5 3.4 - 4.8 g/dL Final     Comment:     @ [facility]     Bilirubin Total   Date Value Ref Range Status   01/02/2024 1.0 <=1.5 mg/dL Final     Comment:     @ [facility]     Alkaline Phosphatase   Date Value Ref Range Status   01/02/2024 88 40 - 150 unit/L Final     Comment:     @ [facility]     Aspartate Aminotransferase   Date Value Ref Range Status   01/02/2024 18 5 - 34 unit/L Final     Comment:     @ [facility]     Alanine Aminotransferase   Date Value Ref Range Status   01/02/2024 19 0 - 55 unit/L Final     Comment:     @ [facility]     Estimated GFR-Non    Date Value Ref Range Status   04/19/2022 >60           Assessment:       1. Non-small cell cancer of right lung    2. Maintenance antineoplastic immunotherapy    3. 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 WBC  and platelets have normalized. Now only with mild anemia that is improving.       Plan:        Plan is for Durvalumab every 4 weeks for 1 year. Tolerating well.     Continue with Imfinzi every 4 weeks - okay to proceed with C8 today  CT C/A/P every 3 months - Scheduled to be done on 1/10/24 @ [facility]  RTC in 4 weeks with MD for TD/Infusion and scan results  Labs: CBC, CMP, TSH - will be done @ [facility] 1-2 days prior to visit    Encourage to call or message us for any questions or problems  The patient was given ample opportunity to ask questions, and to the best of my abilities, all questions answered to satisfaction; patient demonstrated understanding of what we discussed and agreeable to the plan.     INDIA Arango

## 2024-01-05 RX ORDER — DIPHENHYDRAMINE HYDROCHLORIDE 50 MG/ML
50 INJECTION, SOLUTION INTRAMUSCULAR; INTRAVENOUS ONCE AS NEEDED
Status: CANCELLED | OUTPATIENT
Start: 2024-01-08

## 2024-01-05 RX ORDER — HEPARIN 100 UNIT/ML
500 SYRINGE INTRAVENOUS
Status: CANCELLED | OUTPATIENT
Start: 2024-01-08

## 2024-01-05 RX ORDER — SODIUM CHLORIDE 0.9 % (FLUSH) 0.9 %
10 SYRINGE (ML) INJECTION
Status: CANCELLED | OUTPATIENT
Start: 2024-01-08

## 2024-01-05 RX ORDER — EPINEPHRINE 0.3 MG/.3ML
0.3 INJECTION SUBCUTANEOUS ONCE AS NEEDED
Status: CANCELLED | OUTPATIENT
Start: 2024-01-08

## 2024-01-08 ENCOUNTER — INFUSION (OUTPATIENT)
Dept: INFUSION THERAPY | Facility: HOSPITAL | Age: 79
End: 2024-01-08
Attending: NURSE PRACTITIONER
Payer: MEDICARE

## 2024-01-08 VITALS
RESPIRATION RATE: 16 BRPM | DIASTOLIC BLOOD PRESSURE: 78 MMHG | TEMPERATURE: 98 F | OXYGEN SATURATION: 99 % | HEART RATE: 106 BPM | SYSTOLIC BLOOD PRESSURE: 150 MMHG

## 2024-01-08 DIAGNOSIS — C34.90 NON-SMALL CELL LUNG CANCER, UNSPECIFIED LATERALITY: Primary | ICD-10-CM

## 2024-01-08 DIAGNOSIS — C34.00 MALIGNANT NEOPLASM OF HILUS OF LUNG, UNSPECIFIED LATERALITY: ICD-10-CM

## 2024-01-08 PROCEDURE — 25000003 PHARM REV CODE 250: Performed by: NURSE PRACTITIONER

## 2024-01-08 PROCEDURE — A4216 STERILE WATER/SALINE, 10 ML: HCPCS | Performed by: NURSE PRACTITIONER

## 2024-01-08 PROCEDURE — 96413 CHEMO IV INFUSION 1 HR: CPT

## 2024-01-08 PROCEDURE — 63600175 PHARM REV CODE 636 W HCPCS: Performed by: NURSE PRACTITIONER

## 2024-01-08 RX ORDER — HEPARIN 100 UNIT/ML
500 SYRINGE INTRAVENOUS
Status: DISCONTINUED | OUTPATIENT
Start: 2024-01-08 | End: 2024-01-08 | Stop reason: HOSPADM

## 2024-01-08 RX ORDER — EPINEPHRINE 0.3 MG/.3ML
0.3 INJECTION SUBCUTANEOUS ONCE AS NEEDED
Status: DISCONTINUED | OUTPATIENT
Start: 2024-01-08 | End: 2024-01-08 | Stop reason: HOSPADM

## 2024-01-08 RX ORDER — SODIUM CHLORIDE 0.9 % (FLUSH) 0.9 %
10 SYRINGE (ML) INJECTION
Status: DISCONTINUED | OUTPATIENT
Start: 2024-01-08 | End: 2024-01-08 | Stop reason: HOSPADM

## 2024-01-08 RX ORDER — DIPHENHYDRAMINE HYDROCHLORIDE 50 MG/ML
50 INJECTION, SOLUTION INTRAMUSCULAR; INTRAVENOUS ONCE AS NEEDED
Status: DISCONTINUED | OUTPATIENT
Start: 2024-01-08 | End: 2024-01-08 | Stop reason: HOSPADM

## 2024-01-08 RX ADMIN — HEPARIN 500 UNITS: 100 SYRINGE at 11:01

## 2024-01-08 RX ADMIN — SODIUM CHLORIDE: 9 INJECTION, SOLUTION INTRAVENOUS at 10:01

## 2024-01-08 RX ADMIN — SODIUM CHLORIDE 1500 MG: 9 INJECTION, SOLUTION INTRAVENOUS at 10:01

## 2024-01-08 RX ADMIN — Medication 10 ML: at 11:01

## 2024-01-10 ENCOUNTER — HOSPITAL ENCOUNTER (OUTPATIENT)
Dept: RADIOLOGY | Facility: HOSPITAL | Age: 79
Discharge: HOME | End: 2024-01-10
Attending: INTERNAL MEDICINE
Payer: MEDICARE

## 2024-01-10 DIAGNOSIS — C34.91 NON-SMALL CELL CANCER OF RIGHT LUNG: ICD-10-CM

## 2024-01-10 DIAGNOSIS — Z51.12 MAINTENANCE ANTINEOPLASTIC IMMUNOTHERAPY: ICD-10-CM

## 2024-01-10 PROCEDURE — 25500020 PHARM REV CODE 255: Performed by: INTERNAL MEDICINE

## 2024-01-10 PROCEDURE — 74177 CT ABD & PELVIS W/CONTRAST: CPT | Mod: TC

## 2024-01-10 RX ADMIN — DIATRIZOATE MEGLUMINE AND DIATRIZOATE SODIUM 30 ML: 660; 100 LIQUID ORAL; RECTAL at 10:01

## 2024-01-10 RX ADMIN — IOPAMIDOL 100 ML: 755 INJECTION, SOLUTION INTRAVENOUS at 10:01

## 2024-02-05 ENCOUNTER — OFFICE VISIT (OUTPATIENT)
Dept: HEMATOLOGY/ONCOLOGY | Facility: CLINIC | Age: 79
End: 2024-02-05
Payer: MEDICARE

## 2024-02-05 VITALS
HEIGHT: 67 IN | RESPIRATION RATE: 17 BRPM | BODY MASS INDEX: 33.12 KG/M2 | DIASTOLIC BLOOD PRESSURE: 84 MMHG | TEMPERATURE: 98 F | OXYGEN SATURATION: 98 % | SYSTOLIC BLOOD PRESSURE: 151 MMHG | HEART RATE: 105 BPM | WEIGHT: 211 LBS

## 2024-02-05 DIAGNOSIS — Z51.12 MAINTENANCE ANTINEOPLASTIC IMMUNOTHERAPY: ICD-10-CM

## 2024-02-05 DIAGNOSIS — R53.83 FATIGUE, UNSPECIFIED TYPE: ICD-10-CM

## 2024-02-05 DIAGNOSIS — R93.89 ABNORMAL CT SCAN, CHEST: Primary | ICD-10-CM

## 2024-02-05 DIAGNOSIS — C34.91 NON-SMALL CELL CANCER OF RIGHT LUNG: ICD-10-CM

## 2024-02-05 LAB
ALBUMIN SERPL-MCNC: 3.7 G/DL (ref 3.4–4.8)
ALBUMIN/GLOB SERPL: 1.1 RATIO (ref 1.1–2)
ALP SERPL-CCNC: 95 UNIT/L (ref 40–150)
ALT SERPL-CCNC: 24 UNIT/L (ref 0–55)
AST SERPL-CCNC: 22 UNIT/L (ref 5–34)
BASOPHILS # BLD AUTO: 0.02 X10(3)/MCL
BASOPHILS NFR BLD AUTO: 0.4 %
BILIRUB SERPL-MCNC: 0.5 MG/DL
BUN SERPL-MCNC: 11.1 MG/DL (ref 8.4–25.7)
CALCIUM SERPL-MCNC: 9.6 MG/DL (ref 8.8–10)
CHLORIDE SERPL-SCNC: 108 MMOL/L (ref 98–107)
CO2 SERPL-SCNC: 26 MMOL/L (ref 23–31)
CREAT SERPL-MCNC: 0.92 MG/DL (ref 0.73–1.18)
EOSINOPHIL # BLD AUTO: 0.19 X10(3)/MCL (ref 0–0.9)
EOSINOPHIL NFR BLD AUTO: 3.5 %
ERYTHROCYTE [DISTWIDTH] IN BLOOD BY AUTOMATED COUNT: 14.4 % (ref 11.5–17)
GFR SERPLBLD CREATININE-BSD FMLA CKD-EPI: >60 MLS/MIN/1.73/M2
GLOBULIN SER-MCNC: 3.5 GM/DL (ref 2.4–3.5)
GLUCOSE SERPL-MCNC: 87 MG/DL (ref 82–115)
HCT VFR BLD AUTO: 41.6 % (ref 42–52)
HGB BLD-MCNC: 13.4 G/DL (ref 14–18)
IMM GRANULOCYTES # BLD AUTO: 0.01 X10(3)/MCL (ref 0–0.04)
IMM GRANULOCYTES NFR BLD AUTO: 0.2 %
LYMPHOCYTES # BLD AUTO: 1.03 X10(3)/MCL (ref 0.6–4.6)
LYMPHOCYTES NFR BLD AUTO: 19 %
MCH RBC QN AUTO: 28 PG (ref 27–31)
MCHC RBC AUTO-ENTMCNC: 32.2 G/DL (ref 33–36)
MCV RBC AUTO: 86.8 FL (ref 80–94)
MONOCYTES # BLD AUTO: 0.6 X10(3)/MCL (ref 0.1–1.3)
MONOCYTES NFR BLD AUTO: 11 %
NEUTROPHILS # BLD AUTO: 3.58 X10(3)/MCL (ref 2.1–9.2)
NEUTROPHILS NFR BLD AUTO: 65.9 %
PLATELET # BLD AUTO: 196 X10(3)/MCL (ref 130–400)
PMV BLD AUTO: 9.5 FL (ref 7.4–10.4)
POTASSIUM SERPL-SCNC: 4.1 MMOL/L (ref 3.5–5.1)
PROT SERPL-MCNC: 7.2 GM/DL (ref 5.8–7.6)
RBC # BLD AUTO: 4.79 X10(6)/MCL (ref 4.7–6.1)
SODIUM SERPL-SCNC: 144 MMOL/L (ref 136–145)
TSH SERPL-ACNC: 0.42 UIU/ML (ref 0.35–4.94)
WBC # SPEC AUTO: 5.43 X10(3)/MCL (ref 4.5–11.5)

## 2024-02-05 PROCEDURE — 3079F DIAST BP 80-89 MM HG: CPT | Mod: CPTII,S$GLB,, | Performed by: INTERNAL MEDICINE

## 2024-02-05 PROCEDURE — 1101F PT FALLS ASSESS-DOCD LE1/YR: CPT | Mod: CPTII,S$GLB,, | Performed by: INTERNAL MEDICINE

## 2024-02-05 PROCEDURE — 1157F ADVNC CARE PLAN IN RCRD: CPT | Mod: CPTII,S$GLB,, | Performed by: INTERNAL MEDICINE

## 2024-02-05 PROCEDURE — 1159F MED LIST DOCD IN RCRD: CPT | Mod: CPTII,S$GLB,, | Performed by: INTERNAL MEDICINE

## 2024-02-05 PROCEDURE — 1126F AMNT PAIN NOTED NONE PRSNT: CPT | Mod: CPTII,S$GLB,, | Performed by: INTERNAL MEDICINE

## 2024-02-05 PROCEDURE — 80053 COMPREHEN METABOLIC PANEL: CPT | Performed by: INTERNAL MEDICINE

## 2024-02-05 PROCEDURE — 99215 OFFICE O/P EST HI 40 MIN: CPT | Mod: S$GLB,,, | Performed by: INTERNAL MEDICINE

## 2024-02-05 PROCEDURE — 84443 ASSAY THYROID STIM HORMONE: CPT | Performed by: INTERNAL MEDICINE

## 2024-02-05 PROCEDURE — 99999 PR PBB SHADOW E&M-EST. PATIENT-LVL IV: CPT | Mod: PBBFAC,,, | Performed by: INTERNAL MEDICINE

## 2024-02-05 PROCEDURE — 3077F SYST BP >= 140 MM HG: CPT | Mod: CPTII,S$GLB,, | Performed by: INTERNAL MEDICINE

## 2024-02-05 PROCEDURE — 36415 COLL VENOUS BLD VENIPUNCTURE: CPT | Performed by: INTERNAL MEDICINE

## 2024-02-05 PROCEDURE — 85025 COMPLETE CBC W/AUTO DIFF WBC: CPT | Performed by: INTERNAL MEDICINE

## 2024-02-05 PROCEDURE — 3288F FALL RISK ASSESSMENT DOCD: CPT | Mod: CPTII,S$GLB,, | Performed by: INTERNAL MEDICINE

## 2024-02-05 RX ORDER — HEPARIN 100 UNIT/ML
500 SYRINGE INTRAVENOUS
Status: CANCELLED | OUTPATIENT
Start: 2024-02-05

## 2024-02-05 RX ORDER — SODIUM CHLORIDE 0.9 % (FLUSH) 0.9 %
10 SYRINGE (ML) INJECTION
Status: CANCELLED | OUTPATIENT
Start: 2024-02-05

## 2024-02-05 RX ORDER — EPINEPHRINE 0.3 MG/.3ML
0.3 INJECTION SUBCUTANEOUS ONCE AS NEEDED
Status: CANCELLED | OUTPATIENT
Start: 2024-02-05

## 2024-02-05 RX ORDER — DIPHENHYDRAMINE HYDROCHLORIDE 50 MG/ML
50 INJECTION INTRAMUSCULAR; INTRAVENOUS ONCE AS NEEDED
Status: CANCELLED | OUTPATIENT
Start: 2024-02-05

## 2024-02-05 NOTE — PROGRESS NOTES
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 Right upper lobe 2.5 cm mass.  CT C 9/19/2022: Status post right partial pneumonectomy for resection of a right upper lobe lung mass with no residual recurrent mass seen. Small right-sided pleural effusion. Some lymphadenopathy in the right paratracheal region with a maximum short axis dimension of 1.6 cm.  Follow-up is recommended  CT C 1/25/2023: There is a paratracheal enlarged lymph node which shows interval mild size increase and now measures 2.2 x 2.0 cm and previously was 1.6 x 1.5 cm.  Aortopulmonary window mildly enlarged lymph node is stable.  Thoracic aorta is without aneurysmal dilatation or dissection.  Impression: 1. Operative changes of right upper lobectomy without bronchialstump soft tissue proliferation    2. No residual tumor load or metastatic nodule. 3. Paratracheal enlarged lymph node with interval size increase.  PET CT 2/9/2023:  Hypermetabolic right paratracheal lymph node image 81 series 3 measures 25 x 20 mm with max SUV 27.0.  Hypermetabolic anterior mediastinal lymph node anterior to the SVC measures 12 x 9 mm with max SUV 12.0. Impression: 1. Hypermetabolic metastatic mediastinal adenopathy.   2. No PET evidence of metastatic disease outside of the mediastinum.  CT C/A/P 7/10/2023:  1. Several new subcentimeter  nodules in the right lung.  Suspect these are infectious or inflammatory in nature, but 3 month follow-up chest CT recommended to ensure resolution.  2. 2 reference mediastinal lymph nodes are smaller since January.  3. L1 vertebral body compression deformity new since January, but appears subacute.  CT C/A/P 10/10/2023:    1. Slightly larger mediastinal lymph nodes, to be followed.  2. Increased irregular right perihilar consolidation which may be treatment related.  3. Small right pleural effusion.  4. No new suspicious findings in the abdomen or pelvis.  CT C/A/P 1/10/2024:    1. Interval enlargement of mediastinal lymph nodes  2. Similar right perihilar consolidative opacity and small right pleural effusion        Pathology:  03/22/2022 CT-guided biopsy of the right lung mass: invasive squamous cell carcinoma, moderately differentiated.  5/24/2022: Right upper lobectomy:  1- LYMPH NODE, LUMBAR RIGHT 10 LYMPHADENECTOMY: NEGATIVE FOR METASTATIC CARCINOMA (0/1).   2- LYMPH NODE, 11R, LYMPHADENECTOMY: NEGATIVE FOR METASTATIC CARCINOMA (0/1).  3- LYMPH NODE, 9R, LYMPHADENECTOMY: NEGATIVE FOR METASTATIC CARCINOMA (0/1).   4- LYMPH NODE, 7R, LYMPHADENECTOMY: NEGATIVE FOR METASTATIC CARCINOMA (0/1).   5- LYMPH NODE, 8R, LYMPHADENECTOMY: ONE LYMPH NODE NEGATIVE FOR METASTATIC CARCINOMA (0/1).    6- LYMPH NODE, 12R, LYMPHADENECTOMY: NEGATIVE FOR METASTATIC CARCINOMA (0/1).  7- LUNG, RIGHT UPPER AND MIDDLE LOBES, PNEUMONECTOMY:  POORLY DIFFERENTIATED, G3, SQUAMOUS CELL CARCINOMA, INVASIVE.    - TUMOR SIZE: 3 CM.    - LYMPHOVASCULAR INVASION IS PRESENT.    - MARGINS NEGATIVE FOR INVASIVE CARCINOMA:    - NEAREST MARGIN, VASCULAR / BRONCHIAL 2.5 CM.    - NO PLEURAL SURFACE INVOLVEMENT     - PROMINENT NECROSIS PRESENT.  Visceral Pleura Invasion: Not identified  Number of Lymph Nodes Examined: Exact number : [id number]  pT Category: pT1c: pN0: No regional lymph node metastasis    3/8/2023 LYMPH NODE BIOPSY:   LYMPH NODE 4R,  LYMPHADENECTOMY:  METASTATIC SQUAMOUS CELL CARCINOMA, NONKERATINIZING, MULTIPLE FRAGMENTS.       IMMUNOHISTOCHEMICAL STAINS:   CK 5/6, P63 AND CK7:  POSITIVE IN MALIGNANT CELLS.   CK20 AND TTF-1:  NEGATIVE IN MALIGNANT CELLS.       CLINICAL HISTORY:       Patient: Leonila Rosales is a 78 y.o. male kindly referred by  Dr. Diaz for evaluation of lung cancer.    On 01/17/2022 patient presented to the emergency department after a fall.  He reports that he was getting to his truck and sleep and fell on his left side on the truck step rail.  He started having pain in his left hip and knee.  He underwent a CT angiogram that showed No pulmonary embolus. Right upper lobe 2.5 cm mass.      During that hospitalization he was treated for a close intertrochanteric fracture of the left femur and underwent open reduction intramedullary nailing left comminuted intertrochanteric hip fracture on 01/18/2022 with Dr. Fortino Palomares.  He then spent several weeks on rehab.    On 03/22/2022 he underwent CT-guided biopsy of the right lung mass.  Pathology showed invasive squamous cell carcinoma, moderately differentiated.    He is s/p right upper lobectomy with  on 5/24/2022.  Pathology report as above.  Patient is stage IA3 squamous cell carcinoma of the lung.  He has recovered well and has been doing good.     Patient was started on surveillance. CT 9/19/2022 with 1.6 right paratracheal LN and small Rt pleural effusion. Surveillance CT scan chest to eval stability 1/25/2023 with paratracheal enlarged lymph node which shows interval mild size increase and now measures 2.2 x 2.0 cm and previously was 1.6 x 1.5 cm.  Aortopulmonary window mildly enlarged lymph node is stable. PET CT 2/9/2023 with Hypermetabolic right paratracheal lymph node 25 x 20 mm with max SUV 27.0. Hypermetabolic anterior mediastinal lymph node anterior to the SVC measures 12 x 9 mm with max SUV 12.0.   Biopsy of R4 lymph node confirmed the metastatic squamous  cell carcinoma. Completed  XRT on 5/30/23  and 5 cycles of Carbo/taxol on 5/19/23. C6 was held on 5/26/23 due to neutropenia, ANC was 0.7.    Patient was started on chemoradiation. -completed XRT on 5/30/23 and 5 cycles of weekly carbo/taxol on 5/19/23, His final cycle was held due to neutropenia, ANC was 0.7.  He was then started on maintenance Imfinzi every 4 weeks on 6/22/23    Chief Complaint: Results      Interval History:  He lives alone and is able to perform ADL's. He uses a walker to aid in ambulation. He quit smoking January 2022, after smoking for 40 yrs.     He completed XRT on 5/30/23 and 5 cycles of weekly carbo/taxol on 5/19/23. He is on maintenance durvalumab, started 6/22/2023 and tolerating well.     Patient presents today to discuss CT scan results and continuation of Imfinzi, due for C9 today. He continues to cough up clear sputum only in the morning after waking up. Overall, he states he is feeling fine. Denies any side effects. He denies shortness of breath, chest pain. No weight loss. Denies headaches. He uses a cane for mobility. Denies any symptoms of immune mediated toxicities.   CT scan and recommendations discussed in detail with him.      Past Medical History:   Diagnosis Date    Anemia     Closed intertrochanteric fracture     Deficiency of macronutrients     GERD (gastroesophageal reflux disease)     H. pylori infection     History of tobacco use     Hyperlipidemia     Hypertension     Lung mass     Malignant neoplasm of unspecified part of unspecified bronchus or lung     Non-small cell lung cancer       Past Surgical History:   Procedure Laterality Date    BIOPSY OF INTESTINE  04/04/2022    BRONCHOSCOPY      COLONOSCOPY      ESOPHAGOGASTRODUODENOSCOPY  04/04/2022    HIP FRACTURE SURGERY Left 2016    Intramedullary Nail Insertion Hip Left 01/18/2022    KIDNEY SURGERY Right 05/27/2022    MEDIASTINOSCOPY N/A 3/6/2023    Procedure: MEDIASTINOSCOPY;  Surgeon: Jose Diaz MD;   Location: [facility] OR;  Service: Cardiothoracic;  Laterality: N/A;  XX    PLACEMENT, MEDIPORT N/A 4/6/2023    Procedure: Placement, Mediport;  Surgeon: Jose Diaz MD;  Location: [facility] OR;  Service: Cardiology;  Laterality: N/A;    SHOULDER SURGERY       Family History   Family history unknown: Yes     Social Connections: Socially Integrated (4/12/2023)    Social Connection and Isolation Panel [NHANES]     Frequency of Communication with Friends and Family: More than three times a week     Frequency of Social Gatherings with Friends and Family: More than three times a week     Attends Buddhism Services: More than 4 times per year     Active Member of Clubs or Organizations: Yes     Attends Club or Organization Meetings: More than 4 times per year     Marital Status:        Review of patient's allergies indicates:  No Known Allergies   Current Outpatient Medications on File Prior to Visit   Medication Sig Dispense Refill    amLODIPine (NORVASC) 5 MG tablet TAKE ONE TABLET BY MOUTH EVERY DAY TWICE DAILY 180 tablet 1    aspirin 81 mg Cap Take 81 mg by mouth Daily.      atorvastatin (LIPITOR) 10 MG tablet TAKE ONE TABLET BY MOUTH DAILY 90 tablet 3    LIDOcaine-prilocaine (EMLA) cream Apply topically as needed. Apply to port site 30 mins prior to chemo 30 g 3    LINZESS 145 mcg Cap capsule Take 145 mcg by mouth daily as needed. New medication, not started yet      ondansetron (ZOFRAN) 8 MG tablet Take 1 tablet (8 mg total) by mouth every 8 (eight) hours as needed for Nausea. 1st choice for nausea 30 tablet 2    pantoprazole (PROTONIX) 40 MG tablet Take 40 mg by mouth.      prochlorperazine (COMPAZINE) 10 MG tablet Take 1 tablet (10 mg total) by mouth every 6 (six) hours as needed (for nausea). 2nd choice, can cause drowsiness. 30 tablet 3    traMADoL (ULTRAM) 50 mg tablet Take 1 tablet (50 mg total) by mouth every 6 (six) hours as needed for Pain. 12 tablet 0     No current facility-administered medications on  "file prior to visit.      Review of Systems   Constitutional:  Negative for activity change, appetite change, chills, diaphoresis, fatigue, fever and unexpected weight change.   HENT:  Negative for nasal congestion, mouth sores, nosebleeds, sinus pressure/congestion, sore throat and trouble swallowing.    Eyes: Negative.    Respiratory:  Negative for cough and shortness of breath.    Cardiovascular:  Negative for chest pain and palpitations.   Gastrointestinal:  Negative for abdominal distention, abdominal pain, blood in stool, change in bowel habit, constipation, diarrhea, nausea and vomiting.   Endocrine: Negative.    Genitourinary:  Negative for bladder incontinence, decreased urine volume, difficulty urinating, dysuria, frequency, hematuria and urgency.   Musculoskeletal:  Negative for arthralgias, back pain, gait problem, joint swelling, leg pain and myalgias.   Integumentary:  Negative for rash.   Allergic/Immunologic: Negative.    Neurological:  Negative for dizziness, tremors, syncope, weakness, light-headedness, numbness, headaches and memory loss.   Hematological:  Negative for adenopathy. Does not bruise/bleed easily.   Psychiatric/Behavioral:  Negative for agitation, confusion, hallucinations, sleep disturbance and suicidal ideas. The patient is not nervous/anxious.           Vitals:    02/05/24 1053   BP: (!) 151/84   BP Location: Left arm   Patient Position: Sitting   Pulse: 105   Resp: 17   Temp: 97.8 °F (36.6 °C)   TempSrc: Oral   SpO2: 98%   Weight: 95.7 kg (211 lb)   Height: 5' 7" (1.702 m)           Wt Readings from Last 6 Encounters:   02/05/24 95.7 kg (211 lb)   01/04/24 95.3 kg (210 lb)   12/07/23 95.3 kg (210 lb)   11/09/23 94.6 kg (208 lb 8 oz)   11/09/23 94.6 kg (208 lb 8 oz)   10/17/23 93.7 kg (206 lb 9.6 oz)     Body mass index is 33.05 kg/m².  Body surface area is 2.13 meters squared.       Physical Exam  Vitals and nursing note reviewed.   Constitutional:       General: He is not in " acute distress.     Appearance: Normal appearance. He is obese.   HENT:      Head: Normocephalic and atraumatic.      Mouth/Throat:      Mouth: Mucous membranes are moist.   Eyes:      General: No scleral icterus.     Extraocular Movements: Extraocular movements intact.      Conjunctiva/sclera: Conjunctivae normal.   Neck:      Vascular: No JVD.   Cardiovascular:      Rate and Rhythm: Normal rate and regular rhythm.      Heart sounds: No murmur heard.  Pulmonary:      Effort: Pulmonary effort is normal.      Breath sounds: Decreased breath sounds present. No wheezing or rhonchi.   Chest:      Chest wall: No tenderness.   Abdominal:      General: Bowel sounds are normal. There is no distension.      Palpations: Abdomen is soft. There is no fluid wave.      Tenderness: There is no abdominal tenderness.   Musculoskeletal:         General: No swelling or deformity.      Cervical back: Neck supple.   Lymphadenopathy:      Head:      Right side of head: No submandibular adenopathy.      Left side of head: No submandibular adenopathy.      Cervical: No cervical adenopathy.      Upper Body:      Right upper body: No supraclavicular or axillary adenopathy.      Left upper body: No supraclavicular or axillary adenopathy.      Lower Body: No right inguinal adenopathy. No left inguinal adenopathy.   Skin:     General: Skin is warm.      Coloration: Skin is not jaundiced.      Findings: No rash.   Neurological:      General: No focal deficit present.      Mental Status: He is alert and oriented to person, place, and time.      Cranial Nerves: Cranial nerves 2-12 are intact.      Comments: Mobility assitssed by cane/walker   Psychiatric:         Attention and Perception: Attention normal.         Mood and Affect: Mood and affect normal.         Behavior: Behavior is cooperative.         Cognition and Memory: Cognition normal.         Judgment: Judgment normal.       ECOG SCORE    1 - Restricted in strenuous activity-ambulatory  and able to carry out work of a light nature         Laboratory:  CBC with Differential:  Lab Results   Component Value Date    WBC 5.43 02/05/2024    RBC 4.79 02/05/2024    HGB 13.4 (L) 02/05/2024    HCT 41.6 (L) 02/05/2024    MCV 86.8 02/05/2024    MCH 28.0 02/05/2024    MCHC 32.2 (L) 02/05/2024    RDW 14.4 02/05/2024     02/05/2024    MPV 9.5 02/05/2024        CMP:  Sodium Level   Date Value Ref Range Status   02/05/2024 144 136 - 145 mmol/L Final     Potassium Level   Date Value Ref Range Status   02/05/2024 4.1 3.5 - 5.1 mmol/L Final     Carbon Dioxide   Date Value Ref Range Status   02/05/2024 26 23 - 31 mmol/L Final     Blood Urea Nitrogen   Date Value Ref Range Status   02/05/2024 11.1 8.4 - 25.7 mg/dL Final     Creatinine   Date Value Ref Range Status   02/05/2024 0.92 0.73 - 1.18 mg/dL Final     Calcium Level Total   Date Value Ref Range Status   02/05/2024 9.6 8.8 - 10.0 mg/dL Final     Albumin Level   Date Value Ref Range Status   02/05/2024 3.7 3.4 - 4.8 g/dL Final     Bilirubin Total   Date Value Ref Range Status   02/05/2024 0.5 <=1.5 mg/dL Final     Alkaline Phosphatase   Date Value Ref Range Status   02/05/2024 95 40 - 150 unit/L Final     Aspartate Aminotransferase   Date Value Ref Range Status   02/05/2024 22 5 - 34 unit/L Final     Alanine Aminotransferase   Date Value Ref Range Status   02/05/2024 24 0 - 55 unit/L Final     Estimated GFR-Non    Date Value Ref Range Status   04/19/2022 >60           Assessment:       1) [id number] Stage IA3 Squamous cell carcinoma of lung  -5/24/2022 right upper lobectomy   -Local Recurrence 03/08/2023--Biopsy proven R4 LN  -completed XRT on 5/30/23 and 5 cycles of weekly carbo/taxol on 5/19/23  -Imfinzi every 4 weeks started on 6/22/23    2) Pancytopenia- Treatment related. WBC and platelets have normalized. Now only with mild anemia that is improving.       Plan:        Plan is for Durvalumab every 4 weeks for 1 year. Tolerating well.      Will HOLD Imfindesiree C9 today  CT C/A/P every 3 months - due 4/2024 @ [facility]  PET CT ordered to evaluate enlargement of mediastinal lymph nodes seen on recent CT scan  RTC in 1 week with MD for TD/ possible Infusion and scan results  Labs today: CBC, CMP, TSH    Encourage to call or message us for any questions or problems  The patient was given ample opportunity to ask questions, and to the best of my abilities, all questions answered to satisfaction; patient demonstrated understanding of what we discussed and agreeable to the plan.     Sanjuana Napoles MD  Hematology/Oncology      Professional Services   I, Shirley Pina LPN, acted solely as a scribe for and in the presence of Dr. Sanjuana Napoles, who performed these services.

## 2024-02-06 ENCOUNTER — HOSPITAL ENCOUNTER (OUTPATIENT)
Dept: RADIOLOGY | Facility: HOSPITAL | Age: 79
Discharge: HOME | End: 2024-02-06
Attending: INTERNAL MEDICINE
Payer: MEDICARE

## 2024-02-06 DIAGNOSIS — R93.89 ABNORMAL CT SCAN, CHEST: ICD-10-CM

## 2024-02-06 DIAGNOSIS — C34.91 NON-SMALL CELL CANCER OF RIGHT LUNG: ICD-10-CM

## 2024-02-06 PROCEDURE — A9552 F18 FDG: HCPCS

## 2024-02-06 PROCEDURE — 78815 PET IMAGE W/CT SKULL-THIGH: CPT | Mod: TC

## 2024-02-12 ENCOUNTER — INFUSION (OUTPATIENT)
Dept: INFUSION THERAPY | Facility: HOSPITAL | Age: 79
End: 2024-02-12
Attending: NURSE PRACTITIONER
Payer: MEDICARE

## 2024-02-12 ENCOUNTER — OFFICE VISIT (OUTPATIENT)
Dept: HEMATOLOGY/ONCOLOGY | Facility: CLINIC | Age: 79
End: 2024-02-12
Payer: MEDICARE

## 2024-02-12 VITALS
HEIGHT: 67 IN | TEMPERATURE: 98 F | DIASTOLIC BLOOD PRESSURE: 78 MMHG | HEART RATE: 109 BPM | WEIGHT: 214.13 LBS | RESPIRATION RATE: 18 BRPM | SYSTOLIC BLOOD PRESSURE: 148 MMHG | OXYGEN SATURATION: 98 % | BODY MASS INDEX: 33.61 KG/M2

## 2024-02-12 DIAGNOSIS — C34.91 NON-SMALL CELL CANCER OF RIGHT LUNG: Primary | ICD-10-CM

## 2024-02-12 DIAGNOSIS — R53.83 FATIGUE, UNSPECIFIED TYPE: ICD-10-CM

## 2024-02-12 DIAGNOSIS — C34.00 MALIGNANT NEOPLASM OF HILUS OF LUNG, UNSPECIFIED LATERALITY: ICD-10-CM

## 2024-02-12 DIAGNOSIS — C34.90 NON-SMALL CELL LUNG CANCER, UNSPECIFIED LATERALITY: Primary | ICD-10-CM

## 2024-02-12 PROCEDURE — 63600175 PHARM REV CODE 636 W HCPCS: Mod: JZ,JG | Performed by: INTERNAL MEDICINE

## 2024-02-12 PROCEDURE — 3078F DIAST BP <80 MM HG: CPT | Mod: CPTII,S$GLB,, | Performed by: INTERNAL MEDICINE

## 2024-02-12 PROCEDURE — 1160F RVW MEDS BY RX/DR IN RCRD: CPT | Mod: CPTII,S$GLB,, | Performed by: INTERNAL MEDICINE

## 2024-02-12 PROCEDURE — 3077F SYST BP >= 140 MM HG: CPT | Mod: CPTII,S$GLB,, | Performed by: INTERNAL MEDICINE

## 2024-02-12 PROCEDURE — 1126F AMNT PAIN NOTED NONE PRSNT: CPT | Mod: CPTII,S$GLB,, | Performed by: INTERNAL MEDICINE

## 2024-02-12 PROCEDURE — 1157F ADVNC CARE PLAN IN RCRD: CPT | Mod: CPTII,S$GLB,, | Performed by: INTERNAL MEDICINE

## 2024-02-12 PROCEDURE — 99999 PR PBB SHADOW E&M-EST. PATIENT-LVL IV: CPT | Mod: PBBFAC,,, | Performed by: INTERNAL MEDICINE

## 2024-02-12 PROCEDURE — 99215 OFFICE O/P EST HI 40 MIN: CPT | Mod: S$GLB,,, | Performed by: INTERNAL MEDICINE

## 2024-02-12 PROCEDURE — 1101F PT FALLS ASSESS-DOCD LE1/YR: CPT | Mod: CPTII,S$GLB,, | Performed by: INTERNAL MEDICINE

## 2024-02-12 PROCEDURE — 96413 CHEMO IV INFUSION 1 HR: CPT

## 2024-02-12 PROCEDURE — 3288F FALL RISK ASSESSMENT DOCD: CPT | Mod: CPTII,S$GLB,, | Performed by: INTERNAL MEDICINE

## 2024-02-12 PROCEDURE — 25000003 PHARM REV CODE 250: Performed by: INTERNAL MEDICINE

## 2024-02-12 PROCEDURE — 1159F MED LIST DOCD IN RCRD: CPT | Mod: CPTII,S$GLB,, | Performed by: INTERNAL MEDICINE

## 2024-02-12 RX ORDER — DIPHENHYDRAMINE HYDROCHLORIDE 50 MG/ML
50 INJECTION INTRAMUSCULAR; INTRAVENOUS ONCE AS NEEDED
Status: DISCONTINUED | OUTPATIENT
Start: 2024-02-12 | End: 2024-02-12 | Stop reason: HOSPADM

## 2024-02-12 RX ORDER — EPINEPHRINE 0.3 MG/.3ML
0.3 INJECTION SUBCUTANEOUS ONCE AS NEEDED
Status: DISCONTINUED | OUTPATIENT
Start: 2024-02-12 | End: 2024-02-12 | Stop reason: HOSPADM

## 2024-02-12 RX ORDER — SODIUM CHLORIDE 0.9 % (FLUSH) 0.9 %
10 SYRINGE (ML) INJECTION
Status: DISCONTINUED | OUTPATIENT
Start: 2024-02-12 | End: 2024-02-12 | Stop reason: HOSPADM

## 2024-02-12 RX ORDER — HEPARIN 100 UNIT/ML
500 SYRINGE INTRAVENOUS
Status: DISCONTINUED | OUTPATIENT
Start: 2024-02-12 | End: 2024-02-12 | Stop reason: HOSPADM

## 2024-02-12 RX ADMIN — SODIUM CHLORIDE 1500 MG: 9 INJECTION, SOLUTION INTRAVENOUS at 10:02

## 2024-02-12 NOTE — PROGRESS NOTES
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 Right upper lobe 2.5 cm mass.  CT C 9/19/2022: Status post right partial pneumonectomy for resection of a right upper lobe lung mass with no residual recurrent mass seen. Small right-sided pleural effusion. Some lymphadenopathy in the right paratracheal region with a maximum short axis dimension of 1.6 cm.  Follow-up is recommended  CT C 1/25/2023: There is a paratracheal enlarged lymph node which shows interval mild size increase and now measures 2.2 x 2.0 cm and previously was 1.6 x 1.5 cm.  Aortopulmonary window mildly enlarged lymph node is stable.  Thoracic aorta is without aneurysmal dilatation or dissection.  Impression: 1. Operative changes of right upper lobectomy without bronchialstump soft tissue proliferation    2. No residual tumor load or metastatic nodule. 3. Paratracheal enlarged lymph node with interval size increase.  PET CT 2/9/2023:  Hypermetabolic right paratracheal lymph node image 81 series 3 measures 25 x 20 mm with max SUV 27.0.  Hypermetabolic anterior mediastinal lymph node anterior to the SVC measures 12 x 9 mm with max SUV 12.0. Impression: 1. Hypermetabolic metastatic mediastinal adenopathy.   2. No PET evidence of metastatic disease outside of the mediastinum.  CT C/A/P 7/10/2023:  1. Several new subcentimeter  nodules in the right lung.  Suspect these are infectious or inflammatory in nature, but 3 month follow-up chest CT recommended to ensure resolution.  2. 2 reference mediastinal lymph nodes are smaller since January.  3. L1 vertebral body compression deformity new since January, but appears subacute.  CT C/A/P 10/10/2023:    1. Slightly larger mediastinal lymph nodes, to be followed.  2. Increased irregular right perihilar consolidation which may be treatment related.  3. Small right pleural effusion.  4. No new suspicious findings in the abdomen or pelvis.  CT C/A/P 1/10/2024:    1. Interval enlargement of mediastinal lymph nodes  2. Similar right perihilar consolidative opacity and small right pleural effusion  PET CT 2/6/2024:  1. New, enlarged hypermetabolic superior paratracheal and para-aortic/subaortic lymph nodes compared to prior PET-CT  2. Persistent enlarged and hypermetabolic lower right paratracheal lymph node.  This lymph node is decreased in size and FDG avidity compared to prior PET-CT.  3. Previously seen hypermetabolic pre-vascular lymph node is decreased in size and is no longer hypermetabolic.       Pathology:  03/22/2022 CT-guided biopsy of the right lung mass: invasive squamous cell carcinoma, moderately differentiated.  5/24/2022: Right upper lobectomy:  1- LYMPH NODE, LUMBAR RIGHT 10 LYMPHADENECTOMY: NEGATIVE FOR METASTATIC CARCINOMA (0/1).   2- LYMPH NODE, 11R, LYMPHADENECTOMY: NEGATIVE FOR METASTATIC CARCINOMA (0/1).  3- LYMPH NODE, 9R, LYMPHADENECTOMY: NEGATIVE FOR METASTATIC CARCINOMA (0/1).   4- LYMPH NODE, 7R, LYMPHADENECTOMY: NEGATIVE FOR METASTATIC CARCINOMA (0/1).   5- LYMPH NODE, 8R, LYMPHADENECTOMY: ONE LYMPH NODE NEGATIVE FOR METASTATIC CARCINOMA (0/1).    6- LYMPH NODE, 12R, LYMPHADENECTOMY: NEGATIVE FOR METASTATIC CARCINOMA (0/1).  7- LUNG, RIGHT UPPER AND MIDDLE LOBES, PNEUMONECTOMY:  POORLY DIFFERENTIATED, G3, SQUAMOUS CELL CARCINOMA, INVASIVE.    - TUMOR SIZE: 3 CM.    -  LYMPHOVASCULAR INVASION IS PRESENT.    - MARGINS NEGATIVE FOR INVASIVE CARCINOMA:    - NEAREST MARGIN, VASCULAR / BRONCHIAL 2.5 CM.    - NO PLEURAL SURFACE INVOLVEMENT     - PROMINENT NECROSIS PRESENT.  Visceral Pleura Invasion: Not identified  Number of Lymph Nodes Examined: Exact number : [id number]  pT Category: pT1c: pN0: No regional lymph node metastasis    3/8/2023 LYMPH NODE BIOPSY:   LYMPH NODE 4R, LYMPHADENECTOMY:  METASTATIC SQUAMOUS CELL CARCINOMA, NONKERATINIZING, MULTIPLE FRAGMENTS.       IMMUNOHISTOCHEMICAL STAINS:   CK 5/6, P63 AND CK7:  POSITIVE IN MALIGNANT CELLS.   CK20 AND TTF-1:  NEGATIVE IN MALIGNANT CELLS.       CLINICAL HISTORY:       Patient: Leonila Rosales is a 78 y.o. male kindly referred by  Dr. Diaz for evaluation of lung cancer.    On 01/17/2022 patient presented to the emergency department after a fall.  He reports that he was getting to his truck and sleep and fell on his left side on the truck step rail.  He started having pain in his left hip and knee.  He underwent a CT angiogram that showed No pulmonary embolus. Right upper lobe 2.5 cm mass.      During that hospitalization he was treated for a close intertrochanteric fracture of the left femur and underwent open reduction intramedullary nailing left comminuted intertrochanteric hip fracture on 01/18/2022 with Dr. Fortino Palomares.  He then spent several weeks on rehab.    On 03/22/2022 he underwent CT-guided biopsy of the right lung mass.  Pathology showed invasive squamous cell carcinoma, moderately differentiated.    He is s/p right upper lobectomy with  on 5/24/2022.  Pathology report as above.  Patient is stage IA3 squamous cell carcinoma of the lung.  He has recovered well and has been doing good.     Patient was started on surveillance. CT 9/19/2022 with 1.6 right paratracheal LN and small Rt pleural effusion. Surveillance CT scan chest to eval stability 1/25/2023 with paratracheal enlarged lymph node which shows interval mild  size increase and now measures 2.2 x 2.0 cm and previously was 1.6 x 1.5 cm.  Aortopulmonary window mildly enlarged lymph node is stable. PET CT 2/9/2023 with Hypermetabolic right paratracheal lymph node 25 x 20 mm with max SUV 27.0. Hypermetabolic anterior mediastinal lymph node anterior to the SVC measures 12 x 9 mm with max SUV 12.0.   Biopsy of R4 lymph node confirmed the metastatic squamous cell carcinoma. Completed  XRT on 5/30/23  and 5 cycles of Carbo/taxol on 5/19/23. C6 was held on 5/26/23 due to neutropenia, ANC was 0.7.    Patient was started on chemoradiation. -completed XRT on 5/30/23 and 5 cycles of weekly carbo/taxol on 5/19/23, His final cycle was held due to neutropenia, ANC was 0.7.  He was then started on maintenance Imfinzi every 4 weeks on 6/22/23    Chief Complaint: Results      Interval History:  He lives alone and is able to perform ADL's. He uses a walker to aid in ambulation. He quit smoking January 2022, after smoking for 40 yrs.     He completed XRT to the mediastinum on 5/30/23 and 5 cycles of weekly carbo/taxol on 5/19/23. He is on maintenance durvalumab, started 6/22/2023 and tolerating well.     Patient presents today to discuss PET CT  results and continuation of Imfinzi, due for C9 today. (Was held last week to have PET CT done) He continues to cough up clear sputum only in the morning after waking up. Overall, he states he is feeling fine. Denies any side effects. He denies shortness of breath, chest pain. No weight loss. Denies headaches. He uses a cane for mobility. Denies any symptoms of immune mediated toxicities.   PET CT results and recommendations discussed in detail with him. It showed significant activity in the LN of concern. I advised him I discussed case with Dr. Willis. He has follow up scheduled for 2/21/24.        Past Medical History:   Diagnosis Date    Anemia     Closed intertrochanteric fracture     Deficiency of macronutrients     GERD (gastroesophageal reflux  disease)     H. pylori infection     History of tobacco use     Hyperlipidemia     Hypertension     Lung mass     Malignant neoplasm of unspecified part of unspecified bronchus or lung     Non-small cell lung cancer       Past Surgical History:   Procedure Laterality Date    BIOPSY OF INTESTINE  04/04/2022    BRONCHOSCOPY      COLONOSCOPY      ESOPHAGOGASTRODUODENOSCOPY  04/04/2022    HIP FRACTURE SURGERY Left 2016    Intramedullary Nail Insertion Hip Left 01/18/2022    KIDNEY SURGERY Right 05/27/2022    MEDIASTINOSCOPY N/A 3/6/2023    Procedure: MEDIASTINOSCOPY;  Surgeon: Jose Diaz MD;  Location: [facility];  Service: Cardiothoracic;  Laterality: N/A;  XX    PLACEMENT, MEDIPORT N/A 4/6/2023    Procedure: Placement, Mediport;  Surgeon: Jose Diaz MD;  Location: [facility] OR;  Service: Cardiology;  Laterality: N/A;    SHOULDER SURGERY       Family History   Family history unknown: Yes     Social Connections: Socially Integrated (4/12/2023)    Social Connection and Isolation Panel [NHANES]     Frequency of Communication with Friends and Family: More than three times a week     Frequency of Social Gatherings with Friends and Family: More than three times a week     Attends Alevism Services: More than 4 times per year     Active Member of Clubs or Organizations: Yes     Attends Club or Organization Meetings: More than 4 times per year     Marital Status:        Review of patient's allergies indicates:  No Known Allergies   Current Outpatient Medications on File Prior to Visit   Medication Sig Dispense Refill    amLODIPine (NORVASC) 5 MG tablet TAKE ONE TABLET BY MOUTH EVERY DAY TWICE DAILY 180 tablet 1    aspirin 81 mg Cap Take 81 mg by mouth Daily.      atorvastatin (LIPITOR) 10 MG tablet TAKE ONE TABLET BY MOUTH DAILY 90 tablet 3    LIDOcaine-prilocaine (EMLA) cream Apply topically as needed. Apply to port site 30 mins prior to chemo 30 g 3    LINZESS 145 mcg Cap capsule Take 145 mcg by mouth daily as  needed. New medication, not started yet      ondansetron (ZOFRAN) 8 MG tablet Take 1 tablet (8 mg total) by mouth every 8 (eight) hours as needed for Nausea. 1st choice for nausea 30 tablet 2    pantoprazole (PROTONIX) 40 MG tablet Take 40 mg by mouth.      prochlorperazine (COMPAZINE) 10 MG tablet Take 1 tablet (10 mg total) by mouth every 6 (six) hours as needed (for nausea). 2nd choice, can cause drowsiness. 30 tablet 3    traMADoL (ULTRAM) 50 mg tablet Take 1 tablet (50 mg total) by mouth every 6 (six) hours as needed for Pain. 12 tablet 0     No current facility-administered medications on file prior to visit.      Review of Systems   Constitutional:  Negative for activity change, appetite change, chills, diaphoresis, fatigue, fever and unexpected weight change.   HENT:  Negative for nasal congestion, mouth sores, nosebleeds, sinus pressure/congestion, sore throat and trouble swallowing.    Eyes: Negative.    Respiratory:  Negative for cough and shortness of breath.    Cardiovascular:  Negative for chest pain and palpitations.   Gastrointestinal:  Negative for abdominal distention, abdominal pain, blood in stool, change in bowel habit, constipation, diarrhea, nausea and vomiting.   Endocrine: Negative.    Genitourinary:  Negative for bladder incontinence, decreased urine volume, difficulty urinating, dysuria, frequency, hematuria and urgency.   Musculoskeletal:  Negative for arthralgias, back pain, gait problem, joint swelling, leg pain and myalgias.   Integumentary:  Negative for rash.   Allergic/Immunologic: Negative.    Neurological:  Negative for dizziness, tremors, syncope, weakness, light-headedness, numbness, headaches and memory loss.   Hematological:  Negative for adenopathy. Does not bruise/bleed easily.   Psychiatric/Behavioral:  Negative for agitation, confusion, hallucinations, sleep disturbance and suicidal ideas. The patient is not nervous/anxious.           Vitals:    02/12/24 0958   BP: (!)  "148/78   BP Location: Left arm   Patient Position: Sitting   Pulse: 109   Resp: 18   Temp: 98.2 °F (36.8 °C)   TempSrc: Oral   SpO2: 98%   Weight: 97.1 kg (214 lb 1.6 oz)   Height: 5' 7" (1.702 m)             Wt Readings from Last 6 Encounters:   02/12/24 97.1 kg (214 lb 1.6 oz)   02/05/24 95.7 kg (211 lb)   01/04/24 95.3 kg (210 lb)   12/07/23 95.3 kg (210 lb)   11/09/23 94.6 kg (208 lb 8 oz)   11/09/23 94.6 kg (208 lb 8 oz)     Body mass index is 33.53 kg/m².  Body surface area is 2.14 meters squared.       Physical Exam  Vitals and nursing note reviewed.   Constitutional:       General: He is not in acute distress.     Appearance: Normal appearance. He is obese.   HENT:      Head: Normocephalic and atraumatic.      Mouth/Throat:      Mouth: Mucous membranes are moist.   Eyes:      General: No scleral icterus.     Extraocular Movements: Extraocular movements intact.      Conjunctiva/sclera: Conjunctivae normal.   Neck:      Vascular: No JVD.   Cardiovascular:      Rate and Rhythm: Normal rate and regular rhythm.      Heart sounds: No murmur heard.  Pulmonary:      Effort: Pulmonary effort is normal.      Breath sounds: Decreased breath sounds present. No wheezing or rhonchi.   Chest:      Chest wall: No tenderness.   Abdominal:      General: Bowel sounds are normal. There is no distension.      Palpations: Abdomen is soft. There is no fluid wave.      Tenderness: There is no abdominal tenderness.   Musculoskeletal:         General: No swelling or deformity.      Cervical back: Neck supple.   Lymphadenopathy:      Head:      Right side of head: No submandibular adenopathy.      Left side of head: No submandibular adenopathy.      Cervical: No cervical adenopathy.      Upper Body:      Right upper body: No supraclavicular or axillary adenopathy.      Left upper body: No supraclavicular or axillary adenopathy.      Lower Body: No right inguinal adenopathy. No left inguinal adenopathy.   Skin:     General: Skin is " warm.      Coloration: Skin is not jaundiced.      Findings: No rash.   Neurological:      General: No focal deficit present.      Mental Status: He is alert and oriented to person, place, and time.      Cranial Nerves: Cranial nerves 2-12 are intact.      Comments: Mobility assitssed by cane/walker   Psychiatric:         Attention and Perception: Attention normal.         Mood and Affect: Mood and affect normal.         Behavior: Behavior is cooperative.         Cognition and Memory: Cognition normal.         Judgment: Judgment normal.       ECOG SCORE             Laboratory:  CBC with Differential:  Lab Results   Component Value Date    WBC 5.43 02/05/2024    RBC 4.79 02/05/2024    HGB 13.4 (L) 02/05/2024    HCT 41.6 (L) 02/05/2024    MCV 86.8 02/05/2024    MCH 28.0 02/05/2024    MCHC 32.2 (L) 02/05/2024    RDW 14.4 02/05/2024     02/05/2024    MPV 9.5 02/05/2024        CMP:  Sodium Level   Date Value Ref Range Status   02/05/2024 144 136 - 145 mmol/L Final     Potassium Level   Date Value Ref Range Status   02/05/2024 4.1 3.5 - 5.1 mmol/L Final     Carbon Dioxide   Date Value Ref Range Status   02/05/2024 26 23 - 31 mmol/L Final     Blood Urea Nitrogen   Date Value Ref Range Status   02/05/2024 11.1 8.4 - 25.7 mg/dL Final     Creatinine   Date Value Ref Range Status   02/05/2024 0.92 0.73 - 1.18 mg/dL Final     Calcium Level Total   Date Value Ref Range Status   02/05/2024 9.6 8.8 - 10.0 mg/dL Final     Albumin Level   Date Value Ref Range Status   02/05/2024 3.7 3.4 - 4.8 g/dL Final     Bilirubin Total   Date Value Ref Range Status   02/05/2024 0.5 <=1.5 mg/dL Final     Alkaline Phosphatase   Date Value Ref Range Status   02/05/2024 95 40 - 150 unit/L Final     Aspartate Aminotransferase   Date Value Ref Range Status   02/05/2024 22 5 - 34 unit/L Final     Alanine Aminotransferase   Date Value Ref Range Status   02/05/2024 24 0 - 55 unit/L Final     Estimated GFR-Non    Date Value Ref  Range Status   04/19/2022 >60           Assessment:       1) [id number] 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 Discussed with Dr Willis NP and he will be seen 4/21/2024.         Plan:        Plan is for Durvalumab every 4 weeks for 1 year. Tolerating well.     Okay to proceed with Imfinzi C9 today  CT C/A/P every 3 months - due 4/2024 @ [facility], will order when closer  Keep scheduled appointment with Dr. Willis on 2/21/24  RTC in 4 weeks with NP for TD/Infusion same day  Labs: CBC, CMP, TSH (standing order placed) - to be drawn 1-2 days prior @ [facility]    Encourage to call or message us for any questions or problems  The patient was given ample opportunity to ask questions, and to the best of my abilities, all questions answered to satisfaction; patient demonstrated understanding of what we discussed and agreeable to the plan.     Sanjuana Napoles MD  Hematology/Oncology      Professional Services   I, Shirley Pina LPN, acted solely as a scribe for and in the presence of Dr. Sanjuana Napoles, who performed these services.

## 2024-03-11 ENCOUNTER — INFUSION (OUTPATIENT)
Dept: INFUSION THERAPY | Facility: HOSPITAL | Age: 79
End: 2024-03-11
Attending: NURSE PRACTITIONER
Payer: MEDICARE

## 2024-03-11 ENCOUNTER — OFFICE VISIT (OUTPATIENT)
Dept: HEMATOLOGY/ONCOLOGY | Facility: CLINIC | Age: 79
End: 2024-03-11
Payer: MEDICARE

## 2024-03-11 VITALS
OXYGEN SATURATION: 98 % | RESPIRATION RATE: 18 BRPM | TEMPERATURE: 98 F | SYSTOLIC BLOOD PRESSURE: 123 MMHG | HEIGHT: 67 IN | WEIGHT: 212.63 LBS | BODY MASS INDEX: 33.37 KG/M2 | HEART RATE: 100 BPM | DIASTOLIC BLOOD PRESSURE: 75 MMHG

## 2024-03-11 VITALS
WEIGHT: 212.63 LBS | TEMPERATURE: 98 F | SYSTOLIC BLOOD PRESSURE: 123 MMHG | DIASTOLIC BLOOD PRESSURE: 75 MMHG | OXYGEN SATURATION: 98 % | BODY MASS INDEX: 33.37 KG/M2 | HEART RATE: 100 BPM | RESPIRATION RATE: 18 BRPM | HEIGHT: 67 IN

## 2024-03-11 DIAGNOSIS — C34.90 NON-SMALL CELL LUNG CANCER, UNSPECIFIED LATERALITY: Primary | ICD-10-CM

## 2024-03-11 DIAGNOSIS — Z51.12 MAINTENANCE ANTINEOPLASTIC IMMUNOTHERAPY: ICD-10-CM

## 2024-03-11 DIAGNOSIS — R53.83 FATIGUE, UNSPECIFIED TYPE: ICD-10-CM

## 2024-03-11 DIAGNOSIS — C34.00 MALIGNANT NEOPLASM OF HILUS OF LUNG, UNSPECIFIED LATERALITY: ICD-10-CM

## 2024-03-11 DIAGNOSIS — C34.91 NON-SMALL CELL CANCER OF RIGHT LUNG: Primary | ICD-10-CM

## 2024-03-11 PROCEDURE — 1159F MED LIST DOCD IN RCRD: CPT | Mod: CPTII,S$GLB,, | Performed by: NURSE PRACTITIONER

## 2024-03-11 PROCEDURE — 96413 CHEMO IV INFUSION 1 HR: CPT

## 2024-03-11 PROCEDURE — 25000003 PHARM REV CODE 250: Performed by: NURSE PRACTITIONER

## 2024-03-11 PROCEDURE — 3078F DIAST BP <80 MM HG: CPT | Mod: CPTII,S$GLB,, | Performed by: NURSE PRACTITIONER

## 2024-03-11 PROCEDURE — 99215 OFFICE O/P EST HI 40 MIN: CPT | Mod: S$GLB,,, | Performed by: NURSE PRACTITIONER

## 2024-03-11 PROCEDURE — 63600175 PHARM REV CODE 636 W HCPCS: Performed by: NURSE PRACTITIONER

## 2024-03-11 PROCEDURE — 1101F PT FALLS ASSESS-DOCD LE1/YR: CPT | Mod: CPTII,S$GLB,, | Performed by: NURSE PRACTITIONER

## 2024-03-11 PROCEDURE — 1126F AMNT PAIN NOTED NONE PRSNT: CPT | Mod: CPTII,S$GLB,, | Performed by: NURSE PRACTITIONER

## 2024-03-11 PROCEDURE — 99999 PR PBB SHADOW E&M-EST. PATIENT-LVL IV: CPT | Mod: PBBFAC,,, | Performed by: NURSE PRACTITIONER

## 2024-03-11 PROCEDURE — 3288F FALL RISK ASSESSMENT DOCD: CPT | Mod: CPTII,S$GLB,, | Performed by: NURSE PRACTITIONER

## 2024-03-11 PROCEDURE — 1160F RVW MEDS BY RX/DR IN RCRD: CPT | Mod: CPTII,S$GLB,, | Performed by: NURSE PRACTITIONER

## 2024-03-11 PROCEDURE — 1157F ADVNC CARE PLAN IN RCRD: CPT | Mod: CPTII,S$GLB,, | Performed by: NURSE PRACTITIONER

## 2024-03-11 PROCEDURE — 3074F SYST BP LT 130 MM HG: CPT | Mod: CPTII,S$GLB,, | Performed by: NURSE PRACTITIONER

## 2024-03-11 RX ORDER — EPINEPHRINE 0.3 MG/.3ML
0.3 INJECTION SUBCUTANEOUS ONCE AS NEEDED
Status: CANCELLED | OUTPATIENT
Start: 2024-03-11

## 2024-03-11 RX ORDER — DIPHENHYDRAMINE HYDROCHLORIDE 50 MG/ML
50 INJECTION INTRAMUSCULAR; INTRAVENOUS ONCE AS NEEDED
Status: DISCONTINUED | OUTPATIENT
Start: 2024-03-11 | End: 2024-03-11 | Stop reason: HOSPADM

## 2024-03-11 RX ORDER — EPINEPHRINE 0.3 MG/.3ML
0.3 INJECTION SUBCUTANEOUS ONCE AS NEEDED
Status: DISCONTINUED | OUTPATIENT
Start: 2024-03-11 | End: 2024-03-11 | Stop reason: HOSPADM

## 2024-03-11 RX ORDER — HEPARIN 100 UNIT/ML
500 SYRINGE INTRAVENOUS
Status: CANCELLED | OUTPATIENT
Start: 2024-03-11

## 2024-03-11 RX ORDER — SODIUM CHLORIDE 0.9 % (FLUSH) 0.9 %
10 SYRINGE (ML) INJECTION
Status: DISCONTINUED | OUTPATIENT
Start: 2024-03-11 | End: 2024-03-11 | Stop reason: HOSPADM

## 2024-03-11 RX ORDER — SODIUM CHLORIDE 0.9 % (FLUSH) 0.9 %
10 SYRINGE (ML) INJECTION
Status: CANCELLED | OUTPATIENT
Start: 2024-03-11

## 2024-03-11 RX ORDER — HEPARIN 100 UNIT/ML
500 SYRINGE INTRAVENOUS
Status: DISCONTINUED | OUTPATIENT
Start: 2024-03-11 | End: 2024-03-11 | Stop reason: HOSPADM

## 2024-03-11 RX ORDER — DIPHENHYDRAMINE HYDROCHLORIDE 50 MG/ML
50 INJECTION INTRAMUSCULAR; INTRAVENOUS ONCE AS NEEDED
Status: CANCELLED | OUTPATIENT
Start: 2024-03-11

## 2024-03-11 RX ADMIN — HEPARIN 500 UNITS: 100 SYRINGE at 11:03

## 2024-03-11 RX ADMIN — SODIUM CHLORIDE: 9 INJECTION, SOLUTION INTRAVENOUS at 10:03

## 2024-03-11 RX ADMIN — SODIUM CHLORIDE 1500 MG: 9 INJECTION, SOLUTION INTRAVENOUS at 10:03

## 2024-03-11 NOTE — NURSING
Pt tolerated C10 Imfinzi without any adverse events. Pt discharged home in stable condition, future appts given.

## 2024-03-11 NOTE — PROGRESS NOTES
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 Right upper lobe 2.5 cm mass.  CT C 9/19/2022: Status post right partial pneumonectomy for resection of a right upper lobe lung mass with no residual recurrent mass seen. Small right-sided pleural effusion. Some lymphadenopathy in the right paratracheal region with a maximum short axis dimension of 1.6 cm.  Follow-up is recommended  CT C 1/25/2023: There is a paratracheal enlarged lymph node which shows interval mild size increase and now measures 2.2 x 2.0 cm and previously was 1.6 x 1.5 cm.  Aortopulmonary window mildly enlarged lymph node is stable.  Thoracic aorta is without aneurysmal dilatation or dissection.  Impression: 1. Operative changes of right upper lobectomy without bronchialstump soft tissue proliferation    2. No residual tumor load or metastatic nodule. 3. Paratracheal enlarged lymph node with interval size increase.  PET CT 2/9/2023:  Hypermetabolic right paratracheal lymph node image 81 series 3 measures 25 x 20 mm with max SUV 27.0.  Hypermetabolic anterior mediastinal lymph node anterior to the SVC measures 12 x 9 mm with max SUV 12.0. Impression: 1. Hypermetabolic metastatic mediastinal adenopathy.   2. No PET evidence of metastatic disease outside of the mediastinum.  CT C/A/P 7/10/2023:  1. Several new subcentimeter  nodules in the right lung.  Suspect these are infectious or inflammatory in nature, but 3 month follow-up chest CT recommended to ensure resolution.  2. 2 reference mediastinal lymph nodes are smaller since January.  3. L1 vertebral body compression deformity new since January, but appears subacute.  CT C/A/P 10/10/2023:    1. Slightly larger mediastinal lymph nodes, to be followed.  2. Increased irregular right perihilar consolidation which may be treatment related.  3. Small right pleural effusion.  4. No new suspicious findings in the abdomen or pelvis.  CT C/A/P 1/10/2024:    1. Interval enlargement of mediastinal lymph nodes  2. Similar right perihilar consolidative opacity and small right pleural effusion  PET CT 2/6/2024:  1. New, enlarged hypermetabolic superior paratracheal and para-aortic/subaortic lymph nodes compared to prior PET-CT  2. Persistent enlarged and hypermetabolic lower right paratracheal lymph node.  This lymph node is decreased in size and FDG avidity compared to prior PET-CT.  3. Previously seen hypermetabolic pre-vascular lymph node is decreased in size and is no longer hypermetabolic.       Pathology:  03/22/2022 CT-guided biopsy of the right lung mass: invasive squamous cell carcinoma, moderately differentiated.  5/24/2022: Right upper lobectomy:  1- LYMPH NODE, LUMBAR RIGHT 10 LYMPHADENECTOMY: NEGATIVE FOR METASTATIC CARCINOMA (0/1).   2- LYMPH NODE, 11R, LYMPHADENECTOMY: NEGATIVE FOR METASTATIC CARCINOMA (0/1).  3- LYMPH NODE, 9R, LYMPHADENECTOMY: NEGATIVE FOR METASTATIC CARCINOMA (0/1).   4- LYMPH NODE, 7R, LYMPHADENECTOMY: NEGATIVE FOR METASTATIC CARCINOMA (0/1).   5- LYMPH NODE, 8R, LYMPHADENECTOMY: ONE LYMPH NODE NEGATIVE FOR METASTATIC CARCINOMA (0/1).    6- LYMPH NODE, 12R, LYMPHADENECTOMY: NEGATIVE FOR METASTATIC CARCINOMA (0/1).  7- LUNG, RIGHT UPPER AND MIDDLE LOBES, PNEUMONECTOMY:  POORLY DIFFERENTIATED, G3, SQUAMOUS CELL CARCINOMA, INVASIVE.    - TUMOR SIZE: 3 CM.    -  LYMPHOVASCULAR INVASION IS PRESENT.    - MARGINS NEGATIVE FOR INVASIVE CARCINOMA:    - NEAREST MARGIN, VASCULAR / BRONCHIAL 2.5 CM.    - NO PLEURAL SURFACE INVOLVEMENT     - PROMINENT NECROSIS PRESENT.  Visceral Pleura Invasion: Not identified  Number of Lymph Nodes Examined: Exact number : 6  pT Category: pT1c: pN0: No regional lymph node metastasis    3/8/2023 LYMPH NODE BIOPSY:   LYMPH NODE 4R, LYMPHADENECTOMY:  METASTATIC SQUAMOUS CELL CARCINOMA, NONKERATINIZING, MULTIPLE FRAGMENTS.       IMMUNOHISTOCHEMICAL STAINS:   CK 5/6, P63 AND CK7:  POSITIVE IN MALIGNANT CELLS.   CK20 AND TTF-1:  NEGATIVE IN MALIGNANT CELLS.       CLINICAL HISTORY:       Patient: Leonila Rosales is a 78 y.o. male kindly referred by  Dr. Diaz for evaluation of lung cancer.    On 01/17/2022 patient presented to the emergency department after a fall.  He reports that he was getting to his truck and sleep and fell on his left side on the truck step rail.  He started having pain in his left hip and knee.  He underwent a CT angiogram that showed No pulmonary embolus. Right upper lobe 2.5 cm mass.      During that hospitalization he was treated for a close intertrochanteric fracture of the left femur and underwent open reduction intramedullary nailing left comminuted intertrochanteric hip fracture on 01/18/2022 with Dr. Fortino Palomares.  He then spent several weeks on rehab.    On 03/22/2022 he underwent CT-guided biopsy of the right lung mass.  Pathology showed invasive squamous cell carcinoma, moderately differentiated.    He is s/p right upper lobectomy with  on 5/24/2022.  Pathology report as above.  Patient is stage IA3 squamous cell carcinoma of the lung.  He has recovered well and has been doing good.     Patient was started on surveillance. CT 9/19/2022 with 1.6 right paratracheal LN and small Rt pleural effusion. Surveillance CT scan chest to eval stability 1/25/2023 with paratracheal enlarged lymph node which shows interval mild  size increase and now measures 2.2 x 2.0 cm and previously was 1.6 x 1.5 cm.  Aortopulmonary window mildly enlarged lymph node is stable. PET CT 2/9/2023 with Hypermetabolic right paratracheal lymph node 25 x 20 mm with max SUV 27.0. Hypermetabolic anterior mediastinal lymph node anterior to the SVC measures 12 x 9 mm with max SUV 12.0.   Biopsy of R4 lymph node confirmed the metastatic squamous cell carcinoma. Completed  XRT on 5/30/23  and 5 cycles of Carbo/taxol on 5/19/23. C6 was held on 5/26/23 due to neutropenia, ANC was 0.7.    Patient was started on chemoradiation. -completed XRT on 5/30/23 and 5 cycles of weekly carbo/taxol on 5/19/23, His final cycle was held due to neutropenia, ANC was 0.7.  He was then started on maintenance Imfinzi every 4 weeks on 6/22/23    Chief Complaint: 4 Week Follow Up      Interval History:  He lives alone and is able to perform ADL's. He uses a walker to aid in ambulation. He quit smoking January 2022, after smoking for 40 yrs.     He completed XRT to the mediastinum on 5/30/23 and 5 cycles of weekly carbo/taxol on 5/19/23. He is on maintenance durvalumab, started 6/22/2023 and tolerating well.     In the interim, he saw Dr. Brown in reference to the concerning hypermetabolic lymph node. She can not radiate that area again .      Past Medical History:   Diagnosis Date    Anemia     Closed intertrochanteric fracture     Deficiency of macronutrients     GERD (gastroesophageal reflux disease)     H. pylori infection     History of tobacco use     Hyperlipidemia     Hypertension     Lung mass     Malignant neoplasm of unspecified part of unspecified bronchus or lung     Non-small cell lung cancer       Past Surgical History:   Procedure Laterality Date    BIOPSY OF INTESTINE  04/04/2022    BRONCHOSCOPY      COLONOSCOPY      ESOPHAGOGASTRODUODENOSCOPY  04/04/2022    HIP FRACTURE SURGERY Left 2016    Intramedullary Nail Insertion Hip Left 01/18/2022    KIDNEY SURGERY Right  05/27/2022    MEDIASTINOSCOPY N/A 3/6/2023    Procedure: MEDIASTINOSCOPY;  Surgeon: Jose Diaz MD;  Location: [facility] OR;  Service: Cardiothoracic;  Laterality: N/A;  XX    PLACEMENT, MEDIPORT N/A 4/6/2023    Procedure: Placement, Mediport;  Surgeon: Jose Diaz MD;  Location: [facility] OR;  Service: Cardiology;  Laterality: N/A;    SHOULDER SURGERY       Family History   Family history unknown: Yes     Social Connections: Socially Integrated (4/12/2023)    Social Connection and Isolation Panel [NHANES]     Frequency of Communication with Friends and Family: More than three times a week     Frequency of Social Gatherings with Friends and Family: More than three times a week     Attends [employer] Services: More than 4 times per year     Active Member of Clubs or Organizations: Yes     Attends Club or Organization Meetings: More than 4 times per year     Marital Status:        Review of patient's allergies indicates:  No Known Allergies   Current Outpatient Medications on File Prior to Visit   Medication Sig Dispense Refill    amLODIPine (NORVASC) 5 MG tablet TAKE ONE TABLET BY MOUTH EVERY DAY TWICE DAILY 180 tablet 1    aspirin 81 mg Cap Take 81 mg by mouth Daily.      atorvastatin (LIPITOR) 10 MG tablet TAKE ONE TABLET BY MOUTH DAILY 90 tablet 3    LIDOcaine-prilocaine (EMLA) cream Apply topically as needed. Apply to port site 30 mins prior to chemo 30 g 3    LINZESS 145 mcg Cap capsule Take 145 mcg by mouth daily as needed. New medication, not started yet      ondansetron (ZOFRAN) 8 MG tablet Take 1 tablet (8 mg total) by mouth every 8 (eight) hours as needed for Nausea. 1st choice for nausea 30 tablet 2    pantoprazole (PROTONIX) 40 MG tablet Take 40 mg by mouth.      prochlorperazine (COMPAZINE) 10 MG tablet Take 1 tablet (10 mg total) by mouth every 6 (six) hours as needed (for nausea). 2nd choice, can cause drowsiness. 30 tablet 3    traMADoL (ULTRAM) 50 mg tablet Take 1 tablet (50 mg total) by mouth  "every 6 (six) hours as needed for Pain. 12 tablet 0     No current facility-administered medications on file prior to visit.      Review of Systems   Constitutional:  Negative for activity change, appetite change, chills, diaphoresis, fatigue, fever and unexpected weight change.   HENT:  Negative for nasal congestion, mouth sores, nosebleeds, sinus pressure/congestion, sore throat and trouble swallowing.    Eyes: Negative.    Respiratory:  Negative for cough and shortness of breath.    Cardiovascular:  Negative for chest pain and palpitations.   Gastrointestinal:  Negative for abdominal distention, abdominal pain, blood in stool, change in bowel habit, constipation, diarrhea, nausea and vomiting.   Endocrine: Negative.    Genitourinary:  Negative for bladder incontinence, decreased urine volume, difficulty urinating, dysuria, frequency, hematuria and urgency.   Musculoskeletal:  Negative for arthralgias, back pain, gait problem, joint swelling, leg pain and myalgias.   Integumentary:  Negative for rash.   Allergic/Immunologic: Negative.    Neurological:  Negative for dizziness, tremors, syncope, weakness, light-headedness, numbness, headaches and memory loss.   Hematological:  Negative for adenopathy. Does not bruise/bleed easily.   Psychiatric/Behavioral:  Negative for agitation, confusion, hallucinations, sleep disturbance and suicidal ideas. The patient is not nervous/anxious.           Vitals:    03/11/24 0940   BP: 123/75   BP Location: Left arm   Patient Position: Sitting   Pulse: 100   Resp: 18   Temp: 97.7 °F (36.5 °C)   TempSrc: Oral   SpO2: 98%   Weight: 96.4 kg (212 lb 9.6 oz)   Height: 5' 7" (1.702 m)               Wt Readings from Last 6 Encounters:   03/11/24 96.4 kg (212 lb 9.6 oz)   03/11/24 96.4 kg (212 lb 9.6 oz)   02/12/24 97.1 kg (214 lb 1.6 oz)   02/05/24 95.7 kg (211 lb)   01/04/24 95.3 kg (210 lb)   12/07/23 95.3 kg (210 lb)     Body mass index is 33.3 kg/m².  Body surface area is 2.13 meters " squared.       Physical Exam  Vitals and nursing note reviewed.   Constitutional:       General: He is not in acute distress.     Appearance: Normal appearance. He is obese.   HENT:      Head: Normocephalic and atraumatic.      Mouth/Throat:      Mouth: Mucous membranes are moist.   Eyes:      General: No scleral icterus.     Extraocular Movements: Extraocular movements intact.      Conjunctiva/sclera: Conjunctivae normal.   Neck:      Vascular: No JVD.   Cardiovascular:      Rate and Rhythm: Normal rate and regular rhythm.      Heart sounds: No murmur heard.  Pulmonary:      Effort: Pulmonary effort is normal.      Breath sounds: Decreased breath sounds present. No wheezing or rhonchi.   Chest:      Chest wall: No tenderness.   Abdominal:      General: Bowel sounds are normal. There is no distension.      Palpations: Abdomen is soft. There is no fluid wave.      Tenderness: There is no abdominal tenderness.   Musculoskeletal:         General: No swelling or deformity.      Cervical back: Neck supple.   Lymphadenopathy:      Head:      Right side of head: No submandibular adenopathy.      Left side of head: No submandibular adenopathy.      Cervical: No cervical adenopathy.      Upper Body:      Right upper body: No supraclavicular or axillary adenopathy.      Left upper body: No supraclavicular or axillary adenopathy.      Lower Body: No right inguinal adenopathy. No left inguinal adenopathy.   Skin:     General: Skin is warm.      Coloration: Skin is not jaundiced.      Findings: No rash.   Neurological:      General: No focal deficit present.      Mental Status: He is alert and oriented to person, place, and time.      Cranial Nerves: Cranial nerves 2-12 are intact.      Comments: Mobility assitssed by cane/walker   Psychiatric:         Attention and Perception: Attention normal.         Mood and Affect: Mood and affect normal.         Behavior: Behavior is cooperative.         Cognition and Memory: Cognition  normal.         Judgment: Judgment normal.       ECOG SCORE             Laboratory:  CBC with Differential:  Lab Results   Component Value Date    WBC 4.87 03/11/2024    RBC 4.74 03/11/2024    HGB 13.5 (L) 03/11/2024    HCT 41.1 (L) 03/11/2024    MCV 86.7 03/11/2024    MCH 28.5 03/11/2024    MCHC 32.8 (L) 03/11/2024    RDW 14.4 03/11/2024     03/11/2024    MPV 9.4 03/11/2024        CMP:  Sodium Level   Date Value Ref Range Status   03/11/2024 144 136 - 145 mmol/L Final     Potassium Level   Date Value Ref Range Status   03/11/2024 4.0 3.5 - 5.1 mmol/L Final     Carbon Dioxide   Date Value Ref Range Status   03/11/2024 26 23 - 31 mmol/L Final     Blood Urea Nitrogen   Date Value Ref Range Status   03/11/2024 11.9 8.4 - 25.7 mg/dL Final     Creatinine   Date Value Ref Range Status   03/11/2024 0.91 0.73 - 1.18 mg/dL Final     Calcium Level Total   Date Value Ref Range Status   03/11/2024 9.2 8.8 - 10.0 mg/dL Final     Albumin Level   Date Value Ref Range Status   03/11/2024 3.7 3.4 - 4.8 g/dL Final     Bilirubin Total   Date Value Ref Range Status   03/11/2024 0.8 <=1.5 mg/dL Final     Alkaline Phosphatase   Date Value Ref Range Status   03/11/2024 97 40 - 150 unit/L Final     Aspartate Aminotransferase   Date Value Ref Range Status   03/11/2024 20 5 - 34 unit/L Final     Alanine Aminotransferase   Date Value Ref Range Status   03/11/2024 17 0 - 55 unit/L Final     Estimated GFR-Non    Date Value Ref Range Status   04/19/2022 >60           Assessment:       1) [id number] 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 Discussed with Dr Alba ESCOBAR and he will be seen 4/21/2024.         Plan:        Plan is for Durvalumab every 4 weeks for 1  year. Tolerating well.     Okay to proceed with Imfinzi C10 for now.   CT C/A/P every 3 months - on or after 5/6/24. I thought that he was due for scan before 4 week f/u but I see that he had PET CT on 2/6/24. He is progressing. I cancelled the order for CT C/A/P prior to f/u and ordered NGS instead.   RTC in 4 weeks with MD for further treatment options.   Labs: CBC, CMP, TSH (standing order placed) - to be drawn 1-2 days prior @ [facility]    Encourage to call or message us for any questions or problems  The patient was given ample opportunity to ask questions, and to the best of my abilities, all questions answered to satisfaction; patient demonstrated understanding of what we discussed and agreeable to the plan.     INDIA Arango

## 2024-04-04 ENCOUNTER — LAB VISIT (OUTPATIENT)
Dept: LAB | Facility: HOSPITAL | Age: 79
End: 2024-04-04
Attending: INTERNAL MEDICINE
Payer: MEDICARE

## 2024-04-04 DIAGNOSIS — C34.90 NON-SMALL CELL LUNG CANCER, UNSPECIFIED LATERALITY: ICD-10-CM

## 2024-04-04 DIAGNOSIS — T45.1X5A LEUKOPENIA DUE TO ANTINEOPLASTIC CHEMOTHERAPY: ICD-10-CM

## 2024-04-04 DIAGNOSIS — C34.91 NON-SMALL CELL CANCER OF RIGHT LUNG: ICD-10-CM

## 2024-04-04 DIAGNOSIS — R53.83 FATIGUE, UNSPECIFIED TYPE: ICD-10-CM

## 2024-04-04 DIAGNOSIS — Z79.899 ON ANTINEOPLASTIC CHEMOTHERAPY: ICD-10-CM

## 2024-04-04 DIAGNOSIS — D70.1 LEUKOPENIA DUE TO ANTINEOPLASTIC CHEMOTHERAPY: ICD-10-CM

## 2024-04-04 DIAGNOSIS — E78.5 HYPERLIPIDEMIA, UNSPECIFIED HYPERLIPIDEMIA TYPE: ICD-10-CM

## 2024-04-04 DIAGNOSIS — Z51.11 CHEMOTHERAPY MANAGEMENT, ENCOUNTER FOR: ICD-10-CM

## 2024-04-04 LAB
ALBUMIN SERPL-MCNC: 3.5 G/DL (ref 3.4–4.8)
ALBUMIN/GLOB SERPL: 0.8 RATIO (ref 1.1–2)
ALP SERPL-CCNC: 104 UNIT/L (ref 40–150)
ALT SERPL-CCNC: 11 UNIT/L (ref 0–55)
AST SERPL-CCNC: 15 UNIT/L (ref 5–34)
BASOPHILS # BLD AUTO: 0.02 X10(3)/MCL
BASOPHILS NFR BLD AUTO: 0.3 %
BILIRUB SERPL-MCNC: 1 MG/DL
BUN SERPL-MCNC: 7.9 MG/DL (ref 8.4–25.7)
CALCIUM SERPL-MCNC: 9.4 MG/DL (ref 8.8–10)
CHLORIDE SERPL-SCNC: 106 MMOL/L (ref 98–107)
CO2 SERPL-SCNC: 25 MMOL/L (ref 23–31)
CREAT SERPL-MCNC: 0.86 MG/DL (ref 0.73–1.18)
EOSINOPHIL # BLD AUTO: 0.21 X10(3)/MCL (ref 0–0.9)
EOSINOPHIL NFR BLD AUTO: 2.8 %
ERYTHROCYTE [DISTWIDTH] IN BLOOD BY AUTOMATED COUNT: 14.9 % (ref 11.5–17)
GFR SERPLBLD CREATININE-BSD FMLA CKD-EPI: >60 MLS/MIN/1.73/M2
GLOBULIN SER-MCNC: 4.2 GM/DL (ref 2.4–3.5)
GLUCOSE SERPL-MCNC: 103 MG/DL (ref 82–115)
HCT VFR BLD AUTO: 42.2 % (ref 42–52)
HGB BLD-MCNC: 13.5 G/DL (ref 14–18)
IMM GRANULOCYTES # BLD AUTO: 0.02 X10(3)/MCL (ref 0–0.04)
IMM GRANULOCYTES NFR BLD AUTO: 0.3 %
LYMPHOCYTES # BLD AUTO: 1.33 X10(3)/MCL (ref 0.6–4.6)
LYMPHOCYTES NFR BLD AUTO: 17.4 %
MCH RBC QN AUTO: 28 PG (ref 27–31)
MCHC RBC AUTO-ENTMCNC: 32 G/DL (ref 33–36)
MCV RBC AUTO: 87.6 FL (ref 80–94)
MONOCYTES # BLD AUTO: 0.83 X10(3)/MCL (ref 0.1–1.3)
MONOCYTES NFR BLD AUTO: 10.9 %
NEUTROPHILS # BLD AUTO: 5.22 X10(3)/MCL (ref 2.1–9.2)
NEUTROPHILS NFR BLD AUTO: 68.3 %
PLATELET # BLD AUTO: 205 X10(3)/MCL (ref 130–400)
PMV BLD AUTO: 9.6 FL (ref 7.4–10.4)
POTASSIUM SERPL-SCNC: 3.6 MMOL/L (ref 3.5–5.1)
PROT SERPL-MCNC: 7.7 GM/DL (ref 5.8–7.6)
RBC # BLD AUTO: 4.82 X10(6)/MCL (ref 4.7–6.1)
SODIUM SERPL-SCNC: 141 MMOL/L (ref 136–145)
TSH SERPL-ACNC: 0.47 UIU/ML (ref 0.35–4.94)
WBC # SPEC AUTO: 7.63 X10(3)/MCL (ref 4.5–11.5)

## 2024-04-04 PROCEDURE — 80053 COMPREHEN METABOLIC PANEL: CPT

## 2024-04-04 PROCEDURE — 80061 LIPID PANEL: CPT

## 2024-04-04 PROCEDURE — 84443 ASSAY THYROID STIM HORMONE: CPT

## 2024-04-04 PROCEDURE — 85025 COMPLETE CBC W/AUTO DIFF WBC: CPT

## 2024-04-04 PROCEDURE — 36415 COLL VENOUS BLD VENIPUNCTURE: CPT

## 2024-04-05 ENCOUNTER — TELEPHONE (OUTPATIENT)
Dept: FAMILY MEDICINE | Facility: CLINIC | Age: 79
End: 2024-04-05
Payer: MEDICARE

## 2024-04-05 LAB
CHOLEST SERPL-MCNC: 153 MG/DL
CHOLEST/HDLC SERPL: 3 {RATIO} (ref 0–5)
HDLC SERPL-MCNC: 50 MG/DL (ref 35–60)
LDLC SERPL CALC-MCNC: 88 MG/DL (ref 50–140)
TRIGL SERPL-MCNC: 73 MG/DL (ref 34–140)
VLDLC SERPL CALC-MCNC: 15 MG/DL

## 2024-04-05 NOTE — TELEPHONE ENCOUNTER
Attempted to contact patient for previsit on 4/5/24 at 8:28.  Unable to leave voicemail.  Labs completed on yesterday for Dr. Napoles.  Spoke with [facility] lab and they will add on Lipid Panel needed for upcoming visit.

## 2024-04-08 ENCOUNTER — HOSPITAL ENCOUNTER (OUTPATIENT)
Dept: RADIOLOGY | Facility: HOSPITAL | Age: 79
Discharge: HOME | End: 2024-04-08
Attending: INTERNAL MEDICINE
Payer: MEDICARE

## 2024-04-08 ENCOUNTER — OFFICE VISIT (OUTPATIENT)
Dept: HEMATOLOGY/ONCOLOGY | Facility: CLINIC | Age: 79
End: 2024-04-08
Payer: MEDICARE

## 2024-04-08 VITALS
RESPIRATION RATE: 18 BRPM | TEMPERATURE: 98 F | HEART RATE: 111 BPM | DIASTOLIC BLOOD PRESSURE: 74 MMHG | WEIGHT: 207.88 LBS | OXYGEN SATURATION: 97 % | BODY MASS INDEX: 32.63 KG/M2 | SYSTOLIC BLOOD PRESSURE: 130 MMHG | HEIGHT: 67 IN

## 2024-04-08 DIAGNOSIS — C34.91 NON-SMALL CELL CANCER OF RIGHT LUNG: Primary | ICD-10-CM

## 2024-04-08 DIAGNOSIS — R05.1 ACUTE COUGH: ICD-10-CM

## 2024-04-08 DIAGNOSIS — C34.91 NON-SMALL CELL CANCER OF RIGHT LUNG: ICD-10-CM

## 2024-04-08 PROCEDURE — 99215 OFFICE O/P EST HI 40 MIN: CPT | Mod: S$GLB,,, | Performed by: INTERNAL MEDICINE

## 2024-04-08 PROCEDURE — 1101F PT FALLS ASSESS-DOCD LE1/YR: CPT | Mod: CPTII,S$GLB,, | Performed by: INTERNAL MEDICINE

## 2024-04-08 PROCEDURE — 1157F ADVNC CARE PLAN IN RCRD: CPT | Mod: CPTII,S$GLB,, | Performed by: INTERNAL MEDICINE

## 2024-04-08 PROCEDURE — 71046 X-RAY EXAM CHEST 2 VIEWS: CPT | Mod: TC

## 2024-04-08 PROCEDURE — 1159F MED LIST DOCD IN RCRD: CPT | Mod: CPTII,S$GLB,, | Performed by: INTERNAL MEDICINE

## 2024-04-08 PROCEDURE — 99999 PR PBB SHADOW E&M-EST. PATIENT-LVL IV: CPT | Mod: PBBFAC,,, | Performed by: INTERNAL MEDICINE

## 2024-04-08 PROCEDURE — 3288F FALL RISK ASSESSMENT DOCD: CPT | Mod: CPTII,S$GLB,, | Performed by: INTERNAL MEDICINE

## 2024-04-08 PROCEDURE — 3075F SYST BP GE 130 - 139MM HG: CPT | Mod: CPTII,S$GLB,, | Performed by: INTERNAL MEDICINE

## 2024-04-08 PROCEDURE — 3078F DIAST BP <80 MM HG: CPT | Mod: CPTII,S$GLB,, | Performed by: INTERNAL MEDICINE

## 2024-04-08 PROCEDURE — 1126F AMNT PAIN NOTED NONE PRSNT: CPT | Mod: CPTII,S$GLB,, | Performed by: INTERNAL MEDICINE

## 2024-04-08 RX ORDER — ONDANSETRON HYDROCHLORIDE 2 MG/ML
8 INJECTION, SOLUTION INTRAVENOUS
Status: CANCELLED | OUTPATIENT
Start: 2024-04-24

## 2024-04-08 RX ORDER — SODIUM CHLORIDE 0.9 % (FLUSH) 0.9 %
10 SYRINGE (ML) INJECTION
Status: CANCELLED | OUTPATIENT
Start: 2024-04-24

## 2024-04-08 RX ORDER — HEPARIN 100 UNIT/ML
500 SYRINGE INTRAVENOUS
Status: CANCELLED | OUTPATIENT
Start: 2024-04-24

## 2024-04-08 RX ORDER — HEPARIN 100 UNIT/ML
500 SYRINGE INTRAVENOUS
Status: CANCELLED | OUTPATIENT
Start: 2024-04-17

## 2024-04-08 RX ORDER — SODIUM CHLORIDE 0.9 % (FLUSH) 0.9 %
10 SYRINGE (ML) INJECTION
Status: CANCELLED | OUTPATIENT
Start: 2024-04-17

## 2024-04-08 RX ORDER — ONDANSETRON HYDROCHLORIDE 2 MG/ML
8 INJECTION, SOLUTION INTRAVENOUS
Status: CANCELLED | OUTPATIENT
Start: 2024-04-17

## 2024-04-08 NOTE — PROGRESS NOTES
HEMATOLOGY/ONCOLOGY OFFICE CLINIC VISIT    Visit Information:    Initial Evaluation: 6/27/2022  Referring Provider: Dr Diaz  Other providers: Dr Palomares  Code status: Not addressed    Diagnosis:  1) [id number] Stage IA3 Squamous cell carcinoma of lung  2) recurrence 3/6/23  3) Thrombocytopenia    Present treatment:  Gemzar planned to be started 4/17/2024       Treatment/Oncogy history:  -5/24/2022 right upper lobectomy   -Local Recurrence 03/08/2023  -completed XRT on 5/30/23 and 5 cycles of weekly carbo/taxol on 5/19/23   Mediastinum: 5,000 cGy/200 cGy per fx (4/18/2023-5/23/2023)   Med Bst:        1,000 cGy/200 cGy per fx (5/24/2023-5/30/2023)  -Imfinzi every 4 weeks x 10 cycles (6/22/23-3/11/2024)--> progression      Imaging:  CT angiogram 1/17/2022: No pulmonary embolus. Right upper lobe 2.5 cm mass.  CT C 9/19/2022: Status post right partial pneumonectomy for resection of a right upper lobe lung mass with no residual recurrent mass seen. Small right-sided pleural effusion. Some lymphadenopathy in the right paratracheal region with a maximum short axis dimension of 1.6 cm.  Follow-up is recommended  CT C 1/25/2023: There is a paratracheal enlarged lymph node which shows interval mild size increase and now measures 2.2 x 2.0 cm and previously was 1.6 x 1.5 cm.  Aortopulmonary window mildly enlarged lymph node is stable.  Thoracic aorta is without aneurysmal dilatation or dissection.  Impression: 1. Operative changes of right upper lobectomy without bronchialstump soft tissue proliferation    2. No residual tumor load or metastatic nodule. 3. Paratracheal enlarged lymph node with interval size increase.  PET CT 2/9/2023:  Hypermetabolic right paratracheal lymph node image 81 series 3 measures 25 x 20 mm with max SUV 27.0.  Hypermetabolic anterior mediastinal lymph node anterior to the SVC measures 12 x 9 mm with max SUV 12.0. Impression: 1. Hypermetabolic metastatic mediastinal adenopathy.   2. No PET evidence of  metastatic disease outside of the mediastinum.  CT C/A/P 7/10/2023:  1. Several new subcentimeter nodules in the right lung.  Suspect these are infectious or inflammatory in nature, but 3 month follow-up chest CT recommended to ensure resolution.  2. 2 reference mediastinal lymph nodes are smaller since January.  3. L1 vertebral body compression deformity new since January, but appears subacute.  CT C/A/P 10/10/2023:    1. Slightly larger mediastinal lymph nodes, to be followed.  2. Increased irregular right perihilar consolidation which may be treatment related.  3. Small right pleural effusion.  4. No new suspicious findings in the abdomen or pelvis.  CT C/A/P 1/10/2024:    1. Interval enlargement of mediastinal lymph nodes  2. Similar right perihilar consolidative opacity and small right pleural effusion  PET CT 2/6/2024:  1. New, enlarged hypermetabolic superior paratracheal and para-aortic/subaortic lymph nodes compared to prior PET-CT  2. Persistent enlarged and hypermetabolic lower right paratracheal lymph node.  This lymph node is decreased in size and FDG avidity compared to prior PET-CT.  3. Previously seen hypermetabolic pre-vascular lymph node is decreased in size and is no longer hypermetabolic.       Pathology:  03/22/2022 CT-guided biopsy of the right lung mass: invasive squamous cell carcinoma, moderately differentiated.  5/24/2022: Right upper lobectomy:  1- LYMPH NODE, LUMBAR RIGHT 10 LYMPHADENECTOMY: NEGATIVE FOR METASTATIC CARCINOMA (0/1).   2- LYMPH NODE, 11R, LYMPHADENECTOMY: NEGATIVE FOR METASTATIC CARCINOMA (0/1).  3- LYMPH NODE, 9R, LYMPHADENECTOMY: NEGATIVE FOR METASTATIC CARCINOMA (0/1).   4- LYMPH NODE, 7R, LYMPHADENECTOMY: NEGATIVE FOR METASTATIC CARCINOMA (0/1).   5- LYMPH NODE, 8R, LYMPHADENECTOMY: ONE LYMPH NODE NEGATIVE FOR METASTATIC CARCINOMA (0/1).    6- LYMPH NODE, 12R, LYMPHADENECTOMY: NEGATIVE FOR METASTATIC CARCINOMA (0/1).  7- LUNG, RIGHT UPPER AND MIDDLE LOBES,  PNEUMONECTOMY:  POORLY DIFFERENTIATED, G3, SQUAMOUS CELL CARCINOMA, INVASIVE.    - TUMOR SIZE: 3 CM.    - LYMPHOVASCULAR INVASION IS PRESENT.    - MARGINS NEGATIVE FOR INVASIVE CARCINOMA:    - NEAREST MARGIN, VASCULAR / BRONCHIAL 2.5 CM.    - NO PLEURAL SURFACE INVOLVEMENT     - PROMINENT NECROSIS PRESENT.  Visceral Pleura Invasion: Not identified  Number of Lymph Nodes Examined: Exact number : 6  pT Category: pT1c: pN0: No regional lymph node metastasis    3/8/2023 LYMPH NODE BIOPSY:   LYMPH NODE 4R, LYMPHADENECTOMY:  METASTATIC SQUAMOUS CELL CARCINOMA, NONKERATINIZING, MULTIPLE FRAGMENTS.       IMMUNOHISTOCHEMICAL STAINS:   CK 5/6, P63 AND CK7:  POSITIVE IN MALIGNANT CELLS.   CK20 AND TTF-1:  NEGATIVE IN MALIGNANT CELLS.       CLINICAL HISTORY:       Patient: Leonila Rosales is a 78 y.o. male kindly referred by  Dr. Diaz for evaluation of lung cancer.    On 01/17/2022 patient presented to the emergency department after a fall.  He reports that he was getting to his truck and sleep and fell on his left side on the truck step rail.  He started having pain in his left hip and knee.  He underwent a CT angiogram that showed No pulmonary embolus. Right upper lobe 2.5 cm mass.      During that hospitalization he was treated for a close intertrochanteric fracture of the left femur and underwent open reduction intramedullary nailing left comminuted intertrochanteric hip fracture on 01/18/2022 with Dr. Fortino Palomares.  He then spent several weeks on rehab.    On 03/22/2022 he underwent CT-guided biopsy of the right lung mass.  Pathology showed invasive squamous cell carcinoma, moderately differentiated.    He is s/p right upper lobectomy with  on 5/24/2022.  Pathology report as above.  Patient is stage IA3 squamous cell carcinoma of the lung.  He has recovered well and has been doing good.     Patient was started on surveillance. CT 9/19/2022 with 1.6 right paratracheal LN and small Rt pleural effusion. Surveillance  CT scan chest to eval stability 1/25/2023 with paratracheal enlarged lymph node which shows interval mild size increase and now measures 2.2 x 2.0 cm and previously was 1.6 x 1.5 cm.  Aortopulmonary window mildly enlarged lymph node is stable. PET CT 2/9/2023 with Hypermetabolic right paratracheal lymph node 25 x 20 mm with max SUV 27.0. Hypermetabolic anterior mediastinal lymph node anterior to the SVC measures 12 x 9 mm with max SUV 12.0.   Biopsy of R4 lymph node confirmed the metastatic squamous cell carcinoma. Completed  XRT on 5/30/23  and 5 cycles of Carbo/taxol on 5/19/23. C6 was held on 5/26/23 due to neutropenia, ANC was 0.7.    Patient was started on chemoradiation. -completed XRT on 5/30/23 and 5 cycles of weekly carbo/taxol on 5/19/23, His final cycle was held due to neutropenia, ANC was 0.7.  He was then started on maintenance Imfinzi every 4 weeks on 6/22/23    Chief Complaint: 4 Week Follow Up (PT states he been having a cough in the am for 4-5 days now.)      Interval History:  He completed XRT to the mediastinum on 5/30/23 and 5 cycles of weekly carbo/taxol on 5/19/23. He is on maintenance durvalumab, started 6/22/2023 and tolerating well. Patient last imaging with progression of paratracheal and para-aortic/subaortic lymph nodes.  He was seen by Dr. Willis but not a good candidate for radiation at this time.      He presents today for follow up for continuation of Imfinzi, C11 due today.  He saw Dr. Brown in reference to the concerning hypermetabolic lymph node. She cannot radiate that area again. Will plan to begin chemotherapy with Gemzar. He reports a cough for about 3 days, white sputum, mainly in the morning. Otherwise, he is felling good.   He lives alone and is able to perform ADL's. He uses a walker to aid in ambulation. He quit smoking January 2022, after smoking for 40 yrs.         Past Medical History:   Diagnosis Date    Anemia     Closed intertrochanteric fracture     Deficiency of  macronutrients     GERD (gastroesophageal reflux disease)     H. pylori infection     History of tobacco use     Hyperlipidemia     Hypertension     Lung mass     Malignant neoplasm of unspecified part of unspecified bronchus or lung     Non-small cell lung cancer       Past Surgical History:   Procedure Laterality Date    BIOPSY OF INTESTINE  04/04/2022    BRONCHOSCOPY      COLONOSCOPY      ESOPHAGOGASTRODUODENOSCOPY  04/04/2022    HIP FRACTURE SURGERY Left 2016    Intramedullary Nail Insertion Hip Left 01/18/2022    KIDNEY SURGERY Right 05/27/2022    MEDIASTINOSCOPY N/A 3/6/2023    Procedure: MEDIASTINOSCOPY;  Surgeon: Jose Diaz MD;  Location: [facility] OR;  Service: Cardiothoracic;  Laterality: N/A;  XX    PLACEMENT, MEDIPORT N/A 4/6/2023    Procedure: Placement, Mediport;  Surgeon: Jose Diaz MD;  Location: [facility] OR;  Service: Cardiology;  Laterality: N/A;    SHOULDER SURGERY       Family History   Family history unknown: Yes     Social Connections: Socially Integrated (4/12/2023)    Social Connection and Isolation Panel [NHANES]     Frequency of Communication with Friends and Family: More than three times a week     Frequency of Social Gatherings with Friends and Family: More than three times a week     Attends [employer] Services: More than 4 times per year     Active Member of Clubs or Organizations: Yes     Attends Club or Organization Meetings: More than 4 times per year     Marital Status:        Review of patient's allergies indicates:  No Known Allergies   Current Outpatient Medications on File Prior to Visit   Medication Sig Dispense Refill    amLODIPine (NORVASC) 5 MG tablet TAKE ONE TABLET BY MOUTH EVERY DAY TWICE DAILY 180 tablet 1    aspirin 81 mg Cap Take 81 mg by mouth Daily.      atorvastatin (LIPITOR) 10 MG tablet TAKE ONE TABLET BY MOUTH DAILY 90 tablet 3    LIDOcaine-prilocaine (EMLA) cream Apply topically as needed. Apply to port site 30 mins prior to chemo 30 g 3    LINZESS 145  mcg Cap capsule Take 145 mcg by mouth daily as needed. New medication, not started yet      ondansetron (ZOFRAN) 8 MG tablet Take 1 tablet (8 mg total) by mouth every 8 (eight) hours as needed for Nausea. 1st choice for nausea 30 tablet 2    pantoprazole (PROTONIX) 40 MG tablet Take 40 mg by mouth.      prochlorperazine (COMPAZINE) 10 MG tablet Take 1 tablet (10 mg total) by mouth every 6 (six) hours as needed (for nausea). 2nd choice, can cause drowsiness. 30 tablet 3    traMADoL (ULTRAM) 50 mg tablet Take 1 tablet (50 mg total) by mouth every 6 (six) hours as needed for Pain. 12 tablet 0     No current facility-administered medications on file prior to visit.      Review of Systems   Constitutional:  Negative for activity change, appetite change, chills, diaphoresis, fatigue, fever and unexpected weight change.   HENT:  Negative for nasal congestion, mouth sores, nosebleeds, sinus pressure/congestion, sore throat and trouble swallowing.    Eyes: Negative.    Respiratory:  Positive for cough (white sputum) and shortness of breath.    Cardiovascular:  Negative for chest pain and palpitations.   Gastrointestinal:  Negative for abdominal distention, abdominal pain, blood in stool, change in bowel habit, constipation, diarrhea, nausea and vomiting.   Endocrine: Negative.    Genitourinary:  Negative for bladder incontinence, decreased urine volume, difficulty urinating, dysuria, frequency, hematuria and urgency.   Musculoskeletal:  Negative for arthralgias, back pain, gait problem, joint swelling, leg pain and myalgias.   Integumentary:  Negative for rash.   Allergic/Immunologic: Negative.    Neurological:  Negative for dizziness, tremors, syncope, weakness, light-headedness, numbness, headaches and memory loss.   Hematological:  Negative for adenopathy. Does not bruise/bleed easily.   Psychiatric/Behavioral:  Negative for agitation, confusion, hallucinations, sleep disturbance and suicidal ideas. The patient is not  "nervous/anxious.           Vitals:    04/08/24 1004   BP: 130/74   BP Location: Left arm   Patient Position: Sitting   Pulse: (!) 111   Resp: 18   Temp: 98.4 °F (36.9 °C)   TempSrc: Oral   SpO2: 97%   Weight: 94.3 kg (207 lb 14.4 oz)   Height: 5' 7" (1.702 m)       Wt Readings from Last 6 Encounters:   04/08/24 94.3 kg (207 lb 14.4 oz)   03/11/24 96.4 kg (212 lb 9.6 oz)   03/11/24 96.4 kg (212 lb 9.6 oz)   02/12/24 97.1 kg (214 lb 1.6 oz)   02/05/24 95.7 kg (211 lb)   01/04/24 95.3 kg (210 lb)     Body mass index is 32.56 kg/m².  Body surface area is 2.11 meters squared.       Physical Exam  Vitals and nursing note reviewed.   Constitutional:       General: He is not in acute distress.     Appearance: Normal appearance. He is obese.   HENT:      Head: Normocephalic and atraumatic.      Mouth/Throat:      Mouth: Mucous membranes are moist.   Eyes:      General: No scleral icterus.     Extraocular Movements: Extraocular movements intact.      Conjunctiva/sclera: Conjunctivae normal.   Neck:      Vascular: No JVD.   Cardiovascular:      Rate and Rhythm: Normal rate and regular rhythm.      Heart sounds: No murmur heard.  Pulmonary:      Effort: Pulmonary effort is normal.      Breath sounds: Decreased breath sounds and wheezing present. No rhonchi.   Chest:      Chest wall: No tenderness.   Abdominal:      General: Bowel sounds are normal. There is no distension.      Palpations: Abdomen is soft. There is no fluid wave.      Tenderness: There is no abdominal tenderness.   Musculoskeletal:         General: No swelling or deformity.      Cervical back: Neck supple.   Lymphadenopathy:      Head:      Right side of head: No submandibular adenopathy.      Left side of head: No submandibular adenopathy.      Cervical: No cervical adenopathy.      Upper Body:      Right upper body: No supraclavicular or axillary adenopathy.      Left upper body: No supraclavicular or axillary adenopathy.      Lower Body: No right inguinal " adenopathy. No left inguinal adenopathy.   Skin:     General: Skin is warm.      Coloration: Skin is not jaundiced.      Findings: No rash.   Neurological:      General: No focal deficit present.      Mental Status: He is alert and oriented to person, place, and time.      Cranial Nerves: Cranial nerves 2-12 are intact.      Comments: Mobility assitssed by cane/walker   Psychiatric:         Attention and Perception: Attention normal.         Mood and Affect: Mood and affect normal.         Behavior: Behavior is cooperative.         Cognition and Memory: Cognition normal.         Judgment: Judgment normal.       ECOG SCORE    1 - Restricted in strenuous activity-ambulatory and able to carry out work of a light nature         Laboratory:  CBC with Differential:  Lab Results   Component Value Date    WBC 7.63 04/04/2024    RBC 4.82 04/04/2024    HGB 13.5 (L) 04/04/2024    HCT 42.2 04/04/2024    MCV 87.6 04/04/2024    MCH 28.0 04/04/2024    MCHC 32.0 (L) 04/04/2024    RDW 14.9 04/04/2024     04/04/2024    MPV 9.6 04/04/2024        CMP:  Sodium Level   Date Value Ref Range Status   04/04/2024 141 136 - 145 mmol/L Final     Potassium Level   Date Value Ref Range Status   04/04/2024 3.6 3.5 - 5.1 mmol/L Final     Carbon Dioxide   Date Value Ref Range Status   04/04/2024 25 23 - 31 mmol/L Final     Blood Urea Nitrogen   Date Value Ref Range Status   04/04/2024 7.9 (L) 8.4 - 25.7 mg/dL Final     Creatinine   Date Value Ref Range Status   04/04/2024 0.86 0.73 - 1.18 mg/dL Final     Calcium Level Total   Date Value Ref Range Status   04/04/2024 9.4 8.8 - 10.0 mg/dL Final     Albumin Level   Date Value Ref Range Status   04/04/2024 3.5 3.4 - 4.8 g/dL Final     Bilirubin Total   Date Value Ref Range Status   04/04/2024 1.0 <=1.5 mg/dL Final     Alkaline Phosphatase   Date Value Ref Range Status   04/04/2024 104 40 - 150 unit/L Final     Aspartate Aminotransferase   Date Value Ref Range Status   04/04/2024 15 5 - 34 unit/L  Final     Alanine Aminotransferase   Date Value Ref Range Status   04/04/2024 11 0 - 55 unit/L Final     Estimated GFR-Non    Date Value Ref Range Status   04/19/2022 >60           Assessment:       1) [id number] 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 Discussed with Dr Alba ESCOBAR and he will be seen 4/21/2024.     Educated the patient on the risks versus benefits as well as toxicities associated with treatment.  Verbally consented the patient to the treatment plan and the patient was educated on the planned duration of the treatment and schedule of the treatment administration.  All questions were answered.          Plan:       Patient with recurrence of disease. Not candidate for RT. We discussed again chemotherapy and he is agreeable.    Will DISCONTINUE Imfinzi due to disease progression  Plan to begin Gezmar next week - 4/17/2024 as per patient request; no education per MD  PET CT in 6 weeks (after 2 cycles of chemotherapy)  CXR today to evaluate cough - will call with results and recommendations if abnormal  RTC in 1 week with MD for TD/Infusion - he prefers Wednesday appts  Labs: CBC, CMP, TSH (standing order placed) - to be drawn 1-2 days prior @ [facility]    Encourage to call or message us for any questions or problems  The patient was given ample opportunity to ask questions, and to the best of my abilities, all questions answered to satisfaction; patient demonstrated understanding of what we discussed and agreeable to the plan.       Sanjuana Napoles MD  Hematology/Oncology      Professional Services   I, Shirley Pina LPN, acted solely as a scribe for and in the presence of Dr. Sanjuana Napoles, who performed these  services.

## 2024-04-12 ENCOUNTER — LAB VISIT (OUTPATIENT)
Dept: LAB | Facility: HOSPITAL | Age: 79
End: 2024-04-12
Attending: NURSE PRACTITIONER
Payer: MEDICARE

## 2024-04-12 ENCOUNTER — OFFICE VISIT (OUTPATIENT)
Dept: FAMILY MEDICINE | Facility: CLINIC | Age: 79
End: 2024-04-12
Payer: MEDICARE

## 2024-04-12 VITALS
RESPIRATION RATE: 16 BRPM | BODY MASS INDEX: 32.33 KG/M2 | SYSTOLIC BLOOD PRESSURE: 132 MMHG | TEMPERATURE: 98 F | WEIGHT: 206 LBS | OXYGEN SATURATION: 96 % | DIASTOLIC BLOOD PRESSURE: 74 MMHG | HEART RATE: 112 BPM | HEIGHT: 67 IN

## 2024-04-12 DIAGNOSIS — D70.1 CHEMOTHERAPY-INDUCED NEUTROPENIA: ICD-10-CM

## 2024-04-12 DIAGNOSIS — T45.1X5A CHEMOTHERAPY-INDUCED NEUTROPENIA: ICD-10-CM

## 2024-04-12 DIAGNOSIS — Z71.89 ADVANCED CARE PLANNING/COUNSELING DISCUSSION: ICD-10-CM

## 2024-04-12 DIAGNOSIS — E78.2 MIXED HYPERLIPIDEMIA: ICD-10-CM

## 2024-04-12 DIAGNOSIS — C77.1 SECONDARY AND UNSPECIFIED MALIGNANT NEOPLASM OF INTRATHORACIC LYMPH NODES: ICD-10-CM

## 2024-04-12 DIAGNOSIS — Z29.11 NEED FOR RSV IMMUNIZATION: ICD-10-CM

## 2024-04-12 DIAGNOSIS — I10 PRIMARY HYPERTENSION: ICD-10-CM

## 2024-04-12 DIAGNOSIS — C34.91 NON-SMALL CELL CANCER OF RIGHT LUNG: ICD-10-CM

## 2024-04-12 DIAGNOSIS — Z00.00 MEDICARE ANNUAL WELLNESS VISIT, SUBSEQUENT: Primary | ICD-10-CM

## 2024-04-12 DIAGNOSIS — R00.0 TACHYCARDIA: ICD-10-CM

## 2024-04-12 DIAGNOSIS — R79.89 LOW TSH LEVEL: ICD-10-CM

## 2024-04-12 DIAGNOSIS — R53.83 FATIGUE, UNSPECIFIED TYPE: ICD-10-CM

## 2024-04-12 LAB
ALBUMIN SERPL-MCNC: 3.1 G/DL (ref 3.4–4.8)
ALBUMIN/GLOB SERPL: 0.7 RATIO (ref 1.1–2)
ALP SERPL-CCNC: 101 UNIT/L (ref 40–150)
ALT SERPL-CCNC: 15 UNIT/L (ref 0–55)
AST SERPL-CCNC: 17 UNIT/L (ref 5–34)
BASOPHILS # BLD AUTO: 0.02 X10(3)/MCL
BASOPHILS NFR BLD AUTO: 0.3 %
BILIRUB SERPL-MCNC: 0.6 MG/DL
BUN SERPL-MCNC: 8.5 MG/DL (ref 8.4–25.7)
CALCIUM SERPL-MCNC: 9.4 MG/DL (ref 8.8–10)
CHLORIDE SERPL-SCNC: 105 MMOL/L (ref 98–107)
CO2 SERPL-SCNC: 28 MMOL/L (ref 23–31)
CREAT SERPL-MCNC: 1.01 MG/DL (ref 0.73–1.18)
EOSINOPHIL # BLD AUTO: 0.19 X10(3)/MCL (ref 0–0.9)
EOSINOPHIL NFR BLD AUTO: 3 %
ERYTHROCYTE [DISTWIDTH] IN BLOOD BY AUTOMATED COUNT: 14.4 % (ref 11.5–17)
GFR SERPLBLD CREATININE-BSD FMLA CKD-EPI: >60 MLS/MIN/1.73/M2
GLOBULIN SER-MCNC: 4.4 GM/DL (ref 2.4–3.5)
GLUCOSE SERPL-MCNC: 102 MG/DL (ref 82–115)
HCT VFR BLD AUTO: 40.1 % (ref 42–52)
HGB BLD-MCNC: 13 G/DL (ref 14–18)
IMM GRANULOCYTES # BLD AUTO: 0.03 X10(3)/MCL (ref 0–0.04)
IMM GRANULOCYTES NFR BLD AUTO: 0.5 %
LYMPHOCYTES # BLD AUTO: 0.71 X10(3)/MCL (ref 0.6–4.6)
LYMPHOCYTES NFR BLD AUTO: 11.2 %
MCH RBC QN AUTO: 28.1 PG (ref 27–31)
MCHC RBC AUTO-ENTMCNC: 32.4 G/DL (ref 33–36)
MCV RBC AUTO: 86.8 FL (ref 80–94)
MONOCYTES # BLD AUTO: 0.54 X10(3)/MCL (ref 0.1–1.3)
MONOCYTES NFR BLD AUTO: 8.5 %
NEUTROPHILS # BLD AUTO: 4.83 X10(3)/MCL (ref 2.1–9.2)
NEUTROPHILS NFR BLD AUTO: 76.5 %
PLATELET # BLD AUTO: 268 X10(3)/MCL (ref 130–400)
PMV BLD AUTO: 9.1 FL (ref 7.4–10.4)
POTASSIUM SERPL-SCNC: 3.5 MMOL/L (ref 3.5–5.1)
PROT SERPL-MCNC: 7.5 GM/DL (ref 5.8–7.6)
RBC # BLD AUTO: 4.62 X10(6)/MCL (ref 4.7–6.1)
SODIUM SERPL-SCNC: 143 MMOL/L (ref 136–145)
TSH SERPL-ACNC: 0.08 UIU/ML (ref 0.35–4.94)
WBC # SPEC AUTO: 6.32 X10(3)/MCL (ref 4.5–11.5)

## 2024-04-12 PROCEDURE — 3078F DIAST BP <80 MM HG: CPT | Mod: CPTII,,, | Performed by: NURSE PRACTITIONER

## 2024-04-12 PROCEDURE — 3075F SYST BP GE 130 - 139MM HG: CPT | Mod: CPTII,,, | Performed by: NURSE PRACTITIONER

## 2024-04-12 PROCEDURE — 3288F FALL RISK ASSESSMENT DOCD: CPT | Mod: CPTII,,, | Performed by: NURSE PRACTITIONER

## 2024-04-12 PROCEDURE — 84443 ASSAY THYROID STIM HORMONE: CPT

## 2024-04-12 PROCEDURE — G0439 PPPS, SUBSEQ VISIT: HCPCS | Mod: ,,, | Performed by: NURSE PRACTITIONER

## 2024-04-12 PROCEDURE — 1160F RVW MEDS BY RX/DR IN RCRD: CPT | Mod: CPTII,,, | Performed by: NURSE PRACTITIONER

## 2024-04-12 PROCEDURE — 85025 COMPLETE CBC W/AUTO DIFF WBC: CPT

## 2024-04-12 PROCEDURE — 80053 COMPREHEN METABOLIC PANEL: CPT

## 2024-04-12 PROCEDURE — 1123F ACP DISCUSS/DSCN MKR DOCD: CPT | Mod: CPTII,,, | Performed by: NURSE PRACTITIONER

## 2024-04-12 PROCEDURE — 1159F MED LIST DOCD IN RCRD: CPT | Mod: CPTII,,, | Performed by: NURSE PRACTITIONER

## 2024-04-12 PROCEDURE — 1125F AMNT PAIN NOTED PAIN PRSNT: CPT | Mod: CPTII,,, | Performed by: NURSE PRACTITIONER

## 2024-04-12 PROCEDURE — 1101F PT FALLS ASSESS-DOCD LE1/YR: CPT | Mod: CPTII,,, | Performed by: NURSE PRACTITIONER

## 2024-04-12 PROCEDURE — 36415 COLL VENOUS BLD VENIPUNCTURE: CPT

## 2024-04-12 RX ORDER — RESPIRATORY SYNCYTIAL VISUS VACCINE RECOMBINANT, ADJUVANTED 120MCG/0.5
0.5 KIT INTRAMUSCULAR ONCE
Qty: 0.5 ML | Refills: 0 | Status: SHIPPED | OUTPATIENT
Start: 2024-04-12 | End: 2024-04-12

## 2024-04-12 RX ORDER — METOPROLOL SUCCINATE 25 MG/1
25 TABLET, EXTENDED RELEASE ORAL DAILY
Qty: 90 TABLET | Refills: 3 | Status: SHIPPED | OUTPATIENT
Start: 2024-04-12 | End: 2025-04-12

## 2024-04-12 NOTE — ASSESSMENT & PLAN NOTE
Oncology records extensively reviewed, will be starting new chemotherapy next week.  Continue management per Oncology.

## 2024-04-12 NOTE — ASSESSMENT & PLAN NOTE
Patient still with persistent tachycardia, he denies any palpitations or chest pain.  Will get him started on metoprolol XL 25 mg daily and follow-up in a month.

## 2024-04-12 NOTE — ASSESSMENT & PLAN NOTE
Will, total cholesterol 153, LDL 88.  Continue atorvastatin 10 mg daily, follow-up with repeat lipid panel in 1 year.

## 2024-04-12 NOTE — PROGRESS NOTES
Patient ID: [id number]     Chief Complaint: Medicare AWV      HPI:     Leonila Rosales is a 78 y.o. male here today for a Medicare Wellness. No other complaints today.       ----------------------------  Anemia  Closed intertrochanteric fracture  Deficiency of macronutrients  GERD (gastroesophageal reflux disease)  H. pylori infection  History of tobacco use  Hyperlipidemia  Hypertension  Lung mass  Malignant neoplasm of unspecified part of unspecified bronchus or lung  Non-small cell lung cancer     Past Surgical History:   Procedure Laterality Date    BIOPSY OF INTESTINE  04/04/2022    BRONCHOSCOPY      COLONOSCOPY      ESOPHAGOGASTRODUODENOSCOPY  04/04/2022    HIP FRACTURE SURGERY Left 2016    Intramedullary Nail Insertion Hip Left 01/18/2022    KIDNEY SURGERY Right 05/27/2022    MEDIASTINOSCOPY N/A 3/6/2023    Procedure: MEDIASTINOSCOPY;  Surgeon: Jose Diaz MD;  Location: [facility];  Service: Cardiothoracic;  Laterality: N/A;  XX    PLACEMENT, MEDIPORT N/A 4/6/2023    Procedure: Placement, Mediport;  Surgeon: Jose Diaz MD;  Location: [facility] OR;  Service: Cardiology;  Laterality: N/A;    SHOULDER SURGERY         Review of patient's allergies indicates:  No Known Allergies    Outpatient Medications Marked as Taking for the 4/12/24 encounter (Office Visit) with Crystal Alicia FNP   Medication Sig Dispense Refill    amLODIPine (NORVASC) 5 MG tablet TAKE ONE TABLET BY MOUTH EVERY DAY TWICE DAILY 180 tablet 1    aspirin 81 mg Cap Take 81 mg by mouth Daily.      atorvastatin (LIPITOR) 10 MG tablet TAKE ONE TABLET BY MOUTH DAILY 90 tablet 3    LIDOcaine-prilocaine (EMLA) cream Apply topically as needed. Apply to port site 30 mins prior to chemo 30 g 3    LINZESS 145 mcg Cap capsule Take 145 mcg by mouth daily as needed. New medication, not started yet      pantoprazole (PROTONIX) 40 MG tablet Take 40 mg by mouth.         Social History     Socioeconomic History    Marital status:    Tobacco Use     Smoking status: Former     Current packs/day: 0.00     Types: Cigarettes     Quit date: 2021     Years since quittin.3    Smokeless tobacco: Never   Substance and Sexual Activity    Alcohol use: Yes     Comment: rare    Drug use: Never    Sexual activity: Not Currently     Social Determinants of Health     Financial Resource Strain: Medium Risk (2023)    Overall Financial Resource Strain (CARDIA)     Difficulty of Paying Living Expenses: Somewhat hard   Food Insecurity: No Food Insecurity (2023)    Hunger Vital Sign     Worried About Running Out of Food in the Last Year: Never true     Ran Out of Food in the Last Year: Never true   Transportation Needs: No Transportation Needs (2023)    PRAPARE - Transportation     Lack of Transportation (Medical): No     Lack of Transportation (Non-Medical): No   Physical Activity: Insufficiently Active (2023)    Exercise Vital Sign     Days of Exercise per Week: 3 days     Minutes of Exercise per Session: 20 min   Stress: Stress Concern Present (2023)    [employer] [employer] - Occupational Stress Questionnaire     Feeling of Stress : To some extent   Social Connections: Socially Integrated (2023)    Social Connection and Isolation Panel [NHANES]     Frequency of Communication with Friends and Family: More than three times a week     Frequency of Social Gatherings with Friends and Family: More than three times a week     Attends Protestant Services: More than 4 times per year     Active Member of Clubs or Organizations: Yes     Attends Club or Organization Meetings: More than 4 times per year     Marital Status:    Housing Stability: Low Risk  (2023)    Housing Stability Vital Sign     Unable to Pay for Housing in the Last Year: No     Number of Places Lived in the Last Year: 1     Unstable Housing in the Last Year: No        Family History   Family history unknown: Yes        Patient Care Team:  Maral  NIDIA Tanner as PCP - General (Nurse Practitioner)  Sanjuana Napoles MD as Consulting Physician (Hematology and Oncology)  Keiko Linda FNP as Nurse Practitioner (Oncology)  Sanjuana Napoles MD as Consulting Physician (Hematology and Oncology)  Carlos Manuel Suazo MD as Consulting Physician (Cardiology)       Subjective:     Review of Systems   Constitutional: Negative.    HENT: Negative.     Eyes: Negative.    Respiratory: Negative.     Cardiovascular: Negative.    Gastrointestinal: Negative.    Genitourinary: Negative.    Musculoskeletal: Negative.    Skin: Negative.    Neurological: Negative.    Endo/Heme/Allergies: Negative.    Psychiatric/Behavioral: Negative.     All other systems reviewed and are negative.        Patient Reported Health Risk Assessment  What [age] 70-79  Are you male or female?: Male  During the past four weeks, how much have you been bothered by emotional problems such as feeling anxious, depressed, irritable, sad, or downhearted and blue?: Not at all  During the past five weeks, has your physical and/or emotional health limited your social activities with family, friends, neighbors, or groups?: Not at all  During the past four weeks, how much bodily pain have you generally had?: Mild pain  During the past four weeks, was someone available to help if you needed and wanted help?: Yes, as much as I wanted  During the past four weeks, what was the hardest physical activity you could do for at least two minutes?: Light  Can you get to places out of walking distance without help?  (For example, can you travel alone on buses or taxis, or drive your own car?): Yes  Can you go shopping for groceries or clothes without someone's help?: Yes  Can you prepare your own meals?: Yes  Can you do your own housework without help?: Yes  Because of any health problems, do you need the help of another person with your personal care needs such as eating, bathing, dressing, or getting around the  house?: No  Can you handle your own money without help?: Yes  During the past four weeks, how would you rate your health in general?: Good  How have things been going for you during the past four weeks?: Pretty well  Are you having difficulties driving your car?: No  Do you always fasten your seat belt when you are in a car?: Yes, usually  How often in the past four weeks have you been bothered by falling or dizzy when standing up?: Never  How often in the past four weeks have you been bothered by sexual problems?: Never  How often in the past four weeks have you been bothered by trouble eating well?: Never  How often in the past four weeks have you been bothered by teeth or denture problems?: Never  How often in the past four weeks have you been bothered with problems using the telephone?: Never  How often in the past four weeks have you been bothered by tiredness or fatigue?: Never  Have you fallen two or more times in the past year?: Yes  Are you afraid of falling?: No  Are you a smoker?: No  During the past four weeks, how many drinks of wine, beer, or other alcoholic beverages did you have?: No alcohol at all  Do you exercise for about 20 minutes three or more days a week?: Yes, some of the time  Have you been given any information to help you with hazards in your house that might hurt you?: Yes  Have you been given any information to help you with keeping track of your medications?: Yes  How often do you have trouble taking medicines the way you've been told to take them?: I always take them as prescribed  How confident are you that you can control and manage most of your health problems?: Very confident  What is your race? (Check all that apply.):     Opioid Screening: Patient medication list reviewed, patient is not taking prescription opioids. Patient is not using additional opioids than prescribed. Patient is at low risk of substance abuse based on this opioid use history.      A  "personalized 5-10 year written screening schedule and personal prevention plan has been developed using the USPSTF age appropriate recommendations and this was provided to the patient at the end of todays visit. Please see the AVS for those details     Objective:     /74 (BP Location: Left arm)   Pulse (!) 112   Temp 97.6 °F (36.4 °C) (Oral)   Resp 16   Ht 5' 7" (1.702 m)   Wt 93.4 kg (206 lb)   SpO2 96%   BMI 32.26 kg/m²     Physical Exam  Vitals and nursing note reviewed.   Constitutional:       Appearance: Normal appearance. He is obese.   HENT:      Head: Normocephalic and atraumatic.      Right Ear: Tympanic membrane, ear canal and external ear normal.      Left Ear: Tympanic membrane, ear canal and external ear normal.      Nose: Nose normal.      Mouth/Throat:      Mouth: Mucous membranes are moist.      Pharynx: Oropharynx is clear.   Eyes:      Extraocular Movements: Extraocular movements intact.      Conjunctiva/sclera: Conjunctivae normal.      Pupils: Pupils are equal, round, and reactive to light.   Cardiovascular:      Rate and Rhythm: Regular rhythm. Tachycardia present.      Pulses: Normal pulses.      Heart sounds: Normal heart sounds.   Pulmonary:      Effort: Pulmonary effort is normal.      Breath sounds: Normal breath sounds.   Abdominal:      General: Abdomen is flat. Bowel sounds are normal.      Palpations: Abdomen is soft.   Musculoskeletal:         General: Normal range of motion.      Cervical back: Normal range of motion and neck supple.      Comments: Steady gait with the assistance of cane   Skin:     General: Skin is warm and dry.   Neurological:      General: No focal deficit present.      Mental Status: He is alert and oriented to person, place, and time.   Psychiatric:         Mood and Affect: Mood normal.         Behavior: Behavior normal.         Thought Content: Thought content normal.         Judgment: Judgment normal.                No data to display          "         4/12/2024     9:40 AM 4/8/2024    10:20 AM 3/11/2024    10:00 AM 3/11/2024     9:30 AM 2/12/2024    11:00 AM 2/12/2024    10:00 AM 2/5/2024    10:40 AM   Fall Risk Assessment - Outpatient   Mobility Status Ambulatory w/ assistance Ambulatory w/ assistance Ambulatory Ambulatory w/ assistance Ambulatory Ambulatory w/ assistance Ambulatory   Number of falls 0 0 0 0 0 0 0   Identified as fall risk True True False True False True False           Depression Screening  Over the past two weeks, has the patient felt down, depressed, or hopeless?: No  Over the past two weeks, has the patient felt little interest or pleasure in doing things?: No  Functional Ability/Safety Screening  Was the patient's timed Up & Go test unsteady or longer than 30 seconds?: No  Does the patient need help with phone, transportation, shopping, preparing meals, housework, laundry, meds, or managing money?: No  Does the patient's home have rugs in the hallway, lack grab bars in the bathroom, lack handrails on the stairs or have poor lighting?: No  Have you noticed any hearing difficulties?: No  Cognitive Function (Assessed through direct observation with due consideration of information obtained by way of patient reports and/or concerns raised by family, friends, caretakers, or others)    Does the patient repeat questions/statements in the same day?: No  Does the patient have trouble remembering the date, year, and time?: No  Does the patient have difficulty managing finances?: No  Does the patient have a decreased sense of direction?: No  Assessment and Plan       ICD-10-CM ICD-9-CM   1. Medicare annual wellness visit, subsequent  Z00.00 V70.0   2. Advanced care planning/counseling discussion  Z71.89 V65.49   3. Non-small cell cancer of right lung  C34.91 162.9   4. Secondary and unspecified malignant neoplasm of intrathoracic lymph nodes  C77.1 196.1   5. Low TSH level  R79.89 794.5   6. Mixed hyperlipidemia  E78.2 272.2   7. Primary  hypertension  I10 401.9   8. Tachycardia  R00.0 785.0   9. Chemotherapy-induced neutropenia  D70.1 288.03    T45.1X5A E933.1   10. Need for RSV immunization  Z29.11 V04.82     1. Medicare annual wellness visit, subsequent  Overview:  Medicare annual wellness visit yearly in April      2. Advanced care planning/counseling discussion  Assessment & Plan:  ACP documents reviewed, no changes requested.      3. Non-small cell cancer of right lung  Overview:  01/17/2022 - fall with closed intratrochanteric fracture of the left femur, had CT chest while in emergency department and incidentally noted was right upper lobe 2.5 cm mass.    03/22/2022 CT - guided biopsy right upper lobe mass pathology showing invasive squamous cell carcinoma    05/24/2022 - right upper lobectomy with Dr. Diaz     09/27/2022 - initial visit with Dr. Napoles - recommends H&P and CT chest every 6 months for 2-3 years then H&P and low-dose noncontrast enhanced CT annually    CT C 1/25/2023: There is a paratracheal enlarged lymph node which shows interval mild size increase and now measures 2.2 x 2.0 cm and previously was 1.6 x 1.5 cm.  Impression -  Paratracheal enlarged lymph node with interval size increase.    PET CT 2/9/2023:  Hypermetabolic right paratracheal lymph node image 81 series 3 measures 25 x 20 mm with max SUV 27.0.  Hypermetabolic anterior mediastinal lymph node anterior to the SVC measures 12 x 9 mm with max SUV 12.0. Impression: 1. Hypermetabolic metastatic mediastinal adenopathy.   2. No PET evidence of metastatic disease outside of the mediastinum.    3/8/2023 LYMPH NODE BIOPSY:   LYMPH NODE 4R, LYMPHADENECTOMY:  METASTATIC SQUAMOUS CELL CARCINOMA, NONKERATINIZING, MULTIPLE FRAGMENTS.    04/06/2023 - mediport placement per Dr Diaz    04/11/2023 - oncology visit, plan to start chemotherapy + XRT    05/19/2023 - completed 5 cycles carbo/Taxol    05/30/2023 - completed XRT    06/22/2023 - started durvalumab    CT C/A/P 7/10/2023:   1. Several new subcentimeter nodules in the right lung.  Suspect these are infectious or inflammatory in nature, but 3 month follow-up chest CT recommended to ensure resolution.  2. 2 reference mediastinal lymph nodes are smaller since January.  3. L1 vertebral body compression deformity new since January, but appears subacute.  CT C/A/P 10/10/2023:    1. Slightly larger mediastinal lymph nodes, to be followed.  2. Increased irregular right perihilar consolidation which may be treatment related.  3. Small right pleural effusion.  4. No new suspicious findings in the abdomen or pelvis.  CT C/A/P 1/10/2024:    1. Interval enlargement of mediastinal lymph nodes  2. Similar right perihilar consolidative opacity and small right pleural effusion  PET CT 2/6/2024:  1. New, enlarged hypermetabolic superior paratracheal and para-aortic/subaortic lymph nodes compared to prior PET-CT  2. Persistent enlarged and hypermetabolic lower right paratracheal lymph node.  This lymph node is decreased in size and FDG avidity compared to prior PET-CT.  3. Previously seen hypermetabolic pre-vascular lymph node is decreased in size and is no longer hypermetabolic.    04/08/2024 - appointment with Dr Napoles - Patient with recurrence of disease. Not candidate for RT. We discussed again chemotherapy and he is agreeable. Will DISCONTINUE Imfinzi due to disease progression. Plan to begin Gezmar next week - 4/17/2024 as per patient request.    Assessment & Plan:  Oncology records reviewed, continue management per Oncology.      4. Secondary and unspecified malignant neoplasm of intrathoracic lymph nodes  Overview:  See overview for non small cell lung cancer     Assessment & Plan:  Oncology records extensively reviewed, will be starting new chemotherapy next week.  Continue management per Oncology.      5. Low TSH level  Overview:  Noted per oncology starting June 2023  Monitored by oncology monthly  Normal FT3 and FT4    Assessment &  Plan:  TSH stable at 0.474, continue to monitor per Oncology.      6. Mixed hyperlipidemia  Overview:  Atorvastatin 10 mg daily    Assessment & Plan:  Will, total cholesterol 153, LDL 88.  Continue atorvastatin 10 mg daily, follow-up with repeat lipid panel in 1 year.      7. Primary hypertension  Overview:  Amlodipine 5 mg daily    Assessment & Plan:  Stable, continue amlodipine 5 mg daily.  Follow-up 6 months.      8. Tachycardia  Overview:  04/12/2024 - start metoprolol XL 25 mg daily    Assessment & Plan:  Patient still with persistent tachycardia, he denies any palpitations or chest pain.  Will get him started on metoprolol XL 25 mg daily and follow-up in a month.    Orders:  -     metoprolol succinate (TOPROL-XL) 25 MG 24 hr tablet; Take 1 tablet (25 mg total) by mouth once daily.  Dispense: 90 tablet; Refill: 3    9. Chemotherapy-induced neutropenia    10. Need for RSV immunization  -     RSVPreF3 antigen-AS01E, PF, (AREXVY, PF,) 120 mcg/0.5 mL SusR vaccine; Inject 0.5 mLs into the muscle once. for 1 dose  Dispense: 0.5 mL; Refill: 0              Medicare Annual Wellness and Personalized Prevention Plan:   Fall Risk + Home Safety + Hearing Impairment + Depression Screen + Cognitive Impairment Screen + Health Risk Assessment all reviewed.       Health Maintenance Topics with due status: Not Due       Topic Last Completion Date    Lipid Panel 04/04/2024      The patient's Health Maintenance was reviewed and the following appears to be due at this time:   Health Maintenance Due   Topic Date Due    RSV Vaccine (Age 60+ and Pregnant patients) (1 - 1-dose 60+ series) Never done         Advance Care Planning     Date: 04/12/2024    Living Will  During this visit, I engaged the patient  in the voluntary advance care planning process.  The patient and I reviewed the role for advance directives and their purpose in directing future healthcare if the patient's unable to speak for him/herself.  At this point in time, the  patient does have full decision-making capacity.  We discussed different extreme health states that he could experience, and reviewed what kind of medical care he would want in those situations.  The patient communicated that if he were comatose and had little chance of a meaningful recovery, he would not want machines/life-sustaining treatments used. In addition to the above preference, other important end-of-life issues for the patient include  comfort . The patient has completed a living will to reflect these preferences.  I spent a total of 20 minutes engaging the patient in this advance care planning discussion.          Power of   I initiated the process of voluntary advance care planning today and explained the importance of this process to the patient.  I introduced the concept of advance directives to the patient, as well. Then the patient received detailed information about the importance of designating a Health Care Power of  (HCPOA). He was also instructed to communicate with this person about their wishes for future healthcare, should he become sick and lose decision-making capacity. The patient has previously appointed a HCPOA. After our discussion, the patient has decided to complete a HCPOA and has appointed his sister, health care agent:  Felecia Rosales  & health care agent number:  [phone]  I spent a total time of 20 minutes discussing this issue with the patient.             Opioid Screening: Patient medication list reviewed, patient is not taking prescription opioids. Patient is not using additional opioids than prescribed. Patient is at low risk of substance abuse based on this opioid use history.     Medication List with Changes/Refills   New Medications    METOPROLOL SUCCINATE (TOPROL-XL) 25 MG 24 HR TABLET    Take 1 tablet (25 mg total) by mouth once daily.       Start Date: 4/12/2024 End Date: 4/12/2025    RSVPREF3 ANTIGEN-AS01E, PF, (AREXVY, PF,) 120 MCG/0.5 ML SUSR  VACCINE    Inject 0.5 mLs into the muscle once. for 1 dose       Start Date: 4/12/2024 End Date: 4/12/2024   Current Medications    AMLODIPINE (NORVASC) 5 MG TABLET    TAKE ONE TABLET BY MOUTH EVERY DAY TWICE DAILY       Start Date: 12/22/2023End Date: --    ASPIRIN 81 MG CAP    Take 81 mg by mouth Daily.       Start Date: 3/22/2022 End Date: --    ATORVASTATIN (LIPITOR) 10 MG TABLET    TAKE ONE TABLET BY MOUTH DAILY       Start Date: 7/3/2023  End Date: --    LIDOCAINE-PRILOCAINE (EMLA) CREAM    Apply topically as needed. Apply to port site 30 mins prior to chemo       Start Date: 4/11/2023 End Date: --    LINZESS 145 MCG CAP CAPSULE    Take 145 mcg by mouth daily as needed. New medication, not started yet       Start Date: 3/28/2022 End Date: --    PANTOPRAZOLE (PROTONIX) 40 MG TABLET    Take 40 mg by mouth.       Start Date: 4/27/2023 End Date: --   Discontinued Medications    TRAMADOL (ULTRAM) 50 MG TABLET    Take 1 tablet (50 mg total) by mouth every 6 (six) hours as needed for Pain.       Start Date: 3/6/2023  End Date: 4/12/2024        Follow up in about 4 weeks (around 5/10/2024) for follow up tachycardia . In addition to their scheduled follow up, the patient has also been instructed to follow up on as needed basis.

## 2024-04-17 ENCOUNTER — INFUSION (OUTPATIENT)
Dept: INFUSION THERAPY | Facility: HOSPITAL | Age: 79
End: 2024-04-17
Attending: NURSE PRACTITIONER
Payer: MEDICARE

## 2024-04-17 ENCOUNTER — OFFICE VISIT (OUTPATIENT)
Dept: HEMATOLOGY/ONCOLOGY | Facility: CLINIC | Age: 79
End: 2024-04-17
Payer: MEDICARE

## 2024-04-17 VITALS
DIASTOLIC BLOOD PRESSURE: 69 MMHG | OXYGEN SATURATION: 98 % | SYSTOLIC BLOOD PRESSURE: 132 MMHG | BODY MASS INDEX: 32.42 KG/M2 | HEART RATE: 110 BPM | TEMPERATURE: 98 F | WEIGHT: 207 LBS

## 2024-04-17 VITALS
WEIGHT: 206 LBS | RESPIRATION RATE: 18 BRPM | OXYGEN SATURATION: 97 % | TEMPERATURE: 98 F | HEART RATE: 106 BPM | DIASTOLIC BLOOD PRESSURE: 78 MMHG | SYSTOLIC BLOOD PRESSURE: 124 MMHG | BODY MASS INDEX: 32.33 KG/M2 | HEIGHT: 67 IN

## 2024-04-17 DIAGNOSIS — C34.90 NON-SMALL CELL LUNG CANCER, UNSPECIFIED LATERALITY: ICD-10-CM

## 2024-04-17 DIAGNOSIS — Z79.899 ON ANTINEOPLASTIC CHEMOTHERAPY: ICD-10-CM

## 2024-04-17 DIAGNOSIS — C34.91 NON-SMALL CELL CANCER OF RIGHT LUNG: Primary | ICD-10-CM

## 2024-04-17 DIAGNOSIS — C34.00 MALIGNANT NEOPLASM OF HILUS OF LUNG, UNSPECIFIED LATERALITY: Primary | ICD-10-CM

## 2024-04-17 PROCEDURE — 99999 PR PBB SHADOW E&M-EST. PATIENT-LVL IV: CPT | Mod: PBBFAC,,, | Performed by: INTERNAL MEDICINE

## 2024-04-17 PROCEDURE — 3288F FALL RISK ASSESSMENT DOCD: CPT | Mod: CPTII,S$GLB,, | Performed by: INTERNAL MEDICINE

## 2024-04-17 PROCEDURE — 99215 OFFICE O/P EST HI 40 MIN: CPT | Mod: S$GLB,,, | Performed by: INTERNAL MEDICINE

## 2024-04-17 PROCEDURE — 25000003 PHARM REV CODE 250: Performed by: INTERNAL MEDICINE

## 2024-04-17 PROCEDURE — 1126F AMNT PAIN NOTED NONE PRSNT: CPT | Mod: CPTII,S$GLB,, | Performed by: INTERNAL MEDICINE

## 2024-04-17 PROCEDURE — 96375 TX/PRO/DX INJ NEW DRUG ADDON: CPT

## 2024-04-17 PROCEDURE — 96413 CHEMO IV INFUSION 1 HR: CPT

## 2024-04-17 PROCEDURE — 1101F PT FALLS ASSESS-DOCD LE1/YR: CPT | Mod: CPTII,S$GLB,, | Performed by: INTERNAL MEDICINE

## 2024-04-17 PROCEDURE — 1157F ADVNC CARE PLAN IN RCRD: CPT | Mod: CPTII,S$GLB,, | Performed by: INTERNAL MEDICINE

## 2024-04-17 PROCEDURE — 63600175 PHARM REV CODE 636 W HCPCS: Performed by: INTERNAL MEDICINE

## 2024-04-17 PROCEDURE — 3078F DIAST BP <80 MM HG: CPT | Mod: CPTII,S$GLB,, | Performed by: INTERNAL MEDICINE

## 2024-04-17 PROCEDURE — 1159F MED LIST DOCD IN RCRD: CPT | Mod: CPTII,S$GLB,, | Performed by: INTERNAL MEDICINE

## 2024-04-17 PROCEDURE — 3074F SYST BP LT 130 MM HG: CPT | Mod: CPTII,S$GLB,, | Performed by: INTERNAL MEDICINE

## 2024-04-17 RX ORDER — ONDANSETRON HYDROCHLORIDE 2 MG/ML
8 INJECTION, SOLUTION INTRAVENOUS
Status: COMPLETED | OUTPATIENT
Start: 2024-04-17 | End: 2024-04-17

## 2024-04-17 RX ORDER — HEPARIN 100 UNIT/ML
500 SYRINGE INTRAVENOUS
Status: DISCONTINUED | OUTPATIENT
Start: 2024-04-17 | End: 2024-04-17 | Stop reason: HOSPADM

## 2024-04-17 RX ORDER — SODIUM CHLORIDE 0.9 % (FLUSH) 0.9 %
10 SYRINGE (ML) INJECTION
Status: DISCONTINUED | OUTPATIENT
Start: 2024-04-17 | End: 2024-04-17 | Stop reason: HOSPADM

## 2024-04-17 RX ADMIN — SODIUM CHLORIDE: 9 INJECTION, SOLUTION INTRAVENOUS at 12:04

## 2024-04-17 RX ADMIN — GEMCITABINE HYDROCHLORIDE 2600 MG: 2 INJECTION, SOLUTION INTRAVENOUS at 11:04

## 2024-04-17 RX ADMIN — HEPARIN 500 UNITS: 100 SYRINGE at 12:04

## 2024-04-17 RX ADMIN — ONDANSETRON 8 MG: 2 INJECTION INTRAMUSCULAR; INTRAVENOUS at 11:04

## 2024-04-17 NOTE — PROGRESS NOTES
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 Right upper lobe 2.5 cm mass.  CT C 9/19/2022: Status post right partial pneumonectomy for resection of a right upper lobe lung mass with no residual recurrent mass seen. Small right-sided pleural effusion. Some lymphadenopathy in the right paratracheal region with a maximum short axis dimension of 1.6 cm.  Follow-up is recommended  CT C 1/25/2023: There is a paratracheal enlarged lymph node which shows interval mild size increase and now measures 2.2 x 2.0 cm and previously was 1.6 x 1.5 cm.  Aortopulmonary window mildly enlarged lymph node is stable.  Thoracic aorta is without aneurysmal dilatation or dissection.  Impression: 1. Operative changes of right upper lobectomy without bronchialstump soft tissue proliferation    2. No residual tumor load or metastatic nodule. 3. Paratracheal enlarged lymph node with interval size increase.  PET CT 2/9/2023:  Hypermetabolic right paratracheal lymph node image 81 series 3 measures 25 x 20 mm with max SUV 27.0.  Hypermetabolic anterior mediastinal lymph node anterior to the SVC measures 12 x 9 mm with max SUV 12.0. Impression: 1. Hypermetabolic metastatic mediastinal adenopathy.   2. No PET evidence of  metastatic disease outside of the mediastinum.  CT C/A/P 7/10/2023:  1. Several new subcentimeter nodules in the right lung.  Suspect these are infectious or inflammatory in nature, but 3 month follow-up chest CT recommended to ensure resolution.  2. 2 reference mediastinal lymph nodes are smaller since January.  3. L1 vertebral body compression deformity new since January, but appears subacute.  CT C/A/P 10/10/2023:    1. Slightly larger mediastinal lymph nodes, to be followed.  2. Increased irregular right perihilar consolidation which may be treatment related.  3. Small right pleural effusion.  4. No new suspicious findings in the abdomen or pelvis.  CT C/A/P 1/10/2024:    1. Interval enlargement of mediastinal lymph nodes  2. Similar right perihilar consolidative opacity and small right pleural effusion  PET CT 2/6/2024:  1. New, enlarged hypermetabolic superior paratracheal and para-aortic/subaortic lymph nodes compared to prior PET-CT  2. Persistent enlarged and hypermetabolic lower right paratracheal lymph node.  This lymph node is decreased in size and FDG avidity compared to prior PET-CT.  3. Previously seen hypermetabolic pre-vascular lymph node is decreased in size and is no longer hypermetabolic.       Pathology:  03/22/2022 CT-guided biopsy of the right lung mass: invasive squamous cell carcinoma, moderately differentiated.  5/24/2022: Right upper lobectomy:  1- LYMPH NODE, LUMBAR RIGHT 10 LYMPHADENECTOMY: NEGATIVE FOR METASTATIC CARCINOMA (0/1).   2- LYMPH NODE, 11R, LYMPHADENECTOMY: NEGATIVE FOR METASTATIC CARCINOMA (0/1).  3- LYMPH NODE, 9R, LYMPHADENECTOMY: NEGATIVE FOR METASTATIC CARCINOMA (0/1).   4- LYMPH NODE, 7R, LYMPHADENECTOMY: NEGATIVE FOR METASTATIC CARCINOMA (0/1).   5- LYMPH NODE, 8R, LYMPHADENECTOMY: ONE LYMPH NODE NEGATIVE FOR METASTATIC CARCINOMA (0/1).    6- LYMPH NODE, 12R, LYMPHADENECTOMY: NEGATIVE FOR METASTATIC CARCINOMA (0/1).  7- LUNG, RIGHT UPPER AND MIDDLE LOBES,  PNEUMONECTOMY:  POORLY DIFFERENTIATED, G3, SQUAMOUS CELL CARCINOMA, INVASIVE.    - TUMOR SIZE: 3 CM.    - LYMPHOVASCULAR INVASION IS PRESENT.    - MARGINS NEGATIVE FOR INVASIVE CARCINOMA:    - NEAREST MARGIN, VASCULAR / BRONCHIAL 2.5 CM.    - NO PLEURAL SURFACE INVOLVEMENT     - PROMINENT NECROSIS PRESENT.  Visceral Pleura Invasion: Not identified  Number of Lymph Nodes Examined: Exact number : 6  pT Category: pT1c: pN0: No regional lymph node metastasis    3/8/2023 LYMPH NODE BIOPSY:   LYMPH NODE 4R, LYMPHADENECTOMY:  METASTATIC SQUAMOUS CELL CARCINOMA, NONKERATINIZING, MULTIPLE FRAGMENTS.       IMMUNOHISTOCHEMICAL STAINS:   CK 5/6, P63 AND CK7:  POSITIVE IN MALIGNANT CELLS.   CK20 AND TTF-1:  NEGATIVE IN MALIGNANT CELLS.       CLINICAL HISTORY:       Patient: Leonila Rosales is a 78 y.o. male kindly referred by  Dr. Diaz for evaluation of lung cancer.    On 01/17/2022 patient presented to the emergency department after a fall.  He reports that he was getting to his truck and sleep and fell on his left side on the truck step rail.  He started having pain in his left hip and knee.  He underwent a CT angiogram that showed No pulmonary embolus. Right upper lobe 2.5 cm mass.      During that hospitalization he was treated for a close intertrochanteric fracture of the left femur and underwent open reduction intramedullary nailing left comminuted intertrochanteric hip fracture on 01/18/2022 with Dr. Fortino Palomares.  He then spent several weeks on rehab.    On 03/22/2022 he underwent CT-guided biopsy of the right lung mass.  Pathology showed invasive squamous cell carcinoma, moderately differentiated.    He is s/p right upper lobectomy with  on 5/24/2022.  Pathology report as above.  Patient is stage IA3 squamous cell carcinoma of the lung.  He has recovered well and has been doing good.     Patient was started on surveillance. CT 9/19/2022 with 1.6 right paratracheal LN and small Rt pleural effusion. Surveillance  CT scan chest to eval stability 1/25/2023 with paratracheal enlarged lymph node which shows interval mild size increase and now measures 2.2 x 2.0 cm and previously was 1.6 x 1.5 cm.  Aortopulmonary window mildly enlarged lymph node is stable. PET CT 2/9/2023 with Hypermetabolic right paratracheal lymph node 25 x 20 mm with max SUV 27.0. Hypermetabolic anterior mediastinal lymph node anterior to the SVC measures 12 x 9 mm with max SUV 12.0.   Biopsy of R4 lymph node confirmed the metastatic squamous cell carcinoma. Completed  XRT on 5/30/23  and 5 cycles of Carbo/taxol on 5/19/23. C6 was held on 5/26/23 due to neutropenia, ANC was 0.7.    Patient was started on chemoradiation. -completed XRT on 5/30/23 and 5 cycles of weekly carbo/taxol on 5/19/23, His final cycle was held due to neutropenia, ANC was 0.7.  He was then started on maintenance Imfinzi every 4 weeks on 6/22/23    Chief Complaint: 1 Week Follow Up      Interval History:  He completed XRT to the mediastinum on 5/30/23 and 5 cycles of weekly carbo/taxol on 5/19/23. He is on maintenance durvalumab, started 6/22/2023 and tolerating well. Patient last imaging with progression of paratracheal and para-aortic/subaortic lymph nodes.  He was seen by Dr. Willis but not a good candidate for radiation at this time.      He presents today to begin chemotherapy with Gemzar. Imfinzi was discontinued after 10 cycles due to disease progression seen on recent CT scan. He saw Dr. Brown in reference to the concerning hypermetabolic lymph node. She cannot radiate that area again. He continues with cough, white sputum, mainly in the morning. CXR ordered and did not show pneumonia. Overall, he is felling good.  He lives alone and is able to perform ADL's. He uses a walker to aid in ambulation. He quit smoking January 2022, after smoking for 40 yrs.         Past Medical History:   Diagnosis Date    Anemia     Closed intertrochanteric fracture     Deficiency of macronutrients      GERD (gastroesophageal reflux disease)     H. pylori infection     History of tobacco use     Hyperlipidemia     Hypertension     Lung mass     Malignant neoplasm of unspecified part of unspecified bronchus or lung     Non-small cell lung cancer       Past Surgical History:   Procedure Laterality Date    BIOPSY OF INTESTINE  04/04/2022    BRONCHOSCOPY      COLONOSCOPY      ESOPHAGOGASTRODUODENOSCOPY  04/04/2022    HIP FRACTURE SURGERY Left 2016    Intramedullary Nail Insertion Hip Left 01/18/2022    KIDNEY SURGERY Right 05/27/2022    MEDIASTINOSCOPY N/A 3/6/2023    Procedure: MEDIASTINOSCOPY;  Surgeon: Jose Diaz MD;  Location: [facility] OR;  Service: Cardiothoracic;  Laterality: N/A;  XX    PLACEMENT, MEDIPORT N/A 4/6/2023    Procedure: Placement, Mediport;  Surgeon: Jose Diaz MD;  Location: [facility] OR;  Service: Cardiology;  Laterality: N/A;    SHOULDER SURGERY       Family History   Family history unknown: Yes     Social Connections: Socially Integrated (4/12/2023)    Social Connection and Isolation Panel [NHANES]     Frequency of Communication with Friends and Family: More than three times a week     Frequency of Social Gatherings with Friends and Family: More than three times a week     Attends Zoroastrianism Services: More than 4 times per year     Active Member of Clubs or Organizations: Yes     Attends Club or Organization Meetings: More than 4 times per year     Marital Status:        Review of patient's allergies indicates:  No Known Allergies   Current Outpatient Medications on File Prior to Visit   Medication Sig Dispense Refill    amLODIPine (NORVASC) 5 MG tablet TAKE ONE TABLET BY MOUTH EVERY DAY TWICE DAILY 180 tablet 1    aspirin 81 mg Cap Take 81 mg by mouth Daily.      atorvastatin (LIPITOR) 10 MG tablet TAKE ONE TABLET BY MOUTH DAILY 90 tablet 3    LIDOcaine-prilocaine (EMLA) cream Apply topically as needed. Apply to port site 30 mins prior to chemo 30 g 3    LINZESS 145 mcg Cap capsule  "Take 145 mcg by mouth daily as needed. New medication, not started yet      metoprolol succinate (TOPROL-XL) 25 MG 24 hr tablet Take 1 tablet (25 mg total) by mouth once daily. 90 tablet 3    pantoprazole (PROTONIX) 40 MG tablet Take 40 mg by mouth.       No current facility-administered medications on file prior to visit.      Review of Systems   Constitutional:  Negative for activity change, appetite change, chills, diaphoresis, fatigue, fever and unexpected weight change.   HENT:  Negative for nasal congestion, mouth sores, nosebleeds, sinus pressure/congestion, sore throat and trouble swallowing.    Eyes: Negative.    Respiratory:  Positive for cough (white sputum) and shortness of breath.    Cardiovascular:  Negative for chest pain and palpitations.   Gastrointestinal:  Negative for abdominal distention, abdominal pain, blood in stool, change in bowel habit, constipation, diarrhea, nausea and vomiting.   Endocrine: Negative.    Genitourinary:  Negative for bladder incontinence, decreased urine volume, difficulty urinating, dysuria, frequency, hematuria and urgency.   Musculoskeletal:  Negative for arthralgias, back pain, gait problem, joint swelling, leg pain and myalgias.   Integumentary:  Negative for rash.   Allergic/Immunologic: Negative.    Neurological:  Negative for dizziness, tremors, syncope, weakness, light-headedness, numbness, headaches and memory loss.   Hematological:  Negative for adenopathy. Does not bruise/bleed easily.   Psychiatric/Behavioral:  Negative for agitation, confusion, hallucinations, sleep disturbance and suicidal ideas. The patient is not nervous/anxious.           Vitals:    04/17/24 1033   BP: 124/78   BP Location: Left arm   Patient Position: Sitting   Pulse: 106   Resp: 18   Temp: 98.1 °F (36.7 °C)   TempSrc: Oral   SpO2: 97%   Weight: 93.4 kg (206 lb)   Height: 5' 7" (1.702 m)         Wt Readings from Last 6 Encounters:   04/17/24 93.9 kg (207 lb)   04/17/24 93.4 kg (206 lb) "   04/12/24 93.4 kg (206 lb)   04/08/24 94.3 kg (207 lb 14.4 oz)   03/11/24 96.4 kg (212 lb 9.6 oz)   03/11/24 96.4 kg (212 lb 9.6 oz)     Body mass index is 32.26 kg/m².  Body surface area is 2.1 meters squared.       Physical Exam  Vitals and nursing note reviewed.   Constitutional:       General: He is not in acute distress.     Appearance: Normal appearance. He is obese.   HENT:      Head: Normocephalic and atraumatic.      Mouth/Throat:      Mouth: Mucous membranes are moist.   Eyes:      General: No scleral icterus.     Extraocular Movements: Extraocular movements intact.      Conjunctiva/sclera: Conjunctivae normal.   Neck:      Vascular: No JVD.   Cardiovascular:      Rate and Rhythm: Normal rate and regular rhythm.      Heart sounds: No murmur heard.  Pulmonary:      Effort: Pulmonary effort is normal.      Breath sounds: Decreased breath sounds and wheezing present. No rhonchi.   Chest:      Chest wall: No tenderness.   Abdominal:      General: Bowel sounds are normal. There is no distension.      Palpations: Abdomen is soft. There is no fluid wave.      Tenderness: There is no abdominal tenderness.   Musculoskeletal:         General: No swelling or deformity.      Cervical back: Neck supple.   Lymphadenopathy:      Head:      Right side of head: No submandibular adenopathy.      Left side of head: No submandibular adenopathy.      Cervical: No cervical adenopathy.      Upper Body:      Right upper body: No supraclavicular or axillary adenopathy.      Left upper body: No supraclavicular or axillary adenopathy.      Lower Body: No right inguinal adenopathy. No left inguinal adenopathy.   Skin:     General: Skin is warm.      Coloration: Skin is not jaundiced.      Findings: No rash.   Neurological:      General: No focal deficit present.      Mental Status: He is alert and oriented to person, place, and time.      Cranial Nerves: Cranial nerves 2-12 are intact.      Comments: Mobility assitssed by  cane/walker   Psychiatric:         Attention and Perception: Attention normal.         Mood and Affect: Mood and affect normal.         Behavior: Behavior is cooperative.         Cognition and Memory: Cognition normal.         Judgment: Judgment normal.       ECOG SCORE    1 - Restricted in strenuous activity-ambulatory and able to carry out work of a light nature         Laboratory:  CBC with Differential:  Lab Results   Component Value Date    WBC 6.32 04/12/2024    RBC 4.62 (L) 04/12/2024    HGB 13.0 (L) 04/12/2024    HCT 40.1 (L) 04/12/2024    MCV 86.8 04/12/2024    MCH 28.1 04/12/2024    MCHC 32.4 (L) 04/12/2024    RDW 14.4 04/12/2024     04/12/2024    MPV 9.1 04/12/2024        CMP:  Sodium Level   Date Value Ref Range Status   04/12/2024 143 136 - 145 mmol/L Final     Potassium Level   Date Value Ref Range Status   04/12/2024 3.5 3.5 - 5.1 mmol/L Final     Carbon Dioxide   Date Value Ref Range Status   04/12/2024 28 23 - 31 mmol/L Final     Blood Urea Nitrogen   Date Value Ref Range Status   04/12/2024 8.5 8.4 - 25.7 mg/dL Final     Creatinine   Date Value Ref Range Status   04/12/2024 1.01 0.73 - 1.18 mg/dL Final     Calcium Level Total   Date Value Ref Range Status   04/12/2024 9.4 8.8 - 10.0 mg/dL Final     Albumin Level   Date Value Ref Range Status   04/12/2024 3.1 (L) 3.4 - 4.8 g/dL Final     Bilirubin Total   Date Value Ref Range Status   04/12/2024 0.6 <=1.5 mg/dL Final     Alkaline Phosphatase   Date Value Ref Range Status   04/12/2024 101 40 - 150 unit/L Final     Aspartate Aminotransferase   Date Value Ref Range Status   04/12/2024 17 5 - 34 unit/L Final     Alanine Aminotransferase   Date Value Ref Range Status   04/12/2024 15 0 - 55 unit/L Final     Estimated GFR-Non    Date Value Ref Range Status   04/19/2022 >60           Assessment:       1) [id number] 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 Discussed with Dr Alba ESCOBAR and he will be seen 4/21/2024.     Educated the patient on the risks versus benefits as well as toxicities associated with treatment.  Verbally consented the patient to the treatment plan and the patient was educated on the planned duration of the treatment and schedule of the treatment administration.  All questions were answered.      Plan:       Patient with recurrence of disease while on Imfinizi.  Not candidate for RT. We discussed again chemotherapy and he is agreeable. Will discontinue Imfinzi and plan to begin Gemzar.    Okay to proceed with Gemzar C1D1 today  Please schedule for Gemzar C1D8 next week  PET CT in 6 weeks (after 2 cycles of chemotherapy)  RTC in 3 weeks with NP for TD/Infusion - he prefers Wednesday appts  Labs: CBC, CMP, TSH (standing order placed) - to be drawn 1-2 days prior @ [facility]    Encourage to call or message us for any questions or problems  The patient was given ample opportunity to ask questions, and to the best of my abilities, all questions answered to satisfaction; patient demonstrated understanding of what we discussed and agreeable to the plan.       Sanjuana Napoles MD  Hematology/Oncology      Professional Services   I, Shirley Pina LPN, acted solely as a scribe for and in the presence of Dr. Sanjuana Napoles, who performed these services.

## 2024-04-22 ENCOUNTER — LAB VISIT (OUTPATIENT)
Dept: LAB | Facility: HOSPITAL | Age: 79
End: 2024-04-22
Attending: INTERNAL MEDICINE
Payer: MEDICARE

## 2024-04-22 DIAGNOSIS — C34.90 NON-SMALL CELL LUNG CANCER, UNSPECIFIED LATERALITY: ICD-10-CM

## 2024-04-22 DIAGNOSIS — C34.91 NON-SMALL CELL CANCER OF RIGHT LUNG: ICD-10-CM

## 2024-04-22 DIAGNOSIS — Z51.11 CHEMOTHERAPY MANAGEMENT, ENCOUNTER FOR: ICD-10-CM

## 2024-04-22 DIAGNOSIS — R53.83 FATIGUE, UNSPECIFIED TYPE: ICD-10-CM

## 2024-04-22 LAB
ALBUMIN SERPL-MCNC: 3 G/DL (ref 3.4–4.8)
ALBUMIN/GLOB SERPL: 0.7 RATIO (ref 1.1–2)
ALP SERPL-CCNC: 78 UNIT/L (ref 40–150)
ALT SERPL-CCNC: 24 UNIT/L (ref 0–55)
AST SERPL-CCNC: 25 UNIT/L (ref 5–34)
BASOPHILS # BLD AUTO: 0.01 X10(3)/MCL
BASOPHILS NFR BLD AUTO: 0.3 %
BILIRUB SERPL-MCNC: 0.8 MG/DL
BUN SERPL-MCNC: 8 MG/DL (ref 8.4–25.7)
CALCIUM SERPL-MCNC: 9.6 MG/DL (ref 8.8–10)
CHLORIDE SERPL-SCNC: 104 MMOL/L (ref 98–107)
CO2 SERPL-SCNC: 28 MMOL/L (ref 23–31)
CREAT SERPL-MCNC: 0.86 MG/DL (ref 0.73–1.18)
EOSINOPHIL # BLD AUTO: 0.17 X10(3)/MCL (ref 0–0.9)
EOSINOPHIL NFR BLD AUTO: 5 %
ERYTHROCYTE [DISTWIDTH] IN BLOOD BY AUTOMATED COUNT: 14.4 % (ref 11.5–17)
GFR SERPLBLD CREATININE-BSD FMLA CKD-EPI: >60 MLS/MIN/1.73/M2
GLOBULIN SER-MCNC: 4.4 GM/DL (ref 2.4–3.5)
GLUCOSE SERPL-MCNC: 104 MG/DL (ref 82–115)
HCT VFR BLD AUTO: 39.5 % (ref 42–52)
HGB BLD-MCNC: 12.6 G/DL (ref 14–18)
IMM GRANULOCYTES # BLD AUTO: 0 X10(3)/MCL (ref 0–0.04)
IMM GRANULOCYTES NFR BLD AUTO: 0 %
LYMPHOCYTES # BLD AUTO: 0.84 X10(3)/MCL (ref 0.6–4.6)
LYMPHOCYTES NFR BLD AUTO: 24.6 %
MCH RBC QN AUTO: 27.8 PG (ref 27–31)
MCHC RBC AUTO-ENTMCNC: 31.9 G/DL (ref 33–36)
MCV RBC AUTO: 87.2 FL (ref 80–94)
MONOCYTES # BLD AUTO: 0.06 X10(3)/MCL (ref 0.1–1.3)
MONOCYTES NFR BLD AUTO: 1.8 %
NEUTROPHILS # BLD AUTO: 2.34 X10(3)/MCL (ref 2.1–9.2)
NEUTROPHILS NFR BLD AUTO: 68.3 %
PLATELET # BLD AUTO: 304 X10(3)/MCL (ref 130–400)
PMV BLD AUTO: 9.4 FL (ref 7.4–10.4)
POTASSIUM SERPL-SCNC: 3.8 MMOL/L (ref 3.5–5.1)
PROT SERPL-MCNC: 7.4 GM/DL (ref 5.8–7.6)
RBC # BLD AUTO: 4.53 X10(6)/MCL (ref 4.7–6.1)
SODIUM SERPL-SCNC: 146 MMOL/L (ref 136–145)
TSH SERPL-ACNC: 0.68 UIU/ML (ref 0.35–4.94)
WBC # SPEC AUTO: 3.42 X10(3)/MCL (ref 4.5–11.5)

## 2024-04-22 PROCEDURE — 80053 COMPREHEN METABOLIC PANEL: CPT

## 2024-04-22 PROCEDURE — 84443 ASSAY THYROID STIM HORMONE: CPT

## 2024-04-22 PROCEDURE — 85025 COMPLETE CBC W/AUTO DIFF WBC: CPT

## 2024-04-22 PROCEDURE — 36415 COLL VENOUS BLD VENIPUNCTURE: CPT

## 2024-04-24 ENCOUNTER — INFUSION (OUTPATIENT)
Dept: INFUSION THERAPY | Facility: HOSPITAL | Age: 79
End: 2024-04-24
Attending: NURSE PRACTITIONER
Payer: MEDICARE

## 2024-04-24 VITALS
BODY MASS INDEX: 32.42 KG/M2 | OXYGEN SATURATION: 99 % | WEIGHT: 207 LBS | DIASTOLIC BLOOD PRESSURE: 75 MMHG | TEMPERATURE: 98 F | SYSTOLIC BLOOD PRESSURE: 132 MMHG | HEART RATE: 97 BPM

## 2024-04-24 DIAGNOSIS — C34.00 MALIGNANT NEOPLASM OF HILUS OF LUNG, UNSPECIFIED LATERALITY: Primary | ICD-10-CM

## 2024-04-24 DIAGNOSIS — C34.90 NON-SMALL CELL LUNG CANCER, UNSPECIFIED LATERALITY: ICD-10-CM

## 2024-04-24 PROCEDURE — 63600175 PHARM REV CODE 636 W HCPCS: Performed by: INTERNAL MEDICINE

## 2024-04-24 PROCEDURE — 96375 TX/PRO/DX INJ NEW DRUG ADDON: CPT

## 2024-04-24 PROCEDURE — 96413 CHEMO IV INFUSION 1 HR: CPT

## 2024-04-24 PROCEDURE — 25000003 PHARM REV CODE 250: Performed by: INTERNAL MEDICINE

## 2024-04-24 RX ORDER — HEPARIN 100 UNIT/ML
500 SYRINGE INTRAVENOUS
Status: DISCONTINUED | OUTPATIENT
Start: 2024-04-24 | End: 2024-04-24 | Stop reason: HOSPADM

## 2024-04-24 RX ORDER — ONDANSETRON HYDROCHLORIDE 2 MG/ML
8 INJECTION, SOLUTION INTRAVENOUS
Status: COMPLETED | OUTPATIENT
Start: 2024-04-24 | End: 2024-04-24

## 2024-04-24 RX ORDER — SODIUM CHLORIDE 0.9 % (FLUSH) 0.9 %
10 SYRINGE (ML) INJECTION
Status: DISCONTINUED | OUTPATIENT
Start: 2024-04-24 | End: 2024-04-24 | Stop reason: HOSPADM

## 2024-04-24 RX ADMIN — HEPARIN 500 UNITS: 100 SYRINGE at 11:04

## 2024-04-24 RX ADMIN — SODIUM CHLORIDE: 9 INJECTION, SOLUTION INTRAVENOUS at 11:04

## 2024-04-24 RX ADMIN — GEMCITABINE 2600 MG: 38 INJECTION, SOLUTION INTRAVENOUS at 10:04

## 2024-04-24 RX ADMIN — ONDANSETRON 8 MG: 2 INJECTION INTRAMUSCULAR; INTRAVENOUS at 10:04

## 2024-04-29 ENCOUNTER — LAB VISIT (OUTPATIENT)
Dept: LAB | Facility: HOSPITAL | Age: 79
End: 2024-04-29
Attending: INTERNAL MEDICINE
Payer: MEDICARE

## 2024-04-29 DIAGNOSIS — T45.1X5A CHEMOTHERAPY-INDUCED NEUTROPENIA: Primary | ICD-10-CM

## 2024-04-29 DIAGNOSIS — D70.1 CHEMOTHERAPY-INDUCED NEUTROPENIA: Primary | ICD-10-CM

## 2024-04-29 DIAGNOSIS — C34.91 NON-SMALL CELL CANCER OF RIGHT LUNG: ICD-10-CM

## 2024-04-29 DIAGNOSIS — R53.83 FATIGUE, UNSPECIFIED TYPE: ICD-10-CM

## 2024-04-29 LAB
ABS NEUT CALC (OHS): 0.62 X10(3)/MCL (ref 2.1–9.2)
ALBUMIN SERPL-MCNC: 2.9 G/DL (ref 3.4–4.8)
ALBUMIN/GLOB SERPL: 0.6 RATIO (ref 1.1–2)
ALP SERPL-CCNC: 75 UNIT/L (ref 40–150)
ALT SERPL-CCNC: 21 UNIT/L (ref 0–55)
ANISOCYTOSIS BLD QL SMEAR: SLIGHT
AST SERPL-CCNC: 16 UNIT/L (ref 5–34)
BILIRUB SERPL-MCNC: 0.8 MG/DL
BUN SERPL-MCNC: 9.6 MG/DL (ref 8.4–25.7)
CALCIUM SERPL-MCNC: 9.6 MG/DL (ref 8.8–10)
CHLORIDE SERPL-SCNC: 107 MMOL/L (ref 98–107)
CO2 SERPL-SCNC: 26 MMOL/L (ref 23–31)
CREAT SERPL-MCNC: 0.82 MG/DL (ref 0.73–1.18)
EOSINOPHIL NFR BLD MANUAL: 0.09 X10(3)/MCL (ref 0–0.9)
EOSINOPHIL NFR BLD MANUAL: 6 % (ref 0–8)
ERYTHROCYTE [DISTWIDTH] IN BLOOD BY AUTOMATED COUNT: 14.2 % (ref 11.5–17)
GFR SERPLBLD CREATININE-BSD FMLA CKD-EPI: >60 MLS/MIN/1.73/M2
GLOBULIN SER-MCNC: 4.5 GM/DL (ref 2.4–3.5)
GLUCOSE SERPL-MCNC: 105 MG/DL (ref 82–115)
HCT VFR BLD AUTO: 35.3 % (ref 42–52)
HGB BLD-MCNC: 11.5 G/DL (ref 14–18)
LYMPHOCYTES NFR BLD MANUAL: 0.74 X10(3)/MCL
LYMPHOCYTES NFR BLD MANUAL: 50 % (ref 13–40)
MCH RBC QN AUTO: 28.3 PG (ref 27–31)
MCHC RBC AUTO-ENTMCNC: 32.6 G/DL (ref 33–36)
MCV RBC AUTO: 86.9 FL (ref 80–94)
MONOCYTES NFR BLD MANUAL: 0.03 X10(3)/MCL (ref 0.1–1.3)
MONOCYTES NFR BLD MANUAL: 2 % (ref 2–11)
NEUTROPHILS NFR BLD MANUAL: 42 % (ref 47–80)
PLATELET # BLD AUTO: 161 X10(3)/MCL (ref 130–400)
PLATELET # BLD EST: ADEQUATE 10*3/UL
PMV BLD AUTO: 9.3 FL (ref 7.4–10.4)
POIKILOCYTOSIS BLD QL SMEAR: SLIGHT
POTASSIUM SERPL-SCNC: 3.6 MMOL/L (ref 3.5–5.1)
PROT SERPL-MCNC: 7.4 GM/DL (ref 5.8–7.6)
RBC # BLD AUTO: 4.06 X10(6)/MCL (ref 4.7–6.1)
ROULEAUX BLD QL SMEAR: ABNORMAL
SODIUM SERPL-SCNC: 142 MMOL/L (ref 136–145)
TSH SERPL-ACNC: 0.61 UIU/ML (ref 0.35–4.94)
WBC # SPEC AUTO: 1.48 X10(3)/MCL (ref 4.5–11.5)

## 2024-04-29 PROCEDURE — 85027 COMPLETE CBC AUTOMATED: CPT

## 2024-04-29 PROCEDURE — 36415 COLL VENOUS BLD VENIPUNCTURE: CPT

## 2024-04-29 PROCEDURE — 80053 COMPREHEN METABOLIC PANEL: CPT

## 2024-04-29 PROCEDURE — 84443 ASSAY THYROID STIM HORMONE: CPT

## 2024-04-29 RX ORDER — LEVOFLOXACIN 500 MG/1
500 TABLET, FILM COATED ORAL DAILY
Qty: 7 TABLET | Refills: 0 | Status: SHIPPED | OUTPATIENT
Start: 2024-04-29 | End: 2024-05-06

## 2024-04-29 NOTE — TELEPHONE ENCOUNTER
CALLED PT AND DISCUSSED NEUTROPENIC PRECAUTIONS AND ADVISED HIM OF NEW RX TO  AND TAKE FOR 1 WEEK. ALSO ADVISED HIM TO REPEAT LABS IN 1 WEEK. HE STATED UNDERSTANDING TO ALL.

## 2024-04-30 RX ORDER — LEVOFLOXACIN 500 MG/1
500 TABLET, FILM COATED ORAL DAILY
Qty: 7 TABLET | Refills: 0 | Status: SHIPPED | OUTPATIENT
Start: 2024-04-30

## 2024-05-06 ENCOUNTER — LAB VISIT (OUTPATIENT)
Dept: LAB | Facility: HOSPITAL | Age: 79
End: 2024-05-06
Attending: NURSE PRACTITIONER
Payer: MEDICARE

## 2024-05-06 DIAGNOSIS — C34.91 NON-SMALL CELL CANCER OF RIGHT LUNG: ICD-10-CM

## 2024-05-06 DIAGNOSIS — R53.83 FATIGUE, UNSPECIFIED TYPE: ICD-10-CM

## 2024-05-06 DIAGNOSIS — Z51.12 MAINTENANCE ANTINEOPLASTIC IMMUNOTHERAPY: ICD-10-CM

## 2024-05-06 LAB
ALBUMIN SERPL-MCNC: 3.2 G/DL (ref 3.4–4.8)
ALBUMIN/GLOB SERPL: 0.9 RATIO (ref 1.1–2)
ALP SERPL-CCNC: 76 UNIT/L (ref 40–150)
ALT SERPL-CCNC: 24 UNIT/L (ref 0–55)
AST SERPL-CCNC: 23 UNIT/L (ref 5–34)
BASOPHILS # BLD AUTO: 0.01 X10(3)/MCL
BASOPHILS NFR BLD AUTO: 0.4 %
BILIRUB SERPL-MCNC: 0.6 MG/DL
BUN SERPL-MCNC: 7.7 MG/DL (ref 8.4–25.7)
CALCIUM SERPL-MCNC: 9.6 MG/DL (ref 8.8–10)
CHLORIDE SERPL-SCNC: 106 MMOL/L (ref 98–107)
CO2 SERPL-SCNC: 27 MMOL/L (ref 23–31)
CREAT SERPL-MCNC: 0.83 MG/DL (ref 0.73–1.18)
EOSINOPHIL # BLD AUTO: 0.08 X10(3)/MCL (ref 0–0.9)
EOSINOPHIL NFR BLD AUTO: 2.9 %
ERYTHROCYTE [DISTWIDTH] IN BLOOD BY AUTOMATED COUNT: 15.7 % (ref 11.5–17)
GFR SERPLBLD CREATININE-BSD FMLA CKD-EPI: >60 MLS/MIN/1.73/M2
GLOBULIN SER-MCNC: 3.7 GM/DL (ref 2.4–3.5)
GLUCOSE SERPL-MCNC: 89 MG/DL (ref 82–115)
HCT VFR BLD AUTO: 35.3 % (ref 42–52)
HGB BLD-MCNC: 11.6 G/DL (ref 14–18)
IMM GRANULOCYTES # BLD AUTO: 0.01 X10(3)/MCL (ref 0–0.04)
IMM GRANULOCYTES NFR BLD AUTO: 0.4 %
LYMPHOCYTES # BLD AUTO: 0.85 X10(3)/MCL (ref 0.6–4.6)
LYMPHOCYTES NFR BLD AUTO: 30.6 %
MCH RBC QN AUTO: 28.2 PG (ref 27–31)
MCHC RBC AUTO-ENTMCNC: 32.9 G/DL (ref 33–36)
MCV RBC AUTO: 85.7 FL (ref 80–94)
MONOCYTES # BLD AUTO: 0.37 X10(3)/MCL (ref 0.1–1.3)
MONOCYTES NFR BLD AUTO: 13.3 %
NEUTROPHILS # BLD AUTO: 1.46 X10(3)/MCL (ref 2.1–9.2)
NEUTROPHILS NFR BLD AUTO: 52.4 %
PLATELET # BLD AUTO: 168 X10(3)/MCL (ref 130–400)
PMV BLD AUTO: 9.7 FL (ref 7.4–10.4)
POTASSIUM SERPL-SCNC: 3.9 MMOL/L (ref 3.5–5.1)
PROT SERPL-MCNC: 6.9 GM/DL (ref 5.8–7.6)
RBC # BLD AUTO: 4.12 X10(6)/MCL (ref 4.7–6.1)
SODIUM SERPL-SCNC: 141 MMOL/L (ref 136–145)
TSH SERPL-ACNC: 0.64 UIU/ML (ref 0.35–4.94)
WBC # SPEC AUTO: 2.78 X10(3)/MCL (ref 4.5–11.5)

## 2024-05-06 PROCEDURE — 85025 COMPLETE CBC W/AUTO DIFF WBC: CPT

## 2024-05-06 PROCEDURE — 36415 COLL VENOUS BLD VENIPUNCTURE: CPT

## 2024-05-06 PROCEDURE — 84443 ASSAY THYROID STIM HORMONE: CPT

## 2024-05-06 PROCEDURE — 80053 COMPREHEN METABOLIC PANEL: CPT

## 2024-05-08 ENCOUNTER — INFUSION (OUTPATIENT)
Dept: INFUSION THERAPY | Facility: HOSPITAL | Age: 79
End: 2024-05-08
Attending: NURSE PRACTITIONER
Payer: MEDICARE

## 2024-05-08 ENCOUNTER — OFFICE VISIT (OUTPATIENT)
Dept: HEMATOLOGY/ONCOLOGY | Facility: CLINIC | Age: 79
End: 2024-05-08
Payer: MEDICARE

## 2024-05-08 VITALS — TEMPERATURE: 98 F | HEART RATE: 103 BPM | SYSTOLIC BLOOD PRESSURE: 125 MMHG | DIASTOLIC BLOOD PRESSURE: 68 MMHG

## 2024-05-08 VITALS
TEMPERATURE: 98 F | SYSTOLIC BLOOD PRESSURE: 132 MMHG | DIASTOLIC BLOOD PRESSURE: 72 MMHG | HEART RATE: 96 BPM | HEIGHT: 67 IN | RESPIRATION RATE: 18 BRPM | BODY MASS INDEX: 32 KG/M2 | OXYGEN SATURATION: 98 % | WEIGHT: 203.88 LBS

## 2024-05-08 DIAGNOSIS — Z79.899 ON ANTINEOPLASTIC CHEMOTHERAPY: ICD-10-CM

## 2024-05-08 DIAGNOSIS — C77.1 SECONDARY AND UNSPECIFIED MALIGNANT NEOPLASM OF INTRATHORACIC LYMPH NODES: ICD-10-CM

## 2024-05-08 DIAGNOSIS — C34.00 MALIGNANT NEOPLASM OF HILUS OF LUNG, UNSPECIFIED LATERALITY: Primary | ICD-10-CM

## 2024-05-08 DIAGNOSIS — C34.00 MALIGNANT NEOPLASM OF HILUS OF LUNG, UNSPECIFIED LATERALITY: ICD-10-CM

## 2024-05-08 DIAGNOSIS — C34.91 NON-SMALL CELL CANCER OF RIGHT LUNG: Primary | ICD-10-CM

## 2024-05-08 DIAGNOSIS — C34.90 NON-SMALL CELL LUNG CANCER, UNSPECIFIED LATERALITY: ICD-10-CM

## 2024-05-08 PROBLEM — Z51.12 MAINTENANCE ANTINEOPLASTIC IMMUNOTHERAPY: Status: RESOLVED | Noted: 2023-04-12 | Resolved: 2024-05-08

## 2024-05-08 PROBLEM — Z79.69 ON ANTINEOPLASTIC CHEMOTHERAPY: Status: ACTIVE | Noted: 2024-05-08

## 2024-05-08 PROCEDURE — 1157F ADVNC CARE PLAN IN RCRD: CPT | Mod: CPTII,S$GLB,, | Performed by: NURSE PRACTITIONER

## 2024-05-08 PROCEDURE — 99999 PR PBB SHADOW E&M-EST. PATIENT-LVL IV: CPT | Mod: PBBFAC,,, | Performed by: NURSE PRACTITIONER

## 2024-05-08 PROCEDURE — 3288F FALL RISK ASSESSMENT DOCD: CPT | Mod: CPTII,S$GLB,, | Performed by: NURSE PRACTITIONER

## 2024-05-08 PROCEDURE — 96375 TX/PRO/DX INJ NEW DRUG ADDON: CPT

## 2024-05-08 PROCEDURE — 25000003 PHARM REV CODE 250: Performed by: NURSE PRACTITIONER

## 2024-05-08 PROCEDURE — 63600175 PHARM REV CODE 636 W HCPCS: Performed by: NURSE PRACTITIONER

## 2024-05-08 PROCEDURE — 96413 CHEMO IV INFUSION 1 HR: CPT

## 2024-05-08 PROCEDURE — 1101F PT FALLS ASSESS-DOCD LE1/YR: CPT | Mod: CPTII,S$GLB,, | Performed by: NURSE PRACTITIONER

## 2024-05-08 PROCEDURE — 3078F DIAST BP <80 MM HG: CPT | Mod: CPTII,S$GLB,, | Performed by: NURSE PRACTITIONER

## 2024-05-08 PROCEDURE — 3075F SYST BP GE 130 - 139MM HG: CPT | Mod: CPTII,S$GLB,, | Performed by: NURSE PRACTITIONER

## 2024-05-08 PROCEDURE — 1126F AMNT PAIN NOTED NONE PRSNT: CPT | Mod: CPTII,S$GLB,, | Performed by: NURSE PRACTITIONER

## 2024-05-08 PROCEDURE — 1159F MED LIST DOCD IN RCRD: CPT | Mod: CPTII,S$GLB,, | Performed by: NURSE PRACTITIONER

## 2024-05-08 PROCEDURE — 99215 OFFICE O/P EST HI 40 MIN: CPT | Mod: S$GLB,,, | Performed by: NURSE PRACTITIONER

## 2024-05-08 RX ORDER — ONDANSETRON HYDROCHLORIDE 2 MG/ML
8 INJECTION, SOLUTION INTRAVENOUS
Status: COMPLETED | OUTPATIENT
Start: 2024-05-08 | End: 2024-05-08

## 2024-05-08 RX ORDER — SODIUM CHLORIDE 0.9 % (FLUSH) 0.9 %
10 SYRINGE (ML) INJECTION
Status: CANCELLED | OUTPATIENT
Start: 2024-05-08

## 2024-05-08 RX ORDER — HEPARIN 100 UNIT/ML
500 SYRINGE INTRAVENOUS
Status: DISCONTINUED | OUTPATIENT
Start: 2024-05-08 | End: 2024-05-08 | Stop reason: HOSPADM

## 2024-05-08 RX ORDER — ONDANSETRON HYDROCHLORIDE 2 MG/ML
8 INJECTION, SOLUTION INTRAVENOUS
Status: CANCELLED | OUTPATIENT
Start: 2024-05-08

## 2024-05-08 RX ORDER — HEPARIN 100 UNIT/ML
500 SYRINGE INTRAVENOUS
Status: CANCELLED | OUTPATIENT
Start: 2024-05-08

## 2024-05-08 RX ORDER — SODIUM CHLORIDE 0.9 % (FLUSH) 0.9 %
10 SYRINGE (ML) INJECTION
Status: DISCONTINUED | OUTPATIENT
Start: 2024-05-08 | End: 2024-05-08 | Stop reason: HOSPADM

## 2024-05-08 RX ADMIN — GEMCITABINE 2600 MG: 38 INJECTION, SOLUTION INTRAVENOUS at 11:05

## 2024-05-08 RX ADMIN — ONDANSETRON 8 MG: 2 INJECTION INTRAMUSCULAR; INTRAVENOUS at 11:05

## 2024-05-08 NOTE — PROGRESS NOTES
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 Right upper lobe 2.5 cm mass.  CT C 9/19/2022: Status post right partial pneumonectomy for resection of a right upper lobe lung mass with no residual recurrent mass seen. Small right-sided pleural effusion. Some lymphadenopathy in the right paratracheal region with a maximum short axis dimension of 1.6 cm.  Follow-up is recommended  CT C 1/25/2023: There is a paratracheal enlarged lymph node which shows interval mild size increase and now measures 2.2 x 2.0 cm and previously was 1.6 x 1.5 cm.  Aortopulmonary window mildly enlarged lymph node is stable.  Thoracic aorta is without aneurysmal dilatation or dissection.  Impression: 1. Operative changes of right upper lobectomy without bronchialstump soft tissue proliferation    2. No residual tumor load or metastatic nodule. 3. Paratracheal enlarged lymph node with interval size increase.  PET CT 2/9/2023:  Hypermetabolic right paratracheal lymph node image 81 series 3 measures 25 x 20 mm with max SUV 27.0.  Hypermetabolic anterior mediastinal lymph node anterior to the SVC measures 12 x 9 mm with max SUV 12.0. Impression: 1. Hypermetabolic metastatic mediastinal adenopathy.   2. No PET  evidence of metastatic disease outside of the mediastinum.  CT C/A/P 7/10/2023:  1. Several new subcentimeter nodules in the right lung.  Suspect these are infectious or inflammatory in nature, but 3 month follow-up chest CT recommended to ensure resolution.  2. 2 reference mediastinal lymph nodes are smaller since January.  3. L1 vertebral body compression deformity new since January, but appears subacute.  CT C/A/P 10/10/2023:    1. Slightly larger mediastinal lymph nodes, to be followed.  2. Increased irregular right perihilar consolidation which may be treatment related.  3. Small right pleural effusion.  4. No new suspicious findings in the abdomen or pelvis.  CT C/A/P 1/10/2024:    1. Interval enlargement of mediastinal lymph nodes  2. Similar right perihilar consolidative opacity and small right pleural effusion  PET CT 2/6/2024:  1. New, enlarged hypermetabolic superior paratracheal and para-aortic/subaortic lymph nodes compared to prior PET-CT  2. Persistent enlarged and hypermetabolic lower right paratracheal lymph node.  This lymph node is decreased in size and FDG avidity compared to prior PET-CT.  3. Previously seen hypermetabolic pre-vascular lymph node is decreased in size and is no longer hypermetabolic.       Pathology:  03/22/2022 CT-guided biopsy of the right lung mass: invasive squamous cell carcinoma, moderately differentiated.  5/24/2022: Right upper lobectomy:  1- LYMPH NODE, LUMBAR RIGHT 10 LYMPHADENECTOMY: NEGATIVE FOR METASTATIC CARCINOMA (0/1).   2- LYMPH NODE, 11R, LYMPHADENECTOMY: NEGATIVE FOR METASTATIC CARCINOMA (0/1).  3- LYMPH NODE, 9R, LYMPHADENECTOMY: NEGATIVE FOR METASTATIC CARCINOMA (0/1).   4- LYMPH NODE, 7R, LYMPHADENECTOMY: NEGATIVE FOR METASTATIC CARCINOMA (0/1).   5- LYMPH NODE, 8R, LYMPHADENECTOMY: ONE LYMPH NODE NEGATIVE FOR METASTATIC CARCINOMA (0/1).    6- LYMPH NODE, 12R, LYMPHADENECTOMY: NEGATIVE FOR METASTATIC CARCINOMA (0/1).  7- LUNG, RIGHT UPPER AND MIDDLE LOBES,  PNEUMONECTOMY:  POORLY DIFFERENTIATED, G3, SQUAMOUS CELL CARCINOMA, INVASIVE.    - TUMOR SIZE: 3 CM.    - LYMPHOVASCULAR INVASION IS PRESENT.    - MARGINS NEGATIVE FOR INVASIVE CARCINOMA:    - NEAREST MARGIN, VASCULAR / BRONCHIAL 2.5 CM.    - NO PLEURAL SURFACE INVOLVEMENT     - PROMINENT NECROSIS PRESENT.  Visceral Pleura Invasion: Not identified  Number of Lymph Nodes Examined: Exact number : [id number]  pT Category: pT1c: pN0: No regional lymph node metastasis    3/8/2023 LYMPH NODE BIOPSY:   LYMPH NODE 4R, LYMPHADENECTOMY:  METASTATIC SQUAMOUS CELL CARCINOMA, NONKERATINIZING, MULTIPLE FRAGMENTS.       IMMUNOHISTOCHEMICAL STAINS:   CK 5/6, P63 AND CK7:  POSITIVE IN MALIGNANT CELLS.   CK20 AND TTF-1:  NEGATIVE IN MALIGNANT CELLS.       CLINICAL HISTORY:       Patient: Leonila Rosales is a 78 y.o. male kindly referred by  Dr. Diaz for evaluation of lung cancer.    On 01/17/2022 patient presented to the emergency department after a fall.  He reports that he was getting to his truck and sleep and fell on his left side on the truck step rail.  He started having pain in his left hip and knee.  He underwent a CT angiogram that showed No pulmonary embolus. Right upper lobe 2.5 cm mass.      During that hospitalization he was treated for a close intertrochanteric fracture of the left femur and underwent open reduction intramedullary nailing left comminuted intertrochanteric hip fracture on 01/18/2022 with Dr. Fortino Palomares.  He then spent several weeks on rehab.    On 03/22/2022 he underwent CT-guided biopsy of the right lung mass.  Pathology showed invasive squamous cell carcinoma, moderately differentiated.    He is s/p right upper lobectomy with  on 5/24/2022.  Pathology report as above.  Patient is stage IA3 squamous cell carcinoma of the lung.  He has recovered well and has been doing good.     Patient was started on surveillance. CT 9/19/2022 with 1.6 right paratracheal LN and small Rt pleural effusion. Surveillance  CT scan chest to eval stability 1/25/2023 with paratracheal enlarged lymph node which shows interval mild size increase and now measures 2.2 x 2.0 cm and previously was 1.6 x 1.5 cm.  Aortopulmonary window mildly enlarged lymph node is stable. PET CT 2/9/2023 with Hypermetabolic right paratracheal lymph node 25 x 20 mm with max SUV 27.0. Hypermetabolic anterior mediastinal lymph node anterior to the SVC measures 12 x 9 mm with max SUV 12.0.   Biopsy of R4 lymph node confirmed the metastatic squamous cell carcinoma. Completed  XRT on 5/30/23  and 5 cycles of Carbo/taxol on 5/19/23. C6 was held on 5/26/23 due to neutropenia, ANC was 0.7.    Patient was started on chemoradiation. -completed XRT on 5/30/23 and 5 cycles of weekly carbo/taxol on 5/19/23, His final cycle was held due to neutropenia, ANC was 0.7.  He was then started on maintenance Imfinzi every 4 weeks on 6/22/23    Chief Complaint: 3 Week Follow Up      Interval History:  He completed XRT to the mediastinum on 5/30/23 and 5 cycles of weekly carbo/taxol on 5/19/23. He was on maintenance durvalumab, started 6/22/2023 and tolerated well. Patient with progression of paratracheal and para-aortic/subaortic lymph nodes.  He was seen by Dr. Willis but not a good candidate for radiation at this time.  He was started on Gemzar on 4/13/24.     5/8/24: Patient presents today for C2 of Gemzar. He reports that he had no side effects with Gemzar. Denies fever, chills and sweats. Denies pain. Bowels are moving well. Denies bleeding. Still with chronic cough.     Past Medical History:   Diagnosis Date    Anemia     Closed intertrochanteric fracture     Deficiency of macronutrients     GERD (gastroesophageal reflux disease)     H. pylori infection     History of tobacco use     Hyperlipidemia     Hypertension     Lung mass     Malignant neoplasm of unspecified part of unspecified bronchus or lung     Non-small cell lung cancer       Past Surgical History:   Procedure  Laterality Date    BIOPSY OF INTESTINE  04/04/2022    BRONCHOSCOPY      COLONOSCOPY      ESOPHAGOGASTRODUODENOSCOPY  04/04/2022    HIP FRACTURE SURGERY Left 2016    Intramedullary Nail Insertion Hip Left 01/18/2022    KIDNEY SURGERY Right 05/27/2022    MEDIASTINOSCOPY N/A 3/6/2023    Procedure: MEDIASTINOSCOPY;  Surgeon: Jose Diaz MD;  Location: [facility] OR;  Service: Cardiothoracic;  Laterality: N/A;  XX    PLACEMENT, MEDIPORT N/A 4/6/2023    Procedure: Placement, Mediport;  Surgeon: Jose Diaz MD;  Location: [facility] OR;  Service: Cardiology;  Laterality: N/A;    SHOULDER SURGERY       Family History   Family history unknown: Yes            Review of patient's allergies indicates:  No Known Allergies   Current Outpatient Medications on File Prior to Visit   Medication Sig Dispense Refill    amLODIPine (NORVASC) 5 MG tablet TAKE ONE TABLET BY MOUTH EVERY DAY TWICE DAILY 180 tablet 1    aspirin 81 mg Cap Take 81 mg by mouth Daily.      atorvastatin (LIPITOR) 10 MG tablet TAKE ONE TABLET BY MOUTH DAILY 90 tablet 3    levoFLOXacin (LEVAQUIN) 500 MG tablet Take 1 tablet (500 mg total) by mouth once daily. 7 tablet 0    LIDOcaine-prilocaine (EMLA) cream Apply topically as needed. Apply to port site 30 mins prior to chemo 30 g 3    LINZESS 145 mcg Cap capsule Take 145 mcg by mouth daily as needed. New medication, not started yet      metoprolol succinate (TOPROL-XL) 25 MG 24 hr tablet Take 1 tablet (25 mg total) by mouth once daily. 90 tablet 3    pantoprazole (PROTONIX) 40 MG tablet Take 40 mg by mouth.       No current facility-administered medications on file prior to visit.      Review of Systems   Constitutional:  Negative for activity change, appetite change, chills, diaphoresis, fatigue, fever and unexpected weight change.   HENT:  Negative for nasal congestion, mouth sores, nosebleeds, sinus pressure/congestion, sore throat and trouble swallowing.    Eyes: Negative.    Respiratory:  Positive for cough (white  "sputum) and shortness of breath.    Cardiovascular:  Negative for chest pain and palpitations.   Gastrointestinal:  Negative for abdominal distention, abdominal pain, blood in stool, change in bowel habit, constipation, diarrhea, nausea and vomiting.   Endocrine: Negative.    Genitourinary:  Negative for bladder incontinence, decreased urine volume, difficulty urinating, dysuria, frequency, hematuria and urgency.   Musculoskeletal:  Negative for arthralgias, back pain, gait problem, joint swelling, leg pain and myalgias.   Integumentary:  Negative for rash.   Allergic/Immunologic: Negative.    Neurological:  Negative for dizziness, tremors, syncope, weakness, light-headedness, numbness, headaches and memory loss.   Hematological:  Negative for adenopathy. Does not bruise/bleed easily.   Psychiatric/Behavioral:  Negative for agitation, confusion, hallucinations, sleep disturbance and suicidal ideas. The patient is not nervous/anxious.           Vitals:    05/08/24 1023   BP: 132/72   BP Location: Left arm   Patient Position: Sitting   Pulse: 96   Resp: 18   Temp: 98.3 °F (36.8 °C)   TempSrc: Oral   SpO2: 98%   Weight: 92.5 kg (203 lb 14.4 oz)   Height: 5' 7" (1.702 m)           Wt Readings from Last 6 Encounters:   05/08/24 92.5 kg (203 lb 14.4 oz)   04/24/24 93.9 kg (207 lb)   04/17/24 93.9 kg (207 lb)   04/17/24 93.4 kg (206 lb)   04/12/24 93.4 kg (206 lb)   04/08/24 94.3 kg (207 lb 14.4 oz)     Body mass index is 31.94 kg/m².  Body surface area is 2.09 meters squared.       Physical Exam  Vitals and nursing note reviewed.   Constitutional:       General: He is not in acute distress.     Appearance: Normal appearance. He is obese.   HENT:      Head: Normocephalic and atraumatic.      Mouth/Throat:      Mouth: Mucous membranes are moist.   Eyes:      General: No scleral icterus.     Extraocular Movements: Extraocular movements intact.      Conjunctiva/sclera: Conjunctivae normal.   Neck:      Vascular: No JVD. "   Cardiovascular:      Rate and Rhythm: Normal rate and regular rhythm.      Heart sounds: No murmur heard.  Pulmonary:      Effort: Pulmonary effort is normal.      Breath sounds: Decreased breath sounds and wheezing present. No rhonchi.   Chest:      Chest wall: No tenderness.   Abdominal:      General: Bowel sounds are normal. There is no distension.      Palpations: Abdomen is soft. There is no fluid wave.      Tenderness: There is no abdominal tenderness.   Musculoskeletal:         General: No swelling or deformity.      Cervical back: Neck supple.   Lymphadenopathy:      Head:      Right side of head: No submandibular adenopathy.      Left side of head: No submandibular adenopathy.      Cervical: No cervical adenopathy.      Upper Body:      Right upper body: No supraclavicular or axillary adenopathy.      Left upper body: No supraclavicular or axillary adenopathy.      Lower Body: No right inguinal adenopathy. No left inguinal adenopathy.   Skin:     General: Skin is warm.      Coloration: Skin is not jaundiced.      Findings: No rash.   Neurological:      General: No focal deficit present.      Mental Status: He is alert and oriented to person, place, and time.      Cranial Nerves: Cranial nerves 2-12 are intact.      Comments: Mobility assitssed by cane/walker   Psychiatric:         Attention and Perception: Attention normal.         Mood and Affect: Mood and affect normal.         Behavior: Behavior is cooperative.         Cognition and Memory: Cognition normal.         Judgment: Judgment normal.       ECOG SCORE    2 - Capable of all selfcare but unable to carry out any work activities, active > 50% of hours         Laboratory:  CBC with Differential:  Lab Results   Component Value Date    WBC 2.78 (L) 05/06/2024    RBC 4.12 (L) 05/06/2024    HGB 11.6 (L) 05/06/2024    HCT 35.3 (L) 05/06/2024    MCV 85.7 05/06/2024    MCH 28.2 05/06/2024    MCHC 32.9 (L) 05/06/2024    RDW 15.7 05/06/2024      05/06/2024    MPV 9.7 05/06/2024        CMP:  Sodium Level   Date Value Ref Range Status   05/06/2024 141 136 - 145 mmol/L Final     Potassium Level   Date Value Ref Range Status   05/06/2024 3.9 3.5 - 5.1 mmol/L Final     Carbon Dioxide   Date Value Ref Range Status   05/06/2024 27 23 - 31 mmol/L Final     Blood Urea Nitrogen   Date Value Ref Range Status   05/06/2024 7.7 (L) 8.4 - 25.7 mg/dL Final     Creatinine   Date Value Ref Range Status   05/06/2024 0.83 0.73 - 1.18 mg/dL Final     Calcium Level Total   Date Value Ref Range Status   05/06/2024 9.6 8.8 - 10.0 mg/dL Final     Albumin Level   Date Value Ref Range Status   05/06/2024 3.2 (L) 3.4 - 4.8 g/dL Final     Bilirubin Total   Date Value Ref Range Status   05/06/2024 0.6 <=1.5 mg/dL Final     Alkaline Phosphatase   Date Value Ref Range Status   05/06/2024 76 40 - 150 unit/L Final     Aspartate Aminotransferase   Date Value Ref Range Status   05/06/2024 23 5 - 34 unit/L Final     Alanine Aminotransferase   Date Value Ref Range Status   05/06/2024 24 0 - 55 unit/L Final     Estimated GFR-Non    Date Value Ref Range Status   04/19/2022 >60           Assessment:       1) [id number] 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 Discussed with Dr Alba ESCOBAR and he will be seen 4/21/2024.     Educated the patient on the risks versus benefits as well as toxicities associated with treatment.  Verbally consented the patient to the treatment plan and the patient was educated on the planned duration of the treatment and schedule of the treatment administration.  All questions were answered.      Plan:       Patient with recurrence of disease while on Imfinizi.  Not candidate for RT. We  discussed again chemotherapy and he is agreeable. Will discontinue Imfinzi and plan to begin Gemzar.    Okay to proceed with Gemzar C2D1 today  Please schedule for Gemzar C2D8 next week plus Neulasta onpro.   PET CT in 6 weeks (after 2 cycles of chemotherapy)- ordered.   RTC in 3 weeks with  for TD/Infusion - he prefers Wednesday appts. Labs to be drawn the day before treatment at [facility].     Encourage to call or message us for any questions or problems  The patient was given ample opportunity to ask questions, and to the best of my abilities, all questions answered to satisfaction; patient demonstrated understanding of what we discussed and agreeable to the plan.     EVERETT ArangoP

## 2024-05-08 NOTE — PROGRESS NOTES
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 Right upper lobe 2.5 cm mass.  CT C 9/19/2022: Status post right partial pneumonectomy for resection of a right upper lobe lung mass with no residual recurrent mass seen. Small right-sided pleural effusion. Some lymphadenopathy in the right paratracheal region with a maximum short axis dimension of 1.6 cm.  Follow-up is recommended  CT C 1/25/2023: There is a paratracheal enlarged lymph node which shows interval mild size increase and now measures 2.2 x 2.0 cm and previously was 1.6 x 1.5 cm.  Aortopulmonary window mildly enlarged lymph node is stable.  Thoracic aorta is without aneurysmal dilatation or dissection.  Impression: 1. Operative changes of right upper lobectomy without bronchialstump soft tissue proliferation    2. No residual tumor load or metastatic nodule. 3. Paratracheal enlarged lymph node with interval size increase.  PET CT 2/9/2023:  Hypermetabolic right paratracheal lymph node image 81 series 3 measures 25 x 20 mm with max SUV 27.0.  Hypermetabolic anterior mediastinal lymph node anterior to the SVC measures 12 x 9 mm with max SUV 12.0. Impression: 1. Hypermetabolic metastatic mediastinal adenopathy.   2. No PET evidence of metastatic disease outside of the mediastinum.  CT C/A/P 7/10/2023:  1. Several new subcentimeter nodules in the right lung.  Suspect these are infectious or inflammatory in nature, but 3 month follow-up chest CT recommended to ensure resolution.  2. 2 reference mediastinal  ANTICOAGULATION MANAGEMENT     Fausto Farr 84 year old male is on warfarin with supratherapeutic INR result. (Goal INR 2.5-3.5)    Recent labs: (last 7 days)     05/08/24  0938   INR 4.7*       ASSESSMENT     Source(s): Chart Review and Patient/Caregiver Call     Warfarin doses taken: More warfarin taken than planned which may be affecting INR, patient did not reduce his warfarin dose as instructed at 4/26/24 INR chck  Diet: Decreased greens/vitamin K in diet; plans to resume previous intake once spouse is able to get around better, she had recent knee surgery  Medication/supplement changes:  patient reports he has been taking 2600 mg Tylenol daily, using the arthritis Tylenol that is 650 mg in each  New illness, injury, or hospitalization: Yes: Patient reports he hurt his back a couple weeks ago while lifting something, pain has increased, will have a consult with ortho for back injection, patient instructed to call [facility] as soon as he knows when back injection is scheduled  Signs or symptoms of bleeding or clotting: No  Previous result: Supratherapeutic  Additional findings: None       PLAN     Recommended plan for temporary change(s) affecting INR     Dosing Instructions: hold dose then continue your current warfarin dose with next INR in 7-10 days       Summary  As of 5/8/2024      Full warfarin instructions:  5/8: Hold; Otherwise 7.5 mg every Tue; 5 mg all other days   Next INR check:  5/16/2024               Telephone call with Ralph who agrees to plan and repeated back plan correctly    Lab visit scheduled    Education provided:   Please call back if any changes to your diet, medications or how you've been taking warfarin  Contact [phone]  with any changes, questions or concerns.     Plan made per [facility] anticoagulation protocol    Cece Rios RN  Anticoagulation Clinic  5/8/2024    _______________________________________________________________________     Anticoagulation Episode Summary        Current INR goal:  2.5-3.5   TTR:  48.1% (1 y)   Target end date:  Indefinite   Send INR reminders to:  SHANNA DOWELL    Indications    S/P aortic valve replacement [Z95.2]  S/P mitral valve replacement [Z95.2]  Long term current use of anticoagulant therapy [Z79.01]  Longstanding persistent atrial fibrillation (H) [I48.11]             Comments:  Per 9/20/22 Cardio Note strict INR goal of 2.5-3.5. If INR falls below 2.5 at any time, needs to be bridged with Lovenox. consent to leave DVM for medical information and scheduling             Anticoagulation Care Providers       Provider Role Specialty Phone number    Jodi Flower Mai, MD Referring Internal Medicine - Pediatrics [phone]             lymph nodes are smaller since January.  3. L1 vertebral body compression deformity new since January, but appears subacute.       Pathology:  03/22/2022 CT-guided biopsy of the right lung mass: invasive squamous cell carcinoma, moderately differentiated.  5/24/2022: Right upper lobectomy:  1- LYMPH NODE, LUMBAR RIGHT 10 LYMPHADENECTOMY: NEGATIVE FOR METASTATIC CARCINOMA (0/1).   2- LYMPH NODE, 11R, LYMPHADENECTOMY: NEGATIVE FOR METASTATIC CARCINOMA (0/1).  3- LYMPH NODE, 9R, LYMPHADENECTOMY: NEGATIVE FOR METASTATIC CARCINOMA (0/1).   4- LYMPH NODE, 7R, LYMPHADENECTOMY: NEGATIVE FOR METASTATIC CARCINOMA (0/1).   5- LYMPH NODE, 8R, LYMPHADENECTOMY: ONE LYMPH NODE NEGATIVE FOR METASTATIC CARCINOMA (0/1).    6- LYMPH NODE, 12R, LYMPHADENECTOMY: NEGATIVE FOR METASTATIC CARCINOMA (0/1).  7- LUNG, RIGHT UPPER AND MIDDLE LOBES, PNEUMONECTOMY:  POORLY DIFFERENTIATED, G3, SQUAMOUS CELL CARCINOMA, INVASIVE.    - TUMOR SIZE: 3 CM.    - LYMPHOVASCULAR INVASION IS PRESENT.    - MARGINS NEGATIVE FOR INVASIVE CARCINOMA:    - NEAREST MARGIN, VASCULAR / BRONCHIAL 2.5 CM.    - NO PLEURAL SURFACE INVOLVEMENT     - PROMINENT NECROSIS PRESENT.  Visceral Pleura Invasion: Not identified  Number of Lymph Nodes Examined: Exact number : [id number]  pT Category: pT1c: pN0: No regional lymph node metastasis    3/8/2023 LYMPH NODE BIOPSY:   LYMPH NODE 4R, LYMPHADENECTOMY:  METASTATIC SQUAMOUS CELL CARCINOMA, NONKERATINIZING, MULTIPLE FRAGMENTS.       IMMUNOHISTOCHEMICAL STAINS:   CK 5/6, P63 AND CK7:  POSITIVE IN MALIGNANT CELLS.   CK20 AND TTF-1:  NEGATIVE IN MALIGNANT CELLS.         CLINICAL HISTORY:       Patient: Leonila Rosales is a 77 y.o. male kindly referred by  Dr. Diaz for evaluation of lung cancer.    On 01/17/2022 patient presented to the emergency department after a fall.  He reports that he was getting to his truck and sleep and fell on his left side on the truck step rail.  He started having pain in his left hip and knee.  He underwent a  CT angiogram that showed No pulmonary embolus. Right upper lobe 2.5 cm mass.      During that hospitalization he was treated for a close intertrochanteric fracture of the left femur and underwent open reduction intramedullary nailing left comminuted intertrochanteric hip fracture on 01/18/2022 with Dr. Fortino Palomares.  He then spent several weeks on rehab.    On 03/22/2022 he underwent CT-guided biopsy of the right lung mass.  Pathology showed invasive squamous cell carcinoma, moderately differentiated.    He is s/p right upper lobectomy with  on 5/24/2022.  Pathology report as above.  Patient is stage IA3 squamous cell carcinoma of the lung.  He has recovered well and has been doing good.     He lives alone and is able to perform ADL's. He uses a walker to aid in ambulation. He quit smoking January 2022, after smoking for 40 yrs.       Chief Complaint: OTHER (No concerns today.)      Interval History:    Patient presents today continuation of immunotherapy. He started Durvalumab on 6/22/23. Due for C2 today.   He completed XRT on 5/30/23 and 5 cycles of weekly carbo/taxol on 5/19/23. His final cycle was held due to neutropenia, ANC was 0.7.  He has no complaints today, states he is feeling fine. Denies any side effects. He denies shortness of breath, chest pain. No weight loss. Denies headaches. He uses a cane for mobility.  He drove himself to appointment today.  Denies any back pain or recent falls.      Past Medical History:   Diagnosis Date    Anemia     Closed intertrochanteric fracture     Deficiency of macronutrients     GERD (gastroesophageal reflux disease)     H. pylori infection     History of tobacco use     Hyperlipidemia     Hypertension     Lung mass     Malignant neoplasm of unspecified part of unspecified bronchus or lung     Non-small cell lung cancer       Past Surgical History:   Procedure Laterality Date    BIOPSY OF INTESTINE  04/04/2022    BRONCHOSCOPY      COLONOSCOPY       ESOPHAGOGASTRODUODENOSCOPY  04/04/2022    HIP FRACTURE SURGERY Left 2016    Intramedullary Nail Insertion Hip Left 01/18/2022    KIDNEY SURGERY Right 05/27/2022    MEDIASTINOSCOPY N/A 3/6/2023    Procedure: MEDIASTINOSCOPY;  Surgeon: Jose Diaz MD;  Location: [facility];  Service: Cardiothoracic;  Laterality: N/A;  XX    PLACEMENT, MEDIPORT N/A 4/6/2023    Procedure: Placement, Mediport;  Surgeon: Jose Diaz MD;  Location: [facility] OR;  Service: Cardiology;  Laterality: N/A;    SHOULDER SURGERY       Family History   Family history unknown: Yes     Social Connections: Socially Integrated    Frequency of Communication with Friends and Family: More than three times a week    Frequency of Social Gatherings with Friends and Family: More than three times a week    Attends Yarsanism Services: More than 4 times per year    Active Member of Clubs or Organizations: Yes    Attends Club or Organization Meetings: More than 4 times per year    Marital Status:        Review of patient's allergies indicates:  No Known Allergies   Current Outpatient Medications on File Prior to Visit   Medication Sig Dispense Refill    aspirin 81 mg Cap Take 81 mg by mouth Daily.      atorvastatin (LIPITOR) 10 MG tablet TAKE ONE TABLET BY MOUTH DAILY 90 tablet 3    ferrous sulfate (FEOSOL) 325 mg (65 mg iron) Tab tablet Take 1 tablet (325 mg total) by mouth once daily. 30 tablet 3    levoFLOXacin (LEVAQUIN) 500 MG tablet Take 1 tablet (500 mg total) by mouth once daily. 5 tablet 0    LIDOcaine-prilocaine (EMLA) cream Apply topically as needed. Apply to port site 30 mins prior to chemo 30 g 3    LINZESS 145 mcg Cap capsule Take 145 mcg by mouth daily as needed. New medication, not started yet      ondansetron (ZOFRAN) 8 MG tablet Take 1 tablet (8 mg total) by mouth every 8 (eight) hours as needed for Nausea. 1st choice for nausea 30 tablet 2    prochlorperazine (COMPAZINE) 10 MG tablet Take 1 tablet (10 mg total) by mouth every 6 (six)  hours as needed (for nausea). 2nd choice, can cause drowsiness. 30 tablet 3    traMADoL (ULTRAM) 50 mg tablet Take 1 tablet (50 mg total) by mouth every 6 (six) hours as needed for Pain. 12 tablet 0    amLODIPine (NORVASC) 5 MG tablet TAKE ONE TABLET BY MOUTH TWICE DAILY (Patient not taking: Reported on 7/20/2023) 180 tablet 1     No current facility-administered medications on file prior to visit.      Review of Systems   Constitutional:  Positive for fatigue (mild). Negative for activity change, appetite change, chills, diaphoresis, fever and unexpected weight change.   HENT:  Negative for nasal congestion, mouth sores, nosebleeds, postnasal drip, sinus pressure/congestion, sore throat and trouble swallowing.    Eyes: Negative.  Negative for visual disturbance.   Respiratory:  Positive for shortness of breath (baseline). Negative for cough.    Cardiovascular:  Positive for leg swelling (mild). Negative for chest pain and palpitations.        Around surgical scar   Gastrointestinal:  Positive for constipation. Negative for abdominal distention, abdominal pain, blood in stool, change in bowel habit, diarrhea, nausea, vomiting and change in bowel habit.   Endocrine: Negative.    Genitourinary:  Negative for bladder incontinence, decreased urine volume, difficulty urinating, dysuria, frequency, hematuria, scrotal swelling, testicular pain and urgency.   Musculoskeletal:  Positive for gait problem. Negative for arthralgias, back pain, joint swelling, leg pain, myalgias and neck pain.   Integumentary:  Negative for rash.   Allergic/Immunologic: Negative.    Neurological:  Negative for dizziness, tremors, seizures, syncope, speech difficulty, weakness, light-headedness, numbness, headaches and memory loss.   Hematological:  Negative for adenopathy. Does not bruise/bleed easily.   Psychiatric/Behavioral:  Negative for agitation, confusion, hallucinations, sleep disturbance and suicidal ideas. The patient is not  "nervous/anxious.             Vitals:    07/20/23 0959   BP: 125/72   BP Location: Right arm   Patient Position: Sitting   Pulse: 110   Temp: 98.2 °F (36.8 °C)   TempSrc: Oral   SpO2: 99%   Weight: 93.9 kg (207 lb 1.6 oz)   Height: 5' 7" (1.702 m)             Wt Readings from Last 6 Encounters:   07/20/23 93.9 kg (207 lb 1.6 oz)   06/22/23 95.6 kg (210 lb 12.8 oz)   06/16/23 94.9 kg (209 lb 3.2 oz)   05/26/23 94.8 kg (208 lb 14.4 oz)   05/19/23 96.9 kg (213 lb 9.6 oz)   05/12/23 96.4 kg (212 lb 9.6 oz)     Body mass index is 32.44 kg/m².  Body surface area is 2.11 meters squared.       Physical Exam  Vitals and nursing note reviewed.   Constitutional:       General: He is not in acute distress.     Appearance: Normal appearance. He is obese.   HENT:      Head: Normocephalic and atraumatic.      Mouth/Throat:      Mouth: Mucous membranes are moist.   Eyes:      General: No scleral icterus.     Extraocular Movements: Extraocular movements intact.      Conjunctiva/sclera: Conjunctivae normal.   Neck:      Vascular: No JVD.   Cardiovascular:      Rate and Rhythm: Normal rate and regular rhythm.      Heart sounds: No murmur heard.  Pulmonary:      Effort: Pulmonary effort is normal.      Breath sounds: Decreased breath sounds present. No wheezing.   Chest:      Chest wall: No tenderness.   Abdominal:      General: There is no distension.      Palpations: There is no fluid wave.      Tenderness: There is no abdominal tenderness.   Musculoskeletal:         General: No swelling or deformity.      Cervical back: Neck supple.   Lymphadenopathy:      Cervical: No cervical adenopathy.      Upper Body:      Right upper body: No supraclavicular or axillary adenopathy.      Left upper body: No supraclavicular or axillary adenopathy.      Lower Body: No right inguinal adenopathy. No left inguinal adenopathy.   Skin:     General: Skin is warm.      Coloration: Skin is not jaundiced.      Findings: No rash.   Neurological:      " General: No focal deficit present.      Mental Status: He is alert and oriented to person, place, and time.      Comments: Mobility assitssed by cane/walker   Psychiatric:         Attention and Perception: Attention normal.         Mood and Affect: Mood and affect normal.         Behavior: Behavior is cooperative.         Cognition and Memory: Cognition normal.         Judgment: Judgment normal.     ECOG SCORE    1 - Restricted in strenuous activity-ambulatory and able to carry out work of a light nature         Laboratory:  CBC with Differential:  Lab Results   Component Value Date    WBC 5.01 07/20/2023    RBC 4.16 (L) 07/20/2023    HGB 11.9 (L) 07/20/2023    HCT 38.5 (L) 07/20/2023    MCV 92.5 07/20/2023    MCH 28.6 07/20/2023    MCHC 30.9 (L) 07/20/2023    RDW 16.1 07/20/2023     07/20/2023    MPV 11.4 (H) 07/20/2023        CMP:  Sodium Level   Date Value Ref Range Status   07/20/2023 142 136 - 145 mmol/L Final     Potassium Level   Date Value Ref Range Status   07/20/2023 3.8 3.5 - 5.1 mmol/L Final     Carbon Dioxide   Date Value Ref Range Status   07/20/2023 20 (L) 23 - 31 mmol/L Final     Blood Urea Nitrogen   Date Value Ref Range Status   07/20/2023 8.9 8.4 - 25.7 mg/dL Final     Creatinine   Date Value Ref Range Status   07/20/2023 0.85 0.73 - 1.18 mg/dL Final     Calcium Level Total   Date Value Ref Range Status   07/20/2023 9.6 8.8 - 10.0 mg/dL Final     Albumin Level   Date Value Ref Range Status   07/20/2023 3.6 3.4 - 4.8 g/dL Final     Bilirubin Total   Date Value Ref Range Status   07/20/2023 0.8 <=1.5 mg/dL Final     Alkaline Phosphatase   Date Value Ref Range Status   07/20/2023 88 40 - 150 unit/L Final     Aspartate Aminotransferase   Date Value Ref Range Status   07/20/2023 17 5 - 34 unit/L Final     Alanine Aminotransferase   Date Value Ref Range Status   07/20/2023 12 0 - 55 unit/L Final     Estimated GFR-Non    Date Value Ref Range Status   04/19/2022 >60            Assessment:       1) [id number] 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  Verbally consented the patient to the treatment plan and the patient was educated on the planned duration of the treatment and schedule of the treatment administration.  All questions were answered.    3) Pancytopenia- Treatment related. Counts improving.        Plan:       Patient with 1.6 right paratracheal LN and small Rt pleural effusion. Surveillance CT scan chest to eval stability with paratracheal enlarged lymph node which shows interval mild size increase and now measures 2.2 x 2.0 cm and previously was 1.6 x 1.5 cm.  Aortopulmonary window mildly enlarged lymph node is stable. PET CT with Hypermetabolic right paratracheal lymph node image 81 series 3 measures 25 x 20 mm with max SUV 27.0. Hypermetabolic anterior mediastinal lymph node anterior to the SVC measures 12 x 9 mm with max SUV 12.0.   Biopsy of R4 lymph node confirmed the metastatic squamous cell carcinoma. Discussed case with Dr Willis and he will be ready to start next week. Completed  XRT on 5/30/23  and 5 cycles of Carbo/taxol on 5/19/23. C6 was held on 5/26/23 due to neutropenia, ANC was 0.7. Plan is for Durvalumab every 4 weeks for 1 year.      Continue with Imfinzi every 4 weeks - okay to proceed with C2 today  RTC in 4 weeks with NP for TD/lab/infusion same day - he lives in [place]  Labs: CBC, CMP, TSH   CT C/A/P in 3 months to evaluate subcentimeter nodules in the right lung, infectious or inflammatory in nature - due 10/2023, will order when closer    Encourage to call or message us for any questions or problems  The patient was given ample opportunity to ask questions, and to the best of my abilities, all questions answered to satisfaction; patient demonstrated understanding of what  we discussed and agreeable to the plan.     CONNER FLORES MD      Professional Services   I, Shirley Pina LPN, acted solely as a scribe for and in the presence of Dr. Conner Flores, who performed these services.

## 2024-05-09 ENCOUNTER — TELEPHONE (OUTPATIENT)
Dept: FAMILY MEDICINE | Facility: CLINIC | Age: 79
End: 2024-05-09
Payer: MEDICARE

## 2024-05-09 NOTE — TELEPHONE ENCOUNTER
Attempted to contact patient for previsit on 5/9/24 at 9:04.  Unable to leave a voicemail or send portal message.

## 2024-05-13 ENCOUNTER — LAB VISIT (OUTPATIENT)
Dept: LAB | Facility: HOSPITAL | Age: 79
End: 2024-05-13
Attending: INTERNAL MEDICINE
Payer: MEDICARE

## 2024-05-13 DIAGNOSIS — C34.91 NON-SMALL CELL CANCER OF RIGHT LUNG: ICD-10-CM

## 2024-05-13 LAB
ALBUMIN SERPL-MCNC: 3.2 G/DL (ref 3.4–4.8)
ALBUMIN/GLOB SERPL: 0.8 RATIO (ref 1.1–2)
ALP SERPL-CCNC: 83 UNIT/L (ref 40–150)
ALT SERPL-CCNC: 74 UNIT/L (ref 0–55)
AST SERPL-CCNC: 61 UNIT/L (ref 5–34)
BASOPHILS # BLD AUTO: 0.02 X10(3)/MCL
BASOPHILS NFR BLD AUTO: 1 %
BILIRUB SERPL-MCNC: 0.8 MG/DL
BUN SERPL-MCNC: 6 MG/DL (ref 8.4–25.7)
CALCIUM SERPL-MCNC: 9.4 MG/DL (ref 8.8–10)
CHLORIDE SERPL-SCNC: 108 MMOL/L (ref 98–107)
CO2 SERPL-SCNC: 26 MMOL/L (ref 23–31)
CREAT SERPL-MCNC: 0.75 MG/DL (ref 0.73–1.18)
EOSINOPHIL # BLD AUTO: 0.05 X10(3)/MCL (ref 0–0.9)
EOSINOPHIL NFR BLD AUTO: 2.4 %
ERYTHROCYTE [DISTWIDTH] IN BLOOD BY AUTOMATED COUNT: 16.4 % (ref 11.5–17)
GFR SERPLBLD CREATININE-BSD FMLA CKD-EPI: >60 ML/MIN/1.73/M2
GLOBULIN SER-MCNC: 3.9 GM/DL (ref 2.4–3.5)
GLUCOSE SERPL-MCNC: 93 MG/DL (ref 82–115)
HCT VFR BLD AUTO: 34.4 % (ref 42–52)
HGB BLD-MCNC: 11.3 G/DL (ref 14–18)
IMM GRANULOCYTES # BLD AUTO: 0 X10(3)/MCL (ref 0–0.04)
IMM GRANULOCYTES NFR BLD AUTO: 0 %
LYMPHOCYTES # BLD AUTO: 0.77 X10(3)/MCL (ref 0.6–4.6)
LYMPHOCYTES NFR BLD AUTO: 36.8 %
MCH RBC QN AUTO: 28.6 PG (ref 27–31)
MCHC RBC AUTO-ENTMCNC: 32.8 G/DL (ref 33–36)
MCV RBC AUTO: 87.1 FL (ref 80–94)
MONOCYTES # BLD AUTO: 0.12 X10(3)/MCL (ref 0.1–1.3)
MONOCYTES NFR BLD AUTO: 5.7 %
NEUTROPHILS # BLD AUTO: 1.13 X10(3)/MCL (ref 2.1–9.2)
NEUTROPHILS NFR BLD AUTO: 54.1 %
PLATELET # BLD AUTO: 384 X10(3)/MCL (ref 130–400)
PMV BLD AUTO: 9.7 FL (ref 7.4–10.4)
POTASSIUM SERPL-SCNC: 3.9 MMOL/L (ref 3.5–5.1)
PROT SERPL-MCNC: 7.1 GM/DL (ref 5.8–7.6)
RBC # BLD AUTO: 3.95 X10(6)/MCL (ref 4.7–6.1)
SODIUM SERPL-SCNC: 141 MMOL/L (ref 136–145)
WBC # SPEC AUTO: 2.09 X10(3)/MCL (ref 4.5–11.5)

## 2024-05-13 PROCEDURE — 36415 COLL VENOUS BLD VENIPUNCTURE: CPT

## 2024-05-13 PROCEDURE — 80053 COMPREHEN METABOLIC PANEL: CPT

## 2024-05-13 PROCEDURE — 85025 COMPLETE CBC W/AUTO DIFF WBC: CPT

## 2024-05-14 RX ORDER — ONDANSETRON HYDROCHLORIDE 2 MG/ML
8 INJECTION, SOLUTION INTRAVENOUS
Status: CANCELLED | OUTPATIENT
Start: 2024-05-15

## 2024-05-14 RX ORDER — SODIUM CHLORIDE 0.9 % (FLUSH) 0.9 %
10 SYRINGE (ML) INJECTION
Status: CANCELLED | OUTPATIENT
Start: 2024-05-15

## 2024-05-14 RX ORDER — HEPARIN 100 UNIT/ML
500 SYRINGE INTRAVENOUS
Status: CANCELLED | OUTPATIENT
Start: 2024-05-15

## 2024-05-15 ENCOUNTER — INFUSION (OUTPATIENT)
Dept: INFUSION THERAPY | Facility: HOSPITAL | Age: 79
End: 2024-05-15
Attending: NURSE PRACTITIONER
Payer: MEDICARE

## 2024-05-15 VITALS
HEIGHT: 67 IN | RESPIRATION RATE: 18 BRPM | SYSTOLIC BLOOD PRESSURE: 144 MMHG | WEIGHT: 203.88 LBS | BODY MASS INDEX: 32 KG/M2 | TEMPERATURE: 98 F | DIASTOLIC BLOOD PRESSURE: 73 MMHG | HEART RATE: 108 BPM

## 2024-05-15 DIAGNOSIS — C34.90 NON-SMALL CELL LUNG CANCER, UNSPECIFIED LATERALITY: ICD-10-CM

## 2024-05-15 DIAGNOSIS — C34.00 MALIGNANT NEOPLASM OF HILUS OF LUNG, UNSPECIFIED LATERALITY: Primary | ICD-10-CM

## 2024-05-15 PROCEDURE — 63600175 PHARM REV CODE 636 W HCPCS: Performed by: NURSE PRACTITIONER

## 2024-05-15 PROCEDURE — 96413 CHEMO IV INFUSION 1 HR: CPT

## 2024-05-15 PROCEDURE — 25000003 PHARM REV CODE 250: Performed by: NURSE PRACTITIONER

## 2024-05-15 PROCEDURE — 96375 TX/PRO/DX INJ NEW DRUG ADDON: CPT

## 2024-05-15 RX ORDER — ONDANSETRON HYDROCHLORIDE 2 MG/ML
8 INJECTION, SOLUTION INTRAVENOUS
Status: COMPLETED | OUTPATIENT
Start: 2024-05-15 | End: 2024-05-15

## 2024-05-15 RX ORDER — HEPARIN 100 UNIT/ML
500 SYRINGE INTRAVENOUS
Status: DISCONTINUED | OUTPATIENT
Start: 2024-05-15 | End: 2024-05-15 | Stop reason: HOSPADM

## 2024-05-15 RX ORDER — SODIUM CHLORIDE 0.9 % (FLUSH) 0.9 %
10 SYRINGE (ML) INJECTION
Status: DISCONTINUED | OUTPATIENT
Start: 2024-05-15 | End: 2024-05-15 | Stop reason: HOSPADM

## 2024-05-15 RX ADMIN — PEGFILGRASTIM 6 MG: KIT SUBCUTANEOUS at 11:05

## 2024-05-15 RX ADMIN — ONDANSETRON 8 MG: 2 INJECTION INTRAMUSCULAR; INTRAVENOUS at 10:05

## 2024-05-15 RX ADMIN — GEMCITABINE HYDROCHLORIDE 2600 MG: 2 INJECTION, SOLUTION INTRAVENOUS at 11:05

## 2024-05-16 ENCOUNTER — OFFICE VISIT (OUTPATIENT)
Dept: FAMILY MEDICINE | Facility: CLINIC | Age: 79
End: 2024-05-16
Payer: MEDICARE

## 2024-05-16 VITALS
WEIGHT: 203 LBS | HEIGHT: 67 IN | HEART RATE: 96 BPM | RESPIRATION RATE: 16 BRPM | SYSTOLIC BLOOD PRESSURE: 132 MMHG | BODY MASS INDEX: 31.86 KG/M2 | TEMPERATURE: 98 F | OXYGEN SATURATION: 98 % | DIASTOLIC BLOOD PRESSURE: 76 MMHG

## 2024-05-16 DIAGNOSIS — R00.0 TACHYCARDIA: ICD-10-CM

## 2024-05-16 DIAGNOSIS — I10 PRIMARY HYPERTENSION: Primary | ICD-10-CM

## 2024-05-16 PROCEDURE — 3075F SYST BP GE 130 - 139MM HG: CPT | Mod: CPTII,,, | Performed by: NURSE PRACTITIONER

## 2024-05-16 PROCEDURE — 1157F ADVNC CARE PLAN IN RCRD: CPT | Mod: CPTII,,, | Performed by: NURSE PRACTITIONER

## 2024-05-16 PROCEDURE — 1159F MED LIST DOCD IN RCRD: CPT | Mod: CPTII,,, | Performed by: NURSE PRACTITIONER

## 2024-05-16 PROCEDURE — 1160F RVW MEDS BY RX/DR IN RCRD: CPT | Mod: CPTII,,, | Performed by: NURSE PRACTITIONER

## 2024-05-16 PROCEDURE — 99213 OFFICE O/P EST LOW 20 MIN: CPT | Mod: ,,, | Performed by: NURSE PRACTITIONER

## 2024-05-16 PROCEDURE — 3078F DIAST BP <80 MM HG: CPT | Mod: CPTII,,, | Performed by: NURSE PRACTITIONER

## 2024-05-16 PROCEDURE — 1126F AMNT PAIN NOTED NONE PRSNT: CPT | Mod: CPTII,,, | Performed by: NURSE PRACTITIONER

## 2024-05-16 PROCEDURE — 3288F FALL RISK ASSESSMENT DOCD: CPT | Mod: CPTII,,, | Performed by: NURSE PRACTITIONER

## 2024-05-16 PROCEDURE — 1101F PT FALLS ASSESS-DOCD LE1/YR: CPT | Mod: CPTII,,, | Performed by: NURSE PRACTITIONER

## 2024-05-16 NOTE — ASSESSMENT & PLAN NOTE
Improved, continue amlodipine daily and metoprolol 25 mg daily, follow-up in October as scheduled.

## 2024-05-16 NOTE — PROGRESS NOTES
Subjective:       Patient ID: Leonila Rosales is a 78 y.o. male.    Chief Complaint: Hypertension (1m fu) and Tachycardia (1m fu)      HPI   This is a 78-year-old  male who presents to clinic today for one-month follow-up for hypertension and tachycardia.  Reports overall doing well.  No complaints today.  Review of Systems  Comprehensive review of systems negative except as stated in HPI    The patient's Health Maintenance was reviewed and the following appears to be due:   There are no preventive care reminders to display for this patient.    Past Medical History:  Past Medical History:   Diagnosis Date    Anemia     Closed intertrochanteric fracture     Deficiency of macronutrients     GERD (gastroesophageal reflux disease)     H. pylori infection     History of tobacco use     Hyperlipidemia     Hypertension     Lung mass     Malignant neoplasm of unspecified part of unspecified bronchus or lung     Non-small cell lung cancer      Past Surgical History:   Procedure Laterality Date    BIOPSY OF INTESTINE  04/04/2022    BRONCHOSCOPY      COLONOSCOPY      ESOPHAGOGASTRODUODENOSCOPY  04/04/2022    HIP FRACTURE SURGERY Left 2016    Intramedullary Nail Insertion Hip Left 01/18/2022    KIDNEY SURGERY Right 05/27/2022    MEDIASTINOSCOPY N/A 3/6/2023    Procedure: MEDIASTINOSCOPY;  Surgeon: Jose Diaz MD;  Location: [facility];  Service: Cardiothoracic;  Laterality: N/A;  XX    PLACEMENT, MEDIPORT N/A 4/6/2023    Procedure: Placement, Mediport;  Surgeon: Jose Diaz MD;  Location: [facility];  Service: Cardiology;  Laterality: N/A;    SHOULDER SURGERY       Review of patient's allergies indicates:  No Known Allergies  Current Outpatient Medications on File Prior to Visit   Medication Sig Dispense Refill    amLODIPine (NORVASC) 5 MG tablet TAKE ONE TABLET BY MOUTH EVERY DAY TWICE DAILY 180 tablet 1    aspirin 81 mg Cap Take 81 mg by mouth Daily.      atorvastatin (LIPITOR) 10 MG tablet TAKE ONE TABLET  BY MOUTH DAILY 90 tablet 3    levoFLOXacin (LEVAQUIN) 500 MG tablet Take 1 tablet (500 mg total) by mouth once daily. 7 tablet 0    LIDOcaine-prilocaine (EMLA) cream Apply topically as needed. Apply to port site 30 mins prior to chemo 30 g 3    LINZESS 145 mcg Cap capsule Take 145 mcg by mouth daily as needed. New medication, not started yet      metoprolol succinate (TOPROL-XL) 25 MG 24 hr tablet Take 1 tablet (25 mg total) by mouth once daily. 90 tablet 3    pantoprazole (PROTONIX) 40 MG tablet Take 40 mg by mouth.       Current Facility-Administered Medications on File Prior to Visit   Medication Dose Route Frequency Provider Last Rate Last Admin    [COMPLETED] gemcitabine 2,600 mg in sodium chloride 0.9% SolP 353.38 mL chemo infusion  2,600 mg Intravenous 1 time in [facility]/Keiko Mcfadden FNP   Stopped at 05/15/24 1145    [COMPLETED] ondansetron injection 8 mg  8 mg Intravenous 1 time in [facility]/Keiko Mcfadden FNP   8 mg at 05/15/24 1046    [COMPLETED] pegfilgrastim (NEULASTA (ON BODY INJECTOR)) injection 6 mg  6 mg Subcutaneous 1 time in [facility]/Keiko Mcfadden FNP   6 mg at 05/15/24 1114    [DISCONTINUED] alteplase injection 2 mg  2 mg Intra-Catheter PRN Keiko Linda FNP        [DISCONTINUED] heparin, porcine (PF) 100 unit/mL injection flush 500 Units  500 Units Intravenous PRN Keiko Linda FNP        [DISCONTINUED] sodium chloride 0.9% 250 mL flush bag   Intravenous 1 time in [facility]/Keiko Mcfadden FNP        [DISCONTINUED] sodium chloride 0.9% flush 10 mL  10 mL Intravenous PRN Keiko Linda FNP         Social History     Socioeconomic History    Marital status:    Tobacco Use    Smoking status: Former     Current packs/day: 0.00     Types: Cigarettes     Quit date: 2021     Years since quittin.4    Smokeless tobacco: Never   Substance and Sexual Activity    Alcohol use: Yes     Comment: rare    Drug use: Never    Sexual activity: Not Currently     Social Determinants of  "Health     Financial Resource Strain: Medium Risk (4/12/2023)    Overall Financial Resource Strain (CARDIA)     Difficulty of Paying Living Expenses: Somewhat hard   Food Insecurity: No Food Insecurity (4/12/2023)    Hunger Vital Sign     Worried About Running Out of Food in the Last Year: Never true     Ran Out of Food in the Last Year: Never true   Transportation Needs: No Transportation Needs (4/12/2023)    PRAPARE - Transportation     Lack of Transportation (Medical): No     Lack of Transportation (Non-Medical): No   Physical Activity: Insufficiently Active (4/12/2023)    Exercise Vital Sign     Days of Exercise per Week: 3 days     Minutes of Exercise per Session: 20 min   Stress: Stress Concern Present (4/12/2023)    [employer] [place] [employer] - Occupational Stress Questionnaire     Feeling of Stress : To some extent   Housing Stability: Low Risk  (4/12/2023)    Housing Stability Vital Sign     Unable to Pay for Housing in the Last Year: No     Number of Places Lived in the Last Year: 1     Unstable Housing in the Last Year: No     Family History   Family history unknown: Yes       Objective:       /76 (BP Location: Right arm)   Pulse 96   Temp 98.1 °F (36.7 °C) (Oral)   Resp 16   Ht 5' 6.93" (1.7 m)   Wt 92.1 kg (203 lb)   SpO2 98%   BMI 31.86 kg/m²      Physical Exam  Vitals and nursing note reviewed.   Constitutional:       Appearance: Normal appearance. He is normal weight.   HENT:      Head: Normocephalic and atraumatic.      Right Ear: Tympanic membrane, ear canal and external ear normal.      Left Ear: Tympanic membrane, ear canal and external ear normal.      Nose: Nose normal.      Mouth/Throat:      Mouth: Mucous membranes are moist.      Pharynx: Oropharynx is clear.   Eyes:      Extraocular Movements: Extraocular movements intact.      Conjunctiva/sclera: Conjunctivae normal.      Pupils: Pupils are equal, round, and reactive to light.   Cardiovascular:      Rate and " Rhythm: Normal rate and regular rhythm.      Heart sounds: Normal heart sounds.   Pulmonary:      Effort: Pulmonary effort is normal.      Breath sounds: Normal breath sounds.   Musculoskeletal:         General: Normal range of motion.      Cervical back: Normal range of motion and neck supple.      Comments: Ambulates with the assistance of cane   Skin:     General: Skin is warm and dry.   Neurological:      General: No focal deficit present.      Mental Status: He is alert and oriented to person, place, and time.   Psychiatric:         Mood and Affect: Mood normal.         Behavior: Behavior normal.         Thought Content: Thought content normal.         Judgment: Judgment normal.             Assessment and Plan       ICD-10-CM ICD-9-CM   1. Primary hypertension  I10 401.9   2. Tachycardia  R00.0 785.0        1. Primary hypertension  Overview:  Amlodipine 5 mg daily  04/12/2024 - add metoprolol XL 25 mg daily    Assessment & Plan:  Improved, continue amlodipine daily and metoprolol 25 mg daily, follow-up in October as scheduled.      2. Tachycardia  Overview:  04/12/2024 - start metoprolol XL 25 mg daily    Assessment & Plan:  Improved, continue metoprolol XL 25 mg daily, follow-up in October as scheduled.             Follow up in 5 months (on 10/15/2024) for follow up.

## 2024-05-23 ENCOUNTER — HOSPITAL ENCOUNTER (OUTPATIENT)
Dept: RADIOLOGY | Facility: HOSPITAL | Age: 79
Discharge: HOME | End: 2024-05-23
Attending: NURSE PRACTITIONER
Payer: MEDICARE

## 2024-05-23 DIAGNOSIS — C34.91 NON-SMALL CELL CANCER OF RIGHT LUNG: ICD-10-CM

## 2024-05-23 DIAGNOSIS — Z79.899 ON ANTINEOPLASTIC CHEMOTHERAPY: ICD-10-CM

## 2024-05-23 PROCEDURE — A9552 F18 FDG: HCPCS | Performed by: NURSE PRACTITIONER

## 2024-05-23 PROCEDURE — 78815 PET IMAGE W/CT SKULL-THIGH: CPT | Mod: TC

## 2024-05-23 RX ORDER — FLUDEOXYGLUCOSE F18 500 MCI/ML
10 INJECTION INTRAVENOUS
Status: COMPLETED | OUTPATIENT
Start: 2024-05-23 | End: 2024-05-23

## 2024-05-23 RX ADMIN — FLUDEOXYGLUCOSE F-18 10.6 MILLICURIE: 500 INJECTION INTRAVENOUS at 09:05

## 2024-05-29 ENCOUNTER — OFFICE VISIT (OUTPATIENT)
Dept: HEMATOLOGY/ONCOLOGY | Facility: CLINIC | Age: 79
End: 2024-05-29
Payer: MEDICARE

## 2024-05-29 ENCOUNTER — INFUSION (OUTPATIENT)
Dept: INFUSION THERAPY | Facility: HOSPITAL | Age: 79
End: 2024-05-29
Attending: NURSE PRACTITIONER
Payer: MEDICARE

## 2024-05-29 VITALS
HEART RATE: 90 BPM | WEIGHT: 202.69 LBS | RESPIRATION RATE: 20 BRPM | HEIGHT: 67 IN | DIASTOLIC BLOOD PRESSURE: 71 MMHG | BODY MASS INDEX: 31.81 KG/M2 | OXYGEN SATURATION: 98 % | TEMPERATURE: 98 F | SYSTOLIC BLOOD PRESSURE: 125 MMHG

## 2024-05-29 DIAGNOSIS — C34.91 NON-SMALL CELL CANCER OF RIGHT LUNG: Primary | ICD-10-CM

## 2024-05-29 DIAGNOSIS — Z79.899 ON ANTINEOPLASTIC CHEMOTHERAPY: ICD-10-CM

## 2024-05-29 DIAGNOSIS — C77.1 SECONDARY AND UNSPECIFIED MALIGNANT NEOPLASM OF INTRATHORACIC LYMPH NODES: ICD-10-CM

## 2024-05-29 DIAGNOSIS — D84.821 IMMUNODEFICIENT STATE DUE TO DRUG THERAPY: ICD-10-CM

## 2024-05-29 DIAGNOSIS — C34.90 NON-SMALL CELL LUNG CANCER, UNSPECIFIED LATERALITY: ICD-10-CM

## 2024-05-29 DIAGNOSIS — T45.1X5A CHEMOTHERAPY-INDUCED NEUTROPENIA: ICD-10-CM

## 2024-05-29 DIAGNOSIS — Z79.899 IMMUNODEFICIENT STATE DUE TO DRUG THERAPY: ICD-10-CM

## 2024-05-29 DIAGNOSIS — C34.00 MALIGNANT NEOPLASM OF HILUS OF LUNG, UNSPECIFIED LATERALITY: Primary | ICD-10-CM

## 2024-05-29 DIAGNOSIS — R53.83 FATIGUE, UNSPECIFIED TYPE: ICD-10-CM

## 2024-05-29 DIAGNOSIS — D70.1 CHEMOTHERAPY-INDUCED NEUTROPENIA: ICD-10-CM

## 2024-05-29 LAB
ALBUMIN SERPL-MCNC: 3.5 G/DL (ref 3.4–4.8)
ALBUMIN/GLOB SERPL: 1 RATIO (ref 1.1–2)
ALP SERPL-CCNC: 120 UNIT/L (ref 40–150)
ALT SERPL-CCNC: 29 UNIT/L (ref 0–55)
ANION GAP SERPL CALC-SCNC: 9 MEQ/L
AST SERPL-CCNC: 27 UNIT/L (ref 5–34)
BASOPHILS # BLD AUTO: 0.05 X10(3)/MCL
BASOPHILS NFR BLD AUTO: 0.5 %
BILIRUB SERPL-MCNC: 0.5 MG/DL
BUN SERPL-MCNC: 7.1 MG/DL (ref 8.4–25.7)
CALCIUM SERPL-MCNC: 9.1 MG/DL (ref 8.8–10)
CHLORIDE SERPL-SCNC: 108 MMOL/L (ref 98–107)
CO2 SERPL-SCNC: 24 MMOL/L (ref 23–31)
CREAT SERPL-MCNC: 0.8 MG/DL (ref 0.73–1.18)
CREAT/UREA NIT SERPL: 9
EOSINOPHIL # BLD AUTO: 0.1 X10(3)/MCL (ref 0–0.9)
EOSINOPHIL NFR BLD AUTO: 1.1 %
ERYTHROCYTE [DISTWIDTH] IN BLOOD BY AUTOMATED COUNT: 20.4 % (ref 11.5–17)
GFR SERPLBLD CREATININE-BSD FMLA CKD-EPI: >60 ML/MIN/1.73/M2
GLOBULIN SER-MCNC: 3.4 GM/DL (ref 2.4–3.5)
GLUCOSE SERPL-MCNC: 112 MG/DL (ref 82–115)
HCT VFR BLD AUTO: 36.1 % (ref 42–52)
HGB BLD-MCNC: 11.7 G/DL (ref 14–18)
IMM GRANULOCYTES # BLD AUTO: 0.06 X10(3)/MCL (ref 0–0.04)
IMM GRANULOCYTES NFR BLD AUTO: 0.7 %
LYMPHOCYTES # BLD AUTO: 0.61 X10(3)/MCL (ref 0.6–4.6)
LYMPHOCYTES NFR BLD AUTO: 6.7 %
MCH RBC QN AUTO: 29 PG (ref 27–31)
MCHC RBC AUTO-ENTMCNC: 32.4 G/DL (ref 33–36)
MCV RBC AUTO: 89.6 FL (ref 80–94)
MONOCYTES # BLD AUTO: 0.96 X10(3)/MCL (ref 0.1–1.3)
MONOCYTES NFR BLD AUTO: 10.5 %
NEUTROPHILS # BLD AUTO: 7.37 X10(3)/MCL (ref 2.1–9.2)
NEUTROPHILS NFR BLD AUTO: 80.5 %
PLATELET # BLD AUTO: 258 X10(3)/MCL (ref 130–400)
PMV BLD AUTO: 10.7 FL (ref 7.4–10.4)
POTASSIUM SERPL-SCNC: 3.8 MMOL/L (ref 3.5–5.1)
PROT SERPL-MCNC: 6.9 GM/DL (ref 5.8–7.6)
RBC # BLD AUTO: 4.03 X10(6)/MCL (ref 4.7–6.1)
SODIUM SERPL-SCNC: 141 MMOL/L (ref 136–145)
WBC # SPEC AUTO: 9.15 X10(3)/MCL (ref 4.5–11.5)

## 2024-05-29 PROCEDURE — 63600175 PHARM REV CODE 636 W HCPCS: Performed by: INTERNAL MEDICINE

## 2024-05-29 PROCEDURE — 99215 OFFICE O/P EST HI 40 MIN: CPT | Mod: S$GLB,,, | Performed by: INTERNAL MEDICINE

## 2024-05-29 PROCEDURE — 1157F ADVNC CARE PLAN IN RCRD: CPT | Mod: CPTII,S$GLB,, | Performed by: INTERNAL MEDICINE

## 2024-05-29 PROCEDURE — 1126F AMNT PAIN NOTED NONE PRSNT: CPT | Mod: CPTII,S$GLB,, | Performed by: INTERNAL MEDICINE

## 2024-05-29 PROCEDURE — 80053 COMPREHEN METABOLIC PANEL: CPT | Performed by: INTERNAL MEDICINE

## 2024-05-29 PROCEDURE — 3074F SYST BP LT 130 MM HG: CPT | Mod: CPTII,S$GLB,, | Performed by: INTERNAL MEDICINE

## 2024-05-29 PROCEDURE — 1160F RVW MEDS BY RX/DR IN RCRD: CPT | Mod: CPTII,S$GLB,, | Performed by: INTERNAL MEDICINE

## 2024-05-29 PROCEDURE — 96413 CHEMO IV INFUSION 1 HR: CPT

## 2024-05-29 PROCEDURE — 85025 COMPLETE CBC W/AUTO DIFF WBC: CPT | Performed by: INTERNAL MEDICINE

## 2024-05-29 PROCEDURE — 3288F FALL RISK ASSESSMENT DOCD: CPT | Mod: CPTII,S$GLB,, | Performed by: INTERNAL MEDICINE

## 2024-05-29 PROCEDURE — 1159F MED LIST DOCD IN RCRD: CPT | Mod: CPTII,S$GLB,, | Performed by: INTERNAL MEDICINE

## 2024-05-29 PROCEDURE — 25000003 PHARM REV CODE 250: Performed by: INTERNAL MEDICINE

## 2024-05-29 PROCEDURE — 36415 COLL VENOUS BLD VENIPUNCTURE: CPT | Performed by: INTERNAL MEDICINE

## 2024-05-29 PROCEDURE — 1101F PT FALLS ASSESS-DOCD LE1/YR: CPT | Mod: CPTII,S$GLB,, | Performed by: INTERNAL MEDICINE

## 2024-05-29 PROCEDURE — 99999 PR PBB SHADOW E&M-EST. PATIENT-LVL IV: CPT | Mod: PBBFAC,,, | Performed by: INTERNAL MEDICINE

## 2024-05-29 PROCEDURE — 96375 TX/PRO/DX INJ NEW DRUG ADDON: CPT

## 2024-05-29 PROCEDURE — 3078F DIAST BP <80 MM HG: CPT | Mod: CPTII,S$GLB,, | Performed by: INTERNAL MEDICINE

## 2024-05-29 RX ORDER — ONDANSETRON HYDROCHLORIDE 2 MG/ML
8 INJECTION, SOLUTION INTRAVENOUS
Status: CANCELLED | OUTPATIENT
Start: 2024-05-29

## 2024-05-29 RX ORDER — ONDANSETRON HYDROCHLORIDE 2 MG/ML
8 INJECTION, SOLUTION INTRAVENOUS
Status: CANCELLED | OUTPATIENT
Start: 2024-06-05

## 2024-05-29 RX ORDER — HEPARIN 100 UNIT/ML
500 SYRINGE INTRAVENOUS
Status: CANCELLED | OUTPATIENT
Start: 2024-06-05

## 2024-05-29 RX ORDER — SODIUM CHLORIDE 0.9 % (FLUSH) 0.9 %
10 SYRINGE (ML) INJECTION
Status: CANCELLED | OUTPATIENT
Start: 2024-05-29

## 2024-05-29 RX ORDER — SODIUM CHLORIDE 0.9 % (FLUSH) 0.9 %
10 SYRINGE (ML) INJECTION
Status: CANCELLED | OUTPATIENT
Start: 2024-06-05

## 2024-05-29 RX ORDER — ONDANSETRON HYDROCHLORIDE 2 MG/ML
8 INJECTION, SOLUTION INTRAVENOUS
Status: COMPLETED | OUTPATIENT
Start: 2024-05-29 | End: 2024-05-29

## 2024-05-29 RX ORDER — SODIUM CHLORIDE 0.9 % (FLUSH) 0.9 %
10 SYRINGE (ML) INJECTION
Status: DISCONTINUED | OUTPATIENT
Start: 2024-05-29 | End: 2024-05-29 | Stop reason: HOSPADM

## 2024-05-29 RX ORDER — HEPARIN 100 UNIT/ML
500 SYRINGE INTRAVENOUS
Status: CANCELLED | OUTPATIENT
Start: 2024-05-29

## 2024-05-29 RX ORDER — HEPARIN 100 UNIT/ML
500 SYRINGE INTRAVENOUS
Status: DISCONTINUED | OUTPATIENT
Start: 2024-05-29 | End: 2024-05-29 | Stop reason: HOSPADM

## 2024-05-29 RX ADMIN — GEMCITABINE 2600 MG: 38 INJECTION, SOLUTION INTRAVENOUS at 11:05

## 2024-05-29 RX ADMIN — ONDANSETRON 8 MG: 2 INJECTION INTRAMUSCULAR; INTRAVENOUS at 11:05

## 2024-05-29 NOTE — PROGRESS NOTES
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 Right upper lobe 2.5 cm mass.  CT C 9/19/2022: Status post right partial pneumonectomy for resection of a right upper lobe lung mass with no residual recurrent mass seen. Small right-sided pleural effusion. Some lymphadenopathy in the right paratracheal region with a maximum short axis dimension of 1.6 cm.  Follow-up is recommended  CT C 1/25/2023: There is a paratracheal enlarged lymph node which shows interval mild size increase and now measures 2.2 x 2.0 cm and previously was 1.6 x 1.5 cm.  Aortopulmonary window mildly enlarged lymph node is stable.  Thoracic aorta is without aneurysmal dilatation or dissection.  Impression: 1. Operative changes of right upper lobectomy without bronchialstump soft tissue proliferation    2. No residual tumor load or metastatic nodule. 3. Paratracheal enlarged lymph node with interval size increase.  PET CT 2/9/2023:  Hypermetabolic right paratracheal lymph node image 81 series 3 measures 25 x 20 mm with max SUV 27.0.  Hypermetabolic anterior mediastinal lymph node anterior to the SVC measures 12 x 9 mm with max SUV 12.0. Impression: 1. Hypermetabolic metastatic mediastinal adenopathy.   2. No PET  evidence of metastatic disease outside of the mediastinum.  CT C/A/P 7/10/2023:  1. Several new subcentimeter nodules in the right lung.  Suspect these are infectious or inflammatory in nature, but 3 month follow-up chest CT recommended to ensure resolution.  2. 2 reference mediastinal lymph nodes are smaller since January.  3. L1 vertebral body compression deformity new since January, but appears subacute.  CT C/A/P 10/10/2023:    1. Slightly larger mediastinal lymph nodes, to be followed.  2. Increased irregular right perihilar consolidation which may be treatment related.  3. Small right pleural effusion.  4. No new suspicious findings in the abdomen or pelvis.  CT C/A/P 1/10/2024:    1. Interval enlargement of mediastinal lymph nodes  2. Similar right perihilar consolidative opacity and small right pleural effusion  PET CT 2/6/2024:  1. New, enlarged hypermetabolic superior paratracheal and para-aortic/subaortic lymph nodes compared to prior PET-CT  2. Persistent enlarged and hypermetabolic lower right paratracheal lymph node.  This lymph node is decreased in size and FDG avidity compared to prior PET-CT.  3. Previously seen hypermetabolic pre-vascular lymph node is decreased in size and is no longer hypermetabolic.  PET CT 5/23/2024:  1. Status post right upper lobe resection without evidence for local recurrence/residual disease.  2. Interval response to therapy with decreasing right paratracheal and left AP window adenopathy.  3. Nonspecific area of hypermetabolic activity within the left tongue base.  Direct visualization may be warranted.  4. No evidence for new focus of metastatic disease.         Pathology:  03/22/2022 CT-guided biopsy of the right lung mass: invasive squamous cell carcinoma, moderately differentiated.  5/24/2022: Right upper lobectomy:  1- LYMPH NODE, LUMBAR RIGHT 10 LYMPHADENECTOMY: NEGATIVE FOR METASTATIC CARCINOMA (0/1).   2- LYMPH NODE, 11R, LYMPHADENECTOMY: NEGATIVE FOR METASTATIC  CARCINOMA (0/1).  3- LYMPH NODE, 9R, LYMPHADENECTOMY: NEGATIVE FOR METASTATIC CARCINOMA (0/1).   4- LYMPH NODE, 7R, LYMPHADENECTOMY: NEGATIVE FOR METASTATIC CARCINOMA (0/1).   5- LYMPH NODE, 8R, LYMPHADENECTOMY: ONE LYMPH NODE NEGATIVE FOR METASTATIC CARCINOMA (0/1).    6- LYMPH NODE, 12R, LYMPHADENECTOMY: NEGATIVE FOR METASTATIC CARCINOMA (0/1).  7- LUNG, RIGHT UPPER AND MIDDLE LOBES, PNEUMONECTOMY:  POORLY DIFFERENTIATED, G3, SQUAMOUS CELL CARCINOMA, INVASIVE.    - TUMOR SIZE: 3 CM.    - LYMPHOVASCULAR INVASION IS PRESENT.    - MARGINS NEGATIVE FOR INVASIVE CARCINOMA:    - NEAREST MARGIN, VASCULAR / BRONCHIAL 2.5 CM.    - NO PLEURAL SURFACE INVOLVEMENT     - PROMINENT NECROSIS PRESENT.  Visceral Pleura Invasion: Not identified  Number of Lymph Nodes Examined: Exact number : [id number]  pT Category: pT1c: pN0: No regional lymph node metastasis    3/8/2023 LYMPH NODE BIOPSY:   LYMPH NODE 4R, LYMPHADENECTOMY:  METASTATIC SQUAMOUS CELL CARCINOMA, NONKERATINIZING, MULTIPLE FRAGMENTS.       IMMUNOHISTOCHEMICAL STAINS:   CK 5/6, P63 AND CK7:  POSITIVE IN MALIGNANT CELLS.   CK20 AND TTF-1:  NEGATIVE IN MALIGNANT CELLS.       CLINICAL HISTORY:       Patient: Leonila Rosales is a 78 y.o. male kindly referred by  Dr. Diaz for evaluation of lung cancer.    On 01/17/2022 patient presented to the emergency department after a fall.  He reports that he was getting to his truck and sleep and fell on his left side on the truck step rail.  He started having pain in his left hip and knee.  He underwent a CT angiogram that showed No pulmonary embolus. Right upper lobe 2.5 cm mass.      During that hospitalization he was treated for a close intertrochanteric fracture of the left femur and underwent open reduction intramedullary nailing left comminuted intertrochanteric hip fracture on 01/18/2022 with Dr. Fortino Palomares.  He then spent several weeks on rehab.    On 03/22/2022 he underwent CT-guided biopsy of the right lung mass.  Pathology showed  invasive squamous cell carcinoma, moderately differentiated.    He is s/p right upper lobectomy with  on 5/24/2022.  Pathology report as above.  Patient is stage IA3 squamous cell carcinoma of the lung.  He has recovered well and has been doing good.     Patient was started on surveillance. CT 9/19/2022 with 1.6 right paratracheal LN and small Rt pleural effusion. Surveillance CT scan chest to eval stability 1/25/2023 with paratracheal enlarged lymph node which shows interval mild size increase and now measures 2.2 x 2.0 cm and previously was 1.6 x 1.5 cm.  Aortopulmonary window mildly enlarged lymph node is stable. PET CT 2/9/2023 with Hypermetabolic right paratracheal lymph node 25 x 20 mm with max SUV 27.0. Hypermetabolic anterior mediastinal lymph node anterior to the SVC measures 12 x 9 mm with max SUV 12.0.   Biopsy of R4 lymph node confirmed the metastatic squamous cell carcinoma. Completed  XRT on 5/30/23  and 5 cycles of Carbo/taxol on 5/19/23. C6 was held on 5/26/23 due to neutropenia, ANC was 0.7.    Patient was started on chemoradiation. -completed XRT on 5/30/23 and 5 cycles of weekly carbo/taxol on 5/19/23, His final cycle was held due to neutropenia, ANC was 0.7.  He was then started on maintenance Imfinzi every 4 weeks on 6/22/23    Chief Complaint: 3 Week Follow Up (Scan Results.)      Interval History:  He completed XRT to the mediastinum on 5/30/23 and 5 cycles of weekly carbo/taxol on 5/19/23. He was on maintenance durvalumab, started 6/22/2023 and tolerated well. Patient with progression of paratracheal and para-aortic/subaortic lymph nodes.  He was seen by Dr. Willis but not a good candidate for radiation at this time.  He was started on Gemzar on 4/13/24.     5/29/24: Patient presents today for follow up to discuss PET CT results and C3 of Gemzar, due today.  He is tolerating well so far. He denies any side effects of Gemzar. Denies fever, chills and sweats. Denies pain. Bowels are  moving well. Denies bleeding. Still with chronic cough.       Past Medical History:   Diagnosis Date    Anemia     Closed intertrochanteric fracture     Deficiency of macronutrients     GERD (gastroesophageal reflux disease)     H. pylori infection     History of tobacco use     Hyperlipidemia     Hypertension     Lung mass     Malignant neoplasm of unspecified part of unspecified bronchus or lung     Non-small cell lung cancer       Past Surgical History:   Procedure Laterality Date    BIOPSY OF INTESTINE  04/04/2022    BRONCHOSCOPY      COLONOSCOPY      ESOPHAGOGASTRODUODENOSCOPY  04/04/2022    HIP FRACTURE SURGERY Left 2016    Intramedullary Nail Insertion Hip Left 01/18/2022    KIDNEY SURGERY Right 05/27/2022    MEDIASTINOSCOPY N/A 3/6/2023    Procedure: MEDIASTINOSCOPY;  Surgeon: Jose Diaz MD;  Location: [facility] OR;  Service: Cardiothoracic;  Laterality: N/A;  XX    PLACEMENT, MEDIPORT N/A 4/6/2023    Procedure: Placement, Mediport;  Surgeon: Jose Diaz MD;  Location: [facility] OR;  Service: Cardiology;  Laterality: N/A;    SHOULDER SURGERY       Family History   Family history unknown: Yes            Review of patient's allergies indicates:  No Known Allergies   Current Outpatient Medications on File Prior to Visit   Medication Sig Dispense Refill    amLODIPine (NORVASC) 5 MG tablet TAKE ONE TABLET BY MOUTH EVERY DAY TWICE DAILY 180 tablet 1    aspirin 81 mg Cap Take 81 mg by mouth Daily.      atorvastatin (LIPITOR) 10 MG tablet TAKE ONE TABLET BY MOUTH DAILY 90 tablet 3    levoFLOXacin (LEVAQUIN) 500 MG tablet Take 1 tablet (500 mg total) by mouth once daily. 7 tablet 0    LIDOcaine-prilocaine (EMLA) cream Apply topically as needed. Apply to port site 30 mins prior to chemo 30 g 3    LINZESS 145 mcg Cap capsule Take 145 mcg by mouth daily as needed. New medication, not started yet      metoprolol succinate (TOPROL-XL) 25 MG 24 hr tablet Take 1 tablet (25 mg total) by mouth once daily. 90 tablet 3     "pantoprazole (PROTONIX) 40 MG tablet Take 40 mg by mouth.       No current facility-administered medications on file prior to visit.      Review of Systems   Constitutional:  Negative for activity change, appetite change, chills, diaphoresis, fatigue, fever and unexpected weight change.   HENT:  Negative for nasal congestion, mouth sores, nosebleeds, sinus pressure/congestion, sore throat and trouble swallowing.    Eyes: Negative.    Respiratory:  Positive for cough (Occ) and shortness of breath (better and on exertion).    Cardiovascular:  Negative for chest pain and palpitations.   Gastrointestinal:  Negative for abdominal distention, abdominal pain, blood in stool, change in bowel habit, constipation, diarrhea, nausea and vomiting.   Endocrine: Negative.    Genitourinary:  Negative for bladder incontinence, decreased urine volume, difficulty urinating, dysuria, frequency, hematuria and urgency.   Musculoskeletal:  Negative for arthralgias, back pain, gait problem, joint swelling, leg pain and myalgias.   Integumentary:  Negative for rash.   Allergic/Immunologic: Negative.    Neurological:  Negative for dizziness, tremors, syncope, weakness, light-headedness, numbness, headaches and memory loss.   Hematological:  Negative for adenopathy. Does not bruise/bleed easily.   Psychiatric/Behavioral:  Negative for agitation, confusion, hallucinations, sleep disturbance and suicidal ideas. The patient is not nervous/anxious.           Vitals:    05/29/24 1017   BP: 125/71   BP Location: Left arm   Patient Position: Sitting   Pulse: 90   Resp: 20   Temp: 97.9 °F (36.6 °C)   TempSrc: Oral   SpO2: 98%   Weight: 91.9 kg (202 lb 11.2 oz)   Height: 5' 6.93" (1.7 m)             Wt Readings from Last 6 Encounters:   05/29/24 91.9 kg (202 lb 11.2 oz)   05/16/24 92.1 kg (203 lb)   05/15/24 92.5 kg (203 lb 14.1 oz)   05/08/24 92.5 kg (203 lb 14.4 oz)   04/24/24 93.9 kg (207 lb)   04/17/24 93.9 kg (207 lb)     Body mass index is 31.81 " kg/m².  Body surface area is 2.08 meters squared.       Physical Exam  Vitals and nursing note reviewed.   Constitutional:       General: He is not in acute distress.     Appearance: Normal appearance. He is obese.   HENT:      Head: Normocephalic and atraumatic.      Mouth/Throat:      Mouth: Mucous membranes are moist.   Eyes:      General: No scleral icterus.     Extraocular Movements: Extraocular movements intact.      Conjunctiva/sclera: Conjunctivae normal.   Neck:      Vascular: No JVD.   Cardiovascular:      Rate and Rhythm: Normal rate and regular rhythm.      Heart sounds: No murmur heard.  Pulmonary:      Effort: Pulmonary effort is normal.      Breath sounds: Decreased breath sounds and wheezing present. No rhonchi.   Chest:      Chest wall: No tenderness.   Abdominal:      General: Bowel sounds are normal. There is no distension.      Palpations: Abdomen is soft. There is no fluid wave.      Tenderness: There is no abdominal tenderness.   Musculoskeletal:         General: No swelling or deformity.      Cervical back: Neck supple.   Lymphadenopathy:      Head:      Right side of head: No submandibular adenopathy.      Left side of head: No submandibular adenopathy.      Cervical: No cervical adenopathy.      Upper Body:      Right upper body: No supraclavicular or axillary adenopathy.      Left upper body: No supraclavicular or axillary adenopathy.      Lower Body: No right inguinal adenopathy. No left inguinal adenopathy.   Skin:     General: Skin is warm.      Coloration: Skin is not jaundiced.      Findings: No rash.   Neurological:      General: No focal deficit present.      Mental Status: He is alert and oriented to person, place, and time.      Cranial Nerves: Cranial nerves 2-12 are intact.      Comments: Mobility assitssed by cane/walker   Psychiatric:         Attention and Perception: Attention normal.         Mood and Affect: Mood and affect normal.         Behavior: Behavior is cooperative.          Cognition and Memory: Cognition normal.         Judgment: Judgment normal.       ECOG SCORE    1 - Restricted in strenuous activity-ambulatory and able to carry out work of a light nature         Laboratory:  CBC with Differential:  Lab Results   Component Value Date    WBC 9.15 05/29/2024    RBC 4.03 (L) 05/29/2024    HGB 11.7 (L) 05/29/2024    HCT 36.1 (L) 05/29/2024    MCV 89.6 05/29/2024    MCH 29.0 05/29/2024    MCHC 32.4 (L) 05/29/2024    RDW 20.4 (H) 05/29/2024     05/29/2024    MPV 10.7 (H) 05/29/2024        CMP:  Sodium   Date Value Ref Range Status   05/13/2024 141 136 - 145 mmol/L Final     Potassium   Date Value Ref Range Status   05/13/2024 3.9 3.5 - 5.1 mmol/L Final     CO2   Date Value Ref Range Status   05/13/2024 26 23 - 31 mmol/L Final     Blood Urea Nitrogen   Date Value Ref Range Status   05/13/2024 6.0 (L) 8.4 - 25.7 mg/dL Final     Creatinine   Date Value Ref Range Status   05/13/2024 0.75 0.73 - 1.18 mg/dL Final     Calcium   Date Value Ref Range Status   05/13/2024 9.4 8.8 - 10.0 mg/dL Final     Albumin   Date Value Ref Range Status   05/13/2024 3.2 (L) 3.4 - 4.8 g/dL Final     Bilirubin Total   Date Value Ref Range Status   05/13/2024 0.8 <=1.5 mg/dL Final     ALP   Date Value Ref Range Status   05/13/2024 83 40 - 150 unit/L Final     AST   Date Value Ref Range Status   05/13/2024 61 (H) 5 - 34 unit/L Final     ALT   Date Value Ref Range Status   05/13/2024 74 (H) 0 - 55 unit/L Final     Estimated GFR-Non    Date Value Ref Range Status   04/19/2022 >60           Assessment:       1) [id number] 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 Discussed with Dr Alba ESCOBAR and he will be seen 4/21/2024.      Educated the patient on the risks versus benefits as well as toxicities associated with treatment.  Verbally consented the patient to the treatment plan and the patient was educated on the planned duration of the treatment and schedule of the treatment administration.  All questions were answered.      Plan:       Patient with recurrence of disease while on Imfinizi.  Not candidate for RT. We discussed again chemotherapy and he is agreeable. We discontinue Imfinzi and started Gemzar on 4/17/2024 and so far tolerating well.    Okay to proceed with Gemzar C3D1 today  Please schedule for Gemzar C3D8 next week plus Neulasta onpro, may need if white count is too low  PET CT due Aug. 2024 - will order when closer   RTC in 3 weeks with  for TD/Infusion - he prefers Wednesday appts. Labs to be drawn the day before treatment at [facility].   Labs: CBC, CMP  Encourage to call or message us for any questions or problems  The patient was given ample opportunity to ask questions, and to the best of my abilities, all questions answered to satisfaction; patient demonstrated understanding of what we discussed and agreeable to the plan.     Sanjuana Napoles MD  Hematology/Oncology      Professional Services   I, Shirley Pina LPN, acted solely as a scribe for and in the presence of Dr. Sanjuana Napoles, who performed these services.

## 2024-06-03 ENCOUNTER — LAB VISIT (OUTPATIENT)
Dept: LAB | Facility: HOSPITAL | Age: 79
End: 2024-06-03
Attending: NURSE PRACTITIONER
Payer: MEDICARE

## 2024-06-03 DIAGNOSIS — C34.91 NON-SMALL CELL CANCER OF RIGHT LUNG: ICD-10-CM

## 2024-06-03 DIAGNOSIS — R53.83 FATIGUE, UNSPECIFIED TYPE: ICD-10-CM

## 2024-06-03 DIAGNOSIS — Z79.899 ON ANTINEOPLASTIC CHEMOTHERAPY: ICD-10-CM

## 2024-06-03 LAB
ALBUMIN SERPL-MCNC: 3.3 G/DL (ref 3.4–4.8)
ALBUMIN/GLOB SERPL: 0.9 RATIO (ref 1.1–2)
ALP SERPL-CCNC: 102 UNIT/L (ref 40–150)
ALT SERPL-CCNC: 77 UNIT/L (ref 0–55)
ANION GAP SERPL CALC-SCNC: 8 MEQ/L
AST SERPL-CCNC: 55 UNIT/L (ref 5–34)
BASOPHILS # BLD AUTO: 0.06 X10(3)/MCL
BASOPHILS NFR BLD AUTO: 1.5 %
BILIRUB SERPL-MCNC: 1.3 MG/DL
BUN SERPL-MCNC: 7.6 MG/DL (ref 8.4–25.7)
CALCIUM SERPL-MCNC: 9.1 MG/DL (ref 8.8–10)
CHLORIDE SERPL-SCNC: 107 MMOL/L (ref 98–107)
CO2 SERPL-SCNC: 25 MMOL/L (ref 23–31)
CREAT SERPL-MCNC: 0.76 MG/DL (ref 0.73–1.18)
CREAT/UREA NIT SERPL: 10
EOSINOPHIL # BLD AUTO: 0.12 X10(3)/MCL (ref 0–0.9)
EOSINOPHIL NFR BLD AUTO: 3.1 %
ERYTHROCYTE [DISTWIDTH] IN BLOOD BY AUTOMATED COUNT: 20.3 % (ref 11.5–17)
GFR SERPLBLD CREATININE-BSD FMLA CKD-EPI: >60 ML/MIN/1.73/M2
GLOBULIN SER-MCNC: 3.5 GM/DL (ref 2.4–3.5)
GLUCOSE SERPL-MCNC: 93 MG/DL (ref 82–115)
HCT VFR BLD AUTO: 32.7 % (ref 42–52)
HGB BLD-MCNC: 10.5 G/DL (ref 14–18)
IMM GRANULOCYTES # BLD AUTO: 0.03 X10(3)/MCL (ref 0–0.04)
IMM GRANULOCYTES NFR BLD AUTO: 0.8 %
LYMPHOCYTES # BLD AUTO: 0.48 X10(3)/MCL (ref 0.6–4.6)
LYMPHOCYTES NFR BLD AUTO: 12.2 %
MCH RBC QN AUTO: 29.1 PG (ref 27–31)
MCHC RBC AUTO-ENTMCNC: 32.1 G/DL (ref 33–36)
MCV RBC AUTO: 90.6 FL (ref 80–94)
MONOCYTES # BLD AUTO: 0.17 X10(3)/MCL (ref 0.1–1.3)
MONOCYTES NFR BLD AUTO: 4.3 %
NEUTROPHILS # BLD AUTO: 3.07 X10(3)/MCL (ref 2.1–9.2)
NEUTROPHILS NFR BLD AUTO: 78.1 %
PLATELET # BLD AUTO: 314 X10(3)/MCL (ref 130–400)
PMV BLD AUTO: 9.6 FL (ref 7.4–10.4)
POTASSIUM SERPL-SCNC: 3.9 MMOL/L (ref 3.5–5.1)
PROT SERPL-MCNC: 6.8 GM/DL (ref 5.8–7.6)
RBC # BLD AUTO: 3.61 X10(6)/MCL (ref 4.7–6.1)
SODIUM SERPL-SCNC: 140 MMOL/L (ref 136–145)
TSH SERPL-ACNC: 0.6 UIU/ML (ref 0.35–4.94)
WBC # SPEC AUTO: 3.93 X10(3)/MCL (ref 4.5–11.5)

## 2024-06-03 PROCEDURE — 84443 ASSAY THYROID STIM HORMONE: CPT

## 2024-06-03 PROCEDURE — 80053 COMPREHEN METABOLIC PANEL: CPT

## 2024-06-03 PROCEDURE — 36415 COLL VENOUS BLD VENIPUNCTURE: CPT

## 2024-06-03 PROCEDURE — 85025 COMPLETE CBC W/AUTO DIFF WBC: CPT

## 2024-06-05 ENCOUNTER — INFUSION (OUTPATIENT)
Dept: INFUSION THERAPY | Facility: HOSPITAL | Age: 79
End: 2024-06-05
Attending: NURSE PRACTITIONER
Payer: MEDICARE

## 2024-06-05 VITALS
SYSTOLIC BLOOD PRESSURE: 138 MMHG | HEART RATE: 118 BPM | RESPIRATION RATE: 18 BRPM | TEMPERATURE: 98 F | DIASTOLIC BLOOD PRESSURE: 53 MMHG | BODY MASS INDEX: 31.81 KG/M2 | WEIGHT: 202.69 LBS | HEIGHT: 67 IN

## 2024-06-05 DIAGNOSIS — C34.00 MALIGNANT NEOPLASM OF HILUS OF LUNG, UNSPECIFIED LATERALITY: Primary | ICD-10-CM

## 2024-06-05 DIAGNOSIS — C34.90 NON-SMALL CELL LUNG CANCER, UNSPECIFIED LATERALITY: ICD-10-CM

## 2024-06-05 PROCEDURE — 25000003 PHARM REV CODE 250: Performed by: INTERNAL MEDICINE

## 2024-06-05 PROCEDURE — 96413 CHEMO IV INFUSION 1 HR: CPT

## 2024-06-05 PROCEDURE — 96377 APPLICATON ON-BODY INJECTOR: CPT

## 2024-06-05 PROCEDURE — 63600175 PHARM REV CODE 636 W HCPCS: Performed by: INTERNAL MEDICINE

## 2024-06-05 PROCEDURE — 96375 TX/PRO/DX INJ NEW DRUG ADDON: CPT

## 2024-06-05 RX ORDER — SODIUM CHLORIDE 0.9 % (FLUSH) 0.9 %
10 SYRINGE (ML) INJECTION
Status: DISCONTINUED | OUTPATIENT
Start: 2024-06-05 | End: 2024-06-05 | Stop reason: HOSPADM

## 2024-06-05 RX ORDER — HEPARIN 100 UNIT/ML
500 SYRINGE INTRAVENOUS
Status: DISCONTINUED | OUTPATIENT
Start: 2024-06-05 | End: 2024-06-05 | Stop reason: HOSPADM

## 2024-06-05 RX ORDER — ONDANSETRON HYDROCHLORIDE 2 MG/ML
8 INJECTION, SOLUTION INTRAVENOUS
Status: COMPLETED | OUTPATIENT
Start: 2024-06-05 | End: 2024-06-05

## 2024-06-05 RX ADMIN — HEPARIN 500 UNITS: 100 SYRINGE at 11:06

## 2024-06-05 RX ADMIN — GEMCITABINE 2600 MG: 38 INJECTION, SOLUTION INTRAVENOUS at 11:06

## 2024-06-05 RX ADMIN — PEGFILGRASTIM 6 MG: KIT SUBCUTANEOUS at 11:06

## 2024-06-05 RX ADMIN — ONDANSETRON 8 MG: 2 INJECTION INTRAMUSCULAR; INTRAVENOUS at 10:06

## 2024-06-17 ENCOUNTER — LAB VISIT (OUTPATIENT)
Dept: LAB | Facility: HOSPITAL | Age: 79
End: 2024-06-17
Attending: INTERNAL MEDICINE
Payer: MEDICARE

## 2024-06-17 DIAGNOSIS — D70.1 LEUKOPENIA DUE TO ANTINEOPLASTIC CHEMOTHERAPY: ICD-10-CM

## 2024-06-17 DIAGNOSIS — Z51.11 CHEMOTHERAPY MANAGEMENT, ENCOUNTER FOR: ICD-10-CM

## 2024-06-17 DIAGNOSIS — C34.90 NON-SMALL CELL LUNG CANCER, UNSPECIFIED LATERALITY: ICD-10-CM

## 2024-06-17 DIAGNOSIS — R53.83 FATIGUE, UNSPECIFIED TYPE: ICD-10-CM

## 2024-06-17 DIAGNOSIS — C34.91 NON-SMALL CELL CANCER OF RIGHT LUNG: ICD-10-CM

## 2024-06-17 DIAGNOSIS — Z79.899 ON ANTINEOPLASTIC CHEMOTHERAPY: ICD-10-CM

## 2024-06-17 DIAGNOSIS — T45.1X5A LEUKOPENIA DUE TO ANTINEOPLASTIC CHEMOTHERAPY: ICD-10-CM

## 2024-06-17 LAB
ALBUMIN SERPL-MCNC: 3.5 G/DL (ref 3.4–4.8)
ALBUMIN/GLOB SERPL: 1.1 RATIO (ref 1.1–2)
ALP SERPL-CCNC: 124 UNIT/L (ref 40–150)
ALT SERPL-CCNC: 35 UNIT/L (ref 0–55)
ANION GAP SERPL CALC-SCNC: 9 MEQ/L
AST SERPL-CCNC: 27 UNIT/L (ref 5–34)
BASOPHILS # BLD AUTO: 0.04 X10(3)/MCL
BASOPHILS NFR BLD AUTO: 0.4 %
BILIRUB SERPL-MCNC: 0.5 MG/DL
BUN SERPL-MCNC: 9.1 MG/DL (ref 8.4–25.7)
CALCIUM SERPL-MCNC: 9.4 MG/DL (ref 8.8–10)
CHLORIDE SERPL-SCNC: 108 MMOL/L (ref 98–107)
CO2 SERPL-SCNC: 28 MMOL/L (ref 23–31)
CREAT SERPL-MCNC: 0.88 MG/DL (ref 0.73–1.18)
CREAT/UREA NIT SERPL: 10
EOSINOPHIL # BLD AUTO: 0.15 X10(3)/MCL (ref 0–0.9)
EOSINOPHIL NFR BLD AUTO: 1.6 %
ERYTHROCYTE [DISTWIDTH] IN BLOOD BY AUTOMATED COUNT: 22.6 % (ref 11.5–17)
GFR SERPLBLD CREATININE-BSD FMLA CKD-EPI: >60 ML/MIN/1.73/M2
GLOBULIN SER-MCNC: 3.2 GM/DL (ref 2.4–3.5)
GLUCOSE SERPL-MCNC: 88 MG/DL (ref 82–115)
HCT VFR BLD AUTO: 35.6 % (ref 42–52)
HGB BLD-MCNC: 11.3 G/DL (ref 14–18)
IMM GRANULOCYTES # BLD AUTO: 0.07 X10(3)/MCL (ref 0–0.04)
IMM GRANULOCYTES NFR BLD AUTO: 0.8 %
LYMPHOCYTES # BLD AUTO: 0.95 X10(3)/MCL (ref 0.6–4.6)
LYMPHOCYTES NFR BLD AUTO: 10.3 %
MCH RBC QN AUTO: 29.7 PG (ref 27–31)
MCHC RBC AUTO-ENTMCNC: 31.7 G/DL (ref 33–36)
MCV RBC AUTO: 93.4 FL (ref 80–94)
MONOCYTES # BLD AUTO: 1.01 X10(3)/MCL (ref 0.1–1.3)
MONOCYTES NFR BLD AUTO: 10.9 %
NEUTROPHILS # BLD AUTO: 7.01 X10(3)/MCL (ref 2.1–9.2)
NEUTROPHILS NFR BLD AUTO: 76 %
PLATELET # BLD AUTO: 224 X10(3)/MCL (ref 130–400)
PMV BLD AUTO: 10.6 FL (ref 7.4–10.4)
POTASSIUM SERPL-SCNC: 3.4 MMOL/L (ref 3.5–5.1)
PROT SERPL-MCNC: 6.7 GM/DL (ref 5.8–7.6)
RBC # BLD AUTO: 3.81 X10(6)/MCL (ref 4.7–6.1)
SODIUM SERPL-SCNC: 145 MMOL/L (ref 136–145)
TSH SERPL-ACNC: 0.59 UIU/ML (ref 0.35–4.94)
WBC # BLD AUTO: 9.23 X10(3)/MCL (ref 4.5–11.5)

## 2024-06-17 PROCEDURE — 80053 COMPREHEN METABOLIC PANEL: CPT

## 2024-06-17 PROCEDURE — 84443 ASSAY THYROID STIM HORMONE: CPT

## 2024-06-17 PROCEDURE — 85025 COMPLETE CBC W/AUTO DIFF WBC: CPT

## 2024-06-17 PROCEDURE — 36415 COLL VENOUS BLD VENIPUNCTURE: CPT

## 2024-06-19 ENCOUNTER — OFFICE VISIT (OUTPATIENT)
Dept: HEMATOLOGY/ONCOLOGY | Facility: CLINIC | Age: 79
End: 2024-06-19
Payer: MEDICARE

## 2024-06-19 ENCOUNTER — INFUSION (OUTPATIENT)
Dept: INFUSION THERAPY | Facility: HOSPITAL | Age: 79
End: 2024-06-19
Attending: NURSE PRACTITIONER
Payer: MEDICARE

## 2024-06-19 VITALS
RESPIRATION RATE: 18 BRPM | TEMPERATURE: 98 F | BODY MASS INDEX: 31.5 KG/M2 | HEART RATE: 93 BPM | SYSTOLIC BLOOD PRESSURE: 128 MMHG | WEIGHT: 200.69 LBS | DIASTOLIC BLOOD PRESSURE: 76 MMHG | HEIGHT: 67 IN | OXYGEN SATURATION: 99 %

## 2024-06-19 DIAGNOSIS — C34.00 MALIGNANT NEOPLASM OF HILUS OF LUNG, UNSPECIFIED LATERALITY: Primary | ICD-10-CM

## 2024-06-19 DIAGNOSIS — C77.1 SECONDARY AND UNSPECIFIED MALIGNANT NEOPLASM OF INTRATHORACIC LYMPH NODES: ICD-10-CM

## 2024-06-19 DIAGNOSIS — T45.1X5A CHEMOTHERAPY-INDUCED NEUTROPENIA: ICD-10-CM

## 2024-06-19 DIAGNOSIS — C34.90 NON-SMALL CELL LUNG CANCER, UNSPECIFIED LATERALITY: ICD-10-CM

## 2024-06-19 DIAGNOSIS — D70.1 CHEMOTHERAPY-INDUCED NEUTROPENIA: ICD-10-CM

## 2024-06-19 DIAGNOSIS — C34.91 NON-SMALL CELL CANCER OF RIGHT LUNG: Primary | ICD-10-CM

## 2024-06-19 DIAGNOSIS — Z79.899 ON ANTINEOPLASTIC CHEMOTHERAPY: ICD-10-CM

## 2024-06-19 PROCEDURE — 3078F DIAST BP <80 MM HG: CPT | Mod: CPTII,S$GLB,, | Performed by: NURSE PRACTITIONER

## 2024-06-19 PROCEDURE — 96416 CHEMO PROLONG INFUSE W/PUMP: CPT

## 2024-06-19 PROCEDURE — 63600175 PHARM REV CODE 636 W HCPCS: Performed by: NURSE PRACTITIONER

## 2024-06-19 PROCEDURE — 3074F SYST BP LT 130 MM HG: CPT | Mod: CPTII,S$GLB,, | Performed by: NURSE PRACTITIONER

## 2024-06-19 PROCEDURE — 1101F PT FALLS ASSESS-DOCD LE1/YR: CPT | Mod: CPTII,S$GLB,, | Performed by: NURSE PRACTITIONER

## 2024-06-19 PROCEDURE — 3288F FALL RISK ASSESSMENT DOCD: CPT | Mod: CPTII,S$GLB,, | Performed by: NURSE PRACTITIONER

## 2024-06-19 PROCEDURE — 96375 TX/PRO/DX INJ NEW DRUG ADDON: CPT

## 2024-06-19 PROCEDURE — 99999 PR PBB SHADOW E&M-EST. PATIENT-LVL IV: CPT | Mod: PBBFAC,,, | Performed by: NURSE PRACTITIONER

## 2024-06-19 PROCEDURE — 1159F MED LIST DOCD IN RCRD: CPT | Mod: CPTII,S$GLB,, | Performed by: NURSE PRACTITIONER

## 2024-06-19 PROCEDURE — 25000003 PHARM REV CODE 250: Performed by: NURSE PRACTITIONER

## 2024-06-19 PROCEDURE — 1126F AMNT PAIN NOTED NONE PRSNT: CPT | Mod: CPTII,S$GLB,, | Performed by: NURSE PRACTITIONER

## 2024-06-19 PROCEDURE — 96413 CHEMO IV INFUSION 1 HR: CPT

## 2024-06-19 PROCEDURE — 1157F ADVNC CARE PLAN IN RCRD: CPT | Mod: CPTII,S$GLB,, | Performed by: NURSE PRACTITIONER

## 2024-06-19 PROCEDURE — 99215 OFFICE O/P EST HI 40 MIN: CPT | Mod: S$GLB,,, | Performed by: NURSE PRACTITIONER

## 2024-06-19 RX ORDER — ONDANSETRON HYDROCHLORIDE 2 MG/ML
8 INJECTION, SOLUTION INTRAVENOUS
OUTPATIENT
Start: 2024-06-26

## 2024-06-19 RX ORDER — SODIUM CHLORIDE 0.9 % (FLUSH) 0.9 %
10 SYRINGE (ML) INJECTION
Status: CANCELLED | OUTPATIENT
Start: 2024-06-19

## 2024-06-19 RX ORDER — HEPARIN 100 UNIT/ML
500 SYRINGE INTRAVENOUS
Status: DISCONTINUED | OUTPATIENT
Start: 2024-06-19 | End: 2024-06-19 | Stop reason: HOSPADM

## 2024-06-19 RX ORDER — ONDANSETRON HYDROCHLORIDE 2 MG/ML
8 INJECTION, SOLUTION INTRAVENOUS
Status: CANCELLED | OUTPATIENT
Start: 2024-06-19

## 2024-06-19 RX ORDER — SODIUM CHLORIDE 0.9 % (FLUSH) 0.9 %
10 SYRINGE (ML) INJECTION
Status: DISCONTINUED | OUTPATIENT
Start: 2024-06-19 | End: 2024-06-19 | Stop reason: HOSPADM

## 2024-06-19 RX ORDER — HEPARIN 100 UNIT/ML
500 SYRINGE INTRAVENOUS
Status: CANCELLED | OUTPATIENT
Start: 2024-06-19

## 2024-06-19 RX ORDER — SODIUM CHLORIDE 0.9 % (FLUSH) 0.9 %
10 SYRINGE (ML) INJECTION
OUTPATIENT
Start: 2024-06-26

## 2024-06-19 RX ORDER — ONDANSETRON HYDROCHLORIDE 2 MG/ML
8 INJECTION, SOLUTION INTRAVENOUS
Status: COMPLETED | OUTPATIENT
Start: 2024-06-19 | End: 2024-06-19

## 2024-06-19 RX ORDER — HEPARIN 100 UNIT/ML
500 SYRINGE INTRAVENOUS
OUTPATIENT
Start: 2024-06-26

## 2024-06-19 RX ADMIN — ONDANSETRON 8 MG: 2 INJECTION INTRAMUSCULAR; INTRAVENOUS at 11:06

## 2024-06-19 RX ADMIN — SODIUM CHLORIDE: 9 INJECTION, SOLUTION INTRAVENOUS at 11:06

## 2024-06-19 RX ADMIN — HEPARIN 500 UNITS: 100 SYRINGE at 12:06

## 2024-06-19 RX ADMIN — GEMCITABINE 2600 MG: 38 INJECTION, SOLUTION INTRAVENOUS at 11:06

## 2024-06-19 NOTE — PROGRESS NOTES
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 Right upper lobe 2.5 cm mass.  CT C 9/19/2022: Status post right partial pneumonectomy for resection of a right upper lobe lung mass with no residual recurrent mass seen. Small right-sided pleural effusion. Some lymphadenopathy in the right paratracheal region with a maximum short axis dimension of 1.6 cm.  Follow-up is recommended  CT C 1/25/2023: There is a paratracheal enlarged lymph node which shows interval mild size increase and now measures 2.2 x 2.0 cm and previously was 1.6 x 1.5 cm.  Aortopulmonary window mildly enlarged lymph node is stable.  Thoracic aorta is without aneurysmal dilatation or dissection.  Impression: 1. Operative changes of right upper lobectomy without bronchialstump soft tissue proliferation    2. No residual tumor load or metastatic nodule. 3. Paratracheal enlarged lymph node with interval size increase.  PET CT 2/9/2023:  Hypermetabolic right paratracheal lymph node image 81 series 3 measures 25 x 20 mm with max SUV 27.0.  Hypermetabolic anterior mediastinal lymph node anterior to the SVC measures 12 x 9 mm with max SUV 12.0. Impression: 1. Hypermetabolic metastatic mediastinal adenopathy.   2. No PET  evidence of metastatic disease outside of the mediastinum.  CT C/A/P 7/10/2023:  1. Several new subcentimeter nodules in the right lung.  Suspect these are infectious or inflammatory in nature, but 3 month follow-up chest CT recommended to ensure resolution.  2. 2 reference mediastinal lymph nodes are smaller since January.  3. L1 vertebral body compression deformity new since January, but appears subacute.  CT C/A/P 10/10/2023:    1. Slightly larger mediastinal lymph nodes, to be followed.  2. Increased irregular right perihilar consolidation which may be treatment related.  3. Small right pleural effusion.  4. No new suspicious findings in the abdomen or pelvis.  CT C/A/P 1/10/2024:    1. Interval enlargement of mediastinal lymph nodes  2. Similar right perihilar consolidative opacity and small right pleural effusion  PET CT 2/6/2024:  1. New, enlarged hypermetabolic superior paratracheal and para-aortic/subaortic lymph nodes compared to prior PET-CT  2. Persistent enlarged and hypermetabolic lower right paratracheal lymph node.  This lymph node is decreased in size and FDG avidity compared to prior PET-CT.  3. Previously seen hypermetabolic pre-vascular lymph node is decreased in size and is no longer hypermetabolic.  PET CT 5/23/2024:  1. Status post right upper lobe resection without evidence for local recurrence/residual disease.  2. Interval response to therapy with decreasing right paratracheal and left AP window adenopathy.  3. Nonspecific area of hypermetabolic activity within the left tongue base.  Direct visualization may be warranted.  4. No evidence for new focus of metastatic disease.         Pathology:  03/22/2022 CT-guided biopsy of the right lung mass: invasive squamous cell carcinoma, moderately differentiated.  5/24/2022: Right upper lobectomy:  1- LYMPH NODE, LUMBAR RIGHT 10 LYMPHADENECTOMY: NEGATIVE FOR METASTATIC CARCINOMA (0/1).   2- LYMPH NODE, 11R, LYMPHADENECTOMY: NEGATIVE FOR METASTATIC  CARCINOMA (0/1).  3- LYMPH NODE, 9R, LYMPHADENECTOMY: NEGATIVE FOR METASTATIC CARCINOMA (0/1).   4- LYMPH NODE, 7R, LYMPHADENECTOMY: NEGATIVE FOR METASTATIC CARCINOMA (0/1).   5- LYMPH NODE, 8R, LYMPHADENECTOMY: ONE LYMPH NODE NEGATIVE FOR METASTATIC CARCINOMA (0/1).    6- LYMPH NODE, 12R, LYMPHADENECTOMY: NEGATIVE FOR METASTATIC CARCINOMA (0/1).  7- LUNG, RIGHT UPPER AND MIDDLE LOBES, PNEUMONECTOMY:  POORLY DIFFERENTIATED, G3, SQUAMOUS CELL CARCINOMA, INVASIVE.    - TUMOR SIZE: 3 CM.    - LYMPHOVASCULAR INVASION IS PRESENT.    - MARGINS NEGATIVE FOR INVASIVE CARCINOMA:    - NEAREST MARGIN, VASCULAR / BRONCHIAL 2.5 CM.    - NO PLEURAL SURFACE INVOLVEMENT     - PROMINENT NECROSIS PRESENT.  Visceral Pleura Invasion: Not identified  Number of Lymph Nodes Examined: Exact number : [id number]  pT Category: pT1c: pN0: No regional lymph node metastasis    3/8/2023 LYMPH NODE BIOPSY:   LYMPH NODE 4R, LYMPHADENECTOMY:  METASTATIC SQUAMOUS CELL CARCINOMA, NONKERATINIZING, MULTIPLE FRAGMENTS.       IMMUNOHISTOCHEMICAL STAINS:   CK 5/6, P63 AND CK7:  POSITIVE IN MALIGNANT CELLS.   CK20 AND TTF-1:  NEGATIVE IN MALIGNANT CELLS.       CLINICAL HISTORY:       Patient: Leonila Rosales is a 78 y.o. male kindly referred by  Dr. Diaz for evaluation of lung cancer.    On 01/17/2022 patient presented to the emergency department after a fall.  He reports that he was getting to his truck and sleep and fell on his left side on the truck step rail.  He started having pain in his left hip and knee.  He underwent a CT angiogram that showed No pulmonary embolus. Right upper lobe 2.5 cm mass.      During that hospitalization he was treated for a close intertrochanteric fracture of the left femur and underwent open reduction intramedullary nailing left comminuted intertrochanteric hip fracture on 01/18/2022 with Dr. Fortino Palomares.  He then spent several weeks on rehab.    On 03/22/2022 he underwent CT-guided biopsy of the right lung mass.  Pathology showed  invasive squamous cell carcinoma, moderately differentiated.    He is s/p right upper lobectomy with  on 5/24/2022.  Pathology report as above.  Patient is stage IA3 squamous cell carcinoma of the lung.  He has recovered well and has been doing good.     Patient was started on surveillance. CT 9/19/2022 with 1.6 right paratracheal LN and small Rt pleural effusion. Surveillance CT scan chest to eval stability 1/25/2023 with paratracheal enlarged lymph node which shows interval mild size increase and now measures 2.2 x 2.0 cm and previously was 1.6 x 1.5 cm.  Aortopulmonary window mildly enlarged lymph node is stable. PET CT 2/9/2023 with Hypermetabolic right paratracheal lymph node 25 x 20 mm with max SUV 27.0. Hypermetabolic anterior mediastinal lymph node anterior to the SVC measures 12 x 9 mm with max SUV 12.0.   Biopsy of R4 lymph node confirmed the metastatic squamous cell carcinoma. Completed  XRT on 5/30/23  and 5 cycles of Carbo/taxol on 5/19/23. C6 was held on 5/26/23 due to neutropenia, ANC was 0.7.    Patient was started on chemoradiation. -completed XRT on 5/30/23 and 5 cycles of weekly carbo/taxol on 5/19/23, His final cycle was held due to neutropenia, ANC was 0.7.  He was then started on maintenance Imfinzi every 4 weeks on 6/22/23    Chief Complaint: 3 Week Follow Up      Interval History:  He completed XRT to the mediastinum on 5/30/23 and 5 cycles of weekly carbo/taxol on 5/19/23. He was on maintenance durvalumab, started 6/22/2023 and tolerated well. Patient with progression of paratracheal and para-aortic/subaortic lymph nodes.  He was seen by Dr. Willis but not a good candidate for radiation at this time.  He was started on Gemzar on 4/13/24.     5/29/24: Patient presents today for follow up to discuss PET CT results and C3 of Gemzar, due today.  He is tolerating well so far. He denies any side effects of Gemzar. Denies fever, chills and sweats. Denies pain. Bowels are moving well.  Denies bleeding. Still with chronic cough.     6/19/24: Patient presents today for TD prior to C4 of D1,D8 Q21 D Gemzar.  He is tolerating well so far. He denies any side effects of Gemzar. Denies fever, chills and sweats. Denies pain. Bowels are moving well. Denies bleeding. Still with chronic cough.         Past Medical History:   Diagnosis Date    Anemia     Closed intertrochanteric fracture     Deficiency of macronutrients     GERD (gastroesophageal reflux disease)     H. pylori infection     History of tobacco use     Hyperlipidemia     Hypertension     Lung mass     Malignant neoplasm of unspecified part of unspecified bronchus or lung     Non-small cell lung cancer       Past Surgical History:   Procedure Laterality Date    BIOPSY OF INTESTINE  04/04/2022    BRONCHOSCOPY      COLONOSCOPY      ESOPHAGOGASTRODUODENOSCOPY  04/04/2022    HIP FRACTURE SURGERY Left 2016    Intramedullary Nail Insertion Hip Left 01/18/2022    KIDNEY SURGERY Right 05/27/2022    MEDIASTINOSCOPY N/A 3/6/2023    Procedure: MEDIASTINOSCOPY;  Surgeon: Jose Diaz MD;  Location: [facility];  Service: Cardiothoracic;  Laterality: N/A;  XX    PLACEMENT, MEDIPORT N/A 4/6/2023    Procedure: Placement, Mediport;  Surgeon: Jose Diaz MD;  Location: [facility] OR;  Service: Cardiology;  Laterality: N/A;    SHOULDER SURGERY       Family History   Family history unknown: Yes            Review of patient's allergies indicates:  No Known Allergies   Current Outpatient Medications on File Prior to Visit   Medication Sig Dispense Refill    amLODIPine (NORVASC) 5 MG tablet TAKE ONE TABLET BY MOUTH EVERY DAY TWICE DAILY 180 tablet 1    aspirin 81 mg Cap Take 81 mg by mouth Daily.      atorvastatin (LIPITOR) 10 MG tablet TAKE ONE TABLET BY MOUTH DAILY 90 tablet 3    levoFLOXacin (LEVAQUIN) 500 MG tablet Take 1 tablet (500 mg total) by mouth once daily. 7 tablet 0    LIDOcaine-prilocaine (EMLA) cream Apply topically as needed.  Apply to port site 30 mins prior to chemo 30 g 3    LINZESS 145 mcg Cap capsule Take 145 mcg by mouth daily as needed. New medication, not started yet      metoprolol succinate (TOPROL-XL) 25 MG 24 hr tablet Take 1 tablet (25 mg total) by mouth once daily. 90 tablet 3    pantoprazole (PROTONIX) 40 MG tablet Take 40 mg by mouth.       No current facility-administered medications on file prior to visit.      Review of Systems       There were no vitals filed for this visit.            Wt Readings from Last 6 Encounters:   06/05/24 91.9 kg (202 lb 11.1 oz)   05/29/24 91.9 kg (202 lb 11.2 oz)   05/16/24 92.1 kg (203 lb)   05/15/24 92.5 kg (203 lb 14.1 oz)   05/08/24 92.5 kg (203 lb 14.4 oz)   04/24/24 93.9 kg (207 lb)     There is no height or weight on file to calculate BMI.  There is no height or weight on file to calculate BSA.       Physical Exam  Vitals and nursing note reviewed.   Constitutional:       General: He is not in acute distress.     Appearance: Normal appearance. He is obese.   HENT:      Head: Normocephalic and atraumatic.      Mouth/Throat:      Mouth: Mucous membranes are moist.   Eyes:      General: No scleral icterus.     Extraocular Movements: Extraocular movements intact.      Conjunctiva/sclera: Conjunctivae normal.   Neck:      Vascular: No JVD.   Cardiovascular:      Rate and Rhythm: Normal rate and regular rhythm.      Heart sounds: No murmur heard.  Pulmonary:      Effort: Pulmonary effort is normal.      Breath sounds: Decreased breath sounds and wheezing present. No rhonchi.   Chest:      Chest wall: No tenderness.   Abdominal:      General: Bowel sounds are normal. There is no distension.      Palpations: Abdomen is soft. There is no fluid wave.      Tenderness: There is no abdominal tenderness.   Musculoskeletal:         General: No swelling or deformity.      Cervical back: Neck supple.      Comments: Uses cane for mobility   Lymphadenopathy:      Head:      Right side of head: No  submandibular adenopathy.      Left side of head: No submandibular adenopathy.      Cervical: No cervical adenopathy.      Upper Body:      Right upper body: No supraclavicular or axillary adenopathy.      Left upper body: No supraclavicular or axillary adenopathy.      Lower Body: No right inguinal adenopathy. No left inguinal adenopathy.   Skin:     General: Skin is warm.      Coloration: Skin is not jaundiced.      Findings: No rash.   Neurological:      General: No focal deficit present.      Mental Status: He is alert and oriented to person, place, and time.      Cranial Nerves: Cranial nerves 2-12 are intact.   Psychiatric:         Attention and Perception: Attention normal.         Mood and Affect: Mood and affect normal.         Behavior: Behavior is cooperative.         Cognition and Memory: Cognition normal.         Judgment: Judgment normal.     ECOG SCORE             Laboratory:  CBC with Differential:  Lab Results   Component Value Date    WBC 9.23 06/17/2024    RBC 3.81 (L) 06/17/2024    HGB 11.3 (L) 06/17/2024    HCT 35.6 (L) 06/17/2024    MCV 93.4 06/17/2024    MCH 29.7 06/17/2024    MCHC 31.7 (L) 06/17/2024    RDW 22.6 (H) 06/17/2024     06/17/2024    MPV 10.6 (H) 06/17/2024        CMP:  Sodium   Date Value Ref Range Status   06/17/2024 145 136 - 145 mmol/L Final     Potassium   Date Value Ref Range Status   06/17/2024 3.4 (L) 3.5 - 5.1 mmol/L Final     Chloride   Date Value Ref Range Status   06/17/2024 108 (H) 98 - 107 mmol/L Final     CO2   Date Value Ref Range Status   06/17/2024 28 23 - 31 mmol/L Final     Blood Urea Nitrogen   Date Value Ref Range Status   06/17/2024 9.1 8.4 - 25.7 mg/dL Final     Creatinine   Date Value Ref Range Status   06/17/2024 0.88 0.73 - 1.18 mg/dL Final     Calcium   Date Value Ref Range Status   06/17/2024 9.4 8.8 - 10.0 mg/dL Final     Albumin   Date Value Ref Range Status   06/17/2024 3.5 3.4 - 4.8 g/dL Final     Bilirubin Total   Date Value Ref Range Status    06/17/2024 0.5 <=1.5 mg/dL Final     ALP   Date Value Ref Range Status   06/17/2024 124 40 - 150 unit/L Final     AST   Date Value Ref Range Status   06/17/2024 27 5 - 34 unit/L Final     ALT   Date Value Ref Range Status   06/17/2024 35 0 - 55 unit/L Final     Estimated GFR-Non    Date Value Ref Range Status   04/19/2022 >60           Assessment:       1) [id number] 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 Discussed with Dr Alba ESCOBAR and he will be seen 4/21/2024.     Educated the patient on the risks versus benefits as well as toxicities associated with treatment.  Verbally consented the patient to the treatment plan and the patient was educated on the planned duration of the treatment and schedule of the treatment administration.  All questions were answered.      Plan:       Patient with recurrence of disease while on Imfinizi.  Not candidate for RT. We discussed again chemotherapy and he is agreeable. We discontinue Imfinzi and started Gemzar on 4/17/2024 and so far tolerating well.    Okay to proceed with Gemzar C4D1 today  PET CT due Aug. 2024 - will order when closer   RTC in 3 weeks with  for TD/Infusion - he prefers Wednesday appts. Labs to be drawn the day before treatment at [facility].   Labs: CBC, CMP  Encourage to call or message us for any questions or problems  The patient was given ample opportunity to ask questions, and to the best of my abilities, all questions answered to satisfaction; patient demonstrated understanding of what we discussed and agreeable to the plan.       INDIA Arango

## 2024-06-24 ENCOUNTER — LAB VISIT (OUTPATIENT)
Dept: LAB | Facility: HOSPITAL | Age: 79
End: 2024-06-24
Attending: INTERNAL MEDICINE
Payer: MEDICARE

## 2024-06-24 DIAGNOSIS — C34.91 NON-SMALL CELL CANCER OF RIGHT LUNG: ICD-10-CM

## 2024-06-24 DIAGNOSIS — R53.83 FATIGUE, UNSPECIFIED TYPE: ICD-10-CM

## 2024-06-24 LAB
ALBUMIN SERPL-MCNC: 3.6 G/DL (ref 3.4–4.8)
ALBUMIN/GLOB SERPL: 1.1 RATIO (ref 1.1–2)
ALP SERPL-CCNC: 101 UNIT/L (ref 40–150)
ALT SERPL-CCNC: 82 UNIT/L (ref 0–55)
ANION GAP SERPL CALC-SCNC: 9 MEQ/L
AST SERPL-CCNC: 73 UNIT/L (ref 5–34)
BASOPHILS # BLD AUTO: 0.03 X10(3)/MCL
BASOPHILS NFR BLD AUTO: 0.8 %
BILIRUB SERPL-MCNC: 1 MG/DL
BUN SERPL-MCNC: 8.5 MG/DL (ref 8.4–25.7)
CALCIUM SERPL-MCNC: 9.7 MG/DL (ref 8.8–10)
CHLORIDE SERPL-SCNC: 108 MMOL/L (ref 98–107)
CO2 SERPL-SCNC: 24 MMOL/L (ref 23–31)
CREAT SERPL-MCNC: 0.82 MG/DL (ref 0.73–1.18)
CREAT/UREA NIT SERPL: 10
EOSINOPHIL # BLD AUTO: 0.14 X10(3)/MCL (ref 0–0.9)
EOSINOPHIL NFR BLD AUTO: 3.7 %
ERYTHROCYTE [DISTWIDTH] IN BLOOD BY AUTOMATED COUNT: 21.7 % (ref 11.5–17)
GFR SERPLBLD CREATININE-BSD FMLA CKD-EPI: >60 ML/MIN/1.73/M2
GLOBULIN SER-MCNC: 3.4 GM/DL (ref 2.4–3.5)
GLUCOSE SERPL-MCNC: 108 MG/DL (ref 82–115)
HCT VFR BLD AUTO: 35.7 % (ref 42–52)
HGB BLD-MCNC: 11.2 G/DL (ref 14–18)
IMM GRANULOCYTES # BLD AUTO: 0.01 X10(3)/MCL (ref 0–0.04)
IMM GRANULOCYTES NFR BLD AUTO: 0.3 %
LYMPHOCYTES # BLD AUTO: 0.76 X10(3)/MCL (ref 0.6–4.6)
LYMPHOCYTES NFR BLD AUTO: 19.8 %
MCH RBC QN AUTO: 29.9 PG (ref 27–31)
MCHC RBC AUTO-ENTMCNC: 31.4 G/DL (ref 33–36)
MCV RBC AUTO: 95.5 FL (ref 80–94)
MONOCYTES # BLD AUTO: 0.1 X10(3)/MCL (ref 0.1–1.3)
MONOCYTES NFR BLD AUTO: 2.6 %
NEUTROPHILS # BLD AUTO: 2.79 X10(3)/MCL (ref 2.1–9.2)
NEUTROPHILS NFR BLD AUTO: 72.8 %
PLATELET # BLD AUTO: 362 X10(3)/MCL (ref 130–400)
PMV BLD AUTO: 10 FL (ref 7.4–10.4)
POTASSIUM SERPL-SCNC: 3.9 MMOL/L (ref 3.5–5.1)
PROT SERPL-MCNC: 7 GM/DL (ref 5.8–7.6)
RBC # BLD AUTO: 3.74 X10(6)/MCL (ref 4.7–6.1)
SODIUM SERPL-SCNC: 141 MMOL/L (ref 136–145)
TSH SERPL-ACNC: 0.85 UIU/ML (ref 0.35–4.94)
WBC # BLD AUTO: 3.83 X10(3)/MCL (ref 4.5–11.5)

## 2024-06-24 PROCEDURE — 36415 COLL VENOUS BLD VENIPUNCTURE: CPT

## 2024-06-24 PROCEDURE — 85025 COMPLETE CBC W/AUTO DIFF WBC: CPT

## 2024-06-24 PROCEDURE — 84443 ASSAY THYROID STIM HORMONE: CPT

## 2024-06-24 PROCEDURE — 80053 COMPREHEN METABOLIC PANEL: CPT

## 2024-06-26 ENCOUNTER — INFUSION (OUTPATIENT)
Dept: INFUSION THERAPY | Facility: HOSPITAL | Age: 79
End: 2024-06-26
Attending: NURSE PRACTITIONER
Payer: MEDICARE

## 2024-06-26 VITALS — DIASTOLIC BLOOD PRESSURE: 62 MMHG | HEART RATE: 116 BPM | TEMPERATURE: 99 F | SYSTOLIC BLOOD PRESSURE: 140 MMHG

## 2024-06-26 DIAGNOSIS — C34.90 NON-SMALL CELL LUNG CANCER, UNSPECIFIED LATERALITY: ICD-10-CM

## 2024-06-26 DIAGNOSIS — C34.00 MALIGNANT NEOPLASM OF HILUS OF LUNG, UNSPECIFIED LATERALITY: Primary | ICD-10-CM

## 2024-06-26 PROCEDURE — 25000003 PHARM REV CODE 250: Performed by: NURSE PRACTITIONER

## 2024-06-26 PROCEDURE — 96377 APPLICATON ON-BODY INJECTOR: CPT

## 2024-06-26 PROCEDURE — 63600175 PHARM REV CODE 636 W HCPCS: Mod: JZ,JG | Performed by: NURSE PRACTITIONER

## 2024-06-26 PROCEDURE — 96375 TX/PRO/DX INJ NEW DRUG ADDON: CPT

## 2024-06-26 PROCEDURE — 96413 CHEMO IV INFUSION 1 HR: CPT

## 2024-06-26 RX ORDER — HEPARIN 100 UNIT/ML
500 SYRINGE INTRAVENOUS
Status: DISCONTINUED | OUTPATIENT
Start: 2024-06-26 | End: 2024-06-26 | Stop reason: HOSPADM

## 2024-06-26 RX ORDER — ONDANSETRON HYDROCHLORIDE 2 MG/ML
8 INJECTION, SOLUTION INTRAVENOUS
Status: COMPLETED | OUTPATIENT
Start: 2024-06-26 | End: 2024-06-26

## 2024-06-26 RX ORDER — SODIUM CHLORIDE 0.9 % (FLUSH) 0.9 %
10 SYRINGE (ML) INJECTION
Status: DISCONTINUED | OUTPATIENT
Start: 2024-06-26 | End: 2024-06-26 | Stop reason: HOSPADM

## 2024-06-26 RX ADMIN — GEMCITABINE 2600 MG: 38 INJECTION, SOLUTION INTRAVENOUS at 11:06

## 2024-06-26 RX ADMIN — PEGFILGRASTIM 6 MG: KIT SUBCUTANEOUS at 10:06

## 2024-06-26 RX ADMIN — ONDANSETRON 8 MG: 2 INJECTION INTRAMUSCULAR; INTRAVENOUS at 10:06

## 2024-07-08 ENCOUNTER — LAB VISIT (OUTPATIENT)
Dept: LAB | Facility: HOSPITAL | Age: 79
End: 2024-07-08
Attending: INTERNAL MEDICINE
Payer: MEDICARE

## 2024-07-08 DIAGNOSIS — E78.5 HYPERLIPIDEMIA, UNSPECIFIED HYPERLIPIDEMIA TYPE: ICD-10-CM

## 2024-07-08 DIAGNOSIS — C34.91 NON-SMALL CELL CANCER OF RIGHT LUNG: ICD-10-CM

## 2024-07-08 DIAGNOSIS — R53.83 FATIGUE, UNSPECIFIED TYPE: ICD-10-CM

## 2024-07-08 LAB
ALBUMIN SERPL-MCNC: 3.8 G/DL (ref 3.4–4.8)
ALBUMIN/GLOB SERPL: 1.1 RATIO (ref 1.1–2)
ALP SERPL-CCNC: 137 UNIT/L (ref 40–150)
ALT SERPL-CCNC: 42 UNIT/L (ref 0–55)
ANION GAP SERPL CALC-SCNC: 8 MEQ/L
AST SERPL-CCNC: 39 UNIT/L (ref 5–34)
BASOPHILS # BLD AUTO: 0.03 X10(3)/MCL
BASOPHILS NFR BLD AUTO: 0.3 %
BILIRUB SERPL-MCNC: 0.4 MG/DL
BUN SERPL-MCNC: 6.2 MG/DL (ref 8.4–25.7)
CALCIUM SERPL-MCNC: 9.5 MG/DL (ref 8.8–10)
CHLORIDE SERPL-SCNC: 110 MMOL/L (ref 98–107)
CO2 SERPL-SCNC: 25 MMOL/L (ref 23–31)
CREAT SERPL-MCNC: 0.84 MG/DL (ref 0.73–1.18)
CREAT/UREA NIT SERPL: 7
EOSINOPHIL # BLD AUTO: 0.1 X10(3)/MCL (ref 0–0.9)
EOSINOPHIL NFR BLD AUTO: 1.1 %
ERYTHROCYTE [DISTWIDTH] IN BLOOD BY AUTOMATED COUNT: 22.3 % (ref 11.5–17)
GFR SERPLBLD CREATININE-BSD FMLA CKD-EPI: >60 ML/MIN/1.73/M2
GLOBULIN SER-MCNC: 3.6 GM/DL (ref 2.4–3.5)
GLUCOSE SERPL-MCNC: 97 MG/DL (ref 82–115)
HCT VFR BLD AUTO: 37 % (ref 42–52)
HGB BLD-MCNC: 11.9 G/DL (ref 14–18)
IMM GRANULOCYTES # BLD AUTO: 0.06 X10(3)/MCL (ref 0–0.04)
IMM GRANULOCYTES NFR BLD AUTO: 0.7 %
LYMPHOCYTES # BLD AUTO: 1.07 X10(3)/MCL (ref 0.6–4.6)
LYMPHOCYTES NFR BLD AUTO: 11.7 %
MCH RBC QN AUTO: 30.8 PG (ref 27–31)
MCHC RBC AUTO-ENTMCNC: 32.2 G/DL (ref 33–36)
MCV RBC AUTO: 95.9 FL (ref 80–94)
MONOCYTES # BLD AUTO: 1.06 X10(3)/MCL (ref 0.1–1.3)
MONOCYTES NFR BLD AUTO: 11.6 %
NEUTROPHILS # BLD AUTO: 6.83 X10(3)/MCL (ref 2.1–9.2)
NEUTROPHILS NFR BLD AUTO: 74.6 %
PLATELET # BLD AUTO: 197 X10(3)/MCL (ref 130–400)
PMV BLD AUTO: 11 FL (ref 7.4–10.4)
POTASSIUM SERPL-SCNC: 3.9 MMOL/L (ref 3.5–5.1)
PROT SERPL-MCNC: 7.4 GM/DL (ref 5.8–7.6)
RBC # BLD AUTO: 3.86 X10(6)/MCL (ref 4.7–6.1)
SODIUM SERPL-SCNC: 143 MMOL/L (ref 136–145)
TSH SERPL-ACNC: 0.52 UIU/ML (ref 0.35–4.94)
WBC # BLD AUTO: 9.15 X10(3)/MCL (ref 4.5–11.5)

## 2024-07-08 PROCEDURE — 85025 COMPLETE CBC W/AUTO DIFF WBC: CPT

## 2024-07-08 PROCEDURE — 84443 ASSAY THYROID STIM HORMONE: CPT

## 2024-07-08 PROCEDURE — 36415 COLL VENOUS BLD VENIPUNCTURE: CPT

## 2024-07-08 PROCEDURE — 80053 COMPREHEN METABOLIC PANEL: CPT

## 2024-07-08 RX ORDER — ATORVASTATIN CALCIUM 10 MG/1
TABLET, FILM COATED ORAL
Qty: 90 TABLET | Refills: 3 | Status: SHIPPED | OUTPATIENT
Start: 2024-07-08

## 2024-07-10 ENCOUNTER — INFUSION (OUTPATIENT)
Dept: INFUSION THERAPY | Facility: HOSPITAL | Age: 79
End: 2024-07-10
Attending: NURSE PRACTITIONER
Payer: MEDICARE

## 2024-07-10 ENCOUNTER — OFFICE VISIT (OUTPATIENT)
Dept: HEMATOLOGY/ONCOLOGY | Facility: CLINIC | Age: 79
End: 2024-07-10
Payer: MEDICARE

## 2024-07-10 VITALS
HEART RATE: 87 BPM | RESPIRATION RATE: 18 BRPM | BODY MASS INDEX: 31.18 KG/M2 | BODY MASS INDEX: 31.18 KG/M2 | WEIGHT: 198.69 LBS | HEIGHT: 67 IN | WEIGHT: 198.63 LBS | TEMPERATURE: 98 F | SYSTOLIC BLOOD PRESSURE: 128 MMHG | OXYGEN SATURATION: 100 % | DIASTOLIC BLOOD PRESSURE: 75 MMHG

## 2024-07-10 DIAGNOSIS — C34.00 MALIGNANT NEOPLASM OF HILUS OF LUNG, UNSPECIFIED LATERALITY: Primary | ICD-10-CM

## 2024-07-10 DIAGNOSIS — C34.91 NON-SMALL CELL CANCER OF RIGHT LUNG: Primary | ICD-10-CM

## 2024-07-10 DIAGNOSIS — C77.1 SECONDARY AND UNSPECIFIED MALIGNANT NEOPLASM OF INTRATHORACIC LYMPH NODES: ICD-10-CM

## 2024-07-10 DIAGNOSIS — C34.90 NON-SMALL CELL LUNG CANCER, UNSPECIFIED LATERALITY: ICD-10-CM

## 2024-07-10 DIAGNOSIS — D70.1 CHEMOTHERAPY-INDUCED NEUTROPENIA: ICD-10-CM

## 2024-07-10 DIAGNOSIS — T45.1X5A CHEMOTHERAPY-INDUCED NEUTROPENIA: ICD-10-CM

## 2024-07-10 PROCEDURE — 1159F MED LIST DOCD IN RCRD: CPT | Mod: CPTII,S$GLB,, | Performed by: NURSE PRACTITIONER

## 2024-07-10 PROCEDURE — 3074F SYST BP LT 130 MM HG: CPT | Mod: CPTII,S$GLB,, | Performed by: NURSE PRACTITIONER

## 2024-07-10 PROCEDURE — 99999 PR PBB SHADOW E&M-EST. PATIENT-LVL IV: CPT | Mod: PBBFAC,,, | Performed by: NURSE PRACTITIONER

## 2024-07-10 PROCEDURE — 25000003 PHARM REV CODE 250: Performed by: NURSE PRACTITIONER

## 2024-07-10 PROCEDURE — 1126F AMNT PAIN NOTED NONE PRSNT: CPT | Mod: CPTII,S$GLB,, | Performed by: NURSE PRACTITIONER

## 2024-07-10 PROCEDURE — 96413 CHEMO IV INFUSION 1 HR: CPT

## 2024-07-10 PROCEDURE — 3288F FALL RISK ASSESSMENT DOCD: CPT | Mod: CPTII,S$GLB,, | Performed by: NURSE PRACTITIONER

## 2024-07-10 PROCEDURE — 1157F ADVNC CARE PLAN IN RCRD: CPT | Mod: CPTII,S$GLB,, | Performed by: NURSE PRACTITIONER

## 2024-07-10 PROCEDURE — 1160F RVW MEDS BY RX/DR IN RCRD: CPT | Mod: CPTII,S$GLB,, | Performed by: NURSE PRACTITIONER

## 2024-07-10 PROCEDURE — 96375 TX/PRO/DX INJ NEW DRUG ADDON: CPT

## 2024-07-10 PROCEDURE — 1101F PT FALLS ASSESS-DOCD LE1/YR: CPT | Mod: CPTII,S$GLB,, | Performed by: NURSE PRACTITIONER

## 2024-07-10 PROCEDURE — 99215 OFFICE O/P EST HI 40 MIN: CPT | Mod: S$GLB,,, | Performed by: NURSE PRACTITIONER

## 2024-07-10 PROCEDURE — 3078F DIAST BP <80 MM HG: CPT | Mod: CPTII,S$GLB,, | Performed by: NURSE PRACTITIONER

## 2024-07-10 PROCEDURE — 63600175 PHARM REV CODE 636 W HCPCS: Performed by: NURSE PRACTITIONER

## 2024-07-10 RX ORDER — ONDANSETRON HYDROCHLORIDE 2 MG/ML
8 INJECTION, SOLUTION INTRAVENOUS
Status: CANCELLED | OUTPATIENT
Start: 2024-07-10

## 2024-07-10 RX ORDER — ONDANSETRON HYDROCHLORIDE 2 MG/ML
8 INJECTION, SOLUTION INTRAVENOUS
Status: COMPLETED | OUTPATIENT
Start: 2024-07-10 | End: 2024-07-10

## 2024-07-10 RX ORDER — HEPARIN 100 UNIT/ML
500 SYRINGE INTRAVENOUS
Status: CANCELLED | OUTPATIENT
Start: 2024-07-10

## 2024-07-10 RX ORDER — SODIUM CHLORIDE 0.9 % (FLUSH) 0.9 %
10 SYRINGE (ML) INJECTION
Status: CANCELLED | OUTPATIENT
Start: 2024-07-10

## 2024-07-10 RX ORDER — SODIUM CHLORIDE 0.9 % (FLUSH) 0.9 %
10 SYRINGE (ML) INJECTION
Status: DISCONTINUED | OUTPATIENT
Start: 2024-07-10 | End: 2024-07-10 | Stop reason: HOSPADM

## 2024-07-10 RX ORDER — HEPARIN 100 UNIT/ML
500 SYRINGE INTRAVENOUS
Status: DISCONTINUED | OUTPATIENT
Start: 2024-07-10 | End: 2024-07-10 | Stop reason: HOSPADM

## 2024-07-10 RX ADMIN — ONDANSETRON 8 MG: 2 INJECTION INTRAMUSCULAR; INTRAVENOUS at 09:07

## 2024-07-10 RX ADMIN — GEMCITABINE 2600 MG: 38 INJECTION, SOLUTION INTRAVENOUS at 09:07

## 2024-07-10 NOTE — PROGRESS NOTES
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 Right upper lobe 2.5 cm mass.  CT C 9/19/2022: Status post right partial pneumonectomy for resection of a right upper lobe lung mass with no residual recurrent mass seen. Small right-sided pleural effusion. Some lymphadenopathy in the right paratracheal region with a maximum short axis dimension of 1.6 cm.  Follow-up is recommended  CT C 1/25/2023: There is a paratracheal enlarged lymph node which shows interval mild size increase and now measures 2.2 x 2.0 cm and previously was 1.6 x 1.5 cm.  Aortopulmonary window mildly enlarged lymph node is stable.  Thoracic aorta is without aneurysmal dilatation or dissection.  Impression: 1. Operative changes of right upper lobectomy without bronchialstump soft tissue proliferation    2. No residual tumor load or metastatic nodule. 3. Paratracheal enlarged lymph node with interval size increase.  PET CT 2/9/2023:  Hypermetabolic right paratracheal lymph node image 81 series 3 measures 25 x 20 mm with max SUV 27.0.  Hypermetabolic anterior mediastinal lymph node anterior to the SVC measures 12 x 9 mm with max SUV 12.0. Impression: 1. Hypermetabolic metastatic mediastinal adenopathy.   2. No PET  evidence of metastatic disease outside of the mediastinum.  CT C/A/P 7/10/2023:  1. Several new subcentimeter nodules in the right lung.  Suspect these are infectious or inflammatory in nature, but 3 month follow-up chest CT recommended to ensure resolution.  2. 2 reference mediastinal lymph nodes are smaller since January.  3. L1 vertebral body compression deformity new since January, but appears subacute.  CT C/A/P 10/10/2023:    1. Slightly larger mediastinal lymph nodes, to be followed.  2. Increased irregular right perihilar consolidation which may be treatment related.  3. Small right pleural effusion.  4. No new suspicious findings in the abdomen or pelvis.  CT C/A/P 1/10/2024:    1. Interval enlargement of mediastinal lymph nodes  2. Similar right perihilar consolidative opacity and small right pleural effusion  PET CT 2/6/2024:  1. New, enlarged hypermetabolic superior paratracheal and para-aortic/subaortic lymph nodes compared to prior PET-CT  2. Persistent enlarged and hypermetabolic lower right paratracheal lymph node.  This lymph node is decreased in size and FDG avidity compared to prior PET-CT.  3. Previously seen hypermetabolic pre-vascular lymph node is decreased in size and is no longer hypermetabolic.  PET CT 5/23/2024:  1. Status post right upper lobe resection without evidence for local recurrence/residual disease.  2. Interval response to therapy with decreasing right paratracheal and left AP window adenopathy.  3. Nonspecific area of hypermetabolic activity within the left tongue base.  Direct visualization may be warranted.  4. No evidence for new focus of metastatic disease.         Pathology:  03/22/2022 CT-guided biopsy of the right lung mass: invasive squamous cell carcinoma, moderately differentiated.  5/24/2022: Right upper lobectomy:  1- LYMPH NODE, LUMBAR RIGHT 10 LYMPHADENECTOMY: NEGATIVE FOR METASTATIC CARCINOMA (0/1).   2- LYMPH NODE, 11R, LYMPHADENECTOMY: NEGATIVE FOR METASTATIC  CARCINOMA (0/1).  3- LYMPH NODE, 9R, LYMPHADENECTOMY: NEGATIVE FOR METASTATIC CARCINOMA (0/1).   4- LYMPH NODE, 7R, LYMPHADENECTOMY: NEGATIVE FOR METASTATIC CARCINOMA (0/1).   5- LYMPH NODE, 8R, LYMPHADENECTOMY: ONE LYMPH NODE NEGATIVE FOR METASTATIC CARCINOMA (0/1).    6- LYMPH NODE, 12R, LYMPHADENECTOMY: NEGATIVE FOR METASTATIC CARCINOMA (0/1).  7- LUNG, RIGHT UPPER AND MIDDLE LOBES, PNEUMONECTOMY:  POORLY DIFFERENTIATED, G3, SQUAMOUS CELL CARCINOMA, INVASIVE.    - TUMOR SIZE: 3 CM.    - LYMPHOVASCULAR INVASION IS PRESENT.    - MARGINS NEGATIVE FOR INVASIVE CARCINOMA:    - NEAREST MARGIN, VASCULAR / BRONCHIAL 2.5 CM.    - NO PLEURAL SURFACE INVOLVEMENT     - PROMINENT NECROSIS PRESENT.  Visceral Pleura Invasion: Not identified  Number of Lymph Nodes Examined: Exact number : [id number]  pT Category: pT1c: pN0: No regional lymph node metastasis    3/8/2023 LYMPH NODE BIOPSY:   LYMPH NODE 4R, LYMPHADENECTOMY:  METASTATIC SQUAMOUS CELL CARCINOMA, NONKERATINIZING, MULTIPLE FRAGMENTS.       IMMUNOHISTOCHEMICAL STAINS:   CK 5/6, P63 AND CK7:  POSITIVE IN MALIGNANT CELLS.   CK20 AND TTF-1:  NEGATIVE IN MALIGNANT CELLS.       CLINICAL HISTORY:       Patient: Leonila Rosales is a 78 y.o. male kindly referred by  Dr. Diaz for evaluation of lung cancer.    On 01/17/2022 patient presented to the emergency department after a fall.  He reports that he was getting to his truck and sleep and fell on his left side on the truck step rail.  He started having pain in his left hip and knee.  He underwent a CT angiogram that showed No pulmonary embolus. Right upper lobe 2.5 cm mass.      During that hospitalization he was treated for a close intertrochanteric fracture of the left femur and underwent open reduction intramedullary nailing left comminuted intertrochanteric hip fracture on 01/18/2022 with Dr. Fortino Palomares.  He then spent several weeks on rehab.    On 03/22/2022 he underwent CT-guided biopsy of the right lung mass.  Pathology showed  invasive squamous cell carcinoma, moderately differentiated.    He is s/p right upper lobectomy with  on 5/24/2022.  Pathology report as above.  Patient is stage IA3 squamous cell carcinoma of the lung.  He has recovered well and has been doing good.     Patient was started on surveillance. CT 9/19/2022 with 1.6 right paratracheal LN and small Rt pleural effusion. Surveillance CT scan chest to eval stability 1/25/2023 with paratracheal enlarged lymph node which shows interval mild size increase and now measures 2.2 x 2.0 cm and previously was 1.6 x 1.5 cm.  Aortopulmonary window mildly enlarged lymph node is stable. PET CT 2/9/2023 with Hypermetabolic right paratracheal lymph node 25 x 20 mm with max SUV 27.0. Hypermetabolic anterior mediastinal lymph node anterior to the SVC measures 12 x 9 mm with max SUV 12.0.   Biopsy of R4 lymph node confirmed the metastatic squamous cell carcinoma. Completed  XRT on 5/30/23  and 5 cycles of Carbo/taxol on 5/19/23. C6 was held on 5/26/23 due to neutropenia, ANC was 0.7.    Patient was started on chemoradiation. -completed XRT on 5/30/23 and 5 cycles of weekly carbo/taxol on 5/19/23, His final cycle was held due to neutropenia, ANC was 0.7.  He was then started on maintenance Imfinzi every 4 weeks on 6/22/23    Chief Complaint: 3 Month Follow Up      Interval History:  He completed XRT to the mediastinum on 5/30/23 and 5 cycles of weekly carbo/taxol on 5/19/23. He was on maintenance durvalumab, started 6/22/2023 and tolerated well. Patient with progression of paratracheal and para-aortic/subaortic lymph nodes.  He was seen by Dr. Willis but not a good candidate for radiation at this time.  He was started on Gemzar on 4/13/24.       7/10/24: Patient presents today for TD prior to C5 of D1,D8 Q21 D Gemzar.  He is tolerating well so far. He denies any side effects of Gemzar. Denies fever, chills and sweats. Denies pain. Bowels are moving well. Denies bleeding. Still with  chronic cough.         Past Medical History:   Diagnosis Date    Anemia     Closed intertrochanteric fracture     Deficiency of macronutrients     GERD (gastroesophageal reflux disease)     H. pylori infection     History of tobacco use     Hyperlipidemia     Hypertension     Lung mass     Malignant neoplasm of unspecified part of unspecified bronchus or lung     Non-small cell lung cancer       Past Surgical History:   Procedure Laterality Date    BIOPSY OF INTESTINE  04/04/2022    BRONCHOSCOPY      COLONOSCOPY      ESOPHAGOGASTRODUODENOSCOPY  04/04/2022    HIP FRACTURE SURGERY Left 2016    Intramedullary Nail Insertion Hip Left 01/18/2022    KIDNEY SURGERY Right 05/27/2022    MEDIASTINOSCOPY N/A 3/6/2023    Procedure: MEDIASTINOSCOPY;  Surgeon: Jose Diaz MD;  Location: [facility] OR;  Service: Cardiothoracic;  Laterality: N/A;  XX    PLACEMENT, MEDIPORT N/A 4/6/2023    Procedure: Placement, Mediport;  Surgeon: Jose Diaz MD;  Location: [facility] OR;  Service: Cardiology;  Laterality: N/A;    SHOULDER SURGERY       Family History   Family history unknown: Yes            Review of patient's allergies indicates:  No Known Allergies   Current Outpatient Medications on File Prior to Visit   Medication Sig Dispense Refill    amLODIPine (NORVASC) 5 MG tablet TAKE ONE TABLET BY MOUTH EVERY DAY TWICE DAILY 180 tablet 1    aspirin 81 mg Cap Take 81 mg by mouth Daily.      atorvastatin (LIPITOR) 10 MG tablet TAKE ONE TABLET BY MOUTH DAILY 90 tablet 3    levoFLOXacin (LEVAQUIN) 500 MG tablet Take 1 tablet (500 mg total) by mouth once daily. 7 tablet 0    LIDOcaine-prilocaine (EMLA) cream Apply topically as needed. Apply to port site 30 mins prior to chemo 30 g 3    LINZESS 145 mcg Cap capsule Take 145 mcg by mouth daily as needed. New medication, not started yet      metoprolol succinate (TOPROL-XL) 25 MG 24 hr tablet Take 1 tablet (25 mg total) by mouth once daily. 90 tablet 3    pantoprazole (PROTONIX) 40 MG tablet Take  "40 mg by mouth.       No current facility-administered medications on file prior to visit.      Review of Systems       Vitals:    07/10/24 0903   BP: 128/75   BP Location: Left arm   Patient Position: Sitting   Pulse: 87   Resp: 18   Temp: 98 °F (36.7 °C)   TempSrc: Oral   SpO2: 100%   Weight: 90.1 kg (198 lb 11.2 oz)   Height: 5' 6.93" (1.7 m)               Wt Readings from Last 6 Encounters:   07/10/24 90.1 kg (198 lb 11.2 oz)   06/19/24 91 kg (200 lb 11.2 oz)   06/05/24 91.9 kg (202 lb 11.1 oz)   05/29/24 91.9 kg (202 lb 11.2 oz)   05/16/24 92.1 kg (203 lb)   05/15/24 92.5 kg (203 lb 14.1 oz)     Body mass index is 31.19 kg/m².  Body surface area is 2.06 meters squared.       Physical Exam  Vitals and nursing note reviewed.   Constitutional:       General: He is not in acute distress.     Appearance: Normal appearance. He is obese.   HENT:      Head: Normocephalic and atraumatic.      Mouth/Throat:      Mouth: Mucous membranes are moist.   Eyes:      General: No scleral icterus.     Extraocular Movements: Extraocular movements intact.      Conjunctiva/sclera: Conjunctivae normal.   Neck:      Vascular: No JVD.   Cardiovascular:      Rate and Rhythm: Normal rate and regular rhythm.      Heart sounds: No murmur heard.  Pulmonary:      Effort: Pulmonary effort is normal.      Breath sounds: Decreased breath sounds and wheezing present. No rhonchi.   Chest:      Chest wall: No tenderness.   Abdominal:      General: Bowel sounds are normal. There is no distension.      Palpations: Abdomen is soft. There is no fluid wave.      Tenderness: There is no abdominal tenderness.   Musculoskeletal:         General: No swelling or deformity.      Cervical back: Neck supple.      Comments: Uses cane for mobility   Lymphadenopathy:      Head:      Right side of head: No submandibular adenopathy.      Left side of head: No submandibular adenopathy.      Cervical: No cervical adenopathy.      Upper Body:      Right upper body: No " supraclavicular or axillary adenopathy.      Left upper body: No supraclavicular or axillary adenopathy.      Lower Body: No right inguinal adenopathy. No left inguinal adenopathy.   Skin:     General: Skin is warm.      Coloration: Skin is not jaundiced.      Findings: No rash.   Neurological:      General: No focal deficit present.      Mental Status: He is alert and oriented to person, place, and time.      Cranial Nerves: Cranial nerves 2-12 are intact.   Psychiatric:         Attention and Perception: Attention normal.         Mood and Affect: Mood and affect normal.         Behavior: Behavior is cooperative.         Cognition and Memory: Cognition normal.         Judgment: Judgment normal.       ECOG SCORE             Laboratory:  CBC with Differential:  Lab Results   Component Value Date    WBC 9.15 07/08/2024    RBC 3.86 (L) 07/08/2024    HGB 11.9 (L) 07/08/2024    HCT 37.0 (L) 07/08/2024    MCV 95.9 (H) 07/08/2024    MCH 30.8 07/08/2024    MCHC 32.2 (L) 07/08/2024    RDW 22.3 (H) 07/08/2024     07/08/2024    MPV 11.0 (H) 07/08/2024        CMP:  Sodium   Date Value Ref Range Status   07/08/2024 143 136 - 145 mmol/L Final     Potassium   Date Value Ref Range Status   07/08/2024 3.9 3.5 - 5.1 mmol/L Final     Chloride   Date Value Ref Range Status   07/08/2024 110 (H) 98 - 107 mmol/L Final     CO2   Date Value Ref Range Status   07/08/2024 25 23 - 31 mmol/L Final     Blood Urea Nitrogen   Date Value Ref Range Status   07/08/2024 6.2 (L) 8.4 - 25.7 mg/dL Final     Creatinine   Date Value Ref Range Status   07/08/2024 0.84 0.73 - 1.18 mg/dL Final     Calcium   Date Value Ref Range Status   07/08/2024 9.5 8.8 - 10.0 mg/dL Final     Albumin   Date Value Ref Range Status   07/08/2024 3.8 3.4 - 4.8 g/dL Final     Bilirubin Total   Date Value Ref Range Status   07/08/2024 0.4 <=1.5 mg/dL Final     ALP   Date Value Ref Range Status   07/08/2024 137 40 - 150 unit/L Final     AST   Date Value Ref Range Status    07/08/2024 39 (H) 5 - 34 unit/L Final     ALT   Date Value Ref Range Status   07/08/2024 42 0 - 55 unit/L Final     Estimated GFR-Non    Date Value Ref Range Status   04/19/2022 >60           Assessment:       1) [id number] 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 Discussed with Dr Alba ESCOBAR and he will be seen 4/21/2024.     Educated the patient on the risks versus benefits as well as toxicities associated with treatment.  Verbally consented the patient to the treatment plan and the patient was educated on the planned duration of the treatment and schedule of the treatment administration.  All questions were answered.      Plan:       Patient with recurrence of disease while on Imfinizi.  Not candidate for RT. We discussed again chemotherapy and he is agreeable. We discontinue Imfinzi and started Gemzar on 4/17/2024 and so far tolerating well.    Okay to proceed with Gemzar C5D1 today, Neulasta on day 8.   Infusion only on day 8- labs to be drawn the day before at [facility]  PET CT due Aug. 2024 - will order at next visit  RTC in 3 weeks with  for TD/Infusion - he prefers Wednesday appts. Labs to be drawn the day before treatment at [facility].   Labs: CBC, CMP  Encourage to call or message us for any questions or problems  The patient was given ample opportunity to ask questions, and to the best of my abilities, all questions answered to satisfaction; patient demonstrated understanding of what we discussed and agreeable to the plan.       INDIA Arango

## 2024-07-15 ENCOUNTER — LAB VISIT (OUTPATIENT)
Dept: LAB | Facility: HOSPITAL | Age: 79
End: 2024-07-15
Attending: INTERNAL MEDICINE
Payer: MEDICARE

## 2024-07-15 DIAGNOSIS — R53.83 FATIGUE, UNSPECIFIED TYPE: ICD-10-CM

## 2024-07-15 DIAGNOSIS — C34.91 NON-SMALL CELL CANCER OF RIGHT LUNG: ICD-10-CM

## 2024-07-15 PROBLEM — Z00.00 MEDICARE ANNUAL WELLNESS VISIT, SUBSEQUENT: Status: RESOLVED | Noted: 2023-04-12 | Resolved: 2024-07-15

## 2024-07-15 LAB
ALBUMIN SERPL-MCNC: 3.5 G/DL (ref 3.4–4.8)
ALBUMIN/GLOB SERPL: 1 RATIO (ref 1.1–2)
ALP SERPL-CCNC: 103 UNIT/L (ref 40–150)
ALT SERPL-CCNC: 98 UNIT/L (ref 0–55)
ANION GAP SERPL CALC-SCNC: 8 MEQ/L
AST SERPL-CCNC: 80 UNIT/L (ref 5–34)
BASOPHILS # BLD AUTO: 0.05 X10(3)/MCL
BASOPHILS NFR BLD AUTO: 1.5 %
BILIRUB SERPL-MCNC: 1.4 MG/DL
BUN SERPL-MCNC: 8.7 MG/DL (ref 8.4–25.7)
CALCIUM SERPL-MCNC: 9.4 MG/DL (ref 8.8–10)
CHLORIDE SERPL-SCNC: 109 MMOL/L (ref 98–107)
CO2 SERPL-SCNC: 24 MMOL/L (ref 23–31)
CREAT SERPL-MCNC: 0.8 MG/DL (ref 0.73–1.18)
CREAT/UREA NIT SERPL: 11
EOSINOPHIL # BLD AUTO: 0.18 X10(3)/MCL (ref 0–0.9)
EOSINOPHIL NFR BLD AUTO: 5.5 %
ERYTHROCYTE [DISTWIDTH] IN BLOOD BY AUTOMATED COUNT: 20.9 % (ref 11.5–17)
GFR SERPLBLD CREATININE-BSD FMLA CKD-EPI: >60 ML/MIN/1.73/M2
GLOBULIN SER-MCNC: 3.4 GM/DL (ref 2.4–3.5)
GLUCOSE SERPL-MCNC: 90 MG/DL (ref 82–115)
HCT VFR BLD AUTO: 34.8 % (ref 42–52)
HGB BLD-MCNC: 11.1 G/DL (ref 14–18)
IMM GRANULOCYTES # BLD AUTO: 0.01 X10(3)/MCL (ref 0–0.04)
IMM GRANULOCYTES NFR BLD AUTO: 0.3 %
LYMPHOCYTES # BLD AUTO: 0.69 X10(3)/MCL (ref 0.6–4.6)
LYMPHOCYTES NFR BLD AUTO: 21 %
MCH RBC QN AUTO: 31.1 PG (ref 27–31)
MCHC RBC AUTO-ENTMCNC: 31.9 G/DL (ref 33–36)
MCV RBC AUTO: 97.5 FL (ref 80–94)
MONOCYTES # BLD AUTO: 0.16 X10(3)/MCL (ref 0.1–1.3)
MONOCYTES NFR BLD AUTO: 4.9 %
NEUTROPHILS # BLD AUTO: 2.2 X10(3)/MCL (ref 2.1–9.2)
NEUTROPHILS NFR BLD AUTO: 66.8 %
PLATELET # BLD AUTO: 332 X10(3)/MCL (ref 130–400)
PMV BLD AUTO: 10.2 FL (ref 7.4–10.4)
POTASSIUM SERPL-SCNC: 4.1 MMOL/L (ref 3.5–5.1)
PROT SERPL-MCNC: 6.9 GM/DL (ref 5.8–7.6)
RBC # BLD AUTO: 3.57 X10(6)/MCL (ref 4.7–6.1)
SODIUM SERPL-SCNC: 141 MMOL/L (ref 136–145)
TSH SERPL-ACNC: 0.57 UIU/ML (ref 0.35–4.94)
WBC # BLD AUTO: 3.29 X10(3)/MCL (ref 4.5–11.5)

## 2024-07-15 PROCEDURE — 84443 ASSAY THYROID STIM HORMONE: CPT

## 2024-07-15 PROCEDURE — 36415 COLL VENOUS BLD VENIPUNCTURE: CPT

## 2024-07-15 PROCEDURE — 85025 COMPLETE CBC W/AUTO DIFF WBC: CPT

## 2024-07-15 PROCEDURE — 80053 COMPREHEN METABOLIC PANEL: CPT

## 2024-07-16 RX ORDER — SODIUM CHLORIDE 0.9 % (FLUSH) 0.9 %
10 SYRINGE (ML) INJECTION
Status: CANCELLED | OUTPATIENT
Start: 2024-07-17

## 2024-07-16 RX ORDER — ONDANSETRON HYDROCHLORIDE 2 MG/ML
8 INJECTION, SOLUTION INTRAVENOUS
Status: CANCELLED | OUTPATIENT
Start: 2024-07-17

## 2024-07-16 RX ORDER — HEPARIN 100 UNIT/ML
500 SYRINGE INTRAVENOUS
Status: CANCELLED | OUTPATIENT
Start: 2024-07-17

## 2024-07-17 ENCOUNTER — INFUSION (OUTPATIENT)
Dept: INFUSION THERAPY | Facility: HOSPITAL | Age: 79
End: 2024-07-17
Attending: NURSE PRACTITIONER
Payer: MEDICARE

## 2024-07-17 VITALS — TEMPERATURE: 98 F | SYSTOLIC BLOOD PRESSURE: 115 MMHG | DIASTOLIC BLOOD PRESSURE: 67 MMHG | HEART RATE: 86 BPM

## 2024-07-17 DIAGNOSIS — C34.90 NON-SMALL CELL LUNG CANCER, UNSPECIFIED LATERALITY: ICD-10-CM

## 2024-07-17 DIAGNOSIS — C34.00 MALIGNANT NEOPLASM OF HILUS OF LUNG, UNSPECIFIED LATERALITY: Primary | ICD-10-CM

## 2024-07-17 PROCEDURE — 96413 CHEMO IV INFUSION 1 HR: CPT

## 2024-07-17 PROCEDURE — 25000003 PHARM REV CODE 250: Performed by: INTERNAL MEDICINE

## 2024-07-17 PROCEDURE — 63600175 PHARM REV CODE 636 W HCPCS: Performed by: INTERNAL MEDICINE

## 2024-07-17 PROCEDURE — 96375 TX/PRO/DX INJ NEW DRUG ADDON: CPT

## 2024-07-17 PROCEDURE — 96377 APPLICATON ON-BODY INJECTOR: CPT

## 2024-07-17 RX ORDER — HEPARIN 100 UNIT/ML
500 SYRINGE INTRAVENOUS
Status: DISCONTINUED | OUTPATIENT
Start: 2024-07-17 | End: 2024-07-17 | Stop reason: HOSPADM

## 2024-07-17 RX ORDER — SODIUM CHLORIDE 0.9 % (FLUSH) 0.9 %
10 SYRINGE (ML) INJECTION
Status: DISCONTINUED | OUTPATIENT
Start: 2024-07-17 | End: 2024-07-17 | Stop reason: HOSPADM

## 2024-07-17 RX ORDER — ONDANSETRON HYDROCHLORIDE 2 MG/ML
8 INJECTION, SOLUTION INTRAVENOUS
Status: COMPLETED | OUTPATIENT
Start: 2024-07-17 | End: 2024-07-17

## 2024-07-17 RX ADMIN — PEGFILGRASTIM 6 MG: KIT SUBCUTANEOUS at 11:07

## 2024-07-17 RX ADMIN — ONDANSETRON 8 MG: 2 INJECTION INTRAMUSCULAR; INTRAVENOUS at 11:07

## 2024-07-17 RX ADMIN — GEMCITABINE 2600 MG: 38 INJECTION, SOLUTION INTRAVENOUS at 11:07

## 2024-07-24 DIAGNOSIS — I10 HYPERTENSION, UNSPECIFIED TYPE: ICD-10-CM

## 2024-07-24 RX ORDER — AMLODIPINE BESYLATE 5 MG/1
TABLET ORAL
Qty: 180 TABLET | Refills: 1 | Status: SHIPPED | OUTPATIENT
Start: 2024-07-24

## 2024-07-29 ENCOUNTER — LAB VISIT (OUTPATIENT)
Dept: LAB | Facility: HOSPITAL | Age: 79
End: 2024-07-29
Attending: INTERNAL MEDICINE
Payer: MEDICARE

## 2024-07-29 DIAGNOSIS — C34.91 NON-SMALL CELL CANCER OF RIGHT LUNG: ICD-10-CM

## 2024-07-29 DIAGNOSIS — R53.83 FATIGUE, UNSPECIFIED TYPE: ICD-10-CM

## 2024-07-29 LAB
ALBUMIN SERPL-MCNC: 3.4 G/DL (ref 3.4–4.8)
ALBUMIN/GLOB SERPL: 1 RATIO (ref 1.1–2)
ALP SERPL-CCNC: 124 UNIT/L (ref 40–150)
ALT SERPL-CCNC: 25 UNIT/L (ref 0–55)
ANION GAP SERPL CALC-SCNC: 11 MEQ/L
AST SERPL-CCNC: 21 UNIT/L (ref 5–34)
BASOPHILS # BLD AUTO: 0.03 X10(3)/MCL
BASOPHILS NFR BLD AUTO: 0.4 %
BILIRUB SERPL-MCNC: 0.5 MG/DL
BUN SERPL-MCNC: 7.2 MG/DL (ref 8.4–25.7)
CALCIUM SERPL-MCNC: 9 MG/DL (ref 8.8–10)
CHLORIDE SERPL-SCNC: 109 MMOL/L (ref 98–107)
CO2 SERPL-SCNC: 25 MMOL/L (ref 23–31)
CREAT SERPL-MCNC: 0.81 MG/DL (ref 0.73–1.18)
CREAT/UREA NIT SERPL: 9
EOSINOPHIL # BLD AUTO: 0.11 X10(3)/MCL (ref 0–0.9)
EOSINOPHIL NFR BLD AUTO: 1.6 %
ERYTHROCYTE [DISTWIDTH] IN BLOOD BY AUTOMATED COUNT: 19.7 % (ref 11.5–17)
GFR SERPLBLD CREATININE-BSD FMLA CKD-EPI: >60 ML/MIN/1.73/M2
GLOBULIN SER-MCNC: 3.4 GM/DL (ref 2.4–3.5)
GLUCOSE SERPL-MCNC: 112 MG/DL (ref 82–115)
HCT VFR BLD AUTO: 34.8 % (ref 42–52)
HGB BLD-MCNC: 11.3 G/DL (ref 14–18)
IMM GRANULOCYTES # BLD AUTO: 0.05 X10(3)/MCL (ref 0–0.04)
IMM GRANULOCYTES NFR BLD AUTO: 0.7 %
LYMPHOCYTES # BLD AUTO: 0.76 X10(3)/MCL (ref 0.6–4.6)
LYMPHOCYTES NFR BLD AUTO: 10.7 %
MCH RBC QN AUTO: 32.5 PG (ref 27–31)
MCHC RBC AUTO-ENTMCNC: 32.5 G/DL (ref 33–36)
MCV RBC AUTO: 100 FL (ref 80–94)
MONOCYTES # BLD AUTO: 1.24 X10(3)/MCL (ref 0.1–1.3)
MONOCYTES NFR BLD AUTO: 17.5 %
NEUTROPHILS # BLD AUTO: 4.89 X10(3)/MCL (ref 2.1–9.2)
NEUTROPHILS NFR BLD AUTO: 69.1 %
PLATELET # BLD AUTO: 180 X10(3)/MCL (ref 130–400)
PMV BLD AUTO: 10.7 FL (ref 7.4–10.4)
POTASSIUM SERPL-SCNC: 3.5 MMOL/L (ref 3.5–5.1)
PROT SERPL-MCNC: 6.8 GM/DL (ref 5.8–7.6)
RBC # BLD AUTO: 3.48 X10(6)/MCL (ref 4.7–6.1)
SODIUM SERPL-SCNC: 145 MMOL/L (ref 136–145)
TSH SERPL-ACNC: 0.26 UIU/ML (ref 0.35–4.94)
WBC # BLD AUTO: 7.08 X10(3)/MCL (ref 4.5–11.5)

## 2024-07-29 PROCEDURE — 36415 COLL VENOUS BLD VENIPUNCTURE: CPT

## 2024-07-29 PROCEDURE — 80053 COMPREHEN METABOLIC PANEL: CPT

## 2024-07-29 PROCEDURE — 84443 ASSAY THYROID STIM HORMONE: CPT

## 2024-07-29 PROCEDURE — 85025 COMPLETE CBC W/AUTO DIFF WBC: CPT

## 2024-07-31 ENCOUNTER — OFFICE VISIT (OUTPATIENT)
Dept: HEMATOLOGY/ONCOLOGY | Facility: CLINIC | Age: 79
End: 2024-07-31
Payer: MEDICARE

## 2024-07-31 ENCOUNTER — INFUSION (OUTPATIENT)
Dept: INFUSION THERAPY | Facility: HOSPITAL | Age: 79
End: 2024-07-31
Attending: NURSE PRACTITIONER
Payer: MEDICARE

## 2024-07-31 VITALS
RESPIRATION RATE: 18 BRPM | DIASTOLIC BLOOD PRESSURE: 75 MMHG | BODY MASS INDEX: 30.87 KG/M2 | WEIGHT: 196.69 LBS | TEMPERATURE: 99 F | SYSTOLIC BLOOD PRESSURE: 121 MMHG | HEIGHT: 67 IN | HEART RATE: 89 BPM | OXYGEN SATURATION: 99 %

## 2024-07-31 DIAGNOSIS — C34.00 MALIGNANT NEOPLASM OF HILUS OF LUNG, UNSPECIFIED LATERALITY: Primary | ICD-10-CM

## 2024-07-31 DIAGNOSIS — C34.90 NON-SMALL CELL LUNG CANCER, UNSPECIFIED LATERALITY: ICD-10-CM

## 2024-07-31 DIAGNOSIS — C77.1 SECONDARY AND UNSPECIFIED MALIGNANT NEOPLASM OF INTRATHORACIC LYMPH NODES: ICD-10-CM

## 2024-07-31 DIAGNOSIS — C34.91 NON-SMALL CELL CANCER OF RIGHT LUNG: Primary | ICD-10-CM

## 2024-07-31 PROCEDURE — 99999 PR PBB SHADOW E&M-EST. PATIENT-LVL IV: CPT | Mod: PBBFAC,,, | Performed by: NURSE PRACTITIONER

## 2024-07-31 PROCEDURE — 25000003 PHARM REV CODE 250: Performed by: NURSE PRACTITIONER

## 2024-07-31 PROCEDURE — 1101F PT FALLS ASSESS-DOCD LE1/YR: CPT | Mod: CPTII,S$GLB,, | Performed by: NURSE PRACTITIONER

## 2024-07-31 PROCEDURE — 3288F FALL RISK ASSESSMENT DOCD: CPT | Mod: CPTII,S$GLB,, | Performed by: NURSE PRACTITIONER

## 2024-07-31 PROCEDURE — 3078F DIAST BP <80 MM HG: CPT | Mod: CPTII,S$GLB,, | Performed by: NURSE PRACTITIONER

## 2024-07-31 PROCEDURE — 1126F AMNT PAIN NOTED NONE PRSNT: CPT | Mod: CPTII,S$GLB,, | Performed by: NURSE PRACTITIONER

## 2024-07-31 PROCEDURE — 3074F SYST BP LT 130 MM HG: CPT | Mod: CPTII,S$GLB,, | Performed by: NURSE PRACTITIONER

## 2024-07-31 PROCEDURE — 63600175 PHARM REV CODE 636 W HCPCS: Performed by: NURSE PRACTITIONER

## 2024-07-31 PROCEDURE — 1157F ADVNC CARE PLAN IN RCRD: CPT | Mod: CPTII,S$GLB,, | Performed by: NURSE PRACTITIONER

## 2024-07-31 PROCEDURE — 1159F MED LIST DOCD IN RCRD: CPT | Mod: CPTII,S$GLB,, | Performed by: NURSE PRACTITIONER

## 2024-07-31 PROCEDURE — 99215 OFFICE O/P EST HI 40 MIN: CPT | Mod: S$GLB,,, | Performed by: NURSE PRACTITIONER

## 2024-07-31 RX ORDER — SODIUM CHLORIDE 0.9 % (FLUSH) 0.9 %
10 SYRINGE (ML) INJECTION
Status: CANCELLED | OUTPATIENT
Start: 2024-07-31

## 2024-07-31 RX ORDER — ONDANSETRON HYDROCHLORIDE 2 MG/ML
8 INJECTION, SOLUTION INTRAVENOUS
Status: CANCELLED | OUTPATIENT
Start: 2024-07-31

## 2024-07-31 RX ORDER — HEPARIN 100 UNIT/ML
500 SYRINGE INTRAVENOUS
Status: DISCONTINUED | OUTPATIENT
Start: 2024-07-31 | End: 2024-07-31 | Stop reason: HOSPADM

## 2024-07-31 RX ORDER — HEPARIN 100 UNIT/ML
500 SYRINGE INTRAVENOUS
Status: CANCELLED | OUTPATIENT
Start: 2024-07-31

## 2024-07-31 RX ORDER — SODIUM CHLORIDE 0.9 % (FLUSH) 0.9 %
10 SYRINGE (ML) INJECTION
Status: DISCONTINUED | OUTPATIENT
Start: 2024-07-31 | End: 2024-07-31 | Stop reason: HOSPADM

## 2024-07-31 RX ORDER — ONDANSETRON HYDROCHLORIDE 2 MG/ML
8 INJECTION, SOLUTION INTRAVENOUS
Status: COMPLETED | OUTPATIENT
Start: 2024-07-31 | End: 2024-07-31

## 2024-07-31 RX ADMIN — GEMCITABINE 2600 MG: 38 INJECTION, SOLUTION INTRAVENOUS at 11:07

## 2024-07-31 RX ADMIN — ONDANSETRON 8 MG: 2 INJECTION INTRAMUSCULAR; INTRAVENOUS at 11:07

## 2024-07-31 NOTE — PROGRESS NOTES
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 Right upper lobe 2.5 cm mass.  CT C 9/19/2022: Status post right partial pneumonectomy for resection of a right upper lobe lung mass with no residual recurrent mass seen. Small right-sided pleural effusion. Some lymphadenopathy in the right paratracheal region with a maximum short axis dimension of 1.6 cm.  Follow-up is recommended  CT C 1/25/2023: There is a paratracheal enlarged lymph node which shows interval mild size increase and now measures 2.2 x 2.0 cm and previously was 1.6 x 1.5 cm.  Aortopulmonary window mildly enlarged lymph node is stable.  Thoracic aorta is without aneurysmal dilatation or dissection.  Impression: 1. Operative changes of right upper lobectomy without bronchialstump soft tissue proliferation    2. No residual tumor load or metastatic nodule. 3. Paratracheal enlarged lymph node with interval size increase.  PET CT 2/9/2023:  Hypermetabolic right paratracheal lymph node image 81 series 3 measures 25 x 20 mm with max SUV 27.0.  Hypermetabolic anterior mediastinal lymph node anterior to the SVC measures 12 x 9 mm with max SUV 12.0. Impression: 1. Hypermetabolic metastatic mediastinal adenopathy.   2. No PET  evidence of metastatic disease outside of the mediastinum.  CT C/A/P 7/10/2023:  1. Several new subcentimeter nodules in the right lung.  Suspect these are infectious or inflammatory in nature, but 3 month follow-up chest CT recommended to ensure resolution.  2. 2 reference mediastinal lymph nodes are smaller since January.  3. L1 vertebral body compression deformity new since January, but appears subacute.  CT C/A/P 10/10/2023:    1. Slightly larger mediastinal lymph nodes, to be followed.  2. Increased irregular right perihilar consolidation which may be treatment related.  3. Small right pleural effusion.  4. No new suspicious findings in the abdomen or pelvis.  CT C/A/P 1/10/2024:    1. Interval enlargement of mediastinal lymph nodes  2. Similar right perihilar consolidative opacity and small right pleural effusion  PET CT 2/6/2024:  1. New, enlarged hypermetabolic superior paratracheal and para-aortic/subaortic lymph nodes compared to prior PET-CT  2. Persistent enlarged and hypermetabolic lower right paratracheal lymph node.  This lymph node is decreased in size and FDG avidity compared to prior PET-CT.  3. Previously seen hypermetabolic pre-vascular lymph node is decreased in size and is no longer hypermetabolic.  PET CT 5/23/2024:  1. Status post right upper lobe resection without evidence for local recurrence/residual disease.  2. Interval response to therapy with decreasing right paratracheal and left AP window adenopathy.  3. Nonspecific area of hypermetabolic activity within the left tongue base.  Direct visualization may be warranted.  4. No evidence for new focus of metastatic disease.         Pathology:  03/22/2022 CT-guided biopsy of the right lung mass: invasive squamous cell carcinoma, moderately differentiated.  5/24/2022: Right upper lobectomy:  1- LYMPH NODE, LUMBAR RIGHT 10 LYMPHADENECTOMY: NEGATIVE FOR METASTATIC CARCINOMA (0/1).   2- LYMPH NODE, 11R, LYMPHADENECTOMY: NEGATIVE FOR METASTATIC  CARCINOMA (0/1).  3- LYMPH NODE, 9R, LYMPHADENECTOMY: NEGATIVE FOR METASTATIC CARCINOMA (0/1).   4- LYMPH NODE, 7R, LYMPHADENECTOMY: NEGATIVE FOR METASTATIC CARCINOMA (0/1).   5- LYMPH NODE, 8R, LYMPHADENECTOMY: ONE LYMPH NODE NEGATIVE FOR METASTATIC CARCINOMA (0/1).    6- LYMPH NODE, 12R, LYMPHADENECTOMY: NEGATIVE FOR METASTATIC CARCINOMA (0/1).  7- LUNG, RIGHT UPPER AND MIDDLE LOBES, PNEUMONECTOMY:  POORLY DIFFERENTIATED, G3, SQUAMOUS CELL CARCINOMA, INVASIVE.    - TUMOR SIZE: 3 CM.    - LYMPHOVASCULAR INVASION IS PRESENT.    - MARGINS NEGATIVE FOR INVASIVE CARCINOMA:    - NEAREST MARGIN, VASCULAR / BRONCHIAL 2.5 CM.    - NO PLEURAL SURFACE INVOLVEMENT     - PROMINENT NECROSIS PRESENT.  Visceral Pleura Invasion: Not identified  Number of Lymph Nodes Examined: Exact number : [id number]  pT Category: pT1c: pN0: No regional lymph node metastasis    3/8/2023 LYMPH NODE BIOPSY:   LYMPH NODE 4R, LYMPHADENECTOMY:  METASTATIC SQUAMOUS CELL CARCINOMA, NONKERATINIZING, MULTIPLE FRAGMENTS.       IMMUNOHISTOCHEMICAL STAINS:   CK 5/6, P63 AND CK7:  POSITIVE IN MALIGNANT CELLS.   CK20 AND TTF-1:  NEGATIVE IN MALIGNANT CELLS.       CLINICAL HISTORY:       Patient: Leonila Rosales is a 78 y.o. male kindly referred by  Dr. Diaz for evaluation of lung cancer.    On 01/17/2022 patient presented to the emergency department after a fall.  He reports that he was getting to his truck and sleep and fell on his left side on the truck step rail.  He started having pain in his left hip and knee.  He underwent a CT angiogram that showed No pulmonary embolus. Right upper lobe 2.5 cm mass.      During that hospitalization he was treated for a close intertrochanteric fracture of the left femur and underwent open reduction intramedullary nailing left comminuted intertrochanteric hip fracture on 01/18/2022 with Dr. Fortino Palomares.  He then spent several weeks on rehab.    On 03/22/2022 he underwent CT-guided biopsy of the right lung mass.  Pathology showed  invasive squamous cell carcinoma, moderately differentiated.    He is s/p right upper lobectomy with  on 5/24/2022.  Pathology report as above.  Patient is stage IA3 squamous cell carcinoma of the lung.  He has recovered well and has been doing good.     Patient was started on surveillance. CT 9/19/2022 with 1.6 right paratracheal LN and small Rt pleural effusion. Surveillance CT scan chest to eval stability 1/25/2023 with paratracheal enlarged lymph node which shows interval mild size increase and now measures 2.2 x 2.0 cm and previously was 1.6 x 1.5 cm.  Aortopulmonary window mildly enlarged lymph node is stable. PET CT 2/9/2023 with Hypermetabolic right paratracheal lymph node 25 x 20 mm with max SUV 27.0. Hypermetabolic anterior mediastinal lymph node anterior to the SVC measures 12 x 9 mm with max SUV 12.0.   Biopsy of R4 lymph node confirmed the metastatic squamous cell carcinoma. Completed  XRT on 5/30/23  and 5 cycles of Carbo/taxol on 5/19/23. C6 was held on 5/26/23 due to neutropenia, ANC was 0.7.    Patient was started on chemoradiation. -completed XRT on 5/30/23 and 5 cycles of weekly carbo/taxol on 5/19/23, His final cycle was held due to neutropenia, ANC was 0.7.  He was then started on maintenance Imfinzi every 4 weeks on 6/22/23    Chief Complaint: 3 Week Follow Up      Interval History:  He completed XRT to the mediastinum on 5/30/23 and 5 cycles of weekly carbo/taxol on 5/19/23. He was on maintenance durvalumab, started 6/22/2023 and tolerated well. Patient with progression of paratracheal and para-aortic/subaortic lymph nodes.  He was seen by Dr. Willis but not a good candidate for radiation at this time.  He was started on Gemzar on 4/13/24.       7/31/24: Patient presents today for TD prior to C6 of D1,D8 Q21 D Gemzar.  He is tolerating well so far. He denies any side effects of Gemzar. Denies fever, chills and sweats. Denies pain. Bowels are moving well. Denies bleeding. Still with  chronic cough.       Past Medical History:   Diagnosis Date    Anemia     Closed intertrochanteric fracture     Deficiency of macronutrients     GERD (gastroesophageal reflux disease)     H. pylori infection     History of tobacco use     Hyperlipidemia     Hypertension     Lung mass     Malignant neoplasm of unspecified part of unspecified bronchus or lung     Non-small cell lung cancer       Past Surgical History:   Procedure Laterality Date    BIOPSY OF INTESTINE  04/04/2022    BRONCHOSCOPY      COLONOSCOPY      ESOPHAGOGASTRODUODENOSCOPY  04/04/2022    HIP FRACTURE SURGERY Left 2016    Intramedullary Nail Insertion Hip Left 01/18/2022    KIDNEY SURGERY Right 05/27/2022    MEDIASTINOSCOPY N/A 3/6/2023    Procedure: MEDIASTINOSCOPY;  Surgeon: Jose Diaz MD;  Location: [facility] OR;  Service: Cardiothoracic;  Laterality: N/A;  XX    PLACEMENT, MEDIPORT N/A 4/6/2023    Procedure: Placement, Mediport;  Surgeon: Jose Diaz MD;  Location: [facility] OR;  Service: Cardiology;  Laterality: N/A;    SHOULDER SURGERY       Family History   Family history unknown: Yes            Review of patient's allergies indicates:  No Known Allergies   Current Outpatient Medications on File Prior to Visit   Medication Sig Dispense Refill    amLODIPine (NORVASC) 5 MG tablet TAKE ONE TABLET BY MOUTH EVERY DAY TWICE DAILY 180 tablet 1    aspirin 81 mg Cap Take 81 mg by mouth Daily.      atorvastatin (LIPITOR) 10 MG tablet TAKE ONE TABLET BY MOUTH DAILY 90 tablet 3    levoFLOXacin (LEVAQUIN) 500 MG tablet Take 1 tablet (500 mg total) by mouth once daily. 7 tablet 0    LIDOcaine-prilocaine (EMLA) cream Apply topically as needed. Apply to port site 30 mins prior to chemo 30 g 3    LINZESS 145 mcg Cap capsule Take 145 mcg by mouth daily as needed. New medication, not started yet      metoprolol succinate (TOPROL-XL) 25 MG 24 hr tablet Take 1 tablet (25 mg total) by mouth once daily. 90 tablet 3    pantoprazole (PROTONIX) 40 MG tablet Take 40  mg by mouth.       No current facility-administered medications on file prior to visit.      Review of Systems   Constitutional:  Negative for activity change, fever, night sweats and unexpected weight change.   Respiratory:  Positive for cough.         Vitals:    07/31/24 1037   Weight: 89.2 kg (196 lb 11.2 oz)                 Wt Readings from Last 6 Encounters:   07/31/24 89.2 kg (196 lb 11.2 oz)   07/10/24 90.1 kg (198 lb 10.2 oz)   07/10/24 90.1 kg (198 lb 11.2 oz)   06/19/24 91 kg (200 lb 11.2 oz)   06/05/24 91.9 kg (202 lb 11.1 oz)   05/29/24 91.9 kg (202 lb 11.2 oz)     Body mass index is 30.87 kg/m².  Body surface area is 2.05 meters squared.       Physical Exam  Vitals and nursing note reviewed.   Constitutional:       General: He is not in acute distress.     Appearance: Normal appearance. He is obese.   HENT:      Head: Normocephalic and atraumatic.      Mouth/Throat:      Mouth: Mucous membranes are moist.   Eyes:      General: No scleral icterus.     Extraocular Movements: Extraocular movements intact.      Conjunctiva/sclera: Conjunctivae normal.   Neck:      Vascular: No JVD.   Cardiovascular:      Rate and Rhythm: Normal rate and regular rhythm.      Heart sounds: No murmur heard.  Pulmonary:      Effort: Pulmonary effort is normal.      Breath sounds: Decreased breath sounds and wheezing present. No rhonchi.   Chest:      Chest wall: No tenderness.   Abdominal:      General: Bowel sounds are normal. There is no distension.      Palpations: Abdomen is soft. There is no fluid wave.      Tenderness: There is no abdominal tenderness.   Musculoskeletal:         General: No swelling or deformity.      Cervical back: Neck supple.      Comments: Uses cane for mobility   Lymphadenopathy:      Head:      Right side of head: No submandibular adenopathy.      Left side of head: No submandibular adenopathy.      Cervical: No cervical adenopathy.      Upper Body:      Right upper body: No supraclavicular or  axillary adenopathy.      Left upper body: No supraclavicular or axillary adenopathy.      Lower Body: No right inguinal adenopathy. No left inguinal adenopathy.   Skin:     General: Skin is warm.      Coloration: Skin is not jaundiced.      Findings: No rash.   Neurological:      General: No focal deficit present.      Mental Status: He is alert and oriented to person, place, and time.      Cranial Nerves: Cranial nerves 2-12 are intact.   Psychiatric:         Attention and Perception: Attention normal.         Mood and Affect: Mood and affect normal.         Behavior: Behavior is cooperative.         Cognition and Memory: Cognition normal.         Judgment: Judgment normal.     ECOG SCORE             Laboratory:  CBC with Differential:  Lab Results   Component Value Date    WBC 7.08 07/29/2024    RBC 3.48 (L) 07/29/2024    HGB 11.3 (L) 07/29/2024    HCT 34.8 (L) 07/29/2024    .0 (H) 07/29/2024    MCH 32.5 (H) 07/29/2024    MCHC 32.5 (L) 07/29/2024    RDW 19.7 (H) 07/29/2024     07/29/2024    MPV 10.7 (H) 07/29/2024        CMP:  Sodium   Date Value Ref Range Status   07/29/2024 145 136 - 145 mmol/L Final     Potassium   Date Value Ref Range Status   07/29/2024 3.5 3.5 - 5.1 mmol/L Final     Chloride   Date Value Ref Range Status   07/29/2024 109 (H) 98 - 107 mmol/L Final     CO2   Date Value Ref Range Status   07/29/2024 25 23 - 31 mmol/L Final     Blood Urea Nitrogen   Date Value Ref Range Status   07/29/2024 7.2 (L) 8.4 - 25.7 mg/dL Final     Creatinine   Date Value Ref Range Status   07/29/2024 0.81 0.73 - 1.18 mg/dL Final     Calcium   Date Value Ref Range Status   07/29/2024 9.0 8.8 - 10.0 mg/dL Final     Albumin   Date Value Ref Range Status   07/29/2024 3.4 3.4 - 4.8 g/dL Final     Bilirubin Total   Date Value Ref Range Status   07/29/2024 0.5 <=1.5 mg/dL Final     ALP   Date Value Ref Range Status   07/29/2024 124 40 - 150 unit/L Final     AST   Date Value Ref Range Status   07/29/2024 21 5 -  34 unit/L Final     ALT   Date Value Ref Range Status   07/29/2024 25 0 - 55 unit/L Final     Estimated GFR-Non    Date Value Ref Range Status   04/19/2022 >60           Assessment:       1) [id number] 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 Discussed with Dr Alba ESCOBAR and he will be seen 4/21/2024.     Educated the patient on the risks versus benefits as well as toxicities associated with treatment.  Verbally consented the patient to the treatment plan and the patient was educated on the planned duration of the treatment and schedule of the treatment administration.  All questions were answered.      Plan:       Patient with recurrence of disease while on Imfinizi.  Not candidate for RT. We discussed again chemotherapy and he is agreeable. We discontinue Imfinzi and started Gemzar on 4/17/2024 and so far tolerating well.    Okay to proceed with Gemzar C6D1 today, Neulasta on day 8.   Infusion only on day 8- labs to be drawn the day before at [facility]  PET CT due Sept. 2024 - ordered  RTC in 3 weeks with  for TD/Infusion - he prefers Wednesday appts. Labs to be drawn the day before treatment at [facility].   Labs: CBC, CMP  Encourage to call or message us for any questions or problems  The patient was given ample opportunity to ask questions, and to the best of my abilities, all questions answered to satisfaction; patient demonstrated understanding of what we discussed and agreeable to the plan.       INDIA Arango

## 2024-08-05 ENCOUNTER — LAB VISIT (OUTPATIENT)
Dept: LAB | Facility: HOSPITAL | Age: 79
End: 2024-08-05
Attending: NURSE PRACTITIONER
Payer: MEDICARE

## 2024-08-05 ENCOUNTER — TELEPHONE (OUTPATIENT)
Dept: HEMATOLOGY/ONCOLOGY | Facility: CLINIC | Age: 79
End: 2024-08-05
Payer: MEDICARE

## 2024-08-05 DIAGNOSIS — C77.1 SECONDARY AND UNSPECIFIED MALIGNANT NEOPLASM OF INTRATHORACIC LYMPH NODES: ICD-10-CM

## 2024-08-05 DIAGNOSIS — C34.91 NON-SMALL CELL CANCER OF RIGHT LUNG: ICD-10-CM

## 2024-08-05 LAB
ABS NEUT CALC (OHS): 0.74 X10(3)/MCL (ref 2.1–9.2)
ALBUMIN SERPL-MCNC: 3.5 G/DL (ref 3.4–4.8)
ALBUMIN/GLOB SERPL: 1 RATIO (ref 1.1–2)
ALP SERPL-CCNC: 95 UNIT/L (ref 40–150)
ALT SERPL-CCNC: 40 UNIT/L (ref 0–55)
ANION GAP SERPL CALC-SCNC: 9 MEQ/L
ANISOCYTOSIS BLD QL SMEAR: SLIGHT
AST SERPL-CCNC: 40 UNIT/L (ref 5–34)
BILIRUB SERPL-MCNC: 0.8 MG/DL
BUN SERPL-MCNC: 7.5 MG/DL (ref 8.4–25.7)
CALCIUM SERPL-MCNC: 9.3 MG/DL (ref 8.8–10)
CHLORIDE SERPL-SCNC: 106 MMOL/L (ref 98–107)
CO2 SERPL-SCNC: 27 MMOL/L (ref 23–31)
CREAT SERPL-MCNC: 0.84 MG/DL (ref 0.73–1.18)
CREAT/UREA NIT SERPL: 9
ELLIPTOCYTOSIS (OHS): SLIGHT
EOSINOPHIL NFR BLD MANUAL: 0.12 X10(3)/MCL (ref 0–0.9)
EOSINOPHIL NFR BLD MANUAL: 8 % (ref 0–8)
ERYTHROCYTE [DISTWIDTH] IN BLOOD BY AUTOMATED COUNT: 17.7 % (ref 11.5–17)
GFR SERPLBLD CREATININE-BSD FMLA CKD-EPI: >60 ML/MIN/1.73/M2
GLOBULIN SER-MCNC: 3.4 GM/DL (ref 2.4–3.5)
GLUCOSE SERPL-MCNC: 106 MG/DL (ref 82–115)
HCT VFR BLD AUTO: 34.9 % (ref 42–52)
HGB BLD-MCNC: 11.4 G/DL (ref 14–18)
LYMPH ABN # BLD MANUAL: 6 %
LYMPHOCYTES NFR BLD MANUAL: 0.45 X10(3)/MCL
LYMPHOCYTES NFR BLD MANUAL: 30 % (ref 13–40)
MCH RBC QN AUTO: 32.5 PG (ref 27–31)
MCHC RBC AUTO-ENTMCNC: 32.7 G/DL (ref 33–36)
MCV RBC AUTO: 99.4 FL (ref 80–94)
MONOCYTES NFR BLD MANUAL: 0.09 X10(3)/MCL (ref 0.1–1.3)
MONOCYTES NFR BLD MANUAL: 6 % (ref 2–11)
NEUTROPHILS NFR BLD MANUAL: 44 % (ref 47–80)
NEUTS BAND NFR BLD MANUAL: 6 % (ref 0–11)
PLATELET # BLD AUTO: 308 X10(3)/MCL (ref 130–400)
PLATELET # BLD EST: NORMAL 10*3/UL
PMV BLD AUTO: 10.5 FL (ref 7.4–10.4)
POIKILOCYTOSIS BLD QL SMEAR: SLIGHT
POTASSIUM SERPL-SCNC: 3.6 MMOL/L (ref 3.5–5.1)
PROT SERPL-MCNC: 6.9 GM/DL (ref 5.8–7.6)
RBC # BLD AUTO: 3.51 X10(6)/MCL (ref 4.7–6.1)
SODIUM SERPL-SCNC: 142 MMOL/L (ref 136–145)
WBC # BLD AUTO: 1.49 X10(3)/MCL (ref 4.5–11.5)

## 2024-08-05 PROCEDURE — 85027 COMPLETE CBC AUTOMATED: CPT

## 2024-08-05 PROCEDURE — 85007 BL SMEAR W/DIFF WBC COUNT: CPT

## 2024-08-05 PROCEDURE — 80053 COMPREHEN METABOLIC PANEL: CPT

## 2024-08-05 PROCEDURE — 36415 COLL VENOUS BLD VENIPUNCTURE: CPT

## 2024-08-19 ENCOUNTER — LAB VISIT (OUTPATIENT)
Dept: LAB | Facility: HOSPITAL | Age: 79
End: 2024-08-19
Attending: NURSE PRACTITIONER
Payer: MEDICARE

## 2024-08-19 DIAGNOSIS — C34.91 NON-SMALL CELL CANCER OF RIGHT LUNG: ICD-10-CM

## 2024-08-19 DIAGNOSIS — R53.83 FATIGUE, UNSPECIFIED TYPE: ICD-10-CM

## 2024-08-19 LAB
ALBUMIN SERPL-MCNC: 3.6 G/DL (ref 3.4–4.8)
ALBUMIN/GLOB SERPL: 0.9 RATIO (ref 1.1–2)
ALP SERPL-CCNC: 84 UNIT/L (ref 40–150)
ALT SERPL-CCNC: 26 UNIT/L (ref 0–55)
ANION GAP SERPL CALC-SCNC: 10 MEQ/L
AST SERPL-CCNC: 25 UNIT/L (ref 5–34)
BASOPHILS # BLD AUTO: 0.01 X10(3)/MCL
BASOPHILS NFR BLD AUTO: 0.2 %
BILIRUB SERPL-MCNC: 0.8 MG/DL
BUN SERPL-MCNC: 7.5 MG/DL (ref 8.4–25.7)
CALCIUM SERPL-MCNC: 10.3 MG/DL (ref 8.8–10)
CHLORIDE SERPL-SCNC: 110 MMOL/L (ref 98–107)
CO2 SERPL-SCNC: 26 MMOL/L (ref 23–31)
CREAT SERPL-MCNC: 0.84 MG/DL (ref 0.73–1.18)
CREAT/UREA NIT SERPL: 9
EOSINOPHIL # BLD AUTO: 0.18 X10(3)/MCL (ref 0–0.9)
EOSINOPHIL NFR BLD AUTO: 4 %
ERYTHROCYTE [DISTWIDTH] IN BLOOD BY AUTOMATED COUNT: 16.8 % (ref 11.5–17)
GFR SERPLBLD CREATININE-BSD FMLA CKD-EPI: >60 ML/MIN/1.73/M2
GLOBULIN SER-MCNC: 3.9 GM/DL (ref 2.4–3.5)
GLUCOSE SERPL-MCNC: 107 MG/DL (ref 82–115)
HCT VFR BLD AUTO: 39.6 % (ref 42–52)
HGB BLD-MCNC: 12.7 G/DL (ref 14–18)
IMM GRANULOCYTES # BLD AUTO: 0.01 X10(3)/MCL (ref 0–0.04)
IMM GRANULOCYTES NFR BLD AUTO: 0.2 %
LYMPHOCYTES # BLD AUTO: 1.09 X10(3)/MCL (ref 0.6–4.6)
LYMPHOCYTES NFR BLD AUTO: 24 %
MCH RBC QN AUTO: 32 PG (ref 27–31)
MCHC RBC AUTO-ENTMCNC: 32.1 G/DL (ref 33–36)
MCV RBC AUTO: 99.7 FL (ref 80–94)
MONOCYTES # BLD AUTO: 0.73 X10(3)/MCL (ref 0.1–1.3)
MONOCYTES NFR BLD AUTO: 16.1 %
NEUTROPHILS # BLD AUTO: 2.52 X10(3)/MCL (ref 2.1–9.2)
NEUTROPHILS NFR BLD AUTO: 55.5 %
PLATELET # BLD AUTO: 264 X10(3)/MCL (ref 130–400)
PMV BLD AUTO: 10.4 FL (ref 7.4–10.4)
POTASSIUM SERPL-SCNC: 4.3 MMOL/L (ref 3.5–5.1)
PROT SERPL-MCNC: 7.5 GM/DL (ref 5.8–7.6)
RBC # BLD AUTO: 3.97 X10(6)/MCL (ref 4.7–6.1)
SODIUM SERPL-SCNC: 146 MMOL/L (ref 136–145)
TSH SERPL-ACNC: 0.81 UIU/ML (ref 0.35–4.94)
WBC # BLD AUTO: 4.54 X10(3)/MCL (ref 4.5–11.5)

## 2024-08-19 PROCEDURE — 84443 ASSAY THYROID STIM HORMONE: CPT

## 2024-08-19 PROCEDURE — 36415 COLL VENOUS BLD VENIPUNCTURE: CPT

## 2024-08-19 PROCEDURE — 85025 COMPLETE CBC W/AUTO DIFF WBC: CPT

## 2024-08-19 PROCEDURE — 80053 COMPREHEN METABOLIC PANEL: CPT

## 2024-08-21 ENCOUNTER — INFUSION (OUTPATIENT)
Dept: INFUSION THERAPY | Facility: HOSPITAL | Age: 79
End: 2024-08-21
Attending: NURSE PRACTITIONER
Payer: MEDICARE

## 2024-08-21 ENCOUNTER — OFFICE VISIT (OUTPATIENT)
Dept: HEMATOLOGY/ONCOLOGY | Facility: CLINIC | Age: 79
End: 2024-08-21
Payer: MEDICARE

## 2024-08-21 VITALS
WEIGHT: 193.88 LBS | SYSTOLIC BLOOD PRESSURE: 122 MMHG | HEIGHT: 67 IN | TEMPERATURE: 98 F | HEART RATE: 94 BPM | BODY MASS INDEX: 30.43 KG/M2 | OXYGEN SATURATION: 97 % | DIASTOLIC BLOOD PRESSURE: 76 MMHG | RESPIRATION RATE: 20 BRPM

## 2024-08-21 DIAGNOSIS — C77.1 SECONDARY AND UNSPECIFIED MALIGNANT NEOPLASM OF INTRATHORACIC LYMPH NODES: ICD-10-CM

## 2024-08-21 DIAGNOSIS — C34.00 MALIGNANT NEOPLASM OF HILUS OF LUNG, UNSPECIFIED LATERALITY: Primary | ICD-10-CM

## 2024-08-21 DIAGNOSIS — C34.90 NON-SMALL CELL LUNG CANCER, UNSPECIFIED LATERALITY: ICD-10-CM

## 2024-08-21 DIAGNOSIS — C34.91 NON-SMALL CELL CANCER OF RIGHT LUNG: ICD-10-CM

## 2024-08-21 DIAGNOSIS — Z79.899 ON ANTINEOPLASTIC CHEMOTHERAPY: ICD-10-CM

## 2024-08-21 PROCEDURE — 99999 PR PBB SHADOW E&M-EST. PATIENT-LVL IV: CPT | Mod: PBBFAC,,, | Performed by: NURSE PRACTITIONER

## 2024-08-21 PROCEDURE — 63600175 PHARM REV CODE 636 W HCPCS: Performed by: NURSE PRACTITIONER

## 2024-08-21 PROCEDURE — 25000003 PHARM REV CODE 250: Performed by: NURSE PRACTITIONER

## 2024-08-21 PROCEDURE — 96375 TX/PRO/DX INJ NEW DRUG ADDON: CPT

## 2024-08-21 PROCEDURE — 96413 CHEMO IV INFUSION 1 HR: CPT

## 2024-08-21 RX ORDER — SODIUM CHLORIDE 0.9 % (FLUSH) 0.9 %
10 SYRINGE (ML) INJECTION
OUTPATIENT
Start: 2024-08-28

## 2024-08-21 RX ORDER — HEPARIN 100 UNIT/ML
500 SYRINGE INTRAVENOUS
Status: CANCELLED | OUTPATIENT
Start: 2024-08-21

## 2024-08-21 RX ORDER — SODIUM CHLORIDE 0.9 % (FLUSH) 0.9 %
10 SYRINGE (ML) INJECTION
Status: CANCELLED | OUTPATIENT
Start: 2024-08-21

## 2024-08-21 RX ORDER — ONDANSETRON HYDROCHLORIDE 2 MG/ML
8 INJECTION, SOLUTION INTRAVENOUS
Status: COMPLETED | OUTPATIENT
Start: 2024-08-21 | End: 2024-08-21

## 2024-08-21 RX ORDER — ONDANSETRON HYDROCHLORIDE 2 MG/ML
8 INJECTION, SOLUTION INTRAVENOUS
OUTPATIENT
Start: 2024-08-28

## 2024-08-21 RX ORDER — ONDANSETRON HYDROCHLORIDE 2 MG/ML
8 INJECTION, SOLUTION INTRAVENOUS
Status: CANCELLED | OUTPATIENT
Start: 2024-08-21

## 2024-08-21 RX ORDER — HEPARIN 100 UNIT/ML
500 SYRINGE INTRAVENOUS
Status: DISCONTINUED | OUTPATIENT
Start: 2024-08-21 | End: 2024-08-21 | Stop reason: HOSPADM

## 2024-08-21 RX ORDER — HEPARIN 100 UNIT/ML
500 SYRINGE INTRAVENOUS
OUTPATIENT
Start: 2024-08-28

## 2024-08-21 RX ORDER — SODIUM CHLORIDE 0.9 % (FLUSH) 0.9 %
10 SYRINGE (ML) INJECTION
Status: DISCONTINUED | OUTPATIENT
Start: 2024-08-21 | End: 2024-08-21 | Stop reason: HOSPADM

## 2024-08-21 RX ADMIN — HEPARIN 500 UNITS: 100 SYRINGE at 11:08

## 2024-08-21 RX ADMIN — SODIUM CHLORIDE: 9 INJECTION, SOLUTION INTRAVENOUS at 11:08

## 2024-08-21 RX ADMIN — ONDANSETRON 8 MG: 2 INJECTION INTRAMUSCULAR; INTRAVENOUS at 11:08

## 2024-08-21 RX ADMIN — GEMCITABINE 2600 MG: 38 INJECTION, SOLUTION INTRAVENOUS at 11:08

## 2024-08-21 NOTE — PLAN OF CARE
Problem: Adult Inpatient Plan of Care  Goal: Plan of Care Review  Outcome: Met  Flowsheets (Taken 8/21/2024 1159)  Plan of Care Reviewed With: patient  Goal: Absence of Hospital-Acquired Illness or Injury  Outcome: Met  Intervention: Identify and Manage Fall Risk  Flowsheets (Taken 8/21/2024 1159)  Safety Promotion/Fall Prevention:   assistive device/personal item within reach   Fall Risk reviewed with patient/family   in recliner, wheels locked

## 2024-08-21 NOTE — PROGRESS NOTES
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     Treatment/Oncogy history:  -5/24/2022 right upper lobectomy   -Local Recurrence 03/08/2023  -completed XRT on 5/30/23 and 5 cycles of weekly carbo/taxol on 5/19/23   Mediastinum: 5,000 cGy/200 cGy per fx (4/18/2023-5/23/2023)   Med Bst:        1,000 cGy/200 cGy per fx (5/24/2023-5/30/2023)  -Imfinzi every 4 weeks x 10 cycles (6/22/23-3/11/2024)--> progression      Imaging:  CT angiogram 1/17/2022: No pulmonary embolus. Right upper lobe 2.5 cm mass.  CT C 9/19/2022: Status post right partial pneumonectomy for resection of a right upper lobe lung mass with no residual recurrent mass seen. Small right-sided pleural effusion. Some lymphadenopathy in the right paratracheal region with a maximum short axis dimension of 1.6 cm.  Follow-up is recommended  CT C 1/25/2023: There is a paratracheal enlarged lymph node which shows interval mild size increase and now measures 2.2 x 2.0 cm and previously was 1.6 x 1.5 cm.  Aortopulmonary window mildly enlarged lymph node is stable.  Thoracic aorta is without aneurysmal dilatation or dissection.  Impression: 1. Operative changes of right upper lobectomy without bronchialstump soft tissue proliferation    2. No residual tumor load or metastatic nodule. 3. Paratracheal enlarged lymph node with interval size increase.  PET CT 2/9/2023:  Hypermetabolic right paratracheal lymph node image 81 series 3 measures 25 x 20 mm with max SUV 27.0.  Hypermetabolic anterior mediastinal lymph node anterior to the SVC measures 12 x 9 mm with max SUV 12.0. Impression: 1. Hypermetabolic metastatic  mediastinal adenopathy.   2. No PET evidence of metastatic disease outside of the mediastinum.  CT C/A/P 7/10/2023:  1. Several new subcentimeter nodules in the right lung.  Suspect these are infectious or inflammatory in nature, but 3 month follow-up chest CT recommended to ensure resolution.  2. 2 reference mediastinal lymph nodes are smaller since January.  3. L1 vertebral body compression deformity new since January, but appears subacute.  CT C/A/P 10/10/2023:    1. Slightly larger mediastinal lymph nodes, to be followed.  2. Increased irregular right perihilar consolidation which may be treatment related.  3. Small right pleural effusion.  4. No new suspicious findings in the abdomen or pelvis.  CT C/A/P 1/10/2024:    1. Interval enlargement of mediastinal lymph nodes  2. Similar right perihilar consolidative opacity and small right pleural effusion  PET CT 2/6/2024:  1. New, enlarged hypermetabolic superior paratracheal and para-aortic/subaortic lymph nodes compared to prior PET-CT  2. Persistent enlarged and hypermetabolic lower right paratracheal lymph node.  This lymph node is decreased in size and FDG avidity compared to prior PET-CT.  3. Previously seen hypermetabolic pre-vascular lymph node is decreased in size and is no longer hypermetabolic.  PET CT 5/23/2024:  1. Status post right upper lobe resection without evidence for local recurrence/residual disease.  2. Interval response to therapy with decreasing right paratracheal and left AP window adenopathy.  3. Nonspecific area of hypermetabolic activity within the left tongue base.  Direct visualization may be warranted.  4. No evidence for new focus of metastatic disease.         Pathology:  03/22/2022 CT-guided biopsy of the right lung mass: invasive squamous cell carcinoma, moderately differentiated.  5/24/2022: Right upper lobectomy:  1- LYMPH NODE, LUMBAR RIGHT 10 LYMPHADENECTOMY: NEGATIVE FOR METASTATIC CARCINOMA (0/1).   2- LYMPH NODE, 11R,  LYMPHADENECTOMY: NEGATIVE FOR METASTATIC CARCINOMA (0/1).  3- LYMPH NODE, 9R, LYMPHADENECTOMY: NEGATIVE FOR METASTATIC CARCINOMA (0/1).   4- LYMPH NODE, 7R, LYMPHADENECTOMY: NEGATIVE FOR METASTATIC CARCINOMA (0/1).   5- LYMPH NODE, 8R, LYMPHADENECTOMY: ONE LYMPH NODE NEGATIVE FOR METASTATIC CARCINOMA (0/1).    6- LYMPH NODE, 12R, LYMPHADENECTOMY: NEGATIVE FOR METASTATIC CARCINOMA (0/1).  7- LUNG, RIGHT UPPER AND MIDDLE LOBES, PNEUMONECTOMY:  POORLY DIFFERENTIATED, G3, SQUAMOUS CELL CARCINOMA, INVASIVE.    - TUMOR SIZE: 3 CM.    - LYMPHOVASCULAR INVASION IS PRESENT.    - MARGINS NEGATIVE FOR INVASIVE CARCINOMA:    - NEAREST MARGIN, VASCULAR / BRONCHIAL 2.5 CM.    - NO PLEURAL SURFACE INVOLVEMENT     - PROMINENT NECROSIS PRESENT.  Visceral Pleura Invasion: Not identified  Number of Lymph Nodes Examined: Exact number : [id number]  pT Category: pT1c: pN0: No regional lymph node metastasis    3/8/2023 LYMPH NODE BIOPSY:   LYMPH NODE 4R, LYMPHADENECTOMY:  METASTATIC SQUAMOUS CELL CARCINOMA, NONKERATINIZING, MULTIPLE FRAGMENTS.       IMMUNOHISTOCHEMICAL STAINS:   CK 5/6, P63 AND CK7:  POSITIVE IN MALIGNANT CELLS.   CK20 AND TTF-1:  NEGATIVE IN MALIGNANT CELLS.       CLINICAL HISTORY:       Patient: Leonila Rosales is a 79 y.o. male kindly referred by  Dr. Diaz for evaluation of lung cancer.    On 01/17/2022 patient presented to the emergency department after a fall.  He reports that he was getting to his truck and sleep and fell on his left side on the truck step rail.  He started having pain in his left hip and knee.  He underwent a CT angiogram that showed No pulmonary embolus. Right upper lobe 2.5 cm mass.      During that hospitalization he was treated for a close intertrochanteric fracture of the left femur and underwent open reduction intramedullary nailing left comminuted intertrochanteric hip fracture on 01/18/2022 with Dr. Fortino Palomares.  He then spent several weeks on rehab.    On 03/22/2022 he underwent CT-guided biopsy  of the right lung mass.  Pathology showed invasive squamous cell carcinoma, moderately differentiated.    He is s/p right upper lobectomy with  on 5/24/2022.  Pathology report as above.  Patient is stage IA3 squamous cell carcinoma of the lung.  He has recovered well and has been doing good.     Patient was started on surveillance. CT 9/19/2022 with 1.6 right paratracheal LN and small Rt pleural effusion. Surveillance CT scan chest to eval stability 1/25/2023 with paratracheal enlarged lymph node which shows interval mild size increase and now measures 2.2 x 2.0 cm and previously was 1.6 x 1.5 cm.  Aortopulmonary window mildly enlarged lymph node is stable. PET CT 2/9/2023 with Hypermetabolic right paratracheal lymph node 25 x 20 mm with max SUV 27.0. Hypermetabolic anterior mediastinal lymph node anterior to the SVC measures 12 x 9 mm with max SUV 12.0.   Biopsy of R4 lymph node confirmed the metastatic squamous cell carcinoma. Completed  XRT on 5/30/23  and 5 cycles of Carbo/taxol on 5/19/23. C6 was held on 5/26/23 due to neutropenia, ANC was 0.7.    Patient was started on chemoradiation. -completed XRT on 5/30/23 and 5 cycles of weekly carbo/taxol on 5/19/23, His final cycle was held due to neutropenia, ANC was 0.7.  He was then started on maintenance Imfinzi every 4 weeks on 6/22/23    Chief Complaint: 3 Week Follow Up      Interval History:  He completed XRT to the mediastinum on 5/30/23 and 5 cycles of weekly carbo/taxol on 5/19/23. He was on maintenance durvalumab, started 6/22/2023 and tolerated well. Patient with progression of paratracheal and para-aortic/subaortic lymph nodes.  He was seen by Dr. Willis but not a good candidate for radiation at this time.  He was started on Gemzar on 4/13/24.       8/21/24: Patient presents today for TD prior to C7 of D1,D8 Q21 D Gemzar.  He is tolerating well so far except that day 8 of C6 had to be held due to ANC of 700.  He denies any side effects of Gemzar.  Denies fever, chills and sweats. Denies pain. Bowels are moving well. Denies bleeding. Still with chronic cough.       Past Medical History:   Diagnosis Date    Anemia     Closed intertrochanteric fracture     Deficiency of macronutrients     GERD (gastroesophageal reflux disease)     H. pylori infection     History of tobacco use     Hyperlipidemia     Hypertension     Lung mass     Malignant neoplasm of unspecified part of unspecified bronchus or lung     Non-small cell lung cancer       Past Surgical History:   Procedure Laterality Date    BIOPSY OF INTESTINE  04/04/2022    BRONCHOSCOPY      COLONOSCOPY      ESOPHAGOGASTRODUODENOSCOPY  04/04/2022    HIP FRACTURE SURGERY Left 2016    Intramedullary Nail Insertion Hip Left 01/18/2022    KIDNEY SURGERY Right 05/27/2022    MEDIASTINOSCOPY N/A 3/6/2023    Procedure: MEDIASTINOSCOPY;  Surgeon: Jose Diaz MD;  Location: [facility] OR;  Service: Cardiothoracic;  Laterality: N/A;  XX    PLACEMENT, MEDIPORT N/A 4/6/2023    Procedure: Placement, Mediport;  Surgeon: Jose Diaz MD;  Location: [facility] OR;  Service: Cardiology;  Laterality: N/A;    SHOULDER SURGERY       Family History   Family history unknown: Yes            Review of patient's allergies indicates:  No Known Allergies   Current Outpatient Medications on File Prior to Visit   Medication Sig Dispense Refill    amLODIPine (NORVASC) 5 MG tablet TAKE ONE TABLET BY MOUTH EVERY DAY TWICE DAILY 180 tablet 1    aspirin 81 mg Cap Take 81 mg by mouth Daily.      atorvastatin (LIPITOR) 10 MG tablet TAKE ONE TABLET BY MOUTH DAILY 90 tablet 3    levoFLOXacin (LEVAQUIN) 500 MG tablet Take 1 tablet (500 mg total) by mouth once daily. 7 tablet 0    LIDOcaine-prilocaine (EMLA) cream Apply topically as needed. Apply to port site 30 mins prior to chemo 30 g 3    LINZESS 145 mcg Cap capsule Take 145 mcg by mouth daily as needed. New medication, not started yet      metoprolol succinate (TOPROL-XL) 25 MG 24 hr tablet Take 1  "tablet (25 mg total) by mouth once daily. 90 tablet 3    pantoprazole (PROTONIX) 40 MG tablet Take 40 mg by mouth.       No current facility-administered medications on file prior to visit.      Review of Systems   Constitutional:  Negative for activity change, fever, night sweats and unexpected weight change.   Respiratory:  Positive for cough.           Vitals:    08/21/24 1018   BP: 122/76   BP Location: Left arm   Patient Position: Sitting   Pulse: 94   Resp: 20   Temp: 98.2 °F (36.8 °C)   TempSrc: Oral   SpO2: 97%   Weight: 88 kg (193 lb 14.4 oz)   Height: 5' 6.93" (1.7 m)                   Wt Readings from Last 6 Encounters:   08/21/24 88 kg (193 lb 14.4 oz)   07/31/24 89.2 kg (196 lb 11.2 oz)   07/10/24 90.1 kg (198 lb 10.2 oz)   07/10/24 90.1 kg (198 lb 11.2 oz)   06/19/24 91 kg (200 lb 11.2 oz)   06/05/24 91.9 kg (202 lb 11.1 oz)     Body mass index is 30.43 kg/m².  Body surface area is 2.04 meters squared.       Physical Exam  Vitals and nursing note reviewed.   Constitutional:       General: He is not in acute distress.     Appearance: Normal appearance. He is obese.   HENT:      Head: Normocephalic and atraumatic.      Mouth/Throat:      Mouth: Mucous membranes are moist.   Eyes:      General: No scleral icterus.     Extraocular Movements: Extraocular movements intact.      Conjunctiva/sclera: Conjunctivae normal.   Neck:      Vascular: No JVD.   Cardiovascular:      Rate and Rhythm: Normal rate and regular rhythm.      Heart sounds: No murmur heard.  Pulmonary:      Effort: Pulmonary effort is normal.      Breath sounds: Decreased breath sounds and wheezing present. No rhonchi.   Chest:      Chest wall: No tenderness.   Abdominal:      General: Bowel sounds are normal. There is no distension.      Palpations: Abdomen is soft. There is no fluid wave.      Tenderness: There is no abdominal tenderness.   Musculoskeletal:         General: No swelling or deformity.      Cervical back: Neck supple.      " Comments: Uses cane for mobility   Lymphadenopathy:      Head:      Right side of head: No submandibular adenopathy.      Left side of head: No submandibular adenopathy.      Cervical: No cervical adenopathy.      Upper Body:      Right upper body: No supraclavicular or axillary adenopathy.      Left upper body: No supraclavicular or axillary adenopathy.      Lower Body: No right inguinal adenopathy. No left inguinal adenopathy.   Skin:     General: Skin is warm.      Coloration: Skin is not jaundiced.      Findings: No rash.   Neurological:      General: No focal deficit present.      Mental Status: He is alert and oriented to person, place, and time.      Cranial Nerves: Cranial nerves 2-12 are intact.   Psychiatric:         Attention and Perception: Attention normal.         Mood and Affect: Mood and affect normal.         Behavior: Behavior is cooperative.         Cognition and Memory: Cognition normal.         Judgment: Judgment normal.       ECOG SCORE             Laboratory:  CBC with Differential:  Lab Results   Component Value Date    WBC 4.54 08/19/2024    RBC 3.97 (L) 08/19/2024    HGB 12.7 (L) 08/19/2024    HCT 39.6 (L) 08/19/2024    MCV 99.7 (H) 08/19/2024    MCH 32.0 (H) 08/19/2024    MCHC 32.1 (L) 08/19/2024    RDW 16.8 08/19/2024     08/19/2024    MPV 10.4 08/19/2024        CMP:  Sodium   Date Value Ref Range Status   08/19/2024 146 (H) 136 - 145 mmol/L Final     Potassium   Date Value Ref Range Status   08/19/2024 4.3 3.5 - 5.1 mmol/L Final     Chloride   Date Value Ref Range Status   08/19/2024 110 (H) 98 - 107 mmol/L Final     CO2   Date Value Ref Range Status   08/19/2024 26 23 - 31 mmol/L Final     Blood Urea Nitrogen   Date Value Ref Range Status   08/19/2024 7.5 (L) 8.4 - 25.7 mg/dL Final     Creatinine   Date Value Ref Range Status   08/19/2024 0.84 0.73 - 1.18 mg/dL Final     Calcium   Date Value Ref Range Status   08/19/2024 10.3 (H) 8.8 - 10.0 mg/dL Final     Albumin   Date Value  Ref Range Status   08/19/2024 3.6 3.4 - 4.8 g/dL Final     Bilirubin Total   Date Value Ref Range Status   08/19/2024 0.8 <=1.5 mg/dL Final     ALP   Date Value Ref Range Status   08/19/2024 84 40 - 150 unit/L Final     AST   Date Value Ref Range Status   08/19/2024 25 5 - 34 unit/L Final     ALT   Date Value Ref Range Status   08/19/2024 26 0 - 55 unit/L Final     Estimated GFR-Non    Date Value Ref Range Status   04/19/2022 >60           Assessment:       1) [id number] 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 Discussed with Dr Alba ESCOBAR and he will be seen 4/21/2024.     Educated the patient on the risks versus benefits as well as toxicities associated with treatment.  Verbally consented the patient to the treatment plan and the patient was educated on the planned duration of the treatment and schedule of the treatment administration.  All questions were answered.      Plan:       Patient with recurrence of disease while on Imfinizi.  Not candidate for RT. We discussed again chemotherapy and he is agreeable. We discontinue Imfinzi and started Gemzar on 4/17/2024 and so far tolerating well.    Okay to proceed with Gemzar C7D1 today, Neulasta on day 8.   Infusion only on day 8- labs to be drawn the day before at [facility]  PET CT due Sept. 2024 - scheduled to be done on 9/9/24.   RTC in 3 weeks with  for TD/Infusion - he prefers Wednesday appts. Labs to be drawn the day before treatment at [facility]. - with MD for results  Labs: CBC, CMP    Encourage to call or message us for any questions or problems  The patient was given ample opportunity to ask questions, and to the best of my abilities, all questions answered to satisfaction; patient  demonstrated understanding of what we discussed and agreeable to the plan.       INDIA Arango

## 2024-08-26 ENCOUNTER — LAB VISIT (OUTPATIENT)
Dept: LAB | Facility: HOSPITAL | Age: 79
End: 2024-08-26
Attending: INTERNAL MEDICINE
Payer: MEDICARE

## 2024-08-26 DIAGNOSIS — C77.1 SECONDARY AND UNSPECIFIED MALIGNANT NEOPLASM OF INTRATHORACIC LYMPH NODES: ICD-10-CM

## 2024-08-26 DIAGNOSIS — R53.83 FATIGUE, UNSPECIFIED TYPE: ICD-10-CM

## 2024-08-26 DIAGNOSIS — C34.91 NON-SMALL CELL CANCER OF RIGHT LUNG: ICD-10-CM

## 2024-08-26 LAB
ALBUMIN SERPL-MCNC: 3.3 G/DL (ref 3.4–4.8)
ALBUMIN/GLOB SERPL: 0.8 RATIO (ref 1.1–2)
ALP SERPL-CCNC: 78 UNIT/L (ref 40–150)
ALT SERPL-CCNC: 36 UNIT/L (ref 0–55)
ANION GAP SERPL CALC-SCNC: 8 MEQ/L
AST SERPL-CCNC: 34 UNIT/L (ref 5–34)
BASOPHILS # BLD AUTO: 0.03 X10(3)/MCL
BASOPHILS NFR BLD AUTO: 0.9 %
BILIRUB SERPL-MCNC: 0.9 MG/DL
BUN SERPL-MCNC: 7.4 MG/DL (ref 8.4–25.7)
CALCIUM SERPL-MCNC: 9.4 MG/DL (ref 8.8–10)
CHLORIDE SERPL-SCNC: 107 MMOL/L (ref 98–107)
CO2 SERPL-SCNC: 25 MMOL/L (ref 23–31)
CREAT SERPL-MCNC: 0.75 MG/DL (ref 0.73–1.18)
CREAT/UREA NIT SERPL: 10
EOSINOPHIL # BLD AUTO: 0.24 X10(3)/MCL (ref 0–0.9)
EOSINOPHIL NFR BLD AUTO: 6.9 %
ERYTHROCYTE [DISTWIDTH] IN BLOOD BY AUTOMATED COUNT: 14.9 % (ref 11.5–17)
GFR SERPLBLD CREATININE-BSD FMLA CKD-EPI: >60 ML/MIN/1.73/M2
GLOBULIN SER-MCNC: 4.1 GM/DL (ref 2.4–3.5)
GLUCOSE SERPL-MCNC: 109 MG/DL (ref 82–115)
HCT VFR BLD AUTO: 37.2 % (ref 42–52)
HGB BLD-MCNC: 11.7 G/DL (ref 14–18)
IMM GRANULOCYTES # BLD AUTO: 0.01 X10(3)/MCL (ref 0–0.04)
IMM GRANULOCYTES NFR BLD AUTO: 0.3 %
LYMPHOCYTES # BLD AUTO: 0.77 X10(3)/MCL (ref 0.6–4.6)
LYMPHOCYTES NFR BLD AUTO: 22 %
MCH RBC QN AUTO: 31.3 PG (ref 27–31)
MCHC RBC AUTO-ENTMCNC: 31.5 G/DL (ref 33–36)
MCV RBC AUTO: 99.5 FL (ref 80–94)
MONOCYTES # BLD AUTO: 0.15 X10(3)/MCL (ref 0.1–1.3)
MONOCYTES NFR BLD AUTO: 4.3 %
NEUTROPHILS # BLD AUTO: 2.3 X10(3)/MCL (ref 2.1–9.2)
NEUTROPHILS NFR BLD AUTO: 65.6 %
PLATELET # BLD AUTO: 191 X10(3)/MCL (ref 130–400)
PMV BLD AUTO: 10.2 FL (ref 7.4–10.4)
POTASSIUM SERPL-SCNC: 3.4 MMOL/L (ref 3.5–5.1)
PROT SERPL-MCNC: 7.4 GM/DL (ref 5.8–7.6)
RBC # BLD AUTO: 3.74 X10(6)/MCL (ref 4.7–6.1)
SODIUM SERPL-SCNC: 140 MMOL/L (ref 136–145)
TSH SERPL-ACNC: 0.71 UIU/ML (ref 0.35–4.94)
WBC # BLD AUTO: 3.5 X10(3)/MCL (ref 4.5–11.5)

## 2024-08-26 PROCEDURE — 85025 COMPLETE CBC W/AUTO DIFF WBC: CPT

## 2024-08-26 PROCEDURE — 80053 COMPREHEN METABOLIC PANEL: CPT

## 2024-08-26 PROCEDURE — 36415 COLL VENOUS BLD VENIPUNCTURE: CPT

## 2024-08-26 PROCEDURE — 84443 ASSAY THYROID STIM HORMONE: CPT

## 2024-08-28 ENCOUNTER — INFUSION (OUTPATIENT)
Dept: INFUSION THERAPY | Facility: HOSPITAL | Age: 79
End: 2024-08-28
Attending: NURSE PRACTITIONER
Payer: MEDICARE

## 2024-08-28 VITALS
HEART RATE: 101 BPM | DIASTOLIC BLOOD PRESSURE: 63 MMHG | RESPIRATION RATE: 20 BRPM | SYSTOLIC BLOOD PRESSURE: 130 MMHG | TEMPERATURE: 97 F

## 2024-08-28 DIAGNOSIS — C34.00 MALIGNANT NEOPLASM OF HILUS OF LUNG, UNSPECIFIED LATERALITY: Primary | ICD-10-CM

## 2024-08-28 DIAGNOSIS — C34.90 NON-SMALL CELL LUNG CANCER, UNSPECIFIED LATERALITY: ICD-10-CM

## 2024-08-28 PROCEDURE — 63600175 PHARM REV CODE 636 W HCPCS: Performed by: NURSE PRACTITIONER

## 2024-08-28 PROCEDURE — 25000003 PHARM REV CODE 250: Performed by: NURSE PRACTITIONER

## 2024-08-28 PROCEDURE — 96413 CHEMO IV INFUSION 1 HR: CPT

## 2024-08-28 PROCEDURE — 96377 APPLICATON ON-BODY INJECTOR: CPT

## 2024-08-28 PROCEDURE — 96375 TX/PRO/DX INJ NEW DRUG ADDON: CPT

## 2024-08-28 RX ORDER — HEPARIN 100 UNIT/ML
500 SYRINGE INTRAVENOUS
Status: DISCONTINUED | OUTPATIENT
Start: 2024-08-28 | End: 2024-08-28 | Stop reason: HOSPADM

## 2024-08-28 RX ORDER — SODIUM CHLORIDE 0.9 % (FLUSH) 0.9 %
10 SYRINGE (ML) INJECTION
Status: DISCONTINUED | OUTPATIENT
Start: 2024-08-28 | End: 2024-08-28 | Stop reason: HOSPADM

## 2024-08-28 RX ORDER — ONDANSETRON HYDROCHLORIDE 2 MG/ML
8 INJECTION, SOLUTION INTRAVENOUS
Status: COMPLETED | OUTPATIENT
Start: 2024-08-28 | End: 2024-08-28

## 2024-08-28 RX ADMIN — GEMCITABINE 2600 MG: 38 INJECTION, SOLUTION INTRAVENOUS at 01:08

## 2024-08-28 RX ADMIN — PEGFILGRASTIM 6 MG: KIT SUBCUTANEOUS at 01:08

## 2024-08-28 RX ADMIN — ONDANSETRON 8 MG: 2 INJECTION INTRAMUSCULAR; INTRAVENOUS at 12:08

## 2024-09-09 ENCOUNTER — HOSPITAL ENCOUNTER (OUTPATIENT)
Dept: RADIOLOGY | Facility: HOSPITAL | Age: 79
Discharge: HOME | End: 2024-09-09
Attending: NURSE PRACTITIONER
Payer: MEDICARE

## 2024-09-09 DIAGNOSIS — C77.1 SECONDARY AND UNSPECIFIED MALIGNANT NEOPLASM OF INTRATHORACIC LYMPH NODES: ICD-10-CM

## 2024-09-09 DIAGNOSIS — C34.91 NON-SMALL CELL CANCER OF RIGHT LUNG: ICD-10-CM

## 2024-09-09 PROCEDURE — 78815 PET IMAGE W/CT SKULL-THIGH: CPT | Mod: TC

## 2024-09-09 PROCEDURE — A9552 F18 FDG: HCPCS | Performed by: NURSE PRACTITIONER

## 2024-09-09 RX ORDER — FLUDEOXYGLUCOSE F18 500 MCI/ML
10 INJECTION INTRAVENOUS
Status: COMPLETED | OUTPATIENT
Start: 2024-09-09 | End: 2024-09-09

## 2024-09-09 RX ADMIN — FLUDEOXYGLUCOSE F-18 11 MILLICURIE: 500 INJECTION INTRAVENOUS at 07:09

## 2024-09-12 ENCOUNTER — LAB VISIT (OUTPATIENT)
Dept: LAB | Facility: HOSPITAL | Age: 79
End: 2024-09-12
Attending: INTERNAL MEDICINE
Payer: MEDICARE

## 2024-09-12 DIAGNOSIS — C34.91 NON-SMALL CELL CANCER OF RIGHT LUNG: ICD-10-CM

## 2024-09-12 DIAGNOSIS — R53.83 FATIGUE, UNSPECIFIED TYPE: ICD-10-CM

## 2024-09-12 LAB
ALBUMIN SERPL-MCNC: 3.3 G/DL (ref 3.4–4.8)
ALBUMIN/GLOB SERPL: 0.8 RATIO (ref 1.1–2)
ALP SERPL-CCNC: 119 UNIT/L (ref 40–150)
ALT SERPL-CCNC: 21 UNIT/L (ref 0–55)
ANION GAP SERPL CALC-SCNC: 8 MEQ/L
AST SERPL-CCNC: 24 UNIT/L (ref 5–34)
BASOPHILS # BLD AUTO: 0.02 X10(3)/MCL
BASOPHILS NFR BLD AUTO: 0.2 %
BILIRUB SERPL-MCNC: 0.5 MG/DL
BUN SERPL-MCNC: 5.5 MG/DL (ref 8.4–25.7)
CALCIUM SERPL-MCNC: 9.6 MG/DL (ref 8.8–10)
CHLORIDE SERPL-SCNC: 109 MMOL/L (ref 98–107)
CO2 SERPL-SCNC: 27 MMOL/L (ref 23–31)
CREAT SERPL-MCNC: 0.79 MG/DL (ref 0.73–1.18)
CREAT/UREA NIT SERPL: 7
EOSINOPHIL # BLD AUTO: 0.08 X10(3)/MCL (ref 0–0.9)
EOSINOPHIL NFR BLD AUTO: 0.7 %
ERYTHROCYTE [DISTWIDTH] IN BLOOD BY AUTOMATED COUNT: 15.9 % (ref 11.5–17)
GFR SERPLBLD CREATININE-BSD FMLA CKD-EPI: >60 ML/MIN/1.73/M2
GLOBULIN SER-MCNC: 3.9 GM/DL (ref 2.4–3.5)
GLUCOSE SERPL-MCNC: 105 MG/DL (ref 82–115)
HCT VFR BLD AUTO: 37.2 % (ref 42–52)
HGB BLD-MCNC: 11.8 G/DL (ref 14–18)
IMM GRANULOCYTES # BLD AUTO: 0.06 X10(3)/MCL (ref 0–0.04)
IMM GRANULOCYTES NFR BLD AUTO: 0.5 %
LYMPHOCYTES # BLD AUTO: 0.98 X10(3)/MCL (ref 0.6–4.6)
LYMPHOCYTES NFR BLD AUTO: 8.9 %
MCH RBC QN AUTO: 31.6 PG (ref 27–31)
MCHC RBC AUTO-ENTMCNC: 31.7 G/DL (ref 33–36)
MCV RBC AUTO: 99.7 FL (ref 80–94)
MONOCYTES # BLD AUTO: 1.51 X10(3)/MCL (ref 0.1–1.3)
MONOCYTES NFR BLD AUTO: 13.6 %
NEUTROPHILS # BLD AUTO: 8.42 X10(3)/MCL (ref 2.1–9.2)
NEUTROPHILS NFR BLD AUTO: 76.1 %
PLATELET # BLD AUTO: 281 X10(3)/MCL (ref 130–400)
PMV BLD AUTO: 10.3 FL (ref 7.4–10.4)
POTASSIUM SERPL-SCNC: 3.8 MMOL/L (ref 3.5–5.1)
PROT SERPL-MCNC: 7.2 GM/DL (ref 5.8–7.6)
RBC # BLD AUTO: 3.73 X10(6)/MCL (ref 4.7–6.1)
SODIUM SERPL-SCNC: 144 MMOL/L (ref 136–145)
TSH SERPL-ACNC: 0.65 UIU/ML (ref 0.35–4.94)
WBC # BLD AUTO: 11.07 X10(3)/MCL (ref 4.5–11.5)

## 2024-09-12 PROCEDURE — 84443 ASSAY THYROID STIM HORMONE: CPT

## 2024-09-12 PROCEDURE — 80053 COMPREHEN METABOLIC PANEL: CPT

## 2024-09-12 PROCEDURE — 85025 COMPLETE CBC W/AUTO DIFF WBC: CPT

## 2024-09-12 PROCEDURE — 36415 COLL VENOUS BLD VENIPUNCTURE: CPT

## 2024-09-13 ENCOUNTER — OFFICE VISIT (OUTPATIENT)
Dept: HEMATOLOGY/ONCOLOGY | Facility: CLINIC | Age: 79
End: 2024-09-13
Payer: MEDICARE

## 2024-09-13 VITALS
HEIGHT: 67 IN | BODY MASS INDEX: 30.42 KG/M2 | TEMPERATURE: 98 F | SYSTOLIC BLOOD PRESSURE: 133 MMHG | RESPIRATION RATE: 18 BRPM | OXYGEN SATURATION: 97 % | HEART RATE: 97 BPM | DIASTOLIC BLOOD PRESSURE: 73 MMHG | WEIGHT: 193.81 LBS

## 2024-09-13 DIAGNOSIS — C34.91 NON-SMALL CELL CANCER OF RIGHT LUNG: Primary | ICD-10-CM

## 2024-09-13 DIAGNOSIS — D84.821 IMMUNODEFICIENCY SECONDARY TO CHEMOTHERAPY: ICD-10-CM

## 2024-09-13 DIAGNOSIS — Z79.899 IMMUNODEFICIENCY SECONDARY TO CHEMOTHERAPY: ICD-10-CM

## 2024-09-13 DIAGNOSIS — Z79.899 ON ANTINEOPLASTIC CHEMOTHERAPY: ICD-10-CM

## 2024-09-13 DIAGNOSIS — T45.1X5A IMMUNODEFICIENCY SECONDARY TO CHEMOTHERAPY: ICD-10-CM

## 2024-09-13 DIAGNOSIS — C77.1 SECONDARY AND UNSPECIFIED MALIGNANT NEOPLASM OF INTRATHORACIC LYMPH NODES: ICD-10-CM

## 2024-09-13 DIAGNOSIS — Z90.2 S/P LOBECTOMY OF LUNG: ICD-10-CM

## 2024-09-13 PROCEDURE — 99999 PR PBB SHADOW E&M-EST. PATIENT-LVL IV: CPT | Mod: PBBFAC,,, | Performed by: INTERNAL MEDICINE

## 2024-09-13 RX ORDER — SODIUM CHLORIDE 0.9 % (FLUSH) 0.9 %
10 SYRINGE (ML) INJECTION
OUTPATIENT
Start: 2024-09-25

## 2024-09-13 RX ORDER — SODIUM CHLORIDE 0.9 % (FLUSH) 0.9 %
10 SYRINGE (ML) INJECTION
OUTPATIENT
Start: 2024-09-18

## 2024-09-13 RX ORDER — ONDANSETRON HYDROCHLORIDE 2 MG/ML
8 INJECTION, SOLUTION INTRAVENOUS
OUTPATIENT
Start: 2024-09-25

## 2024-09-13 RX ORDER — HEPARIN 100 UNIT/ML
500 SYRINGE INTRAVENOUS
OUTPATIENT
Start: 2024-09-25

## 2024-09-13 RX ORDER — ONDANSETRON HYDROCHLORIDE 2 MG/ML
8 INJECTION, SOLUTION INTRAVENOUS
OUTPATIENT
Start: 2024-09-18

## 2024-09-13 RX ORDER — HEPARIN 100 UNIT/ML
500 SYRINGE INTRAVENOUS
OUTPATIENT
Start: 2024-09-18

## 2024-09-13 NOTE — PROGRESS NOTES
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     Treatment/Oncogy history:  -5/24/2022 right upper lobectomy   -Local Recurrence 03/08/2023  -completed XRT on 5/30/23 and 5 cycles of weekly carbo/taxol on 5/19/23   Mediastinum: 5,000 cGy/200 cGy per fx (4/18/2023-5/23/2023)   Med Bst:        1,000 cGy/200 cGy per fx (5/24/2023-5/30/2023)  -Imfinzi every 4 weeks x 10 cycles (6/22/23-3/11/2024)--> progression      Imaging:  CT angiogram 1/17/2022: No pulmonary embolus. Right upper lobe 2.5 cm mass.  CT C 9/19/2022: Status post right partial pneumonectomy for resection of a right upper lobe lung mass with no residual recurrent mass seen. Small right-sided pleural effusion. Some lymphadenopathy in the right paratracheal region with a maximum short axis dimension of 1.6 cm.  Follow-up is recommended  CT C 1/25/2023: There is a paratracheal enlarged lymph node which shows interval mild size increase and now measures 2.2 x 2.0 cm and previously was 1.6 x 1.5 cm.  Aortopulmonary window mildly enlarged lymph node is stable.  Thoracic aorta is without aneurysmal dilatation or dissection.  Impression: 1. Operative changes of right upper lobectomy without bronchialstump soft tissue proliferation    2. No residual tumor load or metastatic nodule. 3. Paratracheal enlarged lymph node with interval size increase.  PET CT 2/9/2023:  Hypermetabolic right paratracheal lymph node image 81 series 3 measures 25 x 20 mm with max SUV 27.0.  Hypermetabolic anterior mediastinal lymph node anterior to the SVC measures 12 x 9 mm with max SUV 12.0. Impression: 1. Hypermetabolic metastatic  mediastinal adenopathy.   2. No PET evidence of metastatic disease outside of the mediastinum.  CT C/A/P 7/10/2023:  1. Several new subcentimeter nodules in the right lung.  Suspect these are infectious or inflammatory in nature, but 3 month follow-up chest CT recommended to ensure resolution.  2. 2 reference mediastinal lymph nodes are smaller since January.  3. L1 vertebral body compression deformity new since January, but appears subacute.  CT C/A/P 10/10/2023:    1. Slightly larger mediastinal lymph nodes, to be followed.  2. Increased irregular right perihilar consolidation which may be treatment related.  3. Small right pleural effusion.  4. No new suspicious findings in the abdomen or pelvis.  CT C/A/P 1/10/2024:    1. Interval enlargement of mediastinal lymph nodes  2. Similar right perihilar consolidative opacity and small right pleural effusion  PET CT 2/6/2024:  1. New, enlarged hypermetabolic superior paratracheal and para-aortic/subaortic lymph nodes compared to prior PET-CT  2. Persistent enlarged and hypermetabolic lower right paratracheal lymph node.  This lymph node is decreased in size and FDG avidity compared to prior PET-CT.  3. Previously seen hypermetabolic pre-vascular lymph node is decreased in size and is no longer hypermetabolic.  PET CT 5/23/2024:  1. Status post right upper lobe resection without evidence for local recurrence/residual disease.  2. Interval response to therapy with decreasing right paratracheal and left AP window adenopathy.  3. Nonspecific area of hypermetabolic activity within the left tongue base.  Direct visualization may be warranted.  4. No evidence for new focus of metastatic disease.  PET CT 9/9/2024:  1. Previously visualized mediastinal lymph nodes slightly more FDG avid today.  2. No new sites of metastatic disease.          Pathology:  03/22/2022 CT-guided biopsy of the right lung mass: invasive squamous cell carcinoma, moderately differentiated.  5/24/2022:  Right upper lobectomy:  1- LYMPH NODE, LUMBAR RIGHT 10 LYMPHADENECTOMY: NEGATIVE FOR METASTATIC CARCINOMA (0/1).   2- LYMPH NODE, 11R, LYMPHADENECTOMY: NEGATIVE FOR METASTATIC CARCINOMA (0/1).  3- LYMPH NODE, 9R, LYMPHADENECTOMY: NEGATIVE FOR METASTATIC CARCINOMA (0/1).   4- LYMPH NODE, 7R, LYMPHADENECTOMY: NEGATIVE FOR METASTATIC CARCINOMA (0/1).   5- LYMPH NODE, 8R, LYMPHADENECTOMY: ONE LYMPH NODE NEGATIVE FOR METASTATIC CARCINOMA (0/1).    6- LYMPH NODE, 12R, LYMPHADENECTOMY: NEGATIVE FOR METASTATIC CARCINOMA (0/1).  7- LUNG, RIGHT UPPER AND MIDDLE LOBES, PNEUMONECTOMY:  POORLY DIFFERENTIATED, G3, SQUAMOUS CELL CARCINOMA, INVASIVE.    - TUMOR SIZE: 3 CM.    - LYMPHOVASCULAR INVASION IS PRESENT.    - MARGINS NEGATIVE FOR INVASIVE CARCINOMA:    - NEAREST MARGIN, VASCULAR / BRONCHIAL 2.5 CM.    - NO PLEURAL SURFACE INVOLVEMENT     - PROMINENT NECROSIS PRESENT.  Visceral Pleura Invasion: Not identified  Number of Lymph Nodes Examined: Exact number : [id number]  pT Category: pT1c: pN0: No regional lymph node metastasis    3/8/2023 LYMPH NODE BIOPSY:   LYMPH NODE 4R, LYMPHADENECTOMY:  METASTATIC SQUAMOUS CELL CARCINOMA, NONKERATINIZING, MULTIPLE FRAGMENTS.       IMMUNOHISTOCHEMICAL STAINS:   CK 5/6, P63 AND CK7:  POSITIVE IN MALIGNANT CELLS.   CK20 AND TTF-1:  NEGATIVE IN MALIGNANT CELLS.       CLINICAL HISTORY:       Patient: Leonila Rosales is a 79 y.o. male kindly referred by  Dr. Diaz for evaluation of lung cancer.    On 01/17/2022 patient presented to the emergency department after a fall.  He reports that he was getting to his truck and sleep and fell on his left side on the truck step rail.  He started having pain in his left hip and knee.  He underwent a CT angiogram that showed No pulmonary embolus. Right upper lobe 2.5 cm mass.      During that hospitalization he was treated for a close intertrochanteric fracture of the left femur and underwent open reduction intramedullary nailing left comminuted intertrochanteric  hip fracture on 01/18/2022 with Dr. Fortino Palomares.  He then spent several weeks on rehab.    On 03/22/2022 he underwent CT-guided biopsy of the right lung mass.  Pathology showed invasive squamous cell carcinoma, moderately differentiated.    He is s/p right upper lobectomy with  on 5/24/2022.  Pathology report as above.  Patient is stage IA3 squamous cell carcinoma of the lung.  He has recovered well and has been doing good.     Patient was started on surveillance. CT 9/19/2022 with 1.6 right paratracheal LN and small Rt pleural effusion. Surveillance CT scan chest to eval stability 1/25/2023 with paratracheal enlarged lymph node which shows interval mild size increase and now measures 2.2 x 2.0 cm and previously was 1.6 x 1.5 cm.  Aortopulmonary window mildly enlarged lymph node is stable. PET CT 2/9/2023 with Hypermetabolic right paratracheal lymph node 25 x 20 mm with max SUV 27.0. Hypermetabolic anterior mediastinal lymph node anterior to the SVC measures 12 x 9 mm with max SUV 12.0.   Biopsy of R4 lymph node confirmed the metastatic squamous cell carcinoma. Completed  XRT on 5/30/23  and 5 cycles of Carbo/taxol on 5/19/23. C6 was held on 5/26/23 due to neutropenia, ANC was 0.7.    Patient was started on chemoradiation. -completed XRT on 5/30/23 and 5 cycles of weekly carbo/taxol on 5/19/23, His final cycle was held due to neutropenia, ANC was 0.7.  He was then started on maintenance Imfinzi every 4 weeks on 6/22/23    Chief Complaint: Results (PET CT Results.)      Interval History:  He completed XRT to the mediastinum on 5/30/23 and 5 cycles of weekly carbo/taxol on 5/19/23. He was on maintenance durvalumab, started 6/22/2023 and tolerated well. Patient with progression of paratracheal and para-aortic/subaortic lymph nodes.  He was seen by Dr. Willis but not a good candidate for radiation at this time.  He was started on Gemzar on 4/13/24.     9/13/24: Patient presents today for TD prior to C8 of  D1,D8 Q21 Gemzar and to discuss CT scan results.  He is tolerating well so far except that day 8 of C6 had to be held due to ANC of 700.  He denies any side effects of Gemzar. Denies fever, chills and sweats. Denies pain. Bowels are moving well. Denies bleeding. Still with chronic cough. No new complaints today.      Past Medical History:   Diagnosis Date    Anemia     Closed intertrochanteric fracture     Deficiency of macronutrients     GERD (gastroesophageal reflux disease)     H. pylori infection     History of tobacco use     Hyperlipidemia     Hypertension     Lung mass     Malignant neoplasm of unspecified part of unspecified bronchus or lung     Non-small cell lung cancer       Past Surgical History:   Procedure Laterality Date    BIOPSY OF INTESTINE  04/04/2022    BRONCHOSCOPY      COLONOSCOPY      ESOPHAGOGASTRODUODENOSCOPY  04/04/2022    HIP FRACTURE SURGERY Left 2016    Intramedullary Nail Insertion Hip Left 01/18/2022    KIDNEY SURGERY Right 05/27/2022    MEDIASTINOSCOPY N/A 3/6/2023    Procedure: MEDIASTINOSCOPY;  Surgeon: Jose Diaz MD;  Location: [facility] OR;  Service: Cardiothoracic;  Laterality: N/A;  XX    PLACEMENT, MEDIPORT N/A 4/6/2023    Procedure: Placement, Mediport;  Surgeon: Jose Diaz MD;  Location: [facility] OR;  Service: Cardiology;  Laterality: N/A;    SHOULDER SURGERY       Family History   Family history unknown: Yes            Review of patient's allergies indicates:  No Known Allergies   Current Outpatient Medications on File Prior to Visit   Medication Sig Dispense Refill    amLODIPine (NORVASC) 5 MG tablet TAKE ONE TABLET BY MOUTH EVERY DAY TWICE DAILY 180 tablet 1    aspirin 81 mg Cap Take 81 mg by mouth Daily.      atorvastatin (LIPITOR) 10 MG tablet TAKE ONE TABLET BY MOUTH DAILY 90 tablet 3    levoFLOXacin (LEVAQUIN) 500 MG tablet Take 1 tablet (500 mg total) by mouth once daily. 7 tablet 0    LIDOcaine-prilocaine (EMLA) cream Apply topically as needed. Apply to port site  "30 mins prior to chemo 30 g 3    LINZESS 145 mcg Cap capsule Take 145 mcg by mouth daily as needed. New medication, not started yet      metoprolol succinate (TOPROL-XL) 25 MG 24 hr tablet Take 1 tablet (25 mg total) by mouth once daily. 90 tablet 3    pantoprazole (PROTONIX) 40 MG tablet Take 40 mg by mouth.       No current facility-administered medications on file prior to visit.      Review of Systems   Constitutional:  Negative for activity change, appetite change, chills, diaphoresis, fatigue, fever, night sweats and unexpected weight change.   HENT:  Negative for nasal congestion, mouth sores, nosebleeds, sinus pressure/congestion, sore throat and trouble swallowing.    Eyes: Negative.    Respiratory:  Positive for cough. Negative for shortness of breath.    Cardiovascular:  Negative for chest pain and palpitations.   Gastrointestinal:  Negative for abdominal distention, abdominal pain, blood in stool, change in bowel habit, constipation, diarrhea, nausea and vomiting.   Endocrine: Negative.    Genitourinary:  Negative for bladder incontinence, decreased urine volume, difficulty urinating, dysuria, frequency, hematuria and urgency.   Musculoskeletal:  Negative for arthralgias, back pain, gait problem, joint swelling, leg pain and myalgias.   Integumentary:  Negative for rash.   Allergic/Immunologic: Negative.    Neurological:  Negative for dizziness, tremors, syncope, weakness, light-headedness, numbness, headaches and memory loss.   Hematological:  Negative for adenopathy. Does not bruise/bleed easily.   Psychiatric/Behavioral:  Negative for agitation, confusion, hallucinations, sleep disturbance and suicidal ideas. The patient is not nervous/anxious.           Vitals:    09/13/24 0925   BP: 133/73   BP Location: Left arm   Patient Position: Sitting   Pulse: 97   Resp: 18   Temp: 98.2 °F (36.8 °C)   TempSrc: Oral   SpO2: 97%   Weight: 87.9 kg (193 lb 12.8 oz)   Height: 5' 6.93" (1.7 m)         Wt Readings " from Last 6 Encounters:   09/13/24 87.9 kg (193 lb 12.8 oz)   08/21/24 88 kg (193 lb 14.4 oz)   07/31/24 89.2 kg (196 lb 11.2 oz)   07/10/24 90.1 kg (198 lb 10.2 oz)   07/10/24 90.1 kg (198 lb 11.2 oz)   06/19/24 91 kg (200 lb 11.2 oz)     Body mass index is 30.42 kg/m².  Body surface area is 2.04 meters squared.       Physical Exam  Vitals and nursing note reviewed.   Constitutional:       General: He is not in acute distress.     Appearance: Normal appearance. He is obese.   HENT:      Head: Normocephalic and atraumatic.      Mouth/Throat:      Mouth: Mucous membranes are moist.   Eyes:      General: No scleral icterus.     Extraocular Movements: Extraocular movements intact.      Conjunctiva/sclera: Conjunctivae normal.   Neck:      Vascular: No JVD.   Cardiovascular:      Rate and Rhythm: Normal rate and regular rhythm.      Heart sounds: No murmur heard.  Pulmonary:      Effort: Pulmonary effort is normal.      Breath sounds: Decreased breath sounds and wheezing present. No rhonchi.   Chest:      Chest wall: No tenderness.   Abdominal:      General: Bowel sounds are normal. There is no distension.      Palpations: Abdomen is soft. There is no fluid wave.      Tenderness: There is no abdominal tenderness.   Musculoskeletal:         General: No swelling or deformity.      Cervical back: Neck supple.      Comments: Uses cane for mobility   Lymphadenopathy:      Head:      Right side of head: No submandibular adenopathy.      Left side of head: No submandibular adenopathy.      Cervical: No cervical adenopathy.      Upper Body:      Right upper body: No supraclavicular or axillary adenopathy.      Left upper body: No supraclavicular or axillary adenopathy.      Lower Body: No right inguinal adenopathy. No left inguinal adenopathy.   Skin:     General: Skin is warm.      Coloration: Skin is not jaundiced.      Findings: No rash.   Neurological:      General: No focal deficit present.      Mental Status: He is alert  and oriented to person, place, and time.      Cranial Nerves: Cranial nerves 2-12 are intact.   Psychiatric:         Attention and Perception: Attention normal.         Mood and Affect: Mood and affect normal.         Behavior: Behavior is cooperative.         Cognition and Memory: Cognition normal.         Judgment: Judgment normal.       ECOG SCORE    1 - Restricted in strenuous activity-ambulatory and able to carry out work of a light nature         Laboratory:  CBC with Differential:  Lab Results   Component Value Date    WBC 11.07 09/12/2024    RBC 3.73 (L) 09/12/2024    HGB 11.8 (L) 09/12/2024    HCT 37.2 (L) 09/12/2024    MCV 99.7 (H) 09/12/2024    MCH 31.6 (H) 09/12/2024    MCHC 31.7 (L) 09/12/2024    RDW 15.9 09/12/2024     09/12/2024    MPV 10.3 09/12/2024        CMP:  Sodium   Date Value Ref Range Status   09/12/2024 144 136 - 145 mmol/L Final     Potassium   Date Value Ref Range Status   09/12/2024 3.8 3.5 - 5.1 mmol/L Final     Chloride   Date Value Ref Range Status   09/12/2024 109 (H) 98 - 107 mmol/L Final     CO2   Date Value Ref Range Status   09/12/2024 27 23 - 31 mmol/L Final     Blood Urea Nitrogen   Date Value Ref Range Status   09/12/2024 5.5 (L) 8.4 - 25.7 mg/dL Final     Creatinine   Date Value Ref Range Status   09/12/2024 0.79 0.73 - 1.18 mg/dL Final     Calcium   Date Value Ref Range Status   09/12/2024 9.6 8.8 - 10.0 mg/dL Final     Albumin   Date Value Ref Range Status   09/12/2024 3.3 (L) 3.4 - 4.8 g/dL Final     Bilirubin Total   Date Value Ref Range Status   09/12/2024 0.5 <=1.5 mg/dL Final     ALP   Date Value Ref Range Status   09/12/2024 119 40 - 150 unit/L Final     AST   Date Value Ref Range Status   09/12/2024 24 5 - 34 unit/L Final     ALT   Date Value Ref Range Status   09/12/2024 21 0 - 55 unit/L Final     Estimated GFR-Non    Date Value Ref Range Status   04/19/2022 >60           Assessment:       1) [id number] Stage IA3 Squamous cell carcinoma of  lung  -5/24/2022 right upper lobectomy   -Local Recurrence 03/08/2023--Biopsy proven R4 LN  -completed XRT on 5/30/23 and 5 cycles of weekly carbo/taxol on 5/19/23  -Imfinzi every 4 weeks started on 6/22/23  -PET CT with hypermetabolic activity in the right paratracheal LN. Discussed with Dr Alba ESCOBAR and he will be seen 4/21/2024.   -Recent PET CT with persistent stable  LN but increase activity    Educated the patient on the risks versus benefits as well as toxicities associated with treatment.  Verbally consented the patient to the treatment plan and the patient was educated on the planned duration of the treatment and schedule of the treatment administration.  All questions were answered.      Plan:       Patient with recurrence of disease while on Imfinizi.  Not candidate for RT. We discussed again chemotherapy and he is agreeable. We discontinue Imfinzi and started Gemzar on 4/17/2024 and so far tolerating well. PET CT with persistent stable  LN but increase activity. Will cont present treatment    Okay to proceed with Gemzar C8 D1 on 9/18 , Neulasta on day 8. Getting him back on Wednesday schedule that he prefers  Infusion only on day 8- labs to be drawn the day before at [facility]  PET CT due Dec. 2024 - will order when closer  RTC in 4 weeks with NP for TD/Infusion - he prefers Wednesday appts. Labs to be drawn the day before treatment at [facility].  Labs: CBC, CMP    Encourage to call or message us for any questions or problems  The patient was given ample opportunity to ask questions, and to the best of my abilities, all questions answered to satisfaction; patient demonstrated understanding of what we discussed and agreeable to the plan.     Sanjuana Napoles MD  Hematology/Oncology      Professional Services   I, Shirley Pina LPN, acted solely as a scribe for and in the presence of Dr. Sanjuana Napoles, who performed these services.

## 2024-09-17 ENCOUNTER — LAB VISIT (OUTPATIENT)
Dept: LAB | Facility: HOSPITAL | Age: 79
End: 2024-09-17
Attending: INTERNAL MEDICINE
Payer: MEDICARE

## 2024-09-17 DIAGNOSIS — R53.83 FATIGUE, UNSPECIFIED TYPE: ICD-10-CM

## 2024-09-17 DIAGNOSIS — C34.91 NON-SMALL CELL CANCER OF RIGHT LUNG: ICD-10-CM

## 2024-09-17 LAB
ALBUMIN SERPL-MCNC: 3.5 G/DL (ref 3.4–4.8)
ALBUMIN/GLOB SERPL: 0.9 RATIO (ref 1.1–2)
ALP SERPL-CCNC: 114 UNIT/L (ref 40–150)
ALT SERPL-CCNC: 16 UNIT/L (ref 0–55)
ANION GAP SERPL CALC-SCNC: 7 MEQ/L
AST SERPL-CCNC: 21 UNIT/L (ref 5–34)
BASOPHILS # BLD AUTO: 0.03 X10(3)/MCL
BASOPHILS NFR BLD AUTO: 0.5 %
BILIRUB SERPL-MCNC: 0.6 MG/DL
BUN SERPL-MCNC: 5.2 MG/DL (ref 8.4–25.7)
CALCIUM SERPL-MCNC: 9.7 MG/DL (ref 8.8–10)
CHLORIDE SERPL-SCNC: 107 MMOL/L (ref 98–107)
CO2 SERPL-SCNC: 29 MMOL/L (ref 23–31)
CREAT SERPL-MCNC: 0.85 MG/DL (ref 0.73–1.18)
CREAT/UREA NIT SERPL: 6
EOSINOPHIL # BLD AUTO: 0.08 X10(3)/MCL (ref 0–0.9)
EOSINOPHIL NFR BLD AUTO: 1.4 %
ERYTHROCYTE [DISTWIDTH] IN BLOOD BY AUTOMATED COUNT: 15.8 % (ref 11.5–17)
GFR SERPLBLD CREATININE-BSD FMLA CKD-EPI: >60 ML/MIN/1.73/M2
GLOBULIN SER-MCNC: 4.1 GM/DL (ref 2.4–3.5)
GLUCOSE SERPL-MCNC: 101 MG/DL (ref 82–115)
HCT VFR BLD AUTO: 39 % (ref 42–52)
HGB BLD-MCNC: 12.5 G/DL (ref 14–18)
IMM GRANULOCYTES # BLD AUTO: 0.01 X10(3)/MCL (ref 0–0.04)
IMM GRANULOCYTES NFR BLD AUTO: 0.2 %
LYMPHOCYTES # BLD AUTO: 1.22 X10(3)/MCL (ref 0.6–4.6)
LYMPHOCYTES NFR BLD AUTO: 22 %
MCH RBC QN AUTO: 31.6 PG (ref 27–31)
MCHC RBC AUTO-ENTMCNC: 32.1 G/DL (ref 33–36)
MCV RBC AUTO: 98.7 FL (ref 80–94)
MONOCYTES # BLD AUTO: 0.88 X10(3)/MCL (ref 0.1–1.3)
MONOCYTES NFR BLD AUTO: 15.9 %
NEUTROPHILS # BLD AUTO: 3.33 X10(3)/MCL (ref 2.1–9.2)
NEUTROPHILS NFR BLD AUTO: 60 %
PLATELET # BLD AUTO: 344 X10(3)/MCL (ref 130–400)
PMV BLD AUTO: 10.3 FL (ref 7.4–10.4)
POTASSIUM SERPL-SCNC: 4.2 MMOL/L (ref 3.5–5.1)
PROT SERPL-MCNC: 7.6 GM/DL (ref 5.8–7.6)
RBC # BLD AUTO: 3.95 X10(6)/MCL (ref 4.7–6.1)
SODIUM SERPL-SCNC: 143 MMOL/L (ref 136–145)
TSH SERPL-ACNC: 0.81 UIU/ML (ref 0.35–4.94)
WBC # BLD AUTO: 5.55 X10(3)/MCL (ref 4.5–11.5)

## 2024-09-17 PROCEDURE — 85025 COMPLETE CBC W/AUTO DIFF WBC: CPT

## 2024-09-17 PROCEDURE — 36415 COLL VENOUS BLD VENIPUNCTURE: CPT

## 2024-09-17 PROCEDURE — 80053 COMPREHEN METABOLIC PANEL: CPT

## 2024-09-17 PROCEDURE — 84443 ASSAY THYROID STIM HORMONE: CPT

## 2024-09-18 ENCOUNTER — INFUSION (OUTPATIENT)
Dept: INFUSION THERAPY | Facility: HOSPITAL | Age: 79
End: 2024-09-18
Attending: NURSE PRACTITIONER
Payer: MEDICARE

## 2024-09-18 VITALS — TEMPERATURE: 99 F | DIASTOLIC BLOOD PRESSURE: 68 MMHG | SYSTOLIC BLOOD PRESSURE: 135 MMHG | HEART RATE: 111 BPM

## 2024-09-18 DIAGNOSIS — C34.00 MALIGNANT NEOPLASM OF HILUS OF LUNG, UNSPECIFIED LATERALITY: Primary | ICD-10-CM

## 2024-09-18 DIAGNOSIS — C34.90 NON-SMALL CELL LUNG CANCER, UNSPECIFIED LATERALITY: ICD-10-CM

## 2024-09-18 PROCEDURE — 25000003 PHARM REV CODE 250: Performed by: INTERNAL MEDICINE

## 2024-09-18 PROCEDURE — 63600175 PHARM REV CODE 636 W HCPCS: Performed by: INTERNAL MEDICINE

## 2024-09-18 PROCEDURE — 96375 TX/PRO/DX INJ NEW DRUG ADDON: CPT

## 2024-09-18 PROCEDURE — 96413 CHEMO IV INFUSION 1 HR: CPT

## 2024-09-18 RX ORDER — HEPARIN 100 UNIT/ML
500 SYRINGE INTRAVENOUS
Status: DISCONTINUED | OUTPATIENT
Start: 2024-09-18 | End: 2024-09-18 | Stop reason: HOSPADM

## 2024-09-18 RX ORDER — ONDANSETRON HYDROCHLORIDE 2 MG/ML
8 INJECTION, SOLUTION INTRAVENOUS
Status: COMPLETED | OUTPATIENT
Start: 2024-09-18 | End: 2024-09-18

## 2024-09-18 RX ORDER — SODIUM CHLORIDE 0.9 % (FLUSH) 0.9 %
10 SYRINGE (ML) INJECTION
Status: DISCONTINUED | OUTPATIENT
Start: 2024-09-18 | End: 2024-09-18 | Stop reason: HOSPADM

## 2024-09-18 RX ADMIN — ONDANSETRON 8 MG: 2 INJECTION INTRAMUSCULAR; INTRAVENOUS at 09:09

## 2024-09-18 RX ADMIN — GEMCITABINE 2600 MG: 38 INJECTION, SOLUTION INTRAVENOUS at 10:09

## 2024-09-23 ENCOUNTER — LAB VISIT (OUTPATIENT)
Dept: LAB | Facility: HOSPITAL | Age: 79
End: 2024-09-23
Attending: INTERNAL MEDICINE
Payer: MEDICARE

## 2024-09-23 DIAGNOSIS — C34.91 NON-SMALL CELL CANCER OF RIGHT LUNG: ICD-10-CM

## 2024-09-23 DIAGNOSIS — R53.83 FATIGUE, UNSPECIFIED TYPE: ICD-10-CM

## 2024-09-23 LAB
ABS NEUT CALC (OHS): 1.79 X10(3)/MCL (ref 2.1–9.2)
ALBUMIN SERPL-MCNC: 3.5 G/DL (ref 3.4–4.8)
ALBUMIN/GLOB SERPL: 0.8 RATIO (ref 1.1–2)
ALP SERPL-CCNC: 99 UNIT/L (ref 40–150)
ALT SERPL-CCNC: 35 UNIT/L (ref 0–55)
ANION GAP SERPL CALC-SCNC: 9 MEQ/L
AST SERPL-CCNC: 40 UNIT/L (ref 5–34)
BILIRUB SERPL-MCNC: 1 MG/DL
BUN SERPL-MCNC: 6 MG/DL (ref 8.4–25.7)
CALCIUM SERPL-MCNC: 9.5 MG/DL (ref 8.8–10)
CHLORIDE SERPL-SCNC: 108 MMOL/L (ref 98–107)
CO2 SERPL-SCNC: 26 MMOL/L (ref 23–31)
CREAT SERPL-MCNC: 0.83 MG/DL (ref 0.73–1.18)
CREAT/UREA NIT SERPL: 7
EOSINOPHIL NFR BLD MANUAL: 0.08 X10(3)/MCL (ref 0–0.9)
EOSINOPHIL NFR BLD MANUAL: 3 % (ref 0–8)
ERYTHROCYTE [DISTWIDTH] IN BLOOD BY AUTOMATED COUNT: 15.1 % (ref 11.5–17)
GFR SERPLBLD CREATININE-BSD FMLA CKD-EPI: >60 ML/MIN/1.73/M2
GLOBULIN SER-MCNC: 4.3 GM/DL (ref 2.4–3.5)
GLUCOSE SERPL-MCNC: 113 MG/DL (ref 82–115)
HCT VFR BLD AUTO: 38.2 % (ref 42–52)
HGB BLD-MCNC: 12 G/DL (ref 14–18)
LYMPH ABN # BLD MANUAL: 6 %
LYMPHOCYTES NFR BLD MANUAL: 0.63 X10(3)/MCL
LYMPHOCYTES NFR BLD MANUAL: 23 % (ref 13–40)
MCH RBC QN AUTO: 31.2 PG (ref 27–31)
MCHC RBC AUTO-ENTMCNC: 31.4 G/DL (ref 33–36)
MCV RBC AUTO: 99.2 FL (ref 80–94)
MONOCYTES NFR BLD MANUAL: 0.08 X10(3)/MCL (ref 0.1–1.3)
MONOCYTES NFR BLD MANUAL: 3 % (ref 2–11)
NEUTROPHILS NFR BLD MANUAL: 63 % (ref 47–80)
NEUTS BAND NFR BLD MANUAL: 2 % (ref 0–11)
PLATELET # BLD AUTO: 250 X10(3)/MCL (ref 130–400)
PLATELET # BLD EST: NORMAL 10*3/UL
PMV BLD AUTO: 9.3 FL (ref 7.4–10.4)
POTASSIUM SERPL-SCNC: 3.9 MMOL/L (ref 3.5–5.1)
PROT SERPL-MCNC: 7.8 GM/DL (ref 5.8–7.6)
RBC # BLD AUTO: 3.85 X10(6)/MCL (ref 4.7–6.1)
RBC MORPH BLD: NORMAL
SODIUM SERPL-SCNC: 143 MMOL/L (ref 136–145)
TSH SERPL-ACNC: 0.82 UIU/ML (ref 0.35–4.94)
WBC # BLD AUTO: 2.76 X10(3)/MCL (ref 4.5–11.5)

## 2024-09-23 PROCEDURE — 36415 COLL VENOUS BLD VENIPUNCTURE: CPT

## 2024-09-23 PROCEDURE — 85027 COMPLETE CBC AUTOMATED: CPT

## 2024-09-23 PROCEDURE — 80053 COMPREHEN METABOLIC PANEL: CPT

## 2024-09-23 PROCEDURE — 84443 ASSAY THYROID STIM HORMONE: CPT

## 2024-09-24 NOTE — NURSING
Confirmed appointment with Leonila. Explained what to expect and our location.   Patient is aware of below recommendations. States he doesn't understand because he was in the hospital for breathing issues. Repeated the below message and advised he could purchase a meter if he wanted to check his o2 levels but [facility] would not order for him

## 2024-09-25 ENCOUNTER — INFUSION (OUTPATIENT)
Dept: INFUSION THERAPY | Facility: HOSPITAL | Age: 79
End: 2024-09-25
Attending: NURSE PRACTITIONER
Payer: MEDICARE

## 2024-09-25 VITALS
DIASTOLIC BLOOD PRESSURE: 73 MMHG | HEART RATE: 100 BPM | OXYGEN SATURATION: 99 % | RESPIRATION RATE: 18 BRPM | SYSTOLIC BLOOD PRESSURE: 128 MMHG | TEMPERATURE: 98 F

## 2024-09-25 DIAGNOSIS — C34.90 NON-SMALL CELL LUNG CANCER, UNSPECIFIED LATERALITY: ICD-10-CM

## 2024-09-25 DIAGNOSIS — C34.00 MALIGNANT NEOPLASM OF HILUS OF LUNG, UNSPECIFIED LATERALITY: Primary | ICD-10-CM

## 2024-09-25 PROCEDURE — 96377 APPLICATON ON-BODY INJECTOR: CPT

## 2024-09-25 PROCEDURE — 96375 TX/PRO/DX INJ NEW DRUG ADDON: CPT

## 2024-09-25 PROCEDURE — A4216 STERILE WATER/SALINE, 10 ML: HCPCS | Performed by: INTERNAL MEDICINE

## 2024-09-25 PROCEDURE — 96413 CHEMO IV INFUSION 1 HR: CPT

## 2024-09-25 PROCEDURE — 63600175 PHARM REV CODE 636 W HCPCS: Performed by: INTERNAL MEDICINE

## 2024-09-25 PROCEDURE — 25000003 PHARM REV CODE 250: Performed by: INTERNAL MEDICINE

## 2024-09-25 RX ORDER — HEPARIN 100 UNIT/ML
500 SYRINGE INTRAVENOUS
Status: DISCONTINUED | OUTPATIENT
Start: 2024-09-25 | End: 2024-09-25 | Stop reason: HOSPADM

## 2024-09-25 RX ORDER — SODIUM CHLORIDE 0.9 % (FLUSH) 0.9 %
10 SYRINGE (ML) INJECTION
Status: DISCONTINUED | OUTPATIENT
Start: 2024-09-25 | End: 2024-09-25 | Stop reason: HOSPADM

## 2024-09-25 RX ORDER — ONDANSETRON HYDROCHLORIDE 2 MG/ML
8 INJECTION, SOLUTION INTRAVENOUS
Status: COMPLETED | OUTPATIENT
Start: 2024-09-25 | End: 2024-09-25

## 2024-09-25 RX ADMIN — Medication 10 ML: at 02:09

## 2024-09-25 RX ADMIN — ONDANSETRON 8 MG: 2 INJECTION INTRAMUSCULAR; INTRAVENOUS at 01:09

## 2024-09-25 RX ADMIN — HEPARIN 500 UNITS: 100 SYRINGE at 02:09

## 2024-09-25 RX ADMIN — PEGFILGRASTIM 6 MG: KIT SUBCUTANEOUS at 01:09

## 2024-09-25 RX ADMIN — SODIUM CHLORIDE: 9 INJECTION, SOLUTION INTRAVENOUS at 01:09

## 2024-09-25 RX ADMIN — GEMCITABINE 2600 MG: 38 INJECTION, SOLUTION INTRAVENOUS at 01:09

## 2024-09-25 NOTE — PLAN OF CARE
C8D8 Gemzar + Neulasta (OBI) given; tolerated well; next appointments discussed with patient; discharged home in stable condition

## 2024-10-07 ENCOUNTER — LAB VISIT (OUTPATIENT)
Dept: LAB | Facility: HOSPITAL | Age: 79
End: 2024-10-07
Attending: INTERNAL MEDICINE
Payer: MEDICARE

## 2024-10-07 DIAGNOSIS — R53.83 FATIGUE, UNSPECIFIED TYPE: ICD-10-CM

## 2024-10-07 DIAGNOSIS — C34.91 NON-SMALL CELL CANCER OF RIGHT LUNG: ICD-10-CM

## 2024-10-07 LAB
ALBUMIN SERPL-MCNC: 3.6 G/DL (ref 3.4–4.8)
ALBUMIN/GLOB SERPL: 0.9 RATIO (ref 1.1–2)
ALP SERPL-CCNC: 120 UNIT/L (ref 40–150)
ALT SERPL-CCNC: 17 UNIT/L (ref 0–55)
ANION GAP SERPL CALC-SCNC: 10 MEQ/L
AST SERPL-CCNC: 20 UNIT/L (ref 5–34)
BASOPHILS # BLD AUTO: 0.02 X10(3)/MCL
BASOPHILS NFR BLD AUTO: 0.3 %
BILIRUB SERPL-MCNC: 0.5 MG/DL
BUN SERPL-MCNC: 6.9 MG/DL (ref 8.4–25.7)
CALCIUM SERPL-MCNC: 9.6 MG/DL (ref 8.8–10)
CHLORIDE SERPL-SCNC: 108 MMOL/L (ref 98–107)
CO2 SERPL-SCNC: 27 MMOL/L (ref 23–31)
CREAT SERPL-MCNC: 0.88 MG/DL (ref 0.73–1.18)
CREAT/UREA NIT SERPL: 8
EOSINOPHIL # BLD AUTO: 0.11 X10(3)/MCL (ref 0–0.9)
EOSINOPHIL NFR BLD AUTO: 1.6 %
ERYTHROCYTE [DISTWIDTH] IN BLOOD BY AUTOMATED COUNT: 16.2 % (ref 11.5–17)
GFR SERPLBLD CREATININE-BSD FMLA CKD-EPI: >60 ML/MIN/1.73/M2
GLOBULIN SER-MCNC: 3.9 GM/DL (ref 2.4–3.5)
GLUCOSE SERPL-MCNC: 93 MG/DL (ref 82–115)
HCT VFR BLD AUTO: 37.4 % (ref 42–52)
HGB BLD-MCNC: 11.7 G/DL (ref 14–18)
IMM GRANULOCYTES # BLD AUTO: 0.04 X10(3)/MCL (ref 0–0.04)
IMM GRANULOCYTES NFR BLD AUTO: 0.6 %
LYMPHOCYTES # BLD AUTO: 1.11 X10(3)/MCL (ref 0.6–4.6)
LYMPHOCYTES NFR BLD AUTO: 15.7 %
MCH RBC QN AUTO: 31 PG (ref 27–31)
MCHC RBC AUTO-ENTMCNC: 31.3 G/DL (ref 33–36)
MCV RBC AUTO: 98.9 FL (ref 80–94)
MONOCYTES # BLD AUTO: 0.8 X10(3)/MCL (ref 0.1–1.3)
MONOCYTES NFR BLD AUTO: 11.3 %
NEUTROPHILS # BLD AUTO: 4.98 X10(3)/MCL (ref 2.1–9.2)
NEUTROPHILS NFR BLD AUTO: 70.5 %
PLATELET # BLD AUTO: 167 X10(3)/MCL (ref 130–400)
PMV BLD AUTO: 10.8 FL (ref 7.4–10.4)
POTASSIUM SERPL-SCNC: 3.6 MMOL/L (ref 3.5–5.1)
PROT SERPL-MCNC: 7.5 GM/DL (ref 5.8–7.6)
RBC # BLD AUTO: 3.78 X10(6)/MCL (ref 4.7–6.1)
SODIUM SERPL-SCNC: 145 MMOL/L (ref 136–145)
TSH SERPL-ACNC: 0.65 UIU/ML (ref 0.35–4.94)
WBC # BLD AUTO: 7.06 X10(3)/MCL (ref 4.5–11.5)

## 2024-10-07 PROCEDURE — 84443 ASSAY THYROID STIM HORMONE: CPT

## 2024-10-07 PROCEDURE — 85025 COMPLETE CBC W/AUTO DIFF WBC: CPT

## 2024-10-07 PROCEDURE — 80053 COMPREHEN METABOLIC PANEL: CPT

## 2024-10-07 PROCEDURE — 36415 COLL VENOUS BLD VENIPUNCTURE: CPT

## 2024-10-08 ENCOUNTER — TELEPHONE (OUTPATIENT)
Dept: FAMILY MEDICINE | Facility: CLINIC | Age: 79
End: 2024-10-08
Payer: MEDICARE

## 2024-10-08 DIAGNOSIS — E78.5 HYPERLIPIDEMIA, UNSPECIFIED HYPERLIPIDEMIA TYPE: Primary | ICD-10-CM

## 2024-10-08 NOTE — TELEPHONE ENCOUNTER
Attempted to contact patient for previsit on 10/8/24 at 8:36. Labs completed on 10/7/24 for Dr. Napoles. Added on Lipid Panel

## 2024-10-09 ENCOUNTER — OFFICE VISIT (OUTPATIENT)
Dept: HEMATOLOGY/ONCOLOGY | Facility: CLINIC | Age: 79
End: 2024-10-09
Payer: MEDICARE

## 2024-10-09 ENCOUNTER — INFUSION (OUTPATIENT)
Dept: INFUSION THERAPY | Facility: HOSPITAL | Age: 79
End: 2024-10-09
Attending: NURSE PRACTITIONER
Payer: MEDICARE

## 2024-10-09 VITALS
BODY MASS INDEX: 29.95 KG/M2 | HEIGHT: 67 IN | RESPIRATION RATE: 18 BRPM | OXYGEN SATURATION: 98 % | WEIGHT: 190.81 LBS | HEART RATE: 79 BPM | DIASTOLIC BLOOD PRESSURE: 71 MMHG | RESPIRATION RATE: 18 BRPM | TEMPERATURE: 98 F | SYSTOLIC BLOOD PRESSURE: 139 MMHG

## 2024-10-09 DIAGNOSIS — T45.1X5A IMMUNODEFICIENCY SECONDARY TO CHEMOTHERAPY: ICD-10-CM

## 2024-10-09 DIAGNOSIS — C77.1 SECONDARY AND UNSPECIFIED MALIGNANT NEOPLASM OF INTRATHORACIC LYMPH NODES: ICD-10-CM

## 2024-10-09 DIAGNOSIS — Z79.899 IMMUNODEFICIENCY SECONDARY TO CHEMOTHERAPY: ICD-10-CM

## 2024-10-09 DIAGNOSIS — D84.821 IMMUNODEFICIENCY SECONDARY TO CHEMOTHERAPY: ICD-10-CM

## 2024-10-09 DIAGNOSIS — C34.90 NON-SMALL CELL LUNG CANCER, UNSPECIFIED LATERALITY: ICD-10-CM

## 2024-10-09 DIAGNOSIS — C34.91 NON-SMALL CELL CANCER OF RIGHT LUNG: Primary | ICD-10-CM

## 2024-10-09 DIAGNOSIS — C34.00 MALIGNANT NEOPLASM OF HILUS OF LUNG, UNSPECIFIED LATERALITY: Primary | ICD-10-CM

## 2024-10-09 PROCEDURE — 99215 OFFICE O/P EST HI 40 MIN: CPT | Mod: S$GLB,,, | Performed by: NURSE PRACTITIONER

## 2024-10-09 PROCEDURE — 25000003 PHARM REV CODE 250: Performed by: NURSE PRACTITIONER

## 2024-10-09 PROCEDURE — 1126F AMNT PAIN NOTED NONE PRSNT: CPT | Mod: CPTII,S$GLB,, | Performed by: NURSE PRACTITIONER

## 2024-10-09 PROCEDURE — 96375 TX/PRO/DX INJ NEW DRUG ADDON: CPT

## 2024-10-09 PROCEDURE — 1159F MED LIST DOCD IN RCRD: CPT | Mod: CPTII,S$GLB,, | Performed by: NURSE PRACTITIONER

## 2024-10-09 PROCEDURE — 1101F PT FALLS ASSESS-DOCD LE1/YR: CPT | Mod: CPTII,S$GLB,, | Performed by: NURSE PRACTITIONER

## 2024-10-09 PROCEDURE — 3288F FALL RISK ASSESSMENT DOCD: CPT | Mod: CPTII,S$GLB,, | Performed by: NURSE PRACTITIONER

## 2024-10-09 PROCEDURE — 96413 CHEMO IV INFUSION 1 HR: CPT

## 2024-10-09 PROCEDURE — 3075F SYST BP GE 130 - 139MM HG: CPT | Mod: CPTII,S$GLB,, | Performed by: NURSE PRACTITIONER

## 2024-10-09 PROCEDURE — 1157F ADVNC CARE PLAN IN RCRD: CPT | Mod: CPTII,S$GLB,, | Performed by: NURSE PRACTITIONER

## 2024-10-09 PROCEDURE — 3078F DIAST BP <80 MM HG: CPT | Mod: CPTII,S$GLB,, | Performed by: NURSE PRACTITIONER

## 2024-10-09 PROCEDURE — 63600175 PHARM REV CODE 636 W HCPCS: Performed by: NURSE PRACTITIONER

## 2024-10-09 PROCEDURE — 99999 PR PBB SHADOW E&M-EST. PATIENT-LVL IV: CPT | Mod: PBBFAC,,, | Performed by: NURSE PRACTITIONER

## 2024-10-09 PROCEDURE — 1160F RVW MEDS BY RX/DR IN RCRD: CPT | Mod: CPTII,S$GLB,, | Performed by: NURSE PRACTITIONER

## 2024-10-09 RX ORDER — HEPARIN 100 UNIT/ML
500 SYRINGE INTRAVENOUS
Status: CANCELLED | OUTPATIENT
Start: 2024-10-09

## 2024-10-09 RX ORDER — ONDANSETRON HYDROCHLORIDE 2 MG/ML
8 INJECTION, SOLUTION INTRAVENOUS
Status: COMPLETED | OUTPATIENT
Start: 2024-10-09 | End: 2024-10-09

## 2024-10-09 RX ORDER — SODIUM CHLORIDE 0.9 % (FLUSH) 0.9 %
10 SYRINGE (ML) INJECTION
Status: DISCONTINUED | OUTPATIENT
Start: 2024-10-09 | End: 2024-10-09 | Stop reason: HOSPADM

## 2024-10-09 RX ORDER — SODIUM CHLORIDE 0.9 % (FLUSH) 0.9 %
10 SYRINGE (ML) INJECTION
Status: CANCELLED | OUTPATIENT
Start: 2024-10-09

## 2024-10-09 RX ORDER — ONDANSETRON HYDROCHLORIDE 2 MG/ML
8 INJECTION, SOLUTION INTRAVENOUS
Status: CANCELLED | OUTPATIENT
Start: 2024-10-09

## 2024-10-09 RX ORDER — HEPARIN 100 UNIT/ML
500 SYRINGE INTRAVENOUS
Status: DISCONTINUED | OUTPATIENT
Start: 2024-10-09 | End: 2024-10-09 | Stop reason: HOSPADM

## 2024-10-09 RX ADMIN — GEMCITABINE 2600 MG: 38 INJECTION, SOLUTION INTRAVENOUS at 11:10

## 2024-10-09 RX ADMIN — ONDANSETRON 8 MG: 2 INJECTION INTRAMUSCULAR; INTRAVENOUS at 11:10

## 2024-10-09 NOTE — PROGRESS NOTES
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     Treatment/Oncogy history:  -5/24/2022 right upper lobectomy   -Local Recurrence 03/08/2023  -completed XRT on 5/30/23 and 5 cycles of weekly carbo/taxol on 5/19/23   Mediastinum: 5,000 cGy/200 cGy per fx (4/18/2023-5/23/2023)   Med Bst:        1,000 cGy/200 cGy per fx (5/24/2023-5/30/2023)  -Imfinzi every 4 weeks x 10 cycles (6/22/23-3/11/2024)--> progression      Imaging:  CT angiogram 1/17/2022: No pulmonary embolus. Right upper lobe 2.5 cm mass.  CT C 9/19/2022: Status post right partial pneumonectomy for resection of a right upper lobe lung mass with no residual recurrent mass seen. Small right-sided pleural effusion. Some lymphadenopathy in the right paratracheal region with a maximum short axis dimension of 1.6 cm.  Follow-up is recommended  CT C 1/25/2023: There is a paratracheal enlarged lymph node which shows interval mild size increase and now measures 2.2 x 2.0 cm and previously was 1.6 x 1.5 cm.  Aortopulmonary window mildly enlarged lymph node is stable.  Thoracic aorta is without aneurysmal dilatation or dissection.  Impression: 1. Operative changes of right upper lobectomy without bronchialstump soft tissue proliferation    2. No residual tumor load or metastatic nodule. 3. Paratracheal enlarged lymph node with interval size increase.  PET CT 2/9/2023:  Hypermetabolic right paratracheal lymph node image 81 series 3 measures 25 x 20 mm with max SUV 27.0.  Hypermetabolic anterior mediastinal lymph node anterior to the SVC measures 12 x 9 mm with max SUV 12.0. Impression: 1. Hypermetabolic metastatic  mediastinal adenopathy.   2. No PET evidence of metastatic disease outside of the mediastinum.  CT C/A/P 7/10/2023:  1. Several new subcentimeter nodules in the right lung.  Suspect these are infectious or inflammatory in nature, but 3 month follow-up chest CT recommended to ensure resolution.  2. 2 reference mediastinal lymph nodes are smaller since January.  3. L1 vertebral body compression deformity new since January, but appears subacute.  CT C/A/P 10/10/2023:    1. Slightly larger mediastinal lymph nodes, to be followed.  2. Increased irregular right perihilar consolidation which may be treatment related.  3. Small right pleural effusion.  4. No new suspicious findings in the abdomen or pelvis.  CT C/A/P 1/10/2024:    1. Interval enlargement of mediastinal lymph nodes  2. Similar right perihilar consolidative opacity and small right pleural effusion  PET CT 2/6/2024:  1. New, enlarged hypermetabolic superior paratracheal and para-aortic/subaortic lymph nodes compared to prior PET-CT  2. Persistent enlarged and hypermetabolic lower right paratracheal lymph node.  This lymph node is decreased in size and FDG avidity compared to prior PET-CT.  3. Previously seen hypermetabolic pre-vascular lymph node is decreased in size and is no longer hypermetabolic.  PET CT 5/23/2024:  1. Status post right upper lobe resection without evidence for local recurrence/residual disease.  2. Interval response to therapy with decreasing right paratracheal and left AP window adenopathy.  3. Nonspecific area of hypermetabolic activity within the left tongue base.  Direct visualization may be warranted.  4. No evidence for new focus of metastatic disease.  PET CT 9/9/2024:  1. Previously visualized mediastinal lymph nodes slightly more FDG avid today.  2. No new sites of metastatic disease.          Pathology:  03/22/2022 CT-guided biopsy of the right lung mass: invasive squamous cell carcinoma, moderately differentiated.  5/24/2022:  Right upper lobectomy:  1- LYMPH NODE, LUMBAR RIGHT 10 LYMPHADENECTOMY: NEGATIVE FOR METASTATIC CARCINOMA (0/1).   2- LYMPH NODE, 11R, LYMPHADENECTOMY: NEGATIVE FOR METASTATIC CARCINOMA (0/1).  3- LYMPH NODE, 9R, LYMPHADENECTOMY: NEGATIVE FOR METASTATIC CARCINOMA (0/1).   4- LYMPH NODE, 7R, LYMPHADENECTOMY: NEGATIVE FOR METASTATIC CARCINOMA (0/1).   5- LYMPH NODE, 8R, LYMPHADENECTOMY: ONE LYMPH NODE NEGATIVE FOR METASTATIC CARCINOMA (0/1).    6- LYMPH NODE, 12R, LYMPHADENECTOMY: NEGATIVE FOR METASTATIC CARCINOMA (0/1).  7- LUNG, RIGHT UPPER AND MIDDLE LOBES, PNEUMONECTOMY:  POORLY DIFFERENTIATED, G3, SQUAMOUS CELL CARCINOMA, INVASIVE.    - TUMOR SIZE: 3 CM.    - LYMPHOVASCULAR INVASION IS PRESENT.    - MARGINS NEGATIVE FOR INVASIVE CARCINOMA:    - NEAREST MARGIN, VASCULAR / BRONCHIAL 2.5 CM.    - NO PLEURAL SURFACE INVOLVEMENT     - PROMINENT NECROSIS PRESENT.  Visceral Pleura Invasion: Not identified  Number of Lymph Nodes Examined: Exact number : [id number]  pT Category: pT1c: pN0: No regional lymph node metastasis    3/8/2023 LYMPH NODE BIOPSY:   LYMPH NODE 4R, LYMPHADENECTOMY:  METASTATIC SQUAMOUS CELL CARCINOMA, NONKERATINIZING, MULTIPLE FRAGMENTS.       IMMUNOHISTOCHEMICAL STAINS:   CK 5/6, P63 AND CK7:  POSITIVE IN MALIGNANT CELLS.   CK20 AND TTF-1:  NEGATIVE IN MALIGNANT CELLS.       CLINICAL HISTORY:       Patient: Leonila Rosales is a 79 y.o. male kindly referred by  Dr. Diaz for evaluation of lung cancer.    On 01/17/2022 patient presented to the emergency department after a fall.  He reports that he was getting to his truck and sleep and fell on his left side on the truck step rail.  He started having pain in his left hip and knee.  He underwent a CT angiogram that showed No pulmonary embolus. Right upper lobe 2.5 cm mass.      During that hospitalization he was treated for a close intertrochanteric fracture of the left femur and underwent open reduction intramedullary nailing left comminuted intertrochanteric  hip fracture on 01/18/2022 with Dr. Fortino Palomares.  He then spent several weeks on rehab.    On 03/22/2022 he underwent CT-guided biopsy of the right lung mass.  Pathology showed invasive squamous cell carcinoma, moderately differentiated.    He is s/p right upper lobectomy with  on 5/24/2022.  Pathology report as above.  Patient is stage IA3 squamous cell carcinoma of the lung.  He has recovered well and has been doing good.     Patient was started on surveillance. CT 9/19/2022 with 1.6 right paratracheal LN and small Rt pleural effusion. Surveillance CT scan chest to eval stability 1/25/2023 with paratracheal enlarged lymph node which shows interval mild size increase and now measures 2.2 x 2.0 cm and previously was 1.6 x 1.5 cm.  Aortopulmonary window mildly enlarged lymph node is stable. PET CT 2/9/2023 with Hypermetabolic right paratracheal lymph node 25 x 20 mm with max SUV 27.0. Hypermetabolic anterior mediastinal lymph node anterior to the SVC measures 12 x 9 mm with max SUV 12.0.   Biopsy of R4 lymph node confirmed the metastatic squamous cell carcinoma. Completed  XRT on 5/30/23  and 5 cycles of Carbo/taxol on 5/19/23. C6 was held on 5/26/23 due to neutropenia, ANC was 0.7.    Patient was started on chemoradiation. -completed XRT on 5/30/23 and 5 cycles of weekly carbo/taxol on 5/19/23, His final cycle was held due to neutropenia, ANC was 0.7.  He was then started on maintenance Imfinzi every 4 weeks on 6/22/23    Chief Complaint: 4 Week Follow Up      Interval History:  He completed XRT to the mediastinum on 5/30/23 and 5 cycles of weekly carbo/taxol on 5/19/23. He was on maintenance durvalumab, started 6/22/2023 and tolerated well. Patient with progression of paratracheal and para-aortic/subaortic lymph nodes.  He was seen by Dr. Willis but not a good candidate for radiation at this time.  He was started on Gemzar on 4/13/24.     10/9/24: Patient presents today for TD prior to C9 of D1,D8 Q21  Gemzar.  He denies any side effects of Gemzar. Denies fever, chills and sweats. Denies pain. Bowels are moving well. Denies bleeding. Still with chronic cough. No new complaints today.     Past Medical History:   Diagnosis Date    Anemia     Closed intertrochanteric fracture     Deficiency of macronutrients     GERD (gastroesophageal reflux disease)     H. pylori infection     History of tobacco use     Hyperlipidemia     Hypertension     Lung mass     Malignant neoplasm of unspecified part of unspecified bronchus or lung     Non-small cell lung cancer       Past Surgical History:   Procedure Laterality Date    BIOPSY OF INTESTINE  04/04/2022    BRONCHOSCOPY      COLONOSCOPY      ESOPHAGOGASTRODUODENOSCOPY  04/04/2022    HIP FRACTURE SURGERY Left 2016    Intramedullary Nail Insertion Hip Left 01/18/2022    KIDNEY SURGERY Right 05/27/2022    MEDIASTINOSCOPY N/A 3/6/2023    Procedure: MEDIASTINOSCOPY;  Surgeon: Jose Diaz MD;  Location: [facility];  Service: Cardiothoracic;  Laterality: N/A;  XX    PLACEMENT, MEDIPORT N/A 4/6/2023    Procedure: Placement, Mediport;  Surgeon: Jose Diaz MD;  Location: [facility];  Service: Cardiology;  Laterality: N/A;    SHOULDER SURGERY       Family History   Family history unknown: Yes            Review of patient's allergies indicates:  No Known Allergies   Current Outpatient Medications on File Prior to Visit   Medication Sig Dispense Refill    amLODIPine (NORVASC) 5 MG tablet TAKE ONE TABLET BY MOUTH EVERY DAY TWICE DAILY 180 tablet 1    aspirin 81 mg Cap Take 81 mg by mouth Daily.      atorvastatin (LIPITOR) 10 MG tablet TAKE ONE TABLET BY MOUTH DAILY 90 tablet 3    levoFLOXacin (LEVAQUIN) 500 MG tablet Take 1 tablet (500 mg total) by mouth once daily. 7 tablet 0    LIDOcaine-prilocaine (EMLA) cream Apply topically as needed. Apply to port site 30 mins prior to chemo 30 g 3    LINZESS 145 mcg Cap capsule Take 145 mcg by mouth daily as needed. New medication, not started  "yet      metoprolol succinate (TOPROL-XL) 25 MG 24 hr tablet Take 1 tablet (25 mg total) by mouth once daily. 90 tablet 3    pantoprazole (PROTONIX) 40 MG tablet Take 40 mg by mouth.       No current facility-administered medications on file prior to visit.      Review of Systems   Constitutional:  Negative for activity change, appetite change, chills, diaphoresis, fatigue, fever, night sweats and unexpected weight change.   HENT:  Negative for nasal congestion, mouth sores, nosebleeds, sinus pressure/congestion, sore throat and trouble swallowing.    Eyes: Negative.    Respiratory:  Positive for cough. Negative for shortness of breath.    Cardiovascular:  Negative for chest pain and palpitations.   Gastrointestinal:  Negative for abdominal distention, abdominal pain, blood in stool, change in bowel habit, constipation, diarrhea, nausea and vomiting.   Endocrine: Negative.    Genitourinary:  Negative for bladder incontinence, decreased urine volume, difficulty urinating, dysuria, frequency, hematuria and urgency.   Musculoskeletal:  Negative for arthralgias, back pain, gait problem, joint swelling, leg pain and myalgias.   Integumentary:  Negative for rash.   Allergic/Immunologic: Negative.    Neurological:  Negative for dizziness, tremors, syncope, weakness, light-headedness, numbness, headaches and memory loss.   Hematological:  Negative for adenopathy. Does not bruise/bleed easily.   Psychiatric/Behavioral:  Negative for agitation, confusion, hallucinations, sleep disturbance and suicidal ideas. The patient is not nervous/anxious.           Vitals:    10/09/24 1035   BP: 139/71   BP Location: Left arm   Patient Position: Sitting   Pulse: 79   Resp: 18   Temp: 98 °F (36.7 °C)   TempSrc: Oral   SpO2: 98%   Weight: 86.5 kg (190 lb 12.8 oz)   Height: 5' 6.93" (1.7 m)         Wt Readings from Last 6 Encounters:   10/09/24 86.5 kg (190 lb 12.8 oz)   09/13/24 87.9 kg (193 lb 12.8 oz)   08/21/24 88 kg (193 lb 14.4 oz) "   07/31/24 89.2 kg (196 lb 11.2 oz)   07/10/24 90.1 kg (198 lb 10.2 oz)   07/10/24 90.1 kg (198 lb 11.2 oz)     Body mass index is 29.95 kg/m².  Body surface area is 2.02 meters squared.       Physical Exam  Vitals and nursing note reviewed.   Constitutional:       General: He is not in acute distress.     Appearance: Normal appearance. He is obese.   HENT:      Head: Normocephalic and atraumatic.      Mouth/Throat:      Mouth: Mucous membranes are moist.   Eyes:      General: No scleral icterus.     Extraocular Movements: Extraocular movements intact.      Conjunctiva/sclera: Conjunctivae normal.   Neck:      Vascular: No JVD.   Cardiovascular:      Rate and Rhythm: Normal rate and regular rhythm.      Heart sounds: No murmur heard.  Pulmonary:      Effort: Pulmonary effort is normal.      Breath sounds: Decreased breath sounds and wheezing present. No rhonchi.   Chest:      Chest wall: No tenderness.   Abdominal:      General: Bowel sounds are normal. There is no distension.      Palpations: Abdomen is soft. There is no fluid wave.      Tenderness: There is no abdominal tenderness.   Musculoskeletal:         General: No swelling or deformity.      Cervical back: Neck supple.      Comments: Uses cane for mobility   Lymphadenopathy:      Head:      Right side of head: No submandibular adenopathy.      Left side of head: No submandibular adenopathy.      Cervical: No cervical adenopathy.      Upper Body:      Right upper body: No supraclavicular or axillary adenopathy.      Left upper body: No supraclavicular or axillary adenopathy.      Lower Body: No right inguinal adenopathy. No left inguinal adenopathy.   Skin:     General: Skin is warm.      Coloration: Skin is not jaundiced.      Findings: No rash.   Neurological:      General: No focal deficit present.      Mental Status: He is alert and oriented to person, place, and time.      Cranial Nerves: Cranial nerves 2-12 are intact.   Psychiatric:         Attention  and Perception: Attention normal.         Mood and Affect: Mood and affect normal.         Behavior: Behavior is cooperative.         Cognition and Memory: Cognition normal.         Judgment: Judgment normal.       ECOG SCORE    1 - Restricted in strenuous activity-ambulatory and able to carry out work of a light nature         Laboratory:  CBC with Differential:  Lab Results   Component Value Date    WBC 7.06 10/07/2024    RBC 3.78 (L) 10/07/2024    HGB 11.7 (L) 10/07/2024    HCT 37.4 (L) 10/07/2024    MCV 98.9 (H) 10/07/2024    MCH 31.0 10/07/2024    MCHC 31.3 (L) 10/07/2024    RDW 16.2 10/07/2024     10/07/2024    MPV 10.8 (H) 10/07/2024        CMP:  Sodium   Date Value Ref Range Status   10/07/2024 145 136 - 145 mmol/L Final     Potassium   Date Value Ref Range Status   10/07/2024 3.6 3.5 - 5.1 mmol/L Final     Chloride   Date Value Ref Range Status   10/07/2024 108 (H) 98 - 107 mmol/L Final     CO2   Date Value Ref Range Status   10/07/2024 27 23 - 31 mmol/L Final     Blood Urea Nitrogen   Date Value Ref Range Status   10/07/2024 6.9 (L) 8.4 - 25.7 mg/dL Final     Creatinine   Date Value Ref Range Status   10/07/2024 0.88 0.73 - 1.18 mg/dL Final     Calcium   Date Value Ref Range Status   10/07/2024 9.6 8.8 - 10.0 mg/dL Final     Albumin   Date Value Ref Range Status   10/07/2024 3.6 3.4 - 4.8 g/dL Final     Bilirubin Total   Date Value Ref Range Status   10/07/2024 0.5 <=1.5 mg/dL Final     ALP   Date Value Ref Range Status   10/07/2024 120 40 - 150 unit/L Final     AST   Date Value Ref Range Status   10/07/2024 20 5 - 34 unit/L Final     ALT   Date Value Ref Range Status   10/07/2024 17 0 - 55 unit/L Final     Estimated GFR-Non    Date Value Ref Range Status   04/19/2022 >60           Assessment:       1) [id number] 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 Discussed with Dr Alba ESCOBAR and he will be seen 4/21/2024.   -Recent PET CT with persistent stable  LN but increase activity    Educated the patient on the risks versus benefits as well as toxicities associated with treatment.  Verbally consented the patient to the treatment plan and the patient was educated on the planned duration of the treatment and schedule of the treatment administration.  All questions were answered.      Plan:       Patient with recurrence of disease while on Imfinizi.  Not candidate for RT. We discussed again chemotherapy and he is agreeable. We discontinue Imfinzi and started Gemzar on 4/17/2024 and so far tolerating well. PET CT with persistent stable  LN but increase activity. Will cont present treatment    Okay to proceed with Gemzar C9 D1 today , Neulasta on day 8.   Infusion only on day 8- labs to be drawn the day before at [facility]  PET CT due Dec. 2024 - will order when closer  RTC in 4 weeks with or TD/Infusion - he prefers Wednesday appts. Labs to be drawn the day before treatment at [facility].  Labs: CBC, CMP    Encourage to call or message us for any questions or problems  The patient was given ample opportunity to ask questions, and to the best of my abilities, all questions answered to satisfaction; patient demonstrated understanding of what we discussed and agreeable to the plan.     INDIA Arango

## 2024-10-14 ENCOUNTER — LAB VISIT (OUTPATIENT)
Dept: LAB | Facility: HOSPITAL | Age: 79
End: 2024-10-14
Attending: INTERNAL MEDICINE
Payer: MEDICARE

## 2024-10-14 DIAGNOSIS — C34.91 NON-SMALL CELL CANCER OF RIGHT LUNG: ICD-10-CM

## 2024-10-14 DIAGNOSIS — R53.83 FATIGUE, UNSPECIFIED TYPE: ICD-10-CM

## 2024-10-14 LAB
ALBUMIN SERPL-MCNC: 3.4 G/DL (ref 3.4–4.8)
ALBUMIN/GLOB SERPL: 0.9 RATIO (ref 1.1–2)
ALP SERPL-CCNC: 100 UNIT/L (ref 40–150)
ALT SERPL-CCNC: 32 UNIT/L (ref 0–55)
ANION GAP SERPL CALC-SCNC: 10 MEQ/L
AST SERPL-CCNC: 34 UNIT/L (ref 5–34)
BASOPHILS # BLD AUTO: 0.03 X10(3)/MCL
BASOPHILS NFR BLD AUTO: 0.9 %
BILIRUB SERPL-MCNC: 1 MG/DL
BUN SERPL-MCNC: 9.2 MG/DL (ref 8.4–25.7)
CALCIUM SERPL-MCNC: 9.5 MG/DL (ref 8.8–10)
CHLORIDE SERPL-SCNC: 109 MMOL/L (ref 98–107)
CO2 SERPL-SCNC: 24 MMOL/L (ref 23–31)
CREAT SERPL-MCNC: 0.78 MG/DL (ref 0.73–1.18)
CREAT/UREA NIT SERPL: 12
EOSINOPHIL # BLD AUTO: 0.14 X10(3)/MCL (ref 0–0.9)
EOSINOPHIL NFR BLD AUTO: 4.4 %
ERYTHROCYTE [DISTWIDTH] IN BLOOD BY AUTOMATED COUNT: 16.4 % (ref 11.5–17)
GFR SERPLBLD CREATININE-BSD FMLA CKD-EPI: >60 ML/MIN/1.73/M2
GLOBULIN SER-MCNC: 3.8 GM/DL (ref 2.4–3.5)
GLUCOSE SERPL-MCNC: 99 MG/DL (ref 82–115)
HCT VFR BLD AUTO: 35.5 % (ref 42–52)
HGB BLD-MCNC: 11.2 G/DL (ref 14–18)
IMM GRANULOCYTES # BLD AUTO: 0.01 X10(3)/MCL (ref 0–0.04)
IMM GRANULOCYTES NFR BLD AUTO: 0.3 %
LYMPHOCYTES # BLD AUTO: 0.79 X10(3)/MCL (ref 0.6–4.6)
LYMPHOCYTES NFR BLD AUTO: 24.8 %
MCH RBC QN AUTO: 30.9 PG (ref 27–31)
MCHC RBC AUTO-ENTMCNC: 31.5 G/DL (ref 33–36)
MCV RBC AUTO: 97.8 FL (ref 80–94)
MONOCYTES # BLD AUTO: 0.13 X10(3)/MCL (ref 0.1–1.3)
MONOCYTES NFR BLD AUTO: 4.1 %
NEUTROPHILS # BLD AUTO: 2.08 X10(3)/MCL (ref 2.1–9.2)
NEUTROPHILS NFR BLD AUTO: 65.5 %
PLATELET # BLD AUTO: 354 X10(3)/MCL (ref 130–400)
PMV BLD AUTO: 10.7 FL (ref 7.4–10.4)
POTASSIUM SERPL-SCNC: 3.7 MMOL/L (ref 3.5–5.1)
PROT SERPL-MCNC: 7.2 GM/DL (ref 5.8–7.6)
RBC # BLD AUTO: 3.63 X10(6)/MCL (ref 4.7–6.1)
SODIUM SERPL-SCNC: 143 MMOL/L (ref 136–145)
TSH SERPL-ACNC: 0.9 UIU/ML (ref 0.35–4.94)
WBC # BLD AUTO: 3.18 X10(3)/MCL (ref 4.5–11.5)

## 2024-10-14 PROCEDURE — 36415 COLL VENOUS BLD VENIPUNCTURE: CPT

## 2024-10-14 PROCEDURE — 80053 COMPREHEN METABOLIC PANEL: CPT

## 2024-10-14 PROCEDURE — 85025 COMPLETE CBC W/AUTO DIFF WBC: CPT

## 2024-10-14 PROCEDURE — 84443 ASSAY THYROID STIM HORMONE: CPT

## 2024-10-15 ENCOUNTER — OFFICE VISIT (OUTPATIENT)
Dept: FAMILY MEDICINE | Facility: CLINIC | Age: 79
End: 2024-10-15
Payer: MEDICARE

## 2024-10-15 VITALS
BODY MASS INDEX: 29.82 KG/M2 | HEART RATE: 87 BPM | DIASTOLIC BLOOD PRESSURE: 66 MMHG | WEIGHT: 190 LBS | HEIGHT: 67 IN | TEMPERATURE: 98 F | SYSTOLIC BLOOD PRESSURE: 124 MMHG | OXYGEN SATURATION: 98 % | RESPIRATION RATE: 16 BRPM

## 2024-10-15 DIAGNOSIS — Z23 NEED FOR COVID-19 VACCINE: ICD-10-CM

## 2024-10-15 DIAGNOSIS — I10 PRIMARY HYPERTENSION: ICD-10-CM

## 2024-10-15 DIAGNOSIS — E78.2 MIXED HYPERLIPIDEMIA: Primary | ICD-10-CM

## 2024-10-15 DIAGNOSIS — E63.9 DEFICIENCY OF MACRONUTRIENTS: ICD-10-CM

## 2024-10-15 DIAGNOSIS — C34.91 NON-SMALL CELL CANCER OF RIGHT LUNG: ICD-10-CM

## 2024-10-15 DIAGNOSIS — R00.0 TACHYCARDIA: ICD-10-CM

## 2024-10-15 DIAGNOSIS — R79.89 LOW TSH LEVEL: ICD-10-CM

## 2024-10-15 DIAGNOSIS — Z23 NEED FOR INFLUENZA VACCINATION: ICD-10-CM

## 2024-10-15 PROCEDURE — G0008 ADMIN INFLUENZA VIRUS VAC: HCPCS | Mod: ,,, | Performed by: NURSE PRACTITIONER

## 2024-10-15 PROCEDURE — 3074F SYST BP LT 130 MM HG: CPT | Mod: CPTII,,, | Performed by: NURSE PRACTITIONER

## 2024-10-15 PROCEDURE — 90653 IIV ADJUVANT VACCINE IM: CPT | Mod: ,,, | Performed by: NURSE PRACTITIONER

## 2024-10-15 PROCEDURE — 99214 OFFICE O/P EST MOD 30 MIN: CPT | Mod: ,,, | Performed by: NURSE PRACTITIONER

## 2024-10-15 PROCEDURE — 3078F DIAST BP <80 MM HG: CPT | Mod: CPTII,,, | Performed by: NURSE PRACTITIONER

## 2024-10-15 PROCEDURE — 1101F PT FALLS ASSESS-DOCD LE1/YR: CPT | Mod: CPTII,,, | Performed by: NURSE PRACTITIONER

## 2024-10-15 PROCEDURE — 1159F MED LIST DOCD IN RCRD: CPT | Mod: CPTII,,, | Performed by: NURSE PRACTITIONER

## 2024-10-15 PROCEDURE — 1160F RVW MEDS BY RX/DR IN RCRD: CPT | Mod: CPTII,,, | Performed by: NURSE PRACTITIONER

## 2024-10-15 PROCEDURE — 1126F AMNT PAIN NOTED NONE PRSNT: CPT | Mod: CPTII,,, | Performed by: NURSE PRACTITIONER

## 2024-10-15 PROCEDURE — 3288F FALL RISK ASSESSMENT DOCD: CPT | Mod: CPTII,,, | Performed by: NURSE PRACTITIONER

## 2024-10-15 PROCEDURE — 1157F ADVNC CARE PLAN IN RCRD: CPT | Mod: CPTII,,, | Performed by: NURSE PRACTITIONER

## 2024-10-15 NOTE — PROGRESS NOTES
Subjective:       Patient ID: Leonila Rosales is a 79 y.o. male.    Chief Complaint: Hyperlipidemia (6m fu), Hypertension (6m fu), Tachycardia (6m fu), and Thyroid Problem (6m fu)      HPI   This has a 79-year-old  male who presents to clinic today for six-month follow-up for hyperlipidemia, hypertension, tachycardia, lung cancer, low TSH.  Reports overall doing well.  Reports still doing his treatments with Oncology and having no side effects.  No complaints today.  Review of Systems  Comprehensive review of systems negative except as stated in HPI    The patient's Health Maintenance was reviewed and the following appears to be due:   Health Maintenance Due   Topic Date Due    COVID-19 Vaccine (5 - 2024-25 season) 09/01/2024       Past Medical History:  Past Medical History:   Diagnosis Date    Anemia     Class 1 obesity due to excess calories with serious comorbidity and body mass index (BMI) of 33.0 to 33.9 in adult     Closed intertrochanteric fracture     Deficiency of macronutrients     GERD (gastroesophageal reflux disease)     H. pylori infection     History of tobacco use     Hyperlipidemia     Hypertension     Lung mass     Malignant neoplasm of unspecified part of unspecified bronchus or lung     Non-small cell lung cancer      Past Surgical History:   Procedure Laterality Date    BIOPSY OF INTESTINE  04/04/2022    BRONCHOSCOPY      COLONOSCOPY      ESOPHAGOGASTRODUODENOSCOPY  04/04/2022    HIP FRACTURE SURGERY Left 2016    Intramedullary Nail Insertion Hip Left 01/18/2022    KIDNEY SURGERY Right 05/27/2022    MEDIASTINOSCOPY N/A 3/6/2023    Procedure: MEDIASTINOSCOPY;  Surgeon: Jose Diaz MD;  Location: [facility] OR;  Service: Cardiothoracic;  Laterality: N/A;  XX    PLACEMENT, MEDIPORT N/A 4/6/2023    Procedure: Placement, Mediport;  Surgeon: Jose Diaz MD;  Location: [facility] OR;  Service: Cardiology;  Laterality: N/A;    SHOULDER SURGERY       Review of patient's allergies  indicates:  No Known Allergies  Current Outpatient Medications on File Prior to Visit   Medication Sig Dispense Refill    amLODIPine (NORVASC) 5 MG tablet TAKE ONE TABLET BY MOUTH EVERY DAY TWICE DAILY 180 tablet 1    aspirin 81 mg Cap Take 81 mg by mouth Daily.      atorvastatin (LIPITOR) 10 MG tablet TAKE ONE TABLET BY MOUTH DAILY 90 tablet 3    levoFLOXacin (LEVAQUIN) 500 MG tablet Take 1 tablet (500 mg total) by mouth once daily. 7 tablet 0    LIDOcaine-prilocaine (EMLA) cream Apply topically as needed. Apply to port site 30 mins prior to chemo 30 g 3    LINZESS 145 mcg Cap capsule Take 145 mcg by mouth daily as needed. New medication, not started yet      metoprolol succinate (TOPROL-XL) 25 MG 24 hr tablet Take 1 tablet (25 mg total) by mouth once daily. 90 tablet 3    pantoprazole (PROTONIX) 40 MG tablet Take 40 mg by mouth.       No current facility-administered medications on file prior to visit.     Social History     Socioeconomic History    Marital status:    Tobacco Use    Smoking status: Former     Current packs/day: 0.00     Types: Cigarettes     Quit date: 2021     Years since quittin.8    Smokeless tobacco: Never   Substance and Sexual Activity    Alcohol use: Yes     Comment: rare    Drug use: Never    Sexual activity: Not Currently     Social Drivers of Health     Financial Resource Strain: Medium Risk (2023)    Overall Financial Resource Strain (CARDIA)     Difficulty of Paying Living Expenses: Somewhat hard   Food Insecurity: No Food Insecurity (2023)    Hunger Vital Sign     Worried About Running Out of Food in the Last Year: Never true     Ran Out of Food in the Last Year: Never true   Transportation Needs: No Transportation Needs (2023)    PRAPARE - Transportation     Lack of Transportation (Medical): No     Lack of Transportation (Non-Medical): No   Physical Activity: Insufficiently Active (2023)    Exercise Vital Sign     Days of Exercise per Week: 3 days     " Minutes of Exercise per Session: 20 min   Stress: Stress Concern Present (4/12/2023)    [employer] - Occupational Stress Questionnaire     Feeling of Stress : To some extent   Housing Stability: Low Risk  (4/12/2023)    Housing Stability Vital Sign     Unable to Pay for Housing in the Last Year: No     Number of Places Lived in the Last Year: 1     Unstable Housing in the Last Year: No     Family History   Family history unknown: Yes       Objective:       /66   Pulse 87   Temp 97.8 °F (36.6 °C) (Oral)   Resp 16   Ht 5' 6.93" (1.7 m)   Wt 86.2 kg (190 lb)   SpO2 98%   BMI 29.82 kg/m²      Physical Exam  Vitals and nursing note reviewed.   Constitutional:       Appearance: Normal appearance. He is normal weight.   HENT:      Head: Normocephalic and atraumatic.      Right Ear: Tympanic membrane, ear canal and external ear normal.      Left Ear: Tympanic membrane, ear canal and external ear normal.      Nose: Nose normal.      Mouth/Throat:      Mouth: Mucous membranes are moist.      Pharynx: Oropharynx is clear.   Eyes:      Extraocular Movements: Extraocular movements intact.      Conjunctiva/sclera: Conjunctivae normal.      Pupils: Pupils are equal, round, and reactive to light.   Cardiovascular:      Rate and Rhythm: Normal rate and regular rhythm.      Heart sounds: Murmur heard.   Pulmonary:      Effort: Pulmonary effort is normal.      Breath sounds: Normal breath sounds.   Musculoskeletal:         General: Normal range of motion.      Cervical back: Normal range of motion and neck supple.      Comments: Ambulates with the assistance of cane   Skin:     General: Skin is warm and dry.   Neurological:      General: No focal deficit present.      Mental Status: He is alert and oriented to person, place, and time.   Psychiatric:         Mood and Affect: Mood normal.         Behavior: Behavior normal.         Thought Content: Thought content normal.         Judgment: " Judgment normal.         Labs  Lab Visit on 10/14/2024   Component Date Value Ref Range Status    Sodium 10/14/2024 143  136 - 145 mmol/L Final    Potassium 10/14/2024 3.7  3.5 - 5.1 mmol/L Final    Chloride 10/14/2024 109 (H)  98 - 107 mmol/L Final    CO2 10/14/2024 24  23 - 31 mmol/L Final    Glucose 10/14/2024 99  82 - 115 mg/dL Final    Blood Urea Nitrogen 10/14/2024 9.2  8.4 - 25.7 mg/dL Final    Creatinine 10/14/2024 0.78  0.73 - 1.18 mg/dL Final    Calcium 10/14/2024 9.5  8.8 - 10.0 mg/dL Final    Protein Total 10/14/2024 7.2  5.8 - 7.6 gm/dL Final    Albumin 10/14/2024 3.4  3.4 - 4.8 g/dL Final    Globulin 10/14/2024 3.8 (H)  2.4 - 3.5 gm/dL Final    Albumin/Globulin Ratio 10/14/2024 0.9 (L)  1.1 - 2.0 ratio Final    Bilirubin Total 10/14/2024 1.0  <=1.5 mg/dL Final    ALP 10/14/2024 100  40 - 150 unit/L Final    ALT 10/14/2024 32  0 - 55 unit/L Final    AST 10/14/2024 34  5 - 34 unit/L Final    eGFR 10/14/2024 >60  mL/min/1.73/m2 Final    Anion Gap 10/14/2024 10.0  mEq/L Final    BUN/Creatinine Ratio 10/14/2024 12   Final    TSH 10/14/2024 0.900  0.350 - 4.940 uIU/mL Final    WBC 10/14/2024 3.18 (L)  4.50 - 11.50 x10(3)/mcL Final    RBC 10/14/2024 3.63 (L)  4.70 - 6.10 x10(6)/mcL Final    Hgb 10/14/2024 11.2 (L)  14.0 - 18.0 g/dL Final    Hct 10/14/2024 35.5 (L)  42.0 - 52.0 % Final    MCV 10/14/2024 97.8 (H)  80.0 - 94.0 fL Final    MCH 10/14/2024 30.9  27.0 - 31.0 pg Final    MCHC 10/14/2024 31.5 (L)  33.0 - 36.0 g/dL Final    RDW 10/14/2024 16.4  11.5 - 17.0 % Final    Platelet 10/14/2024 354  130 - 400 x10(3)/mcL Final    MPV 10/14/2024 10.7 (H)  7.4 - 10.4 fL Final    Neut % 10/14/2024 65.5  % Final    Lymph % 10/14/2024 24.8  % Final    Mono % 10/14/2024 4.1  % Final    Eos % 10/14/2024 4.4  % Final    Basophil % 10/14/2024 0.9  % Final    Lymph # 10/14/2024 0.79  0.6 - 4.6 x10(3)/mcL Final    Neut # 10/14/2024 2.08 (L)  2.1 - 9.2 x10(3)/mcL Final    Mono # 10/14/2024 0.13  0.1 - 1.3 x10(3)/mcL Final     Eos # 10/14/2024 0.14  0 - 0.9 x10(3)/mcL Final    Baso # 10/14/2024 0.03  <=0.2 x10(3)/mcL Final    IG# 10/14/2024 0.01  0 - 0.04 x10(3)/mcL Final    IG% 10/14/2024 0.3  % Final   Orders Only on 10/08/2024   Component Date Value Ref Range Status    Cholesterol Total 10/07/2024 122  <=200 mg/dL Final    HDL Cholesterol 10/07/2024 45  35 - 60 mg/dL Final    Triglyceride 10/07/2024 64  34 - 140 mg/dL Final    Cholesterol/HDL Ratio 10/07/2024 3  0 - 5 Final    Very Low Density Lipoprotein 10/07/2024 13   Final    LDL Cholesterol 10/07/2024 64.00  50.00 - 140.00 mg/dL Final   Lab Visit on 10/07/2024   Component Date Value Ref Range Status    Sodium 10/07/2024 145  136 - 145 mmol/L Final    Potassium 10/07/2024 3.6  3.5 - 5.1 mmol/L Final    Chloride 10/07/2024 108 (H)  98 - 107 mmol/L Final    CO2 10/07/2024 27  23 - 31 mmol/L Final    Glucose 10/07/2024 93  82 - 115 mg/dL Final    Blood Urea Nitrogen 10/07/2024 6.9 (L)  8.4 - 25.7 mg/dL Final    Creatinine 10/07/2024 0.88  0.73 - 1.18 mg/dL Final    Calcium 10/07/2024 9.6  8.8 - 10.0 mg/dL Final    Protein Total 10/07/2024 7.5  5.8 - 7.6 gm/dL Final    Albumin 10/07/2024 3.6  3.4 - 4.8 g/dL Final    Globulin 10/07/2024 3.9 (H)  2.4 - 3.5 gm/dL Final    Albumin/Globulin Ratio 10/07/2024 0.9 (L)  1.1 - 2.0 ratio Final    Bilirubin Total 10/07/2024 0.5  <=1.5 mg/dL Final    ALP 10/07/2024 120  40 - 150 unit/L Final    ALT 10/07/2024 17  0 - 55 unit/L Final    AST 10/07/2024 20  5 - 34 unit/L Final    eGFR 10/07/2024 >60  mL/min/1.73/m2 Final    Anion Gap 10/07/2024 10.0  mEq/L Final    BUN/Creatinine Ratio 10/07/2024 8   Final    TSH 10/07/2024 0.646  0.350 - 4.940 uIU/mL Final    WBC 10/07/2024 7.06  4.50 - 11.50 x10(3)/mcL Final    RBC 10/07/2024 3.78 (L)  4.70 - 6.10 x10(6)/mcL Final    Hgb 10/07/2024 11.7 (L)  14.0 - 18.0 g/dL Final    Hct 10/07/2024 37.4 (L)  42.0 - 52.0 % Final    MCV 10/07/2024 98.9 (H)  80.0 - 94.0 fL Final    MCH 10/07/2024 31.0  27.0 - 31.0  pg Final    MCHC 10/07/2024 31.3 (L)  33.0 - 36.0 g/dL Final    RDW 10/07/2024 16.2  11.5 - 17.0 % Final    Platelet 10/07/2024 167  130 - 400 x10(3)/mcL Final    MPV 10/07/2024 10.8 (H)  7.4 - 10.4 fL Final    Neut % 10/07/2024 70.5  % Final    Lymph % 10/07/2024 15.7  % Final    Mono % 10/07/2024 11.3  % Final    Eos % 10/07/2024 1.6  % Final    Basophil % 10/07/2024 0.3  % Final    Lymph # 10/07/2024 1.11  0.6 - 4.6 x10(3)/mcL Final    Neut # 10/07/2024 4.98  2.1 - 9.2 x10(3)/mcL Final    Mono # 10/07/2024 0.80  0.1 - 1.3 x10(3)/mcL Final    Eos # 10/07/2024 0.11  0 - 0.9 x10(3)/mcL Final    Baso # 10/07/2024 0.02  <=0.2 x10(3)/mcL Final    IG# 10/07/2024 0.04  0 - 0.04 x10(3)/mcL Final    IG% 10/07/2024 0.6  % Final         Assessment and Plan       ICD-10-CM ICD-9-CM   1. Mixed hyperlipidemia  E78.2 272.2   2. Primary hypertension  I10 401.9   3. Tachycardia  R00.0 785.0   4. Non-small cell cancer of right lung  C34.91 162.9   5. Deficiency of macronutrients  E63.9 269.9   6. Low TSH level  R79.89 794.5   7. Need for influenza vaccination  Z23 V04.81   8. Need for COVID-19 vaccine  Z23 V04.89        1. Mixed hyperlipidemia  Overview:  Atorvastatin 10 mg daily    Assessment & Plan:  Lipid panel in 6 months.    Orders:  -     Comprehensive Metabolic Panel; Future; Expected date: 04/15/2025  -     Lipid Panel; Future; Expected date: 04/15/2025  -     TSH; Future; Expected date: 04/15/2025    2. Primary hypertension  Overview:  Amlodipine 5 mg daily  04/12/2024 - add metoprolol XL 25 mg daily    Assessment & Plan:  Stable, continue amlodipine and metoprolol daily, follow-up 6 months.    Orders:  -     Comprehensive Metabolic Panel; Future; Expected date: 04/15/2025    3. Tachycardia  Overview:  04/12/2024 - start metoprolol XL 25 mg daily    Assessment & Plan:  Stable, continue metoprolol XL 25 mg daily, follow-up 6 months.      4. Non-small cell cancer of right lung  Overview:  01/17/2022 - fall with closed  intratrochanteric fracture of the left femur, had CT chest while in emergency department and incidentally noted was right upper lobe 2.5 cm mass.    03/22/2022 CT - guided biopsy right upper lobe mass pathology showing invasive squamous cell carcinoma    05/24/2022 - right upper lobectomy with Dr. Diaz     09/27/2022 - initial visit with Dr. Napoles - recommends H&P and CT chest every 6 months for 2-3 years then H&P and low-dose noncontrast enhanced CT annually    CT C 1/25/2023: There is a paratracheal enlarged lymph node which shows interval mild size increase and now measures 2.2 x 2.0 cm and previously was 1.6 x 1.5 cm.  Impression -  Paratracheal enlarged lymph node with interval size increase.    PET CT 2/9/2023:  Hypermetabolic right paratracheal lymph node image 81 series 3 measures 25 x 20 mm with max SUV 27.0.  Hypermetabolic anterior mediastinal lymph node anterior to the SVC measures 12 x 9 mm with max SUV 12.0. Impression: 1. Hypermetabolic metastatic mediastinal adenopathy.   2. No PET evidence of metastatic disease outside of the mediastinum.    3/8/2023 LYMPH NODE BIOPSY:   LYMPH NODE 4R, LYMPHADENECTOMY:  METASTATIC SQUAMOUS CELL CARCINOMA, NONKERATINIZING, MULTIPLE FRAGMENTS.    04/06/2023 - mediport placement per Dr Diaz    04/11/2023 - oncology visit, plan to start chemotherapy + XRT    05/19/2023 - completed 5 cycles carbo/Taxol    05/30/2023 - completed XRT    06/22/2023 - started durvalumab    CT C/A/P 7/10/2023:  1. Several new subcentimeter nodules in the right lung.  Suspect these are infectious or inflammatory in nature, but 3 month follow-up chest CT recommended to ensure resolution.  2. 2 reference mediastinal lymph nodes are smaller since January.  3. L1 vertebral body compression deformity new since January, but appears subacute.  CT C/A/P 10/10/2023:    1. Slightly larger mediastinal lymph nodes, to be followed.  2. Increased irregular right perihilar consolidation which may be  treatment related.  3. Small right pleural effusion.  4. No new suspicious findings in the abdomen or pelvis.  CT C/A/P 1/10/2024:    1. Interval enlargement of mediastinal lymph nodes  2. Similar right perihilar consolidative opacity and small right pleural effusion  PET CT 2/6/2024:  1. New, enlarged hypermetabolic superior paratracheal and para-aortic/subaortic lymph nodes compared to prior PET-CT  2. Persistent enlarged and hypermetabolic lower right paratracheal lymph node.  This lymph node is decreased in size and FDG avidity compared to prior PET-CT.  3. Previously seen hypermetabolic pre-vascular lymph node is decreased in size and is no longer hypermetabolic.    04/08/2024 - appointment with Dr Napoles - Patient with recurrence of disease. Not candidate for RT. We discussed again chemotherapy and he is agreeable. Will DISCONTINUE Imfinzi due to disease progression. Plan to begin Gezmar next week - 4/17/2024 as per patient request.    PET CT 5/23/2024:  1. Status post right upper lobe resection without evidence for local recurrence/residual disease.  2. Interval response to therapy with decreasing right paratracheal and left AP window adenopathy.  3. Nonspecific area of hypermetabolic activity within the left tongue base.  Direct visualization may be warranted.  4. No evidence for new focus of metastatic disease.    PET CT 9/9/2024:  1. Previously visualized mediastinal lymph nodes slightly more FDG avid today.  2. No new sites of metastatic disease.      Assessment & Plan:  Oncology records reviewed, continue management per Oncology.    Orders:  -     CBC Auto Differential; Future; Expected date: 04/15/2025    5. Deficiency of macronutrients  Assessment & Plan:  Stable, albumin 3.4, follow-up with surveillance labs with Oncology.  Follow-up here 6 months.      6. Low TSH level  Overview:  Noted per oncology starting June 2023  Monitored by oncology monthly  Normal FT3 and FT4    Assessment & Plan:  Stable,  TSH 0.900.  Repeat TSH in 6 months.    Orders:  -     TSH; Future; Expected date: 04/15/2025    7. Need for influenza vaccination  -     influenza (adjuvanted) (Fluad) 45 mcg/0.5 mL IM vaccine (> or = 66 yo) 0.5 mL    8. Need for COVID-19 vaccine  Comments:  Recommend COVID booster at [facility], patient verbalized understanding           Follow up in 6 months (on 4/15/2025) for Annual.

## 2024-10-16 ENCOUNTER — INFUSION (OUTPATIENT)
Dept: INFUSION THERAPY | Facility: HOSPITAL | Age: 79
End: 2024-10-16
Attending: NURSE PRACTITIONER
Payer: MEDICARE

## 2024-10-16 VITALS
TEMPERATURE: 98 F | DIASTOLIC BLOOD PRESSURE: 71 MMHG | OXYGEN SATURATION: 98 % | HEART RATE: 92 BPM | RESPIRATION RATE: 18 BRPM | SYSTOLIC BLOOD PRESSURE: 139 MMHG

## 2024-10-16 DIAGNOSIS — C34.00 MALIGNANT NEOPLASM OF HILUS OF LUNG, UNSPECIFIED LATERALITY: Primary | ICD-10-CM

## 2024-10-16 DIAGNOSIS — C34.90 NON-SMALL CELL LUNG CANCER, UNSPECIFIED LATERALITY: ICD-10-CM

## 2024-10-16 PROCEDURE — A4216 STERILE WATER/SALINE, 10 ML: HCPCS | Performed by: NURSE PRACTITIONER

## 2024-10-16 PROCEDURE — 96375 TX/PRO/DX INJ NEW DRUG ADDON: CPT

## 2024-10-16 PROCEDURE — 96377 APPLICATON ON-BODY INJECTOR: CPT

## 2024-10-16 PROCEDURE — 25000003 PHARM REV CODE 250: Performed by: NURSE PRACTITIONER

## 2024-10-16 PROCEDURE — 96413 CHEMO IV INFUSION 1 HR: CPT

## 2024-10-16 PROCEDURE — 63600175 PHARM REV CODE 636 W HCPCS: Performed by: NURSE PRACTITIONER

## 2024-10-16 RX ORDER — HEPARIN 100 UNIT/ML
500 SYRINGE INTRAVENOUS
Status: CANCELLED | OUTPATIENT
Start: 2024-10-16

## 2024-10-16 RX ORDER — SODIUM CHLORIDE 0.9 % (FLUSH) 0.9 %
10 SYRINGE (ML) INJECTION
Status: DISCONTINUED | OUTPATIENT
Start: 2024-10-16 | End: 2024-10-16 | Stop reason: HOSPADM

## 2024-10-16 RX ORDER — HEPARIN 100 UNIT/ML
500 SYRINGE INTRAVENOUS
Status: DISCONTINUED | OUTPATIENT
Start: 2024-10-16 | End: 2024-10-16 | Stop reason: HOSPADM

## 2024-10-16 RX ORDER — ONDANSETRON HYDROCHLORIDE 2 MG/ML
8 INJECTION, SOLUTION INTRAVENOUS
Status: CANCELLED | OUTPATIENT
Start: 2024-10-16

## 2024-10-16 RX ORDER — SODIUM CHLORIDE 0.9 % (FLUSH) 0.9 %
10 SYRINGE (ML) INJECTION
Status: CANCELLED | OUTPATIENT
Start: 2024-10-16

## 2024-10-16 RX ORDER — ONDANSETRON HYDROCHLORIDE 2 MG/ML
8 INJECTION, SOLUTION INTRAVENOUS
Status: COMPLETED | OUTPATIENT
Start: 2024-10-16 | End: 2024-10-16

## 2024-10-16 RX ADMIN — ONDANSETRON 8 MG: 2 INJECTION INTRAMUSCULAR; INTRAVENOUS at 12:10

## 2024-10-16 RX ADMIN — PEGFILGRASTIM 6 MG: KIT SUBCUTANEOUS at 01:10

## 2024-10-16 RX ADMIN — Medication 10 ML: at 01:10

## 2024-10-16 RX ADMIN — HEPARIN 500 UNITS: 100 SYRINGE at 01:10

## 2024-10-16 RX ADMIN — GEMCITABINE 2600 MG: 38 INJECTION, SOLUTION INTRAVENOUS at 01:10

## 2024-10-16 RX ADMIN — SODIUM CHLORIDE: 9 INJECTION, SOLUTION INTRAVENOUS at 12:10

## 2024-10-16 NOTE — PLAN OF CARE
C9D8 Gemzar + Neulasta (OBI) given; tolerated well with no complications; next appointments confirmed with patient; discharged home in stable condition

## 2024-10-28 ENCOUNTER — LAB VISIT (OUTPATIENT)
Dept: LAB | Facility: HOSPITAL | Age: 79
End: 2024-10-28
Attending: INTERNAL MEDICINE
Payer: MEDICARE

## 2024-10-28 DIAGNOSIS — R53.83 FATIGUE, UNSPECIFIED TYPE: ICD-10-CM

## 2024-10-28 DIAGNOSIS — C34.91 NON-SMALL CELL CANCER OF RIGHT LUNG: ICD-10-CM

## 2024-10-28 LAB
ALBUMIN SERPL-MCNC: 3.4 G/DL (ref 3.4–4.8)
ALBUMIN/GLOB SERPL: 0.9 RATIO (ref 1.1–2)
ALP SERPL-CCNC: 90 UNIT/L (ref 40–150)
ALT SERPL-CCNC: 20 UNIT/L (ref 0–55)
ANION GAP SERPL CALC-SCNC: 7 MEQ/L
AST SERPL-CCNC: 20 UNIT/L (ref 5–34)
BASOPHILS # BLD AUTO: 0.01 X10(3)/MCL
BASOPHILS NFR BLD AUTO: 0.2 %
BILIRUB SERPL-MCNC: 0.5 MG/DL
BUN SERPL-MCNC: 7.6 MG/DL (ref 8.4–25.7)
CALCIUM SERPL-MCNC: 9.5 MG/DL (ref 8.8–10)
CHLORIDE SERPL-SCNC: 108 MMOL/L (ref 98–107)
CO2 SERPL-SCNC: 28 MMOL/L (ref 23–31)
CREAT SERPL-MCNC: 0.77 MG/DL (ref 0.72–1.25)
CREAT/UREA NIT SERPL: 10
EOSINOPHIL # BLD AUTO: 0.11 X10(3)/MCL (ref 0–0.9)
EOSINOPHIL NFR BLD AUTO: 2.6 %
ERYTHROCYTE [DISTWIDTH] IN BLOOD BY AUTOMATED COUNT: 16.9 % (ref 11.5–17)
GFR SERPLBLD CREATININE-BSD FMLA CKD-EPI: >60 ML/MIN/1.73/M2
GLOBULIN SER-MCNC: 4 GM/DL (ref 2.4–3.5)
GLUCOSE SERPL-MCNC: 89 MG/DL (ref 82–115)
HCT VFR BLD AUTO: 35 % (ref 42–52)
HGB BLD-MCNC: 11.3 G/DL (ref 14–18)
IMM GRANULOCYTES # BLD AUTO: 0 X10(3)/MCL (ref 0–0.04)
IMM GRANULOCYTES NFR BLD AUTO: 0 %
LYMPHOCYTES # BLD AUTO: 1.09 X10(3)/MCL (ref 0.6–4.6)
LYMPHOCYTES NFR BLD AUTO: 25.4 %
MCH RBC QN AUTO: 31.3 PG (ref 27–31)
MCHC RBC AUTO-ENTMCNC: 32.3 G/DL (ref 33–36)
MCV RBC AUTO: 97 FL (ref 80–94)
MONOCYTES # BLD AUTO: 0.64 X10(3)/MCL (ref 0.1–1.3)
MONOCYTES NFR BLD AUTO: 14.9 %
NEUTROPHILS # BLD AUTO: 2.44 X10(3)/MCL (ref 2.1–9.2)
NEUTROPHILS NFR BLD AUTO: 56.9 %
PLATELET # BLD AUTO: 192 X10(3)/MCL (ref 130–400)
PMV BLD AUTO: 10 FL (ref 7.4–10.4)
POTASSIUM SERPL-SCNC: 4.2 MMOL/L (ref 3.5–5.1)
PROT SERPL-MCNC: 7.4 GM/DL (ref 5.8–7.6)
RBC # BLD AUTO: 3.61 X10(6)/MCL (ref 4.7–6.1)
SODIUM SERPL-SCNC: 143 MMOL/L (ref 136–145)
TSH SERPL-ACNC: 0.91 UIU/ML (ref 0.35–4.94)
WBC # BLD AUTO: 4.29 X10(3)/MCL (ref 4.5–11.5)

## 2024-10-28 PROCEDURE — 84443 ASSAY THYROID STIM HORMONE: CPT

## 2024-10-28 PROCEDURE — 85025 COMPLETE CBC W/AUTO DIFF WBC: CPT

## 2024-10-28 PROCEDURE — 36415 COLL VENOUS BLD VENIPUNCTURE: CPT

## 2024-10-28 PROCEDURE — 80053 COMPREHEN METABOLIC PANEL: CPT

## 2024-10-30 ENCOUNTER — OFFICE VISIT (OUTPATIENT)
Dept: HEMATOLOGY/ONCOLOGY | Facility: CLINIC | Age: 79
End: 2024-10-30
Payer: MEDICARE

## 2024-10-30 ENCOUNTER — INFUSION (OUTPATIENT)
Dept: INFUSION THERAPY | Facility: HOSPITAL | Age: 79
End: 2024-10-30
Attending: NURSE PRACTITIONER
Payer: MEDICARE

## 2024-10-30 VITALS
TEMPERATURE: 98 F | RESPIRATION RATE: 20 BRPM | SYSTOLIC BLOOD PRESSURE: 112 MMHG | WEIGHT: 190.63 LBS | HEART RATE: 93 BPM | HEIGHT: 67 IN | BODY MASS INDEX: 29.92 KG/M2 | DIASTOLIC BLOOD PRESSURE: 68 MMHG | OXYGEN SATURATION: 99 %

## 2024-10-30 DIAGNOSIS — Z90.2 S/P LOBECTOMY OF LUNG: ICD-10-CM

## 2024-10-30 DIAGNOSIS — D64.81 ANEMIA DUE TO ANTINEOPLASTIC AGENT: ICD-10-CM

## 2024-10-30 DIAGNOSIS — T45.1X5A CHEMOTHERAPY-INDUCED NEUTROPENIA: ICD-10-CM

## 2024-10-30 DIAGNOSIS — C77.1 SECONDARY AND UNSPECIFIED MALIGNANT NEOPLASM OF INTRATHORACIC LYMPH NODES: ICD-10-CM

## 2024-10-30 DIAGNOSIS — D70.1 CHEMOTHERAPY-INDUCED NEUTROPENIA: ICD-10-CM

## 2024-10-30 DIAGNOSIS — T45.1X5A IMMUNODEFICIENCY SECONDARY TO CHEMOTHERAPY: ICD-10-CM

## 2024-10-30 DIAGNOSIS — C34.91 NON-SMALL CELL CANCER OF RIGHT LUNG: Primary | ICD-10-CM

## 2024-10-30 DIAGNOSIS — C34.90 NON-SMALL CELL LUNG CANCER, UNSPECIFIED LATERALITY: ICD-10-CM

## 2024-10-30 DIAGNOSIS — T45.1X5A ANEMIA DUE TO ANTINEOPLASTIC AGENT: ICD-10-CM

## 2024-10-30 DIAGNOSIS — Z79.899 IMMUNODEFICIENCY SECONDARY TO CHEMOTHERAPY: ICD-10-CM

## 2024-10-30 DIAGNOSIS — Z79.899 ON ANTINEOPLASTIC CHEMOTHERAPY: ICD-10-CM

## 2024-10-30 DIAGNOSIS — C34.00 MALIGNANT NEOPLASM OF HILUS OF LUNG, UNSPECIFIED LATERALITY: Primary | ICD-10-CM

## 2024-10-30 DIAGNOSIS — D84.821 IMMUNODEFICIENCY SECONDARY TO CHEMOTHERAPY: ICD-10-CM

## 2024-10-30 PROCEDURE — 96375 TX/PRO/DX INJ NEW DRUG ADDON: CPT

## 2024-10-30 PROCEDURE — 1159F MED LIST DOCD IN RCRD: CPT | Mod: CPTII,S$GLB,, | Performed by: INTERNAL MEDICINE

## 2024-10-30 PROCEDURE — 1160F RVW MEDS BY RX/DR IN RCRD: CPT | Mod: CPTII,S$GLB,, | Performed by: INTERNAL MEDICINE

## 2024-10-30 PROCEDURE — 3288F FALL RISK ASSESSMENT DOCD: CPT | Mod: CPTII,S$GLB,, | Performed by: INTERNAL MEDICINE

## 2024-10-30 PROCEDURE — 3078F DIAST BP <80 MM HG: CPT | Mod: CPTII,S$GLB,, | Performed by: INTERNAL MEDICINE

## 2024-10-30 PROCEDURE — 99215 OFFICE O/P EST HI 40 MIN: CPT | Mod: S$GLB,,, | Performed by: INTERNAL MEDICINE

## 2024-10-30 PROCEDURE — 96413 CHEMO IV INFUSION 1 HR: CPT

## 2024-10-30 PROCEDURE — 3074F SYST BP LT 130 MM HG: CPT | Mod: CPTII,S$GLB,, | Performed by: INTERNAL MEDICINE

## 2024-10-30 PROCEDURE — 99999 PR PBB SHADOW E&M-EST. PATIENT-LVL IV: CPT | Mod: PBBFAC,,, | Performed by: INTERNAL MEDICINE

## 2024-10-30 PROCEDURE — 1126F AMNT PAIN NOTED NONE PRSNT: CPT | Mod: CPTII,S$GLB,, | Performed by: INTERNAL MEDICINE

## 2024-10-30 PROCEDURE — 63600175 PHARM REV CODE 636 W HCPCS: Performed by: INTERNAL MEDICINE

## 2024-10-30 PROCEDURE — 1157F ADVNC CARE PLAN IN RCRD: CPT | Mod: CPTII,S$GLB,, | Performed by: INTERNAL MEDICINE

## 2024-10-30 PROCEDURE — 1101F PT FALLS ASSESS-DOCD LE1/YR: CPT | Mod: CPTII,S$GLB,, | Performed by: INTERNAL MEDICINE

## 2024-10-30 PROCEDURE — 25000003 PHARM REV CODE 250: Performed by: INTERNAL MEDICINE

## 2024-10-30 RX ORDER — HEPARIN 100 UNIT/ML
500 SYRINGE INTRAVENOUS
Status: CANCELLED | OUTPATIENT
Start: 2024-10-30

## 2024-10-30 RX ORDER — SODIUM CHLORIDE 0.9 % (FLUSH) 0.9 %
10 SYRINGE (ML) INJECTION
OUTPATIENT
Start: 2024-11-06

## 2024-10-30 RX ORDER — HEPARIN 100 UNIT/ML
500 SYRINGE INTRAVENOUS
Status: DISCONTINUED | OUTPATIENT
Start: 2024-10-30 | End: 2024-10-30 | Stop reason: HOSPADM

## 2024-10-30 RX ORDER — SODIUM CHLORIDE 0.9 % (FLUSH) 0.9 %
10 SYRINGE (ML) INJECTION
Status: DISCONTINUED | OUTPATIENT
Start: 2024-10-30 | End: 2024-10-30 | Stop reason: HOSPADM

## 2024-10-30 RX ORDER — ONDANSETRON HYDROCHLORIDE 2 MG/ML
8 INJECTION, SOLUTION INTRAVENOUS
OUTPATIENT
Start: 2024-11-06

## 2024-10-30 RX ORDER — ONDANSETRON HYDROCHLORIDE 2 MG/ML
8 INJECTION, SOLUTION INTRAVENOUS
Status: COMPLETED | OUTPATIENT
Start: 2024-10-30 | End: 2024-10-30

## 2024-10-30 RX ORDER — HEPARIN 100 UNIT/ML
500 SYRINGE INTRAVENOUS
OUTPATIENT
Start: 2024-11-06

## 2024-10-30 RX ORDER — ONDANSETRON HYDROCHLORIDE 2 MG/ML
8 INJECTION, SOLUTION INTRAVENOUS
Status: CANCELLED | OUTPATIENT
Start: 2024-10-30

## 2024-10-30 RX ORDER — SODIUM CHLORIDE 0.9 % (FLUSH) 0.9 %
10 SYRINGE (ML) INJECTION
Status: CANCELLED | OUTPATIENT
Start: 2024-10-30

## 2024-10-30 RX ADMIN — GEMCITABINE 2600 MG: 38 INJECTION, SOLUTION INTRAVENOUS at 11:10

## 2024-10-30 RX ADMIN — HEPARIN 500 UNITS: 100 SYRINGE at 11:10

## 2024-10-30 RX ADMIN — ONDANSETRON 8 MG: 2 INJECTION INTRAMUSCULAR; INTRAVENOUS at 10:10

## 2024-11-04 ENCOUNTER — LAB VISIT (OUTPATIENT)
Dept: LAB | Facility: HOSPITAL | Age: 79
End: 2024-11-04
Attending: INTERNAL MEDICINE
Payer: MEDICARE

## 2024-11-04 DIAGNOSIS — C34.91 NON-SMALL CELL CANCER OF RIGHT LUNG: ICD-10-CM

## 2024-11-04 DIAGNOSIS — R53.83 FATIGUE, UNSPECIFIED TYPE: ICD-10-CM

## 2024-11-04 LAB
ALBUMIN SERPL-MCNC: 3.3 G/DL (ref 3.4–4.8)
ALBUMIN/GLOB SERPL: 0.8 RATIO (ref 1.1–2)
ALP SERPL-CCNC: 84 UNIT/L (ref 40–150)
ALT SERPL-CCNC: 39 UNIT/L (ref 0–55)
ANION GAP SERPL CALC-SCNC: 8 MEQ/L
AST SERPL-CCNC: 39 UNIT/L (ref 5–34)
BASOPHILS # BLD AUTO: 0.02 X10(3)/MCL
BASOPHILS NFR BLD AUTO: 0.7 %
BILIRUB SERPL-MCNC: 0.9 MG/DL
BUN SERPL-MCNC: 6.8 MG/DL (ref 8.4–25.7)
CALCIUM SERPL-MCNC: 9.4 MG/DL (ref 8.8–10)
CHLORIDE SERPL-SCNC: 109 MMOL/L (ref 98–107)
CO2 SERPL-SCNC: 25 MMOL/L (ref 23–31)
CREAT SERPL-MCNC: 0.81 MG/DL (ref 0.72–1.25)
CREAT/UREA NIT SERPL: 8
EOSINOPHIL # BLD AUTO: 0.16 X10(3)/MCL (ref 0–0.9)
EOSINOPHIL NFR BLD AUTO: 5.5 %
ERYTHROCYTE [DISTWIDTH] IN BLOOD BY AUTOMATED COUNT: 16.6 % (ref 11.5–17)
GFR SERPLBLD CREATININE-BSD FMLA CKD-EPI: >60 ML/MIN/1.73/M2
GLOBULIN SER-MCNC: 4.2 GM/DL (ref 2.4–3.5)
GLUCOSE SERPL-MCNC: 94 MG/DL (ref 82–115)
HCT VFR BLD AUTO: 33.7 % (ref 42–52)
HGB BLD-MCNC: 11.1 G/DL (ref 14–18)
IMM GRANULOCYTES # BLD AUTO: 0 X10(3)/MCL (ref 0–0.04)
IMM GRANULOCYTES NFR BLD AUTO: 0 %
LYMPHOCYTES # BLD AUTO: 0.75 X10(3)/MCL (ref 0.6–4.6)
LYMPHOCYTES NFR BLD AUTO: 25.9 %
MCH RBC QN AUTO: 31.8 PG (ref 27–31)
MCHC RBC AUTO-ENTMCNC: 32.9 G/DL (ref 33–36)
MCV RBC AUTO: 96.6 FL (ref 80–94)
MONOCYTES # BLD AUTO: 0.18 X10(3)/MCL (ref 0.1–1.3)
MONOCYTES NFR BLD AUTO: 6.2 %
NEUTROPHILS # BLD AUTO: 1.79 X10(3)/MCL (ref 2.1–9.2)
NEUTROPHILS NFR BLD AUTO: 61.7 %
PLATELET # BLD AUTO: 359 X10(3)/MCL (ref 130–400)
PMV BLD AUTO: 9.9 FL (ref 7.4–10.4)
POTASSIUM SERPL-SCNC: 4.4 MMOL/L (ref 3.5–5.1)
PROT SERPL-MCNC: 7.5 GM/DL (ref 5.8–7.6)
RBC # BLD AUTO: 3.49 X10(6)/MCL (ref 4.7–6.1)
SODIUM SERPL-SCNC: 142 MMOL/L (ref 136–145)
TSH SERPL-ACNC: 1.02 UIU/ML (ref 0.35–4.94)
WBC # BLD AUTO: 2.9 X10(3)/MCL (ref 4.5–11.5)

## 2024-11-04 PROCEDURE — 84443 ASSAY THYROID STIM HORMONE: CPT

## 2024-11-04 PROCEDURE — 36415 COLL VENOUS BLD VENIPUNCTURE: CPT

## 2024-11-04 PROCEDURE — 80053 COMPREHEN METABOLIC PANEL: CPT

## 2024-11-04 PROCEDURE — 85025 COMPLETE CBC W/AUTO DIFF WBC: CPT

## 2024-11-06 ENCOUNTER — INFUSION (OUTPATIENT)
Dept: INFUSION THERAPY | Facility: HOSPITAL | Age: 79
End: 2024-11-06
Attending: NURSE PRACTITIONER
Payer: MEDICARE

## 2024-11-06 VITALS — TEMPERATURE: 98 F | SYSTOLIC BLOOD PRESSURE: 129 MMHG | HEART RATE: 111 BPM | DIASTOLIC BLOOD PRESSURE: 63 MMHG

## 2024-11-06 DIAGNOSIS — C34.90 NON-SMALL CELL LUNG CANCER, UNSPECIFIED LATERALITY: ICD-10-CM

## 2024-11-06 DIAGNOSIS — C34.00 MALIGNANT NEOPLASM OF HILUS OF LUNG, UNSPECIFIED LATERALITY: Primary | ICD-10-CM

## 2024-11-06 PROCEDURE — 96377 APPLICATON ON-BODY INJECTOR: CPT

## 2024-11-06 PROCEDURE — 96413 CHEMO IV INFUSION 1 HR: CPT

## 2024-11-06 PROCEDURE — 25000003 PHARM REV CODE 250: Performed by: INTERNAL MEDICINE

## 2024-11-06 PROCEDURE — 96375 TX/PRO/DX INJ NEW DRUG ADDON: CPT

## 2024-11-06 PROCEDURE — 63600175 PHARM REV CODE 636 W HCPCS: Performed by: INTERNAL MEDICINE

## 2024-11-06 RX ORDER — HEPARIN 100 UNIT/ML
500 SYRINGE INTRAVENOUS
Status: DISCONTINUED | OUTPATIENT
Start: 2024-11-06 | End: 2024-11-06 | Stop reason: HOSPADM

## 2024-11-06 RX ORDER — ONDANSETRON HYDROCHLORIDE 2 MG/ML
8 INJECTION, SOLUTION INTRAVENOUS
Status: COMPLETED | OUTPATIENT
Start: 2024-11-06 | End: 2024-11-06

## 2024-11-06 RX ORDER — SODIUM CHLORIDE 0.9 % (FLUSH) 0.9 %
10 SYRINGE (ML) INJECTION
Status: DISCONTINUED | OUTPATIENT
Start: 2024-11-06 | End: 2024-11-06 | Stop reason: HOSPADM

## 2024-11-06 RX ADMIN — HEPARIN 500 UNITS: 100 SYRINGE at 01:11

## 2024-11-06 RX ADMIN — PEGFILGRASTIM 6 MG: KIT SUBCUTANEOUS at 12:11

## 2024-11-06 RX ADMIN — ONDANSETRON 8 MG: 2 INJECTION INTRAMUSCULAR; INTRAVENOUS at 12:11

## 2024-11-06 RX ADMIN — GEMCITABINE 2600 MG: 38 INJECTION, SOLUTION INTRAVENOUS at 12:11

## 2024-11-18 ENCOUNTER — LAB VISIT (OUTPATIENT)
Dept: LAB | Facility: HOSPITAL | Age: 79
End: 2024-11-18
Attending: INTERNAL MEDICINE
Payer: MEDICARE

## 2024-11-18 DIAGNOSIS — T45.1X5A ANEMIA DUE TO ANTINEOPLASTIC AGENT: ICD-10-CM

## 2024-11-18 DIAGNOSIS — T45.1X5A IMMUNODEFICIENCY SECONDARY TO CHEMOTHERAPY: ICD-10-CM

## 2024-11-18 DIAGNOSIS — C77.1 SECONDARY AND UNSPECIFIED MALIGNANT NEOPLASM OF INTRATHORACIC LYMPH NODES: ICD-10-CM

## 2024-11-18 DIAGNOSIS — Z79.899 IMMUNODEFICIENCY SECONDARY TO CHEMOTHERAPY: ICD-10-CM

## 2024-11-18 DIAGNOSIS — Z79.899 ON ANTINEOPLASTIC CHEMOTHERAPY: ICD-10-CM

## 2024-11-18 DIAGNOSIS — D70.1 CHEMOTHERAPY-INDUCED NEUTROPENIA: ICD-10-CM

## 2024-11-18 DIAGNOSIS — D64.81 ANEMIA DUE TO ANTINEOPLASTIC AGENT: ICD-10-CM

## 2024-11-18 DIAGNOSIS — C34.91 NON-SMALL CELL CANCER OF RIGHT LUNG: ICD-10-CM

## 2024-11-18 DIAGNOSIS — T45.1X5A CHEMOTHERAPY-INDUCED NEUTROPENIA: ICD-10-CM

## 2024-11-18 DIAGNOSIS — Z90.2 S/P LOBECTOMY OF LUNG: ICD-10-CM

## 2024-11-18 DIAGNOSIS — R53.83 FATIGUE, UNSPECIFIED TYPE: ICD-10-CM

## 2024-11-18 DIAGNOSIS — D84.821 IMMUNODEFICIENCY SECONDARY TO CHEMOTHERAPY: ICD-10-CM

## 2024-11-18 LAB
ALBUMIN SERPL-MCNC: 3.3 G/DL (ref 3.4–4.8)
ALBUMIN/GLOB SERPL: 0.8 RATIO (ref 1.1–2)
ALP SERPL-CCNC: 131 UNIT/L (ref 40–150)
ALT SERPL-CCNC: 19 UNIT/L (ref 0–55)
ANION GAP SERPL CALC-SCNC: 9 MEQ/L
AST SERPL-CCNC: 21 UNIT/L (ref 5–34)
BASOPHILS # BLD AUTO: 0.01 X10(3)/MCL
BASOPHILS NFR BLD AUTO: 0.1 %
BILIRUB SERPL-MCNC: 0.5 MG/DL
BUN SERPL-MCNC: 5.9 MG/DL (ref 8.4–25.7)
CALCIUM SERPL-MCNC: 9.2 MG/DL (ref 8.8–10)
CHLORIDE SERPL-SCNC: 107 MMOL/L (ref 98–107)
CO2 SERPL-SCNC: 28 MMOL/L (ref 23–31)
CREAT SERPL-MCNC: 0.76 MG/DL (ref 0.72–1.25)
CREAT/UREA NIT SERPL: 8
EOSINOPHIL # BLD AUTO: 0.11 X10(3)/MCL (ref 0–0.9)
EOSINOPHIL NFR BLD AUTO: 1.3 %
ERYTHROCYTE [DISTWIDTH] IN BLOOD BY AUTOMATED COUNT: 18.4 % (ref 11.5–17)
GFR SERPLBLD CREATININE-BSD FMLA CKD-EPI: >60 ML/MIN/1.73/M2
GLOBULIN SER-MCNC: 3.9 GM/DL (ref 2.4–3.5)
GLUCOSE SERPL-MCNC: 96 MG/DL (ref 82–115)
HCT VFR BLD AUTO: 36 % (ref 42–52)
HGB BLD-MCNC: 11.4 G/DL (ref 14–18)
IMM GRANULOCYTES # BLD AUTO: 0.06 X10(3)/MCL (ref 0–0.04)
IMM GRANULOCYTES NFR BLD AUTO: 0.7 %
LYMPHOCYTES # BLD AUTO: 0.98 X10(3)/MCL (ref 0.6–4.6)
LYMPHOCYTES NFR BLD AUTO: 11.5 %
MCH RBC QN AUTO: 31 PG (ref 27–31)
MCHC RBC AUTO-ENTMCNC: 31.7 G/DL (ref 33–36)
MCV RBC AUTO: 97.8 FL (ref 80–94)
MONOCYTES # BLD AUTO: 1.11 X10(3)/MCL (ref 0.1–1.3)
MONOCYTES NFR BLD AUTO: 13 %
NEUTROPHILS # BLD AUTO: 6.26 X10(3)/MCL (ref 2.1–9.2)
NEUTROPHILS NFR BLD AUTO: 73.4 %
PLATELET # BLD AUTO: 188 X10(3)/MCL (ref 130–400)
PMV BLD AUTO: 10.7 FL (ref 7.4–10.4)
POTASSIUM SERPL-SCNC: 3.6 MMOL/L (ref 3.5–5.1)
PROT SERPL-MCNC: 7.2 GM/DL (ref 5.8–7.6)
RBC # BLD AUTO: 3.68 X10(6)/MCL (ref 4.7–6.1)
SODIUM SERPL-SCNC: 144 MMOL/L (ref 136–145)
TSH SERPL-ACNC: 0.62 UIU/ML (ref 0.35–4.94)
WBC # BLD AUTO: 8.53 X10(3)/MCL (ref 4.5–11.5)

## 2024-11-18 PROCEDURE — 80053 COMPREHEN METABOLIC PANEL: CPT

## 2024-11-18 PROCEDURE — 36415 COLL VENOUS BLD VENIPUNCTURE: CPT

## 2024-11-18 PROCEDURE — 84443 ASSAY THYROID STIM HORMONE: CPT

## 2024-11-18 PROCEDURE — 85025 COMPLETE CBC W/AUTO DIFF WBC: CPT

## 2024-11-20 ENCOUNTER — INFUSION (OUTPATIENT)
Dept: INFUSION THERAPY | Facility: HOSPITAL | Age: 79
End: 2024-11-20
Attending: NURSE PRACTITIONER
Payer: MEDICARE

## 2024-11-20 ENCOUNTER — OFFICE VISIT (OUTPATIENT)
Dept: HEMATOLOGY/ONCOLOGY | Facility: CLINIC | Age: 79
End: 2024-11-20
Payer: MEDICARE

## 2024-11-20 VITALS
RESPIRATION RATE: 18 BRPM | RESPIRATION RATE: 18 BRPM | BODY MASS INDEX: 29.95 KG/M2 | HEIGHT: 67 IN | OXYGEN SATURATION: 97 % | SYSTOLIC BLOOD PRESSURE: 144 MMHG | HEART RATE: 92 BPM | DIASTOLIC BLOOD PRESSURE: 74 MMHG | TEMPERATURE: 98 F | WEIGHT: 190.81 LBS

## 2024-11-20 DIAGNOSIS — Z79.899 ON ANTINEOPLASTIC CHEMOTHERAPY: ICD-10-CM

## 2024-11-20 DIAGNOSIS — C77.1 SECONDARY AND UNSPECIFIED MALIGNANT NEOPLASM OF INTRATHORACIC LYMPH NODES: ICD-10-CM

## 2024-11-20 DIAGNOSIS — C34.91 NON-SMALL CELL CANCER OF RIGHT LUNG: Primary | ICD-10-CM

## 2024-11-20 DIAGNOSIS — C34.90 NON-SMALL CELL LUNG CANCER, UNSPECIFIED LATERALITY: ICD-10-CM

## 2024-11-20 DIAGNOSIS — C34.00 MALIGNANT NEOPLASM OF HILUS OF LUNG, UNSPECIFIED LATERALITY: Primary | ICD-10-CM

## 2024-11-20 PROCEDURE — 1159F MED LIST DOCD IN RCRD: CPT | Mod: CPTII,S$GLB,, | Performed by: NURSE PRACTITIONER

## 2024-11-20 PROCEDURE — 1157F ADVNC CARE PLAN IN RCRD: CPT | Mod: CPTII,S$GLB,, | Performed by: NURSE PRACTITIONER

## 2024-11-20 PROCEDURE — 3077F SYST BP >= 140 MM HG: CPT | Mod: CPTII,S$GLB,, | Performed by: NURSE PRACTITIONER

## 2024-11-20 PROCEDURE — 1101F PT FALLS ASSESS-DOCD LE1/YR: CPT | Mod: CPTII,S$GLB,, | Performed by: NURSE PRACTITIONER

## 2024-11-20 PROCEDURE — 3078F DIAST BP <80 MM HG: CPT | Mod: CPTII,S$GLB,, | Performed by: NURSE PRACTITIONER

## 2024-11-20 PROCEDURE — 1126F AMNT PAIN NOTED NONE PRSNT: CPT | Mod: CPTII,S$GLB,, | Performed by: NURSE PRACTITIONER

## 2024-11-20 PROCEDURE — 99999 PR PBB SHADOW E&M-EST. PATIENT-LVL IV: CPT | Mod: PBBFAC,,, | Performed by: NURSE PRACTITIONER

## 2024-11-20 PROCEDURE — 96413 CHEMO IV INFUSION 1 HR: CPT

## 2024-11-20 PROCEDURE — 99215 OFFICE O/P EST HI 40 MIN: CPT | Mod: S$GLB,,, | Performed by: NURSE PRACTITIONER

## 2024-11-20 PROCEDURE — 96375 TX/PRO/DX INJ NEW DRUG ADDON: CPT

## 2024-11-20 PROCEDURE — 63600175 PHARM REV CODE 636 W HCPCS: Performed by: NURSE PRACTITIONER

## 2024-11-20 PROCEDURE — 3288F FALL RISK ASSESSMENT DOCD: CPT | Mod: CPTII,S$GLB,, | Performed by: NURSE PRACTITIONER

## 2024-11-20 PROCEDURE — 25000003 PHARM REV CODE 250: Performed by: NURSE PRACTITIONER

## 2024-11-20 RX ORDER — SODIUM CHLORIDE 0.9 % (FLUSH) 0.9 %
10 SYRINGE (ML) INJECTION
Status: DISCONTINUED | OUTPATIENT
Start: 2024-11-20 | End: 2024-11-20 | Stop reason: HOSPADM

## 2024-11-20 RX ORDER — ONDANSETRON HYDROCHLORIDE 2 MG/ML
8 INJECTION, SOLUTION INTRAVENOUS
Status: CANCELLED | OUTPATIENT
Start: 2024-11-20

## 2024-11-20 RX ORDER — HEPARIN 100 UNIT/ML
500 SYRINGE INTRAVENOUS
Status: CANCELLED | OUTPATIENT
Start: 2024-11-20

## 2024-11-20 RX ORDER — SODIUM CHLORIDE 0.9 % (FLUSH) 0.9 %
10 SYRINGE (ML) INJECTION
Status: CANCELLED | OUTPATIENT
Start: 2024-11-20

## 2024-11-20 RX ORDER — SODIUM CHLORIDE 0.9 % (FLUSH) 0.9 %
10 SYRINGE (ML) INJECTION
OUTPATIENT
Start: 2024-11-27

## 2024-11-20 RX ORDER — HEPARIN 100 UNIT/ML
500 SYRINGE INTRAVENOUS
OUTPATIENT
Start: 2024-11-27

## 2024-11-20 RX ORDER — ONDANSETRON HYDROCHLORIDE 2 MG/ML
8 INJECTION, SOLUTION INTRAVENOUS
OUTPATIENT
Start: 2024-11-27

## 2024-11-20 RX ORDER — ONDANSETRON HYDROCHLORIDE 2 MG/ML
8 INJECTION, SOLUTION INTRAVENOUS
Status: COMPLETED | OUTPATIENT
Start: 2024-11-20 | End: 2024-11-20

## 2024-11-20 RX ORDER — HEPARIN 100 UNIT/ML
500 SYRINGE INTRAVENOUS
Status: DISCONTINUED | OUTPATIENT
Start: 2024-11-20 | End: 2024-11-20 | Stop reason: HOSPADM

## 2024-11-20 RX ADMIN — ONDANSETRON 8 MG: 2 INJECTION INTRAMUSCULAR; INTRAVENOUS at 10:11

## 2024-11-20 RX ADMIN — GEMCITABINE 2600 MG: 38 INJECTION, SOLUTION INTRAVENOUS at 10:11

## 2024-11-20 NOTE — PROGRESS NOTES
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     Treatment/Oncogy history:  -5/24/2022 right upper lobectomy   -Local Recurrence 03/08/2023  -completed XRT on 5/30/23 and 5 cycles of weekly carbo/taxol on 5/19/23   Mediastinum: 5,000 cGy/200 cGy per fx (4/18/2023-5/23/2023)   Med Bst:        1,000 cGy/200 cGy per fx (5/24/2023-5/30/2023)  -Imfinzi every 4 weeks x 10 cycles (6/22/23-3/11/2024)--> progression      Imaging:  CT angiogram 1/17/2022: No pulmonary embolus. Right upper lobe 2.5 cm mass.  CT C 9/19/2022: Status post right partial pneumonectomy for resection of a right upper lobe lung mass with no residual recurrent mass seen. Small right-sided pleural effusion. Some lymphadenopathy in the right paratracheal region with a maximum short axis dimension of 1.6 cm.  Follow-up is recommended  CT C 1/25/2023: There is a paratracheal enlarged lymph node which shows interval mild size increase and now measures 2.2 x 2.0 cm and previously was 1.6 x 1.5 cm.  Aortopulmonary window mildly enlarged lymph node is stable.  Thoracic aorta is without aneurysmal dilatation or dissection.  Impression: 1. Operative changes of right upper lobectomy without bronchialstump soft tissue proliferation    2. No residual tumor load or metastatic nodule. 3. Paratracheal enlarged lymph node with interval size increase.  PET CT 2/9/2023:  Hypermetabolic right paratracheal lymph node image 81 series 3 measures 25 x 20 mm with max SUV 27.0.  Hypermetabolic anterior mediastinal lymph node anterior to the SVC measures 12 x 9 mm with max SUV 12.0. Impression: 1. Hypermetabolic metastatic  mediastinal adenopathy.   2. No PET evidence of metastatic disease outside of the mediastinum.  CT C/A/P 7/10/2023:  1. Several new subcentimeter nodules in the right lung.  Suspect these are infectious or inflammatory in nature, but 3 month follow-up chest CT recommended to ensure resolution.  2. 2 reference mediastinal lymph nodes are smaller since January.  3. L1 vertebral body compression deformity new since January, but appears subacute.  CT C/A/P 10/10/2023:    1. Slightly larger mediastinal lymph nodes, to be followed.  2. Increased irregular right perihilar consolidation which may be treatment related.  3. Small right pleural effusion.  4. No new suspicious findings in the abdomen or pelvis.  CT C/A/P 1/10/2024:    1. Interval enlargement of mediastinal lymph nodes  2. Similar right perihilar consolidative opacity and small right pleural effusion  PET CT 2/6/2024:  1. New, enlarged hypermetabolic superior paratracheal and para-aortic/subaortic lymph nodes compared to prior PET-CT  2. Persistent enlarged and hypermetabolic lower right paratracheal lymph node.  This lymph node is decreased in size and FDG avidity compared to prior PET-CT.  3. Previously seen hypermetabolic pre-vascular lymph node is decreased in size and is no longer hypermetabolic.  PET CT 5/23/2024:  1. Status post right upper lobe resection without evidence for local recurrence/residual disease.  2. Interval response to therapy with decreasing right paratracheal and left AP window adenopathy.  3. Nonspecific area of hypermetabolic activity within the left tongue base.  Direct visualization may be warranted.  4. No evidence for new focus of metastatic disease.  PET CT 9/9/2024:  1. Previously visualized mediastinal lymph nodes slightly more FDG avid today.  2. No new sites of metastatic disease.          Pathology:  03/22/2022 CT-guided biopsy of the right lung mass: invasive squamous cell carcinoma, moderately differentiated.  5/24/2022:  Right upper lobectomy:  1- LYMPH NODE, LUMBAR RIGHT 10 LYMPHADENECTOMY: NEGATIVE FOR METASTATIC CARCINOMA (0/1).   2- LYMPH NODE, 11R, LYMPHADENECTOMY: NEGATIVE FOR METASTATIC CARCINOMA (0/1).  3- LYMPH NODE, 9R, LYMPHADENECTOMY: NEGATIVE FOR METASTATIC CARCINOMA (0/1).   4- LYMPH NODE, 7R, LYMPHADENECTOMY: NEGATIVE FOR METASTATIC CARCINOMA (0/1).   5- LYMPH NODE, 8R, LYMPHADENECTOMY: ONE LYMPH NODE NEGATIVE FOR METASTATIC CARCINOMA (0/1).    6- LYMPH NODE, 12R, LYMPHADENECTOMY: NEGATIVE FOR METASTATIC CARCINOMA (0/1).  7- LUNG, RIGHT UPPER AND MIDDLE LOBES, PNEUMONECTOMY:  POORLY DIFFERENTIATED, G3, SQUAMOUS CELL CARCINOMA, INVASIVE.    - TUMOR SIZE: 3 CM.    - LYMPHOVASCULAR INVASION IS PRESENT.    - MARGINS NEGATIVE FOR INVASIVE CARCINOMA:    - NEAREST MARGIN, VASCULAR / BRONCHIAL 2.5 CM.    - NO PLEURAL SURFACE INVOLVEMENT     - PROMINENT NECROSIS PRESENT.  Visceral Pleura Invasion: Not identified  Number of Lymph Nodes Examined: Exact number : [id number]  pT Category: pT1c: pN0: No regional lymph node metastasis    3/8/2023 LYMPH NODE BIOPSY:   LYMPH NODE 4R, LYMPHADENECTOMY:  METASTATIC SQUAMOUS CELL CARCINOMA, NONKERATINIZING, MULTIPLE FRAGMENTS.       IMMUNOHISTOCHEMICAL STAINS:   CK 5/6, P63 AND CK7:  POSITIVE IN MALIGNANT CELLS.   CK20 AND TTF-1:  NEGATIVE IN MALIGNANT CELLS.       CLINICAL HISTORY:       Patient: Leonila Rosales is a 79 y.o. male kindly referred by  Dr. Diaz for evaluation of lung cancer.    On 01/17/2022 patient presented to the emergency department after a fall.  He reports that he was getting to his truck and sleep and fell on his left side on the truck step rail.  He started having pain in his left hip and knee.  He underwent a CT angiogram that showed No pulmonary embolus. Right upper lobe 2.5 cm mass.      During that hospitalization he was treated for a close intertrochanteric fracture of the left femur and underwent open reduction intramedullary nailing left comminuted intertrochanteric  hip fracture on 01/18/2022 with Dr. Fortino Palomares.  He then spent several weeks on rehab.    On 03/22/2022 he underwent CT-guided biopsy of the right lung mass.  Pathology showed invasive squamous cell carcinoma, moderately differentiated.    He is s/p right upper lobectomy with  on 5/24/2022.  Pathology report as above.  Patient is stage IA3 squamous cell carcinoma of the lung.  He has recovered well and has been doing good.     Patient was started on surveillance. CT 9/19/2022 with 1.6 right paratracheal LN and small Rt pleural effusion. Surveillance CT scan chest to eval stability 1/25/2023 with paratracheal enlarged lymph node which shows interval mild size increase and now measures 2.2 x 2.0 cm and previously was 1.6 x 1.5 cm.  Aortopulmonary window mildly enlarged lymph node is stable. PET CT 2/9/2023 with Hypermetabolic right paratracheal lymph node 25 x 20 mm with max SUV 27.0. Hypermetabolic anterior mediastinal lymph node anterior to the SVC measures 12 x 9 mm with max SUV 12.0.   Biopsy of R4 lymph node confirmed the metastatic squamous cell carcinoma. Completed  XRT on 5/30/23  and 5 cycles of Carbo/taxol on 5/19/23. C6 was held on 5/26/23 due to neutropenia, ANC was 0.7.    Patient was started on chemoradiation. -completed XRT on 5/30/23 and 5 cycles of weekly carbo/taxol on 5/19/23, His final cycle was held due to neutropenia, ANC was 0.7.  He was then started on maintenance Imfinzi every 4 weeks on 6/22/23    Chief Complaint: 3 Week Follow Up      Interval History:  He completed XRT to the mediastinum on 5/30/23 and 5 cycles of weekly carbo/taxol on 5/19/23. He was on maintenance durvalumab, started 6/22/2023 and tolerated well. Patient with progression of paratracheal and para-aortic/subaortic lymph nodes.  He was seen by Dr. Willis but not a good candidate for radiation at this time.  He was started on Gemzar on 4/13/24.     11/20/24: Patient presents today for TD prior to C11 of D1,D8 Q21  Gemzar. He denies any side effects of Gemzar. Denies fever, chills and sweats. Denies pain. Bowels are moving well. Denies bleeding. Still with chronic cough. No new complaints today.       Past Medical History:   Diagnosis Date    Anemia     Class 1 obesity due to excess calories with serious comorbidity and body mass index (BMI) of 33.0 to 33.9 in adult     Closed intertrochanteric fracture     Deficiency of macronutrients     GERD (gastroesophageal reflux disease)     H. pylori infection     History of tobacco use     Hyperlipidemia     Hypertension     Lung mass     Malignant neoplasm of unspecified part of unspecified bronchus or lung     Non-small cell lung cancer       Past Surgical History:   Procedure Laterality Date    BIOPSY OF INTESTINE  04/04/2022    BRONCHOSCOPY      COLONOSCOPY      ESOPHAGOGASTRODUODENOSCOPY  04/04/2022    HIP FRACTURE SURGERY Left 2016    Intramedullary Nail Insertion Hip Left 01/18/2022    KIDNEY SURGERY Right 05/27/2022    MEDIASTINOSCOPY N/A 3/6/2023    Procedure: MEDIASTINOSCOPY;  Surgeon: Jose Diaz MD;  Location: [facility];  Service: Cardiothoracic;  Laterality: N/A;  XX    PLACEMENT, MEDIPORT N/A 4/6/2023    Procedure: Placement, Mediport;  Surgeon: Jose Diaz MD;  Location: [facility] OR;  Service: Cardiology;  Laterality: N/A;    SHOULDER SURGERY       Family History   Family history unknown: Yes            Review of patient's allergies indicates:  No Known Allergies   Current Outpatient Medications on File Prior to Visit   Medication Sig Dispense Refill    amLODIPine (NORVASC) 5 MG tablet TAKE ONE TABLET BY MOUTH EVERY DAY TWICE DAILY 180 tablet 1    aspirin 81 mg Cap Take 81 mg by mouth Daily.      atorvastatin (LIPITOR) 10 MG tablet TAKE ONE TABLET BY MOUTH DAILY 90 tablet 3    levoFLOXacin (LEVAQUIN) 500 MG tablet Take 1 tablet (500 mg total) by mouth once daily. 7 tablet 0    LIDOcaine-prilocaine (EMLA) cream Apply topically as needed. Apply to port site 30 mins  "prior to chemo 30 g 3    LINZESS 145 mcg Cap capsule Take 145 mcg by mouth daily as needed. New medication, not started yet      metoprolol succinate (TOPROL-XL) 25 MG 24 hr tablet Take 1 tablet (25 mg total) by mouth once daily. 90 tablet 3    pantoprazole (PROTONIX) 40 MG tablet Take 40 mg by mouth.       No current facility-administered medications on file prior to visit.      Review of Systems   Constitutional:  Negative for activity change, appetite change, chills, diaphoresis, fatigue, fever, night sweats and unexpected weight change.   HENT:  Negative for nasal congestion, mouth sores, nosebleeds, sinus pressure/congestion, sore throat and trouble swallowing.    Eyes: Negative.    Respiratory:  Negative for cough and shortness of breath.    Cardiovascular:  Negative for chest pain and palpitations.   Gastrointestinal:  Negative for abdominal distention, abdominal pain, blood in stool, change in bowel habit, constipation, diarrhea, nausea and vomiting.   Endocrine: Negative.    Genitourinary:  Negative for bladder incontinence, decreased urine volume, difficulty urinating, dysuria, frequency, hematuria and urgency.   Musculoskeletal:  Negative for arthralgias, back pain, gait problem, joint swelling, leg pain and myalgias.   Integumentary:  Negative for rash.   Allergic/Immunologic: Negative.    Neurological:  Negative for dizziness, tremors, syncope, weakness, light-headedness, numbness, headaches and memory loss.   Hematological:  Negative for adenopathy. Does not bruise/bleed easily.   Psychiatric/Behavioral:  Negative for agitation, confusion, hallucinations, sleep disturbance and suicidal ideas. The patient is not nervous/anxious.           Vitals:    11/20/24 1000   BP: (!) 144/74   BP Location: Left arm   Patient Position: Sitting   Pulse: 92   Resp: 18   Temp: 98.1 °F (36.7 °C)   TempSrc: Oral   SpO2: 97%   Weight: 86.5 kg (190 lb 12.8 oz)   Height: 5' 6.93" (1.7 m)             Wt Readings from Last 6 " Encounters:   11/20/24 86.5 kg (190 lb 12.8 oz)   10/30/24 86.5 kg (190 lb 9.6 oz)   10/15/24 86.2 kg (190 lb)   10/09/24 86.5 kg (190 lb 12.8 oz)   09/13/24 87.9 kg (193 lb 12.8 oz)   08/21/24 88 kg (193 lb 14.4 oz)     Body mass index is 29.95 kg/m².  Body surface area is 2.02 meters squared.       Physical Exam  Vitals and nursing note reviewed.   Constitutional:       General: He is not in acute distress.     Appearance: Normal appearance. He is obese.   HENT:      Head: Normocephalic and atraumatic.      Mouth/Throat:      Mouth: Mucous membranes are moist.   Eyes:      General: No scleral icterus.     Extraocular Movements: Extraocular movements intact.      Conjunctiva/sclera: Conjunctivae normal.   Neck:      Vascular: No JVD.   Cardiovascular:      Rate and Rhythm: Normal rate and regular rhythm.      Heart sounds: No murmur heard.  Pulmonary:      Effort: Pulmonary effort is normal.      Breath sounds: Decreased breath sounds and wheezing present. No rhonchi.   Chest:      Chest wall: No tenderness.   Abdominal:      General: Bowel sounds are normal. There is no distension.      Palpations: Abdomen is soft. There is no fluid wave.      Tenderness: There is no abdominal tenderness.   Musculoskeletal:         General: No swelling or deformity.      Cervical back: Neck supple.      Comments: Uses cane for mobility   Lymphadenopathy:      Head:      Right side of head: No submandibular adenopathy.      Left side of head: No submandibular adenopathy.      Cervical: No cervical adenopathy.      Upper Body:      Right upper body: No supraclavicular or axillary adenopathy.      Left upper body: No supraclavicular or axillary adenopathy.      Lower Body: No right inguinal adenopathy. No left inguinal adenopathy.   Skin:     General: Skin is warm.      Coloration: Skin is not jaundiced.      Findings: No rash.   Neurological:      General: No focal deficit present.      Mental Status: He is alert and oriented to  person, place, and time.      Cranial Nerves: Cranial nerves 2-12 are intact.   Psychiatric:         Attention and Perception: Attention normal.         Mood and Affect: Mood and affect normal.         Behavior: Behavior is cooperative.         Cognition and Memory: Cognition normal.         Judgment: Judgment normal.       ECOG SCORE             Laboratory:  CBC with Differential:  Lab Results   Component Value Date    WBC 8.53 11/18/2024    RBC 3.68 (L) 11/18/2024    HGB 11.4 (L) 11/18/2024    HCT 36.0 (L) 11/18/2024    MCV 97.8 (H) 11/18/2024    MCH 31.0 11/18/2024    MCHC 31.7 (L) 11/18/2024    RDW 18.4 (H) 11/18/2024     11/18/2024    MPV 10.7 (H) 11/18/2024        CMP:  Sodium   Date Value Ref Range Status   11/18/2024 144 136 - 145 mmol/L Final     Potassium   Date Value Ref Range Status   11/18/2024 3.6 3.5 - 5.1 mmol/L Final     Chloride   Date Value Ref Range Status   11/18/2024 107 98 - 107 mmol/L Final     CO2   Date Value Ref Range Status   11/18/2024 28 23 - 31 mmol/L Final     Blood Urea Nitrogen   Date Value Ref Range Status   11/18/2024 5.9 (L) 8.4 - 25.7 mg/dL Final     Creatinine   Date Value Ref Range Status   11/18/2024 0.76 0.72 - 1.25 mg/dL Final     Calcium   Date Value Ref Range Status   11/18/2024 9.2 8.8 - 10.0 mg/dL Final     Albumin   Date Value Ref Range Status   11/18/2024 3.3 (L) 3.4 - 4.8 g/dL Final     Bilirubin Total   Date Value Ref Range Status   11/18/2024 0.5 <=1.5 mg/dL Final     ALP   Date Value Ref Range Status   11/18/2024 131 40 - 150 unit/L Final     AST   Date Value Ref Range Status   11/18/2024 21 5 - 34 unit/L Final     ALT   Date Value Ref Range Status   11/18/2024 19 0 - 55 unit/L Final     Estimated GFR-Non    Date Value Ref Range Status   04/19/2022 >60           Assessment:       1. Non-small cell cancer of right lung    2. On antineoplastic chemotherapy    3. 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 Discussed with Dr Alba ESCOBAR and he will be seen 4/21/2024.   -Recent PET CT with persistent stable  LN but increase activity    2) Chemotherapy induced anemia and leukopenia-stable        Plan:       Patient with recurrence of disease while on Imfinizi.  Not candidate for RT. We discussed again chemotherapy and he is agreeable. We discontinue Imfinzi and started Gemzar on 4/17/2024 and so far tolerating well.     Okay to proceed with Gemzar C11 D1 today, Neulasta on day 8.   Infusion only on day 8- labs to be drawn the day before at [facility]  PET CT due Dec. 2024 - Ordered  RTC in 3 weeks with for TD/Infusion - he prefers Wednesday appts. Labs to be drawn the day before treatment at [facility].  Labs: CBC, CMP    Encourage to call or message us for any questions or problems  The patient was given ample opportunity to ask questions, and to the best of my abilities, all questions answered to satisfaction; patient demonstrated understanding of what we discussed and agreeable to the plan.     Keiko Linda, [place]

## 2024-11-25 ENCOUNTER — LAB VISIT (OUTPATIENT)
Dept: LAB | Facility: HOSPITAL | Age: 79
End: 2024-11-25
Attending: NURSE PRACTITIONER
Payer: MEDICARE

## 2024-11-25 DIAGNOSIS — R53.83 FATIGUE, UNSPECIFIED TYPE: ICD-10-CM

## 2024-11-25 DIAGNOSIS — C77.1 SECONDARY AND UNSPECIFIED MALIGNANT NEOPLASM OF INTRATHORACIC LYMPH NODES: ICD-10-CM

## 2024-11-25 DIAGNOSIS — Z79.899 ON ANTINEOPLASTIC CHEMOTHERAPY: ICD-10-CM

## 2024-11-25 DIAGNOSIS — C34.91 NON-SMALL CELL CANCER OF RIGHT LUNG: ICD-10-CM

## 2024-11-25 LAB
ALBUMIN SERPL-MCNC: 3 G/DL (ref 3.4–4.8)
ALBUMIN/GLOB SERPL: 0.7 RATIO (ref 1.1–2)
ALP SERPL-CCNC: 109 UNIT/L (ref 40–150)
ALT SERPL-CCNC: 36 UNIT/L (ref 0–55)
ANION GAP SERPL CALC-SCNC: 7 MEQ/L
AST SERPL-CCNC: 39 UNIT/L (ref 5–34)
BASOPHILS # BLD AUTO: 0.02 X10(3)/MCL
BASOPHILS NFR BLD AUTO: 0.7 %
BILIRUB SERPL-MCNC: 0.9 MG/DL
BUN SERPL-MCNC: 6.9 MG/DL (ref 8.4–25.7)
CALCIUM SERPL-MCNC: 9.2 MG/DL (ref 8.8–10)
CHLORIDE SERPL-SCNC: 107 MMOL/L (ref 98–107)
CO2 SERPL-SCNC: 27 MMOL/L (ref 23–31)
CREAT SERPL-MCNC: 0.72 MG/DL (ref 0.72–1.25)
CREAT/UREA NIT SERPL: 10
EOSINOPHIL # BLD AUTO: 0.12 X10(3)/MCL (ref 0–0.9)
EOSINOPHIL NFR BLD AUTO: 4.2 %
ERYTHROCYTE [DISTWIDTH] IN BLOOD BY AUTOMATED COUNT: 17.7 % (ref 11.5–17)
GFR SERPLBLD CREATININE-BSD FMLA CKD-EPI: >60 ML/MIN/1.73/M2
GLOBULIN SER-MCNC: 4.3 GM/DL (ref 2.4–3.5)
GLUCOSE SERPL-MCNC: 107 MG/DL (ref 82–115)
HCT VFR BLD AUTO: 35.1 % (ref 42–52)
HGB BLD-MCNC: 11.1 G/DL (ref 14–18)
IMM GRANULOCYTES # BLD AUTO: 0.01 X10(3)/MCL (ref 0–0.04)
IMM GRANULOCYTES NFR BLD AUTO: 0.3 %
LYMPHOCYTES # BLD AUTO: 0.53 X10(3)/MCL (ref 0.6–4.6)
LYMPHOCYTES NFR BLD AUTO: 18.4 %
MCH RBC QN AUTO: 31.1 PG (ref 27–31)
MCHC RBC AUTO-ENTMCNC: 31.6 G/DL (ref 33–36)
MCV RBC AUTO: 98.3 FL (ref 80–94)
MONOCYTES # BLD AUTO: 0.19 X10(3)/MCL (ref 0.1–1.3)
MONOCYTES NFR BLD AUTO: 6.6 %
NEUTROPHILS # BLD AUTO: 2.01 X10(3)/MCL (ref 2.1–9.2)
NEUTROPHILS NFR BLD AUTO: 69.8 %
PLATELET # BLD AUTO: 305 X10(3)/MCL (ref 130–400)
PMV BLD AUTO: 9.9 FL (ref 7.4–10.4)
POTASSIUM SERPL-SCNC: 4 MMOL/L (ref 3.5–5.1)
PROT SERPL-MCNC: 7.3 GM/DL (ref 5.8–7.6)
RBC # BLD AUTO: 3.57 X10(6)/MCL (ref 4.7–6.1)
SODIUM SERPL-SCNC: 141 MMOL/L (ref 136–145)
TSH SERPL-ACNC: 0.8 UIU/ML (ref 0.35–4.94)
WBC # BLD AUTO: 2.88 X10(3)/MCL (ref 4.5–11.5)

## 2024-11-25 PROCEDURE — 84443 ASSAY THYROID STIM HORMONE: CPT

## 2024-11-25 PROCEDURE — 85025 COMPLETE CBC W/AUTO DIFF WBC: CPT

## 2024-11-25 PROCEDURE — 36415 COLL VENOUS BLD VENIPUNCTURE: CPT

## 2024-11-25 PROCEDURE — 80053 COMPREHEN METABOLIC PANEL: CPT

## 2024-11-27 ENCOUNTER — INFUSION (OUTPATIENT)
Dept: INFUSION THERAPY | Facility: HOSPITAL | Age: 79
End: 2024-11-27
Attending: NURSE PRACTITIONER
Payer: MEDICARE

## 2024-11-27 VITALS — HEART RATE: 91 BPM | SYSTOLIC BLOOD PRESSURE: 127 MMHG | DIASTOLIC BLOOD PRESSURE: 77 MMHG

## 2024-11-27 DIAGNOSIS — C34.00 MALIGNANT NEOPLASM OF HILUS OF LUNG, UNSPECIFIED LATERALITY: Primary | ICD-10-CM

## 2024-11-27 DIAGNOSIS — C34.90 NON-SMALL CELL LUNG CANCER, UNSPECIFIED LATERALITY: ICD-10-CM

## 2024-11-27 PROCEDURE — 96413 CHEMO IV INFUSION 1 HR: CPT

## 2024-11-27 PROCEDURE — 96377 APPLICATON ON-BODY INJECTOR: CPT

## 2024-11-27 PROCEDURE — 25000003 PHARM REV CODE 250: Performed by: NURSE PRACTITIONER

## 2024-11-27 PROCEDURE — 96375 TX/PRO/DX INJ NEW DRUG ADDON: CPT

## 2024-11-27 PROCEDURE — 63600175 PHARM REV CODE 636 W HCPCS: Mod: JZ,JG | Performed by: NURSE PRACTITIONER

## 2024-11-27 RX ORDER — SODIUM CHLORIDE 0.9 % (FLUSH) 0.9 %
10 SYRINGE (ML) INJECTION
Status: DISCONTINUED | OUTPATIENT
Start: 2024-11-27 | End: 2024-11-27 | Stop reason: HOSPADM

## 2024-11-27 RX ORDER — HEPARIN 100 UNIT/ML
500 SYRINGE INTRAVENOUS
Status: DISCONTINUED | OUTPATIENT
Start: 2024-11-27 | End: 2024-11-27 | Stop reason: HOSPADM

## 2024-11-27 RX ORDER — ONDANSETRON HYDROCHLORIDE 2 MG/ML
8 INJECTION, SOLUTION INTRAVENOUS
Status: COMPLETED | OUTPATIENT
Start: 2024-11-27 | End: 2024-11-27

## 2024-11-27 RX ADMIN — GEMCITABINE 2600 MG: 38 INJECTION, SOLUTION INTRAVENOUS at 01:11

## 2024-11-27 RX ADMIN — HEPARIN 500 UNITS: 100 SYRINGE at 12:11

## 2024-11-27 RX ADMIN — ONDANSETRON 8 MG: 2 INJECTION INTRAMUSCULAR; INTRAVENOUS at 12:11

## 2024-11-27 RX ADMIN — PEGFILGRASTIM 6 MG: KIT SUBCUTANEOUS at 01:11

## 2024-12-09 ENCOUNTER — LAB VISIT (OUTPATIENT)
Dept: LAB | Facility: HOSPITAL | Age: 79
End: 2024-12-09
Attending: NURSE PRACTITIONER
Payer: MEDICARE

## 2024-12-09 DIAGNOSIS — C34.91 NON-SMALL CELL CANCER OF RIGHT LUNG: ICD-10-CM

## 2024-12-09 LAB
ALBUMIN SERPL-MCNC: 3.2 G/DL (ref 3.4–4.8)
ALBUMIN/GLOB SERPL: 0.7 RATIO (ref 1.1–2)
ALP SERPL-CCNC: 149 UNIT/L (ref 40–150)
ALT SERPL-CCNC: 19 UNIT/L (ref 0–55)
ANION GAP SERPL CALC-SCNC: 9 MEQ/L
AST SERPL-CCNC: 24 UNIT/L (ref 5–34)
BASOPHILS # BLD AUTO: 0.02 X10(3)/MCL
BASOPHILS NFR BLD AUTO: 0.2 %
BILIRUB SERPL-MCNC: 0.7 MG/DL
BUN SERPL-MCNC: 6.1 MG/DL (ref 8.4–25.7)
CALCIUM SERPL-MCNC: 9.2 MG/DL (ref 8.8–10)
CHLORIDE SERPL-SCNC: 107 MMOL/L (ref 98–107)
CO2 SERPL-SCNC: 26 MMOL/L (ref 23–31)
CREAT SERPL-MCNC: 0.8 MG/DL (ref 0.72–1.25)
CREAT/UREA NIT SERPL: 8
EOSINOPHIL # BLD AUTO: 0.11 X10(3)/MCL (ref 0–0.9)
EOSINOPHIL NFR BLD AUTO: 1.3 %
ERYTHROCYTE [DISTWIDTH] IN BLOOD BY AUTOMATED COUNT: 18.6 % (ref 11.5–17)
GFR SERPLBLD CREATININE-BSD FMLA CKD-EPI: >60 ML/MIN/1.73/M2
GLOBULIN SER-MCNC: 4.4 GM/DL (ref 2.4–3.5)
GLUCOSE SERPL-MCNC: 89 MG/DL (ref 82–115)
HCT VFR BLD AUTO: 35.7 % (ref 42–52)
HGB BLD-MCNC: 11.3 G/DL (ref 14–18)
IMM GRANULOCYTES # BLD AUTO: 0.04 X10(3)/MCL (ref 0–0.04)
IMM GRANULOCYTES NFR BLD AUTO: 0.5 %
LYMPHOCYTES # BLD AUTO: 1.02 X10(3)/MCL (ref 0.6–4.6)
LYMPHOCYTES NFR BLD AUTO: 12.2 %
MCH RBC QN AUTO: 31.3 PG (ref 27–31)
MCHC RBC AUTO-ENTMCNC: 31.7 G/DL (ref 33–36)
MCV RBC AUTO: 98.9 FL (ref 80–94)
MONOCYTES # BLD AUTO: 0.99 X10(3)/MCL (ref 0.1–1.3)
MONOCYTES NFR BLD AUTO: 11.9 %
NEUTROPHILS # BLD AUTO: 6.16 X10(3)/MCL (ref 2.1–9.2)
NEUTROPHILS NFR BLD AUTO: 73.9 %
PLATELET # BLD AUTO: 210 X10(3)/MCL (ref 130–400)
PMV BLD AUTO: 10.9 FL (ref 7.4–10.4)
POTASSIUM SERPL-SCNC: 3.6 MMOL/L (ref 3.5–5.1)
PROT SERPL-MCNC: 7.6 GM/DL (ref 5.8–7.6)
RBC # BLD AUTO: 3.61 X10(6)/MCL (ref 4.7–6.1)
SODIUM SERPL-SCNC: 142 MMOL/L (ref 136–145)
WBC # BLD AUTO: 8.34 X10(3)/MCL (ref 4.5–11.5)

## 2024-12-09 PROCEDURE — 85025 COMPLETE CBC W/AUTO DIFF WBC: CPT

## 2024-12-09 PROCEDURE — 80053 COMPREHEN METABOLIC PANEL: CPT

## 2024-12-09 PROCEDURE — 36415 COLL VENOUS BLD VENIPUNCTURE: CPT

## 2024-12-10 ENCOUNTER — HOSPITAL ENCOUNTER (OUTPATIENT)
Dept: RADIOLOGY | Facility: HOSPITAL | Age: 79
Discharge: HOME | End: 2024-12-10
Attending: NURSE PRACTITIONER
Payer: MEDICARE

## 2024-12-10 DIAGNOSIS — C34.91 NON-SMALL CELL CANCER OF RIGHT LUNG: ICD-10-CM

## 2024-12-10 DIAGNOSIS — Z79.899 ON ANTINEOPLASTIC CHEMOTHERAPY: ICD-10-CM

## 2024-12-10 DIAGNOSIS — C77.1 SECONDARY AND UNSPECIFIED MALIGNANT NEOPLASM OF INTRATHORACIC LYMPH NODES: ICD-10-CM

## 2024-12-10 PROCEDURE — 78815 PET IMAGE W/CT SKULL-THIGH: CPT | Mod: TC

## 2024-12-10 PROCEDURE — A9552 F18 FDG: HCPCS | Performed by: NURSE PRACTITIONER

## 2024-12-10 RX ORDER — FLUDEOXYGLUCOSE F18 500 MCI/ML
10 INJECTION INTRAVENOUS
Status: COMPLETED | OUTPATIENT
Start: 2024-12-10 | End: 2024-12-10

## 2024-12-10 RX ADMIN — FLUDEOXYGLUCOSE F-18 10.7 MILLICURIE: 500 INJECTION INTRAVENOUS at 12:12

## 2024-12-11 ENCOUNTER — INFUSION (OUTPATIENT)
Dept: INFUSION THERAPY | Facility: HOSPITAL | Age: 79
End: 2024-12-11
Attending: NURSE PRACTITIONER
Payer: MEDICARE

## 2024-12-11 ENCOUNTER — OFFICE VISIT (OUTPATIENT)
Dept: HEMATOLOGY/ONCOLOGY | Facility: CLINIC | Age: 79
End: 2024-12-11
Payer: MEDICARE

## 2024-12-11 VITALS
HEIGHT: 67 IN | WEIGHT: 187.38 LBS | SYSTOLIC BLOOD PRESSURE: 127 MMHG | OXYGEN SATURATION: 98 % | HEART RATE: 94 BPM | BODY MASS INDEX: 29.41 KG/M2 | DIASTOLIC BLOOD PRESSURE: 62 MMHG | RESPIRATION RATE: 18 BRPM | TEMPERATURE: 98 F

## 2024-12-11 VITALS — BODY MASS INDEX: 29.41 KG/M2 | WEIGHT: 187.38 LBS

## 2024-12-11 DIAGNOSIS — C34.91 NON-SMALL CELL CANCER OF RIGHT LUNG: Primary | ICD-10-CM

## 2024-12-11 DIAGNOSIS — C77.1 SECONDARY AND UNSPECIFIED MALIGNANT NEOPLASM OF INTRATHORACIC LYMPH NODES: ICD-10-CM

## 2024-12-11 DIAGNOSIS — C34.00 MALIGNANT NEOPLASM OF HILUS OF LUNG, UNSPECIFIED LATERALITY: Primary | ICD-10-CM

## 2024-12-11 DIAGNOSIS — Z79.899 ON ANTINEOPLASTIC CHEMOTHERAPY: ICD-10-CM

## 2024-12-11 DIAGNOSIS — C34.90 NON-SMALL CELL LUNG CANCER, UNSPECIFIED LATERALITY: ICD-10-CM

## 2024-12-11 PROCEDURE — 63600175 PHARM REV CODE 636 W HCPCS: Performed by: NURSE PRACTITIONER

## 2024-12-11 PROCEDURE — 3078F DIAST BP <80 MM HG: CPT | Mod: CPTII,S$GLB,, | Performed by: NURSE PRACTITIONER

## 2024-12-11 PROCEDURE — 1101F PT FALLS ASSESS-DOCD LE1/YR: CPT | Mod: CPTII,S$GLB,, | Performed by: NURSE PRACTITIONER

## 2024-12-11 PROCEDURE — 1160F RVW MEDS BY RX/DR IN RCRD: CPT | Mod: CPTII,S$GLB,, | Performed by: NURSE PRACTITIONER

## 2024-12-11 PROCEDURE — 96413 CHEMO IV INFUSION 1 HR: CPT

## 2024-12-11 PROCEDURE — 3288F FALL RISK ASSESSMENT DOCD: CPT | Mod: CPTII,S$GLB,, | Performed by: NURSE PRACTITIONER

## 2024-12-11 PROCEDURE — 1126F AMNT PAIN NOTED NONE PRSNT: CPT | Mod: CPTII,S$GLB,, | Performed by: NURSE PRACTITIONER

## 2024-12-11 PROCEDURE — 1159F MED LIST DOCD IN RCRD: CPT | Mod: CPTII,S$GLB,, | Performed by: NURSE PRACTITIONER

## 2024-12-11 PROCEDURE — 25000003 PHARM REV CODE 250: Performed by: NURSE PRACTITIONER

## 2024-12-11 PROCEDURE — 99215 OFFICE O/P EST HI 40 MIN: CPT | Mod: S$GLB,,, | Performed by: NURSE PRACTITIONER

## 2024-12-11 PROCEDURE — 99999 PR PBB SHADOW E&M-EST. PATIENT-LVL IV: CPT | Mod: PBBFAC,,, | Performed by: NURSE PRACTITIONER

## 2024-12-11 PROCEDURE — 1157F ADVNC CARE PLAN IN RCRD: CPT | Mod: CPTII,S$GLB,, | Performed by: NURSE PRACTITIONER

## 2024-12-11 PROCEDURE — 96375 TX/PRO/DX INJ NEW DRUG ADDON: CPT

## 2024-12-11 PROCEDURE — 3074F SYST BP LT 130 MM HG: CPT | Mod: CPTII,S$GLB,, | Performed by: NURSE PRACTITIONER

## 2024-12-11 RX ORDER — SODIUM CHLORIDE 0.9 % (FLUSH) 0.9 %
10 SYRINGE (ML) INJECTION
OUTPATIENT
Start: 2024-12-18

## 2024-12-11 RX ORDER — ONDANSETRON HYDROCHLORIDE 2 MG/ML
8 INJECTION, SOLUTION INTRAVENOUS
Status: CANCELLED | OUTPATIENT
Start: 2024-12-11

## 2024-12-11 RX ORDER — SODIUM CHLORIDE 0.9 % (FLUSH) 0.9 %
10 SYRINGE (ML) INJECTION
Status: DISCONTINUED | OUTPATIENT
Start: 2024-12-11 | End: 2024-12-11 | Stop reason: HOSPADM

## 2024-12-11 RX ORDER — HEPARIN 100 UNIT/ML
500 SYRINGE INTRAVENOUS
Status: DISCONTINUED | OUTPATIENT
Start: 2024-12-11 | End: 2024-12-11 | Stop reason: HOSPADM

## 2024-12-11 RX ORDER — HEPARIN 100 UNIT/ML
500 SYRINGE INTRAVENOUS
Status: CANCELLED | OUTPATIENT
Start: 2024-12-11

## 2024-12-11 RX ORDER — ONDANSETRON HYDROCHLORIDE 2 MG/ML
8 INJECTION, SOLUTION INTRAVENOUS
Status: COMPLETED | OUTPATIENT
Start: 2024-12-11 | End: 2024-12-11

## 2024-12-11 RX ORDER — SODIUM CHLORIDE 0.9 % (FLUSH) 0.9 %
10 SYRINGE (ML) INJECTION
Status: CANCELLED | OUTPATIENT
Start: 2024-12-11

## 2024-12-11 RX ORDER — HEPARIN 100 UNIT/ML
500 SYRINGE INTRAVENOUS
OUTPATIENT
Start: 2024-12-18

## 2024-12-11 RX ORDER — ONDANSETRON HYDROCHLORIDE 2 MG/ML
8 INJECTION, SOLUTION INTRAVENOUS
OUTPATIENT
Start: 2024-12-18

## 2024-12-11 RX ADMIN — ONDANSETRON 8 MG: 2 INJECTION INTRAMUSCULAR; INTRAVENOUS at 10:12

## 2024-12-11 RX ADMIN — GEMCITABINE 2600 MG: 38 INJECTION, SOLUTION INTRAVENOUS at 11:12

## 2024-12-11 RX ADMIN — HEPARIN 500 UNITS: 100 SYRINGE at 11:12

## 2024-12-11 NOTE — PROGRESS NOTES
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     Treatment/Oncogy history:  -5/24/2022 right upper lobectomy   -Local Recurrence 03/08/2023  -completed XRT on 5/30/23 and 5 cycles of weekly carbo/taxol on 5/19/23   Mediastinum: 5,000 cGy/200 cGy per fx (4/18/2023-5/23/2023)   Med Bst:        1,000 cGy/200 cGy per fx (5/24/2023-5/30/2023)  -Imfinzi every 4 weeks x 10 cycles (6/22/23-3/11/2024)--> progression      Imaging:  CT angiogram 1/17/2022: No pulmonary embolus. Right upper lobe 2.5 cm mass.  CT C 9/19/2022: Status post right partial pneumonectomy for resection of a right upper lobe lung mass with no residual recurrent mass seen. Small right-sided pleural effusion. Some lymphadenopathy in the right paratracheal region with a maximum short axis dimension of 1.6 cm.  Follow-up is recommended  CT C 1/25/2023: There is a paratracheal enlarged lymph node which shows interval mild size increase and now measures 2.2 x 2.0 cm and previously was 1.6 x 1.5 cm.  Aortopulmonary window mildly enlarged lymph node is stable.  Thoracic aorta is without aneurysmal dilatation or dissection.  Impression: 1. Operative changes of right upper lobectomy without bronchialstump soft tissue proliferation    2. No residual tumor load or metastatic nodule. 3. Paratracheal enlarged lymph node with interval size increase.  PET CT 2/9/2023:  Hypermetabolic right paratracheal lymph node image 81 series 3 measures 25 x 20 mm with max SUV 27.0.  Hypermetabolic anterior mediastinal lymph node anterior to the SVC measures 12 x 9 mm with max SUV 12.0. Impression: 1. Hypermetabolic metastatic  mediastinal adenopathy.   2. No PET evidence of metastatic disease outside of the mediastinum.  CT C/A/P 7/10/2023:  1. Several new subcentimeter nodules in the right lung.  Suspect these are infectious or inflammatory in nature, but 3 month follow-up chest CT recommended to ensure resolution.  2. 2 reference mediastinal lymph nodes are smaller since January.  3. L1 vertebral body compression deformity new since January, but appears subacute.  CT C/A/P 10/10/2023:    1. Slightly larger mediastinal lymph nodes, to be followed.  2. Increased irregular right perihilar consolidation which may be treatment related.  3. Small right pleural effusion.  4. No new suspicious findings in the abdomen or pelvis.  CT C/A/P 1/10/2024:    1. Interval enlargement of mediastinal lymph nodes  2. Similar right perihilar consolidative opacity and small right pleural effusion  PET CT 2/6/2024:  1. New, enlarged hypermetabolic superior paratracheal and para-aortic/subaortic lymph nodes compared to prior PET-CT  2. Persistent enlarged and hypermetabolic lower right paratracheal lymph node.  This lymph node is decreased in size and FDG avidity compared to prior PET-CT.  3. Previously seen hypermetabolic pre-vascular lymph node is decreased in size and is no longer hypermetabolic.  PET CT 5/23/2024:  1. Status post right upper lobe resection without evidence for local recurrence/residual disease.  2. Interval response to therapy with decreasing right paratracheal and left AP window adenopathy.  3. Nonspecific area of hypermetabolic activity within the left tongue base.  Direct visualization may be warranted.  4. No evidence for new focus of metastatic disease.  PET CT 9/9/2024:  1. Previously visualized mediastinal lymph nodes slightly more FDG avid today.  2. No new sites of metastatic disease.          Pathology:  03/22/2022 CT-guided biopsy of the right lung mass: invasive squamous cell carcinoma, moderately differentiated.  5/24/2022:  Right upper lobectomy:  1- LYMPH NODE, LUMBAR RIGHT 10 LYMPHADENECTOMY: NEGATIVE FOR METASTATIC CARCINOMA (0/1).   2- LYMPH NODE, 11R, LYMPHADENECTOMY: NEGATIVE FOR METASTATIC CARCINOMA (0/1).  3- LYMPH NODE, 9R, LYMPHADENECTOMY: NEGATIVE FOR METASTATIC CARCINOMA (0/1).   4- LYMPH NODE, 7R, LYMPHADENECTOMY: NEGATIVE FOR METASTATIC CARCINOMA (0/1).   5- LYMPH NODE, 8R, LYMPHADENECTOMY: ONE LYMPH NODE NEGATIVE FOR METASTATIC CARCINOMA (0/1).    6- LYMPH NODE, 12R, LYMPHADENECTOMY: NEGATIVE FOR METASTATIC CARCINOMA (0/1).  7- LUNG, RIGHT UPPER AND MIDDLE LOBES, PNEUMONECTOMY:  POORLY DIFFERENTIATED, G3, SQUAMOUS CELL CARCINOMA, INVASIVE.    - TUMOR SIZE: 3 CM.    - LYMPHOVASCULAR INVASION IS PRESENT.    - MARGINS NEGATIVE FOR INVASIVE CARCINOMA:    - NEAREST MARGIN, VASCULAR / BRONCHIAL 2.5 CM.    - NO PLEURAL SURFACE INVOLVEMENT     - PROMINENT NECROSIS PRESENT.  Visceral Pleura Invasion: Not identified  Number of Lymph Nodes Examined: Exact number : [id number]  pT Category: pT1c: pN0: No regional lymph node metastasis    3/8/2023 LYMPH NODE BIOPSY:   LYMPH NODE 4R, LYMPHADENECTOMY:  METASTATIC SQUAMOUS CELL CARCINOMA, NONKERATINIZING, MULTIPLE FRAGMENTS.       IMMUNOHISTOCHEMICAL STAINS:   CK 5/6, P63 AND CK7:  POSITIVE IN MALIGNANT CELLS.   CK20 AND TTF-1:  NEGATIVE IN MALIGNANT CELLS.       CLINICAL HISTORY:       Patient: Leonila Rosales is a 79 y.o. male kindly referred by  Dr. Diaz for evaluation of lung cancer.    On 01/17/2022 patient presented to the emergency department after a fall.  He reports that he was getting to his truck and sleep and fell on his left side on the truck step rail.  He started having pain in his left hip and knee.  He underwent a CT angiogram that showed No pulmonary embolus. Right upper lobe 2.5 cm mass.      During that hospitalization he was treated for a close intertrochanteric fracture of the left femur and underwent open reduction intramedullary nailing left comminuted intertrochanteric  hip fracture on 01/18/2022 with Dr. Fortino Palomares.  He then spent several weeks on rehab.    On 03/22/2022 he underwent CT-guided biopsy of the right lung mass.  Pathology showed invasive squamous cell carcinoma, moderately differentiated.    He is s/p right upper lobectomy with  on 5/24/2022.  Pathology report as above.  Patient is stage IA3 squamous cell carcinoma of the lung.  He has recovered well and has been doing good.     Patient was started on surveillance. CT 9/19/2022 with 1.6 right paratracheal LN and small Rt pleural effusion. Surveillance CT scan chest to eval stability 1/25/2023 with paratracheal enlarged lymph node which shows interval mild size increase and now measures 2.2 x 2.0 cm and previously was 1.6 x 1.5 cm.  Aortopulmonary window mildly enlarged lymph node is stable. PET CT 2/9/2023 with Hypermetabolic right paratracheal lymph node 25 x 20 mm with max SUV 27.0. Hypermetabolic anterior mediastinal lymph node anterior to the SVC measures 12 x 9 mm with max SUV 12.0.   Biopsy of R4 lymph node confirmed the metastatic squamous cell carcinoma. Completed  XRT on 5/30/23  and 5 cycles of Carbo/taxol on 5/19/23. C6 was held on 5/26/23 due to neutropenia, ANC was 0.7.    Patient was started on chemoradiation. -completed XRT on 5/30/23 and 5 cycles of weekly carbo/taxol on 5/19/23, His final cycle was held due to neutropenia, ANC was 0.7.  He was then started on maintenance Imfinzi every 4 weeks on 6/22/23    Chief Complaint: 3 Week Follow Up      Interval History:  He completed XRT to the mediastinum on 5/30/23 and 5 cycles of weekly carbo/taxol on 5/19/23. He was on maintenance durvalumab, started 6/22/2023 and tolerated well. Patient with progression of paratracheal and para-aortic/subaortic lymph nodes.  He was seen by Dr. Willis but not a good candidate for radiation at this time.  He was started on Gemzar on 4/13/24.     12/11/24: Patient presents today for TD prior to C12 of D1,D8 Q21  Gemzar. He denies any side effects of Gemzar. Denies fever, chills and sweats. Denies pain. Bowels are moving well. Denies bleeding. Still with chronic cough. No new complaints today. He had a PET CT done on 12/10/24: results still pending.       Past Medical History:   Diagnosis Date    Anemia     Class 1 obesity due to excess calories with serious comorbidity and body mass index (BMI) of 33.0 to 33.9 in adult     Closed intertrochanteric fracture     Deficiency of macronutrients     GERD (gastroesophageal reflux disease)     H. pylori infection     History of tobacco use     Hyperlipidemia     Hypertension     Lung mass     Malignant neoplasm of unspecified part of unspecified bronchus or lung     Non-small cell lung cancer       Past Surgical History:   Procedure Laterality Date    BIOPSY OF INTESTINE  04/04/2022    BRONCHOSCOPY      COLONOSCOPY      ESOPHAGOGASTRODUODENOSCOPY  04/04/2022    HIP FRACTURE SURGERY Left 2016    Intramedullary Nail Insertion Hip Left 01/18/2022    KIDNEY SURGERY Right 05/27/2022    MEDIASTINOSCOPY N/A 3/6/2023    Procedure: MEDIASTINOSCOPY;  Surgeon: Jose Diaz MD;  Location: [facility];  Service: Cardiothoracic;  Laterality: N/A;  XX    PLACEMENT, MEDIPORT N/A 4/6/2023    Procedure: Placement, Mediport;  Surgeon: Jose Diaz MD;  Location: [facility] OR;  Service: Cardiology;  Laterality: N/A;    SHOULDER SURGERY       Family History   Family history unknown: Yes            Review of patient's allergies indicates:  No Known Allergies   Current Outpatient Medications on File Prior to Visit   Medication Sig Dispense Refill    amLODIPine (NORVASC) 5 MG tablet TAKE ONE TABLET BY MOUTH EVERY DAY TWICE DAILY 180 tablet 1    aspirin 81 mg Cap Take 81 mg by mouth Daily.      atorvastatin (LIPITOR) 10 MG tablet TAKE ONE TABLET BY MOUTH DAILY 90 tablet 3    levoFLOXacin (LEVAQUIN) 500 MG tablet Take 1 tablet (500 mg total) by mouth once daily. 7 tablet 0    LIDOcaine-prilocaine (EMLA) cream  Apply topically as needed. Apply to port site 30 mins prior to chemo 30 g 3    LINZESS 145 mcg Cap capsule Take 145 mcg by mouth daily as needed. New medication, not started yet      metoprolol succinate (TOPROL-XL) 25 MG 24 hr tablet Take 1 tablet (25 mg total) by mouth once daily. 90 tablet 3    pantoprazole (PROTONIX) 40 MG tablet Take 40 mg by mouth.       No current facility-administered medications on file prior to visit.      Review of Systems   Constitutional:  Negative for activity change, appetite change, chills, diaphoresis, fatigue, fever, night sweats and unexpected weight change.   HENT:  Negative for nasal congestion, mouth sores, nosebleeds, sinus pressure/congestion, sore throat and trouble swallowing.    Eyes: Negative.    Respiratory:  Negative for cough and shortness of breath.    Cardiovascular:  Negative for chest pain and palpitations.   Gastrointestinal:  Negative for abdominal distention, abdominal pain, blood in stool, change in bowel habit, constipation, diarrhea, nausea and vomiting.   Endocrine: Negative.    Genitourinary:  Negative for bladder incontinence, decreased urine volume, difficulty urinating, dysuria, frequency, hematuria and urgency.   Musculoskeletal:  Negative for arthralgias, back pain, gait problem, joint swelling, leg pain and myalgias.   Integumentary:  Negative for rash.   Allergic/Immunologic: Negative.    Neurological:  Negative for dizziness, tremors, syncope, weakness, light-headedness, numbness, headaches and memory loss.   Hematological:  Negative for adenopathy. Does not bruise/bleed easily.   Psychiatric/Behavioral:  Negative for agitation, confusion, hallucinations, sleep disturbance and suicidal ideas. The patient is not nervous/anxious.           Vitals:    12/11/24 0943   BP: 127/62   BP Location: Left arm   Patient Position: Sitting   Pulse: 94   Resp: 18   Temp: 98.1 °F (36.7 °C)   TempSrc: Oral   SpO2: 98%   Weight: 85 kg (187 lb 6.4 oz)   Height: 5'  "6.93" (1.7 m)             Wt Readings from Last 6 Encounters:   12/11/24 85 kg (187 lb 6.3 oz)   12/11/24 85 kg (187 lb 6.4 oz)   11/20/24 86.5 kg (190 lb 12.8 oz)   10/30/24 86.5 kg (190 lb 9.6 oz)   10/15/24 86.2 kg (190 lb)   10/09/24 86.5 kg (190 lb 12.8 oz)     Body mass index is 29.41 kg/m².  Body surface area is 2 meters squared.       Physical Exam  Vitals and nursing note reviewed.   Constitutional:       General: He is not in acute distress.     Appearance: Normal appearance. He is obese.   HENT:      Head: Normocephalic and atraumatic.      Mouth/Throat:      Mouth: Mucous membranes are moist.   Eyes:      General: No scleral icterus.     Extraocular Movements: Extraocular movements intact.      Conjunctiva/sclera: Conjunctivae normal.   Neck:      Vascular: No JVD.   Cardiovascular:      Rate and Rhythm: Normal rate and regular rhythm.      Heart sounds: No murmur heard.  Pulmonary:      Effort: Pulmonary effort is normal.      Breath sounds: Decreased breath sounds and wheezing present. No rhonchi.   Chest:      Chest wall: No tenderness.   Abdominal:      General: Bowel sounds are normal. There is no distension.      Palpations: Abdomen is soft. There is no fluid wave.      Tenderness: There is no abdominal tenderness.   Musculoskeletal:         General: No swelling or deformity.      Cervical back: Neck supple.      Comments: Uses cane for mobility   Lymphadenopathy:      Head:      Right side of head: No submandibular adenopathy.      Left side of head: No submandibular adenopathy.      Cervical: No cervical adenopathy.      Upper Body:      Right upper body: No supraclavicular or axillary adenopathy.      Left upper body: No supraclavicular or axillary adenopathy.      Lower Body: No right inguinal adenopathy. No left inguinal adenopathy.   Skin:     General: Skin is warm.      Coloration: Skin is not jaundiced.      Findings: No rash.   Neurological:      General: No focal deficit present.      " Mental Status: He is alert and oriented to person, place, and time.      Cranial Nerves: Cranial nerves 2-12 are intact.   Psychiatric:         Attention and Perception: Attention normal.         Mood and Affect: Mood and affect normal.         Behavior: Behavior is cooperative.         Cognition and Memory: Cognition normal.         Judgment: Judgment normal.       ECOG SCORE             Laboratory:  CBC with Differential:  Lab Results   Component Value Date    WBC 8.34 12/09/2024    RBC 3.61 (L) 12/09/2024    HGB 11.3 (L) 12/09/2024    HCT 35.7 (L) 12/09/2024    MCV 98.9 (H) 12/09/2024    MCH 31.3 (H) 12/09/2024    MCHC 31.7 (L) 12/09/2024    RDW 18.6 (H) 12/09/2024     12/09/2024    MPV 10.9 (H) 12/09/2024        CMP:  Sodium   Date Value Ref Range Status   12/09/2024 142 136 - 145 mmol/L Final     Potassium   Date Value Ref Range Status   12/09/2024 3.6 3.5 - 5.1 mmol/L Final     Chloride   Date Value Ref Range Status   12/09/2024 107 98 - 107 mmol/L Final     CO2   Date Value Ref Range Status   12/09/2024 26 23 - 31 mmol/L Final     Blood Urea Nitrogen   Date Value Ref Range Status   12/09/2024 6.1 (L) 8.4 - 25.7 mg/dL Final     Creatinine   Date Value Ref Range Status   12/09/2024 0.80 0.72 - 1.25 mg/dL Final     Calcium   Date Value Ref Range Status   12/09/2024 9.2 8.8 - 10.0 mg/dL Final     Albumin   Date Value Ref Range Status   12/09/2024 3.2 (L) 3.4 - 4.8 g/dL Final     Bilirubin Total   Date Value Ref Range Status   12/09/2024 0.7 <=1.5 mg/dL Final     ALP   Date Value Ref Range Status   12/09/2024 149 40 - 150 unit/L Final     AST   Date Value Ref Range Status   12/09/2024 24 5 - 34 unit/L Final     ALT   Date Value Ref Range Status   12/09/2024 19 0 - 55 unit/L Final     Estimated GFR-Non    Date Value Ref Range Status   04/19/2022 >60           Assessment:       1. Non-small cell cancer of right lung    2. Secondary and unspecified malignant neoplasm of intrathoracic lymph  nodes    3. 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 Discussed with Dr Willis NP and he will be seen 4/21/2024.   -Recent PET CT with persistent stable  LN but increase activity    2) Chemotherapy induced anemia and leukopenia-stable        Plan:       Patient with recurrence of disease while on Imfinizi.  Not candidate for RT. We discussed again chemotherapy and he is agreeable. We discontinue Imfinzi and started Gemzar on 4/17/2024 and so far tolerating well.     Okay to proceed with Gemzar C12 D1 today, Neulasta on day 8.   Infusion only on day 8- labs to be drawn the day before at [facility]  PET CT done on 12/10/24-pending.   RTC in 3 weeks with for TD/Infusion - he prefers Wednesday appts. Labs to be drawn the day before treatment at [facility].  Labs: CBC, CMP    Encourage to call or message us for any questions or problems  The patient was given ample opportunity to ask questions, and to the best of my abilities, all questions answered to satisfaction; patient demonstrated understanding of what we discussed and agreeable to the plan.     INDIA Arango

## 2024-12-16 ENCOUNTER — LAB VISIT (OUTPATIENT)
Dept: LAB | Facility: HOSPITAL | Age: 79
End: 2024-12-16
Attending: INTERNAL MEDICINE
Payer: MEDICARE

## 2024-12-16 DIAGNOSIS — C34.91 NON-SMALL CELL CANCER OF RIGHT LUNG: ICD-10-CM

## 2024-12-16 LAB
ALBUMIN SERPL-MCNC: 3 G/DL (ref 3.4–4.8)
ALBUMIN/GLOB SERPL: 0.7 RATIO (ref 1.1–2)
ALP SERPL-CCNC: 109 UNIT/L (ref 40–150)
ALT SERPL-CCNC: 22 UNIT/L (ref 0–55)
ANION GAP SERPL CALC-SCNC: 9 MEQ/L
AST SERPL-CCNC: 27 UNIT/L (ref 5–34)
BASOPHILS # BLD AUTO: 0.02 X10(3)/MCL
BASOPHILS NFR BLD AUTO: 0.6 %
BILIRUB SERPL-MCNC: 0.9 MG/DL
BUN SERPL-MCNC: 7.4 MG/DL (ref 8.4–25.7)
CALCIUM SERPL-MCNC: 8.9 MG/DL (ref 8.8–10)
CHLORIDE SERPL-SCNC: 106 MMOL/L (ref 98–107)
CO2 SERPL-SCNC: 24 MMOL/L (ref 23–31)
CREAT SERPL-MCNC: 0.72 MG/DL (ref 0.72–1.25)
CREAT/UREA NIT SERPL: 10
EOSINOPHIL # BLD AUTO: 0.17 X10(3)/MCL (ref 0–0.9)
EOSINOPHIL NFR BLD AUTO: 5.4 %
ERYTHROCYTE [DISTWIDTH] IN BLOOD BY AUTOMATED COUNT: 17.7 % (ref 11.5–17)
GFR SERPLBLD CREATININE-BSD FMLA CKD-EPI: >60 ML/MIN/1.73/M2
GLOBULIN SER-MCNC: 4.3 GM/DL (ref 2.4–3.5)
GLUCOSE SERPL-MCNC: 99 MG/DL (ref 82–115)
HCT VFR BLD AUTO: 33.8 % (ref 42–52)
HGB BLD-MCNC: 10.8 G/DL (ref 14–18)
IMM GRANULOCYTES # BLD AUTO: 0.01 X10(3)/MCL (ref 0–0.04)
IMM GRANULOCYTES NFR BLD AUTO: 0.3 %
LYMPHOCYTES # BLD AUTO: 0.52 X10(3)/MCL (ref 0.6–4.6)
LYMPHOCYTES NFR BLD AUTO: 16.6 %
MCH RBC QN AUTO: 31.6 PG (ref 27–31)
MCHC RBC AUTO-ENTMCNC: 32 G/DL (ref 33–36)
MCV RBC AUTO: 98.8 FL (ref 80–94)
MONOCYTES # BLD AUTO: 0.17 X10(3)/MCL (ref 0.1–1.3)
MONOCYTES NFR BLD AUTO: 5.4 %
NEUTROPHILS # BLD AUTO: 2.25 X10(3)/MCL (ref 2.1–9.2)
NEUTROPHILS NFR BLD AUTO: 71.7 %
PLATELET # BLD AUTO: 324 X10(3)/MCL (ref 130–400)
PMV BLD AUTO: 9.4 FL (ref 7.4–10.4)
POTASSIUM SERPL-SCNC: 3.7 MMOL/L (ref 3.5–5.1)
PROT SERPL-MCNC: 7.3 GM/DL (ref 5.8–7.6)
RBC # BLD AUTO: 3.42 X10(6)/MCL (ref 4.7–6.1)
SODIUM SERPL-SCNC: 139 MMOL/L (ref 136–145)
WBC # BLD AUTO: 3.14 X10(3)/MCL (ref 4.5–11.5)

## 2024-12-16 PROCEDURE — 36415 COLL VENOUS BLD VENIPUNCTURE: CPT

## 2024-12-16 PROCEDURE — 80053 COMPREHEN METABOLIC PANEL: CPT

## 2024-12-16 PROCEDURE — 85025 COMPLETE CBC W/AUTO DIFF WBC: CPT

## 2024-12-18 ENCOUNTER — INFUSION (OUTPATIENT)
Dept: INFUSION THERAPY | Facility: HOSPITAL | Age: 79
End: 2024-12-18
Attending: NURSE PRACTITIONER
Payer: MEDICARE

## 2024-12-18 VITALS
HEART RATE: 99 BPM | RESPIRATION RATE: 18 BRPM | SYSTOLIC BLOOD PRESSURE: 132 MMHG | DIASTOLIC BLOOD PRESSURE: 68 MMHG | TEMPERATURE: 98 F

## 2024-12-18 DIAGNOSIS — C34.00 MALIGNANT NEOPLASM OF HILUS OF LUNG, UNSPECIFIED LATERALITY: Primary | ICD-10-CM

## 2024-12-18 DIAGNOSIS — C34.90 NON-SMALL CELL LUNG CANCER, UNSPECIFIED LATERALITY: ICD-10-CM

## 2024-12-18 PROCEDURE — 96413 CHEMO IV INFUSION 1 HR: CPT

## 2024-12-18 PROCEDURE — 25000003 PHARM REV CODE 250: Performed by: NURSE PRACTITIONER

## 2024-12-18 PROCEDURE — 96375 TX/PRO/DX INJ NEW DRUG ADDON: CPT

## 2024-12-18 PROCEDURE — 63600175 PHARM REV CODE 636 W HCPCS: Performed by: NURSE PRACTITIONER

## 2024-12-18 RX ORDER — SODIUM CHLORIDE 0.9 % (FLUSH) 0.9 %
10 SYRINGE (ML) INJECTION
Status: DISCONTINUED | OUTPATIENT
Start: 2024-12-18 | End: 2024-12-18 | Stop reason: HOSPADM

## 2024-12-18 RX ORDER — ONDANSETRON HYDROCHLORIDE 2 MG/ML
8 INJECTION, SOLUTION INTRAVENOUS
Status: COMPLETED | OUTPATIENT
Start: 2024-12-18 | End: 2024-12-18

## 2024-12-18 RX ORDER — HEPARIN 100 UNIT/ML
500 SYRINGE INTRAVENOUS
Status: DISCONTINUED | OUTPATIENT
Start: 2024-12-18 | End: 2024-12-18 | Stop reason: HOSPADM

## 2024-12-18 RX ADMIN — PEGFILGRASTIM 6 MG: KIT SUBCUTANEOUS at 12:12

## 2024-12-18 RX ADMIN — GEMCITABINE 2600 MG: 38 INJECTION, SOLUTION INTRAVENOUS at 12:12

## 2024-12-18 RX ADMIN — ONDANSETRON 8 MG: 2 INJECTION INTRAMUSCULAR; INTRAVENOUS at 12:12

## 2024-12-30 ENCOUNTER — LAB VISIT (OUTPATIENT)
Dept: LAB | Facility: HOSPITAL | Age: 79
End: 2024-12-30
Attending: NURSE PRACTITIONER
Payer: MEDICARE

## 2024-12-30 DIAGNOSIS — Z79.899 ON ANTINEOPLASTIC CHEMOTHERAPY: ICD-10-CM

## 2024-12-30 DIAGNOSIS — C34.91 NON-SMALL CELL CANCER OF RIGHT LUNG: ICD-10-CM

## 2024-12-30 DIAGNOSIS — C77.1 SECONDARY AND UNSPECIFIED MALIGNANT NEOPLASM OF INTRATHORACIC LYMPH NODES: ICD-10-CM

## 2024-12-30 LAB
ALBUMIN SERPL-MCNC: 3.5 G/DL (ref 3.4–4.8)
ALBUMIN/GLOB SERPL: 0.7 RATIO (ref 1.1–2)
ALP SERPL-CCNC: 186 UNIT/L (ref 40–150)
ALT SERPL-CCNC: 14 UNIT/L (ref 0–55)
ANION GAP SERPL CALC-SCNC: 12 MEQ/L
AST SERPL-CCNC: 17 UNIT/L (ref 5–34)
BASOPHILS # BLD AUTO: 0.01 X10(3)/MCL
BASOPHILS NFR BLD AUTO: 0.1 %
BILIRUB SERPL-MCNC: 0.7 MG/DL
BUN SERPL-MCNC: 5.9 MG/DL (ref 8.4–25.7)
CALCIUM SERPL-MCNC: 9.4 MG/DL (ref 8.8–10)
CHLORIDE SERPL-SCNC: 106 MMOL/L (ref 98–107)
CO2 SERPL-SCNC: 24 MMOL/L (ref 23–31)
CREAT SERPL-MCNC: 0.73 MG/DL (ref 0.72–1.25)
CREAT/UREA NIT SERPL: 8
EOSINOPHIL # BLD AUTO: 0.1 X10(3)/MCL (ref 0–0.9)
EOSINOPHIL NFR BLD AUTO: 0.9 %
ERYTHROCYTE [DISTWIDTH] IN BLOOD BY AUTOMATED COUNT: 19 % (ref 11.5–17)
GFR SERPLBLD CREATININE-BSD FMLA CKD-EPI: >60 ML/MIN/1.73/M2
GLOBULIN SER-MCNC: 4.9 GM/DL (ref 2.4–3.5)
GLUCOSE SERPL-MCNC: 92 MG/DL (ref 82–115)
HCT VFR BLD AUTO: 38.5 % (ref 42–52)
HGB BLD-MCNC: 12.1 G/DL (ref 14–18)
IMM GRANULOCYTES # BLD AUTO: 0.1 X10(3)/MCL (ref 0–0.04)
IMM GRANULOCYTES NFR BLD AUTO: 0.9 %
LYMPHOCYTES # BLD AUTO: 1.21 X10(3)/MCL (ref 0.6–4.6)
LYMPHOCYTES NFR BLD AUTO: 10.9 %
MCH RBC QN AUTO: 31.3 PG (ref 27–31)
MCHC RBC AUTO-ENTMCNC: 31.4 G/DL (ref 33–36)
MCV RBC AUTO: 99.5 FL (ref 80–94)
MONOCYTES # BLD AUTO: 1.03 X10(3)/MCL (ref 0.1–1.3)
MONOCYTES NFR BLD AUTO: 9.3 %
NEUTROPHILS # BLD AUTO: 8.65 X10(3)/MCL (ref 2.1–9.2)
NEUTROPHILS NFR BLD AUTO: 77.9 %
PLATELET # BLD AUTO: 195 X10(3)/MCL (ref 130–400)
PMV BLD AUTO: 10.7 FL (ref 7.4–10.4)
POTASSIUM SERPL-SCNC: 3.6 MMOL/L (ref 3.5–5.1)
PROT SERPL-MCNC: 8.4 GM/DL (ref 5.8–7.6)
RBC # BLD AUTO: 3.87 X10(6)/MCL (ref 4.7–6.1)
SODIUM SERPL-SCNC: 142 MMOL/L (ref 136–145)
WBC # BLD AUTO: 11.1 X10(3)/MCL (ref 4.5–11.5)

## 2024-12-30 PROCEDURE — 80053 COMPREHEN METABOLIC PANEL: CPT

## 2024-12-30 PROCEDURE — 36415 COLL VENOUS BLD VENIPUNCTURE: CPT

## 2024-12-30 PROCEDURE — 85025 COMPLETE CBC W/AUTO DIFF WBC: CPT

## 2025-01-02 ENCOUNTER — INFUSION (OUTPATIENT)
Dept: INFUSION THERAPY | Facility: HOSPITAL | Age: 80
End: 2025-01-02
Attending: NURSE PRACTITIONER
Payer: MEDICARE

## 2025-01-02 ENCOUNTER — OFFICE VISIT (OUTPATIENT)
Dept: HEMATOLOGY/ONCOLOGY | Facility: CLINIC | Age: 80
End: 2025-01-02
Payer: MEDICARE

## 2025-01-02 VITALS
HEART RATE: 112 BPM | BODY MASS INDEX: 29.25 KG/M2 | TEMPERATURE: 98 F | DIASTOLIC BLOOD PRESSURE: 73 MMHG | RESPIRATION RATE: 18 BRPM | OXYGEN SATURATION: 94 % | SYSTOLIC BLOOD PRESSURE: 126 MMHG | WEIGHT: 186.38 LBS | HEIGHT: 67 IN

## 2025-01-02 DIAGNOSIS — T45.1X5A IMMUNODEFICIENCY SECONDARY TO CHEMOTHERAPY: ICD-10-CM

## 2025-01-02 DIAGNOSIS — D84.821 IMMUNODEFICIENCY SECONDARY TO CHEMOTHERAPY: ICD-10-CM

## 2025-01-02 DIAGNOSIS — C34.91 NON-SMALL CELL CANCER OF RIGHT LUNG: Primary | ICD-10-CM

## 2025-01-02 DIAGNOSIS — T45.1X5A CHEMOTHERAPY-INDUCED NEUTROPENIA: ICD-10-CM

## 2025-01-02 DIAGNOSIS — Z90.2 S/P LOBECTOMY OF LUNG: ICD-10-CM

## 2025-01-02 DIAGNOSIS — C77.1 SECONDARY AND UNSPECIFIED MALIGNANT NEOPLASM OF INTRATHORACIC LYMPH NODES: ICD-10-CM

## 2025-01-02 DIAGNOSIS — Z79.899 ON ANTINEOPLASTIC CHEMOTHERAPY: ICD-10-CM

## 2025-01-02 DIAGNOSIS — D70.1 CHEMOTHERAPY-INDUCED NEUTROPENIA: ICD-10-CM

## 2025-01-02 DIAGNOSIS — C34.00 MALIGNANT NEOPLASM OF HILUS OF LUNG, UNSPECIFIED LATERALITY: Primary | ICD-10-CM

## 2025-01-02 DIAGNOSIS — Z79.899 IMMUNODEFICIENCY SECONDARY TO CHEMOTHERAPY: ICD-10-CM

## 2025-01-02 DIAGNOSIS — D64.81 ANEMIA DUE TO ANTINEOPLASTIC AGENT: ICD-10-CM

## 2025-01-02 DIAGNOSIS — C34.90 NON-SMALL CELL LUNG CANCER, UNSPECIFIED LATERALITY: ICD-10-CM

## 2025-01-02 DIAGNOSIS — T45.1X5A ANEMIA DUE TO ANTINEOPLASTIC AGENT: ICD-10-CM

## 2025-01-02 PROCEDURE — 1126F AMNT PAIN NOTED NONE PRSNT: CPT | Mod: CPTII,S$GLB,, | Performed by: INTERNAL MEDICINE

## 2025-01-02 PROCEDURE — 99999 PR PBB SHADOW E&M-EST. PATIENT-LVL IV: CPT | Mod: PBBFAC,,, | Performed by: INTERNAL MEDICINE

## 2025-01-02 PROCEDURE — 63600175 PHARM REV CODE 636 W HCPCS: Performed by: INTERNAL MEDICINE

## 2025-01-02 PROCEDURE — 3078F DIAST BP <80 MM HG: CPT | Mod: CPTII,S$GLB,, | Performed by: INTERNAL MEDICINE

## 2025-01-02 PROCEDURE — 1101F PT FALLS ASSESS-DOCD LE1/YR: CPT | Mod: CPTII,S$GLB,, | Performed by: INTERNAL MEDICINE

## 2025-01-02 PROCEDURE — 1159F MED LIST DOCD IN RCRD: CPT | Mod: CPTII,S$GLB,, | Performed by: INTERNAL MEDICINE

## 2025-01-02 PROCEDURE — 96375 TX/PRO/DX INJ NEW DRUG ADDON: CPT

## 2025-01-02 PROCEDURE — 99215 OFFICE O/P EST HI 40 MIN: CPT | Mod: S$GLB,,, | Performed by: INTERNAL MEDICINE

## 2025-01-02 PROCEDURE — 25000003 PHARM REV CODE 250: Performed by: INTERNAL MEDICINE

## 2025-01-02 PROCEDURE — 3074F SYST BP LT 130 MM HG: CPT | Mod: CPTII,S$GLB,, | Performed by: INTERNAL MEDICINE

## 2025-01-02 PROCEDURE — 1157F ADVNC CARE PLAN IN RCRD: CPT | Mod: CPTII,S$GLB,, | Performed by: INTERNAL MEDICINE

## 2025-01-02 PROCEDURE — 3288F FALL RISK ASSESSMENT DOCD: CPT | Mod: CPTII,S$GLB,, | Performed by: INTERNAL MEDICINE

## 2025-01-02 PROCEDURE — 96413 CHEMO IV INFUSION 1 HR: CPT

## 2025-01-02 RX ORDER — HEPARIN 100 UNIT/ML
500 SYRINGE INTRAVENOUS
Status: CANCELLED | OUTPATIENT
Start: 2025-01-02

## 2025-01-02 RX ORDER — HEPARIN 100 UNIT/ML
500 SYRINGE INTRAVENOUS
OUTPATIENT
Start: 2025-01-09

## 2025-01-02 RX ORDER — ONDANSETRON HYDROCHLORIDE 2 MG/ML
8 INJECTION, SOLUTION INTRAVENOUS
OUTPATIENT
Start: 2025-01-09

## 2025-01-02 RX ORDER — HEPARIN 100 UNIT/ML
500 SYRINGE INTRAVENOUS
Status: DISCONTINUED | OUTPATIENT
Start: 2025-01-02 | End: 2025-01-02 | Stop reason: HOSPADM

## 2025-01-02 RX ORDER — SODIUM CHLORIDE 0.9 % (FLUSH) 0.9 %
10 SYRINGE (ML) INJECTION
Status: CANCELLED | OUTPATIENT
Start: 2025-01-02

## 2025-01-02 RX ORDER — SODIUM CHLORIDE 0.9 % (FLUSH) 0.9 %
10 SYRINGE (ML) INJECTION
Status: DISCONTINUED | OUTPATIENT
Start: 2025-01-02 | End: 2025-01-02 | Stop reason: HOSPADM

## 2025-01-02 RX ORDER — ONDANSETRON HYDROCHLORIDE 2 MG/ML
8 INJECTION, SOLUTION INTRAVENOUS
Status: COMPLETED | OUTPATIENT
Start: 2025-01-02 | End: 2025-01-02

## 2025-01-02 RX ORDER — SODIUM CHLORIDE 0.9 % (FLUSH) 0.9 %
10 SYRINGE (ML) INJECTION
OUTPATIENT
Start: 2025-01-09

## 2025-01-02 RX ORDER — ONDANSETRON HYDROCHLORIDE 2 MG/ML
8 INJECTION, SOLUTION INTRAVENOUS
Status: CANCELLED | OUTPATIENT
Start: 2025-01-02

## 2025-01-02 RX ADMIN — ONDANSETRON 8 MG: 2 INJECTION INTRAMUSCULAR; INTRAVENOUS at 11:01

## 2025-01-02 RX ADMIN — GEMCITABINE 2400 MG: 38 INJECTION, SOLUTION INTRAVENOUS at 11:01

## 2025-01-02 NOTE — PROGRESS NOTES
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     Treatment/Oncogy history:  -5/24/2022 right upper lobectomy   -Local Recurrence 03/08/2023  -completed XRT on 5/30/23 and 5 cycles of weekly carbo/taxol on 5/19/23   Mediastinum: 5,000 cGy/200 cGy per fx (4/18/2023-5/23/2023)   Med Bst:        1,000 cGy/200 cGy per fx (5/24/2023-5/30/2023)  -Imfinzi every 4 weeks x 10 cycles (6/22/23-3/11/2024)--> progression      Imaging:  CT angiogram 1/17/2022: No pulmonary embolus. Right upper lobe 2.5 cm mass.  CT C 9/19/2022: Status post right partial pneumonectomy for resection of a right upper lobe lung mass with no residual recurrent mass seen. Small right-sided pleural effusion. Some lymphadenopathy in the right paratracheal region with a maximum short axis dimension of 1.6 cm.  Follow-up is recommended  CT C 1/25/2023: There is a paratracheal enlarged lymph node which shows interval mild size increase and now measures 2.2 x 2.0 cm and previously was 1.6 x 1.5 cm.  Aortopulmonary window mildly enlarged lymph node is stable.  Thoracic aorta is without aneurysmal dilatation or dissection.  Impression: 1. Operative changes of right upper lobectomy without bronchialstump soft tissue proliferation    2. No residual tumor load or metastatic nodule. 3. Paratracheal enlarged lymph node with interval size increase.  PET CT 2/9/2023:  Hypermetabolic right paratracheal lymph node image 81 series 3 measures 25 x 20 mm with max SUV 27.0.  Hypermetabolic anterior mediastinal lymph node anterior to the SVC measures 12 x 9 mm with max SUV 12.0. Impression: 1. Hypermetabolic metastatic  mediastinal adenopathy.   2. No PET evidence of metastatic disease outside of the mediastinum.  CT C/A/P 7/10/2023:  1. Several new subcentimeter nodules in the right lung.  Suspect these are infectious or inflammatory in nature, but 3 month follow-up chest CT recommended to ensure resolution.  2. 2 reference mediastinal lymph nodes are smaller since January.  3. L1 vertebral body compression deformity new since January, but appears subacute.  CT C/A/P 10/10/2023:    1. Slightly larger mediastinal lymph nodes, to be followed.  2. Increased irregular right perihilar consolidation which may be treatment related.  3. Small right pleural effusion.  4. No new suspicious findings in the abdomen or pelvis.  CT C/A/P 1/10/2024:    1. Interval enlargement of mediastinal lymph nodes  2. Similar right perihilar consolidative opacity and small right pleural effusion  PET CT 2/6/2024:  1. New, enlarged hypermetabolic superior paratracheal and para-aortic/subaortic lymph nodes compared to prior PET-CT  2. Persistent enlarged and hypermetabolic lower right paratracheal lymph node.  This lymph node is decreased in size and FDG avidity compared to prior PET-CT.  3. Previously seen hypermetabolic pre-vascular lymph node is decreased in size and is no longer hypermetabolic.  PET CT 5/23/2024:  1. Status post right upper lobe resection without evidence for local recurrence/residual disease.  2. Interval response to therapy with decreasing right paratracheal and left AP window adenopathy.  3. Nonspecific area of hypermetabolic activity within the left tongue base.  Direct visualization may be warranted.  4. No evidence for new focus of metastatic disease.  PET CT 9/9/2024:  1. Previously visualized mediastinal lymph nodes slightly more FDG avid today.  2. No new sites of metastatic disease.  PET CT 12/10/24:  1. Variable mediastinal severo changes, with reference lymph nodes larger but of decreased metabolic activity, and newly enlarged  and FDG-avid retrosternal nodes identified.  This is otherwise indeterminate and could represent mixed disease response versus reactive adenopathy.  Short interval follow-up recommended in order to assess stability/resolution.  2. Stable right lung post-treatment changes, with no evidence to suggest tracer-avid new/worsened metastatic disease through the neck, abdomen, or pelvis.  3. Slightly increased volume of loculated, otherwise simple appearing right pleural fluid.          Pathology:  03/22/2022 CT-guided biopsy of the right lung mass: invasive squamous cell carcinoma, moderately differentiated.  5/24/2022: Right upper lobectomy:  1- LYMPH NODE, LUMBAR RIGHT 10 LYMPHADENECTOMY: NEGATIVE FOR METASTATIC CARCINOMA (0/1).   2- LYMPH NODE, 11R, LYMPHADENECTOMY: NEGATIVE FOR METASTATIC CARCINOMA (0/1).  3- LYMPH NODE, 9R, LYMPHADENECTOMY: NEGATIVE FOR METASTATIC CARCINOMA (0/1).   4- LYMPH NODE, 7R, LYMPHADENECTOMY: NEGATIVE FOR METASTATIC CARCINOMA (0/1).   5- LYMPH NODE, 8R, LYMPHADENECTOMY: ONE LYMPH NODE NEGATIVE FOR METASTATIC CARCINOMA (0/1).    6- LYMPH NODE, 12R, LYMPHADENECTOMY: NEGATIVE FOR METASTATIC CARCINOMA (0/1).  7- LUNG, RIGHT UPPER AND MIDDLE LOBES, PNEUMONECTOMY:  POORLY DIFFERENTIATED, G3, SQUAMOUS CELL CARCINOMA, INVASIVE.    - TUMOR SIZE: 3 CM.    - LYMPHOVASCULAR INVASION IS PRESENT.    - MARGINS NEGATIVE FOR INVASIVE CARCINOMA:    - NEAREST MARGIN, VASCULAR / BRONCHIAL 2.5 CM.    - NO PLEURAL SURFACE INVOLVEMENT     - PROMINENT NECROSIS PRESENT.  Visceral Pleura Invasion: Not identified  Number of Lymph Nodes Examined: Exact number : [id number]  pT Category: pT1c: pN0: No regional lymph node metastasis    3/8/2023 LYMPH NODE BIOPSY:   LYMPH NODE 4R, LYMPHADENECTOMY:  METASTATIC SQUAMOUS CELL CARCINOMA, NONKERATINIZING, MULTIPLE FRAGMENTS.       IMMUNOHISTOCHEMICAL STAINS:   CK 5/6, P63 AND CK7:  POSITIVE IN MALIGNANT CELLS.   CK20 AND TTF-1:  NEGATIVE IN MALIGNANT CELLS.       CLINICAL HISTORY:        Patient: Leonila Rosales is a 79 y.o. male kindly referred by  Dr. Diaz for evaluation of lung cancer.    On 01/17/2022 patient presented to the emergency department after a fall.  He reports that he was getting to his truck and sleep and fell on his left side on the truck step rail.  He started having pain in his left hip and knee.  He underwent a CT angiogram that showed No pulmonary embolus. Right upper lobe 2.5 cm mass.      During that hospitalization he was treated for a close intertrochanteric fracture of the left femur and underwent open reduction intramedullary nailing left comminuted intertrochanteric hip fracture on 01/18/2022 with Dr. Fortino Palomares.  He then spent several weeks on rehab.    On 03/22/2022 he underwent CT-guided biopsy of the right lung mass.  Pathology showed invasive squamous cell carcinoma, moderately differentiated.    He is s/p right upper lobectomy with  on 5/24/2022.  Pathology report as above.  Patient is stage IA3 squamous cell carcinoma of the lung.  He has recovered well and has been doing good.     Patient was started on surveillance. CT 9/19/2022 with 1.6 right paratracheal LN and small Rt pleural effusion. Surveillance CT scan chest to eval stability 1/25/2023 with paratracheal enlarged lymph node which shows interval mild size increase and now measures 2.2 x 2.0 cm and previously was 1.6 x 1.5 cm.  Aortopulmonary window mildly enlarged lymph node is stable. PET CT 2/9/2023 with Hypermetabolic right paratracheal lymph node 25 x 20 mm with max SUV 27.0. Hypermetabolic anterior mediastinal lymph node anterior to the SVC measures 12 x 9 mm with max SUV 12.0.   Biopsy of R4 lymph node confirmed the metastatic squamous cell carcinoma. Completed  XRT on 5/30/23  and 5 cycles of Carbo/taxol on 5/19/23. C6 was held on 5/26/23 due to neutropenia, ANC was 0.7.    Patient was started on chemoradiation. -completed XRT on 5/30/23 and 5 cycles of weekly carbo/taxol on 5/19/23, His  final cycle was held due to neutropenia, ANC was 0.7.  He was then started on maintenance Imfinzi every 4 weeks on 6/22/23    Chief Complaint: 3 Week Follow Up      Interval History:  He completed XRT to the mediastinum on 5/30/23 and 5 cycles of weekly carbo/taxol on 5/19/23. He was on maintenance durvalumab, started 6/22/2023 and tolerated well. Patient with progression of paratracheal and para-aortic/subaortic lymph nodes.  He was seen by Dr. Willis but not a good candidate for radiation at this time.  He was started on Gemzar on 4/13/24.     10/30/24: Patient presents today for TD prior to C10 of D1,D8 Q21 Gemzar. He denies any side effects of Gemzar. Denies fever, chills and sweats. Denies pain. Bowels are moving well. Denies bleeding. Still with chronic cough. No new complaints today.     1/2/2025: Patient presents today for follow up.  He is doing well and voices no new concerns.  Fatigue persist.  PET CT results discussed in detail with the patient.  He is due for C13D1 of Gemzar.       Past Medical History:   Diagnosis Date    Anemia     Class 1 obesity due to excess calories with serious comorbidity and body mass index (BMI) of 33.0 to 33.9 in adult     Closed intertrochanteric fracture     Deficiency of macronutrients     GERD (gastroesophageal reflux disease)     H. pylori infection     History of tobacco use     Hyperlipidemia     Hypertension     Lung mass     Malignant neoplasm of unspecified part of unspecified bronchus or lung     Non-small cell lung cancer       Past Surgical History:   Procedure Laterality Date    BIOPSY OF INTESTINE  04/04/2022    BRONCHOSCOPY      COLONOSCOPY      ESOPHAGOGASTRODUODENOSCOPY  04/04/2022    HIP FRACTURE SURGERY Left 2016    Intramedullary Nail Insertion Hip Left 01/18/2022    KIDNEY SURGERY Right 05/27/2022    MEDIASTINOSCOPY N/A 3/6/2023    Procedure: MEDIASTINOSCOPY;  Surgeon: Jose Diaz MD;  Location: [facility];  Service: Cardiothoracic;  Laterality: N/A;   XX    PLACEMENT, MEDIPORT N/A 4/6/2023    Procedure: Placement, Mediport;  Surgeon: Jose Diaz MD;  Location: [facility];  Service: Cardiology;  Laterality: N/A;    SHOULDER SURGERY       Family History   Family history unknown: Yes            Review of patient's allergies indicates:  No Known Allergies   Current Outpatient Medications on File Prior to Visit   Medication Sig Dispense Refill    amLODIPine (NORVASC) 5 MG tablet TAKE ONE TABLET BY MOUTH EVERY DAY TWICE DAILY 180 tablet 1    aspirin 81 mg Cap Take 81 mg by mouth Daily.      atorvastatin (LIPITOR) 10 MG tablet TAKE ONE TABLET BY MOUTH DAILY 90 tablet 3    levoFLOXacin (LEVAQUIN) 500 MG tablet Take 1 tablet (500 mg total) by mouth once daily. 7 tablet 0    LIDOcaine-prilocaine (EMLA) cream Apply topically as needed. Apply to port site 30 mins prior to chemo 30 g 3    LINZESS 145 mcg Cap capsule Take 145 mcg by mouth daily as needed. New medication, not started yet      metoprolol succinate (TOPROL-XL) 25 MG 24 hr tablet Take 1 tablet (25 mg total) by mouth once daily. 90 tablet 3    pantoprazole (PROTONIX) 40 MG tablet Take 40 mg by mouth.       No current facility-administered medications on file prior to visit.      Review of Systems   Constitutional:  Negative for activity change, appetite change, chills, diaphoresis, fatigue, fever, night sweats and unexpected weight change.   HENT:  Negative for nasal congestion, mouth sores, nosebleeds, sinus pressure/congestion, sore throat and trouble swallowing.    Eyes: Negative.    Respiratory:  Negative for cough and shortness of breath.    Cardiovascular:  Negative for chest pain and palpitations.   Gastrointestinal:  Negative for abdominal distention, abdominal pain, blood in stool, change in bowel habit, constipation, diarrhea, nausea and vomiting.   Endocrine: Negative.    Genitourinary:  Negative for bladder incontinence, decreased urine volume, difficulty urinating, dysuria, frequency, hematuria and  "urgency.   Musculoskeletal:  Negative for arthralgias, back pain, gait problem, joint swelling, leg pain and myalgias.   Integumentary:  Negative for rash.   Allergic/Immunologic: Negative.    Neurological:  Negative for dizziness, tremors, syncope, weakness, light-headedness, numbness, headaches and memory loss.   Hematological:  Negative for adenopathy. Does not bruise/bleed easily.   Psychiatric/Behavioral:  Negative for agitation, confusion, hallucinations, sleep disturbance and suicidal ideas. The patient is not nervous/anxious.           Vitals:    01/02/25 1028   BP: 126/73   BP Location: Left arm   Patient Position: Sitting   Pulse: (!) 112   Resp: 18   Temp: 97.9 °F (36.6 °C)   TempSrc: Oral   SpO2: (!) 94%   Weight: 84.6 kg (186 lb 6.4 oz)   Height: 5' 6.93" (1.7 m)             Wt Readings from Last 6 Encounters:   01/02/25 84.6 kg (186 lb 6.4 oz)   12/11/24 85 kg (187 lb 6.3 oz)   12/11/24 85 kg (187 lb 6.4 oz)   11/20/24 86.5 kg (190 lb 12.8 oz)   10/30/24 86.5 kg (190 lb 9.6 oz)   10/15/24 86.2 kg (190 lb)     Body mass index is 29.26 kg/m².  Body surface area is 2 meters squared.       Physical Exam  Vitals and nursing note reviewed.   Constitutional:       General: He is not in acute distress.     Appearance: Normal appearance. He is obese.   HENT:      Head: Normocephalic and atraumatic.      Mouth/Throat:      Mouth: Mucous membranes are moist.   Eyes:      General: No scleral icterus.     Extraocular Movements: Extraocular movements intact.      Conjunctiva/sclera: Conjunctivae normal.   Neck:      Vascular: No JVD.   Cardiovascular:      Rate and Rhythm: Normal rate and regular rhythm.      Heart sounds: No murmur heard.  Pulmonary:      Effort: Pulmonary effort is normal.      Breath sounds: Decreased breath sounds and wheezing present. No rhonchi.   Chest:      Chest wall: No tenderness.   Abdominal:      General: Bowel sounds are normal. There is no distension.      Palpations: Abdomen is soft. " There is no fluid wave.      Tenderness: There is no abdominal tenderness.   Musculoskeletal:         General: No swelling or deformity.      Cervical back: Neck supple.      Comments: Uses cane for mobility   Lymphadenopathy:      Head:      Right side of head: No submandibular adenopathy.      Left side of head: No submandibular adenopathy.      Cervical: No cervical adenopathy.      Upper Body:      Right upper body: No supraclavicular or axillary adenopathy.      Left upper body: No supraclavicular or axillary adenopathy.      Lower Body: No right inguinal adenopathy. No left inguinal adenopathy.   Skin:     General: Skin is warm.      Coloration: Skin is not jaundiced.      Findings: No rash.   Neurological:      General: No focal deficit present.      Mental Status: He is alert and oriented to person, place, and time.      Cranial Nerves: Cranial nerves 2-12 are intact.   Psychiatric:         Attention and Perception: Attention normal.         Mood and Affect: Mood and affect normal.         Behavior: Behavior is cooperative.         Cognition and Memory: Cognition normal.         Judgment: Judgment normal.       ECOG SCORE    1 - Restricted in strenuous activity-ambulatory and able to carry out work of a light nature         Laboratory:  CBC with Differential:  Lab Results   Component Value Date    WBC 11.10 12/30/2024    RBC 3.87 (L) 12/30/2024    HGB 12.1 (L) 12/30/2024    HCT 38.5 (L) 12/30/2024    MCV 99.5 (H) 12/30/2024    MCH 31.3 (H) 12/30/2024    MCHC 31.4 (L) 12/30/2024    RDW 19.0 (H) 12/30/2024     12/30/2024    MPV 10.7 (H) 12/30/2024        CMP:  Sodium   Date Value Ref Range Status   12/30/2024 142 136 - 145 mmol/L Final     Potassium   Date Value Ref Range Status   12/30/2024 3.6 3.5 - 5.1 mmol/L Final     Chloride   Date Value Ref Range Status   12/30/2024 106 98 - 107 mmol/L Final     CO2   Date Value Ref Range Status   12/30/2024 24 23 - 31 mmol/L Final     Blood Urea Nitrogen   Date  Value Ref Range Status   12/30/2024 5.9 (L) 8.4 - 25.7 mg/dL Final     Creatinine   Date Value Ref Range Status   12/30/2024 0.73 0.72 - 1.25 mg/dL Final     Calcium   Date Value Ref Range Status   12/30/2024 9.4 8.8 - 10.0 mg/dL Final     Albumin   Date Value Ref Range Status   12/30/2024 3.5 3.4 - 4.8 g/dL Final     Bilirubin Total   Date Value Ref Range Status   12/30/2024 0.7 <=1.5 mg/dL Final     ALP   Date Value Ref Range Status   12/30/2024 186 (H) 40 - 150 unit/L Final     AST   Date Value Ref Range Status   12/30/2024 17 5 - 34 unit/L Final     ALT   Date Value Ref Range Status   12/30/2024 14 0 - 55 unit/L Final     Estimated GFR-Non    Date Value Ref Range Status   04/19/2022 >60           Assessment:       1. Non-small cell cancer of right lung    2. S/P lobectomy of lung    3. Chemotherapy-induced neutropenia    4. 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 Discussed with Dr Willis NP and he will be seen 4/21/2024.   -Recent PET CT with persistent stable  LN but increase activity    2) Chemotherapy induced anemia and leukopenia-stable        Plan:       Patient with recurrence of disease while on Imfinizi.  Not candidate for RT. We discussed again chemotherapy and he is agreeable. We discontinue Imfinzi and started Gemzar on 4/17/2024 and so far tolerating well.  Most recent PET-CT with mixed response.  He has mild increase of disease in some areas and improvement in others.  We will continue same treatment at  this time.  He understands that in the near future if disease continues to progress may need to change treatment.    Okay to proceed with Gemzar C13 D1 today, Neulasta on day 8.   Infusion only on day 8- labs to be drawn the day before at [facility]  RTC in 4 weeks with NP for TD/Infusion - he prefers Wednesday appts. Labs to be drawn the day before treatment at [facility].  Labs: CBC, CMP    Encourage to call or message us for any questions or problems  The patient was given ample opportunity to ask questions, and to the best of my abilities, all questions answered to satisfaction; patient demonstrated understanding of what we discussed and agreeable to the plan.     Sanjuana Napoles MD  Hematology/Oncology      Professional Services   I, Marie Felder LPN, acted solely as a scribe for and in the presence of Dr. Sanjuana Napoles, who performed these services.

## 2025-01-06 DIAGNOSIS — C34.91 NON-SMALL CELL CANCER OF RIGHT LUNG: Primary | ICD-10-CM

## 2025-01-08 ENCOUNTER — LAB VISIT (OUTPATIENT)
Dept: LAB | Facility: HOSPITAL | Age: 80
End: 2025-01-08
Attending: INTERNAL MEDICINE
Payer: MEDICARE

## 2025-01-08 ENCOUNTER — INFUSION (OUTPATIENT)
Dept: INFUSION THERAPY | Facility: HOSPITAL | Age: 80
End: 2025-01-08
Attending: NURSE PRACTITIONER
Payer: MEDICARE

## 2025-01-08 VITALS
HEART RATE: 111 BPM | RESPIRATION RATE: 16 BRPM | DIASTOLIC BLOOD PRESSURE: 80 MMHG | SYSTOLIC BLOOD PRESSURE: 119 MMHG | TEMPERATURE: 98 F

## 2025-01-08 DIAGNOSIS — C34.91 NON-SMALL CELL CANCER OF RIGHT LUNG: ICD-10-CM

## 2025-01-08 DIAGNOSIS — C34.91 NON-SMALL CELL CANCER OF RIGHT LUNG: Primary | ICD-10-CM

## 2025-01-08 DIAGNOSIS — C34.00 MALIGNANT NEOPLASM OF HILUS OF LUNG, UNSPECIFIED LATERALITY: Primary | ICD-10-CM

## 2025-01-08 DIAGNOSIS — C34.90 NON-SMALL CELL LUNG CANCER, UNSPECIFIED LATERALITY: ICD-10-CM

## 2025-01-08 LAB
ALBUMIN SERPL-MCNC: 3.3 G/DL (ref 3.4–4.8)
ALBUMIN/GLOB SERPL: 0.7 RATIO (ref 1.1–2)
ALP SERPL-CCNC: 131 UNIT/L (ref 40–150)
ALT SERPL-CCNC: 24 UNIT/L (ref 0–55)
ANION GAP SERPL CALC-SCNC: 8 MEQ/L
AST SERPL-CCNC: 31 UNIT/L (ref 5–34)
BASOPHILS # BLD AUTO: 0.03 X10(3)/MCL
BASOPHILS NFR BLD AUTO: 0.4 %
BILIRUB SERPL-MCNC: 1 MG/DL
BUN SERPL-MCNC: 4.8 MG/DL (ref 8.4–25.7)
CALCIUM SERPL-MCNC: 9.6 MG/DL (ref 8.8–10)
CHLORIDE SERPL-SCNC: 109 MMOL/L (ref 98–107)
CO2 SERPL-SCNC: 23 MMOL/L (ref 23–31)
CREAT SERPL-MCNC: 0.76 MG/DL (ref 0.72–1.25)
CREAT/UREA NIT SERPL: 6
EOSINOPHIL # BLD AUTO: 0.13 X10(3)/MCL (ref 0–0.9)
EOSINOPHIL NFR BLD AUTO: 1.9 %
ERYTHROCYTE [DISTWIDTH] IN BLOOD BY AUTOMATED COUNT: 17 % (ref 11.5–17)
GFR SERPLBLD CREATININE-BSD FMLA CKD-EPI: >60 ML/MIN/1.73/M2
GLOBULIN SER-MCNC: 4.7 GM/DL (ref 2.4–3.5)
GLUCOSE SERPL-MCNC: 96 MG/DL (ref 82–115)
HCT VFR BLD AUTO: 37.2 % (ref 42–52)
HGB BLD-MCNC: 11.7 G/DL (ref 14–18)
IMM GRANULOCYTES # BLD AUTO: 0.03 X10(3)/MCL (ref 0–0.04)
IMM GRANULOCYTES NFR BLD AUTO: 0.4 %
LYMPHOCYTES # BLD AUTO: 0.84 X10(3)/MCL (ref 0.6–4.6)
LYMPHOCYTES NFR BLD AUTO: 12.5 %
MCH RBC QN AUTO: 31.2 PG (ref 27–31)
MCHC RBC AUTO-ENTMCNC: 31.5 G/DL (ref 33–36)
MCV RBC AUTO: 99.2 FL (ref 80–94)
MONOCYTES # BLD AUTO: 0.51 X10(3)/MCL (ref 0.1–1.3)
MONOCYTES NFR BLD AUTO: 7.6 %
NEUTROPHILS # BLD AUTO: 5.17 X10(3)/MCL (ref 2.1–9.2)
NEUTROPHILS NFR BLD AUTO: 77.2 %
PLATELET # BLD AUTO: 290 X10(3)/MCL (ref 130–400)
PMV BLD AUTO: 9.6 FL (ref 7.4–10.4)
POTASSIUM SERPL-SCNC: 4.4 MMOL/L (ref 3.5–5.1)
PROT SERPL-MCNC: 8 GM/DL (ref 5.8–7.6)
RBC # BLD AUTO: 3.75 X10(6)/MCL (ref 4.7–6.1)
SODIUM SERPL-SCNC: 140 MMOL/L (ref 136–145)
WBC # BLD AUTO: 6.71 X10(3)/MCL (ref 4.5–11.5)

## 2025-01-08 PROCEDURE — 80053 COMPREHEN METABOLIC PANEL: CPT

## 2025-01-08 PROCEDURE — 25000003 PHARM REV CODE 250: Performed by: INTERNAL MEDICINE

## 2025-01-08 PROCEDURE — 36415 COLL VENOUS BLD VENIPUNCTURE: CPT

## 2025-01-08 PROCEDURE — 96413 CHEMO IV INFUSION 1 HR: CPT

## 2025-01-08 PROCEDURE — 85025 COMPLETE CBC W/AUTO DIFF WBC: CPT

## 2025-01-08 PROCEDURE — 96377 APPLICATON ON-BODY INJECTOR: CPT

## 2025-01-08 PROCEDURE — 63600175 PHARM REV CODE 636 W HCPCS: Performed by: INTERNAL MEDICINE

## 2025-01-08 PROCEDURE — 96375 TX/PRO/DX INJ NEW DRUG ADDON: CPT

## 2025-01-08 RX ORDER — HEPARIN 100 UNIT/ML
500 SYRINGE INTRAVENOUS
Status: DISCONTINUED | OUTPATIENT
Start: 2025-01-08 | End: 2025-01-08 | Stop reason: HOSPADM

## 2025-01-08 RX ORDER — ONDANSETRON HYDROCHLORIDE 2 MG/ML
8 INJECTION, SOLUTION INTRAVENOUS
Status: COMPLETED | OUTPATIENT
Start: 2025-01-08 | End: 2025-01-08

## 2025-01-08 RX ORDER — SODIUM CHLORIDE 0.9 % (FLUSH) 0.9 %
10 SYRINGE (ML) INJECTION
Status: DISCONTINUED | OUTPATIENT
Start: 2025-01-08 | End: 2025-01-08 | Stop reason: HOSPADM

## 2025-01-08 RX ADMIN — GEMCITABINE 2400 MG: 38 INJECTION, SOLUTION INTRAVENOUS at 11:01

## 2025-01-08 RX ADMIN — ONDANSETRON 8 MG: 2 INJECTION INTRAMUSCULAR; INTRAVENOUS at 11:01

## 2025-01-08 RX ADMIN — PEGFILGRASTIM 6 MG: KIT SUBCUTANEOUS at 11:01

## 2025-01-24 NOTE — PROGRESS NOTES
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     Treatment/Oncogy history:  -5/24/2022 right upper lobectomy   -Local Recurrence 03/08/2023  -completed XRT on 5/30/23 and 5 cycles of weekly carbo/taxol on 5/19/23   Mediastinum: 5,000 cGy/200 cGy per fx (4/18/2023-5/23/2023)   Med Bst:        1,000 cGy/200 cGy per fx (5/24/2023-5/30/2023)  -Imfinzi every 4 weeks x 10 cycles (6/22/23-3/11/2024)--> progression      Imaging:  CT angiogram 1/17/2022: No pulmonary embolus. Right upper lobe 2.5 cm mass.  CT C 9/19/2022: Status post right partial pneumonectomy for resection of a right upper lobe lung mass with no residual recurrent mass seen. Small right-sided pleural effusion. Some lymphadenopathy in the right paratracheal region with a maximum short axis dimension of 1.6 cm.  Follow-up is recommended  CT C 1/25/2023: There is a paratracheal enlarged lymph node which shows interval mild size increase and now measures 2.2 x 2.0 cm and previously was 1.6 x 1.5 cm.  Aortopulmonary window mildly enlarged lymph node is stable.  Thoracic aorta is without aneurysmal dilatation or dissection.  Impression: 1. Operative changes of right upper lobectomy without bronchialstump soft tissue proliferation    2. No residual tumor load or metastatic nodule. 3. Paratracheal enlarged lymph node with interval size increase.  PET CT 2/9/2023:  Hypermetabolic right paratracheal lymph node image 81 series 3 measures 25 x 20 mm with max SUV 27.0.  Hypermetabolic anterior mediastinal lymph node anterior to the SVC measures 12 x 9 mm with max SUV 12.0. Impression: 1. Hypermetabolic metastatic  mediastinal adenopathy.   2. No PET evidence of metastatic disease outside of the mediastinum.  CT C/A/P 7/10/2023:  1. Several new subcentimeter nodules in the right lung.  Suspect these are infectious or inflammatory in nature, but 3 month follow-up chest CT recommended to ensure resolution.  2. 2 reference mediastinal lymph nodes are smaller since January.  3. L1 vertebral body compression deformity new since January, but appears subacute.  CT C/A/P 10/10/2023:    1. Slightly larger mediastinal lymph nodes, to be followed.  2. Increased irregular right perihilar consolidation which may be treatment related.  3. Small right pleural effusion.  4. No new suspicious findings in the abdomen or pelvis.  CT C/A/P 1/10/2024:    1. Interval enlargement of mediastinal lymph nodes  2. Similar right perihilar consolidative opacity and small right pleural effusion  PET CT 2/6/2024:  1. New, enlarged hypermetabolic superior paratracheal and para-aortic/subaortic lymph nodes compared to prior PET-CT  2. Persistent enlarged and hypermetabolic lower right paratracheal lymph node.  This lymph node is decreased in size and FDG avidity compared to prior PET-CT.  3. Previously seen hypermetabolic pre-vascular lymph node is decreased in size and is no longer hypermetabolic.  PET CT 5/23/2024:  1. Status post right upper lobe resection without evidence for local recurrence/residual disease.  2. Interval response to therapy with decreasing right paratracheal and left AP window adenopathy.  3. Nonspecific area of hypermetabolic activity within the left tongue base.  Direct visualization may be warranted.  4. No evidence for new focus of metastatic disease.  PET CT 9/9/2024:  1. Previously visualized mediastinal lymph nodes slightly more FDG avid today.  2. No new sites of metastatic disease.  PET CT 12/10/24:  1. Variable mediastinal severo changes, with reference lymph nodes larger but of decreased metabolic activity, and newly enlarged  and FDG-avid retrosternal nodes identified.  This is otherwise indeterminate and could represent mixed disease response versus reactive adenopathy.  Short interval follow-up recommended in order to assess stability/resolution.  2. Stable right lung post-treatment changes, with no evidence to suggest tracer-avid new/worsened metastatic disease through the neck, abdomen, or pelvis.  3. Slightly increased volume of loculated, otherwise simple appearing right pleural fluid.          Pathology:  03/22/2022 CT-guided biopsy of the right lung mass: invasive squamous cell carcinoma, moderately differentiated.  5/24/2022: Right upper lobectomy:  1- LYMPH NODE, LUMBAR RIGHT 10 LYMPHADENECTOMY: NEGATIVE FOR METASTATIC CARCINOMA (0/1).   2- LYMPH NODE, 11R, LYMPHADENECTOMY: NEGATIVE FOR METASTATIC CARCINOMA (0/1).  3- LYMPH NODE, 9R, LYMPHADENECTOMY: NEGATIVE FOR METASTATIC CARCINOMA (0/1).   4- LYMPH NODE, 7R, LYMPHADENECTOMY: NEGATIVE FOR METASTATIC CARCINOMA (0/1).   5- LYMPH NODE, 8R, LYMPHADENECTOMY: ONE LYMPH NODE NEGATIVE FOR METASTATIC CARCINOMA (0/1).    6- LYMPH NODE, 12R, LYMPHADENECTOMY: NEGATIVE FOR METASTATIC CARCINOMA (0/1).  7- LUNG, RIGHT UPPER AND MIDDLE LOBES, PNEUMONECTOMY:  POORLY DIFFERENTIATED, G3, SQUAMOUS CELL CARCINOMA, INVASIVE.    - TUMOR SIZE: 3 CM.    - LYMPHOVASCULAR INVASION IS PRESENT.    - MARGINS NEGATIVE FOR INVASIVE CARCINOMA:    - NEAREST MARGIN, VASCULAR / BRONCHIAL 2.5 CM.    - NO PLEURAL SURFACE INVOLVEMENT     - PROMINENT NECROSIS PRESENT.  Visceral Pleura Invasion: Not identified  Number of Lymph Nodes Examined: Exact number : [id number]  pT Category: pT1c: pN0: No regional lymph node metastasis    3/8/2023 LYMPH NODE BIOPSY:   LYMPH NODE 4R, LYMPHADENECTOMY:  METASTATIC SQUAMOUS CELL CARCINOMA, NONKERATINIZING, MULTIPLE FRAGMENTS.       IMMUNOHISTOCHEMICAL STAINS:   CK 5/6, P63 AND CK7:  POSITIVE IN MALIGNANT CELLS.   CK20 AND TTF-1:  NEGATIVE IN MALIGNANT CELLS.       CLINICAL HISTORY:        Patient: Leonila Rosales is a 79 y.o. male kindly referred by  Dr. Diaz for evaluation of lung cancer.    On 01/17/2022 patient presented to the emergency department after a fall.  He reports that he was getting to his truck and sleep and fell on his left side on the truck step rail.  He started having pain in his left hip and knee.  He underwent a CT angiogram that showed No pulmonary embolus. Right upper lobe 2.5 cm mass.      During that hospitalization he was treated for a close intertrochanteric fracture of the left femur and underwent open reduction intramedullary nailing left comminuted intertrochanteric hip fracture on 01/18/2022 with Dr. Fortino Palomares.  He then spent several weeks on rehab.    On 03/22/2022 he underwent CT-guided biopsy of the right lung mass.  Pathology showed invasive squamous cell carcinoma, moderately differentiated.    He is s/p right upper lobectomy with  on 5/24/2022.  Pathology report as above.  Patient is stage IA3 squamous cell carcinoma of the lung.  He has recovered well and has been doing good.     Patient was started on surveillance. CT 9/19/2022 with 1.6 right paratracheal LN and small Rt pleural effusion. Surveillance CT scan chest to eval stability 1/25/2023 with paratracheal enlarged lymph node which shows interval mild size increase and now measures 2.2 x 2.0 cm and previously was 1.6 x 1.5 cm.  Aortopulmonary window mildly enlarged lymph node is stable. PET CT 2/9/2023 with Hypermetabolic right paratracheal lymph node 25 x 20 mm with max SUV 27.0. Hypermetabolic anterior mediastinal lymph node anterior to the SVC measures 12 x 9 mm with max SUV 12.0.   Biopsy of R4 lymph node confirmed the metastatic squamous cell carcinoma. Completed  XRT on 5/30/23  and 5 cycles of Carbo/taxol on 5/19/23. C6 was held on 5/26/23 due to neutropenia, ANC was 0.7.    Patient was started on chemoradiation. -completed XRT on 5/30/23 and 5 cycles of weekly carbo/taxol on 5/19/23, His  final cycle was held due to neutropenia, ANC was 0.7.  He was then started on maintenance Imfinzi every 4 weeks on 6/22/23    Chief Complaint: 3 Week Follow Up      Interval History:  He completed XRT to the mediastinum on 5/30/23 and 5 cycles of weekly carbo/taxol on 5/19/23. He was on maintenance durvalumab, started 6/22/2023 and tolerated well. Patient with progression of paratracheal and para-aortic/subaortic lymph nodes.  He was seen by Dr. Willis but not a good candidate for radiation at this time.  He was started on Gemzar on 4/13/24.     01/29/25: Patient presents today for follow up.  He is doing well and voices no new concerns. He is due today for Gemzar C14D1. Denies fever, chills and sweats. Denies pain.    Past Medical History:   Diagnosis Date    Anemia     Class 1 obesity due to excess calories with serious comorbidity and body mass index (BMI) of 33.0 to 33.9 in adult     Closed intertrochanteric fracture     Deficiency of macronutrients     GERD (gastroesophageal reflux disease)     H. pylori infection     History of tobacco use     Hyperlipidemia     Hypertension     Lung mass     Malignant neoplasm of unspecified part of unspecified bronchus or lung     Non-small cell lung cancer       Past Surgical History:   Procedure Laterality Date    BIOPSY OF INTESTINE  04/04/2022    BRONCHOSCOPY      COLONOSCOPY      ESOPHAGOGASTRODUODENOSCOPY  04/04/2022    HIP FRACTURE SURGERY Left 2016    Intramedullary Nail Insertion Hip Left 01/18/2022    KIDNEY SURGERY Right 05/27/2022    MEDIASTINOSCOPY N/A 3/6/2023    Procedure: MEDIASTINOSCOPY;  Surgeon: Jose Diaz MD;  Location: [facility] OR;  Service: Cardiothoracic;  Laterality: N/A;  XX    PLACEMENT, MEDIPORT N/A 4/6/2023    Procedure: Placement, Mediport;  Surgeon: Jose Diaz MD;  Location: [facility] OR;  Service: Cardiology;  Laterality: N/A;    SHOULDER SURGERY       Family History   Family history unknown: Yes            Review of patient's allergies  indicates:  No Known Allergies   Current Outpatient Medications on File Prior to Visit   Medication Sig Dispense Refill    amLODIPine (NORVASC) 5 MG tablet TAKE ONE TABLET BY MOUTH EVERY DAY TWICE DAILY 180 tablet 1    aspirin 81 mg Cap Take 81 mg by mouth Daily.      atorvastatin (LIPITOR) 10 MG tablet TAKE ONE TABLET BY MOUTH DAILY 90 tablet 3    levoFLOXacin (LEVAQUIN) 500 MG tablet Take 1 tablet (500 mg total) by mouth once daily. 7 tablet 0    LIDOcaine-prilocaine (EMLA) cream Apply topically as needed. Apply to port site 30 mins prior to chemo 30 g 3    LINZESS 145 mcg Cap capsule Take 145 mcg by mouth daily as needed. New medication, not started yet      metoprolol succinate (TOPROL-XL) 25 MG 24 hr tablet Take 1 tablet (25 mg total) by mouth once daily. 90 tablet 3    pantoprazole (PROTONIX) 40 MG tablet Take 40 mg by mouth.       No current facility-administered medications on file prior to visit.      Review of Systems   Constitutional:  Negative for activity change, appetite change, chills, diaphoresis, fatigue, fever, night sweats and unexpected weight change.   HENT:  Negative for nasal congestion, mouth sores, nosebleeds, sinus pressure/congestion, sore throat and trouble swallowing.    Eyes: Negative.    Respiratory:  Negative for cough and shortness of breath.    Cardiovascular:  Negative for chest pain and palpitations.   Gastrointestinal:  Negative for abdominal distention, abdominal pain, blood in stool, change in bowel habit, constipation, diarrhea, nausea and vomiting.   Endocrine: Negative.    Genitourinary:  Negative for bladder incontinence, decreased urine volume, difficulty urinating, dysuria, frequency, hematuria and urgency.   Musculoskeletal:  Negative for arthralgias, back pain, gait problem, joint swelling, leg pain and myalgias.   Integumentary:  Negative for rash.   Allergic/Immunologic: Negative.    Neurological:  Negative for dizziness, tremors, syncope, weakness, light-headedness,  "numbness, headaches and memory loss.   Hematological:  Negative for adenopathy. Does not bruise/bleed easily.   Psychiatric/Behavioral:  Negative for agitation, confusion, hallucinations, sleep disturbance and suicidal ideas. The patient is not nervous/anxious.           Vitals:    01/29/25 1254   BP: 132/69   BP Location: Left arm   Patient Position: Sitting   Pulse: 110   Resp: 18   Temp: 98.1 °F (36.7 °C)   TempSrc: Oral   SpO2: 98%   Weight: 82.1 kg (180 lb 14.4 oz)   Height: 5' 6.93" (1.7 m)     Wt Readings from Last 6 Encounters:   01/29/25 82.1 kg (180 lb 14.4 oz)   01/02/25 84.6 kg (186 lb 6.4 oz)   12/11/24 85 kg (187 lb 6.3 oz)   12/11/24 85 kg (187 lb 6.4 oz)   11/20/24 86.5 kg (190 lb 12.8 oz)   10/30/24 86.5 kg (190 lb 9.6 oz)     Body mass index is 28.39 kg/m².  Body surface area is 1.97 meters squared.       Physical Exam  Vitals and nursing note reviewed.   Constitutional:       General: He is not in acute distress.     Appearance: Normal appearance. He is obese.   HENT:      Head: Normocephalic and atraumatic.      Mouth/Throat:      Mouth: Mucous membranes are moist.   Eyes:      General: No scleral icterus.     Extraocular Movements: Extraocular movements intact.      Conjunctiva/sclera: Conjunctivae normal.   Neck:      Vascular: No JVD.   Cardiovascular:      Rate and Rhythm: Normal rate and regular rhythm.      Heart sounds: No murmur heard.  Pulmonary:      Effort: Pulmonary effort is normal.      Breath sounds: Decreased breath sounds present. No wheezing or rhonchi.   Chest:      Chest wall: No tenderness.   Abdominal:      General: Bowel sounds are normal. There is no distension.      Palpations: Abdomen is soft. There is no fluid wave.      Tenderness: There is no abdominal tenderness.   Musculoskeletal:         General: No swelling or deformity.      Cervical back: Neck supple.   Lymphadenopathy:      Head:      Right side of head: No submandibular adenopathy.      Left side of head: No " submandibular adenopathy.      Cervical: No cervical adenopathy.      Upper Body:      Right upper body: No supraclavicular or axillary adenopathy.      Left upper body: No supraclavicular or axillary adenopathy.      Lower Body: No right inguinal adenopathy. No left inguinal adenopathy.   Skin:     General: Skin is warm.      Coloration: Skin is not jaundiced.      Findings: No rash.   Neurological:      Mental Status: He is alert and oriented to person, place, and time.      Cranial Nerves: Cranial nerves 2-12 are intact.      Motor: Weakness present.      Gait: Gait abnormal.      Comments: Uses cane for  mobility   Psychiatric:         Attention and Perception: Attention normal.         Mood and Affect: Mood and affect normal.         Behavior: Behavior is cooperative.         Cognition and Memory: Cognition normal.         Judgment: Judgment normal.       ECOG SCORE    1 - Restricted in strenuous activity-ambulatory and able to carry out work of a light nature         Laboratory:  CBC with Differential:  Lab Results   Component Value Date    WBC 7.85 01/27/2025    RBC 4.11 (L) 01/27/2025    HGB 12.6 (L) 01/27/2025    HCT 40.3 (L) 01/27/2025    MCV 98.1 (H) 01/27/2025    MCH 30.7 01/27/2025    MCHC 31.3 (L) 01/27/2025    RDW 17.4 (H) 01/27/2025     01/27/2025    MPV 10.0 01/27/2025        CMP:  Sodium   Date Value Ref Range Status   01/27/2025 139 136 - 145 mmol/L Final     Potassium   Date Value Ref Range Status   01/27/2025 4.0 3.5 - 5.1 mmol/L Final     Chloride   Date Value Ref Range Status   01/27/2025 106 98 - 107 mmol/L Final     CO2   Date Value Ref Range Status   01/27/2025 26 23 - 31 mmol/L Final     Blood Urea Nitrogen   Date Value Ref Range Status   01/27/2025 5.9 (L) 8.4 - 25.7 mg/dL Final     Creatinine   Date Value Ref Range Status   01/27/2025 0.77 0.72 - 1.25 mg/dL Final     Calcium   Date Value Ref Range Status   01/27/2025 9.3 8.8 - 10.0 mg/dL Final     Albumin   Date Value Ref Range  Status   01/27/2025 3.2 (L) 3.4 - 4.8 g/dL Final     Bilirubin Total   Date Value Ref Range Status   01/27/2025 0.7 <=1.5 mg/dL Final     ALP   Date Value Ref Range Status   01/27/2025 146 40 - 150 unit/L Final     AST   Date Value Ref Range Status   01/27/2025 16 5 - 34 unit/L Final     ALT   Date Value Ref Range Status   01/27/2025 10 0 - 55 unit/L Final     Estimated GFR-Non    Date Value Ref Range Status   04/19/2022 >60           Assessment:       1. Non-small cell cancer of right lung    2. S/P lobectomy of lung    3. 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 Discussed with Dr Alba ESCOBAR and he will be seen 4/21/2024.   -Recent PET CT with persistent stable  LN but increase activity    2) Chemotherapy induced anemia and leukopenia-stable        Plan:       Patient with recurrence of disease while on Imfinizi.  Not candidate for RT. We discussed again chemotherapy and he is agreeable. We discontinue Imfinzi and started Gemzar on 4/17/2024 and so far tolerating well.  Most recent PET-CT with mixed response.  He has mild increase of disease in some areas and improvement in others.  We will continue same treatment at this time.  He understands that in the near future if disease continues to progress may need to change treatment.    01/02/25:  Okay to proceed with Gemzar C13 D1 today, Neulasta on day 8.   Infusion only on day 8- labs to be drawn the day before at [facility]  RTC in 4 weeks with NP for TD/Infusion - he prefers Wednesday appts. Labs to be drawn the day  before treatment at [facility].  Labs: CBC, CMP    01/29/25:  Okay to proceed with Gemzar C14 D1 today, Neulasta on day 8.   Infusion only on day 8- labs to be drawn the day before at [facility]  PET CT due 3/2025 - ordered today  RTC in 3 weeks with NP for TD/Infusion - he prefers Wednesday appts. Labs to be drawn the day before treatment at [facility].  Labs weekly - CBC, CMP - standing orders    Encourage to call or message us for any questions or problems  The patient was given ample opportunity to ask questions, and to the best of my abilities, all questions answered to satisfaction; patient demonstrated understanding of what we discussed and agreeable to the plan.   Visit today included increased complexity associated with the care of the episodic problem recurrent lung cancer, addressing and managing the longitudinal care of the patient's recurrent lung cancer.       Sanjuana Napoles MD  Hematology/Oncology    Professional Services   I, Gena Andujar LPN, acted solely as a scribe for and in the presence of Dr. Sanjuana Napoles, who performed these services.

## 2025-01-27 ENCOUNTER — LAB VISIT (OUTPATIENT)
Dept: LAB | Facility: HOSPITAL | Age: 80
End: 2025-01-27
Attending: NURSE PRACTITIONER
Payer: MEDICARE

## 2025-01-27 DIAGNOSIS — C34.91 NON-SMALL CELL CANCER OF RIGHT LUNG: ICD-10-CM

## 2025-01-27 LAB
ALBUMIN SERPL-MCNC: 3.2 G/DL (ref 3.4–4.8)
ALBUMIN/GLOB SERPL: 0.7 RATIO (ref 1.1–2)
ALP SERPL-CCNC: 146 UNIT/L (ref 40–150)
ALT SERPL-CCNC: 10 UNIT/L (ref 0–55)
ANION GAP SERPL CALC-SCNC: 7 MEQ/L
AST SERPL-CCNC: 16 UNIT/L (ref 5–34)
BASOPHILS # BLD AUTO: 0.02 X10(3)/MCL
BASOPHILS NFR BLD AUTO: 0.3 %
BILIRUB SERPL-MCNC: 0.7 MG/DL
BUN SERPL-MCNC: 5.9 MG/DL (ref 8.4–25.7)
CALCIUM SERPL-MCNC: 9.3 MG/DL (ref 8.8–10)
CHLORIDE SERPL-SCNC: 106 MMOL/L (ref 98–107)
CO2 SERPL-SCNC: 26 MMOL/L (ref 23–31)
CREAT SERPL-MCNC: 0.77 MG/DL (ref 0.72–1.25)
CREAT/UREA NIT SERPL: 8
EOSINOPHIL # BLD AUTO: 0.14 X10(3)/MCL (ref 0–0.9)
EOSINOPHIL NFR BLD AUTO: 1.8 %
ERYTHROCYTE [DISTWIDTH] IN BLOOD BY AUTOMATED COUNT: 17.4 % (ref 11.5–17)
GFR SERPLBLD CREATININE-BSD FMLA CKD-EPI: >60 ML/MIN/1.73/M2
GLOBULIN SER-MCNC: 4.9 GM/DL (ref 2.4–3.5)
GLUCOSE SERPL-MCNC: 101 MG/DL (ref 82–115)
HCT VFR BLD AUTO: 40.3 % (ref 42–52)
HGB BLD-MCNC: 12.6 G/DL (ref 14–18)
IMM GRANULOCYTES # BLD AUTO: 0.03 X10(3)/MCL (ref 0–0.04)
IMM GRANULOCYTES NFR BLD AUTO: 0.4 %
LYMPHOCYTES # BLD AUTO: 0.89 X10(3)/MCL (ref 0.6–4.6)
LYMPHOCYTES NFR BLD AUTO: 11.3 %
MCH RBC QN AUTO: 30.7 PG (ref 27–31)
MCHC RBC AUTO-ENTMCNC: 31.3 G/DL (ref 33–36)
MCV RBC AUTO: 98.1 FL (ref 80–94)
MONOCYTES # BLD AUTO: 1.06 X10(3)/MCL (ref 0.1–1.3)
MONOCYTES NFR BLD AUTO: 13.5 %
NEUTROPHILS # BLD AUTO: 5.71 X10(3)/MCL (ref 2.1–9.2)
NEUTROPHILS NFR BLD AUTO: 72.7 %
PLATELET # BLD AUTO: 344 X10(3)/MCL (ref 130–400)
PMV BLD AUTO: 10 FL (ref 7.4–10.4)
POTASSIUM SERPL-SCNC: 4 MMOL/L (ref 3.5–5.1)
PROT SERPL-MCNC: 8.1 GM/DL (ref 5.8–7.6)
RBC # BLD AUTO: 4.11 X10(6)/MCL (ref 4.7–6.1)
SODIUM SERPL-SCNC: 139 MMOL/L (ref 136–145)
WBC # BLD AUTO: 7.85 X10(3)/MCL (ref 4.5–11.5)

## 2025-01-27 PROCEDURE — 85025 COMPLETE CBC W/AUTO DIFF WBC: CPT

## 2025-01-27 PROCEDURE — 80053 COMPREHEN METABOLIC PANEL: CPT

## 2025-01-27 PROCEDURE — 36415 COLL VENOUS BLD VENIPUNCTURE: CPT

## 2025-01-29 ENCOUNTER — INFUSION (OUTPATIENT)
Dept: INFUSION THERAPY | Facility: HOSPITAL | Age: 80
End: 2025-01-29
Attending: NURSE PRACTITIONER
Payer: MEDICARE

## 2025-01-29 ENCOUNTER — OFFICE VISIT (OUTPATIENT)
Dept: HEMATOLOGY/ONCOLOGY | Facility: CLINIC | Age: 80
End: 2025-01-29
Payer: MEDICARE

## 2025-01-29 VITALS
BODY MASS INDEX: 28.39 KG/M2 | WEIGHT: 180.88 LBS | SYSTOLIC BLOOD PRESSURE: 132 MMHG | RESPIRATION RATE: 20 BRPM | TEMPERATURE: 98 F | OXYGEN SATURATION: 98 % | RESPIRATION RATE: 18 BRPM | HEIGHT: 67 IN | DIASTOLIC BLOOD PRESSURE: 69 MMHG | HEART RATE: 110 BPM

## 2025-01-29 DIAGNOSIS — Z90.2 S/P LOBECTOMY OF LUNG: ICD-10-CM

## 2025-01-29 DIAGNOSIS — Z79.899 IMMUNODEFICIENCY SECONDARY TO CHEMOTHERAPY: ICD-10-CM

## 2025-01-29 DIAGNOSIS — T45.1X5A IMMUNODEFICIENCY SECONDARY TO CHEMOTHERAPY: ICD-10-CM

## 2025-01-29 DIAGNOSIS — D84.821 IMMUNODEFICIENCY SECONDARY TO CHEMOTHERAPY: ICD-10-CM

## 2025-01-29 DIAGNOSIS — C34.90 MALIGNANT NEOPLASM OF UNSPECIFIED PART OF UNSPECIFIED BRONCHUS OR LUNG: ICD-10-CM

## 2025-01-29 DIAGNOSIS — Z79.899 ON ANTINEOPLASTIC CHEMOTHERAPY: ICD-10-CM

## 2025-01-29 DIAGNOSIS — C34.00 MALIGNANT NEOPLASM OF HILUS OF LUNG, UNSPECIFIED LATERALITY: Primary | ICD-10-CM

## 2025-01-29 DIAGNOSIS — C34.90 NON-SMALL CELL LUNG CANCER, UNSPECIFIED LATERALITY: ICD-10-CM

## 2025-01-29 DIAGNOSIS — C34.91 NON-SMALL CELL CANCER OF RIGHT LUNG: Primary | ICD-10-CM

## 2025-01-29 PROCEDURE — 3075F SYST BP GE 130 - 139MM HG: CPT | Mod: CPTII,S$GLB,, | Performed by: INTERNAL MEDICINE

## 2025-01-29 PROCEDURE — 99215 OFFICE O/P EST HI 40 MIN: CPT | Mod: S$GLB,,, | Performed by: INTERNAL MEDICINE

## 2025-01-29 PROCEDURE — G2211 COMPLEX E/M VISIT ADD ON: HCPCS | Mod: S$GLB,,, | Performed by: INTERNAL MEDICINE

## 2025-01-29 PROCEDURE — 96413 CHEMO IV INFUSION 1 HR: CPT

## 2025-01-29 PROCEDURE — 99999 PR PBB SHADOW E&M-EST. PATIENT-LVL IV: CPT | Mod: PBBFAC,,, | Performed by: INTERNAL MEDICINE

## 2025-01-29 PROCEDURE — 1159F MED LIST DOCD IN RCRD: CPT | Mod: CPTII,S$GLB,, | Performed by: INTERNAL MEDICINE

## 2025-01-29 PROCEDURE — 25000003 PHARM REV CODE 250: Performed by: INTERNAL MEDICINE

## 2025-01-29 PROCEDURE — 1101F PT FALLS ASSESS-DOCD LE1/YR: CPT | Mod: CPTII,S$GLB,, | Performed by: INTERNAL MEDICINE

## 2025-01-29 PROCEDURE — 1157F ADVNC CARE PLAN IN RCRD: CPT | Mod: CPTII,S$GLB,, | Performed by: INTERNAL MEDICINE

## 2025-01-29 PROCEDURE — 3288F FALL RISK ASSESSMENT DOCD: CPT | Mod: CPTII,S$GLB,, | Performed by: INTERNAL MEDICINE

## 2025-01-29 PROCEDURE — 63600175 PHARM REV CODE 636 W HCPCS: Performed by: INTERNAL MEDICINE

## 2025-01-29 PROCEDURE — 1160F RVW MEDS BY RX/DR IN RCRD: CPT | Mod: CPTII,S$GLB,, | Performed by: INTERNAL MEDICINE

## 2025-01-29 PROCEDURE — 1126F AMNT PAIN NOTED NONE PRSNT: CPT | Mod: CPTII,S$GLB,, | Performed by: INTERNAL MEDICINE

## 2025-01-29 PROCEDURE — 96375 TX/PRO/DX INJ NEW DRUG ADDON: CPT

## 2025-01-29 PROCEDURE — 3078F DIAST BP <80 MM HG: CPT | Mod: CPTII,S$GLB,, | Performed by: INTERNAL MEDICINE

## 2025-01-29 RX ORDER — HEPARIN 100 UNIT/ML
500 SYRINGE INTRAVENOUS
OUTPATIENT
Start: 2025-02-05

## 2025-01-29 RX ORDER — ONDANSETRON HYDROCHLORIDE 2 MG/ML
8 INJECTION, SOLUTION INTRAVENOUS
Status: CANCELLED | OUTPATIENT
Start: 2025-01-29

## 2025-01-29 RX ORDER — HEPARIN 100 UNIT/ML
500 SYRINGE INTRAVENOUS
Status: DISCONTINUED | OUTPATIENT
Start: 2025-01-29 | End: 2025-01-29 | Stop reason: HOSPADM

## 2025-01-29 RX ORDER — HEPARIN 100 UNIT/ML
500 SYRINGE INTRAVENOUS
Status: CANCELLED | OUTPATIENT
Start: 2025-01-29

## 2025-01-29 RX ORDER — SODIUM CHLORIDE 0.9 % (FLUSH) 0.9 %
10 SYRINGE (ML) INJECTION
Status: DISCONTINUED | OUTPATIENT
Start: 2025-01-29 | End: 2025-01-29 | Stop reason: HOSPADM

## 2025-01-29 RX ORDER — ONDANSETRON HYDROCHLORIDE 2 MG/ML
8 INJECTION, SOLUTION INTRAVENOUS
Status: COMPLETED | OUTPATIENT
Start: 2025-01-29 | End: 2025-01-29

## 2025-01-29 RX ORDER — SODIUM CHLORIDE 0.9 % (FLUSH) 0.9 %
10 SYRINGE (ML) INJECTION
Status: CANCELLED | OUTPATIENT
Start: 2025-01-29

## 2025-01-29 RX ORDER — ONDANSETRON HYDROCHLORIDE 2 MG/ML
8 INJECTION, SOLUTION INTRAVENOUS
OUTPATIENT
Start: 2025-02-05

## 2025-01-29 RX ORDER — SODIUM CHLORIDE 0.9 % (FLUSH) 0.9 %
10 SYRINGE (ML) INJECTION
OUTPATIENT
Start: 2025-02-05

## 2025-01-29 RX ADMIN — HEPARIN 500 UNITS: 100 SYRINGE at 02:01

## 2025-01-29 RX ADMIN — ONDANSETRON 8 MG: 2 INJECTION INTRAMUSCULAR; INTRAVENOUS at 02:01

## 2025-01-29 RX ADMIN — SODIUM CHLORIDE: 9 INJECTION, SOLUTION INTRAVENOUS at 01:01

## 2025-01-29 RX ADMIN — GEMCITABINE 2400 MG: 38 INJECTION, SOLUTION INTRAVENOUS at 02:01

## 2025-01-29 NOTE — NURSING
"Patient here for gemzar C14D1 q 3 weeks. Patient spoke to nursing supervisor, Apolinar Guerra RN, on "why pharmacy was slow on making medication today". She explained due the weather last week, we are catching up this week on treatments and pharmacy is working harder. Patient verbalizes understanding. Patient tolerated treatment well.   "

## 2025-02-01 DIAGNOSIS — I10 HYPERTENSION, UNSPECIFIED TYPE: ICD-10-CM

## 2025-02-03 ENCOUNTER — LAB VISIT (OUTPATIENT)
Dept: LAB | Facility: HOSPITAL | Age: 80
End: 2025-02-03
Attending: INTERNAL MEDICINE
Payer: MEDICARE

## 2025-02-03 DIAGNOSIS — Z90.2 S/P LOBECTOMY OF LUNG: ICD-10-CM

## 2025-02-03 DIAGNOSIS — C34.90 MALIGNANT NEOPLASM OF UNSPECIFIED PART OF UNSPECIFIED BRONCHUS OR LUNG: ICD-10-CM

## 2025-02-03 DIAGNOSIS — C34.91 NON-SMALL CELL CANCER OF RIGHT LUNG: ICD-10-CM

## 2025-02-03 DIAGNOSIS — D84.821 IMMUNODEFICIENCY SECONDARY TO CHEMOTHERAPY: ICD-10-CM

## 2025-02-03 DIAGNOSIS — Z79.899 IMMUNODEFICIENCY SECONDARY TO CHEMOTHERAPY: ICD-10-CM

## 2025-02-03 DIAGNOSIS — T45.1X5A IMMUNODEFICIENCY SECONDARY TO CHEMOTHERAPY: ICD-10-CM

## 2025-02-03 DIAGNOSIS — Z79.899 ON ANTINEOPLASTIC CHEMOTHERAPY: ICD-10-CM

## 2025-02-03 LAB
ALBUMIN SERPL-MCNC: 3 G/DL (ref 3.4–4.8)
ALBUMIN/GLOB SERPL: 0.6 RATIO (ref 1.1–2)
ALP SERPL-CCNC: 114 UNIT/L (ref 40–150)
ALT SERPL-CCNC: 16 UNIT/L (ref 0–55)
ANION GAP SERPL CALC-SCNC: 12 MEQ/L
AST SERPL-CCNC: 23 UNIT/L (ref 5–34)
BASOPHILS # BLD AUTO: 0.01 X10(3)/MCL
BASOPHILS NFR BLD AUTO: 0.2 %
BILIRUB SERPL-MCNC: 1.3 MG/DL
BUN SERPL-MCNC: 6.8 MG/DL (ref 8.4–25.7)
CALCIUM SERPL-MCNC: 9.2 MG/DL (ref 8.8–10)
CHLORIDE SERPL-SCNC: 109 MMOL/L (ref 98–107)
CO2 SERPL-SCNC: 23 MMOL/L (ref 23–31)
CREAT SERPL-MCNC: 0.77 MG/DL (ref 0.72–1.25)
CREAT/UREA NIT SERPL: 9
EOSINOPHIL # BLD AUTO: 0.16 X10(3)/MCL (ref 0–0.9)
EOSINOPHIL NFR BLD AUTO: 3.5 %
ERYTHROCYTE [DISTWIDTH] IN BLOOD BY AUTOMATED COUNT: 16.6 % (ref 11.5–17)
GFR SERPLBLD CREATININE-BSD FMLA CKD-EPI: >60 ML/MIN/1.73/M2
GLOBULIN SER-MCNC: 5.2 GM/DL (ref 2.4–3.5)
GLUCOSE SERPL-MCNC: 103 MG/DL (ref 82–115)
HCT VFR BLD AUTO: 36.9 % (ref 42–52)
HGB BLD-MCNC: 11.6 G/DL (ref 14–18)
IMM GRANULOCYTES # BLD AUTO: 0.01 X10(3)/MCL (ref 0–0.04)
IMM GRANULOCYTES NFR BLD AUTO: 0.2 %
LYMPHOCYTES # BLD AUTO: 0.56 X10(3)/MCL (ref 0.6–4.6)
LYMPHOCYTES NFR BLD AUTO: 12.1 %
MCH RBC QN AUTO: 30.3 PG (ref 27–31)
MCHC RBC AUTO-ENTMCNC: 31.4 G/DL (ref 33–36)
MCV RBC AUTO: 96.3 FL (ref 80–94)
MONOCYTES # BLD AUTO: 0.22 X10(3)/MCL (ref 0.1–1.3)
MONOCYTES NFR BLD AUTO: 4.8 %
NEUTROPHILS # BLD AUTO: 3.66 X10(3)/MCL (ref 2.1–9.2)
NEUTROPHILS NFR BLD AUTO: 79.2 %
PLATELET # BLD AUTO: 240 X10(3)/MCL (ref 130–400)
PMV BLD AUTO: 10.3 FL (ref 7.4–10.4)
POTASSIUM SERPL-SCNC: 4 MMOL/L (ref 3.5–5.1)
PROT SERPL-MCNC: 8.2 GM/DL (ref 5.8–7.6)
RBC # BLD AUTO: 3.83 X10(6)/MCL (ref 4.7–6.1)
SODIUM SERPL-SCNC: 144 MMOL/L (ref 136–145)
WBC # BLD AUTO: 4.62 X10(3)/MCL (ref 4.5–11.5)

## 2025-02-03 PROCEDURE — 85025 COMPLETE CBC W/AUTO DIFF WBC: CPT

## 2025-02-03 PROCEDURE — 80053 COMPREHEN METABOLIC PANEL: CPT

## 2025-02-03 PROCEDURE — 36415 COLL VENOUS BLD VENIPUNCTURE: CPT

## 2025-02-03 RX ORDER — AMLODIPINE BESYLATE 5 MG/1
5 TABLET ORAL 2 TIMES DAILY
Qty: 180 TABLET | Refills: 1 | Status: SHIPPED | OUTPATIENT
Start: 2025-02-03

## 2025-02-05 ENCOUNTER — INFUSION (OUTPATIENT)
Dept: INFUSION THERAPY | Facility: HOSPITAL | Age: 80
End: 2025-02-05
Attending: NURSE PRACTITIONER
Payer: MEDICARE

## 2025-02-05 VITALS
RESPIRATION RATE: 20 BRPM | DIASTOLIC BLOOD PRESSURE: 69 MMHG | OXYGEN SATURATION: 91 % | HEART RATE: 110 BPM | SYSTOLIC BLOOD PRESSURE: 129 MMHG | TEMPERATURE: 98 F

## 2025-02-05 DIAGNOSIS — C34.90 NON-SMALL CELL LUNG CANCER, UNSPECIFIED LATERALITY: ICD-10-CM

## 2025-02-05 DIAGNOSIS — C34.00 MALIGNANT NEOPLASM OF HILUS OF LUNG, UNSPECIFIED LATERALITY: Primary | ICD-10-CM

## 2025-02-05 PROCEDURE — 96375 TX/PRO/DX INJ NEW DRUG ADDON: CPT

## 2025-02-05 PROCEDURE — 96377 APPLICATON ON-BODY INJECTOR: CPT

## 2025-02-05 PROCEDURE — 25000003 PHARM REV CODE 250: Performed by: INTERNAL MEDICINE

## 2025-02-05 PROCEDURE — 96413 CHEMO IV INFUSION 1 HR: CPT

## 2025-02-05 PROCEDURE — 63600175 PHARM REV CODE 636 W HCPCS: Mod: JZ,TB | Performed by: INTERNAL MEDICINE

## 2025-02-05 RX ORDER — ONDANSETRON HYDROCHLORIDE 2 MG/ML
8 INJECTION, SOLUTION INTRAVENOUS
Status: COMPLETED | OUTPATIENT
Start: 2025-02-05 | End: 2025-02-05

## 2025-02-05 RX ORDER — HEPARIN 100 UNIT/ML
500 SYRINGE INTRAVENOUS
Status: DISCONTINUED | OUTPATIENT
Start: 2025-02-05 | End: 2025-02-05 | Stop reason: HOSPADM

## 2025-02-05 RX ORDER — SODIUM CHLORIDE 0.9 % (FLUSH) 0.9 %
10 SYRINGE (ML) INJECTION
Status: DISCONTINUED | OUTPATIENT
Start: 2025-02-05 | End: 2025-02-05 | Stop reason: HOSPADM

## 2025-02-05 RX ADMIN — ONDANSETRON 8 MG: 2 INJECTION INTRAMUSCULAR; INTRAVENOUS at 09:02

## 2025-02-05 RX ADMIN — PEGFILGRASTIM 6 MG: KIT SUBCUTANEOUS at 09:02

## 2025-02-05 RX ADMIN — GEMCITABINE 2400 MG: 38 INJECTION, SOLUTION INTRAVENOUS at 09:02

## 2025-02-18 NOTE — PROGRESS NOTES
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     Treatment/Oncogy history:  -5/24/2022 right upper lobectomy   -Local Recurrence 03/08/2023  -completed XRT on 5/30/23 and 5 cycles of weekly carbo/taxol on 5/19/23   Mediastinum: 5,000 cGy/200 cGy per fx (4/18/2023-5/23/2023)   Med Bst:        1,000 cGy/200 cGy per fx (5/24/2023-5/30/2023)  -Imfinzi every 4 weeks x 10 cycles (6/22/23-3/11/2024)--> progression      Imaging:  CT angiogram 1/17/2022: No pulmonary embolus. Right upper lobe 2.5 cm mass.  CT C 9/19/2022: Status post right partial pneumonectomy for resection of a right upper lobe lung mass with no residual recurrent mass seen. Small right-sided pleural effusion. Some lymphadenopathy in the right paratracheal region with a maximum short axis dimension of 1.6 cm.  Follow-up is recommended  CT C 1/25/2023: There is a paratracheal enlarged lymph node which shows interval mild size increase and now measures 2.2 x 2.0 cm and previously was 1.6 x 1.5 cm.  Aortopulmonary window mildly enlarged lymph node is stable.  Thoracic aorta is without aneurysmal dilatation or dissection.  Impression: 1. Operative changes of right upper lobectomy without bronchialstump soft tissue proliferation    2. No residual tumor load or metastatic nodule. 3. Paratracheal enlarged lymph node with interval size increase.  PET CT 2/9/2023:  Hypermetabolic right paratracheal lymph node image 81 series 3 measures 25 x 20 mm with max SUV 27.0.  Hypermetabolic anterior mediastinal lymph node anterior to the SVC measures 12 x 9 mm with max SUV 12.0. Impression: 1. Hypermetabolic metastatic  mediastinal adenopathy.   2. No PET evidence of metastatic disease outside of the mediastinum.  CT C/A/P 7/10/2023:  1. Several new subcentimeter nodules in the right lung.  Suspect these are infectious or inflammatory in nature, but 3 month follow-up chest CT recommended to ensure resolution.  2. 2 reference mediastinal lymph nodes are smaller since January.  3. L1 vertebral body compression deformity new since January, but appears subacute.  CT C/A/P 10/10/2023:    1. Slightly larger mediastinal lymph nodes, to be followed.  2. Increased irregular right perihilar consolidation which may be treatment related.  3. Small right pleural effusion.  4. No new suspicious findings in the abdomen or pelvis.  CT C/A/P 1/10/2024:    1. Interval enlargement of mediastinal lymph nodes  2. Similar right perihilar consolidative opacity and small right pleural effusion  PET CT 2/6/2024:  1. New, enlarged hypermetabolic superior paratracheal and para-aortic/subaortic lymph nodes compared to prior PET-CT  2. Persistent enlarged and hypermetabolic lower right paratracheal lymph node.  This lymph node is decreased in size and FDG avidity compared to prior PET-CT.  3. Previously seen hypermetabolic pre-vascular lymph node is decreased in size and is no longer hypermetabolic.  PET CT 5/23/2024:  1. Status post right upper lobe resection without evidence for local recurrence/residual disease.  2. Interval response to therapy with decreasing right paratracheal and left AP window adenopathy.  3. Nonspecific area of hypermetabolic activity within the left tongue base.  Direct visualization may be warranted.  4. No evidence for new focus of metastatic disease.  PET CT 9/9/2024:  1. Previously visualized mediastinal lymph nodes slightly more FDG avid today.  2. No new sites of metastatic disease.  PET CT 12/10/24:  1. Variable mediastinal severo changes, with reference lymph nodes larger but of decreased metabolic activity, and newly enlarged  and FDG-avid retrosternal nodes identified.  This is otherwise indeterminate and could represent mixed disease response versus reactive adenopathy.  Short interval follow-up recommended in order to assess stability/resolution.  2. Stable right lung post-treatment changes, with no evidence to suggest tracer-avid new/worsened metastatic disease through the neck, abdomen, or pelvis.  3. Slightly increased volume of loculated, otherwise simple appearing right pleural fluid.          Pathology:  03/22/2022 CT-guided biopsy of the right lung mass: invasive squamous cell carcinoma, moderately differentiated.  5/24/2022: Right upper lobectomy:  1- LYMPH NODE, LUMBAR RIGHT 10 LYMPHADENECTOMY: NEGATIVE FOR METASTATIC CARCINOMA (0/1).   2- LYMPH NODE, 11R, LYMPHADENECTOMY: NEGATIVE FOR METASTATIC CARCINOMA (0/1).  3- LYMPH NODE, 9R, LYMPHADENECTOMY: NEGATIVE FOR METASTATIC CARCINOMA (0/1).   4- LYMPH NODE, 7R, LYMPHADENECTOMY: NEGATIVE FOR METASTATIC CARCINOMA (0/1).   5- LYMPH NODE, 8R, LYMPHADENECTOMY: ONE LYMPH NODE NEGATIVE FOR METASTATIC CARCINOMA (0/1).    6- LYMPH NODE, 12R, LYMPHADENECTOMY: NEGATIVE FOR METASTATIC CARCINOMA (0/1).  7- LUNG, RIGHT UPPER AND MIDDLE LOBES, PNEUMONECTOMY:  POORLY DIFFERENTIATED, G3, SQUAMOUS CELL CARCINOMA, INVASIVE.    - TUMOR SIZE: 3 CM.    - LYMPHOVASCULAR INVASION IS PRESENT.    - MARGINS NEGATIVE FOR INVASIVE CARCINOMA:    - NEAREST MARGIN, VASCULAR / BRONCHIAL 2.5 CM.    - NO PLEURAL SURFACE INVOLVEMENT     - PROMINENT NECROSIS PRESENT.  Visceral Pleura Invasion: Not identified  Number of Lymph Nodes Examined: Exact number : 6  pT Category: pT1c: pN0: No regional lymph node metastasis    3/8/2023 LYMPH NODE BIOPSY:   LYMPH NODE 4R, LYMPHADENECTOMY:  METASTATIC SQUAMOUS CELL CARCINOMA, NONKERATINIZING, MULTIPLE FRAGMENTS.       IMMUNOHISTOCHEMICAL STAINS:   CK 5/6, P63 AND CK7:  POSITIVE IN MALIGNANT CELLS.   CK20 AND TTF-1:  NEGATIVE IN MALIGNANT CELLS.       CLINICAL HISTORY:        Patient: Leonila Rosales is a 79 y.o. male kindly referred by  Dr. Diaz for evaluation of lung cancer.    On 01/17/2022 patient presented to the emergency department after a fall.  He reports that he was getting to his truck and sleep and fell on his left side on the truck step rail.  He started having pain in his left hip and knee.  He underwent a CT angiogram that showed No pulmonary embolus. Right upper lobe 2.5 cm mass.      During that hospitalization he was treated for a close intertrochanteric fracture of the left femur and underwent open reduction intramedullary nailing left comminuted intertrochanteric hip fracture on 01/18/2022 with Dr. Fortino Palomares.  He then spent several weeks on rehab.    On 03/22/2022 he underwent CT-guided biopsy of the right lung mass.  Pathology showed invasive squamous cell carcinoma, moderately differentiated.    He is s/p right upper lobectomy with  on 5/24/2022.  Pathology report as above.  Patient is stage IA3 squamous cell carcinoma of the lung.  He has recovered well and has been doing good.     Patient was started on surveillance. CT 9/19/2022 with 1.6 right paratracheal LN and small Rt pleural effusion. Surveillance CT scan chest to eval stability 1/25/2023 with paratracheal enlarged lymph node which shows interval mild size increase and now measures 2.2 x 2.0 cm and previously was 1.6 x 1.5 cm.  Aortopulmonary window mildly enlarged lymph node is stable. PET CT 2/9/2023 with Hypermetabolic right paratracheal lymph node 25 x 20 mm with max SUV 27.0. Hypermetabolic anterior mediastinal lymph node anterior to the SVC measures 12 x 9 mm with max SUV 12.0.   Biopsy of R4 lymph node confirmed the metastatic squamous cell carcinoma. Completed  XRT on 5/30/23  and 5 cycles of Carbo/taxol on 5/19/23. C6 was held on 5/26/23 due to neutropenia, ANC was 0.7.    Patient was started on chemoradiation. -completed XRT on 5/30/23 and 5 cycles of weekly carbo/taxol on 5/19/23, His  final cycle was held due to neutropenia, ANC was 0.7.  He was then started on maintenance Imfinzi every 4 weeks on 6/22/23    Chief Complaint: 3 Week Follow Up      Interval History:  He completed XRT to the mediastinum on 5/30/23 and 5 cycles of weekly carbo/taxol on 5/19/23. He was on maintenance durvalumab, started 6/22/2023 and tolerated well. Patient with progression of paratracheal and para-aortic/subaortic lymph nodes.  He was seen by Dr. Willis but not a good candidate for radiation at this time.  He was started on Gemzar on 4/13/24.     01/29/25: Patient presents today for follow up.  He is doing well and voices no new concerns. He is due today for Gemzar C14D1. Denies fever, chills and sweats. Denies pain.    Interval history  2/19/2025:  01/29/2025-status post Gemzar cycle 14 D1  02/05/2025-status post Gemzar cycle 14 D 8  On today's visit the patient presents to the office for follow-up and management of his non-small cell lung cancer.  He reports a dry cough that started a couple of days ago but denies any phlegm production or any other issues.  Reports good energy denies any GI distress of flu-like symptoms he also denies any edema or rashes.  For treatment with gemcitabine tolerability.      Past Medical History:   Diagnosis Date    Anemia     Class 1 obesity due to excess calories with serious comorbidity and body mass index (BMI) of 33.0 to 33.9 in adult     Closed intertrochanteric fracture     Deficiency of macronutrients     GERD (gastroesophageal reflux disease)     H. pylori infection     History of tobacco use     Hyperlipidemia     Hypertension     Lung mass     Malignant neoplasm of unspecified part of unspecified bronchus or lung     Non-small cell lung cancer       Past Surgical History:   Procedure Laterality Date    BIOPSY OF INTESTINE  04/04/2022    BRONCHOSCOPY      COLONOSCOPY      ESOPHAGOGASTRODUODENOSCOPY  04/04/2022    HIP FRACTURE SURGERY Left 2016    Intramedullary Nail  Insertion Hip Left 01/18/2022    KIDNEY SURGERY Right 05/27/2022    MEDIASTINOSCOPY N/A 3/6/2023    Procedure: MEDIASTINOSCOPY;  Surgeon: Jose Diaz MD;  Location: [facility] OR;  Service: Cardiothoracic;  Laterality: N/A;  XX    PLACEMENT, MEDIPORT N/A 4/6/2023    Procedure: Placement, Mediport;  Surgeon: Jose Diaz MD;  Location: [facility] OR;  Service: Cardiology;  Laterality: N/A;    SHOULDER SURGERY       Family History   Family history unknown: Yes            Review of patient's allergies indicates:  No Known Allergies   Current Outpatient Medications on File Prior to Visit   Medication Sig Dispense Refill    amLODIPine (NORVASC) 5 MG tablet TAKE ONE TABLET BY MOUTH TWICE DAILY 180 tablet 1    aspirin 81 mg Cap Take 81 mg by mouth Daily.      atorvastatin (LIPITOR) 10 MG tablet TAKE ONE TABLET BY MOUTH DAILY 90 tablet 3    levoFLOXacin (LEVAQUIN) 500 MG tablet Take 1 tablet (500 mg total) by mouth once daily. 7 tablet 0    LIDOcaine-prilocaine (EMLA) cream Apply topically as needed. Apply to port site 30 mins prior to chemo 30 g 3    LINZESS 145 mcg Cap capsule Take 145 mcg by mouth daily as needed. New medication, not started yet      metoprolol succinate (TOPROL-XL) 25 MG 24 hr tablet Take 1 tablet (25 mg total) by mouth once daily. 90 tablet 3    pantoprazole (PROTONIX) 40 MG tablet Take 40 mg by mouth.       No current facility-administered medications on file prior to visit.      Review of Systems   Constitutional:  Negative for activity change, appetite change, chills, diaphoresis, fatigue, fever, night sweats and unexpected weight change.   HENT:  Negative for nasal congestion, mouth sores, nosebleeds, sinus pressure/congestion, sore throat and trouble swallowing.    Eyes: Negative.    Respiratory:  Negative for cough and shortness of breath.    Cardiovascular:  Negative for chest pain and palpitations.   Gastrointestinal:  Negative for abdominal distention, abdominal pain, blood in stool, change in  "bowel habit, constipation, diarrhea, nausea and vomiting.   Endocrine: Negative.    Genitourinary:  Negative for bladder incontinence, decreased urine volume, difficulty urinating, dysuria, frequency, hematuria and urgency.   Musculoskeletal:  Negative for arthralgias, back pain, gait problem, joint swelling, leg pain and myalgias.   Integumentary:  Negative for rash.   Allergic/Immunologic: Negative.    Neurological:  Negative for dizziness, tremors, syncope, weakness, light-headedness, numbness, headaches and memory loss.   Hematological:  Negative for adenopathy. Does not bruise/bleed easily.   Psychiatric/Behavioral:  Negative for agitation, confusion, hallucinations, sleep disturbance and suicidal ideas. The patient is not nervous/anxious.           Vitals:    02/19/25 1014   BP: 122/72   BP Location: Left arm   Patient Position: Sitting   Pulse: (!) 115   Resp: 18   Temp: 98.3 °F (36.8 °C)   TempSrc: Oral   SpO2: (!) 94%   Weight: 79.8 kg (175 lb 14.4 oz)   Height: 5' 6.93" (1.7 m)       Wt Readings from Last 6 Encounters:   02/19/25 79.8 kg (175 lb 14.4 oz)   01/29/25 82.1 kg (180 lb 14.4 oz)   01/02/25 84.6 kg (186 lb 6.4 oz)   12/11/24 85 kg (187 lb 6.3 oz)   12/11/24 85 kg (187 lb 6.4 oz)   11/20/24 86.5 kg (190 lb 12.8 oz)     Body mass index is 27.61 kg/m².  Body surface area is 1.94 meters squared.       Physical Exam  Vitals and nursing note reviewed.   Constitutional:       General: He is not in acute distress.     Appearance: Normal appearance. He is obese.   HENT:      Head: Normocephalic and atraumatic.      Mouth/Throat:      Mouth: Mucous membranes are moist.   Eyes:      General: No scleral icterus.     Extraocular Movements: Extraocular movements intact.      Conjunctiva/sclera: Conjunctivae normal.   Neck:      Vascular: No JVD.   Cardiovascular:      Rate and Rhythm: Normal rate and regular rhythm.      Heart sounds: No murmur heard.  Pulmonary:      Effort: Pulmonary effort is normal.      " Breath sounds: Decreased breath sounds present. No wheezing or rhonchi.   Chest:      Chest wall: No tenderness.   Abdominal:      General: Bowel sounds are normal. There is no distension.      Palpations: Abdomen is soft. There is no fluid wave.      Tenderness: There is no abdominal tenderness.   Musculoskeletal:         General: No swelling or deformity.      Cervical back: Neck supple.   Lymphadenopathy:      Head:      Right side of head: No submandibular adenopathy.      Left side of head: No submandibular adenopathy.      Cervical: No cervical adenopathy.      Upper Body:      Right upper body: No supraclavicular or axillary adenopathy.      Left upper body: No supraclavicular or axillary adenopathy.      Lower Body: No right inguinal adenopathy. No left inguinal adenopathy.   Skin:     General: Skin is warm.      Coloration: Skin is not jaundiced.      Findings: No rash.   Neurological:      Mental Status: He is alert and oriented to person, place, and time.      Cranial Nerves: Cranial nerves 2-12 are intact.      Motor: Weakness present.      Gait: Gait abnormal.      Comments: Uses cane for  mobility   Psychiatric:         Attention and Perception: Attention normal.         Mood and Affect: Mood and affect normal.         Behavior: Behavior is cooperative.         Cognition and Memory: Cognition normal.         Judgment: Judgment normal.       ECOG SCORE             Laboratory:  CBC with Differential:  Lab Results   Component Value Date    WBC 4.62 02/03/2025    RBC 3.83 (L) 02/03/2025    HGB 11.6 (L) 02/03/2025    HCT 36.9 (L) 02/03/2025    MCV 96.3 (H) 02/03/2025    MCH 30.3 02/03/2025    MCHC 31.4 (L) 02/03/2025    RDW 16.6 02/03/2025     02/03/2025    MPV 10.3 02/03/2025        CMP:  Sodium   Date Value Ref Range Status   02/03/2025 144 136 - 145 mmol/L Final     Potassium   Date Value Ref Range Status   02/03/2025 4.0 3.5 - 5.1 mmol/L Final     Chloride   Date Value Ref Range Status    02/03/2025 109 (H) 98 - 107 mmol/L Final     CO2   Date Value Ref Range Status   02/03/2025 23 23 - 31 mmol/L Final     Blood Urea Nitrogen   Date Value Ref Range Status   02/03/2025 6.8 (L) 8.4 - 25.7 mg/dL Final     Creatinine   Date Value Ref Range Status   02/03/2025 0.77 0.72 - 1.25 mg/dL Final     Calcium   Date Value Ref Range Status   02/03/2025 9.2 8.8 - 10.0 mg/dL Final     Albumin   Date Value Ref Range Status   02/03/2025 3.0 (L) 3.4 - 4.8 g/dL Final     Bilirubin Total   Date Value Ref Range Status   02/03/2025 1.3 <=1.5 mg/dL Final     ALP   Date Value Ref Range Status   02/03/2025 114 40 - 150 unit/L Final     AST   Date Value Ref Range Status   02/03/2025 23 5 - 34 unit/L Final     ALT   Date Value Ref Range Status   02/03/2025 16 0 - 55 unit/L Final     Estimated GFR-Non    Date Value Ref Range Status   04/19/2022 >60           Assessment:       1. Deficiency of macronutrients    2. Gastrointestinal hemorrhage, unspecified gastrointestinal hemorrhage type    3. Non-small cell cancer of right lung    4. S/P lobectomy of lung    5. Secondary and unspecified malignant neoplasm of intrathoracic lymph nodes    6. Chemotherapy-induced neutropenia    7. On antineoplastic chemotherapy    8. Malignant neoplasm of hilus of lung, unspecified laterality    9. 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 Discussed with Dr Alba ESCOBAR and he will be seen 4/21/2024.   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  Reviewed laboratory evaluation with the patient she is appropriate to proceed with treatment today  Patient denies any GI distress, flu-like symptoms, rash, edema.  Laboratory evaluation reveals hemoglobin = 11.6 other than this mild anemia he has a completely normal platelet count, neutrophil count, LFTs, renal function.  Proceed with cycle 15 gemcitabine today.  Cycle 16 gemcitabine will be delayed by a week at the patient's request because in the far from town and does not want to come 2 days in a row.  Patient is scheduled for PET scan on March 11th 2025.  Next appointment will be scheduled with Dr. Napoles to go over the results of the scan.  Dx:  Chemotherapy-induced anemia  Status:  Ongoing  Hemoglobin = 11.6 likely myelosuppression from treatment  Continue to monitor without interventions at this time  Dx:  Chemotherapy-induced leukopenia  Status:  Resolved  Patient has a completely normal white blood cell count and neutrophil count today.    Patient instructions and visit orders.       -Follow-up:  F/U with MD -03/19/2024  -Labs:  CBC, CMP, CEA-03/19/2024  -Imaging:  PET scan-03/11/2024 (already scheduled)  -Other orders:  Treatment, gemcitabine-cycle 16-03/19/2024  Appointment with family Medicine-April 15, 2025    46 were spent on this encounter    Shaun Vang, MSN, FNP-BC, APRN, AOCNP   Department of Hematology/Oncology.

## 2025-02-19 ENCOUNTER — INFUSION (OUTPATIENT)
Dept: INFUSION THERAPY | Facility: HOSPITAL | Age: 80
End: 2025-02-19
Attending: NURSE PRACTITIONER
Payer: MEDICARE

## 2025-02-19 ENCOUNTER — LAB VISIT (OUTPATIENT)
Dept: LAB | Facility: HOSPITAL | Age: 80
End: 2025-02-19
Attending: INTERNAL MEDICINE
Payer: MEDICARE

## 2025-02-19 ENCOUNTER — OFFICE VISIT (OUTPATIENT)
Dept: HEMATOLOGY/ONCOLOGY | Facility: CLINIC | Age: 80
End: 2025-02-19
Payer: MEDICARE

## 2025-02-19 VITALS
RESPIRATION RATE: 18 BRPM | HEART RATE: 115 BPM | DIASTOLIC BLOOD PRESSURE: 72 MMHG | WEIGHT: 175.88 LBS | HEIGHT: 67 IN | SYSTOLIC BLOOD PRESSURE: 122 MMHG | OXYGEN SATURATION: 94 % | BODY MASS INDEX: 27.61 KG/M2 | TEMPERATURE: 98 F

## 2025-02-19 DIAGNOSIS — C34.90 NON-SMALL CELL LUNG CANCER, UNSPECIFIED LATERALITY: ICD-10-CM

## 2025-02-19 DIAGNOSIS — C34.00 MALIGNANT NEOPLASM OF HILUS OF LUNG, UNSPECIFIED LATERALITY: Primary | ICD-10-CM

## 2025-02-19 DIAGNOSIS — C34.00 MALIGNANT NEOPLASM OF HILUS OF LUNG, UNSPECIFIED LATERALITY: ICD-10-CM

## 2025-02-19 DIAGNOSIS — C34.91 NON-SMALL CELL CANCER OF RIGHT LUNG: ICD-10-CM

## 2025-02-19 DIAGNOSIS — T45.1X5A IMMUNODEFICIENCY SECONDARY TO CHEMOTHERAPY: ICD-10-CM

## 2025-02-19 DIAGNOSIS — Z90.2 S/P LOBECTOMY OF LUNG: ICD-10-CM

## 2025-02-19 DIAGNOSIS — Z79.899 ON ANTINEOPLASTIC CHEMOTHERAPY: ICD-10-CM

## 2025-02-19 DIAGNOSIS — D50.9 MICROCYTIC ANEMIA: ICD-10-CM

## 2025-02-19 DIAGNOSIS — T45.1X5A CHEMOTHERAPY-INDUCED NEUTROPENIA: ICD-10-CM

## 2025-02-19 DIAGNOSIS — C34.90 MALIGNANT NEOPLASM OF UNSPECIFIED PART OF UNSPECIFIED BRONCHUS OR LUNG: ICD-10-CM

## 2025-02-19 DIAGNOSIS — Z79.899 IMMUNODEFICIENCY SECONDARY TO CHEMOTHERAPY: ICD-10-CM

## 2025-02-19 DIAGNOSIS — D84.821 IMMUNODEFICIENCY SECONDARY TO CHEMOTHERAPY: ICD-10-CM

## 2025-02-19 DIAGNOSIS — C77.1 SECONDARY AND UNSPECIFIED MALIGNANT NEOPLASM OF INTRATHORACIC LYMPH NODES: ICD-10-CM

## 2025-02-19 DIAGNOSIS — K92.2 GASTROINTESTINAL HEMORRHAGE, UNSPECIFIED GASTROINTESTINAL HEMORRHAGE TYPE: ICD-10-CM

## 2025-02-19 DIAGNOSIS — E63.9 DEFICIENCY OF MACRONUTRIENTS: Primary | ICD-10-CM

## 2025-02-19 DIAGNOSIS — D70.1 CHEMOTHERAPY-INDUCED NEUTROPENIA: ICD-10-CM

## 2025-02-19 LAB
BASOPHILS # BLD AUTO: 0.02 X10(3)/MCL
BASOPHILS NFR BLD AUTO: 0.2 %
EOSINOPHIL # BLD AUTO: 0.04 X10(3)/MCL (ref 0–0.9)
EOSINOPHIL NFR BLD AUTO: 0.3 %
ERYTHROCYTE [DISTWIDTH] IN BLOOD BY AUTOMATED COUNT: 19.1 % (ref 11.5–17)
HCT VFR BLD AUTO: 38.6 % (ref 42–52)
HGB BLD-MCNC: 12.1 G/DL (ref 14–18)
IMM GRANULOCYTES # BLD AUTO: 0.07 X10(3)/MCL (ref 0–0.04)
IMM GRANULOCYTES NFR BLD AUTO: 0.5 %
LYMPHOCYTES # BLD AUTO: 0.71 X10(3)/MCL (ref 0.6–4.6)
LYMPHOCYTES NFR BLD AUTO: 5.6 %
MCH RBC QN AUTO: 30.4 PG (ref 27–31)
MCHC RBC AUTO-ENTMCNC: 31.3 G/DL (ref 33–36)
MCV RBC AUTO: 97 FL (ref 80–94)
MONOCYTES # BLD AUTO: 1.1 X10(3)/MCL (ref 0.1–1.3)
MONOCYTES NFR BLD AUTO: 8.6 %
NEUTROPHILS # BLD AUTO: 10.85 X10(3)/MCL (ref 2.1–9.2)
NEUTROPHILS NFR BLD AUTO: 84.8 %
PLATELET # BLD AUTO: 261 X10(3)/MCL (ref 130–400)
PMV BLD AUTO: 10.1 FL (ref 7.4–10.4)
RBC # BLD AUTO: 3.98 X10(6)/MCL (ref 4.7–6.1)
WBC # BLD AUTO: 12.79 X10(3)/MCL (ref 4.5–11.5)

## 2025-02-19 PROCEDURE — 25000003 PHARM REV CODE 250: Performed by: NURSE PRACTITIONER

## 2025-02-19 PROCEDURE — A4216 STERILE WATER/SALINE, 10 ML: HCPCS | Performed by: NURSE PRACTITIONER

## 2025-02-19 PROCEDURE — 85025 COMPLETE CBC W/AUTO DIFF WBC: CPT

## 2025-02-19 PROCEDURE — 36415 COLL VENOUS BLD VENIPUNCTURE: CPT

## 2025-02-19 PROCEDURE — 96375 TX/PRO/DX INJ NEW DRUG ADDON: CPT

## 2025-02-19 PROCEDURE — 63600175 PHARM REV CODE 636 W HCPCS: Performed by: NURSE PRACTITIONER

## 2025-02-19 PROCEDURE — 96413 CHEMO IV INFUSION 1 HR: CPT

## 2025-02-19 RX ORDER — SODIUM CHLORIDE 0.9 % (FLUSH) 0.9 %
10 SYRINGE (ML) INJECTION
OUTPATIENT
Start: 2025-02-26

## 2025-02-19 RX ORDER — HEPARIN 100 UNIT/ML
500 SYRINGE INTRAVENOUS
Status: CANCELLED | OUTPATIENT
Start: 2025-02-19

## 2025-02-19 RX ORDER — HEPARIN 100 UNIT/ML
500 SYRINGE INTRAVENOUS
OUTPATIENT
Start: 2025-02-26

## 2025-02-19 RX ORDER — ONDANSETRON HYDROCHLORIDE 2 MG/ML
8 INJECTION, SOLUTION INTRAVENOUS
Status: CANCELLED | OUTPATIENT
Start: 2025-02-19

## 2025-02-19 RX ORDER — HEPARIN 100 UNIT/ML
500 SYRINGE INTRAVENOUS
Status: DISCONTINUED | OUTPATIENT
Start: 2025-02-19 | End: 2025-02-19 | Stop reason: HOSPADM

## 2025-02-19 RX ORDER — ONDANSETRON HYDROCHLORIDE 2 MG/ML
8 INJECTION, SOLUTION INTRAVENOUS
Status: COMPLETED | OUTPATIENT
Start: 2025-02-19 | End: 2025-02-19

## 2025-02-19 RX ORDER — SODIUM CHLORIDE 0.9 % (FLUSH) 0.9 %
10 SYRINGE (ML) INJECTION
Status: DISCONTINUED | OUTPATIENT
Start: 2025-02-19 | End: 2025-02-19 | Stop reason: HOSPADM

## 2025-02-19 RX ORDER — ONDANSETRON HYDROCHLORIDE 2 MG/ML
8 INJECTION, SOLUTION INTRAVENOUS
OUTPATIENT
Start: 2025-02-26

## 2025-02-19 RX ORDER — SODIUM CHLORIDE 0.9 % (FLUSH) 0.9 %
10 SYRINGE (ML) INJECTION
Status: CANCELLED | OUTPATIENT
Start: 2025-02-19

## 2025-02-19 RX ADMIN — GEMCITABINE 2400 MG: 38 INJECTION, SOLUTION INTRAVENOUS at 12:02

## 2025-02-19 RX ADMIN — HEPARIN 500 UNITS: 100 SYRINGE at 12:02

## 2025-02-19 RX ADMIN — SODIUM CHLORIDE: 9 INJECTION, SOLUTION INTRAVENOUS at 10:02

## 2025-02-19 RX ADMIN — ONDANSETRON 8 MG: 2 INJECTION INTRAMUSCULAR; INTRAVENOUS at 10:02

## 2025-02-19 RX ADMIN — Medication 10 ML: at 12:02

## 2025-02-19 NOTE — PLAN OF CARE
SURGICAL ICU ADMISSION NOTE  10/25/2023      PRIMARY TEAM: Vascular Surgery  PRIMARY PHYSICIAN: Elvira  REASON FOR CRITICAL CARE ADMISSION: Hemodynamic monitoring   ADMITTING PHYSICIAN: Juan  Date of Service (when I saw the patient): 10/25/2023    ASSESSMENT:  Darian Engle is a 72 year old male who was admitted on 10/17/2023. 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     PLAN:    Neurological:  # Acute post-surgical pain   - Monitor neurological status. Delirium preventions and precautions.   - Pain: jeffry tylenol, prn oxy and dilaudid    - Sedation plan: None  - PTA donepezil and sertraline restarted  - Melatonin qPM    Pulmonary:   # Post operative respiratory support  - Supplemental oxygen to keep saturation above 92 %.  - Incentive spirometer every 15- 30 minutes.     Cardiovascular:    # CAD s/p CAB w/ LIMA and L GSV (2004)  # HTN   # PAD s/p fem endarterectomy and angiogram with angioplasty and atherectomy   - Monitor hemodynamic status. Goal systolic <160 mmHg.   - Nicardipine gtt ordered, titrate as needed  - PRN labetalol and hydralazine ordered  - PTA amlodipine, carvedilol, cilostazol  - PTA aspirin 81 and atorvastatin  - Holding PTA imdur and losartan  - Heparin gtt 200u/hr    Gastroenterology/Nutrition:  # Protein calorie deficit malnutrition    - Bowel regimen: miralax and senna  - Zofran and compazine prn for nausea  - Pantoprazole PO for PPX    Fluids/Electrolytes:   - LR for IV fluid hydration  - Will stop mIVF once tolerating PO    Renal:  - Daily BMP  - Urine output is adequate . Will continue to monitor intake and output.     Endocrine:  #Benign cystic neoplasm of the pancreas  # Stress hyperglycemia    - Sliding scale for glucose management   - Goal to keep BG< 180 for optimal wound  Plan of care reviewed with the patient. C15 D1 Gemzar given, tolerated well. Discharged in stable condition and is aware of future appointments.     healing       ID:  # Leukocytosis   - Ancef x2 doses post-operative    Heme:     # Acute blood loss anemia   - Hemoglobin 11.0  -  cc  - Transfuse if hgb <7.0 or signs/symptoms of hypoperfusion. Monitor and trend.       Musculoskeletal:  # Weakness and deconditioning of critical illness   # Dry gangrene of L 1st toe  - Physical and occupational therapy consult   - Orthopedic surgery consulted for L 1st toe amputation    General Cares/Prophylaxis:    DVT Prophylaxis: Heparin gtt.  GI Prophylaxis: PPI  Restraints: Restraints for medical healing needed: NO    Lines/ tubes/ drains:  - R radial A-line  - PIV x2  - Mercado    Disposition:  -  Surgical ICU    Patient seen, findings and plan discussed with surgical ICU staff, Dr. Martinez.      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 This has been going on for several weeks, and he has significant pain in the foot. He underwent angiogram 10/17 which revealed occluded SFA & SFA stent (known) with peroneal and PT runoff with both vessels with disease. He is admitted post angiogram for cardiac eval, pain control, and wound cares. He underwent L femoral endarterectomy and angiogram with angioplasty and atherectomy on 10/24. Post-operatively pain well controlled. Admitted to SICU due to uncontrolled hypertension requiring nicardipine gtt.    REVIEW OF SYSTEMS: 10 point ROS neg other than the symptoms noted above in the HPI.    PAST MEDICAL HISTORY:   Past Medical History:   Diagnosis Date    Arthritis March 2018    joint pain both hands    Diabetes (H)     Heart disease 1991    [place]-angioplasty    Hypertension        SURGICAL HISTORY:   Past Surgical History:   Procedure  Laterality Date    ANGIOGRAM Left 10/17/2023    Procedure: Left lower extremity angiogram via Left brachial artery approach;  Surgeon: Chuck Felix MBBS;  Location: [facility] OR    CARDIAC SURGERY  2004    [facility]    ENHANCE LASER REFRACTIVE BILATERAL EXISTING PT IN PARAMETERS  2000    EYE SURGERY      [facility]    IR OR ANGIOGRAM  10/17/2023    VASCULAR SURGERY      [facility]       SOCIAL HISTORY:   Social History     Socioeconomic History    Marital status:      Spouse name: None    Number of children: None    Years of education: None    Highest education level: None   Tobacco Use    Smoking status: Every Day     Packs/day: 0.50     Years: 5.00     Additional pack years: 0.00     Total pack years: 2.50     Types: Cigarettes     Start date: 1968     Last attempt to quit: 7/15/2016     Years since quittin.2    Smokeless tobacco: Former   Vaping Use    Vaping Use: Never used   Substance and Sexual Activity    Alcohol use: Yes     Comment: binge, two months sober    Drug use: Yes     Types: Marijuana     Comment: uses Marijuana for pain    Sexual activity: Not Currently     Partners: Female   Other Topics Concern    Parent/sibling w/ CABG, MI or angioplasty before 65F 55M? Yes     Comment: brother     FAMILY HISTORY: No bleeding/clotting disorders nor problems with anesthesia.     ALLERGIES:   Allergies   Allergen Reactions    Lidocaine Other (See Comments)     Lungs filled with fluid. ? Anaphylaxis    Lisinopril Cough     cough    Naltrexone Nausea and Vomiting       MEDICATIONS:  No current facility-administered medications on file prior to encounter.  ACCU-CHEK SMARTVIEW test strip, TEST THREE TIMES DAILY OR AS DIRECTED  apixaban ANTICOAGULANT (ELIQUIS ANTICOAGULANT) 5 MG tablet, Take 1 tablet (5 mg) by mouth 2 times daily  aspirin 81 MG tablet, Take 81 mg by mouth daily  atorvastatin (LIPITOR) 40 MG tablet, Take 1 tablet (40  mg) by mouth daily For cholesterol.  blood glucose monitoring (ACCU-CHEK FASTCLIX) lancets, USE TO TEST THREE TIMES DAILY OR AS DIRECTED  carvedilol (COREG) 25 MG tablet, Take 25 mg by mouth 2 times daily  cholecalciferol ( VITAMIN D3) 50 MCG (2000 UT) CAPS, Take 2 capsules by mouth daily  cilostazol (PLETAL) 50 MG tablet, Take 100 mg by mouth 2 times daily  donepezil (ARICEPT) 10 MG tablet, Take 1 tablet (10 mg) by mouth At Bedtime  isosorbide mononitrate (IMDUR) 30 MG 24 hr tablet, TAKE 2 TABLETS(60 MG) BY MOUTH DAILY  losartan (COZAAR) 50 MG tablet, TAKE 1 TABLET(50 MG) BY MOUTH DAILY  magnesium oxide 200 MG TABS, Take 200 mg by mouth daily  metFORMIN (GLUCOPHAGE XR) 500 MG 24 hr tablet, TAKE 1 TABLET(500 MG) BY MOUTH TWICE DAILY WITH MEALS  multivitamin (CENTRUM SILVER) tablet, Take 1 tablet by mouth daily  nitroGLYcerin (NITROSTAT) 0.4 MG sublingual tablet, PLACE 1 TABLET UNDER THE TONGUE EVERY 5 MINUTES FOR 3 DOSES, IF SYMPTOMS PERSIST 5 MINUTES AFTER 1ST DOSE CALL 911  Omega-3 Fatty Acids (FISH OIL OMEGA-3) 1000 MG CAPS, Take 1,000 mg by mouth daily  omeprazole (PRILOSEC) 20 MG DR capsule, Take 1 capsule (20 mg) by mouth daily  sertraline (ZOLOFT) 25 MG tablet, TAKE ONE TABLET BY MOUTH DAILY FOR DEPRESSION AND TO HELP APPETITE  NIFEdipine ER (ADALAT CC) 60 MG 24 hr tablet, 60 mg daily (Patient not taking: Reported on 10/18/2023)        PHYSICAL EXAMINATION:  Temp:  [97  F (36.1  C)-98.8  F (37.1  C)] 98.8  F (37.1  C)  Pulse:  [58-96] 67  Resp:  [8-28] 12  BP: (115-189)/(42-82) 129/45  MAP:  [44 mmHg-96 mmHg] 66 mmHg  Arterial Line BP: ()/(32-60) 140/35  SpO2:  [95 %-100 %] 99 %  General: NAD, comfortable appearing  HEENT: NCAT  Neuro: A&Ox3, NAD  Pulm/Resp: Breathing non-laboredon 2L NC  CV: RRR  Abdomen: Soft, non-distended, non-tender, no rebound tenderness or guarding  :  dinh catheter in place, urine yellow and clear. L groin soft with no evidence of hematoma  Incisions/Skin:  C/D/I  MSK/Extremities: LLE warm, bandaged, absent PT or DP signals    LABS: Reviewed.   Arterial Blood Gases   Recent Labs   Lab 10/24/23  1411 10/24/23  1157 10/24/23  0927   PH 7.37 7.40 7.38   PCO2 36 36 35   PO2 166* 168* 165*   HCO3 21 22 20*     Complete Blood Count   Recent Labs   Lab 10/24/23  2235 10/24/23  1938 10/24/23  1411 10/24/23  1157 10/24/23  0927 10/24/23  0658 10/23/23  0548   WBC 14.0* 16.0*  --   --   --  6.4 6.0   HGB 11.0* 12.0* 11.4* 11.5*   < > 12.1* 12.2*    266  --   --   --  280 256    < > = values in this interval not displayed.     Basic Metabolic Panel  Recent Labs   Lab 10/24/23  2137 10/24/23  1952/23  1938 10/24/23  1411 10/24/23  1157 10/24/23  0927 10/24/23  0927 10/24/23  0658 10/23/23  0811 10/23/23  0548 10/22/23  0832 10/22/23  0514   NA  --   --  134* 136 136  --  135 135  --  136  --  137   POTASSIUM  --   --  4.7 4.1 4.2  --  4.2 4.2  --  4.0  --  4.0   CHLORIDE  --   --  100  --   --   --   --  104  --  106  --  107   CO2  --   --  15*  --   --   --   --  18*  --  21*  --  21*   BUN  --   --  15.0  --   --   --   --  13.9  --  12.9  --  12.1   CR  --   --  0.78  --   --   --   --  0.70  --  0.77  --  0.73   * 161* 167* 80 85   < > 82 83   < > 82   < > 87    < > = values in this interval not displayed.     Liver Function Tests  Recent Labs   Lab 10/24/23  1938   INR 1.08     Pancreatic Enzymes  No lab results found in last 7 days.  Coagulation Profile  Recent Labs   Lab 10/24/23  1938   INR 1.08   *       IMAGING:  Recent Results (from the past 24 hour(s))   XR Pelvis Port 1/2 Views    Narrative    EXAM: XR PELVIS PORT 1/2 VIEWS  LOCATION: [facility]  DATE: 10/24/2023    INDICATION: Extra instruments.  COMPARISON: None.      Impression    IMPRESSION: Mercado catheter with tip in the urinary bladder. Both hips negative for fracture. Vascular stent on the left. No additional foreign bodies are  identified.   XR Foot Left G/E 3 Views    Narrative    EXAM: XR FOOT LEFT G/E 3 VIEWS  LOCATION: [facility]  DATE: 10/24/2023    INDICATION: left great toe dry gangrene, preop planning for amputation  COMPARISON: None      Impression    IMPRESSION: Wound dressing around the distal aspect of the great toe obscures some bony detail. No definite evidence for osteomyelitis. No evidence for acute fracture or dislocation. Hammertoe deformities.

## 2025-02-24 ENCOUNTER — LAB VISIT (OUTPATIENT)
Dept: LAB | Facility: HOSPITAL | Age: 80
End: 2025-02-24
Attending: NURSE PRACTITIONER
Payer: MEDICARE

## 2025-02-24 DIAGNOSIS — D84.821 IMMUNODEFICIENCY SECONDARY TO CHEMOTHERAPY: ICD-10-CM

## 2025-02-24 DIAGNOSIS — C34.91 NON-SMALL CELL CANCER OF RIGHT LUNG: ICD-10-CM

## 2025-02-24 DIAGNOSIS — C34.90 MALIGNANT NEOPLASM OF UNSPECIFIED PART OF UNSPECIFIED BRONCHUS OR LUNG: ICD-10-CM

## 2025-02-24 DIAGNOSIS — Z90.2 S/P LOBECTOMY OF LUNG: ICD-10-CM

## 2025-02-24 DIAGNOSIS — Z79.899 IMMUNODEFICIENCY SECONDARY TO CHEMOTHERAPY: ICD-10-CM

## 2025-02-24 DIAGNOSIS — Z79.899 ON ANTINEOPLASTIC CHEMOTHERAPY: ICD-10-CM

## 2025-02-24 DIAGNOSIS — T45.1X5A IMMUNODEFICIENCY SECONDARY TO CHEMOTHERAPY: ICD-10-CM

## 2025-02-24 LAB
ALBUMIN SERPL-MCNC: 2.8 G/DL (ref 3.4–4.8)
ALBUMIN/GLOB SERPL: 0.5 RATIO (ref 1.1–2)
ALP SERPL-CCNC: 120 UNIT/L (ref 40–150)
ALT SERPL-CCNC: 23 UNIT/L (ref 0–55)
ANION GAP SERPL CALC-SCNC: 13 MEQ/L
AST SERPL-CCNC: 31 UNIT/L (ref 5–34)
BASOPHILS # BLD AUTO: 0.01 X10(3)/MCL
BASOPHILS NFR BLD AUTO: 0.3 %
BILIRUB SERPL-MCNC: 1.4 MG/DL
BUN SERPL-MCNC: 6.3 MG/DL (ref 8.4–25.7)
CALCIUM SERPL-MCNC: 8.8 MG/DL (ref 8.8–10)
CHLORIDE SERPL-SCNC: 104 MMOL/L (ref 98–107)
CO2 SERPL-SCNC: 23 MMOL/L (ref 23–31)
CREAT SERPL-MCNC: 0.88 MG/DL (ref 0.72–1.25)
CREAT/UREA NIT SERPL: 7
EOSINOPHIL # BLD AUTO: 0.06 X10(3)/MCL (ref 0–0.9)
EOSINOPHIL NFR BLD AUTO: 1.7 %
ERYTHROCYTE [DISTWIDTH] IN BLOOD BY AUTOMATED COUNT: 18 % (ref 11.5–17)
GFR SERPLBLD CREATININE-BSD FMLA CKD-EPI: >60 ML/MIN/1.73/M2
GLOBULIN SER-MCNC: 5.6 GM/DL (ref 2.4–3.5)
GLUCOSE SERPL-MCNC: 131 MG/DL (ref 82–115)
HCT VFR BLD AUTO: 39.8 % (ref 42–52)
HGB BLD-MCNC: 12.1 G/DL (ref 14–18)
IMM GRANULOCYTES # BLD AUTO: 0.01 X10(3)/MCL (ref 0–0.04)
IMM GRANULOCYTES NFR BLD AUTO: 0.3 %
LYMPHOCYTES # BLD AUTO: 0.58 X10(3)/MCL (ref 0.6–4.6)
LYMPHOCYTES NFR BLD AUTO: 16.4 %
MCH RBC QN AUTO: 29.9 PG (ref 27–31)
MCHC RBC AUTO-ENTMCNC: 30.4 G/DL (ref 33–36)
MCV RBC AUTO: 98.3 FL (ref 80–94)
MONOCYTES # BLD AUTO: 0.19 X10(3)/MCL (ref 0.1–1.3)
MONOCYTES NFR BLD AUTO: 5.4 %
NEUTROPHILS # BLD AUTO: 2.68 X10(3)/MCL (ref 2.1–9.2)
NEUTROPHILS NFR BLD AUTO: 75.9 %
PLATELET # BLD AUTO: 307 X10(3)/MCL (ref 130–400)
PMV BLD AUTO: 10.1 FL (ref 7.4–10.4)
POTASSIUM SERPL-SCNC: 3.8 MMOL/L (ref 3.5–5.1)
PROT SERPL-MCNC: 8.4 GM/DL (ref 5.8–7.6)
RBC # BLD AUTO: 4.05 X10(6)/MCL (ref 4.7–6.1)
SODIUM SERPL-SCNC: 140 MMOL/L (ref 136–145)
WBC # BLD AUTO: 3.53 X10(3)/MCL (ref 4.5–11.5)

## 2025-02-24 PROCEDURE — 36415 COLL VENOUS BLD VENIPUNCTURE: CPT

## 2025-02-24 PROCEDURE — 85025 COMPLETE CBC W/AUTO DIFF WBC: CPT

## 2025-02-24 PROCEDURE — 80053 COMPREHEN METABOLIC PANEL: CPT

## 2025-02-26 ENCOUNTER — INFUSION (OUTPATIENT)
Dept: INFUSION THERAPY | Facility: HOSPITAL | Age: 80
End: 2025-02-26
Attending: NURSE PRACTITIONER
Payer: MEDICARE

## 2025-02-26 VITALS
DIASTOLIC BLOOD PRESSURE: 64 MMHG | TEMPERATURE: 98 F | RESPIRATION RATE: 18 BRPM | HEIGHT: 67 IN | WEIGHT: 175.88 LBS | HEART RATE: 117 BPM | SYSTOLIC BLOOD PRESSURE: 124 MMHG | BODY MASS INDEX: 27.61 KG/M2

## 2025-02-26 DIAGNOSIS — C34.90 NON-SMALL CELL LUNG CANCER, UNSPECIFIED LATERALITY: ICD-10-CM

## 2025-02-26 DIAGNOSIS — C34.00 MALIGNANT NEOPLASM OF HILUS OF LUNG, UNSPECIFIED LATERALITY: Primary | ICD-10-CM

## 2025-02-26 PROCEDURE — 96375 TX/PRO/DX INJ NEW DRUG ADDON: CPT

## 2025-02-26 PROCEDURE — 25000003 PHARM REV CODE 250: Performed by: NURSE PRACTITIONER

## 2025-02-26 PROCEDURE — 63600175 PHARM REV CODE 636 W HCPCS: Performed by: NURSE PRACTITIONER

## 2025-02-26 PROCEDURE — 96413 CHEMO IV INFUSION 1 HR: CPT

## 2025-02-26 RX ORDER — SODIUM CHLORIDE 0.9 % (FLUSH) 0.9 %
10 SYRINGE (ML) INJECTION
Status: DISCONTINUED | OUTPATIENT
Start: 2025-02-26 | End: 2025-02-26 | Stop reason: HOSPADM

## 2025-02-26 RX ORDER — ONDANSETRON HYDROCHLORIDE 2 MG/ML
8 INJECTION, SOLUTION INTRAVENOUS
Status: COMPLETED | OUTPATIENT
Start: 2025-02-26 | End: 2025-02-26

## 2025-02-26 RX ORDER — HEPARIN 100 UNIT/ML
500 SYRINGE INTRAVENOUS
Status: DISCONTINUED | OUTPATIENT
Start: 2025-02-26 | End: 2025-02-26 | Stop reason: HOSPADM

## 2025-02-26 RX ADMIN — ONDANSETRON 8 MG: 2 INJECTION INTRAMUSCULAR; INTRAVENOUS at 01:02

## 2025-02-26 RX ADMIN — GEMCITABINE 2400 MG: 38 INJECTION, SOLUTION INTRAVENOUS at 01:02

## 2025-02-26 RX ADMIN — HEPARIN 500 UNITS: 100 SYRINGE at 01:02

## 2025-02-26 RX ADMIN — PEGFILGRASTIM 6 MG: KIT SUBCUTANEOUS at 01:02

## 2025-03-10 ENCOUNTER — LAB VISIT (OUTPATIENT)
Dept: LAB | Facility: HOSPITAL | Age: 80
End: 2025-03-10
Attending: INTERNAL MEDICINE
Payer: MEDICARE

## 2025-03-10 DIAGNOSIS — C34.90 MALIGNANT NEOPLASM OF UNSPECIFIED PART OF UNSPECIFIED BRONCHUS OR LUNG: ICD-10-CM

## 2025-03-10 DIAGNOSIS — Z90.2 S/P LOBECTOMY OF LUNG: ICD-10-CM

## 2025-03-10 DIAGNOSIS — T45.1X5A IMMUNODEFICIENCY SECONDARY TO CHEMOTHERAPY: ICD-10-CM

## 2025-03-10 DIAGNOSIS — C77.1 SECONDARY AND UNSPECIFIED MALIGNANT NEOPLASM OF INTRATHORACIC LYMPH NODES: ICD-10-CM

## 2025-03-10 DIAGNOSIS — K92.2 GASTROINTESTINAL HEMORRHAGE, UNSPECIFIED GASTROINTESTINAL HEMORRHAGE TYPE: ICD-10-CM

## 2025-03-10 DIAGNOSIS — E63.9 DEFICIENCY OF MACRONUTRIENTS: ICD-10-CM

## 2025-03-10 DIAGNOSIS — D84.821 IMMUNODEFICIENCY SECONDARY TO CHEMOTHERAPY: ICD-10-CM

## 2025-03-10 DIAGNOSIS — Z79.899 IMMUNODEFICIENCY SECONDARY TO CHEMOTHERAPY: ICD-10-CM

## 2025-03-10 DIAGNOSIS — C34.00 MALIGNANT NEOPLASM OF HILUS OF LUNG, UNSPECIFIED LATERALITY: ICD-10-CM

## 2025-03-10 DIAGNOSIS — D50.9 MICROCYTIC ANEMIA: ICD-10-CM

## 2025-03-10 DIAGNOSIS — C34.91 NON-SMALL CELL CANCER OF RIGHT LUNG: ICD-10-CM

## 2025-03-10 DIAGNOSIS — T45.1X5A CHEMOTHERAPY-INDUCED NEUTROPENIA: ICD-10-CM

## 2025-03-10 DIAGNOSIS — Z79.899 ON ANTINEOPLASTIC CHEMOTHERAPY: ICD-10-CM

## 2025-03-10 DIAGNOSIS — D70.1 CHEMOTHERAPY-INDUCED NEUTROPENIA: ICD-10-CM

## 2025-03-10 LAB
ALBUMIN SERPL-MCNC: 2.7 G/DL (ref 3.4–4.8)
ALBUMIN/GLOB SERPL: 0.5 RATIO (ref 1.1–2)
ALP SERPL-CCNC: 165 UNIT/L (ref 40–150)
ALT SERPL-CCNC: 16 UNIT/L (ref 0–55)
ANION GAP SERPL CALC-SCNC: 12 MEQ/L
AST SERPL-CCNC: 22 UNIT/L (ref 5–34)
BASOPHILS # BLD AUTO: 0.02 X10(3)/MCL
BASOPHILS NFR BLD AUTO: 0.2 %
BILIRUB SERPL-MCNC: 1.1 MG/DL
BUN SERPL-MCNC: 5.3 MG/DL (ref 8.4–25.7)
CALCIUM SERPL-MCNC: 8.8 MG/DL (ref 8.8–10)
CEA SERPL-MCNC: 3.31 NG/ML (ref 0–3)
CHLORIDE SERPL-SCNC: 105 MMOL/L (ref 98–107)
CO2 SERPL-SCNC: 24 MMOL/L (ref 23–31)
CREAT SERPL-MCNC: 0.77 MG/DL (ref 0.72–1.25)
CREAT/UREA NIT SERPL: 7
EOSINOPHIL # BLD AUTO: 0.04 X10(3)/MCL (ref 0–0.9)
EOSINOPHIL NFR BLD AUTO: 0.3 %
ERYTHROCYTE [DISTWIDTH] IN BLOOD BY AUTOMATED COUNT: 20.4 % (ref 11.5–17)
FERRITIN SERPL-MCNC: 481.9 NG/ML (ref 21.81–274.66)
FOLATE SERPL-MCNC: 7 NG/ML (ref 7–31.4)
GFR SERPLBLD CREATININE-BSD FMLA CKD-EPI: >60 ML/MIN/1.73/M2
GLOBULIN SER-MCNC: 5.4 GM/DL (ref 2.4–3.5)
GLUCOSE SERPL-MCNC: 108 MG/DL (ref 82–115)
HCT VFR BLD AUTO: 38.4 % (ref 42–52)
HGB BLD-MCNC: 11.8 G/DL (ref 14–18)
IMM GRANULOCYTES # BLD AUTO: 0.06 X10(3)/MCL (ref 0–0.04)
IMM GRANULOCYTES NFR BLD AUTO: 0.5 %
IRON SATN MFR SERPL: 28 % (ref 20–50)
IRON SERPL-MCNC: 46 UG/DL (ref 65–175)
LYMPHOCYTES # BLD AUTO: 0.96 X10(3)/MCL (ref 0.6–4.6)
LYMPHOCYTES NFR BLD AUTO: 8.1 %
MCH RBC QN AUTO: 30.2 PG (ref 27–31)
MCHC RBC AUTO-ENTMCNC: 30.7 G/DL (ref 33–36)
MCV RBC AUTO: 98.2 FL (ref 80–94)
MONOCYTES # BLD AUTO: 0.86 X10(3)/MCL (ref 0.1–1.3)
MONOCYTES NFR BLD AUTO: 7.2 %
NEUTROPHILS # BLD AUTO: 9.95 X10(3)/MCL (ref 2.1–9.2)
NEUTROPHILS NFR BLD AUTO: 83.7 %
PLATELET # BLD AUTO: 203 X10(3)/MCL (ref 130–400)
PMV BLD AUTO: 10.8 FL (ref 7.4–10.4)
POTASSIUM SERPL-SCNC: 3.9 MMOL/L (ref 3.5–5.1)
PROT SERPL-MCNC: 8.1 GM/DL (ref 5.8–7.6)
RBC # BLD AUTO: 3.91 X10(6)/MCL (ref 4.7–6.1)
SODIUM SERPL-SCNC: 141 MMOL/L (ref 136–145)
TIBC SERPL-MCNC: 121 UG/DL (ref 60–240)
TIBC SERPL-MCNC: 167 UG/DL (ref 250–450)
TRANSFERRIN SERPL-MCNC: 131 MG/DL (ref 163–344)
VIT B12 SERPL-MCNC: >2000 PG/ML (ref 213–816)
WBC # BLD AUTO: 11.89 X10(3)/MCL (ref 4.5–11.5)

## 2025-03-10 PROCEDURE — 82746 ASSAY OF FOLIC ACID SERUM: CPT

## 2025-03-10 PROCEDURE — 83540 ASSAY OF IRON: CPT

## 2025-03-10 PROCEDURE — 80053 COMPREHEN METABOLIC PANEL: CPT

## 2025-03-10 PROCEDURE — 82728 ASSAY OF FERRITIN: CPT

## 2025-03-10 PROCEDURE — 82378 CARCINOEMBRYONIC ANTIGEN: CPT

## 2025-03-10 PROCEDURE — 85025 COMPLETE CBC W/AUTO DIFF WBC: CPT

## 2025-03-10 PROCEDURE — 36415 COLL VENOUS BLD VENIPUNCTURE: CPT

## 2025-03-10 PROCEDURE — 82607 VITAMIN B-12: CPT

## 2025-03-11 ENCOUNTER — HOSPITAL ENCOUNTER (OUTPATIENT)
Dept: RADIOLOGY | Facility: HOSPITAL | Age: 80
Discharge: HOME | End: 2025-03-11
Attending: INTERNAL MEDICINE
Payer: MEDICARE

## 2025-03-11 ENCOUNTER — TELEPHONE (OUTPATIENT)
Dept: HEMATOLOGY/ONCOLOGY | Facility: CLINIC | Age: 80
End: 2025-03-11
Payer: MEDICARE

## 2025-03-11 DIAGNOSIS — D84.821 IMMUNODEFICIENCY SECONDARY TO CHEMOTHERAPY: ICD-10-CM

## 2025-03-11 DIAGNOSIS — Z90.2 S/P LOBECTOMY OF LUNG: ICD-10-CM

## 2025-03-11 DIAGNOSIS — T45.1X5A IMMUNODEFICIENCY SECONDARY TO CHEMOTHERAPY: ICD-10-CM

## 2025-03-11 DIAGNOSIS — C34.90 MALIGNANT NEOPLASM OF UNSPECIFIED PART OF UNSPECIFIED BRONCHUS OR LUNG: ICD-10-CM

## 2025-03-11 DIAGNOSIS — Z79.899 IMMUNODEFICIENCY SECONDARY TO CHEMOTHERAPY: ICD-10-CM

## 2025-03-11 DIAGNOSIS — C34.91 NON-SMALL CELL CANCER OF RIGHT LUNG: ICD-10-CM

## 2025-03-11 DIAGNOSIS — Z79.899 ON ANTINEOPLASTIC CHEMOTHERAPY: ICD-10-CM

## 2025-03-11 PROCEDURE — A9552 F18 FDG: HCPCS | Performed by: INTERNAL MEDICINE

## 2025-03-11 PROCEDURE — 78815 PET IMAGE W/CT SKULL-THIGH: CPT | Mod: TC

## 2025-03-11 RX ORDER — FLUDEOXYGLUCOSE F18 500 MCI/ML
10 INJECTION INTRAVENOUS
Status: COMPLETED | OUTPATIENT
Start: 2025-03-11 | End: 2025-03-11

## 2025-03-11 RX ADMIN — FLUDEOXYGLUCOSE F-18 10.8 MILLICURIE: 500 INJECTION INTRAVENOUS at 07:03

## 2025-03-11 NOTE — TELEPHONE ENCOUNTER
Received VM from pt wife requesting supplement assistance at [facility]. Called  to check pt status and he has met maximum assistance for supplements (no new diagnosis or progression), so does not qualify. However, he remains on treatment and has lost 15# in the past 1-2 months, so will send exception request for Ensure Plus 2 cases 2 months.  verbalized understanding.    I called pt wife and left VM with above information as well as RD contact info should she have questions. [facility] be in touch with them for decision about further supplement assistance.

## 2025-03-12 ENCOUNTER — LAB VISIT (OUTPATIENT)
Dept: LAB | Facility: HOSPITAL | Age: 80
End: 2025-03-12
Attending: INTERNAL MEDICINE
Payer: MEDICARE

## 2025-03-12 ENCOUNTER — INFUSION (OUTPATIENT)
Dept: INFUSION THERAPY | Facility: HOSPITAL | Age: 80
End: 2025-03-12
Attending: NURSE PRACTITIONER
Payer: MEDICARE

## 2025-03-12 ENCOUNTER — OFFICE VISIT (OUTPATIENT)
Dept: HEMATOLOGY/ONCOLOGY | Facility: CLINIC | Age: 80
End: 2025-03-12
Payer: MEDICARE

## 2025-03-12 VITALS
HEIGHT: 67 IN | TEMPERATURE: 98 F | DIASTOLIC BLOOD PRESSURE: 88 MMHG | OXYGEN SATURATION: 92 % | BODY MASS INDEX: 27.47 KG/M2 | WEIGHT: 175 LBS | SYSTOLIC BLOOD PRESSURE: 135 MMHG | HEART RATE: 94 BPM

## 2025-03-12 DIAGNOSIS — T45.1X5A IMMUNODEFICIENCY SECONDARY TO CHEMOTHERAPY: ICD-10-CM

## 2025-03-12 DIAGNOSIS — T45.1X5A CHEMOTHERAPY-INDUCED NEUTROPENIA: ICD-10-CM

## 2025-03-12 DIAGNOSIS — C34.91 NON-SMALL CELL CANCER OF RIGHT LUNG: ICD-10-CM

## 2025-03-12 DIAGNOSIS — Z90.2 S/P LOBECTOMY OF LUNG: ICD-10-CM

## 2025-03-12 DIAGNOSIS — C77.1 SECONDARY AND UNSPECIFIED MALIGNANT NEOPLASM OF INTRATHORACIC LYMPH NODES: ICD-10-CM

## 2025-03-12 DIAGNOSIS — T45.1X5A ANEMIA DUE TO ANTINEOPLASTIC AGENT: ICD-10-CM

## 2025-03-12 DIAGNOSIS — Z79.899 ON ANTINEOPLASTIC CHEMOTHERAPY: ICD-10-CM

## 2025-03-12 DIAGNOSIS — C34.91 NON-SMALL CELL CANCER OF RIGHT LUNG: Primary | ICD-10-CM

## 2025-03-12 DIAGNOSIS — D84.821 IMMUNODEFICIENCY SECONDARY TO CHEMOTHERAPY: ICD-10-CM

## 2025-03-12 DIAGNOSIS — D70.1 CHEMOTHERAPY-INDUCED NEUTROPENIA: ICD-10-CM

## 2025-03-12 DIAGNOSIS — C34.00 MALIGNANT NEOPLASM OF HILUS OF LUNG, UNSPECIFIED LATERALITY: Primary | ICD-10-CM

## 2025-03-12 DIAGNOSIS — Z79.899 IMMUNODEFICIENCY SECONDARY TO CHEMOTHERAPY: ICD-10-CM

## 2025-03-12 DIAGNOSIS — D64.81 ANEMIA DUE TO ANTINEOPLASTIC AGENT: ICD-10-CM

## 2025-03-12 DIAGNOSIS — C34.90 NON-SMALL CELL LUNG CANCER, UNSPECIFIED LATERALITY: ICD-10-CM

## 2025-03-12 PROCEDURE — 36415 COLL VENOUS BLD VENIPUNCTURE: CPT

## 2025-03-12 PROCEDURE — 25000003 PHARM REV CODE 250: Performed by: INTERNAL MEDICINE

## 2025-03-12 PROCEDURE — 63600175 PHARM REV CODE 636 W HCPCS: Performed by: INTERNAL MEDICINE

## 2025-03-12 PROCEDURE — 96413 CHEMO IV INFUSION 1 HR: CPT

## 2025-03-12 PROCEDURE — 96375 TX/PRO/DX INJ NEW DRUG ADDON: CPT

## 2025-03-12 PROCEDURE — 99999 PR PBB SHADOW E&M-EST. PATIENT-LVL I: CPT | Mod: PBBFAC,,, | Performed by: NURSE PRACTITIONER

## 2025-03-12 RX ORDER — HEPARIN 100 UNIT/ML
500 SYRINGE INTRAVENOUS
OUTPATIENT
Start: 2025-03-19

## 2025-03-12 RX ORDER — HEPARIN 100 UNIT/ML
500 SYRINGE INTRAVENOUS
Status: CANCELLED | OUTPATIENT
Start: 2025-03-12

## 2025-03-12 RX ORDER — SODIUM CHLORIDE 0.9 % (FLUSH) 0.9 %
10 SYRINGE (ML) INJECTION
Status: CANCELLED | OUTPATIENT
Start: 2025-03-12

## 2025-03-12 RX ORDER — ONDANSETRON HYDROCHLORIDE 2 MG/ML
8 INJECTION, SOLUTION INTRAVENOUS
Status: COMPLETED | OUTPATIENT
Start: 2025-03-12 | End: 2025-03-12

## 2025-03-12 RX ORDER — SODIUM CHLORIDE 0.9 % (FLUSH) 0.9 %
10 SYRINGE (ML) INJECTION
Status: DISCONTINUED | OUTPATIENT
Start: 2025-03-12 | End: 2025-03-12 | Stop reason: HOSPADM

## 2025-03-12 RX ORDER — ONDANSETRON HYDROCHLORIDE 2 MG/ML
8 INJECTION, SOLUTION INTRAVENOUS
Status: CANCELLED | OUTPATIENT
Start: 2025-03-12

## 2025-03-12 RX ORDER — HEPARIN 100 UNIT/ML
500 SYRINGE INTRAVENOUS
Status: DISCONTINUED | OUTPATIENT
Start: 2025-03-12 | End: 2025-03-12 | Stop reason: HOSPADM

## 2025-03-12 RX ORDER — ONDANSETRON HYDROCHLORIDE 2 MG/ML
8 INJECTION, SOLUTION INTRAVENOUS
OUTPATIENT
Start: 2025-03-19

## 2025-03-12 RX ORDER — SODIUM CHLORIDE 0.9 % (FLUSH) 0.9 %
10 SYRINGE (ML) INJECTION
OUTPATIENT
Start: 2025-03-19

## 2025-03-12 RX ADMIN — GEMCITABINE 2400 MG: 38 INJECTION, SOLUTION INTRAVENOUS at 02:03

## 2025-03-12 RX ADMIN — SODIUM CHLORIDE 500 ML: 0.9 INJECTION, SOLUTION INTRAVENOUS at 01:03

## 2025-03-12 RX ADMIN — ONDANSETRON 8 MG: 2 INJECTION INTRAMUSCULAR; INTRAVENOUS at 01:03

## 2025-03-12 RX ADMIN — HEPARIN 500 UNITS: 100 SYRINGE at 02:03

## 2025-03-12 NOTE — NURSING
Pt c/o weakness on arrival.  V/s 105/60, 108, 98.3, 93% o2 sat.  Dr stover notified.  500cc ns bolus given as ordered.  Bp after 135/88, p 94.  C16d1 gemzar given and tempus level drawn as ordered.  Roger well.  Rtc next week as scheduled.

## 2025-03-12 NOTE — PROGRESS NOTES
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     Treatment/Oncogy history:  -5/24/2022 right upper lobectomy   -Local Recurrence 03/08/2023  -completed XRT on 5/30/23 and 5 cycles of weekly carbo/taxol on 5/19/23   Mediastinum: 5,000 cGy/200 cGy per fx (4/18/2023-5/23/2023)   Med Bst:        1,000 cGy/200 cGy per fx (5/24/2023-5/30/2023)  -Imfinzi every 4 weeks x 10 cycles (6/22/23-3/11/2024)--> progression      Imaging:  CT angiogram 1/17/2022: No pulmonary embolus. Right upper lobe 2.5 cm mass.  CT C 9/19/2022: Status post right partial pneumonectomy for resection of a right upper lobe lung mass with no residual recurrent mass seen. Small right-sided pleural effusion. Some lymphadenopathy in the right paratracheal region with a maximum short axis dimension of 1.6 cm.  Follow-up is recommended  CT C 1/25/2023: There is a paratracheal enlarged lymph node which shows interval mild size increase and now measures 2.2 x 2.0 cm and previously was 1.6 x 1.5 cm.  Aortopulmonary window mildly enlarged lymph node is stable.  Thoracic aorta is without aneurysmal dilatation or dissection.  Impression: 1. Operative changes of right upper lobectomy without bronchialstump soft tissue proliferation    2. No residual tumor load or metastatic nodule. 3. Paratracheal enlarged lymph node with interval size increase.  PET CT 2/9/2023:  Hypermetabolic right paratracheal lymph node image 81 series 3 measures 25 x 20 mm with max SUV 27.0.  Hypermetabolic anterior mediastinal lymph node anterior to the SVC measures 12 x 9 mm with max SUV 12.0. Impression: 1. Hypermetabolic metastatic  mediastinal adenopathy.   2. No PET evidence of metastatic disease outside of the mediastinum.  CT C/A/P 7/10/2023:  1. Several new subcentimeter nodules in the right lung.  Suspect these are infectious or inflammatory in nature, but 3 month follow-up chest CT recommended to ensure resolution.  2. 2 reference mediastinal lymph nodes are smaller since January.  3. L1 vertebral body compression deformity new since January, but appears subacute.  CT C/A/P 10/10/2023:    1. Slightly larger mediastinal lymph nodes, to be followed.  2. Increased irregular right perihilar consolidation which may be treatment related.  3. Small right pleural effusion.  4. No new suspicious findings in the abdomen or pelvis.  CT C/A/P 1/10/2024:    1. Interval enlargement of mediastinal lymph nodes  2. Similar right perihilar consolidative opacity and small right pleural effusion  PET CT 2/6/2024:  1. New, enlarged hypermetabolic superior paratracheal and para-aortic/subaortic lymph nodes compared to prior PET-CT  2. Persistent enlarged and hypermetabolic lower right paratracheal lymph node.  This lymph node is decreased in size and FDG avidity compared to prior PET-CT.  3. Previously seen hypermetabolic pre-vascular lymph node is decreased in size and is no longer hypermetabolic.  PET CT 5/23/2024:  1. Status post right upper lobe resection without evidence for local recurrence/residual disease.  2. Interval response to therapy with decreasing right paratracheal and left AP window adenopathy.  3. Nonspecific area of hypermetabolic activity within the left tongue base.  Direct visualization may be warranted.  4. No evidence for new focus of metastatic disease.  PET CT 9/9/2024:  1. Previously visualized mediastinal lymph nodes slightly more FDG avid today.  2. No new sites of metastatic disease.  PET CT 12/10/24:  1. Variable mediastinal severo changes, with reference lymph nodes larger but of decreased metabolic activity, and newly enlarged  and FDG-avid retrosternal nodes identified.  This is otherwise indeterminate and could represent mixed disease response versus reactive adenopathy.  Short interval follow-up recommended in order to assess stability/resolution.  2. Stable right lung post-treatment changes, with no evidence to suggest tracer-avid new/worsened metastatic disease through the neck, abdomen, or pelvis.  3. Slightly increased volume of loculated, otherwise simple appearing right pleural fluid.          Pathology:  03/22/2022 CT-guided biopsy of the right lung mass: invasive squamous cell carcinoma, moderately differentiated.  5/24/2022: Right upper lobectomy:  1- LYMPH NODE, LUMBAR RIGHT 10 LYMPHADENECTOMY: NEGATIVE FOR METASTATIC CARCINOMA (0/1).   2- LYMPH NODE, 11R, LYMPHADENECTOMY: NEGATIVE FOR METASTATIC CARCINOMA (0/1).  3- LYMPH NODE, 9R, LYMPHADENECTOMY: NEGATIVE FOR METASTATIC CARCINOMA (0/1).   4- LYMPH NODE, 7R, LYMPHADENECTOMY: NEGATIVE FOR METASTATIC CARCINOMA (0/1).   5- LYMPH NODE, 8R, LYMPHADENECTOMY: ONE LYMPH NODE NEGATIVE FOR METASTATIC CARCINOMA (0/1).    6- LYMPH NODE, 12R, LYMPHADENECTOMY: NEGATIVE FOR METASTATIC CARCINOMA (0/1).  7- LUNG, RIGHT UPPER AND MIDDLE LOBES, PNEUMONECTOMY:  POORLY DIFFERENTIATED, G3, SQUAMOUS CELL CARCINOMA, INVASIVE.    - TUMOR SIZE: 3 CM.    - LYMPHOVASCULAR INVASION IS PRESENT.    - MARGINS NEGATIVE FOR INVASIVE CARCINOMA:    - NEAREST MARGIN, VASCULAR / BRONCHIAL 2.5 CM.    - NO PLEURAL SURFACE INVOLVEMENT     - PROMINENT NECROSIS PRESENT.  Visceral Pleura Invasion: Not identified  Number of Lymph Nodes Examined: Exact number : [id number]  pT Category: pT1c: pN0: No regional lymph node metastasis    3/8/2023 LYMPH NODE BIOPSY:   LYMPH NODE 4R, LYMPHADENECTOMY:  METASTATIC SQUAMOUS CELL CARCINOMA, NONKERATINIZING, MULTIPLE FRAGMENTS.       IMMUNOHISTOCHEMICAL STAINS:   CK 5/6, P63 AND CK7:  POSITIVE IN MALIGNANT CELLS.   CK20 AND TTF-1:  NEGATIVE IN MALIGNANT CELLS.       CLINICAL HISTORY:        Patient: Leonila Rosales is a 79 y.o. male kindly referred by  Dr. Diaz for evaluation of lung cancer.    On 01/17/2022 patient presented to the emergency department after a fall.  He reports that he was getting to his truck and sleep and fell on his left side on the truck step rail.  He started having pain in his left hip and knee.  He underwent a CT angiogram that showed No pulmonary embolus. Right upper lobe 2.5 cm mass.      During that hospitalization he was treated for a close intertrochanteric fracture of the left femur and underwent open reduction intramedullary nailing left comminuted intertrochanteric hip fracture on 01/18/2022 with Dr. Fortino Palomares.  He then spent several weeks on rehab.    On 03/22/2022 he underwent CT-guided biopsy of the right lung mass.  Pathology showed invasive squamous cell carcinoma, moderately differentiated.    He is s/p right upper lobectomy with  on 5/24/2022.  Pathology report as above.  Patient is stage IA3 squamous cell carcinoma of the lung.  He has recovered well and has been doing good.     Patient was started on surveillance. CT 9/19/2022 with 1.6 right paratracheal LN and small Rt pleural effusion. Surveillance CT scan chest to eval stability 1/25/2023 with paratracheal enlarged lymph node which shows interval mild size increase and now measures 2.2 x 2.0 cm and previously was 1.6 x 1.5 cm.  Aortopulmonary window mildly enlarged lymph node is stable. PET CT 2/9/2023 with Hypermetabolic right paratracheal lymph node 25 x 20 mm with max SUV 27.0. Hypermetabolic anterior mediastinal lymph node anterior to the SVC measures 12 x 9 mm with max SUV 12.0.   Biopsy of R4 lymph node confirmed the metastatic squamous cell carcinoma. Completed  XRT on 5/30/23  and 5 cycles of Carbo/taxol on 5/19/23. C6 was held on 5/26/23 due to neutropenia, ANC was 0.7.    Patient was started on chemoradiation. -completed XRT on 5/30/23 and 5 cycles of weekly carbo/taxol on 5/19/23, His  final cycle was held due to neutropenia, ANC was 0.7.  He was then started on maintenance Imfinzi every 4 weeks on 6/22/23    Chief Complaint: No chief complaint on file.      Interval History:  He completed XRT to the mediastinum on 5/30/23 and 5 cycles of weekly carbo/taxol on 5/19/23. He was on maintenance durvalumab, started 6/22/2023 and tolerated well. Patient with progression of paratracheal and para-aortic/subaortic lymph nodes.  He was seen by Dr. Willis but not a good candidate for radiation at this time.  He was started on Gemzar on 4/13/24.     01/29/25: Patient presents today for follow up.  He is doing well and voices no new concerns. He is due today for Gemzar C14D1. Denies fever, chills and sweats. Denies pain.    Interval history  2/19/2025:  01/29/2025-status post Gemzar cycle 14 D1  02/05/2025-status post Gemzar cycle 14 D 8  On today's visit the patient presents to the office for follow-up and management of his non-small cell lung cancer.  He reports a dry cough that started a couple of days ago but denies any phlegm production or any other issues.  Reports good energy denies any GI distress of flu-like symptoms he also denies any edema or rashes.  For treatment with gemcitabine tolerability.  Interval history  3/12/2025:  On today's visit the patient presents to the office for follow-up and management of his non-small cell lung cancer.  He is undergoing treatment with gemcitabine and is due today for cycle 16.  The patient reports that he feels a little tired by other than that feels well and wants to know the results of the most recent PET scan.  The patient denies any back pain, bleeding, difficulty breathing, GI distress, fever or any signs of infection.        Past Medical History:   Diagnosis Date    Anemia     Class 1 obesity due to excess calories with serious comorbidity and body mass index (BMI) of 33.0 to 33.9 in adult     Closed intertrochanteric fracture     Deficiency of  macronutrients     GERD (gastroesophageal reflux disease)     H. pylori infection     History of tobacco use     Hyperlipidemia     Hypertension     Lung mass     Malignant neoplasm of unspecified part of unspecified bronchus or lung     Non-small cell lung cancer       Past Surgical History:   Procedure Laterality Date    BIOPSY OF INTESTINE  04/04/2022    BRONCHOSCOPY      COLONOSCOPY      ESOPHAGOGASTRODUODENOSCOPY  04/04/2022    HIP FRACTURE SURGERY Left 2016    Intramedullary Nail Insertion Hip Left 01/18/2022    KIDNEY SURGERY Right 05/27/2022    MEDIASTINOSCOPY N/A 3/6/2023    Procedure: MEDIASTINOSCOPY;  Surgeon: Jose Diaz MD;  Location: [facility] OR;  Service: Cardiothoracic;  Laterality: N/A;  XX    PLACEMENT, MEDIPORT N/A 4/6/2023    Procedure: Placement, Mediport;  Surgeon: Jose Diaz MD;  Location: [facility] OR;  Service: Cardiology;  Laterality: N/A;    SHOULDER SURGERY       Family History   Family history unknown: Yes            Review of patient's allergies indicates:  No Known Allergies   Current Outpatient Medications on File Prior to Visit   Medication Sig Dispense Refill    amLODIPine (NORVASC) 5 MG tablet TAKE ONE TABLET BY MOUTH TWICE DAILY 180 tablet 1    aspirin 81 mg Cap Take 81 mg by mouth Daily.      atorvastatin (LIPITOR) 10 MG tablet TAKE ONE TABLET BY MOUTH DAILY 90 tablet 3    levoFLOXacin (LEVAQUIN) 500 MG tablet Take 1 tablet (500 mg total) by mouth once daily. 7 tablet 0    LIDOcaine-prilocaine (EMLA) cream Apply topically as needed. Apply to port site 30 mins prior to chemo 30 g 3    LINZESS 145 mcg Cap capsule Take 145 mcg by mouth daily as needed. New medication, not started yet      metoprolol succinate (TOPROL-XL) 25 MG 24 hr tablet Take 1 tablet (25 mg total) by mouth once daily. 90 tablet 3    pantoprazole (PROTONIX) 40 MG tablet Take 40 mg by mouth.       Current Facility-Administered Medications on File Prior to Visit   Medication Dose Route Frequency Provider Last Rate  Last Admin    0.9% NaCl 100 mL flush bag   Intravenous 1 time in Clinic/HOD Sanjuana Napoles MD        alteplase injection 2 mg  2 mg Intra-Catheter PRN Sanjuana Napoles MD        gemcitabine 2,400 mg in 0.9% NaCl 250 mL chemo infusion  1,250 mg/m2 (Order-Specific) Intravenous 1 time in Clinic/HOD Sanjuana Napoles MD        heparin, porcine (PF) 100 unit/mL injection flush 500 Units  500 Units Intravenous PRN Sanjuana Napoles MD        ondansetron injection 8 mg  8 mg Intravenous 1 time in Clinic/HOD Sanjuana Napoles MD        sodium chloride 0.9% bolus 500 mL 500 mL  500 mL Intravenous Once Sanjuana Napoles MD        sodium chloride 0.9% flush 10 mL  10 mL Intravenous PRN Sanjuana Napoles MD          Review of Systems   Constitutional:  Negative for activity change, appetite change, chills, diaphoresis, fatigue, fever, night sweats and unexpected weight change.   HENT:  Negative for nasal congestion, mouth sores, nosebleeds, sinus pressure/congestion, sore throat and trouble swallowing.    Eyes: Negative.    Respiratory:  Negative for cough and shortness of breath.    Cardiovascular:  Negative for chest pain and palpitations.   Gastrointestinal:  Negative for abdominal distention, abdominal pain, blood in stool, change in bowel habit, constipation, diarrhea, nausea and vomiting.   Endocrine: Negative.    Genitourinary:  Negative for bladder incontinence, decreased urine volume, difficulty urinating, dysuria, frequency, hematuria and urgency.   Musculoskeletal:  Negative for arthralgias, back pain, gait problem, joint swelling, leg pain and myalgias.   Integumentary:  Negative for rash.   Allergic/Immunologic: Negative.    Neurological:  Negative for dizziness, tremors, syncope, weakness, light-headedness, numbness, headaches and memory loss.   Hematological:  Negative for adenopathy. Does not bruise/bleed easily.   Psychiatric/Behavioral:  Negative for agitation, confusion,  hallucinations, sleep disturbance and suicidal ideas. The patient is not nervous/anxious.           There were no vitals filed for this visit.      Wt Readings from Last 6 Encounters:   02/26/25 79.8 kg (175 lb 14.1 oz)   02/19/25 79.8 kg (175 lb 14.4 oz)   01/29/25 82.1 kg (180 lb 14.4 oz)   01/02/25 84.6 kg (186 lb 6.4 oz)   12/11/24 85 kg (187 lb 6.3 oz)   12/11/24 85 kg (187 lb 6.4 oz)     There is no height or weight on file to calculate BMI.  There is no height or weight on file to calculate BSA.       Physical Exam  Vitals and nursing note reviewed.   Constitutional:       General: He is not in acute distress.     Appearance: Normal appearance. He is obese.   HENT:      Head: Normocephalic and atraumatic.      Mouth/Throat:      Mouth: Mucous membranes are moist.   Eyes:      General: No scleral icterus.     Extraocular Movements: Extraocular movements intact.      Conjunctiva/sclera: Conjunctivae normal.   Neck:      Vascular: No JVD.   Cardiovascular:      Rate and Rhythm: Normal rate and regular rhythm.      Heart sounds: No murmur heard.  Pulmonary:      Effort: Pulmonary effort is normal.      Breath sounds: Decreased breath sounds present. No wheezing or rhonchi.   Chest:      Chest wall: No tenderness.   Abdominal:      General: Bowel sounds are normal. There is no distension.      Palpations: Abdomen is soft. There is no fluid wave.      Tenderness: There is no abdominal tenderness.   Musculoskeletal:         General: No swelling or deformity.      Cervical back: Neck supple.   Lymphadenopathy:      Head:      Right side of head: No submandibular adenopathy.      Left side of head: No submandibular adenopathy.      Cervical: No cervical adenopathy.      Upper Body:      Right upper body: No supraclavicular or axillary adenopathy.      Left upper body: No supraclavicular or axillary adenopathy.      Lower Body: No right inguinal adenopathy. No left inguinal adenopathy.   Skin:     General: Skin is  warm.      Coloration: Skin is not jaundiced.      Findings: No rash.   Neurological:      Mental Status: He is alert and oriented to person, place, and time.      Cranial Nerves: Cranial nerves 2-12 are intact.      Motor: Weakness present.      Gait: Gait abnormal.      Comments: Uses cane for  mobility   Psychiatric:         Attention and Perception: Attention normal.         Mood and Affect: Mood and affect normal.         Behavior: Behavior is cooperative.         Cognition and Memory: Cognition normal.         Judgment: Judgment normal.       ECOG SCORE             Laboratory:  CBC with Differential:  Lab Results   Component Value Date    WBC 11.89 (H) 03/10/2025    RBC 3.91 (L) 03/10/2025    HGB 11.8 (L) 03/10/2025    HCT 38.4 (L) 03/10/2025    MCV 98.2 (H) 03/10/2025    MCH 30.2 03/10/2025    MCHC 30.7 (L) 03/10/2025    RDW 20.4 (H) 03/10/2025     03/10/2025    MPV 10.8 (H) 03/10/2025        CMP:  Sodium   Date Value Ref Range Status   03/10/2025 141 136 - 145 mmol/L Final     Potassium   Date Value Ref Range Status   03/10/2025 3.9 3.5 - 5.1 mmol/L Final     Chloride   Date Value Ref Range Status   03/10/2025 105 98 - 107 mmol/L Final     CO2   Date Value Ref Range Status   03/10/2025 24 23 - 31 mmol/L Final     Blood Urea Nitrogen   Date Value Ref Range Status   03/10/2025 5.3 (L) 8.4 - 25.7 mg/dL Final     Creatinine   Date Value Ref Range Status   03/10/2025 0.77 0.72 - 1.25 mg/dL Final     Calcium   Date Value Ref Range Status   03/10/2025 8.8 8.8 - 10.0 mg/dL Final     Albumin   Date Value Ref Range Status   03/10/2025 2.7 (L) 3.4 - 4.8 g/dL Final     Bilirubin Total   Date Value Ref Range Status   03/10/2025 1.1 <=1.5 mg/dL Final     ALP   Date Value Ref Range Status   03/10/2025 165 (H) 40 - 150 unit/L Final     AST   Date Value Ref Range Status   03/10/2025 22 5 - 34 unit/L Final     ALT   Date Value Ref Range Status   03/10/2025 16 0 - 55 unit/L Final     Estimated GFR-Non     Date Value Ref Range Status   04/19/2022 >60           Assessment:       No diagnosis found.        1) [id number] 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 Discussed with Dr Willis NP and he will be seen 4/21/2024.   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 Napoles next week to discuss future treatment plans.  Dx:  Chemotherapy-induced anemia  Status:  Ongoing/stable  Hemoglobin = 11.8 likely myelosuppression from treatment  Continue to monitor without interventions at this time      Patient instructions and visit orders.       -Follow-up:  F/U with MD -03/19/2024  -Labs:  CBC, CMP, CEA-03/19/2024  -Imaging:    -Other orders:  Treatment, gemcitabine-cycle 16 D 8-03/19/2024  Appointment with [facility]-April 15, 2025    46 were spent on this encounter    Shaun Vang, MSN, FNP-BC, APRN, AOCNP   Department of Hematology/Oncology.

## 2025-03-14 NOTE — PROGRESS NOTES
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     Treatment/Oncogy history:  -5/24/2022 right upper lobectomy   -Local Recurrence 03/08/2023  -completed XRT on 5/30/23 and 5 cycles of weekly carbo/taxol on 5/19/23   Mediastinum: 5,000 cGy/200 cGy per fx (4/18/2023-5/23/2023)   Med Bst:        1,000 cGy/200 cGy per fx (5/24/2023-5/30/2023)  -Imfinzi every 4 weeks x 10 cycles (6/22/23-3/11/2024)--> progression      Imaging:  CT angiogram 1/17/2022: No pulmonary embolus. Right upper lobe 2.5 cm mass.  CT C 9/19/2022: Status post right partial pneumonectomy for resection of a right upper lobe lung mass with no residual recurrent mass seen. Small right-sided pleural effusion. Some lymphadenopathy in the right paratracheal region with a maximum short axis dimension of 1.6 cm.  Follow-up is recommended  CT C 1/25/2023: There is a paratracheal enlarged lymph node which shows interval mild size increase and now measures 2.2 x 2.0 cm and previously was 1.6 x 1.5 cm.  Aortopulmonary window mildly enlarged lymph node is stable.  Thoracic aorta is without aneurysmal dilatation or dissection.  Impression: 1. Operative changes of right upper lobectomy without bronchialstump soft tissue proliferation    2. No residual tumor load or metastatic nodule. 3. Paratracheal enlarged lymph node with interval size increase.  PET CT 2/9/2023:  Hypermetabolic right paratracheal lymph node image 81 series 3 measures 25 x 20 mm with max SUV 27.0.  Hypermetabolic anterior mediastinal lymph node anterior to the SVC measures 12 x 9 mm with max SUV 12.0. Impression: 1. Hypermetabolic metastatic  mediastinal adenopathy.   2. No PET evidence of metastatic disease outside of the mediastinum.  CT C/A/P 7/10/2023:  1. Several new subcentimeter nodules in the right lung.  Suspect these are infectious or inflammatory in nature, but 3 month follow-up chest CT recommended to ensure resolution.  2. 2 reference mediastinal lymph nodes are smaller since January.  3. L1 vertebral body compression deformity new since January, but appears subacute.  CT C/A/P 10/10/2023:    1. Slightly larger mediastinal lymph nodes, to be followed.  2. Increased irregular right perihilar consolidation which may be treatment related.  3. Small right pleural effusion.  4. No new suspicious findings in the abdomen or pelvis.  CT C/A/P 1/10/2024:    1. Interval enlargement of mediastinal lymph nodes  2. Similar right perihilar consolidative opacity and small right pleural effusion  PET CT 2/6/2024:  1. New, enlarged hypermetabolic superior paratracheal and para-aortic/subaortic lymph nodes compared to prior PET-CT  2. Persistent enlarged and hypermetabolic lower right paratracheal lymph node.  This lymph node is decreased in size and FDG avidity compared to prior PET-CT.  3. Previously seen hypermetabolic pre-vascular lymph node is decreased in size and is no longer hypermetabolic.  PET CT 5/23/2024:  1. Status post right upper lobe resection without evidence for local recurrence/residual disease.  2. Interval response to therapy with decreasing right paratracheal and left AP window adenopathy.  3. Nonspecific area of hypermetabolic activity within the left tongue base.  Direct visualization may be warranted.  4. No evidence for new focus of metastatic disease.  PET CT 9/9/2024:  1. Previously visualized mediastinal lymph nodes slightly more FDG avid today.  2. No new sites of metastatic disease.  PET CT 12/10/24:  1. Variable mediastinal severo changes, with reference lymph nodes larger but of decreased metabolic activity, and newly enlarged  and FDG-avid retrosternal nodes identified.  This is otherwise indeterminate and could represent mixed disease response versus reactive adenopathy.  Short interval follow-up recommended in order to assess stability/resolution.  2. Stable right lung post-treatment changes, with no evidence to suggest tracer-avid new/worsened metastatic disease through the neck, abdomen, or pelvis.  3. Slightly increased volume of loculated, otherwise simple appearing right pleural fluid.          Pathology:  03/22/2022 CT-guided biopsy of the right lung mass: invasive squamous cell carcinoma, moderately differentiated.  5/24/2022: Right upper lobectomy:  1- LYMPH NODE, LUMBAR RIGHT 10 LYMPHADENECTOMY: NEGATIVE FOR METASTATIC CARCINOMA (0/1).   2- LYMPH NODE, 11R, LYMPHADENECTOMY: NEGATIVE FOR METASTATIC CARCINOMA (0/1).  3- LYMPH NODE, 9R, LYMPHADENECTOMY: NEGATIVE FOR METASTATIC CARCINOMA (0/1).   4- LYMPH NODE, 7R, LYMPHADENECTOMY: NEGATIVE FOR METASTATIC CARCINOMA (0/1).   5- LYMPH NODE, 8R, LYMPHADENECTOMY: ONE LYMPH NODE NEGATIVE FOR METASTATIC CARCINOMA (0/1).    6- LYMPH NODE, 12R, LYMPHADENECTOMY: NEGATIVE FOR METASTATIC CARCINOMA (0/1).  7- LUNG, RIGHT UPPER AND MIDDLE LOBES, PNEUMONECTOMY:  POORLY DIFFERENTIATED, G3, SQUAMOUS CELL CARCINOMA, INVASIVE.    - TUMOR SIZE: 3 CM.    - LYMPHOVASCULAR INVASION IS PRESENT.    - MARGINS NEGATIVE FOR INVASIVE CARCINOMA:    - NEAREST MARGIN, VASCULAR / BRONCHIAL 2.5 CM.    - NO PLEURAL SURFACE INVOLVEMENT     - PROMINENT NECROSIS PRESENT.  Visceral Pleura Invasion: Not identified  Number of Lymph Nodes Examined: Exact number : 6  pT Category: pT1c: pN0: No regional lymph node metastasis    3/8/2023 LYMPH NODE BIOPSY:   LYMPH NODE 4R, LYMPHADENECTOMY:  METASTATIC SQUAMOUS CELL CARCINOMA, NONKERATINIZING, MULTIPLE FRAGMENTS.       IMMUNOHISTOCHEMICAL STAINS:   CK 5/6, P63 AND CK7:  POSITIVE IN MALIGNANT CELLS.   CK20 AND TTF-1:  NEGATIVE IN MALIGNANT CELLS.       CLINICAL HISTORY:        Patient: Leonila Rsoales is a 79 y.o. male kindly referred by  Dr. Diaz for evaluation of lung cancer.    On 01/17/2022 patient presented to the emergency department after a fall.  He reports that he was getting to his truck and sleep and fell on his left side on the truck step rail.  He started having pain in his left hip and knee.  He underwent a CT angiogram that showed No pulmonary embolus. Right upper lobe 2.5 cm mass.      During that hospitalization he was treated for a close intertrochanteric fracture of the left femur and underwent open reduction intramedullary nailing left comminuted intertrochanteric hip fracture on 01/18/2022 with Dr. Fortino Palomares.  He then spent several weeks on rehab.    On 03/22/2022 he underwent CT-guided biopsy of the right lung mass.  Pathology showed invasive squamous cell carcinoma, moderately differentiated.    He is s/p right upper lobectomy with  on 5/24/2022.  Pathology report as above.  Patient is stage IA3 squamous cell carcinoma of the lung.  He has recovered well and has been doing good.     Patient was started on surveillance. CT 9/19/2022 with 1.6 right paratracheal LN and small Rt pleural effusion. Surveillance CT scan chest to eval stability 1/25/2023 with paratracheal enlarged lymph node which shows interval mild size increase and now measures 2.2 x 2.0 cm and previously was 1.6 x 1.5 cm.  Aortopulmonary window mildly enlarged lymph node is stable. PET CT 2/9/2023 with Hypermetabolic right paratracheal lymph node 25 x 20 mm with max SUV 27.0. Hypermetabolic anterior mediastinal lymph node anterior to the SVC measures 12 x 9 mm with max SUV 12.0.   Biopsy of R4 lymph node confirmed the metastatic squamous cell carcinoma. Completed  XRT on 5/30/23  and 5 cycles of Carbo/taxol on 5/19/23. C6 was held on 5/26/23 due to neutropenia, ANC was 0.7.    Patient was started on chemoradiation. -completed XRT on 5/30/23 and 5 cycles of weekly carbo/taxol on 5/19/23, His  final cycle was held due to neutropenia, ANC was 0.7.  He was then started on maintenance Imfinzi every 4 weeks on 6/22/23    Chief Complaint: 1 Month Follow Up      Interval History:  He completed XRT to the mediastinum on 5/30/23 and 5 cycles of weekly carbo/taxol on 5/19/23. He was on maintenance durvalumab, started 6/22/2023 and tolerated well. Patient with progression of paratracheal and para-aortic/subaortic lymph nodes.  He was seen by Dr. Willis but not a good candidate for radiation at this time.  He was started on Gemzar on 4/13/24.       03/19/2025: Patient presents today for follow up and Gemzar [id number]. Pt in wheelchair today, he is complaining of ankle pain. Pt informed of diease progression, discussed options with pt. we discussed palliative and comfort care versus continuation of treatment.  Patient definitively wants to continue treatment.  Hospice is not an option for him at this time.  He stated that he still cook and do chores around the house and remained as after as he can.  He is here on a wheelchair just because his ankle is hurting but other than that he is doing okay.  The patient denies any back pain, bleeding, difficulty breathing, GI distress, fever or any signs of infection.Will discontinue Gemzar.         Past Medical History:   Diagnosis Date    Anemia     Class 1 obesity due to excess calories with serious comorbidity and body mass index (BMI) of 33.0 to 33.9 in adult     Closed intertrochanteric fracture     Deficiency of macronutrients     GERD (gastroesophageal reflux disease)     H. pylori infection     History of tobacco use     Hyperlipidemia     Hypertension     Lung mass     Malignant neoplasm of unspecified part of unspecified bronchus or lung     Non-small cell lung cancer       Past Surgical History:   Procedure Laterality Date    BIOPSY OF INTESTINE  04/04/2022    BRONCHOSCOPY      COLONOSCOPY      ESOPHAGOGASTRODUODENOSCOPY  04/04/2022    HIP FRACTURE SURGERY Left 2016     Intramedullary Nail Insertion Hip Left 01/18/2022    KIDNEY SURGERY Right 05/27/2022    MEDIASTINOSCOPY N/A 3/6/2023    Procedure: MEDIASTINOSCOPY;  Surgeon: Jose Diaz MD;  Location: [facility] OR;  Service: Cardiothoracic;  Laterality: N/A;  XX    PLACEMENT, MEDIPORT N/A 4/6/2023    Procedure: Placement, Mediport;  Surgeon: Jose Diaz MD;  Location: [facility] OR;  Service: Cardiology;  Laterality: N/A;    SHOULDER SURGERY       Family History   Family history unknown: Yes            Review of patient's allergies indicates:  No Known Allergies   Current Outpatient Medications on File Prior to Visit   Medication Sig Dispense Refill    amLODIPine (NORVASC) 5 MG tablet TAKE ONE TABLET BY MOUTH TWICE DAILY 180 tablet 1    aspirin 81 mg Cap Take 81 mg by mouth Daily.      atorvastatin (LIPITOR) 10 MG tablet TAKE ONE TABLET BY MOUTH DAILY 90 tablet 3    levoFLOXacin (LEVAQUIN) 500 MG tablet Take 1 tablet (500 mg total) by mouth once daily. 7 tablet 0    LIDOcaine-prilocaine (EMLA) cream Apply topically as needed. Apply to port site 30 mins prior to chemo 30 g 3    LINZESS 145 mcg Cap capsule Take 145 mcg by mouth daily as needed. New medication, not started yet      metoprolol succinate (TOPROL-XL) 25 MG 24 hr tablet Take 1 tablet (25 mg total) by mouth once daily. 90 tablet 3    pantoprazole (PROTONIX) 40 MG tablet Take 40 mg by mouth.       No current facility-administered medications on file prior to visit.      Review of Systems   Constitutional:  Negative for activity change, appetite change, chills, diaphoresis, fatigue, fever, night sweats and unexpected weight change.   HENT:  Negative for nasal congestion, mouth sores, nosebleeds, sinus pressure/congestion, sore throat and trouble swallowing.    Eyes: Negative.    Respiratory:  Negative for cough and shortness of breath.    Cardiovascular:  Negative for chest pain and palpitations.   Gastrointestinal:  Negative for abdominal distention, abdominal pain, blood in  "stool, change in bowel habit, constipation, diarrhea, nausea and vomiting.   Endocrine: Negative.    Genitourinary:  Negative for bladder incontinence, decreased urine volume, difficulty urinating, dysuria, frequency, hematuria and urgency.   Musculoskeletal:  Negative for arthralgias, back pain, gait problem, joint swelling, leg pain and myalgias.   Integumentary:  Negative for rash.   Allergic/Immunologic: Negative.    Neurological:  Negative for dizziness, tremors, syncope, weakness, light-headedness, numbness, headaches and memory loss.   Hematological:  Negative for adenopathy. Does not bruise/bleed easily.   Psychiatric/Behavioral:  Negative for agitation, confusion, hallucinations, sleep disturbance and suicidal ideas. The patient is not nervous/anxious.           Vitals:    03/19/25 1255   BP: 110/64   BP Location: Left arm   Patient Position: Sitting   Pulse: (!) 117   Resp: 18   Temp: 98 °F (36.7 °C)   TempSrc: Oral   SpO2: (!) 94%   Weight: 78.9 kg (174 lb)   Height: 5' 6.93" (1.7 m)         Wt Readings from Last 6 Encounters:   03/19/25 78.9 kg (174 lb)   03/12/25 79.4 kg (175 lb)   02/26/25 79.8 kg (175 lb 14.1 oz)   02/19/25 79.8 kg (175 lb 14.4 oz)   01/29/25 82.1 kg (180 lb 14.4 oz)   01/02/25 84.6 kg (186 lb 6.4 oz)     Body mass index is 27.31 kg/m².  Body surface area is 1.93 meters squared.       Physical Exam  Vitals and nursing note reviewed.   Constitutional:       General: He is not in acute distress.     Appearance: Normal appearance. He is obese.   HENT:      Head: Normocephalic and atraumatic.      Mouth/Throat:      Mouth: Mucous membranes are moist.   Eyes:      General: No scleral icterus.     Extraocular Movements: Extraocular movements intact.      Conjunctiva/sclera: Conjunctivae normal.   Neck:      Vascular: No JVD.   Cardiovascular:      Rate and Rhythm: Normal rate and regular rhythm.      Heart sounds: No murmur heard.  Pulmonary:      Effort: Pulmonary effort is normal.      " Breath sounds: Decreased breath sounds present. No wheezing or rhonchi.   Chest:      Chest wall: No tenderness.   Abdominal:      General: Bowel sounds are normal. There is no distension.      Palpations: Abdomen is soft. There is no fluid wave.      Tenderness: There is no abdominal tenderness.   Musculoskeletal:         General: No swelling or deformity.      Cervical back: Neck supple.   Lymphadenopathy:      Head:      Right side of head: No submandibular adenopathy.      Left side of head: No submandibular adenopathy.      Cervical: No cervical adenopathy.      Upper Body:      Right upper body: No supraclavicular or axillary adenopathy.      Left upper body: No supraclavicular or axillary adenopathy.      Lower Body: No right inguinal adenopathy. No left inguinal adenopathy.   Skin:     General: Skin is warm.      Coloration: Skin is not jaundiced.      Findings: No rash.   Neurological:      Mental Status: He is alert and oriented to person, place, and time.      Cranial Nerves: Cranial nerves 2-12 are intact.      Motor: Weakness present.      Gait: Gait abnormal.      Comments: Uses cane for  mobility  Today on a wheelchair during the exam.   Psychiatric:         Attention and Perception: Attention normal.         Mood and Affect: Mood and affect normal.         Behavior: Behavior is cooperative.         Cognition and Memory: Cognition normal.         Judgment: Judgment normal.     ECOG SCORE    1 - Restricted in strenuous activity-ambulatory and able to carry out work of a light nature         Laboratory:  CBC with Differential:  Lab Results   Component Value Date    WBC 3.84 (L) 03/17/2025    RBC 3.61 (L) 03/17/2025    HGB 11.0 (L) 03/17/2025    HCT 35.4 (L) 03/17/2025    MCV 98.1 (H) 03/17/2025    MCH 30.5 03/17/2025    MCHC 31.1 (L) 03/17/2025    RDW 19.7 (H) 03/17/2025     03/17/2025    MPV 10.2 03/17/2025        CMP:  Sodium   Date Value Ref Range Status   03/17/2025 141 136 - 145 mmol/L Final      Potassium   Date Value Ref Range Status   03/17/2025 4.3 3.5 - 5.1 mmol/L Final     Chloride   Date Value Ref Range Status   03/17/2025 108 (H) 98 - 107 mmol/L Final     CO2   Date Value Ref Range Status   03/17/2025 23 23 - 31 mmol/L Final     Blood Urea Nitrogen   Date Value Ref Range Status   03/17/2025 9.0 8.4 - 25.7 mg/dL Final     Creatinine   Date Value Ref Range Status   03/17/2025 0.76 0.72 - 1.25 mg/dL Final     Calcium   Date Value Ref Range Status   03/17/2025 8.6 (L) 8.8 - 10.0 mg/dL Final     Albumin   Date Value Ref Range Status   03/17/2025 2.5 (L) 3.4 - 4.8 g/dL Final     Bilirubin Total   Date Value Ref Range Status   03/17/2025 1.0 <=1.5 mg/dL Final     ALP   Date Value Ref Range Status   03/17/2025 122 40 - 150 unit/L Final     AST   Date Value Ref Range Status   03/17/2025 36 (H) 5 - 34 unit/L Final     ALT   Date Value Ref Range Status   03/17/2025 25 0 - 55 unit/L Final     Estimated GFR-Non    Date Value Ref Range Status   04/19/2022 >60           Assessment:       1. 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 Discussed with Dr Alba ESCOBAR and he will be seen 4/21/2024.   -Recent PET CT with persistent stable  LN but increase activity    2) Chemotherapy induced anemia and leukopenia-stable       Plan:        Most recent imaging studies with mixed response but mostly progression of disease.  We discussed different treatment options including palliative care and hospice but he is not ready for that.  He wants to continue treatment.  Patient have weekly carbotaxol with radiation that was completed back in 2023.  I think that he will benefit of carbo Taxol.   I would still with dose reduced carbo Taxol due to his age.  Carbo AUC of 5 and Taxol 175 mg per meter sq.  And evaluate tolerance.      Discontinue Gemzar  Start new infusion next Wednesday -   RTC in 1 week with NP/same day infusion  Labs: CBC, CMP, CEA - @[facility]  The patient was seen, interviewed and examined. Pertinent lab and radiology studies were reviewed.   The patient was given ample opportunity to ask questions, and to the best of my abilities, all questions answered to satisfaction; patient demonstrated understanding of what we discussed and agreeable to the plan. Pt instructed to call should develop concerning signs/symptoms or have further questions.     Visit today included increased complexity associated with the care of the episodic problem lung cancer, addressing and managing the longitudinal care of the patient's lung cancer.       Sanjuana Napoles MD  Hematology/Oncology  [facility]      Professional Services   I, Gena Andujar LPN, acted solely as a scribe for and in the presence of Dr. Sanjuana Napoles, who performed these services.

## 2025-03-17 ENCOUNTER — LAB VISIT (OUTPATIENT)
Dept: LAB | Facility: HOSPITAL | Age: 80
End: 2025-03-17
Attending: INTERNAL MEDICINE
Payer: MEDICARE

## 2025-03-17 DIAGNOSIS — D84.821 IMMUNODEFICIENCY SECONDARY TO CHEMOTHERAPY: ICD-10-CM

## 2025-03-17 DIAGNOSIS — Z90.2 S/P LOBECTOMY OF LUNG: ICD-10-CM

## 2025-03-17 DIAGNOSIS — C34.90 MALIGNANT NEOPLASM OF UNSPECIFIED PART OF UNSPECIFIED BRONCHUS OR LUNG: ICD-10-CM

## 2025-03-17 DIAGNOSIS — Z79.899 IMMUNODEFICIENCY SECONDARY TO CHEMOTHERAPY: ICD-10-CM

## 2025-03-17 DIAGNOSIS — C34.91 NON-SMALL CELL CANCER OF RIGHT LUNG: ICD-10-CM

## 2025-03-17 DIAGNOSIS — T45.1X5A IMMUNODEFICIENCY SECONDARY TO CHEMOTHERAPY: ICD-10-CM

## 2025-03-17 DIAGNOSIS — Z79.899 ON ANTINEOPLASTIC CHEMOTHERAPY: ICD-10-CM

## 2025-03-17 LAB
ALBUMIN SERPL-MCNC: 2.5 G/DL (ref 3.4–4.8)
ALBUMIN/GLOB SERPL: 0.4 RATIO (ref 1.1–2)
ALP SERPL-CCNC: 122 UNIT/L (ref 40–150)
ALT SERPL-CCNC: 25 UNIT/L (ref 0–55)
ANION GAP SERPL CALC-SCNC: 10 MEQ/L
AST SERPL-CCNC: 36 UNIT/L (ref 5–34)
BASOPHILS # BLD AUTO: 0.02 X10(3)/MCL
BASOPHILS NFR BLD AUTO: 0.5 %
BILIRUB SERPL-MCNC: 1 MG/DL
BUN SERPL-MCNC: 9 MG/DL (ref 8.4–25.7)
CALCIUM SERPL-MCNC: 8.6 MG/DL (ref 8.8–10)
CHLORIDE SERPL-SCNC: 108 MMOL/L (ref 98–107)
CO2 SERPL-SCNC: 23 MMOL/L (ref 23–31)
CREAT SERPL-MCNC: 0.76 MG/DL (ref 0.72–1.25)
CREAT/UREA NIT SERPL: 12
EOSINOPHIL # BLD AUTO: 0.05 X10(3)/MCL (ref 0–0.9)
EOSINOPHIL NFR BLD AUTO: 1.3 %
ERYTHROCYTE [DISTWIDTH] IN BLOOD BY AUTOMATED COUNT: 19.7 % (ref 11.5–17)
GFR SERPLBLD CREATININE-BSD FMLA CKD-EPI: >60 ML/MIN/1.73/M2
GLOBULIN SER-MCNC: 5.7 GM/DL (ref 2.4–3.5)
GLUCOSE SERPL-MCNC: 151 MG/DL (ref 82–115)
HCT VFR BLD AUTO: 35.4 % (ref 42–52)
HGB BLD-MCNC: 11 G/DL (ref 14–18)
IMM GRANULOCYTES # BLD AUTO: 0.02 X10(3)/MCL (ref 0–0.04)
IMM GRANULOCYTES NFR BLD AUTO: 0.5 %
LYMPHOCYTES # BLD AUTO: 0.41 X10(3)/MCL (ref 0.6–4.6)
LYMPHOCYTES NFR BLD AUTO: 10.7 %
MCH RBC QN AUTO: 30.5 PG (ref 27–31)
MCHC RBC AUTO-ENTMCNC: 31.1 G/DL (ref 33–36)
MCV RBC AUTO: 98.1 FL (ref 80–94)
MONOCYTES # BLD AUTO: 0.13 X10(3)/MCL (ref 0.1–1.3)
MONOCYTES NFR BLD AUTO: 3.4 %
NEUTROPHILS # BLD AUTO: 3.21 X10(3)/MCL (ref 2.1–9.2)
NEUTROPHILS NFR BLD AUTO: 83.6 %
PLATELET # BLD AUTO: 279 X10(3)/MCL (ref 130–400)
PMV BLD AUTO: 10.2 FL (ref 7.4–10.4)
POTASSIUM SERPL-SCNC: 4.3 MMOL/L (ref 3.5–5.1)
PROT SERPL-MCNC: 8.2 GM/DL (ref 5.8–7.6)
RBC # BLD AUTO: 3.61 X10(6)/MCL (ref 4.7–6.1)
SODIUM SERPL-SCNC: 141 MMOL/L (ref 136–145)
WBC # BLD AUTO: 3.84 X10(3)/MCL (ref 4.5–11.5)

## 2025-03-17 PROCEDURE — 85025 COMPLETE CBC W/AUTO DIFF WBC: CPT

## 2025-03-17 PROCEDURE — 80053 COMPREHEN METABOLIC PANEL: CPT

## 2025-03-17 PROCEDURE — 36415 COLL VENOUS BLD VENIPUNCTURE: CPT

## 2025-03-19 ENCOUNTER — OFFICE VISIT (OUTPATIENT)
Dept: HEMATOLOGY/ONCOLOGY | Facility: CLINIC | Age: 80
End: 2025-03-19
Payer: MEDICARE

## 2025-03-19 VITALS
DIASTOLIC BLOOD PRESSURE: 64 MMHG | WEIGHT: 174 LBS | TEMPERATURE: 98 F | OXYGEN SATURATION: 94 % | HEIGHT: 67 IN | BODY MASS INDEX: 27.31 KG/M2 | HEART RATE: 117 BPM | RESPIRATION RATE: 18 BRPM | SYSTOLIC BLOOD PRESSURE: 110 MMHG

## 2025-03-19 DIAGNOSIS — C34.91 NON-SMALL CELL CANCER OF RIGHT LUNG: Primary | ICD-10-CM

## 2025-03-19 PROCEDURE — 99999 PR PBB SHADOW E&M-EST. PATIENT-LVL IV: CPT | Mod: PBBFAC,,, | Performed by: INTERNAL MEDICINE

## 2025-03-19 PROCEDURE — 1160F RVW MEDS BY RX/DR IN RCRD: CPT | Mod: CPTII,S$GLB,, | Performed by: INTERNAL MEDICINE

## 2025-03-19 PROCEDURE — G2211 COMPLEX E/M VISIT ADD ON: HCPCS | Mod: S$GLB,,, | Performed by: INTERNAL MEDICINE

## 2025-03-19 PROCEDURE — 1157F ADVNC CARE PLAN IN RCRD: CPT | Mod: CPTII,S$GLB,, | Performed by: INTERNAL MEDICINE

## 2025-03-19 PROCEDURE — 99215 OFFICE O/P EST HI 40 MIN: CPT | Mod: S$GLB,,, | Performed by: INTERNAL MEDICINE

## 2025-03-19 PROCEDURE — 3078F DIAST BP <80 MM HG: CPT | Mod: CPTII,S$GLB,, | Performed by: INTERNAL MEDICINE

## 2025-03-19 PROCEDURE — 1159F MED LIST DOCD IN RCRD: CPT | Mod: CPTII,S$GLB,, | Performed by: INTERNAL MEDICINE

## 2025-03-19 PROCEDURE — 1125F AMNT PAIN NOTED PAIN PRSNT: CPT | Mod: CPTII,S$GLB,, | Performed by: INTERNAL MEDICINE

## 2025-03-19 PROCEDURE — 3074F SYST BP LT 130 MM HG: CPT | Mod: CPTII,S$GLB,, | Performed by: INTERNAL MEDICINE

## 2025-03-19 RX ORDER — HEPARIN 100 UNIT/ML
500 SYRINGE INTRAVENOUS
OUTPATIENT
Start: 2025-03-26

## 2025-03-19 RX ORDER — EPINEPHRINE 0.3 MG/.3ML
0.3 INJECTION SUBCUTANEOUS ONCE AS NEEDED
OUTPATIENT
Start: 2025-03-26

## 2025-03-19 RX ORDER — FAMOTIDINE 10 MG/ML
20 INJECTION, SOLUTION INTRAVENOUS
OUTPATIENT
Start: 2025-03-26

## 2025-03-19 RX ORDER — DIPHENHYDRAMINE HYDROCHLORIDE 50 MG/ML
50 INJECTION, SOLUTION INTRAMUSCULAR; INTRAVENOUS ONCE AS NEEDED
OUTPATIENT
Start: 2025-03-26

## 2025-03-19 RX ORDER — SODIUM CHLORIDE 0.9 % (FLUSH) 0.9 %
10 SYRINGE (ML) INJECTION
OUTPATIENT
Start: 2025-03-26

## 2025-03-21 LAB
DNA RANGE(S) EXAMINED NAR: NORMAL
GENE DIS ANL INTERP-IMP: POSITIVE
GENE DIS ASSESSED: NORMAL
REASON FOR STUDY: NORMAL
TEMPUS BLOOD TUMOR MUTATIONAL BURDEN: 7.2 M/MB
TEMPUS LCA: NORMAL
TEMPUS MSI NOTE: NORMAL
TEMPUS PORTAL: NORMAL
TEMPUS THERAPY1: NORMAL
TEMPUS THERAPYCOUNT: 1
TEMPUS TRIAL1: NORMAL
TEMPUS TRIAL2: NORMAL
TEMPUS TRIAL3: NORMAL
TEMPUS TRIALCOUNT: 3

## 2025-03-25 ENCOUNTER — OFFICE VISIT (OUTPATIENT)
Dept: HEMATOLOGY/ONCOLOGY | Facility: CLINIC | Age: 80
End: 2025-03-25
Payer: MEDICARE

## 2025-03-25 ENCOUNTER — LAB VISIT (OUTPATIENT)
Dept: LAB | Facility: HOSPITAL | Age: 80
End: 2025-03-25
Attending: INTERNAL MEDICINE
Payer: MEDICARE

## 2025-03-25 VITALS
HEIGHT: 67 IN | HEART RATE: 117 BPM | OXYGEN SATURATION: 95 % | DIASTOLIC BLOOD PRESSURE: 60 MMHG | SYSTOLIC BLOOD PRESSURE: 108 MMHG | RESPIRATION RATE: 18 BRPM | BODY MASS INDEX: 26.35 KG/M2 | TEMPERATURE: 98 F | WEIGHT: 167.88 LBS

## 2025-03-25 DIAGNOSIS — Z79.899 ON ANTINEOPLASTIC CHEMOTHERAPY: ICD-10-CM

## 2025-03-25 DIAGNOSIS — T45.1X5A CHEMOTHERAPY-INDUCED NEUTROPENIA: ICD-10-CM

## 2025-03-25 DIAGNOSIS — D70.1 CHEMOTHERAPY-INDUCED NEUTROPENIA: ICD-10-CM

## 2025-03-25 DIAGNOSIS — Z90.2 S/P LOBECTOMY OF LUNG: ICD-10-CM

## 2025-03-25 DIAGNOSIS — C34.91 NON-SMALL CELL CANCER OF RIGHT LUNG: ICD-10-CM

## 2025-03-25 DIAGNOSIS — K92.2 GASTROINTESTINAL HEMORRHAGE, UNSPECIFIED GASTROINTESTINAL HEMORRHAGE TYPE: ICD-10-CM

## 2025-03-25 DIAGNOSIS — E63.9 DEFICIENCY OF MACRONUTRIENTS: ICD-10-CM

## 2025-03-25 DIAGNOSIS — C77.1 SECONDARY AND UNSPECIFIED MALIGNANT NEOPLASM OF INTRATHORACIC LYMPH NODES: ICD-10-CM

## 2025-03-25 DIAGNOSIS — D50.9 MICROCYTIC ANEMIA: ICD-10-CM

## 2025-03-25 DIAGNOSIS — C34.00 MALIGNANT NEOPLASM OF HILUS OF LUNG, UNSPECIFIED LATERALITY: ICD-10-CM

## 2025-03-25 LAB
ALBUMIN SERPL-MCNC: 2.7 G/DL (ref 3.4–4.8)
ALBUMIN/GLOB SERPL: 0.5 RATIO (ref 1.1–2)
ALP SERPL-CCNC: 113 UNIT/L (ref 40–150)
ALT SERPL-CCNC: 14 UNIT/L (ref 0–55)
ANION GAP SERPL CALC-SCNC: 10 MEQ/L
AST SERPL-CCNC: 28 UNIT/L (ref 11–45)
BASOPHILS # BLD AUTO: 0.02 X10(3)/MCL
BASOPHILS NFR BLD AUTO: 0.4 %
BILIRUB SERPL-MCNC: 0.9 MG/DL
BUN SERPL-MCNC: 8.9 MG/DL (ref 8.4–25.7)
CALCIUM SERPL-MCNC: 9 MG/DL (ref 8.8–10)
CHLORIDE SERPL-SCNC: 108 MMOL/L (ref 98–107)
CO2 SERPL-SCNC: 25 MMOL/L (ref 23–31)
CREAT SERPL-MCNC: 0.83 MG/DL (ref 0.72–1.25)
CREAT/UREA NIT SERPL: 11
EOSINOPHIL # BLD AUTO: 0.05 X10(3)/MCL (ref 0–0.9)
EOSINOPHIL NFR BLD AUTO: 0.9 %
ERYTHROCYTE [DISTWIDTH] IN BLOOD BY AUTOMATED COUNT: 19.8 % (ref 11.5–17)
GFR SERPLBLD CREATININE-BSD FMLA CKD-EPI: >60 ML/MIN/1.73/M2
GLOBULIN SER-MCNC: 5.8 GM/DL (ref 2.4–3.5)
GLUCOSE SERPL-MCNC: 123 MG/DL (ref 82–115)
HCT VFR BLD AUTO: 38.7 % (ref 42–52)
HGB BLD-MCNC: 12 G/DL (ref 14–18)
IMM GRANULOCYTES # BLD AUTO: 0.01 X10(3)/MCL (ref 0–0.04)
IMM GRANULOCYTES NFR BLD AUTO: 0.2 %
LYMPHOCYTES # BLD AUTO: 0.88 X10(3)/MCL (ref 0.6–4.6)
LYMPHOCYTES NFR BLD AUTO: 15.4 %
MCH RBC QN AUTO: 29.9 PG (ref 27–31)
MCHC RBC AUTO-ENTMCNC: 31 G/DL (ref 33–36)
MCV RBC AUTO: 96.3 FL (ref 80–94)
MONOCYTES # BLD AUTO: 0.95 X10(3)/MCL (ref 0.1–1.3)
MONOCYTES NFR BLD AUTO: 16.6 %
NEUTROPHILS # BLD AUTO: 3.8 X10(3)/MCL (ref 2.1–9.2)
NEUTROPHILS NFR BLD AUTO: 66.5 %
PLATELET # BLD AUTO: 209 X10(3)/MCL (ref 130–400)
PMV BLD AUTO: 9.3 FL (ref 7.4–10.4)
POTASSIUM SERPL-SCNC: 4.4 MMOL/L (ref 3.5–5.1)
PROT SERPL-MCNC: 8.5 GM/DL (ref 5.8–7.6)
RBC # BLD AUTO: 4.02 X10(6)/MCL (ref 4.7–6.1)
SODIUM SERPL-SCNC: 143 MMOL/L (ref 136–145)
WBC # BLD AUTO: 5.71 X10(3)/MCL (ref 4.5–11.5)

## 2025-03-25 PROCEDURE — 99215 OFFICE O/P EST HI 40 MIN: CPT | Mod: S$GLB,,, | Performed by: NURSE PRACTITIONER

## 2025-03-25 PROCEDURE — 80053 COMPREHEN METABOLIC PANEL: CPT

## 2025-03-25 PROCEDURE — 99999 PR PBB SHADOW E&M-EST. PATIENT-LVL IV: CPT | Mod: PBBFAC,,, | Performed by: NURSE PRACTITIONER

## 2025-03-25 PROCEDURE — 1126F AMNT PAIN NOTED NONE PRSNT: CPT | Mod: CPTII,S$GLB,, | Performed by: NURSE PRACTITIONER

## 2025-03-25 PROCEDURE — 1160F RVW MEDS BY RX/DR IN RCRD: CPT | Mod: CPTII,S$GLB,, | Performed by: NURSE PRACTITIONER

## 2025-03-25 PROCEDURE — 1157F ADVNC CARE PLAN IN RCRD: CPT | Mod: CPTII,S$GLB,, | Performed by: NURSE PRACTITIONER

## 2025-03-25 PROCEDURE — 3078F DIAST BP <80 MM HG: CPT | Mod: CPTII,S$GLB,, | Performed by: NURSE PRACTITIONER

## 2025-03-25 PROCEDURE — 1159F MED LIST DOCD IN RCRD: CPT | Mod: CPTII,S$GLB,, | Performed by: NURSE PRACTITIONER

## 2025-03-25 PROCEDURE — 3288F FALL RISK ASSESSMENT DOCD: CPT | Mod: CPTII,S$GLB,, | Performed by: NURSE PRACTITIONER

## 2025-03-25 PROCEDURE — 1101F PT FALLS ASSESS-DOCD LE1/YR: CPT | Mod: CPTII,S$GLB,, | Performed by: NURSE PRACTITIONER

## 2025-03-25 PROCEDURE — 3074F SYST BP LT 130 MM HG: CPT | Mod: CPTII,S$GLB,, | Performed by: NURSE PRACTITIONER

## 2025-03-25 PROCEDURE — 85025 COMPLETE CBC W/AUTO DIFF WBC: CPT

## 2025-03-25 PROCEDURE — 36415 COLL VENOUS BLD VENIPUNCTURE: CPT

## 2025-03-26 ENCOUNTER — TELEPHONE (OUTPATIENT)
Dept: HEMATOLOGY/ONCOLOGY | Facility: CLINIC | Age: 80
End: 2025-03-26
Payer: MEDICARE

## 2025-03-26 DIAGNOSIS — L60.8 DEFORMITY OF TOENAIL: ICD-10-CM

## 2025-03-26 DIAGNOSIS — C34.91 NON-SMALL CELL CANCER OF RIGHT LUNG: Primary | ICD-10-CM

## 2025-03-28 ENCOUNTER — INFUSION (OUTPATIENT)
Dept: INFUSION THERAPY | Facility: HOSPITAL | Age: 80
End: 2025-03-28
Attending: NURSE PRACTITIONER
Payer: MEDICARE

## 2025-03-28 VITALS
DIASTOLIC BLOOD PRESSURE: 61 MMHG | RESPIRATION RATE: 18 BRPM | HEART RATE: 110 BPM | OXYGEN SATURATION: 95 % | WEIGHT: 167.88 LBS | HEIGHT: 66 IN | TEMPERATURE: 98 F | SYSTOLIC BLOOD PRESSURE: 117 MMHG | BODY MASS INDEX: 26.98 KG/M2

## 2025-03-28 DIAGNOSIS — C34.90 NON-SMALL CELL LUNG CANCER, UNSPECIFIED LATERALITY: ICD-10-CM

## 2025-03-28 DIAGNOSIS — C34.00 MALIGNANT NEOPLASM OF HILUS OF LUNG, UNSPECIFIED LATERALITY: Primary | ICD-10-CM

## 2025-03-28 PROCEDURE — 96415 CHEMO IV INFUSION ADDL HR: CPT

## 2025-03-28 PROCEDURE — 63600175 PHARM REV CODE 636 W HCPCS: Performed by: INTERNAL MEDICINE

## 2025-03-28 PROCEDURE — 96413 CHEMO IV INFUSION 1 HR: CPT

## 2025-03-28 PROCEDURE — 96417 CHEMO IV INFUS EACH ADDL SEQ: CPT

## 2025-03-28 PROCEDURE — A4216 STERILE WATER/SALINE, 10 ML: HCPCS | Performed by: INTERNAL MEDICINE

## 2025-03-28 PROCEDURE — 25000003 PHARM REV CODE 250: Performed by: INTERNAL MEDICINE

## 2025-03-28 PROCEDURE — 96367 TX/PROPH/DG ADDL SEQ IV INF: CPT

## 2025-03-28 PROCEDURE — 96375 TX/PRO/DX INJ NEW DRUG ADDON: CPT

## 2025-03-28 RX ORDER — SODIUM CHLORIDE 0.9 % (FLUSH) 0.9 %
10 SYRINGE (ML) INJECTION
Status: DISCONTINUED | OUTPATIENT
Start: 2025-03-28 | End: 2025-03-28 | Stop reason: HOSPADM

## 2025-03-28 RX ORDER — EPINEPHRINE 0.3 MG/.3ML
0.3 INJECTION SUBCUTANEOUS ONCE AS NEEDED
Status: DISCONTINUED | OUTPATIENT
Start: 2025-03-28 | End: 2025-03-28 | Stop reason: HOSPADM

## 2025-03-28 RX ORDER — FAMOTIDINE 10 MG/ML
20 INJECTION, SOLUTION INTRAVENOUS
Status: COMPLETED | OUTPATIENT
Start: 2025-03-28 | End: 2025-03-28

## 2025-03-28 RX ORDER — HEPARIN 100 UNIT/ML
500 SYRINGE INTRAVENOUS
Status: DISCONTINUED | OUTPATIENT
Start: 2025-03-28 | End: 2025-03-28 | Stop reason: HOSPADM

## 2025-03-28 RX ORDER — DIPHENHYDRAMINE HYDROCHLORIDE 50 MG/ML
50 INJECTION, SOLUTION INTRAMUSCULAR; INTRAVENOUS ONCE AS NEEDED
Status: DISCONTINUED | OUTPATIENT
Start: 2025-03-28 | End: 2025-03-28 | Stop reason: HOSPADM

## 2025-03-28 RX ADMIN — APREPITANT 130 MG: 130 INJECTION, EMULSION INTRAVENOUS at 08:03

## 2025-03-28 RX ADMIN — HEPARIN 500 UNITS: 100 SYRINGE at 01:03

## 2025-03-28 RX ADMIN — SODIUM CHLORIDE: 9 INJECTION, SOLUTION INTRAVENOUS at 08:03

## 2025-03-28 RX ADMIN — CARBOPLATIN 535 MG: 10 INJECTION, SOLUTION INTRAVENOUS at 12:03

## 2025-03-28 RX ADMIN — DEXAMETHASONE SODIUM PHOSPHATE 0.25 MG: 4 INJECTION, SOLUTION INTRA-ARTICULAR; INTRALESIONAL; INTRAMUSCULAR; INTRAVENOUS; SOFT TISSUE at 08:03

## 2025-03-28 RX ADMIN — FAMOTIDINE 20 MG: 10 INJECTION, SOLUTION INTRAVENOUS at 08:03

## 2025-03-28 RX ADMIN — Medication 10 ML: at 01:03

## 2025-03-28 RX ADMIN — PACLITAXEL 336 MG: 6 INJECTION, SOLUTION INTRAVENOUS at 09:03

## 2025-03-28 RX ADMIN — SODIUM CHLORIDE 50 MG: 9 INJECTION, SOLUTION INTRAVENOUS at 08:03

## 2025-03-28 NOTE — PLAN OF CARE
Plan of care reviewed with the patient. C1 D1 Taxol/Carbo given, tolerated well. Discharged in stable condition and is aware of future appointments.

## 2025-04-02 ENCOUNTER — TELEPHONE (OUTPATIENT)
Dept: HEMATOLOGY/ONCOLOGY | Facility: CLINIC | Age: 80
End: 2025-04-02
Payer: MEDICARE

## 2025-04-02 DIAGNOSIS — C34.91 NON-SMALL CELL CANCER OF RIGHT LUNG: Primary | ICD-10-CM

## 2025-04-02 NOTE — TELEPHONE ENCOUNTER
Contacted patient's sister to follow up on [facility] referral. They have not received a call yet. She was given the phone number to [facility] to call and schedule.     She is requesting [facility] Order faxed.

## 2025-04-04 DIAGNOSIS — R00.0 TACHYCARDIA: ICD-10-CM

## 2025-04-04 RX ORDER — METOPROLOL SUCCINATE 25 MG/1
25 TABLET, EXTENDED RELEASE ORAL
Qty: 90 TABLET | Refills: 3 | Status: SHIPPED | OUTPATIENT
Start: 2025-04-04

## 2025-04-07 ENCOUNTER — TELEPHONE (OUTPATIENT)
Dept: HEMATOLOGY/ONCOLOGY | Facility: CLINIC | Age: 80
End: 2025-04-07
Payer: MEDICARE

## 2025-04-07 NOTE — TELEPHONE ENCOUNTER
Patient's family requesting hospice services. Orders sent to [facility]. All appointments here have been cancelled.

## 2025-04-08 ENCOUNTER — TELEPHONE (OUTPATIENT)
Dept: FAMILY MEDICINE | Facility: CLINIC | Age: 80
End: 2025-04-08
Payer: MEDICARE

## 2025-04-08 NOTE — TELEPHONE ENCOUNTER
Called to remind patient about his upcoming visit. He is getting evaluated today to go on Hospice. Appointment canceled.

## 2025-07-21 ENCOUNTER — TELEPHONE (OUTPATIENT)
Dept: HEMATOLOGY/ONCOLOGY | Facility: CLINIC | Age: 80
End: 2025-07-21
Payer: MEDICARE

## (undated) DEVICE — SOL NORMAL USPCA 0.9%

## (undated) DEVICE — SUT MONOCRYL PLUS UD 3-0 27

## (undated) DEVICE — BOWL STERILE LARGE 32OZ

## (undated) DEVICE — DRAIN CHEST WATER SEAL

## (undated) DEVICE — DRESSING TELFA N ADH 3X8IN

## (undated) DEVICE — Device

## (undated) DEVICE — SOL NACL IRR 1000ML BTL

## (undated) DEVICE — GLOVE PROTEXIS LTX MICRO  7.5

## (undated) DEVICE — SUT VICRYL 2-0 36 CT-1

## (undated) DEVICE — APPLICATOR ENDOSCP 32CM5MM2TIP

## (undated) DEVICE — SUT PROLENE 5-0 36IN C-1

## (undated) DEVICE — GLOVE PROTEXIS BLUE LATEX 7

## (undated) DEVICE — DRESSING TELFA N ADH 3X8

## (undated) DEVICE — ELECTRODE PATIENT RETURN DISP

## (undated) DEVICE — CLIP LIGATING MEDIUM

## (undated) DEVICE — DRESSING TELFA + BARR 4X6IN

## (undated) DEVICE — SUT VICRYL 1 OB 36 CTX

## (undated) DEVICE — SUT SILK 2.0 BLK 18

## (undated) DEVICE — ELECTRODE EXTENDED BLADE

## (undated) DEVICE — GLOVE PROTEXIS PI SYN SURG 7.5

## (undated) DEVICE — DRESSING TRANS 4X4 TEGADERM

## (undated) DEVICE — SUT VICRYL #2 TP-1 2-27IN

## (undated) DEVICE — CUSHION  WC FOAM 20X20X.75IN

## (undated) DEVICE — DRAPE STERI INCISE 23X23IN

## (undated) DEVICE — COVER C-ARM STRAP BAND 44X80IN

## (undated) DEVICE — RELOAD TRI-STAPLE 2.0 30MM

## (undated) DEVICE — GLOVE PROTEXIS NEOPRN SZ8

## (undated) DEVICE — TUBE SUCTION MEDI-VAC STERILE

## (undated) DEVICE — CATH ON-Q EXPANSION 5

## (undated) DEVICE — CLIP LIGATING HEMOCLP SMALL

## (undated) DEVICE — LIGATING CLIP LARGE

## (undated) DEVICE — SUT ETHBND XTRA 1 OS-8 30IN

## (undated) DEVICE — STAPLER RELOADABLE LINEAR 30M

## (undated) DEVICE — GLOVE PROTEXIS HYDROGEL SZ6.5

## (undated) DEVICE — NDL HYPO REG 25G X 1 1/2

## (undated) DEVICE — DRESSING TEGADERM 2 3/X2.75IN

## (undated) DEVICE — SYR 10CC LUER LOCK

## (undated) DEVICE — GLOVE PROTEXIS LTX MICRO  7

## (undated) DEVICE — TUNNELER SHEATH ON-Q 11GX8IN

## (undated) DEVICE — DRAPE FLUID WARMER 44X44IN

## (undated) DEVICE — KIT SURGICAL TURNOVER

## (undated) DEVICE — BLADE SURG STAINLESS STEEL #11

## (undated) DEVICE — NDL HYPO 22GX1 1/2 SYR 10ML LL

## (undated) DEVICE — CONTAINER SPECIMEN SCREW 4OZ

## (undated) DEVICE — TRAY CATH FOL SIL URIMTR 16FR

## (undated) DEVICE — BAG MEDI-PLAST DECANTER C-FLOW

## (undated) DEVICE — SPONGE LAP STRL 18X18IN

## (undated) DEVICE — DRESSING TELFA PAD N ADH 2X3

## (undated) DEVICE — CORD LAP 10 DISP

## (undated) DEVICE — DRESSING TEGADERM 4.4X5IN

## (undated) DEVICE — APPLICATOR SURG 2 SPRY 1 PLUNG

## (undated) DEVICE — SPONGE GAUZE 16PLY 4X4

## (undated) DEVICE — SOL IRRI STRL WATER 1000ML

## (undated) DEVICE — CATH THORACIC 28FR ST

## (undated) DEVICE — CATH THOR STND RGHT ANG 28F

## (undated) DEVICE — STAPLE TRI-STAPLE 2.0 45MM BLK